# Patient Record
Sex: FEMALE | Race: WHITE | Employment: OTHER | URBAN - METROPOLITAN AREA
[De-identification: names, ages, dates, MRNs, and addresses within clinical notes are randomized per-mention and may not be internally consistent; named-entity substitution may affect disease eponyms.]

---

## 2017-02-01 ENCOUNTER — ANESTHESIA EVENT (OUTPATIENT)
Dept: ENDOSCOPY | Age: 77
End: 2017-02-01
Payer: MEDICARE

## 2017-02-02 ENCOUNTER — ANESTHESIA (OUTPATIENT)
Dept: ENDOSCOPY | Age: 77
End: 2017-02-02
Payer: MEDICARE

## 2017-02-02 PROCEDURE — 74011250636 HC RX REV CODE- 250/636

## 2017-02-02 RX ORDER — PROPOFOL 10 MG/ML
INJECTION, EMULSION INTRAVENOUS AS NEEDED
Status: DISCONTINUED | OUTPATIENT
Start: 2017-02-02 | End: 2017-02-02 | Stop reason: HOSPADM

## 2017-02-02 RX ORDER — PROPOFOL 10 MG/ML
INJECTION, EMULSION INTRAVENOUS
Status: DISCONTINUED | OUTPATIENT
Start: 2017-02-02 | End: 2017-02-02 | Stop reason: HOSPADM

## 2017-02-02 RX ADMIN — PROPOFOL 200 MCG/KG/MIN: 10 INJECTION, EMULSION INTRAVENOUS at 13:38

## 2017-02-02 RX ADMIN — PROPOFOL 60 MG: 10 INJECTION, EMULSION INTRAVENOUS at 13:38

## 2017-02-02 NOTE — ANESTHESIA PREPROCEDURE EVALUATION
Anesthetic History   No history of anesthetic complications            Review of Systems / Medical History  Patient summary reviewed and pertinent labs reviewed    Pulmonary  Within defined limits                 Neuro/Psych   Within defined limits           Cardiovascular    Hypertension: well controlled              Exercise tolerance: >4 METS     GI/Hepatic/Renal     GERD: well controlled           Endo/Other    Diabetes: type 2  Hypothyroidism: well controlled  Obesity     Other Findings   Comments: Current Smoker? NO       Elective Surgery? Yes       Abstained from smoking 24 hours prior to anesthesia? N/A    Risk Factors for Postoperative nausea/vomiting:       History of postoperative nausea/vomiting? NO       Female? YES       Motion sickness? NO       Intended opioid administration for postoperative analgesia?   NO           Physical Exam    Airway  Mallampati: I  TM Distance: 4 - 6 cm  Neck ROM: normal range of motion   Mouth opening: Normal     Cardiovascular  Regular rate and rhythm,  S1 and S2 normal,  no murmur, click, rub, or gallop  Rhythm: regular  Rate: normal         Dental    Dentition: Caps/crowns     Pulmonary  Breath sounds clear to auscultation               Abdominal  GI exam deferred       Other Findings            Anesthetic Plan    ASA: 2  Anesthesia type: MAC          Induction: Intravenous  Anesthetic plan and risks discussed with: Patient

## 2017-04-20 ENCOUNTER — OFFICE VISIT (OUTPATIENT)
Dept: FAMILY MEDICINE CLINIC | Age: 77
End: 2017-04-20

## 2017-04-20 ENCOUNTER — HOSPITAL ENCOUNTER (OUTPATIENT)
Dept: LAB | Age: 77
Discharge: HOME | End: 2017-04-20
Payer: MEDICARE

## 2017-04-20 VITALS
OXYGEN SATURATION: 97 % | BODY MASS INDEX: 34.36 KG/M2 | RESPIRATION RATE: 16 BRPM | WEIGHT: 175 LBS | HEIGHT: 60 IN | HEART RATE: 72 BPM | DIASTOLIC BLOOD PRESSURE: 68 MMHG | SYSTOLIC BLOOD PRESSURE: 142 MMHG | TEMPERATURE: 97.7 F

## 2017-04-20 DIAGNOSIS — E11.9 DM TYPE 2, GOAL HBA1C < 7% (HCC): Primary | ICD-10-CM

## 2017-04-20 DIAGNOSIS — L29.9 PRURITIC DISORDER: ICD-10-CM

## 2017-04-20 DIAGNOSIS — N28.1 RENAL CYST: ICD-10-CM

## 2017-04-20 DIAGNOSIS — E11.9 DM TYPE 2, GOAL HBA1C < 7% (HCC): ICD-10-CM

## 2017-04-20 DIAGNOSIS — R10.10 PAIN OF UPPER ABDOMEN: ICD-10-CM

## 2017-04-20 DIAGNOSIS — E03.4 HYPOTHYROIDISM DUE TO ACQUIRED ATROPHY OF THYROID: ICD-10-CM

## 2017-04-20 DIAGNOSIS — K57.30 DIVERTICULOSIS OF LARGE INTESTINE WITHOUT HEMORRHAGE: ICD-10-CM

## 2017-04-20 DIAGNOSIS — I10 ESSENTIAL HYPERTENSION: ICD-10-CM

## 2017-04-20 LAB
ALBUMIN SERPL BCP-MCNC: 3.9 G/DL (ref 3.4–5)
ALBUMIN/GLOB SERPL: 0.9 {RATIO} (ref 0.8–1.7)
ALP SERPL-CCNC: 86 U/L (ref 45–117)
ALT SERPL-CCNC: 18 U/L (ref 13–56)
ANION GAP BLD CALC-SCNC: 13 MMOL/L (ref 3–18)
APPEARANCE UR: CLEAR
AST SERPL W P-5'-P-CCNC: 14 U/L (ref 15–37)
BASOPHILS # BLD AUTO: 0 K/UL (ref 0–0.06)
BASOPHILS # BLD: 0 % (ref 0–2)
BILIRUB SERPL-MCNC: 0.4 MG/DL (ref 0.2–1)
BILIRUB UR QL: NEGATIVE
BUN SERPL-MCNC: 14 MG/DL (ref 7–18)
BUN/CREAT SERPL: 15 (ref 12–20)
CALCIUM SERPL-MCNC: 9.4 MG/DL (ref 8.5–10.1)
CHLORIDE SERPL-SCNC: 103 MMOL/L (ref 100–108)
CHOLEST SERPL-MCNC: 146 MG/DL
CO2 SERPL-SCNC: 25 MMOL/L (ref 21–32)
COLOR UR: YELLOW
CREAT SERPL-MCNC: 0.94 MG/DL (ref 0.6–1.3)
DIFFERENTIAL METHOD BLD: NORMAL
EOSINOPHIL # BLD: 0.3 K/UL (ref 0–0.4)
EOSINOPHIL NFR BLD: 3 % (ref 0–5)
ERYTHROCYTE [DISTWIDTH] IN BLOOD BY AUTOMATED COUNT: 13.2 % (ref 11.6–14.5)
EST. AVERAGE GLUCOSE BLD GHB EST-MCNC: 137 MG/DL
GLOBULIN SER CALC-MCNC: 4.2 G/DL (ref 2–4)
GLUCOSE SERPL-MCNC: 112 MG/DL (ref 74–99)
GLUCOSE UR STRIP.AUTO-MCNC: NEGATIVE MG/DL
HBA1C MFR BLD: 6.4 % (ref 4.2–5.6)
HCT VFR BLD AUTO: 41.8 % (ref 35–45)
HDLC SERPL-MCNC: 57 MG/DL (ref 40–60)
HDLC SERPL: 2.6 {RATIO} (ref 0–5)
HGB BLD-MCNC: 13.6 G/DL (ref 12–16)
HGB UR QL STRIP: NEGATIVE
KETONES UR QL STRIP.AUTO: NEGATIVE MG/DL
LDLC SERPL CALC-MCNC: 68 MG/DL (ref 0–100)
LEUKOCYTE ESTERASE UR QL STRIP.AUTO: NEGATIVE
LIPASE SERPL-CCNC: 247 U/L (ref 73–393)
LIPID PROFILE,FLP: NORMAL
LYMPHOCYTES # BLD AUTO: 28 % (ref 21–52)
LYMPHOCYTES # BLD: 2.5 K/UL (ref 0.9–3.6)
MCH RBC QN AUTO: 28.8 PG (ref 24–34)
MCHC RBC AUTO-ENTMCNC: 32.5 G/DL (ref 31–37)
MCV RBC AUTO: 88.4 FL (ref 74–97)
MONOCYTES # BLD: 0.4 K/UL (ref 0.05–1.2)
MONOCYTES NFR BLD AUTO: 5 % (ref 3–10)
NEUTS SEG # BLD: 5.8 K/UL (ref 1.8–8)
NEUTS SEG NFR BLD AUTO: 64 % (ref 40–73)
NITRITE UR QL STRIP.AUTO: NEGATIVE
PH UR STRIP: 5 [PH] (ref 5–8)
PLATELET # BLD AUTO: 236 K/UL (ref 135–420)
PMV BLD AUTO: 10.6 FL (ref 9.2–11.8)
POTASSIUM SERPL-SCNC: 4.6 MMOL/L (ref 3.5–5.5)
PROT SERPL-MCNC: 8.1 G/DL (ref 6.4–8.2)
PROT UR STRIP-MCNC: NEGATIVE MG/DL
RBC # BLD AUTO: 4.73 M/UL (ref 4.2–5.3)
SODIUM SERPL-SCNC: 141 MMOL/L (ref 136–145)
SP GR UR REFRACTOMETRY: 1.01 (ref 1–1.03)
TRIGL SERPL-MCNC: 105 MG/DL (ref ?–150)
TSH SERPL DL<=0.05 MIU/L-ACNC: 4.76 UIU/ML (ref 0.36–3.74)
UROBILINOGEN UR QL STRIP.AUTO: 0.2 EU/DL (ref 0.2–1)
VLDLC SERPL CALC-MCNC: 21 MG/DL
WBC # BLD AUTO: 9 K/UL (ref 4.6–13.2)

## 2017-04-20 PROCEDURE — 82043 UR ALBUMIN QUANTITATIVE: CPT | Performed by: INTERNAL MEDICINE

## 2017-04-20 PROCEDURE — 80061 LIPID PANEL: CPT | Performed by: INTERNAL MEDICINE

## 2017-04-20 PROCEDURE — 83690 ASSAY OF LIPASE: CPT | Performed by: INTERNAL MEDICINE

## 2017-04-20 PROCEDURE — 81003 URINALYSIS AUTO W/O SCOPE: CPT | Performed by: INTERNAL MEDICINE

## 2017-04-20 PROCEDURE — 83036 HEMOGLOBIN GLYCOSYLATED A1C: CPT | Performed by: INTERNAL MEDICINE

## 2017-04-20 PROCEDURE — 80053 COMPREHEN METABOLIC PANEL: CPT | Performed by: INTERNAL MEDICINE

## 2017-04-20 PROCEDURE — 85025 COMPLETE CBC W/AUTO DIFF WBC: CPT | Performed by: INTERNAL MEDICINE

## 2017-04-20 PROCEDURE — 84443 ASSAY THYROID STIM HORMONE: CPT | Performed by: INTERNAL MEDICINE

## 2017-04-20 PROCEDURE — 36415 COLL VENOUS BLD VENIPUNCTURE: CPT | Performed by: INTERNAL MEDICINE

## 2017-04-20 RX ORDER — LOSARTAN POTASSIUM AND HYDROCHLOROTHIAZIDE 12.5; 5 MG/1; MG/1
1 TABLET ORAL DAILY
Qty: 30 TAB | Refills: 3 | Status: SHIPPED | OUTPATIENT
Start: 2017-04-20 | End: 2017-05-17 | Stop reason: DRUGHIGH

## 2017-04-20 RX ORDER — DOCUSATE SODIUM 100 MG/1
100 CAPSULE, LIQUID FILLED ORAL 2 TIMES DAILY
Qty: 60 CAP | Refills: 2 | Status: SHIPPED | OUTPATIENT
Start: 2017-04-20 | End: 2017-07-19

## 2017-04-20 RX ORDER — HYDROXYZINE HYDROCHLORIDE 10 MG/1
10 TABLET, FILM COATED ORAL
Qty: 30 TAB | Refills: 3 | Status: SHIPPED | OUTPATIENT
Start: 2017-04-20 | End: 2017-04-30

## 2017-04-20 NOTE — PROGRESS NOTES
Jerad Hardy is a 67 y.o.  female and presents with     Chief Complaint   Patient presents with    Diabetes    Hypothyroidism    Cholesterol Problem    Abdominal Pain       Pt is here to establish care. Pt used to see Dr Redd Burr. Pt has h/o DM, HTN,Hyperchol, hypothyroidsim. Pt was admitted to hospital in November fofr abdominal pain. Pt was treated for diverticulitis . Pt als ohad dialted CBD and had pancreatitis. Pt was supposed to get gallbladder removed at a later date. Pt had cyst in ovary - pt was referred to Obgyn. Pt has thickened endomterium - pt is supposed to get hypsteroscoy and D and C. Pt feels okay today. No chests pain ,SOB. Blood pressure and DM are doing okay      Past Medical History:   Diagnosis Date    Arthritis     Bronchitis     Diabetes ([address]     Diverticulitis     GERD (gastroesophageal reflux disease)     Hypercholesterolemia     Hypertension     Hypothyroid     Pancreatitis      Past Surgical History:   Procedure Laterality Date    COLONOSCOPY N/A 2/2/2017    COLONOSCOPY  w/bx polyp performed by Katherine Acosta MD at [facility] ENDOSCOPY     Current Outpatient Prescriptions   Medication Sig    losartan-hydroCHLOROthiazide (HYZAAR) 50-12.5 mg per tablet Take 1 Tab by mouth daily.  hydrOXYzine HCl (ATARAX) 10 mg tablet Take 1 Tab by mouth three (3) times daily as needed for Itching for up to 10 days.  docusate sodium (COLACE) 100 mg capsule Take 1 Cap by mouth two (2) times a day for 90 days.  levothyroxine (SYNTHROID) 25 mcg tablet Take  by mouth Daily (before breakfast).  cholecalciferol, vitamin D3, (VITAMIN D3) 2,000 unit tab Take  by mouth.  metFORMIN (GLUCOPHAGE) 1,000 mg tablet Take 1,000 mg by mouth two (2) times daily (with meals).  glipiZIDE (GLUCOTROL) 5 mg tablet Take 5 mg by mouth two (2) times a day.  simvastatin (ZOCOR) 20 mg tablet Take 20 mg by mouth nightly.  famotidine (PEPCID) 20 mg tablet Take 20 mg by mouth two (2) times a day.  loperamide (IMODIUM) 2 mg capsule Take 2 mg by mouth four (4) times daily as needed. No current facility-administered medications for this visit. Health Maintenance   Topic Date Due    FOOT EXAM Q1  07/04/1950    MICROALBUMIN Q1  [birth year]04/1950    EYE EXAM RETINAL OR DILATED Q1  07[birth year]04/1949    DTaP/Tdap/Td series (1 - Tdap) 07/04/1960    ZOSTER VACCINE AGE 60>  07/04/1999    GLAUCOMA SCREENING Q2Y  07/04/2004    OSTEOPOROSIS SCREENING (DEXA)  07/04/2004    Pneumococcal 65+ Low/Medium Risk (1 of 2 - PCV13) 07/04/2004    MEDICARE YEARLY EXAM  07/04/2004    HEMOGLOBIN A1C Q6M  05/07/2017    LIPID PANEL Q1  11/07/2017    INFLUENZA AGE 9 TO ADULT  Completed     Immunization History   Administered Date(s) Administered    Influenza Vaccine (Quad) PF 11/09/2016     No LMP recorded. Patient is postmenopausal.        Allergies and Intolerances: Allergies   Allergen Reactions    Ampicillin Itching       Family History:   Family History   Problem Relation Age of Onset    Diabetes Mother     Heart Disease Mother     Cancer Father      melanoma    Stroke Sister     Hypertension Sister     Diabetes Sister     Hypertension Brother     Diabetes Brother     Breast Cancer Paternal Grandmother      older, but not sure age.  Breast Cancer Paternal Aunt      and gyn cancer ?age       Social History:   She  reports that she has never smoked. She has never used smokeless tobacco.  She  reports that she does not drink alcohol.             Review of Systems:   General: negative for - chills, fatigue, fever, weight change  Psych: negative for - anxiety, depression, irritability or mood swings  ENT: negative for - headaches, hearing change, nasal congestion, oral lesions, sneezing or sore throat  Heme/ Lymph: negative for - bleeding problems, bruising, pallor or swollen lymph nodes  Endo: negative for - hot flashes, polydipsia/polyuria or temperature intolerance  Resp: negative for - cough, shortness of breath or wheezing  CV: negative for - chest pain, edema or palpitations  GI: negative for - abdominal pain, change in bowel habits, constipation, diarrhea or nausea/vomiting  : negative for - dysuria, hematuria, incontinence, pelvic pain or vulvar/vaginal symptoms  MSK: negative for - joint pain, joint swelling or muscle pain  Neuro: negative for - confusion, headaches, seizures or weakness  Derm: negative for - dry skin, hair changes, rash or skin lesion changes          Physical:   Vitals:   Vitals:    04/20/17 1016 04/20/17 1028   BP: 146/60 142/68   Pulse: 72    Resp: 16    Temp: 97.7 °F (36.5 °C)    TempSrc: Oral    SpO2: 97%    Weight: 175 lb (79.4 kg)    Height: 5' (1.524 m)            Exam:   HEENT- atraumatic,normocephalic, awake, oriented, well nourished  Neck - supple,no enlarged lymph nodes, no JVD, no thyromegaly  Chest- CTA, no rhonchi, no crackles  Heart- rrr, no murmurs / gallop/rub  Abdomen- soft,BS+,NT, no hepatosplenomegaly,mild distension   Ext - no c/c/edema   Neuro- no focal deficits. Power 5/5 all extremities  Skin - warm,dry, no obvious rashes.           Review of Data:   LABS:   Lab Results   Component Value Date/Time    WBC 6.9 11/09/2016 03:50 AM    HGB 10.6 11/09/2016 03:50 AM    HCT 32.3 11/09/2016 03:50 AM    PLATELET 845 08/29/1116 03:50 AM     Lab Results   Component Value Date/Time    Sodium 142 11/09/2016 03:50 AM    Potassium 3.8 11/09/2016 03:50 AM    Chloride 108 11/09/2016 03:50 AM    CO2 25 11/09/2016 03:50 AM    Glucose 122 11/09/2016 03:50 AM    BUN 9 11/09/2016 03:50 AM    Creatinine 0.78 11/09/2016 03:50 AM     Lab Results   Component Value Date/Time    Cholesterol, total 142 11/07/2016 03:49 AM    HDL Cholesterol 51 11/07/2016 03:49 AM    LDL, calculated 71.4 11/07/2016 03:49 AM    Triglyceride 98 11/07/2016 03:49 AM     No results found for: GPT        Impression / Plan:        ICD-10-CM ICD-9-CM    1. DM type 2, goal HbA1c < 7% ([facility]) E11.9 250.00 HEMOGLOBIN A1C WITH EAG METABOLIC PANEL, COMPREHENSIVE      LIPID PANEL      MICROALBUMIN, UR, RAND W/ MICROALBUMIN/CREA RATIO      CBC WITH AUTOMATED DIFF   2. Essential hypertension I10 401.9 losartan-hydroCHLOROthiazide (HYZAAR) 50-12.5 mg per tablet   3. Hypothyroidism due to acquired atrophy of thyroid E03.4 244.8 TSH 3RD GENERATION     246.8    4. Pain of upper abdomen R10.10 789.09 US ABD COMP      LIPASE   5. Diverticulosis of large intestine without hemorrhage K57.30 562.10 docusate sodium (COLACE) 100 mg capsule   6. Pruritic disorder L29.9 698.9 hydrOXYzine HCl (ATARAX) 10 mg tablet         Explained to patient risk benefits of the medications. Advised patient to stop meds if having any side effects. Pt verbalized understanding of the instructions. I have discussed the diagnosis with the patient and the intended plan as seen in the above orders. The patient has received an after-visit summary and questions were answered concerning future plans. I have discussed medication side effects and warnings with the patient as well. I have reviewed the plan of care with the patient, accepted their input and they are in agreement with the treatment goals. Reviewed plan of care. Patient has provided input and agrees with goals.     Follow-up Disposition: Not on Chan Edwards MD

## 2017-04-20 NOTE — MR AVS SNAPSHOT
Visit Information Date & Time Provider Department Dept. Phone Encounter #  
 4/20/2017 10:30 AM Heather Gusman [address] [phone] [id number] Follow-up Instructions Return in about 2 weeks (around 5/4/2017). Upcoming Health Maintenance Date Due  
 FOOT EXAM Q1 7/4/1950 MICROALBUMIN Q1 7/4/1950 EYE EXAM RETINAL OR DILATED Q1 7[birth year]4/1950 DTaP/Tdap/Td series (1 - Tdap) 7/4/1960 ZOSTER VACCINE AGE 60> 7/4/1999 GLAUCOMA SCREENING Q2Y 7/4/2004 OSTEOPOROSIS SCREENING (DEXA) 7/4/2004 Pneumococcal 65+ Low/Medium Risk (1 of 2 - PCV13) 7/4/2004 MEDICARE YEARLY EXAM 7/4/2004 HEMOGLOBIN A1C Q6M 5/7/2017 LIPID PANEL Q1 11/7/2017 Allergies as of 4/20/2017  Review Complete On: 4/20/2017 By: Heather Gusman MD  
  
 Severity Noted Reaction Type Reactions Ampicillin  08/22/2013    Itching Current Immunizations  Never Reviewed Name Date Influenza Vaccine (Quad) PF 11/9/2016 11:56 AM  
  
 Not reviewed this visit You Were Diagnosed With   
  
 Codes Comments DM type 2, goal HbA1c < 7% (HCC)    -  Primary ICD-10-CM: E11.9 ICD-9-CM: 250.00 Essential hypertension     ICD-10-CM: I10 
ICD-9-CM: 401.9 Hypothyroidism due to acquired atrophy of thyroid     ICD-10-CM: E03.4 ICD-9-CM: 244.8, 246.8 Pain of upper abdomen     ICD-10-CM: R10.10 ICD-9-CM: 789.09 Diverticulosis of large intestine without hemorrhage     ICD-10-CM: K57.30 ICD-9-CM: 562.10 Pruritic disorder     ICD-10-CM: L29.9 ICD-9-CM: 698.9 Renal cyst     ICD-10-CM: N28.1 ICD-9-CM: 753.10 Vitals BP Pulse Temp Resp Height(growth percentile) Weight(growth percentile) 142/68 72 97.7 °F (36.5 °C) (Oral) 16 5' (1.524 m) 175 lb (79.4 kg) SpO2 BMI OB Status Smoking Status 97% 34.18 kg/m2 Postmenopausal Never Smoker Vitals History BMI and BSA Data  Body Mass Index Body Surface Area  
 34.18 kg/m 2 1.83 m 2  
  
  
 Preferred Pharmacy Pharmacy Name Phone [facility] - [place], [address] [phone] Your Updated Medication List  
  
   
This list is accurate as of: 4/20/17 11:14 AM.  Always use your most recent med list.  
  
  
  
  
 docusate sodium 100 mg capsule Commonly known as:  Simonton Aldair Take 1 Cap by mouth two (2) times a day for 90 days. glipiZIDE 5 mg tablet Commonly known as:  Daphene Jang Take 5 mg by mouth two (2) times a day.  
  
 hydrOXYzine HCl 10 mg tablet Commonly known as:  ATARAX Take 1 Tab by mouth three (3) times daily as needed for Itching for up to 10 days. levothyroxine 25 mcg tablet Commonly known as:  SYNTHROID Take  by mouth Daily (before breakfast). loperamide 2 mg capsule Commonly known as:  IMODIUM Take 2 mg by mouth four (4) times daily as needed. losartan-hydroCHLOROthiazide 50-12.5 mg per tablet Commonly known as:  HYZAAR Take 1 Tab by mouth daily. metFORMIN 1,000 mg tablet Commonly known as:  GLUCOPHAGE Take 1,000 mg by mouth two (2) times daily (with meals). PEPCID 20 mg tablet Generic drug:  famotidine Take 20 mg by mouth two (2) times a day. simvastatin 20 mg tablet Commonly known as:  ZOCOR Take 20 mg by mouth nightly. VITAMIN D3 2,000 unit Tab Generic drug:  cholecalciferol (vitamin D3) Take  by mouth. Prescriptions Sent to Pharmacy Refills  
 losartan-hydroCHLOROthiazide (HYZAAR) 50-12.5 mg per tablet 3 Sig: Take 1 Tab by mouth daily. Class: Normal  
 [facility] [id number] [facility] [address] Ph #: [phone] Route: Oral  
 hydrOXYzine HCl (ATARAX) 10 mg tablet 3 Sig: Take 1 Tab by mouth three (3) times daily as needed for Itching for up to 10 days. Class: Normal  
 [id number] [facility] [address] Ph #: [phone]  Route: Oral  
 docusate sodium (COLACE) 100 mg capsule 2  
 Sig: Take 1 Cap by mouth two (2) times a day for 90 days. Class: Normal  
 Pharmacy: [address]  #: [phone] Route: Oral  
  
Follow-up Instructions Return in about 2 weeks (around 5/4/2017). To-Do List   
 04/20/2017 Lab:  CBC WITH AUTOMATED DIFF   
  
 04/20/2017 Lab:  HEMOGLOBIN A1C WITH EAG   
  
 04/20/2017 Lab:  LIPASE   
  
 04/20/2017 Lab:  LIPID PANEL   
  
 04/20/2017 Lab:  METABOLIC PANEL, COMPREHENSIVE   
  
 04/20/2017 Lab:  MICROALBUMIN, UR, RAND W/ MICROALBUMIN/CREA RATIO   
  
 04/20/2017 Lab:  TSH 3RD GENERATION   
  
 04/20/2017 Lab:  URINALYSIS W/ RFLX MICROSCOPIC   
  
 04/20/2017 Imaging:  [facility]! Alissa Yousif introduces [employer] patient portal. Now you can access parts of your medical record, email your doctor's office, and request medication refills online. 1. In your internet browser, go to [email or web address] 2. Click on the First Time User? Click Here link in the Sign In box. You will see the New Member Sign Up page. 3. Enter your [employer] Access Code exactly as it appears below. You will not need to use this code after youve completed the sign-up process. If you do not sign up before the expiration date, you must request a new code. · [employer] Access Code: [email or web address] Expires: 5/8/2017  4:23 PM 
 
4. Enter the last four digits of your Social Security Number (xxxx) and Date of Birth (mm/dd/yyyy) as indicated and click Submit. You will be taken to the next sign-up page. 5. Create a [employer] ID. This will be your [employer] login ID and cannot be changed, so think of one that is secure and easy to remember. 6. Create a [employer] password. You can change your password at any time. 7. Enter your Password Reset Question and Answer. This can be used at a later time if you forget your password. 8. Enter your e-mail address. You will receive e-mail notification when new information is available in [address] 9. Click Sign Up. You can now view and download portions of your medical record. 10. Click the Download Summary menu link to download a portable copy of your medical information. If you have questions, please visit the Frequently Asked Questions section of the APR website. Remember, APR is NOT to be used for urgent needs. For medical emergencies, dial 911. Now available from your iPhone and Android! Please provide this summary of care documentation to your next provider. Your primary care clinician is listed as Shakila Anders. If you have any questions after today's visit, please call [phone].

## 2017-04-21 LAB
CREAT UR-MCNC: 114 MG/DL (ref 30–125)
MICROALBUMIN UR-MCNC: 9.7 MG/DL (ref 0–3)
MICROALBUMIN/CREAT UR-RTO: 85 MG/G (ref 0–30)

## 2017-05-01 ENCOUNTER — HOSPITAL ENCOUNTER (OUTPATIENT)
Dept: ULTRASOUND IMAGING | Age: 77
Discharge: HOME | End: 2017-05-01
Attending: INTERNAL MEDICINE
Payer: MEDICARE

## 2017-05-01 DIAGNOSIS — R10.10 PAIN OF UPPER ABDOMEN: ICD-10-CM

## 2017-05-01 PROCEDURE — 76700 US EXAM ABDOM COMPLETE: CPT

## 2017-05-03 ENCOUNTER — OFFICE VISIT (OUTPATIENT)
Dept: FAMILY MEDICINE CLINIC | Age: 77
End: 2017-05-03

## 2017-05-03 VITALS
BODY MASS INDEX: 34.36 KG/M2 | RESPIRATION RATE: 18 BRPM | DIASTOLIC BLOOD PRESSURE: 66 MMHG | OXYGEN SATURATION: 96 % | TEMPERATURE: 96.4 F | HEART RATE: 70 BPM | HEIGHT: 60 IN | SYSTOLIC BLOOD PRESSURE: 136 MMHG | WEIGHT: 175 LBS

## 2017-05-03 DIAGNOSIS — Z71.89 ADVANCE CARE PLANNING: ICD-10-CM

## 2017-05-03 DIAGNOSIS — K59.00 CONSTIPATION, UNSPECIFIED CONSTIPATION TYPE: ICD-10-CM

## 2017-05-03 DIAGNOSIS — Z13.39 SCREENING FOR ALCOHOLISM: ICD-10-CM

## 2017-05-03 DIAGNOSIS — Z00.00 MEDICARE ANNUAL WELLNESS VISIT, SUBSEQUENT: Primary | ICD-10-CM

## 2017-05-03 DIAGNOSIS — Z23 ENCOUNTER FOR IMMUNIZATION: ICD-10-CM

## 2017-05-03 DIAGNOSIS — Z00.00 ROUTINE GENERAL MEDICAL EXAMINATION AT A HEALTH CARE FACILITY: ICD-10-CM

## 2017-05-03 DIAGNOSIS — E03.4 HYPOTHYROIDISM DUE TO ACQUIRED ATROPHY OF THYROID: ICD-10-CM

## 2017-05-03 RX ORDER — LEVOTHYROXINE SODIUM 50 UG/1
50 TABLET ORAL
Qty: 30 TAB | Refills: 3 | Status: SHIPPED | OUTPATIENT
Start: 2017-05-03 | End: 2017-05-05 | Stop reason: DRUGHIGH

## 2017-05-03 NOTE — MR AVS SNAPSHOT
Visit Information Date & Time Provider Department Dept. Phone Encounter #  
 5/3/2017 11:00 AM [address], [address] [facility] [phone] [id number] Follow-up Instructions Return in 3 months (on 8/3/2017). Upcoming Health Maintenance Date Due  
 FOOT EXAM Q1 7/4/1950 EYE EXAM RETINAL OR DILATED Q1 7/4/1950 DTaP/Tdap/Td series (1 - Tdap) 7/4/1960 ZOSTER VACCINE AGE 60> 7/4/1999 GLAUCOMA SCREENING Q2Y 7/4/2004 OSTEOPOROSIS SCREENING (DEXA) 7/4/2004 Pneumococcal 65+ Low/Medium Risk (1 of 2 - PCV13) 7/4/2004 MEDICARE YEARLY EXAM 7/4/2004 INFLUENZA AGE 9 TO ADULT 8/1/2017 HEMOGLOBIN A1C Q6M 10/20/2017 MICROALBUMIN Q1 4/20/2018 LIPID PANEL Q1 4/20/2018 Allergies as of 5/3/2017  Review Complete On: 5/3/2017 By: Yumiko Pimentel LPN Severity Noted Reaction Type Reactions Ampicillin  08/22/2013    Itching Current Immunizations  Never Reviewed Name Date Influenza Vaccine (Quad) PF 11/9/2016 11:56 AM  
  
 Not reviewed this visit You Were Diagnosed With   
  
 Codes Comments Medicare annual wellness visit, subsequent    -  Primary ICD-10-CM: Z00.00 ICD-9-CM: V70.0 Hypothyroidism due to acquired atrophy of thyroid     ICD-10-CM: E03.4 ICD-9-CM: 244.8, 246.8 Advance care planning     ICD-10-CM: Z71.89 ICD-9-CM: V65.49 Encounter for immunization     ICD-10-CM: L35 ICD-9-CM: V03.89 Routine general medical examination at a health care facility     ICD-10-CM: Z00.00 ICD-9-CM: V70.0 Screening for alcoholism     ICD-10-CM: Z13.89 ICD-9-CM: V79.1 Vitals BP Pulse Temp Resp Height(growth percentile) Weight(growth percentile) 136/66 70 96.4 °F (35.8 °C) (Oral) 18 5' (1.524 m) 175 lb (79.4 kg) SpO2 BMI OB Status Smoking Status 96% 34.18 kg/m2 Postmenopausal Never Smoker Vitals History BMI and BSA Data  Body Mass Index Body Surface Area  
 34.18 kg/m 2 1.83 m 2  
  
  
 Preferred Pharmacy Pharmacy Name Phone [facility], [address] [place] [address] [phone] Your Updated Medication List  
  
   
This list is accurate as of: 5/3/17 11:52 AM.  Always use your most recent med list.  
  
  
  
  
 diph,Pertuss(AC),Tet Vac-PF 2.5-8-5 Lf-mcg suspension Commonly known as:  BOOSTRIX  
0.5 mL by IntraMUSCular route PRIOR TO DISCHARGE for 1 dose. docusate sodium 100 mg capsule Commonly known as:  Genice Flax Take 1 Cap by mouth two (2) times a day for 90 days. glipiZIDE 5 mg tablet Commonly known as:  Pilar Craw Take 5 mg by mouth two (2) times a day. levothyroxine 50 mcg tablet Commonly known as:  SYNTHROID Take 1 Tab by mouth Daily (before breakfast). loperamide 2 mg capsule Commonly known as:  IMODIUM Take 2 mg by mouth four (4) times daily as needed. losartan-hydroCHLOROthiazide 50-12.5 mg per tablet Commonly known as:  HYZAAR Take 1 Tab by mouth daily. metFORMIN 1,000 mg tablet Commonly known as:  GLUCOPHAGE Take 1,000 mg by mouth two (2) times daily (with meals). PEPCID 20 mg tablet Generic drug:  famotidine Take 20 mg by mouth two (2) times a day. simvastatin 20 mg tablet Commonly known as:  ZOCOR Take 20 mg by mouth nightly. varicella zoster vacine live 19,400 unit/0.65 mL Susr injection Commonly known as:  ZOSTAVAX  
1 Vial by SubCUTAneous route once for 1 dose. VITAMIN D3 2,000 unit Tab Generic drug:  cholecalciferol (vitamin D3) Take  by mouth. Prescriptions Printed Refills diph,Pertuss,AC,,Tet Vac-PF (BOOSTRIX) 2.5-8-5 Lf-mcg suspension 0 Si.5 mL by IntraMUSCular route PRIOR TO DISCHARGE for 1 dose. Class: Print Route: IntraMUSCular  
 varicella zoster vacine live (ZOSTAVAX) 19,400 unit/0.65 mL susr injection 0 Si Vial by SubCUTAneous route once for 1 dose. Class: Print Route: SubCUTAneous Prescriptions Sent to Pharmacy Refills  
 levothyroxine (SYNTHROID) 50 mcg tablet 3 Sig: Take 1 Tab by mouth Daily (before breakfast). Class: Normal  
 Pharmacy: [facility], [address] [place] [address] #: [phone] Route: Oral  
  
We Performed the Following ADVANCE CARE PLANNING FIRST 30 MINS [21159 CPT(R)] Follow-up Instructions Return in 3 months (on 8/3/2017). Patient Instructions Medicare Wellness Visit, Female The best way to live healthy is to have a healthy lifestyle by eating a well-balanced diet, exercising regularly, limiting alcohol and stopping smoking. Regular physical exams and screening tests are another way to keep healthy. Preventive exams provided by your health care provider can find health problems before they become diseases or illnesses. Preventive services including immunizations, screening tests, monitoring and exams can help you take care of your own health. All people over age 72 should have a pneumovax  and and a prevnar shot to prevent pneumonia. These are once in a lifetime unless you and your provider decide differently. All people over 65 should have a yearly flu shot and a tetanus vaccine every 10 years. A bone mass density to screen for osteoporosis or thinning of the bones should be done every 2 years after 65. Screening for diabetes mellitus with a blood sugar test should be done every year. Glaucoma is a disease of the eye due to increased ocular pressure that can lead to blindness and it should be done every year by an eye professional. 
 
Cardiovascular screening tests that check for elevated lipids (fatty part of blood) which can lead to heart disease and strokes should be done every 5 years.  
 
Colorectal screening that evaluates for blood or polyps in your colon should be done yearly as a stool test or every five years as a flexible sigmoidoscope or every 10 years as a colonoscopy up to age 76. Breast cancer screening with a mammogram is recommended biennially  for women age 54-69. Screening for cervical cancer with a pap smear and pelvic exam is recommended for women after age 72 years every 2 years up to age 79 or when the provider and patient decide to stop. If there is a history of cervical abnormalities or other increased risk for cancer then the test is recommended yearly. Hepatitis C screening is also recommended for anyone born between [birth year] through [address]. A shingles vaccine is also recommended once in a lifetime after age 61. Your Medicare Wellness Exam is recommended annually. Here is a list of your current Health Maintenance items with a due date: 
Health Maintenance Due Topic Date Due  
 Diabetic Foot Care  07[birth year]04/1950 [facility] Eye Exam  07/04/1950  
 DTaP/Tdap/Td  (1 - Tdap) 07/04/1960  Shingles Vaccine  07/04/1999  Glaucoma Screening   07/04/2004  Bone Density Screening  07/04/2004  Pneumococcal Vaccine (1 of 2 - PCV13) 07/04/2004 [facility] Annual Well Visit  07/04/2004 Introducing [facility]! Citlalli Couch introduces [employer] patient portal. Now you can access parts of your medical record, email your doctor's office, and request medication refills online. 1. In your internet browser, go to [email or web address] 2. Click on the First Time User? Click Here link in the Sign In box. You will see the New Member Sign Up page. 3. Enter your [employer] Access Code exactly as it appears below. You will not need to use this code after youve completed the sign-up process. If you do not sign up before the expiration date, you must request a new code. · [employer] Access Code: [email or web address] Expires: 5/8/2017  4:23 PM 
 
4.  Enter the last four digits of your Social Security Number (xxxx) and Date of Birth (mm/dd/yyyy) as indicated and click Submit. You will be taken to the next sign-up page. 5. Create a [employer] ID. This will be your [employer] login ID and cannot be changed, so think of one that is secure and easy to remember. 6. Create a [employer] password. You can change your password at any time. 7. Enter your Password Reset Question and Answer. This can be used at a later time if you forget your password. 8. Enter your e-mail address. You will receive e-mail notification when new information is available in [address] 9. Click Sign Up. You can now view and download portions of your medical record. 10. Click the Download Summary menu link to download a portable copy of your medical information. If you have questions, please visit the Frequently Asked Questions section of the [employer] website. Remember, [employer] is NOT to be used for urgent needs. For medical emergencies, dial 911. Now available from your iPhone and Android! Please provide this summary of care documentation to your next provider. Your primary care clinician is listed as Elena Fleischer. If you have any questions after today's visit, please call [phone].

## 2017-05-03 NOTE — PATIENT INSTRUCTIONS
Medicare Wellness Visit, Female    The best way to live healthy is to have a healthy lifestyle by eating a well-balanced diet, exercising regularly, limiting alcohol and stopping smoking. Regular physical exams and screening tests are another way to keep healthy. Preventive exams provided by your health care provider can find health problems before they become diseases or illnesses. Preventive services including immunizations, screening tests, monitoring and exams can help you take care of your own health. All people over age 72 should have a pneumovax  and and a prevnar shot to prevent pneumonia. These are once in a lifetime unless you and your provider decide differently. All people over 65 should have a yearly flu shot and a tetanus vaccine every 10 years. A bone mass density to screen for osteoporosis or thinning of the bones should be done every 2 years after 65. Screening for diabetes mellitus with a blood sugar test should be done every year. Glaucoma is a disease of the eye due to increased ocular pressure that can lead to blindness and it should be done every year by an eye professional.    Cardiovascular screening tests that check for elevated lipids (fatty part of blood) which can lead to heart disease and strokes should be done every 5 years. Colorectal screening that evaluates for blood or polyps in your colon should be done yearly as a stool test or every five years as a flexible sigmoidoscope or every 10 years as a colonoscopy up to age 76. Breast cancer screening with a mammogram is recommended biennially  for women age 54-69. Screening for cervical cancer with a pap smear and pelvic exam is recommended for women after age 72 years every 2 years up to age 79 or when the provider and patient decide to stop. If there is a history of cervical abnormalities or other increased risk for cancer then the test is recommended yearly.     Hepatitis C screening is also recommended for anyone born between [facility]. A shingles vaccine is also recommended once in a lifetime after age 61. Your Medicare Wellness Exam is recommended annually.     Here is a list of your current Health Maintenance items with a due date:  Health Maintenance Due   Topic Date Due    Diabetic Foot Care  07/04/1950   Marie Gunning Eye Exam  07/04/1950    DTaP/Tdap/Td  (1 - Tdap) 07/04/1960    Shingles Vaccine  07/04/1999    Glaucoma Screening   07/04/2004    Bone Density Screening  07/04/2004    Pneumococcal Vaccine (1 of 2 - PCV13) 07/04/2004    Annual Well Visit  07/04/2004

## 2017-05-03 NOTE — PROGRESS NOTES
This is a Subsequent Medicare Annual Wellness Visit providing Personalized Prevention Plan Services (PPPS) (Performed 12 months after initial AWV and PPPS )    I have reviewed the patient's medical history in detail and updated the computerized patient record. History     Past Medical History:   Diagnosis Date    Arthritis     Bronchitis     Diabetes ([address]     Diverticulitis     GERD (gastroesophageal reflux disease)     Hypercholesterolemia     Hypertension     Hypothyroid     Pancreatitis       Past Surgical History:   Procedure Laterality Date    COLONOSCOPY N/A 2/2/2017    COLONOSCOPY  w/bx polyp performed by Melisa De La Rosa MD at [facility] ENDOSCOPY     Current Outpatient Prescriptions   Medication Sig Dispense Refill    diph,Pertuss,AC,,Tet Vac-PF (BOOSTRIX) 2.5-8-5 Lf-mcg suspension 0.5 mL by IntraMUSCular route PRIOR TO DISCHARGE for 1 dose. 0.5 mL 0    docusate sodium (COLACE) 100 mg capsule Take 1 Cap by mouth two (2) times a day for 90 days. 60 Cap 2    cholecalciferol, vitamin D3, (VITAMIN D3) 2,000 unit tab Take  by mouth.  metFORMIN (GLUCOPHAGE) 1,000 mg tablet Take 1,000 mg by mouth two (2) times daily (with meals).  glipiZIDE (GLUCOTROL) 5 mg tablet Take 5 mg by mouth two (2) times a day.  simvastatin (ZOCOR) 20 mg tablet Take 20 mg by mouth nightly.  loperamide (IMODIUM) 2 mg capsule Take 2 mg by mouth four (4) times daily as needed.  pantoprazole (PROTONIX) 40 mg tablet Take 1 Tab by mouth daily. 30 Tab 3    losartan (COZAAR) 50 mg tablet Take 1 Tab by mouth daily. 30 Tab 3    hydroCHLOROthiazide (HYDRODIURIL) 25 mg tablet Take 1 Tab by mouth daily. 30 Tab 3    levothyroxine (SYNTHROID) 75 mcg tablet Take 1 Tab by mouth Daily (before breakfast).  30 Tab 3     Allergies   Allergen Reactions    Ampicillin Itching     Family History   Problem Relation Age of Onset    Diabetes Mother     Heart Disease Mother     Cancer Father      melanoma    Stroke Sister  Hypertension Sister     Diabetes Sister     Hypertension Brother     Diabetes Brother     Breast Cancer Paternal Grandmother      older, but not sure age. 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 No    Do you average more than 7 drinks per week? No      Functional Ability and Level of Safety:     Hearing Loss   mild, none    Activities of Daily Living   Self-care. Requires assistance with: no ADLs    Fall Risk     Fall Risk Assessment, last 12 mths 4/20/2017   Able to walk? Yes   Fall in past 12 months? No     Abuse Screen   Patient is not abused    Review of Systems   A comprehensive review of systems was negative. Physical Examination     Evaluation of Cognitive Function:  Mood/affect:  neutral  Appearance: age appropriate  Family member/caregiver input: none    Visit Vitals    /66  Comment: left arm recheck    Pulse 70    Temp 96.4 °F (35.8 °C) (Oral)    Resp 18    Ht 5' (1.524 m)    Wt 175 lb (79.4 kg)    SpO2 96%    BMI 34.18 kg/m2     General:  Alert, cooperative, no distress, appears stated age.    Head:  Normocephalic, without obvious abnormality, atraumatic. Eyes:  Conjunctivae/corneas clear. PERRL, EOMs intact. Fundi benign. Ears:  Normal TMs and external ear canals both ears. Nose: Nares normal. Septum midline. Mucosa normal. No drainage or sinus tenderness. Throat: Lips, mucosa, and tongue normal. Teeth and gums normal.   Neck: Supple, symmetrical, trachea midline, no adenopathy, thyroid: no enlargement/tenderness/nodules, no carotid bruit and no JVD. Back:   Symmetric, no curvature. ROM normal. No CVA tenderness. Lungs:   Clear to auscultation bilaterally. Chest wall:  No tenderness or deformity. Heart:  Regular rate and rhythm, S1, S2 normal, no murmur, click, rub or gallop. Abdomen:   Soft, non-tender. Bowel sounds normal. No masses,  No organomegaly. Extremities: Extremities normal, atraumatic, no cyanosis or edema. Pulses: 2+ and symmetric all extremities. Skin: Skin color, texture, turgor normal. No rashes or lesions. Lymph nodes: Cervical, supraclavicular, and axillary nodes normal.   Neurologic: CNII-XII intact. Normal strength, sensation and reflexes throughout. Patient Care Team:  Chad Hough MD as PCP - General (Internal Medicine)  Herman Kelley MD (General Surgery)    Advice/Referrals/Counseling   Education and counseling provided:  Are appropriate based on today's review and evaluation      Assessment/Plan   current treatment plan is effective, no change in therapy.        ----------------------------------------------      Chronic Condition    Mumtaz Craig is a 65 y.o.  female and presents with     Chief Complaint   Patient presents with    Constipation    Gas    Hypothyroidism       Pt does have constipation but is not taking stool softeners. Pt is taking thyroid meds but labs are off. Pt is supposed to get D and C. Pt is here for clearance for surgery.       Past Medical History:   Diagnosis Date    Arthritis     Bronchitis     Diabetes ([address]     Diverticulitis  GERD (gastroesophageal reflux disease)     Hypercholesterolemia     Hypertension     Hypothyroid     Pancreatitis      Past Surgical History:   Procedure Laterality Date    COLONOSCOPY N/A 2/2/2017    COLONOSCOPY  w/bx polyp performed by Sanjana Tello [facility] at [facility] ENDOSCOPY     Current Outpatient Prescriptions   Medication Sig    diph,Pertuss,AC,,Tet Vac-PF (BOOSTRIX) 2.5-8-5 Lf-mcg suspension 0.5 mL by IntraMUSCular route PRIOR TO DISCHARGE for 1 dose.  docusate sodium (COLACE) 100 mg capsule Take 1 Cap by mouth two (2) times a day for 90 days.  cholecalciferol, vitamin D3, (VITAMIN D3) 2,000 unit tab Take  by mouth.  metFORMIN (GLUCOPHAGE) 1,000 mg tablet Take 1,000 mg by mouth two (2) times daily (with meals).  glipiZIDE (GLUCOTROL) 5 mg tablet Take 5 mg by mouth two (2) times a day.  simvastatin (ZOCOR) 20 mg tablet Take 20 mg by mouth nightly.  loperamide (IMODIUM) 2 mg capsule Take 2 mg by mouth four (4) times daily as needed.  pantoprazole (PROTONIX) 40 mg tablet Take 1 Tab by mouth daily.  losartan (COZAAR) 50 mg tablet Take 1 Tab by mouth daily.  hydroCHLOROthiazide (HYDRODIURIL) 25 mg tablet Take 1 Tab by mouth daily.  levothyroxine (SYNTHROID) 75 mcg tablet Take 1 Tab by mouth Daily (before breakfast). No current facility-administered medications for this visit.       Health Maintenance   Topic Date Due    FOOT EXAM Q1  07/04/1950    EYE EXAM RETINAL OR DILATED Q1  [birth year]04/1950    GLAUCOMA SCREENING Q2Y  07/04/2004    OSTEOPOROSIS SCREENING (DEXA)  07/04/2004    INFLUENZA AGE 9 TO ADULT  08/01/2017    HEMOGLOBIN A1C Q6M  10/20/2017    MICROALBUMIN Q1  04/20/2018    LIPID PANEL Q1  04/20/2018    MEDICARE YEARLY EXAM  05/04/2018    Pneumococcal 65+ Low/Medium Risk (2 of 2 - PPSV23) 05/17/2018    DTaP/Tdap/Td series (2 - Td) 05/13/2027    ZOSTER VACCINE AGE 60>  Completed     Immunization History   Administered Date(s) Administered    Influenza Vaccine (Quad) PF 11/09/2016    Pneumococcal Conjugate (PCV-13) 05/17/2017     No LMP recorded. Patient is postmenopausal.        Allergies and Intolerances: Allergies   Allergen Reactions    Ampicillin Itching       Family History:   Family History   Problem Relation Age of Onset    Diabetes Mother     Heart Disease Mother     Cancer Father      melanoma    Stroke Sister     Hypertension Sister     Diabetes Sister     Hypertension Brother     Diabetes Brother     Breast Cancer Paternal Grandmother      older, but not sure age.  Breast Cancer Paternal Aunt      and gyn cancer ?age       Social History:   She  reports that she has never smoked. She has never used smokeless tobacco.  She  reports that she does not drink alcohol.             Review of Systems:   General: negative for - chills, fatigue, fever, weight change  Psych: negative for - anxiety, depression, irritability or mood swings  ENT: negative for - headaches, hearing change, nasal congestion, oral lesions, sneezing or sore throat  Heme/ Lymph: negative for - bleeding problems, bruising, pallor or swollen lymph nodes  Endo: negative for - hot flashes, polydipsia/polyuria or temperature intolerance  Resp: negative for - cough, shortness of breath or wheezing  CV: negative for - chest pain, edema or palpitations  GI: negative for - abdominal pain, change in bowel habits, pos for constipation, neg for diarrhea or nausea/vomiting  : negative for - dysuria, hematuria, incontinence, pelvic pain or vulvar/vaginal symptoms  MSK: negative for - joint pain, joint swelling or muscle pain  Neuro: negative for - confusion, headaches, seizures or weakness  Derm: negative for - dry skin, hair changes, rash or skin lesion changes          Physical:   Vitals:   Vitals:    05/03/17 1049 05/03/17 1056   BP: 140/64 136/66   Pulse: 70    Resp: 18    Temp: 96.4 °F (35.8 °C)    TempSrc: Oral    SpO2: 96%    Weight: 175 lb (79.4 kg)    Height: 5' (1.524 m) Exam:   HEENT- atraumatic,normocephalic, awake, oriented, well nourished  Neck - supple,no enlarged lymph nodes, no JVD, no thyromegaly  Chest- CTA, no rhonchi, no crackles  Heart- rrr, no murmurs / gallop/rub  Abdomen- soft,BS+,NT, no hepatosplenomegaly  Ext - no c/c/edema   Neuro- no focal deficits. Power 5/5 all extremities  Skin - warm,dry, no obvious rashes. Review of Data:   LABS:   Lab Results   Component Value Date/Time    WBC 10.7 06/13/2017 01:27 PM    HGB 13.6 06/13/2017 01:27 PM    HCT 41.0 06/13/2017 01:27 PM    PLATELET 098 84/24/4138 01:27 PM     Lab Results   Component Value Date/Time    Sodium 141 04/20/2017 11:23 AM    Potassium 4.6 04/20/2017 11:23 AM    Chloride 103 04/20/2017 11:23 AM    CO2 25 04/20/2017 11:23 AM    Glucose 112 04/20/2017 11:23 AM    BUN 14 04/20/2017 11:23 AM    Creatinine 0.94 04/20/2017 11:23 AM     Lab Results   Component Value Date/Time    Cholesterol, total 146 04/20/2017 11:23 AM    HDL Cholesterol 57 04/20/2017 11:23 AM    LDL, calculated 68 04/20/2017 11:23 AM    Triglyceride 105 04/20/2017 11:23 AM     No results found for: GPT        Impression / Plan:      DM- stable  Hypothyroisim - increase levoxyl  Constipation , diverticulitis  - needs to take stool softener. Endometrial thickening - for hysterectomy and  D and C - I do not find contraindiaction to D andC. Gallstone - asymptoamtic  Renal cyst - simple , stable in size    Explained to patient risk benefits of the medications. Advised patient to stop meds if having any side effects. Pt verbalized understanding of the instructions. I have discussed the diagnosis with the patient and the intended plan as seen in the above orders. The patient has received an after-visit summary and questions were answered concerning future plans. I have discussed medication side effects and warnings with the patient as well.  I have reviewed the plan of care with the patient, accepted their input and they are in agreement with the treatment goals. Reviewed plan of care. Patient has provided input and agrees with goals. Follow-up Disposition:  Return in 3 months (on 8/3/2017).     [id number] Lewis MD

## 2017-05-03 NOTE — PROGRESS NOTES
1. Have you been to the ER, urgent care clinic since your last visit? Hospitalized since your last visit? No    2. Have you seen or consulted any other health care providers outside of the [address] [place] [address] since your last visit? Include any pap smears or colon screening.  No      Patient presents for

## 2017-05-05 ENCOUNTER — TELEPHONE (OUTPATIENT)
Dept: FAMILY MEDICINE CLINIC | Age: 77
End: 2017-05-05

## 2017-05-05 DIAGNOSIS — E03.4 HYPOTHYROIDISM DUE TO ACQUIRED ATROPHY OF THYROID: Primary | ICD-10-CM

## 2017-05-05 RX ORDER — LEVOTHYROXINE SODIUM 75 UG/1
75 TABLET ORAL
Qty: 30 TAB | Refills: 3 | Status: SHIPPED | OUTPATIENT
Start: 2017-05-05 | End: 2017-08-13 | Stop reason: SDUPTHER

## 2017-05-05 NOTE — TELEPHONE ENCOUNTER
I called patient but she did not answer. I left a voicemail stating that her synthroid was increased and sent to [facility]. Closing encounter.

## 2017-05-05 NOTE — TELEPHONE ENCOUNTER
Ms Sosa Sanchez called stating that levothyroxine was sent over but dose needs to be increased. Please advise.

## 2017-05-16 RX ORDER — SODIUM CHLORIDE 0.9 % (FLUSH) 0.9 %
5-10 SYRINGE (ML) INJECTION AS NEEDED
Status: CANCELLED | OUTPATIENT
Start: 2017-05-16

## 2017-05-16 RX ORDER — SODIUM CHLORIDE 0.9 % (FLUSH) 0.9 %
5-10 SYRINGE (ML) INJECTION EVERY 8 HOURS
Status: CANCELLED | OUTPATIENT
Start: 2017-05-16

## 2017-05-16 RX ORDER — SODIUM CHLORIDE, SODIUM LACTATE, POTASSIUM CHLORIDE, CALCIUM CHLORIDE 600; 310; 30; 20 MG/100ML; MG/100ML; MG/100ML; MG/100ML
150 INJECTION, SOLUTION INTRAVENOUS CONTINUOUS
Status: CANCELLED | OUTPATIENT
Start: 2017-05-23 | End: 2017-05-24

## 2017-05-17 ENCOUNTER — HOSPITAL ENCOUNTER (OUTPATIENT)
Dept: PREADMISSION TESTING | Age: 77
Discharge: HOME | End: 2017-05-17
Payer: MEDICARE

## 2017-05-17 ENCOUNTER — CLINICAL SUPPORT (OUTPATIENT)
Dept: FAMILY MEDICINE CLINIC | Age: 77
End: 2017-05-17

## 2017-05-17 VITALS
RESPIRATION RATE: 15 BRPM | HEIGHT: 60 IN | TEMPERATURE: 97.8 F | BODY MASS INDEX: 35.06 KG/M2 | HEART RATE: 72 BPM | WEIGHT: 178.6 LBS | SYSTOLIC BLOOD PRESSURE: 150 MMHG | DIASTOLIC BLOOD PRESSURE: 80 MMHG | OXYGEN SATURATION: 95 %

## 2017-05-17 DIAGNOSIS — Z23 ENCOUNTER FOR IMMUNIZATION: ICD-10-CM

## 2017-05-17 DIAGNOSIS — H15.101 EPISCLERITIS OF RIGHT EYE: ICD-10-CM

## 2017-05-17 DIAGNOSIS — N85.00 ENDOMETRIAL HYPERPLASIA: ICD-10-CM

## 2017-05-17 DIAGNOSIS — Z01.818 PRE-OPERATIVE CLEARANCE: Primary | ICD-10-CM

## 2017-05-17 DIAGNOSIS — I10 ESSENTIAL HYPERTENSION: ICD-10-CM

## 2017-05-17 DIAGNOSIS — Z01.818 PRE-OP TESTING: ICD-10-CM

## 2017-05-17 LAB
ABO + RH BLD: NORMAL
BLOOD GROUP ANTIBODIES SERPL: NORMAL
ERYTHROCYTE [DISTWIDTH] IN BLOOD BY AUTOMATED COUNT: 12.7 % (ref 11.6–14.5)
HCT VFR BLD AUTO: 39.6 % (ref 35–45)
HGB BLD-MCNC: 13.1 G/DL (ref 12–16)
MCH RBC QN AUTO: 28.6 PG (ref 24–34)
MCHC RBC AUTO-ENTMCNC: 33.1 G/DL (ref 31–37)
MCV RBC AUTO: 86.5 FL (ref 74–97)
PLATELET # BLD AUTO: 216 K/UL (ref 135–420)
PMV BLD AUTO: 10.3 FL (ref 9.2–11.8)
RBC # BLD AUTO: 4.58 M/UL (ref 4.2–5.3)
SPECIMEN EXP DATE BLD: NORMAL
WBC # BLD AUTO: 9.9 K/UL (ref 4.6–13.2)

## 2017-05-17 PROCEDURE — 85027 COMPLETE CBC AUTOMATED: CPT | Performed by: OBSTETRICS & GYNECOLOGY

## 2017-05-17 PROCEDURE — 36415 COLL VENOUS BLD VENIPUNCTURE: CPT | Performed by: OBSTETRICS & GYNECOLOGY

## 2017-05-17 PROCEDURE — 86900 BLOOD TYPING SEROLOGIC ABO: CPT | Performed by: OBSTETRICS & GYNECOLOGY

## 2017-05-17 RX ORDER — LOSARTAN POTASSIUM 50 MG/1
50 TABLET ORAL DAILY
Qty: 30 TAB | Refills: 3 | Status: SHIPPED | OUTPATIENT
Start: 2017-05-17 | End: 2017-08-31 | Stop reason: SDUPTHER

## 2017-05-17 RX ORDER — HYDROCHLOROTHIAZIDE 25 MG/1
25 TABLET ORAL DAILY
Qty: 30 TAB | Refills: 3 | Status: SHIPPED | OUTPATIENT
Start: 2017-05-17 | End: 2017-08-31 | Stop reason: SDUPTHER

## 2017-05-17 NOTE — PROGRESS NOTES
Emanuel Neville is a 67 y.o.  female and presents with     Chief Complaint   Patient presents with    Pre-op Exam       Pt is here for pre op clearance for planned hysteroscopy and endometril biopsy. Pt does not have chest pain, orthopnoea, PND. No h/o bleeding from any site. NO fever , chills. No cough, dysuria. Pt informs that she already had TDAP and Shingles vaccine at [facility] on 5/13. Pt does have some redness to her  Rt eye and was dagnosed with epislcleritis. She is asymptomatic. Past Medical History:   Diagnosis Date    Arthritis     Bronchitis     Diabetes ([address]     Diverticulitis     GERD (gastroesophageal reflux disease)     Hypercholesterolemia     Hypertension     Hypothyroid     Pancreatitis      Past Surgical History:   Procedure Laterality Date    COLONOSCOPY N/A 2/2/2017    COLONOSCOPY  w/bx polyp performed by Hanny Cahves MD at [facility] ENDOSCOPY     Current Outpatient Prescriptions   Medication Sig    losartan (COZAAR) 50 mg tablet Take 1 Tab by mouth daily.  hydroCHLOROthiazide (HYDRODIURIL) 25 mg tablet Take 1 Tab by mouth daily.  levothyroxine (SYNTHROID) 75 mcg tablet Take 1 Tab by mouth Daily (before breakfast).  diph,Pertuss,AC,,Tet Vac-PF (BOOSTRIX) 2.5-8-5 Lf-mcg suspension 0.5 mL by IntraMUSCular route PRIOR TO DISCHARGE for 1 dose.  docusate sodium (COLACE) 100 mg capsule Take 1 Cap by mouth two (2) times a day for 90 days.  cholecalciferol, vitamin D3, (VITAMIN D3) 2,000 unit tab Take  by mouth.  metFORMIN (GLUCOPHAGE) 1,000 mg tablet Take 1,000 mg by mouth two (2) times daily (with meals).  glipiZIDE (GLUCOTROL) 5 mg tablet Take 5 mg by mouth two (2) times a day.  simvastatin (ZOCOR) 20 mg tablet Take 20 mg by mouth nightly.  famotidine (PEPCID) 20 mg tablet Take 20 mg by mouth two (2) times a day.  loperamide (IMODIUM) 2 mg capsule Take 2 mg by mouth four (4) times daily as needed.      No current facility-administered medications for this visit. Health Maintenance   Topic Date Due    FOOT EXAM Q1  07/04/1950    EYE EXAM RETINAL OR DILATED Q1  07/04/1950    GLAUCOMA SCREENING Q2Y  07/04/2004    OSTEOPOROSIS SCREENING (DEXA)  07/04/2004    Pneumococcal 65+ Low/Medium Risk (1 of 2 - PCV13) 07/04/2004    INFLUENZA AGE 9 TO ADULT  08/01/2017    HEMOGLOBIN A1C Q6M  10/20/2017    MICROALBUMIN Q1  04/20/2018    LIPID PANEL Q1  04/20/2018    MEDICARE YEARLY EXAM  05/04/2018    DTaP/Tdap/Td series (2 - Td) 05/13/2027    ZOSTER VACCINE AGE 60>  Completed     Immunization History   Administered Date(s) Administered    Influenza Vaccine (Quad) PF 11/09/2016     No LMP recorded. Patient is postmenopausal.        Allergies and Intolerances: Allergies   Allergen Reactions    Ampicillin Itching       Family History:   Family History   Problem Relation Age of Onset    Diabetes Mother     Heart Disease Mother     Cancer Father      melanoma    Stroke Sister     Hypertension Sister     Diabetes Sister     Hypertension Brother     Diabetes Brother     Breast Cancer Paternal Grandmother      older, but not sure age.  Breast Cancer Paternal Aunt      and gyn cancer ?age       Social History:   She  reports that she has never smoked. She has never used smokeless tobacco.  She  reports that she does not drink alcohol.             Review of Systems:   General: negative for - chills, fatigue, fever, weight change  Psych: negative for - anxiety, depression, irritability or mood swings  ENT: negative for - headaches, hearing change, nasal congestion, oral lesions, sneezing or sore throat  Heme/ Lymph: negative for - bleeding problems, bruising, pallor or swollen lymph nodes  Endo: negative for - hot flashes, polydipsia/polyuria or temperature intolerance  Resp: negative for - cough, shortness of breath or wheezing  CV: negative for - chest pain, edema or palpitations  GI: negative for - abdominal pain, change in bowel habits, constipation, diarrhea or nausea/vomiting  : negative for - dysuria, hematuria, incontinence, pelvic pain or vulvar/vaginal symptoms  MSK: negative for - joint pain, joint swelling or muscle pain  Neuro: negative for - confusion, headaches, seizures or weakness  Derm: negative for - dry skin, hair changes, rash or skin lesion changes          Physical:   Vitals:   Vitals:    05/17/17 0920 05/17/17 1001   BP: 172/73 150/80   Pulse: 72    Resp: 15    Temp: 97.8 °F (36.6 °C)    TempSrc: Oral    SpO2: 95%    Weight: 178 lb 9.6 oz (81 kg)    Height: 5' (1.524 m)            Exam:   HEENT- atraumatic,normocephalic, awake, oriented, well nourished, chemosis lateral aspect of rt eye, episcleritis  Neck - supple,no enlarged lymph nodes, no JVD, no thyromegaly  Chest- CTA, no rhonchi, no crackles  Heart- rrr, no murmurs / gallop/rub  Abdomen- soft,BS+,NT, no hepatosplenomegaly  Ext - no c/c/edema   Neuro- no focal deficits. Power 5/5 all extremities  Skin - warm,dry, no obvious rashes. Review of Data:   LABS:   Lab Results   Component Value Date/Time    WBC 9.0 04/20/2017 11:23 AM    HGB 13.6 04/20/2017 11:23 AM    HCT 41.8 04/20/2017 11:23 AM    PLATELET 182 15/03/9583 11:23 AM     Lab Results   Component Value Date/Time    Sodium 141 04/20/2017 11:23 AM    Potassium 4.6 04/20/2017 11:23 AM    Chloride 103 04/20/2017 11:23 AM    CO2 25 04/20/2017 11:23 AM    Glucose 112 04/20/2017 11:23 AM    BUN 14 04/20/2017 11:23 AM    Creatinine 0.94 04/20/2017 11:23 AM     Lab Results   Component Value Date/Time    Cholesterol, total 146 04/20/2017 11:23 AM    HDL Cholesterol 57 04/20/2017 11:23 AM    LDL, calculated 68 04/20/2017 11:23 AM    Triglyceride 105 04/20/2017 11:23 AM     No results found for: GPT    EKG - no acute ST - T changes, no pathogial Q waves    Impression / Plan:        ICD-10-CM ICD-9-CM    1. Pre-operative clearance Z01.818 V72.84 AMB POC EKG ROUTINE W/ 12 LEADS, INTER & REP   2.  Essential hypertension I10 401.9 losartan (COZAAR) 50 mg tablet      hydroCHLOROthiazide (HYDRODIURIL) 25 mg tablet   3. Episcleritis of right eye H15.101 379.00    4. Encounter for immunization Z23 V03.89 PNEUMOCOCCAL CONJ VACCINE 13 VALENT IM     DM - stable    Based on history , clincal findings, EKG , previous CXR reprots, lab review, I d not find  contraindication to the planned GYn procedure. Explained to patient risk benefits of the medications. Advised patient to stop meds if having any side effects. Pt verbalized understanding of the instructions. I have discussed the diagnosis with the patient and the intended plan as seen in the above orders. The patient has received an after-visit summary and questions were answered concerning future plans. I have discussed medication side effects and warnings with the patient as well. I have reviewed the plan of care with the patient, accepted their input and they are in agreement with the treatment goals. Reviewed plan of care. Patient has provided input and agrees with goals.     Follow-up Disposition: Not on Jennifer Raza MD

## 2017-05-17 NOTE — MR AVS SNAPSHOT
Visit Information Date & Time Provider Department Dept. Phone Encounter #  
 5/17/2017 10:45 AM Rose Boudreaux, [address] [facility] [phone] [id number] Follow-up Instructions Return for keep appt. Your Appointments 5/17/2017 10:45 AM  
[facility]t Note: E[facility]  
 [address] [place]  
  
   
 [address]  
  
    
 6/7/2017 11:30 AM  
[facility] Appt Note: Return in 1 month (6/7/17). [address] [place]  
  
   
 [address] Upcoming Health Maintenance Date Due  
 FOOT EXAM Q1 7/4/1950 EYE EXAM RETINAL OR DILATED Q1 7/4/1950 GLAUCOMA SCREENING Q2Y 7/4/2004 OSTEOPOROSIS SCREENING (DEXA) 7/4/2004 Pneumococcal 65+ Low/Medium Risk (1 of 2 - PCV13) 7/4/2004 INFLUENZA AGE 9 TO ADULT 8/1/2017 HEMOGLOBIN A1C Q6M 10/20/2017 MICROALBUMIN Q1 4/20/2018 LIPID PANEL Q1 4/20/2018 MEDICARE YEARLY EXAM 5/4/2018 DTaP/Tdap/Td series (2 - Td) 5/13/2027 Allergies as of 5/17/2017  Review Complete On: 5/17/2017 By: Rose Boudreaux MD  
  
 Severity Noted Reaction Type Reactions Ampicillin  08/22/2013    Itching Current Immunizations  Never Reviewed Name Date Influenza Vaccine (Quad) PF 11/9/2016 11:56 AM  
 Pneumococcal Conjugate (PCV-13)  Incomplete Not reviewed this visit You Were Diagnosed With   
  
 Codes Comments Pre-operative clearance    -  Primary ICD-10-CM: Q00.440 ICD-9-CM: V72.84 Essential hypertension     ICD-10-CM: I10 
ICD-9-CM: 401.9 Episcleritis of right eye     ICD-10-CM: H15.101 ICD-9-CM: 379.00 Encounter for immunization     ICD-10-CM: U38 ICD-9-CM: V03.89 Vitals BP Pulse Temp Resp Height(growth percentile) Weight(growth percentile) 150/80 72 97.8 °F (36.6 °C) (Oral) 15 5' (1.524 m) 178 lb 9.6 oz (81 kg) SpO2 BMI OB Status Smoking Status 95% 34.88 kg/m2 Postmenopausal Never Smoker Vitals History BMI and BSA Data Body Mass Index Body Surface Area 34.88 kg/m 2 1.85 m 2 Preferred Pharmacy Pharmacy Name Phone [facility] [address] [phone] Your Updated Medication List  
  
   
This list is accurate as of: 5/17/17 10:10 AM.  Always use your most recent med list.  
  
  
  
  
 diph,Pertuss(AC),Tet Vac-PF 2.5-8-5 Lf-mcg suspension Commonly known as:  BOOSTRIX  
0.5 mL by IntraMUSCular route PRIOR TO DISCHARGE for 1 dose. docusate sodium 100 mg capsule Commonly known as:  Óscar Hem Take 1 Cap by mouth two (2) times a day for 90 days. glipiZIDE 5 mg tablet Commonly known as:  Artist Doll Take 5 mg by mouth two (2) times a day. hydroCHLOROthiazide 25 mg tablet Commonly known as:  HYDRODIURIL Take 1 Tab by mouth daily. levothyroxine 75 mcg tablet Commonly known as:  SYNTHROID Take 1 Tab by mouth Daily (before breakfast). loperamide 2 mg capsule Commonly known as:  IMODIUM Take 2 mg by mouth four (4) times daily as needed. losartan 50 mg tablet Commonly known as:  COZAAR Take 1 Tab by mouth daily. metFORMIN 1,000 mg tablet Commonly known as:  GLUCOPHAGE Take 1,000 mg by mouth two (2) times daily (with meals). PEPCID 20 mg tablet Generic drug:  famotidine Take 20 mg by mouth two (2) times a day. simvastatin 20 mg tablet Commonly known as:  ZOCOR Take 20 mg by mouth nightly. VITAMIN D3 2,000 unit Tab Generic drug:  cholecalciferol (vitamin D3) Take  by mouth. Prescriptions Sent to Pharmacy  Refills  
 losartan (COZAAR) 50 mg tablet 3  
 Sig: Take 1 Tab by mouth daily. Class: Normal  
 Pharmacy: [facility], [address] [place] [address] Ph #: [phone] Route: Oral  
 hydroCHLOROthiazide (HYDRODIURIL) 25 mg tablet 3 Sig: Take 1 Tab by mouth daily. Class: Normal  
 Pharmacy: [facility], [address] Ph #: [phone] Route: Oral  
  
We Performed the Following AMB POC EKG ROUTINE W/ 12 LEADS, INTER & REP [15459 CPT(R)] PNEUMOCOCCAL CONJ VACCINE 13 VALENT IM [id number] CPT(R)] Follow-up Instructions Return for keep appt. Introducing [facility]! John Elliott introduces [employer] patient portal. Now you can access parts of your medical record, email your doctor's office, and request medication refills online. 1. In your internet browser, go to [email or web address] 2. Click on the First Time User? Click Here link in the Sign In box. You will see the New Member Sign Up page. 3. Enter your [employer] Access Code exactly as it appears below. You will not need to use this code after youve completed the sign-up process. If you do not sign up before the expiration date, you must request a new code. · [employer] Access Code: [email or web address] Expires: 8/15/2017 10:10 AM 
 
4. Enter the last four digits of your Social Security Number (xxxx) and Date of Birth (mm/dd/yyyy) as indicated and click Submit. You will be taken to the next sign-up page. 5. Create a FleetMaticst ID. This will be your [employer] login ID and cannot be changed, so think of one that is secure and easy to remember. 6. Create a [employer] password. You can change your password at any time. 7. Enter your Password Reset Question and Answer. This can be used at a later time if you forget your password. 8. Enter your e-mail address.  You will receive e-mail notification when new information is available in [employer]. 9. Click Sign Up. You can now view and download portions of your medical record. 10. Click the Download Summary menu link to download a portable copy of your medical information. If you have questions, please visit the Frequently Asked Questions section of the [employer] website. Remember, [employer] is NOT to be used for urgent needs. For medical emergencies, dial 911. Now available from your iPhone and Android! Please provide this summary of care documentation to your next provider. Your primary care clinician is listed as Darryl Arrieta. If you have any questions after today's visit, please call [phone].

## 2017-05-22 ENCOUNTER — ANESTHESIA EVENT (OUTPATIENT)
Dept: SURGERY | Age: 77
End: 2017-05-22
Payer: MEDICARE

## 2017-05-22 NOTE — H&P
Gynecology History and Physical    Name: Aiden Meraz MRN: [id number] SSN: [id number]    YOB: 1940  Age: 67 y.o. Sex: female       Subjective:      Mrs Milton Dc is a 67yo F with thickened endometrium scheduled for hysteroscopy with D&C. Originally saw me for a simple appearing left ovarian cyst (3cm) and thickened endometrium. Denied bleeding or pain at that time. US  with stable simple cyst but endometrium thickened with fluid and debris. Was unable to do biopsy last because of cervial stenosis.  negative at 12. Discussed hysteroscopy with D&C to r/o hyperplasia or malignancy of endometrium. OB History     No data available        Past Medical History:   Diagnosis Date    Arthritis     Bronchitis     Diabetes ([address]     Diverticulitis     GERD (gastroesophageal reflux disease)     Hypercholesterolemia     Hypertension     Hypothyroid     Pancreatitis      Past Surgical History:   Procedure Laterality Date    COLONOSCOPY N/A 2/2/2017    COLONOSCOPY  w/bx polyp performed by Cortney Borges MD at [place] [address] Not on file. Social History Main Topics    Smoking status: Never Smoker    Smokeless tobacco: Never Used    Alcohol use No    Drug use: No    Sexual activity: Not on file     Family History   Problem Relation Age of Onset    Diabetes Mother     Heart Disease Mother     Cancer Father      melanoma    Stroke Sister     Hypertension Sister     Diabetes Sister     Hypertension Brother     Diabetes Brother     Breast Cancer Paternal Grandmother      older, but not sure age.  Breast Cancer Paternal Aunt      and gyn cancer ?age        Allergies   Allergen Reactions    Ampicillin Itching     Prior to Admission medications    Medication Sig Start Date End Date Taking? Authorizing Provider   levothyroxine (SYNTHROID) 75 mcg tablet Take 1 Tab by mouth Daily (before breakfast).  5/5/17  Yes Maryann Reddy MD [place]   docusate sodium (COLACE) 100 mg capsule Take 1 Cap by mouth two (2) times a day for 90 days. 4/20/17 7/19/17 Yes Sana Stapleton MD   cholecalciferol, vitamin D3, (VITAMIN D3) 2,000 unit tab Take  by mouth. Yes Historical Provider   metFORMIN (GLUCOPHAGE) 1,000 mg tablet Take 1,000 mg by mouth two (2) times daily (with meals). Yes Lon Mckeon MD   glipiZIDE (GLUCOTROL) 5 mg tablet Take 5 mg by mouth two (2) times a day. Yes Lon Mckeon MD   simvastatin (ZOCOR) 20 mg tablet Take 20 mg by mouth nightly. Yes Lon Mckeon MD   losartan (COZAAR) 50 mg tablet Take 1 Tab by mouth daily. 5/17/17   Sana Stapleton MD   hydroCHLOROthiazide (HYDRODIURIL) 25 mg tablet Take 1 Tab by mouth daily. 5/17/17   Sana Stapleton MD   diph,Pertuss,AC,,Tet Vac-PF (BOOSTRIX) 2.5-8-5 Lf-mcg suspension 0.5 mL by IntraMUSCular route PRIOR TO DISCHARGE for 1 dose. 5/3/17   Sana Stapleton MD   famotidine (PEPCID) 20 mg tablet Take 20 mg by mouth two (2) times a day. Lon Mckeon MD   loperamide (IMODIUM) 2 mg capsule Take 2 mg by mouth four (4) times daily as needed. Lon Mckeon MD        Review of Systems:  A comprehensive review of systems was negative except for that written in the History of Present Illness. Objective:     Vitals:    05/16/17 1458   Weight: 79.4 kg (175 lb)   Height: 5' (1.524 m)       Physical Exam:  Patient without distress. Heart: Regular rate and rhythm  Lung: clear to auscultation throughout lung fields, no wheezes, no rales, no rhonchi and normal respiratory effort  Back: costovertebral angle tenderness absent  Abdomen: soft, nontender  External Genitalia: normal general appearance  Urinary system: urethral meatus normal  Vagina: normal mucosa without prolapse or lesions  Cervix: normal appearance  Adnexa: normal bimanual exam  Extr nontender    Assessment:      Thickened endometrium in a postmenopausal female    Plan:     Procedure(s) (LRB):  DILATATION AND CURETTAGE HYSTEROSCOPY (N/A)  Discussed the risks of surgery including the risks of bleeding, infection, deep vein thrombosis, and surgical injuries to internal organs including but not limited to the bowels, bladder, rectum, and female reproductive organs. The patient understands the risks; any and all questions were answered to the patient's satisfaction.     Signed By:  Ulises Colunga MD     May 22, 2017

## 2017-05-23 ENCOUNTER — ANESTHESIA (OUTPATIENT)
Dept: SURGERY | Age: 77
End: 2017-05-23
Payer: MEDICARE

## 2017-05-23 ENCOUNTER — HOSPITAL ENCOUNTER (OUTPATIENT)
Age: 77
Setting detail: OUTPATIENT SURGERY
Discharge: HOME | End: 2017-05-23
Attending: OBSTETRICS & GYNECOLOGY | Admitting: OBSTETRICS & GYNECOLOGY
Payer: MEDICARE

## 2017-05-23 VITALS
TEMPERATURE: 98.3 F | WEIGHT: 174.25 LBS | BODY MASS INDEX: 34.21 KG/M2 | OXYGEN SATURATION: 96 % | HEART RATE: 63 BPM | SYSTOLIC BLOOD PRESSURE: 151 MMHG | HEIGHT: 60 IN | RESPIRATION RATE: 16 BRPM | DIASTOLIC BLOOD PRESSURE: 73 MMHG

## 2017-05-23 LAB
GLUCOSE BLD STRIP.AUTO-MCNC: 94 MG/DL (ref 70–110)
GLUCOSE BLD STRIP.AUTO-MCNC: 98 MG/DL (ref 70–110)

## 2017-05-23 PROCEDURE — 82962 GLUCOSE BLOOD TEST: CPT

## 2017-05-23 PROCEDURE — 76010000149 HC OR TIME 1 TO 1.5 HR: Performed by: OBSTETRICS & GYNECOLOGY

## 2017-05-23 PROCEDURE — 77030031763 HC BLD INCIS TRUCLR PLS S&N -F: Performed by: OBSTETRICS & GYNECOLOGY

## 2017-05-23 PROCEDURE — 74011250636 HC RX REV CODE- 250/636

## 2017-05-23 PROCEDURE — 74011000272 HC RX REV CODE- 272: Performed by: OBSTETRICS & GYNECOLOGY

## 2017-05-23 PROCEDURE — 77030032490 HC SLV COMPR SCD KNE COVD -B: Performed by: OBSTETRICS & GYNECOLOGY

## 2017-05-23 PROCEDURE — 74011250637 HC RX REV CODE- 250/637: Performed by: NURSE ANESTHETIST, CERTIFIED REGISTERED

## 2017-05-23 PROCEDURE — 76210000022 HC REC RM PH II 1.5 TO 2 HR: Performed by: OBSTETRICS & GYNECOLOGY

## 2017-05-23 PROCEDURE — 88305 TISSUE EXAM BY PATHOLOGIST: CPT | Performed by: OBSTETRICS & GYNECOLOGY

## 2017-05-23 PROCEDURE — 77030012510 HC MSK AIRWY LMA TELE -B: Performed by: ANESTHESIOLOGY

## 2017-05-23 PROCEDURE — 77030032151 HC HYSTSCP FLD MGMT KT DISP S&N -D: Performed by: OBSTETRICS & GYNECOLOGY

## 2017-05-23 PROCEDURE — 76060000033 HC ANESTHESIA 1 TO 1.5 HR: Performed by: OBSTETRICS & GYNECOLOGY

## 2017-05-23 PROCEDURE — 74011250636 HC RX REV CODE- 250/636: Performed by: ANESTHESIOLOGY

## 2017-05-23 PROCEDURE — 76210000006 HC OR PH I REC 0.5 TO 1 HR: Performed by: OBSTETRICS & GYNECOLOGY

## 2017-05-23 PROCEDURE — 74011250636 HC RX REV CODE- 250/636: Performed by: NURSE ANESTHETIST, CERTIFIED REGISTERED

## 2017-05-23 PROCEDURE — 74011000250 HC RX REV CODE- 250

## 2017-05-23 RX ORDER — PROPOFOL 10 MG/ML
INJECTION, EMULSION INTRAVENOUS AS NEEDED
Status: DISCONTINUED | OUTPATIENT
Start: 2017-05-23 | End: 2017-05-23 | Stop reason: HOSPADM

## 2017-05-23 RX ORDER — DEXTROSE MONOHYDRATE 25 G/50ML
25-50 INJECTION, SOLUTION INTRAVENOUS AS NEEDED
Status: DISCONTINUED | OUTPATIENT
Start: 2017-05-23 | End: 2017-05-23 | Stop reason: HOSPADM

## 2017-05-23 RX ORDER — FENTANYL CITRATE 50 UG/ML
50 INJECTION, SOLUTION INTRAMUSCULAR; INTRAVENOUS
Status: DISCONTINUED | OUTPATIENT
Start: 2017-05-23 | End: 2017-05-23 | Stop reason: HOSPADM

## 2017-05-23 RX ORDER — KETOROLAC TROMETHAMINE 30 MG/ML
INJECTION, SOLUTION INTRAMUSCULAR; INTRAVENOUS AS NEEDED
Status: DISCONTINUED | OUTPATIENT
Start: 2017-05-23 | End: 2017-05-23 | Stop reason: HOSPADM

## 2017-05-23 RX ORDER — DEXAMETHASONE SODIUM PHOSPHATE 4 MG/ML
INJECTION, SOLUTION INTRA-ARTICULAR; INTRALESIONAL; INTRAMUSCULAR; INTRAVENOUS; SOFT TISSUE AS NEEDED
Status: DISCONTINUED | OUTPATIENT
Start: 2017-05-23 | End: 2017-05-23 | Stop reason: HOSPADM

## 2017-05-23 RX ORDER — FAMOTIDINE 20 MG/1
TABLET, FILM COATED ORAL
Status: DISCONTINUED
Start: 2017-05-23 | End: 2017-05-23 | Stop reason: HOSPADM

## 2017-05-23 RX ORDER — INSULIN LISPRO 100 [IU]/ML
INJECTION, SOLUTION INTRAVENOUS; SUBCUTANEOUS ONCE
Status: DISCONTINUED | OUTPATIENT
Start: 2017-05-24 | End: 2017-05-23 | Stop reason: HOSPADM

## 2017-05-23 RX ORDER — SODIUM CHLORIDE 0.9 % (FLUSH) 0.9 %
5-10 SYRINGE (ML) INJECTION AS NEEDED
Status: DISCONTINUED | OUTPATIENT
Start: 2017-05-23 | End: 2017-05-23 | Stop reason: HOSPADM

## 2017-05-23 RX ORDER — SODIUM CHLORIDE, SODIUM LACTATE, POTASSIUM CHLORIDE, CALCIUM CHLORIDE 600; 310; 30; 20 MG/100ML; MG/100ML; MG/100ML; MG/100ML
75 INJECTION, SOLUTION INTRAVENOUS CONTINUOUS
Status: DISCONTINUED | OUTPATIENT
Start: 2017-05-23 | End: 2017-05-23 | Stop reason: HOSPADM

## 2017-05-23 RX ORDER — HYDROMORPHONE HYDROCHLORIDE 2 MG/ML
0.2 INJECTION, SOLUTION INTRAMUSCULAR; INTRAVENOUS; SUBCUTANEOUS
Status: DISCONTINUED | OUTPATIENT
Start: 2017-05-23 | End: 2017-05-23 | Stop reason: HOSPADM

## 2017-05-23 RX ORDER — SODIUM CHLORIDE, SODIUM LACTATE, POTASSIUM CHLORIDE, CALCIUM CHLORIDE 600; 310; 30; 20 MG/100ML; MG/100ML; MG/100ML; MG/100ML
150 INJECTION, SOLUTION INTRAVENOUS CONTINUOUS
Status: DISCONTINUED | OUTPATIENT
Start: 2017-05-23 | End: 2017-05-23 | Stop reason: HOSPADM

## 2017-05-23 RX ORDER — MAGNESIUM SULFATE 100 %
4 CRYSTALS MISCELLANEOUS AS NEEDED
Status: DISCONTINUED | OUTPATIENT
Start: 2017-05-23 | End: 2017-05-23 | Stop reason: HOSPADM

## 2017-05-23 RX ORDER — INSULIN LISPRO 100 [IU]/ML
INJECTION, SOLUTION INTRAVENOUS; SUBCUTANEOUS ONCE
Status: DISCONTINUED | OUTPATIENT
Start: 2017-05-23 | End: 2017-05-23 | Stop reason: HOSPADM

## 2017-05-23 RX ORDER — FENTANYL CITRATE 50 UG/ML
INJECTION, SOLUTION INTRAMUSCULAR; INTRAVENOUS AS NEEDED
Status: DISCONTINUED | OUTPATIENT
Start: 2017-05-23 | End: 2017-05-23 | Stop reason: HOSPADM

## 2017-05-23 RX ORDER — LIDOCAINE HYDROCHLORIDE 20 MG/ML
INJECTION, SOLUTION EPIDURAL; INFILTRATION; INTRACAUDAL; PERINEURAL AS NEEDED
Status: DISCONTINUED | OUTPATIENT
Start: 2017-05-23 | End: 2017-05-23 | Stop reason: HOSPADM

## 2017-05-23 RX ORDER — SODIUM CHLORIDE 0.9 % (FLUSH) 0.9 %
5-10 SYRINGE (ML) INJECTION EVERY 8 HOURS
Status: DISCONTINUED | OUTPATIENT
Start: 2017-05-23 | End: 2017-05-23 | Stop reason: HOSPADM

## 2017-05-23 RX ORDER — FAMOTIDINE 20 MG/1
20 TABLET, FILM COATED ORAL ONCE
Status: COMPLETED | OUTPATIENT
Start: 2017-05-24 | End: 2017-05-23

## 2017-05-23 RX ORDER — ONDANSETRON 2 MG/ML
INJECTION INTRAMUSCULAR; INTRAVENOUS AS NEEDED
Status: DISCONTINUED | OUTPATIENT
Start: 2017-05-23 | End: 2017-05-23 | Stop reason: HOSPADM

## 2017-05-23 RX ORDER — SODIUM CHLORIDE, SODIUM LACTATE, POTASSIUM CHLORIDE, CALCIUM CHLORIDE 600; 310; 30; 20 MG/100ML; MG/100ML; MG/100ML; MG/100ML
50 INJECTION, SOLUTION INTRAVENOUS CONTINUOUS
Status: DISCONTINUED | OUTPATIENT
Start: 2017-05-24 | End: 2017-05-23 | Stop reason: HOSPADM

## 2017-05-23 RX ADMIN — PROPOFOL 120 MG: 10 INJECTION, EMULSION INTRAVENOUS at 07:39

## 2017-05-23 RX ADMIN — LIDOCAINE HYDROCHLORIDE 60 MG: 20 INJECTION, SOLUTION EPIDURAL; INFILTRATION; INTRACAUDAL; PERINEURAL at 07:39

## 2017-05-23 RX ADMIN — FENTANYL CITRATE 50 MCG: 50 INJECTION, SOLUTION INTRAMUSCULAR; INTRAVENOUS at 07:39

## 2017-05-23 RX ADMIN — KETOROLAC TROMETHAMINE 30 MG: 30 INJECTION, SOLUTION INTRAMUSCULAR; INTRAVENOUS at 08:26

## 2017-05-23 RX ADMIN — DEXAMETHASONE SODIUM PHOSPHATE 4 MG: 4 INJECTION, SOLUTION INTRA-ARTICULAR; INTRALESIONAL; INTRAMUSCULAR; INTRAVENOUS; SOFT TISSUE at 07:46

## 2017-05-23 RX ADMIN — SODIUM CHLORIDE, SODIUM LACTATE, POTASSIUM CHLORIDE, AND CALCIUM CHLORIDE 50 ML/HR: 600; 310; 30; 20 INJECTION, SOLUTION INTRAVENOUS at 06:55

## 2017-05-23 RX ADMIN — ONDANSETRON 4 MG: 2 INJECTION INTRAMUSCULAR; INTRAVENOUS at 08:26

## 2017-05-23 RX ADMIN — FENTANYL CITRATE 50 MCG: 50 INJECTION, SOLUTION INTRAMUSCULAR; INTRAVENOUS at 08:11

## 2017-05-23 RX ADMIN — FAMOTIDINE 20 MG: 20 TABLET ORAL at 06:58

## 2017-05-23 RX ADMIN — FENTANYL CITRATE 50 MCG: 50 INJECTION, SOLUTION INTRAMUSCULAR; INTRAVENOUS at 09:04

## 2017-05-23 NOTE — ANESTHESIA POSTPROCEDURE EVALUATION
Post-Anesthesia Evaluation and Assessment    Patient: Husam Dutton MRN: [id number]  SSN: [id number]    YOB: 1940  Age: 67 y.o. Sex: female      Data from PACU flowsheet    Cardiovascular Function/Vital Signs  Visit Vitals    /50    Pulse 64    Temp 36.8 °C (98.3 °F)    Resp 12    Ht 5' (1.524 m)    Wt 79 kg (174 lb 4 oz)    SpO2 95%    BMI 34.03 kg/m2       Patient is status post general anesthesia for Procedure(s):  DILATATION AND CURETTAGE HYSTEROSCOPY and REMOVAL OF POLYP. Nausea/Vomiting: controlled    Postoperative hydration reviewed and adequate. Pain:  Pain Scale 1: Numeric (0 - 10) (05/23/17 0908)  Pain Intensity 1: 2 (05/23/17 0908)   Managed      Mental Status and Level of Consciousness: Alert and oriented     Pulmonary Status:   O2 Device: Nasal cannula (05/23/17 0908)   Adequate oxygenation and airway patent    Complications related to anesthesia: None    Post-anesthesia assessment completed.  No concerns    Signed By: Eliza Frazier MD     May 23, 2017

## 2017-05-23 NOTE — ANESTHESIA PREPROCEDURE EVALUATION
Anesthetic History   No history of anesthetic complications            Review of Systems / Medical History  Patient summary reviewed and pertinent labs reviewed    Pulmonary  Within defined limits                 Neuro/Psych   Within defined limits           Cardiovascular    Hypertension                   GI/Hepatic/Renal     GERD           Endo/Other    Diabetes: type 2  Hypothyroidism  Morbid obesity and arthritis     Other Findings   Comments:   Risk Factors for Postoperative nausea/vomiting:       History of postoperative nausea/vomiting? NO       Female? YES       Motion sickness? NO       Intended opioid administration for postoperative analgesia? YES      Smoking Abstinence  Current Smoker? NO  Elective Surgery? YES  Seen preoperatively by anesthesiologist or proxy prior to day of surgery? YES  Pt abstained from smoking 24 hours prior to anesthesia?  N/A           Physical Exam    Airway  Mallampati: III  TM Distance: 4 - 6 cm  Neck ROM: decreased range of motion   Mouth opening: Diminished (comment)     Cardiovascular    Rhythm: irregular  Rate: normal         Dental    Dentition: Poor dentition     Pulmonary  Breath sounds clear to auscultation               Abdominal  GI exam deferred       Other Findings            Anesthetic Plan    ASA: 3  Anesthesia type: general          Induction: Intravenous  Anesthetic plan and risks discussed with: Patient

## 2017-05-23 NOTE — PERIOP NOTES
1632  Received pt. Connected pt to monitor. VSS. Assessment preformed. RN at bedside. Will continue to monitor. 3332   Called to waiting room, spoke to Irma Leal - pt son - pt id number verified. Update given on pt status.

## 2017-05-23 NOTE — DISCHARGE INSTRUCTIONS
Dilation and Curettage: What to Expect at Home  Your Recovery  Dilation and curettage (D&C) is a procedure to remove tissue from the inside of the uterus. The doctor used a curved tool, called a curette, to gently scrape tissue from your uterus. You are likely to have a backache, or cramps similar to menstrual cramps, and pass small clots of blood from your vagina for the first few days. You may continue to have light vaginal bleeding for several weeks after the procedure. You will probably be able to go back to most of your normal activities in 1 or 2 days. This care sheet gives you a general idea about how long it will take for you to recover. But each person recovers at a different pace. Follow the steps below to get better as quickly as possible. How can you care for yourself at home? Activity  · Rest when you feel tired. Getting enough sleep will help you recover. · Avoid strenuous activities, such as bicycle riding, jogging, weight lifting, or aerobic exercise, until your doctor says it is okay. · Most women are able to return to work the day after the procedure. · You may have some light vaginal bleeding. Wear sanitary pads if needed. Do not douche or use tampons for 2 weeks or until your doctor says it is okay. · Ask your doctor when it is okay for you to have sex. · If you could become pregnant, talk about birth control with your doctor. Do not try to become pregnant until your doctor says it is okay. Diet  · You can eat your normal diet. If your stomach is upset, try bland, low-fat foods like plain rice, broiled chicken, toast, and yogurt. · Drink plenty of fluids (unless your doctor tells you not to). Medicines  · Your doctor will tell you if and when you can restart your medicines. He or she will also give you instructions about taking any new medicines. · If you take blood thinners, such as warfarin (Coumadin), clopidogrel (Plavix), or aspirin, be sure to talk to your doctor.  He or she will tell you if and when to start taking those medicines again. Make sure that you understand exactly what your doctor wants you to do. · Be safe with medicines. Take pain medicines exactly as directed. ¨ If the doctor gave you a prescription medicine for pain, take it as prescribed. ¨ If you are not taking a prescription pain medicine, ask your doctor if you can take an over-the-counter medicine. · If you think your pain medicine is making you sick to your stomach:  ¨ Take your medicine after meals (unless your doctor has told you not to). ¨ Ask your doctor for a different pain medicine. · If your doctor prescribed antibiotics, take them as directed. Do not stop taking them just because you feel better. You need to take the full course of antibiotics. Follow-up care is a key part of your treatment and safety. Be sure to make and go to all appointments, and call your doctor if you are having problems. It's also a good idea to know your test results and keep a list of the medicines you take. When should you call for help? Call 911 anytime you think you may need emergency care. For example, call if:  · You passed out (lost consciousness). · You have severe trouble breathing. · You have chest pain and shortness of breath, or you cough up blood. · You have severe pain in your belly. Call your doctor now or seek immediate medical care if:  · You have bright red vaginal bleeding that soaks one or more pads each hour for 2 or more hours. · You pass blood clots that are larger than a golf ball. · You have vaginal discharge that smells bad. · You are sick to your stomach or cannot keep fluids down. · You have pain that does not get better after you take pain medicine. · You have pain that is getting worse 2 days after the procedure. · You have a fever over 100°F.  · Your belly feels tender, or full and hard.   Watch closely for changes in your health, and be sure to contact your doctor if:  · You do not get better as expected. Where can you learn more? Go to http://aravind-farzana.info/. Enter [phone] in the search box to learn more about \"Dilation and Curettage: What to Expect at Home. \"  Current as of: May 30, 2016  Content Version: 11.2  © 5664-7036 [employer]. Care instructions adapted under license by [employer] (which disclaims liability or warranty for this information). If you have questions about a medical condition or this instruction, always ask your healthcare professional. [place] [address] any warranty or liability for your use of this information. Call Dr Lb Russ office if fever higher than 101, bleeding through more than 4 pads an hour, nausea and vomiting and inability to eat or drink or any other concerns      DISCHARGE SUMMARY from Nurse    The following personal items are in your possession at time of discharge:    Dental Appliances: None  Visual Aid: Glasses           Clothing: Undergarments, Socks, Pants, Shirt, Footwear (belongings with patient's son.)                PATIENT INSTRUCTIONS:    After general anesthesia or intravenous sedation, for 24 hours or while taking prescription Narcotics:  · Limit your activities  · Do not drive and operate hazardous machinery  · Do not make important personal or business decisions  · Do  not drink alcoholic beverages  · If you have not urinated within 8 hours after discharge, please contact your surgeon on call.     Report the following to your surgeon:  · Excessive pain, swelling, redness or odor of or around the surgical area  · Temperature over 100.5  · Nausea and vomiting lasting longer than 4 hours or if unable to take medications  · Any signs of decreased circulation or nerve impairment to extremity: change in color, persistent  numbness, tingling, coldness or increase pain  · Any questions        What to do at Home:  These are general instructions for a healthy lifestyle:    No smoking/ No tobacco products/ Avoid exposure to second hand smoke    Surgeon General's Warning:  Quitting smoking now greatly reduces serious risk to your health. Obesity, smoking, and sedentary lifestyle greatly increases your risk for illness    A healthy diet, regular physical exercise & weight monitoring are important for maintaining a healthy lifestyle    You may be retaining fluid if you have a history of heart failure or if you experience any of the following symptoms:  Weight gain of 3 pounds or more overnight or 5 pounds in a week, increased swelling in our hands or feet or shortness of breath while lying flat in bed. Please call your doctor as soon as you notice any of these symptoms; do not wait until your next office visit. Recognize signs and symptoms of STROKE:    F-face looks uneven    A-arms unable to move or move unevenly    S-speech slurred or non-existent    T-time-call 911 as soon as signs and symptoms begin-DO NOT go       Back to bed or wait to see if you get better-TIME IS BRAIN. Warning Signs of HEART ATTACK     Call 911 if you have these symptoms:   Chest discomfort. Most heart attacks involve discomfort in the center of the chest that lasts more than a few minutes, or that goes away and comes back. It can feel like uncomfortable pressure, squeezing, fullness, or pain.  Discomfort in other areas of the upper body. Symptoms can include pain or discomfort in one or both arms, the back, neck, jaw, or stomach.  Shortness of breath with or without chest discomfort.  Other signs may include breaking out in a cold sweat, nausea, or lightheadedness. Don't wait more than five minutes to call 911 - MINUTES MATTER! Fast action can save your life. Calling 911 is almost always the fastest way to get lifesaving treatment. Emergency Medical Services staff can begin treatment when they arrive -- up to an hour sooner than if someone gets to the hospital by car.        The discharge information has been reviewed with the patient. The patient verbalized understanding. Discharge medications reviewed with the patient and appropriate educational materials and side effects teaching were provided. Patient armband removed and given to patient to take home.   Patient was informed of the privacy risks if armband lost or stolen

## 2017-05-23 NOTE — INTERVAL H&P NOTE
H&P Update: Xin Agrawal was seen and examined. History and physical has been reviewed. The patient has been examined.  There have been no significant clinical changes since the completion of the originally dated History and Physical.    Signed By: Selin Saldivar MD     May 23, 2017 7:27 AM

## 2017-05-23 NOTE — IP AVS SNAPSHOT
Yesenia Suggs 
 
 
 [address] 
[phone] Patient: Chayo Saldana MRN: [id number] You are allergic to the following Allergen Reactions Ampicillin Itching Recent Documentation Height Weight BMI OB Status Smoking Status 1.524 m 79 kg 34.03 kg/m2 Postmenopausal Never Smoker Emergency Contacts Name Discharge Info Relation Home Work [facility] DISCHARGE CAREGIVER [3] Son [22] [phone] About your hospitalization You were admitted on:  May 23, 2017 You last received care in the:  [facility] PHASE 2 RECOVERY You were discharged on:  May 23, 2017 Unit phone number:  [phone] Why you were hospitalized Your primary diagnosis was:  Not on File Providers Seen During Your Hospitalizations Provider Role Specialty Primary office phone Selin Saldivar MD Attending Provider Obstetrics & Gynecology [phone] Your Primary Care Physician (PCP) Primary Care Physician Office Phone Office Fax [facility] [phone] Follow-up Information Follow up With Details Comments Contact Info Josesito Lewis MD   [address] [facility] [address] [place] [address] 83 [phone] Your Appointments Tuesday June 06, 2017  1:45 PM EDT Follow Up with Josesito Lewis MD  
[facility] [address] [place] [address]) [address] [facility] [address] [place] [address] [id number] [phone] Current Discharge Medication List  
  
CONTINUE these medications which have NOT CHANGED Dose & Instructions Dispensing Information Comments Morning Noon Evening Bedtime diph,Pertuss(AC),Tet Vac-PF 2.5-8-5 Lf-mcg suspension Commonly known as:  BOOSTRIX Your last dose was:     
   
Your next dose is:    
   
   
 Dose:  0.5 mL  
 0.5 mL by IntraMUSCular route PRIOR TO DISCHARGE for 1 dose. Quantity:  0.5 mL Refills:  0  
     
   
   
   
  
 docusate sodium 100 mg capsule Commonly known as:  Edwin Lovelace Your last dose was: Your next dose is:    
   
   
 Dose:  100 mg Take 1 Cap by mouth two (2) times a day for 90 days. Quantity:  60 Cap Refills:  2  
     
   
   
   
  
 glipiZIDE 5 mg tablet Commonly known as:  Madhu Sauger Your last dose was: Your next dose is:    
   
   
 Dose:  5 mg Take 5 mg by mouth two (2) times a day. Refills:  0  
     
   
   
   
  
 hydroCHLOROthiazide 25 mg tablet Commonly known as:  HYDRODIURIL Your last dose was: Your next dose is:    
   
   
 Dose:  25 mg Take 1 Tab by mouth daily. Quantity:  30 Tab Refills:  3  
     
   
   
   
  
 levothyroxine 75 mcg tablet Commonly known as:  SYNTHROID Your last dose was: Your next dose is:    
   
   
 Dose:  75 mcg Take 1 Tab by mouth Daily (before breakfast). Quantity:  30 Tab Refills:  3  
     
   
   
   
  
 loperamide 2 mg capsule Commonly known as:  IMODIUM Your last dose was: Your next dose is:    
   
   
 Dose:  2 mg Take 2 mg by mouth four (4) times daily as needed. Refills:  0  
     
   
   
   
  
 losartan 50 mg tablet Commonly known as:  COZAAR Your last dose was: Your next dose is:    
   
   
 Dose:  50 mg Take 1 Tab by mouth daily. Quantity:  30 Tab Refills:  3  
     
   
   
   
  
 metFORMIN 1,000 mg tablet Commonly known as:  GLUCOPHAGE Your last dose was: Your next dose is:    
   
   
 Dose:  1000 mg Take 1,000 mg by mouth two (2) times daily (with meals). Refills:  0 PEPCID 20 mg tablet Generic drug:  famotidine Your last dose was: Your next dose is:    
   
   
 Dose:  20 mg Take 20 mg by mouth two (2) times a day. Refills:  0 simvastatin 20 mg tablet Commonly known as:  ZOCOR Your last dose was: Your next dose is:    
   
   
 Dose:  20 mg Take 20 mg by mouth nightly. Refills:  0  
     
   
   
   
  
 VITAMIN D3 2,000 unit Tab Generic drug:  cholecalciferol (vitamin D3) Your last dose was: Your next dose is: Take  by mouth. Refills:  0 Discharge Instructions Dilation and Curettage: What to Expect at [facility] Your Recovery Dilation and curettage (D&C) is a procedure to remove tissue from the inside of the uterus. The doctor used a curved tool, called a curette, to gently scrape tissue from your uterus. You are likely to have a backache, or cramps similar to menstrual cramps, and pass small clots of blood from your vagina for the first few days. You may continue to have light vaginal bleeding for several weeks after the procedure. You will probably be able to go back to most of your normal activities in 1 or 2 days. This care sheet gives you a general idea about how long it will take for you to recover. But each person recovers at a different pace. Follow the steps below to get better as quickly as possible. How can you care for yourself at home? Activity · Rest when you feel tired. Getting enough sleep will help you recover. · Avoid strenuous activities, such as bicycle riding, jogging, weight lifting, or aerobic exercise, until your doctor says it is okay. · Most women are able to return to work the day after the procedure. · You may have some light vaginal bleeding. Wear sanitary pads if needed. Do not douche or use tampons for 2 weeks or until your doctor says it is okay. · Ask your doctor when it is okay for you to have sex. · If you could become pregnant, talk about birth control with your doctor. Do not try to become pregnant until your doctor says it is okay. Diet · You can eat your normal diet. If your stomach is upset, try bland, low-fat foods like plain rice, broiled chicken, toast, and yogurt. · Drink plenty of fluids (unless your doctor tells you not to). Medicines · Your doctor will tell you if and when you can restart your medicines. He or she will also give you instructions about taking any new medicines. · If you take blood thinners, such as warfarin (Coumadin), clopidogrel (Plavix), or aspirin, be sure to talk to your doctor. He or she will tell you if and when to start taking those medicines again. Make sure that you understand exactly what your doctor wants you to do. · Be safe with medicines. Take pain medicines exactly as directed. ¨ If the doctor gave you a prescription medicine for pain, take it as prescribed. ¨ If you are not taking a prescription pain medicine, ask your doctor if you can take an over-the-counter medicine. · If you think your pain medicine is making you sick to your stomach: 
¨ Take your medicine after meals (unless your doctor has told you not to). ¨ Ask your doctor for a different pain medicine. · If your doctor prescribed antibiotics, take them as directed. Do not stop taking them just because you feel better. You need to take the full course of antibiotics. Follow-up care is a key part of your treatment and safety. Be sure to make and go to all appointments, and call your doctor if you are having problems. It's also a good idea to know your test results and keep a list of the medicines you take. When should you call for help? Call 911 anytime you think you may need emergency care. For example, call if: 
· You passed out (lost consciousness). · You have severe trouble breathing. · You have chest pain and shortness of breath, or you cough up blood. · You have severe pain in your belly.  
Call your doctor now or seek immediate medical care if: 
· You have bright red vaginal bleeding that soaks one or more pads each hour for 2 or more hours. · You pass blood clots that are larger than a golf ball. · You have vaginal discharge that smells bad. · You are sick to your stomach or cannot keep fluids down. · You have pain that does not get better after you take pain medicine. · You have pain that is getting worse 2 days after the procedure. · You have a fever over 100°F. 
· Your belly feels tender, or full and hard. Watch closely for changes in your health, and be sure to contact your doctor if: 
· You do not get better as expected. Where can you learn more? Go to http://aravind-farzana.info/. Enter [phone] in the search box to learn more about \"Dilation and Curettage: What to Expect at Home. \" Current as of: May 30, 2016 Content Version: 11.2 © 5124-3892 [employer]. Care instructions adapted under license by [employer] (which disclaims liability or warranty for this information). If you have questions about a medical condition or this instruction, always ask your healthcare professional. [address] any warranty or liability for your use of this information. Call Dr Gregory Rubin office if fever higher than 101, bleeding through more than 4 pads an hour, nausea and vomiting and inability to eat or drink or any other concerns DISCHARGE SUMMARY from Nurse The following personal items are in your possession at time of discharge: 
 
Dental Appliances: None Visual Aid: Glasses Clothing: Undergarments, Socks, Pants, Shirt, Footwear (belongings with patient's son.) PATIENT INSTRUCTIONS: 
 
After general anesthesia or intravenous sedation, for 24 hours or while taking prescription Narcotics: · Limit your activities · Do not drive and operate hazardous machinery · Do not make important personal or business decisions · Do  not drink alcoholic beverages · If you have not urinated within 8 hours after discharge, please contact your surgeon on call. Report the following to your surgeon: 
· Excessive pain, swelling, redness or odor of or around the surgical area · Temperature over 100.5 · Nausea and vomiting lasting longer than 4 hours or if unable to take medications · Any signs of decreased circulation or nerve impairment to extremity: change in color, persistent  numbness, tingling, coldness or increase pain · Any questions What to do at Home: These are general instructions for a healthy lifestyle: No smoking/ No tobacco products/ Avoid exposure to second hand smoke Surgeon General's Warning:  Quitting smoking now greatly reduces serious risk to your health. Obesity, smoking, and sedentary lifestyle greatly increases your risk for illness A healthy diet, regular physical exercise & weight monitoring are important for maintaining a healthy lifestyle You may be retaining fluid if you have a history of heart failure or if you experience any of the following symptoms:  Weight gain of 3 pounds or more overnight or 5 pounds in a week, increased swelling in our hands or feet or shortness of breath while lying flat in bed. Please call your doctor as soon as you notice any of these symptoms; do not wait until your next office visit. Recognize signs and symptoms of STROKE: 
 
F-face looks uneven A-arms unable to move or move unevenly S-speech slurred or non-existent T-time-call 911 as soon as signs and symptoms begin-DO NOT go Back to bed or wait to see if you get better-TIME IS BRAIN. Warning Signs of HEART ATTACK Call 911 if you have these symptoms: 
? Chest discomfort. Most heart attacks involve discomfort in the center of the chest that lasts more than a few minutes, or that goes away and comes back. It can feel like uncomfortable pressure, squeezing, fullness, or pain. ? Discomfort in other areas of the upper body.  Symptoms can include pain or discomfort in one or both arms, the back, neck, jaw, or stomach. ? Shortness of breath with or without chest discomfort. ? Other signs may include breaking out in a cold sweat, nausea, or lightheadedness. Don't wait more than five minutes to call [address]! Fast action can save your life. Calling 911 is almost always the fastest way to get lifesaving treatment. Emergency Medical Services staff can begin treatment when they arrive  up to an hour sooner than if someone gets to the hospital by car. The discharge information has been reviewed with the patient. The patient verbalized understanding. Discharge medications reviewed with the patient and appropriate educational materials and side effects teaching were provided. Patient armband removed and given to patient to take home. Patient was informed of the privacy risks if armband lost or stolen Discharge Orders None Introducing [facility] SERVICES! [facility] introduces [email or web address] patient portal. Now you can access parts of your medical record, email your doctor's office, and request medication refills online. 1. In your internet browser, go to [email or web address] 2. Click on the First Time User? Click Here link in the Sign In box. You will see the New Member Sign Up page. 3. Enter your [email or web address] Access Code exactly as it appears below. You will not need to use this code after youve completed the sign-up process. If you do not sign up before the expiration date, you must request a new code. · [email or web address] Access Code: [email or web address] Expires: 8/15/2017 10:10 AM 
 
4. Enter the last four digits of your Social Security Number (xxxx) and Date of Birth (mm/dd/yyyy) as indicated and click Submit. You will be taken to the next sign-up page. 5. Create a [email or web address] ID. This will be your [email or web address] login ID and cannot be changed, so think of one that is secure and easy to remember. 6. Create a [email or web address] password. You can change your password at any time. 7. Enter your Password Reset Question and Answer. This can be used at a later time if you forget your password. 8. Enter your e-mail address. You will receive e-mail notification when new information is available in [address] 9. Click Sign Up. You can now view and download portions of your medical record. 10. Click the Download Summary menu link to download a portable copy of your medical information. If you have questions, please visit the Frequently Asked Questions section of the [email or web address] website. Remember, [email or web address] is NOT to be used for urgent needs. For medical emergencies, dial 911. Now available from your iPhone and Android! General Information Please provide this summary of care documentation to your next provider. Patient Signature:  ____________________________________________________________ Date:  ____________________________________________________________  
  
Gisel Carrizales Provider Signature:  ____________________________________________________________ Date:  ____________________________________________________________

## 2017-05-23 NOTE — PROGRESS NOTES
conducted a pre-surgery visit with Sharyn Kovacs, who is a 67 y.o.,female. The  provided the following Interventions:  Initiated a relationship of care and support. Offered prayer and assurance of continued prayers on patient's behalf. Plan:  Chaplains will continue to follow and will provide pastoral care on an as needed/requested basis.  recommends bedside caregivers page  on duty if patient shows signs of acute spiritual or emotional distress    Chaplain Anthony Daly   Board Certified [address] [place] [address]   [phone]   .

## 2017-05-23 NOTE — OP NOTES
Operative Report:      Pre-op diagnosis: Thickened endometrium in postmenopausal female  Post-op diagnosis: Thickened endometrium; endometrial polyp  Procedure: Diagnostic Hysteroscopy with Polypectomy by Morcellation  Surgeon: Jamila Barboza MD  Assistant: none  EBL: minimal cc  IVFluids:500cc  Anesthesia: general anesthesia  Findings: Endometrial polyp on anterior uterine wall  Disposition:The patient was awoken from anesthesia and transferred to the recovery room in stable condition. Procedure:   Patient was taken to the OR after informed consent had been obtained. Surgery identification was completed with the patient and the OR team. She was then placed in the dorsal lithotomy position. She was prepped and draped  in a sterile fashion. A speculum was placed in the posterior vagina. The anterior lip of the cervix was grasped with a single toothed tenaculum. The cervix was serially dilated to allow passage of a 5 mm diagnostic hysteroscope. Hysteroscopy was performed with the above noted findings. The hysteroscopic morcellator was then introduced into the uterine cavity and blade aligned according to  instructions. The polyp was then removed easily and any other abnormalities by the morcellator until the cavity was clear. The specimen was sent for permanent pathology. Hysteroscope was removed and all other instruments were removed. There was excellent hemostasis. Fluid deficit was 125cc.  All sponge, lap and needle counts were correct x 2.     Jamila Barboza MD  May 23, 2017

## 2017-06-01 ENCOUNTER — TELEPHONE (OUTPATIENT)
Dept: FAMILY MEDICINE CLINIC | Age: 77
End: 2017-06-01

## 2017-06-01 DIAGNOSIS — K21.9 GASTROESOPHAGEAL REFLUX DISEASE WITHOUT ESOPHAGITIS: Primary | ICD-10-CM

## 2017-06-01 RX ORDER — PANTOPRAZOLE SODIUM 40 MG/1
40 TABLET, DELAYED RELEASE ORAL DAILY
Qty: 30 TAB | Refills: 3 | Status: SHIPPED | OUTPATIENT
Start: 2017-06-01 | End: 2017-09-11 | Stop reason: SDUPTHER

## 2017-06-01 NOTE — TELEPHONE ENCOUNTER
Patient called stating she's having a hard time with her acid reflux. She ate spicy food 2 days in a row.she's been taking pepcid, but wasn't working and starting rotating pepcid and pepto, which helped a little bit. She is requesting something else to be sent to the pharmacy before 4pm when her son gets off work- please advise.

## 2017-06-06 ENCOUNTER — OFFICE VISIT (OUTPATIENT)
Dept: FAMILY MEDICINE CLINIC | Age: 77
End: 2017-06-06

## 2017-06-06 VITALS
TEMPERATURE: 97.2 F | WEIGHT: 171 LBS | HEIGHT: 60 IN | RESPIRATION RATE: 18 BRPM | SYSTOLIC BLOOD PRESSURE: 124 MMHG | OXYGEN SATURATION: 97 % | BODY MASS INDEX: 33.57 KG/M2 | HEART RATE: 72 BPM | DIASTOLIC BLOOD PRESSURE: 70 MMHG

## 2017-06-06 DIAGNOSIS — E03.4 HYPOTHYROIDISM DUE TO ACQUIRED ATROPHY OF THYROID: ICD-10-CM

## 2017-06-06 DIAGNOSIS — E11.9 DM TYPE 2, GOAL HBA1C < 7% (HCC): Primary | ICD-10-CM

## 2017-06-06 DIAGNOSIS — I10 ESSENTIAL HYPERTENSION: ICD-10-CM

## 2017-06-06 DIAGNOSIS — K21.9 GASTROESOPHAGEAL REFLUX DISEASE WITHOUT ESOPHAGITIS: ICD-10-CM

## 2017-06-06 NOTE — MR AVS SNAPSHOT
Visit Information Date & Time Provider Department Dept. Phone Encounter #  
 6/6/2017  1:45 PM Josesito Lewis, [address] [facility] [id number] Follow-up Instructions Return in about 3 months (around 9/6/2017). Your Appointments 6/6/2017  1:45 PM  
Follow Up with Josesito Lewis MD  
[facility]) Appt Note: Return in 1 month (6/7/17).; PT R/S FROM 6/7/17  
 [address] [facility] [address] [facility]  
  
   
 [address] Upcoming Health Maintenance Date Due  
 FOOT EXAM Q1 7/4/1950 EYE EXAM RETINAL OR DILATED Q1 7/4/1950 GLAUCOMA SCREENING Q2Y 7/4/2004 OSTEOPOROSIS SCREENING (DEXA) 7/4/2004 INFLUENZA AGE 9 TO ADULT 8/1/2017 HEMOGLOBIN A1C Q6M 10/20/2017 MICROALBUMIN Q1 4/20/2018 LIPID PANEL Q1 4/20/2018 MEDICARE YEARLY EXAM 5/4/2018 Pneumococcal 65+ Low/Medium Risk (2 of 2 - PPSV23) 5/17/2018 DTaP/Tdap/Td series (2 - Td) 5/13/2027 Allergies as of 6/6/2017  Review Complete On: 6/6/2017 By: Josesito Lewis MD  
  
 Severity Noted Reaction Type Reactions Ampicillin  08/22/2013    Itching Current Immunizations  Reviewed on 5/17/2017 Name Date Influenza Vaccine (Quad) PF 11/9/2016 11:56 AM  
 Pneumococcal Conjugate (PCV-13) 5/17/2017 Not reviewed this visit You Were Diagnosed With   
  
 Codes Comments DM type 2, goal HbA1c < 7% (HCC)    -  Primary ICD-10-CM: E11.9 ICD-9-CM: 250.00 Essential hypertension     ICD-10-CM: I10 
ICD-9-CM: 401.9 Hypothyroidism due to acquired atrophy of thyroid     ICD-10-CM: E03.4 ICD-9-CM: 244.8, 246.8 Gastroesophageal reflux disease without esophagitis     ICD-10-CM: K21.9 ICD-9-CM: 530.81 Vitals BP Pulse Temp Resp Height(growth percentile) Weight(growth percentile) 124/70 72 97.2 °F (36.2 °C) (Oral) 18 5' (1.524 m) 171 lb (77.6 kg) SpO2 BMI OB Status Smoking Status 97% 33.4 kg/m2 Postmenopausal Never Smoker Vitals History BMI and BSA Data Body Mass Index Body Surface Area  
 33.4 kg/m 2 1.81 m 2 Preferred Pharmacy Pharmacy Name Phone [facility] [place], [address] [place] [address] [phone] Your Updated Medication List  
  
   
This list is accurate as of: 6/6/17 12:54 PM.  Always use your most recent med list.  
  
  
  
  
 diph,Pertuss(AC),Tet Vac-PF 2.5-8-5 Lf-mcg suspension Commonly known as:  BOOSTRIX  
0.5 mL by IntraMUSCular route PRIOR TO DISCHARGE for 1 dose. docusate sodium 100 mg capsule Commonly known as:  Zhen Harrington Take 1 Cap by mouth two (2) times a day for 90 days. glipiZIDE 5 mg tablet Commonly known as:  Bal Klippel Take 5 mg by mouth two (2) times a day. hydroCHLOROthiazide 25 mg tablet Commonly known as:  HYDRODIURIL Take 1 Tab by mouth daily. levothyroxine 75 mcg tablet Commonly known as:  SYNTHROID Take 1 Tab by mouth Daily (before breakfast). loperamide 2 mg capsule Commonly known as:  IMODIUM Take 2 mg by mouth four (4) times daily as needed. losartan 50 mg tablet Commonly known as:  COZAAR Take 1 Tab by mouth daily. metFORMIN 1,000 mg tablet Commonly known as:  GLUCOPHAGE Take 1,000 mg by mouth two (2) times daily (with meals). pantoprazole 40 mg tablet Commonly known as:  PROTONIX Take 1 Tab by mouth daily. simvastatin 20 mg tablet Commonly known as:  ZOCOR Take 20 mg by mouth nightly. VITAMIN D3 2,000 unit Tab Generic drug:  cholecalciferol (vitamin D3) Take  by mouth. Follow-up Instructions Return in about 3 months (around 9/6/2017). Introducing [facility]!    
 [employer] introduces MeSixty patient portal. Now you can access parts of your medical record, email your doctor's office, and request medication refills online. 1. In your internet browser, go to [email or web address] 2. Click on the First Time User? Click Here link in the Sign In box. You will see the New Member Sign Up page. 3. Enter your Prime Connections Access Code exactly as it appears below. You will not need to use this code after youve completed the sign-up process. If you do not sign up before the expiration date, you must request a new code. · Prime Connections Access Code: [email or web address] Expires: 8/15/2017 10:10 AM 
 
4. Enter the last four digits of your Social Security Number (xxxx) and Date of Birth (mm/dd/yyyy) as indicated and click Submit. You will be taken to the next sign-up page. 5. Create a Prime Connections ID. This will be your Prime Connections login ID and cannot be changed, so think of one that is secure and easy to remember. 6. Create a Prime Connections password. You can change your password at any time. 7. Enter your Password Reset Question and Answer. This can be used at a later time if you forget your password. 8. Enter your e-mail address. You will receive e-mail notification when new information is available in [address] 9. Click Sign Up. You can now view and download portions of your medical record. 10. Click the Download Summary menu link to download a portable copy of your medical information. If you have questions, please visit the Frequently Asked Questions section of the Prime Connections website. Remember, Prime Connections is NOT to be used for urgent needs. For medical emergencies, dial 911. Now available from your iPhone and Android! Please provide this summary of care documentation to your next provider. Your primary care clinician is listed as [address] If you have any questions after today's visit, please call [phone].

## 2017-06-06 NOTE — PROGRESS NOTES
1. Have you been to the ER, urgent care clinic since your last visit? Hospitalized since your last visit? No    2. Have you seen or consulted any other health care providers outside of the [address] [place] [address] since your last visit? Include any pap smears or colon screening.  obgyn

## 2017-06-06 NOTE — PROGRESS NOTES
Miller Herrera is a 67 y.o.  female and presents with     Chief Complaint   Patient presents with    Hypertension    Diabetes    Hypothyroidism       Pt  Had endometrial biopsy and that was benign. Pt feels fine. No chest pain, SOB. Pt has GERD. Pt is taking protonix and that is helping. Past Medical History:   Diagnosis Date    Arthritis     Bronchitis     Diabetes ([address]     Diverticulitis     GERD (gastroesophageal reflux disease)     Hypercholesterolemia     Hypertension     Hypothyroid     Pancreatitis      Past Surgical History:   Procedure Laterality Date    COLONOSCOPY N/A 2/2/2017    COLONOSCOPY  w/bx polyp performed by Saji Montez MD at [facility] ENDOSCOPY     Current Outpatient Prescriptions   Medication Sig    pantoprazole (PROTONIX) 40 mg tablet Take 1 Tab by mouth daily.  losartan (COZAAR) 50 mg tablet Take 1 Tab by mouth daily.  hydroCHLOROthiazide (HYDRODIURIL) 25 mg tablet Take 1 Tab by mouth daily.  levothyroxine (SYNTHROID) 75 mcg tablet Take 1 Tab by mouth Daily (before breakfast).  docusate sodium (COLACE) 100 mg capsule Take 1 Cap by mouth two (2) times a day for 90 days.  cholecalciferol, vitamin D3, (VITAMIN D3) 2,000 unit tab Take  by mouth.  metFORMIN (GLUCOPHAGE) 1,000 mg tablet Take 1,000 mg by mouth two (2) times daily (with meals).  glipiZIDE (GLUCOTROL) 5 mg tablet Take 5 mg by mouth two (2) times a day.  simvastatin (ZOCOR) 20 mg tablet Take 20 mg by mouth nightly.  loperamide (IMODIUM) 2 mg capsule Take 2 mg by mouth four (4) times daily as needed.  diph,Pertuss,AC,,Tet Vac-PF (BOOSTRIX) 2.5-8-5 Lf-mcg suspension 0.5 mL by IntraMUSCular route PRIOR TO DISCHARGE for 1 dose. No current facility-administered medications for this visit.       Health Maintenance   Topic Date Due    FOOT EXAM Q1  07/04/1950    EYE EXAM RETINAL OR DILATED Q1  07/04/1950    GLAUCOMA SCREENING Q2Y  07/04/2004    OSTEOPOROSIS SCREENING (DEXA) 07/04/2004    INFLUENZA AGE 9 TO ADULT  08/01/2017    HEMOGLOBIN A1C Q6M  10/20/2017    MICROALBUMIN Q1  04/20/2018    LIPID PANEL Q1  04/20/2018    MEDICARE YEARLY EXAM  05/04/2018    Pneumococcal 65+ Low/Medium Risk (2 of 2 - PPSV23) 05/17/2018    DTaP/Tdap/Td series (2 - Td) 05/13/2027    ZOSTER VACCINE AGE 60>  Completed     Immunization History   Administered Date(s) Administered    Influenza Vaccine (Quad) PF 11/09/2016    Pneumococcal Conjugate (PCV-13) 05/17/2017     No LMP recorded. Patient is postmenopausal.        Allergies and Intolerances: Allergies   Allergen Reactions    Ampicillin Itching       Family History:   Family History   Problem Relation Age of Onset    Diabetes Mother     Heart Disease Mother     Cancer Father      melanoma    Stroke Sister     Hypertension Sister     Diabetes Sister     Hypertension Brother     Diabetes Brother     Breast Cancer Paternal Grandmother      older, but not sure age.  Breast Cancer Paternal Aunt      and gyn cancer ?age       Social History:   She  reports that she has never smoked. She has never used smokeless tobacco.  She  reports that she does not drink alcohol.             Review of Systems:   General: negative for - chills, fatigue, fever, weight change  Psych: negative for - anxiety, depression, irritability or mood swings  ENT: negative for - headaches, hearing change, nasal congestion, oral lesions, sneezing or sore throat  Heme/ Lymph: negative for - bleeding problems, bruising, pallor or swollen lymph nodes  Endo: negative for - hot flashes, polydipsia/polyuria or temperature intolerance  Resp: negative for - cough, shortness of breath or wheezing  CV: negative for - chest pain, edema or palpitations  GI: negative for - abdominal pain, change in bowel habits, constipation, diarrhea or nausea/vomiting  : negative for - dysuria, hematuria, incontinence, pelvic pain or vulvar/vaginal symptoms  MSK: negative for - joint pain, joint swelling or muscle pain  Neuro: negative for - confusion, headaches, seizures or weakness  Derm: negative for - dry skin, hair changes, rash or skin lesion changes          Physical:   Vitals:   Vitals:    06/06/17 1237 06/06/17 1245   BP: 100/60 124/70   Pulse: 72    Resp: 18    Temp: 97.2 °F (36.2 °C)    TempSrc: Oral    SpO2: 97%    Weight: 171 lb (77.6 kg)    Height: 5' (1.524 m)            Exam:   HEENT- atraumatic,normocephalic, awake, oriented, well nourished  Neck - supple,no enlarged lymph nodes, no JVD, no thyromegaly  Chest- CTA, no rhonchi, no crackles  Heart- rrr, no murmurs / gallop/rub  Abdomen- soft,BS+,NT, no hepatosplenomegaly  Ext - no c/c/edema   Neuro- no focal deficits. Power 5/5 all extremities  Skin - warm,dry, no obvious rashes. Review of Data:   LABS:   Lab Results   Component Value Date/Time    WBC 9.9 05/17/2017 11:41 AM    HGB 13.1 05/17/2017 11:41 AM    HCT 39.6 05/17/2017 11:41 AM    PLATELET 375 30/11/7870 11:41 AM     Lab Results   Component Value Date/Time    Sodium 141 04/20/2017 11:23 AM    Potassium 4.6 04/20/2017 11:23 AM    Chloride 103 04/20/2017 11:23 AM    CO2 25 04/20/2017 11:23 AM    Glucose 112 04/20/2017 11:23 AM    BUN 14 04/20/2017 11:23 AM    Creatinine 0.94 04/20/2017 11:23 AM     Lab Results   Component Value Date/Time    Cholesterol, total 146 04/20/2017 11:23 AM    HDL Cholesterol 57 04/20/2017 11:23 AM    LDL, calculated 68 04/20/2017 11:23 AM    Triglyceride 105 04/20/2017 11:23 AM     No results found for: GPT        Impression / Plan:        ICD-10-CM ICD-9-CM    1. DM type 2, goal HbA1c < 7% (HCC) E11.9 250.00    2. Essential hypertension I10 401.9    3. Hypothyroidism due to acquired atrophy of thyroid E03.4 244.8      246.8    4. Gastroesophageal reflux disease without esophagitis K21.9 530.81      HTN - improved. Hypothyroidism - stable. Explained to patient risk benefits of the medications. Advised patient to stop meds if having any side effects. Pt verbalized understanding of the instructions. I have discussed the diagnosis with the patient and the intended plan as seen in the above orders. The patient has received an after-visit summary and questions were answered concerning future plans. I have discussed medication side effects and warnings with the patient as well. I have reviewed the plan of care with the patient, accepted their input and they are in agreement with the treatment goals. Reviewed plan of care. Patient has provided input and agrees with goals.     Follow-up Disposition: Not on Carrol Lopez MD

## 2017-06-13 ENCOUNTER — HOSPITAL ENCOUNTER (OUTPATIENT)
Dept: LAB | Age: 77
Discharge: HOME | End: 2017-06-13
Payer: MEDICARE

## 2017-06-13 ENCOUNTER — OFFICE VISIT (OUTPATIENT)
Dept: FAMILY MEDICINE CLINIC | Age: 77
End: 2017-06-13

## 2017-06-13 VITALS
BODY MASS INDEX: 33.69 KG/M2 | TEMPERATURE: 97 F | SYSTOLIC BLOOD PRESSURE: 118 MMHG | RESPIRATION RATE: 18 BRPM | WEIGHT: 171.6 LBS | HEIGHT: 60 IN | HEART RATE: 87 BPM | OXYGEN SATURATION: 97 % | DIASTOLIC BLOOD PRESSURE: 54 MMHG

## 2017-06-13 DIAGNOSIS — E03.4 HYPOTHYROIDISM DUE TO ACQUIRED ATROPHY OF THYROID: ICD-10-CM

## 2017-06-13 DIAGNOSIS — R42 VERTIGO: ICD-10-CM

## 2017-06-13 DIAGNOSIS — R51.9 NONINTRACTABLE HEADACHE, UNSPECIFIED CHRONICITY PATTERN, UNSPECIFIED HEADACHE TYPE: ICD-10-CM

## 2017-06-13 DIAGNOSIS — K21.9 GASTROESOPHAGEAL REFLUX DISEASE WITHOUT ESOPHAGITIS: Primary | ICD-10-CM

## 2017-06-13 LAB
BASOPHILS # BLD AUTO: 0 K/UL (ref 0–0.06)
BASOPHILS # BLD: 0 % (ref 0–2)
DIFFERENTIAL METHOD BLD: NORMAL
EOSINOPHIL # BLD: 0.2 K/UL (ref 0–0.4)
EOSINOPHIL NFR BLD: 2 % (ref 0–5)
ERYTHROCYTE [DISTWIDTH] IN BLOOD BY AUTOMATED COUNT: 12.8 % (ref 11.6–14.5)
HCT VFR BLD AUTO: 41 % (ref 35–45)
HGB BLD-MCNC: 13.6 G/DL (ref 12–16)
LYMPHOCYTES # BLD AUTO: 21 % (ref 21–52)
LYMPHOCYTES # BLD: 2.3 K/UL (ref 0.9–3.6)
MCH RBC QN AUTO: 28.8 PG (ref 24–34)
MCHC RBC AUTO-ENTMCNC: 33.2 G/DL (ref 31–37)
MCV RBC AUTO: 86.7 FL (ref 74–97)
MONOCYTES # BLD: 0.6 K/UL (ref 0.05–1.2)
MONOCYTES NFR BLD AUTO: 5 % (ref 3–10)
NEUTS SEG # BLD: 7.7 K/UL (ref 1.8–8)
NEUTS SEG NFR BLD AUTO: 72 % (ref 40–73)
PLATELET # BLD AUTO: 264 K/UL (ref 135–420)
PMV BLD AUTO: 10.7 FL (ref 9.2–11.8)
RBC # BLD AUTO: 4.73 M/UL (ref 4.2–5.3)
TSH SERPL DL<=0.05 MIU/L-ACNC: 0.6 UIU/ML (ref 0.36–3.74)
WBC # BLD AUTO: 10.7 K/UL (ref 4.6–13.2)

## 2017-06-13 PROCEDURE — 84443 ASSAY THYROID STIM HORMONE: CPT | Performed by: INTERNAL MEDICINE

## 2017-06-13 PROCEDURE — 85025 COMPLETE CBC W/AUTO DIFF WBC: CPT | Performed by: INTERNAL MEDICINE

## 2017-06-13 PROCEDURE — 36415 COLL VENOUS BLD VENIPUNCTURE: CPT | Performed by: INTERNAL MEDICINE

## 2017-06-13 RX ORDER — MECLIZINE HYDROCHLORIDE 25 MG/1
25 TABLET ORAL
Qty: 30 TAB | Refills: 1 | Status: SHIPPED | OUTPATIENT
Start: 2017-06-13 | End: 2017-06-23

## 2017-06-13 NOTE — PROGRESS NOTES
Deric Navarro is a 67 y.o.  female and presents with     Chief Complaint   Patient presents with    Headache    Dizziness    Epigastric Pain     Pt has h/o GERD. Pt stopped taking Protonix. Pt  Has some dizziness. Pt does get occasional rt isded headaches. Pt checked her blood sugars and they were normal and blood pressure was normal.  No ear problems. o fever or chills or bleeding. Past Medical History:   Diagnosis Date    Arthritis     Bronchitis     Diabetes ([address]     Diverticulitis     GERD (gastroesophageal reflux disease)     Hypercholesterolemia     Hypertension     Hypothyroid     Pancreatitis      Past Surgical History:   Procedure Laterality Date    COLONOSCOPY N/A 2/2/2017    COLONOSCOPY  w/bx polyp performed by Marquis Ace MD at [facility] ENDOSCOPY     Current Outpatient Prescriptions   Medication Sig    meclizine (ANTIVERT) 25 mg tablet Take 1 Tab by mouth three (3) times daily as needed for up to 10 days.  pantoprazole (PROTONIX) 40 mg tablet Take 1 Tab by mouth daily.  losartan (COZAAR) 50 mg tablet Take 1 Tab by mouth daily.  hydroCHLOROthiazide (HYDRODIURIL) 25 mg tablet Take 1 Tab by mouth daily.  levothyroxine (SYNTHROID) 75 mcg tablet Take 1 Tab by mouth Daily (before breakfast).  docusate sodium (COLACE) 100 mg capsule Take 1 Cap by mouth two (2) times a day for 90 days.  cholecalciferol, vitamin D3, (VITAMIN D3) 2,000 unit tab Take  by mouth.  metFORMIN (GLUCOPHAGE) 1,000 mg tablet Take 1,000 mg by mouth two (2) times daily (with meals).  glipiZIDE (GLUCOTROL) 5 mg tablet Take 5 mg by mouth two (2) times a day.  simvastatin (ZOCOR) 20 mg tablet Take 20 mg by mouth nightly.  loperamide (IMODIUM) 2 mg capsule Take 2 mg by mouth four (4) times daily as needed.  diph,Pertuss,AC,,Tet Vac-PF (BOOSTRIX) 2.5-8-5 Lf-mcg suspension 0.5 mL by IntraMUSCular route PRIOR TO DISCHARGE for 1 dose.      No current facility-administered medications for this visit. Health Maintenance   Topic Date Due    FOOT EXAM Q1  07/04/1950    EYE EXAM RETINAL OR DILATED Q1  [birth year]04/1950    GLAUCOMA SCREENING Q2Y  07/04/2004    OSTEOPOROSIS SCREENING (DEXA)  07/04/2004    INFLUENZA AGE 9 TO ADULT  08/01/2017    HEMOGLOBIN A1C Q6M  10/20/2017    MICROALBUMIN Q1  04/20/2018    LIPID PANEL Q1  04/20/2018    MEDICARE YEARLY EXAM  05/04/2018    Pneumococcal 65+ Low/Medium Risk (2 of 2 - PPSV23) 05/17/2018    DTaP/Tdap/Td series (2 - Td) 05/13/2027    ZOSTER VACCINE AGE 60>  Completed     Immunization History   Administered Date(s) Administered    Influenza Vaccine (Quad) PF 11/09/2016    Pneumococcal Conjugate (PCV-13) 05/17/2017     No LMP recorded. Patient is postmenopausal.        Allergies and Intolerances: Allergies   Allergen Reactions    Ampicillin Itching       Family History:   Family History   Problem Relation Age of Onset    Diabetes Mother     Heart Disease Mother     Cancer Father      melanoma    Stroke Sister     Hypertension Sister     Diabetes Sister     Hypertension Brother     Diabetes Brother     Breast Cancer Paternal Grandmother      older, but not sure age.  Breast Cancer Paternal Aunt      and gyn cancer ?age       Social History:   She  reports that she has never smoked. She has never used smokeless tobacco.  She  reports that she does not drink alcohol.             Review of Systems:   General: negative for - chills, fatigue, fever, weight change  Psych: negative for - anxiety, depression, irritability or mood swings  ENT: negative for - headaches, hearing change, nasal congestion, oral lesions, sneezing or sore throat  Heme/ Lymph: negative for - bleeding problems, bruising, pallor or swollen lymph nodes  Endo: negative for - hot flashes, polydipsia/polyuria or temperature intolerance  Resp: negative for - cough, shortness of breath or wheezing  CV: negative for - chest pain, edema or palpitations  GI: negative for - abdominal pain, change in bowel habits, constipation, diarrhea or nausea/vomiting  : negative for - dysuria, hematuria, incontinence, pelvic pain or vulvar/vaginal symptoms  MSK: negative for - joint pain, joint swelling or muscle pain  Neuro: negative for - confusion, headaches, seizures or weakness,pos for dizziness  Derm: negative for - dry skin, hair changes, rash or skin lesion changes          Physical:   Vitals:   Vitals:    06/13/17 1249   BP: 118/54   Pulse: 87   Resp: 18   Temp: 97 °F (36.1 °C)   TempSrc: Oral   SpO2: 97%   Weight: 171 lb 9.6 oz (77.8 kg)   Height: 5' (1.524 m)           Exam:   HEENT- atraumatic,normocephalic, awake, oriented, well nourished  Neck - supple,no enlarged lymph nodes, no JVD, no thyromegaly  Chest- CTA, no rhonchi, no crackles  Heart- rrr, no murmurs / gallop/rub  Abdomen- soft,BS+,NT, no hepatosplenomegaly  Ext - no c/c/edema   Neuro- no focal deficits. Power 5/5 all extremities,slow gait due to knee pain  Skin - warm,dry, no obvious rashes. Review of Data:   LABS:   Lab Results   Component Value Date/Time    WBC 9.9 05/17/2017 11:41 AM    HGB 13.1 05/17/2017 11:41 AM    HCT 39.6 05/17/2017 11:41 AM    PLATELET 628 07/54/2512 11:41 AM     Lab Results   Component Value Date/Time    Sodium 141 04/20/2017 11:23 AM    Potassium 4.6 04/20/2017 11:23 AM    Chloride 103 04/20/2017 11:23 AM    CO2 25 04/20/2017 11:23 AM    Glucose 112 04/20/2017 11:23 AM    BUN 14 04/20/2017 11:23 AM    Creatinine 0.94 04/20/2017 11:23 AM     Lab Results   Component Value Date/Time    Cholesterol, total 146 04/20/2017 11:23 AM    HDL Cholesterol 57 04/20/2017 11:23 AM    LDL, calculated 68 04/20/2017 11:23 AM    Triglyceride 105 04/20/2017 11:23 AM     No results found for: GPT        Impression / Plan:        ICD-10-CM ICD-9-CM    1. Gastroesophageal reflux disease without esophagitis K21.9 530.81    2. Hypothyroidism due to acquired atrophy of thyroid E03.4 244.8 TSH 3RD GENERATION     246.8    3. Nonintractable headache, unspecified chronicity pattern, unspecified headache type R51 784.0 CBC WITH AUTOMATED DIFF      CT HEAD W WO CONT   4. Vertigo R42 780.4 meclizine (ANTIVERT) 25 mg tablet      CT HEAD W WO CONT     Advised pt to restart protnix for GERD    Explained to patient risk benefits of the medications. Advised patient to stop meds if having any side effects. Pt verbalized understanding of the instructions. I have discussed the diagnosis with the patient and the intended plan as seen in the above orders. The patient has received an after-visit summary and questions were answered concerning future plans. I have discussed medication side effects and warnings with the patient as well. I have reviewed the plan of care with the patient, accepted their input and they are in agreement with the treatment goals. Reviewed plan of care. Patient has provided input and agrees with goals.     Follow-up Disposition: Not on Octavia Gracia MD

## 2017-06-13 NOTE — MR AVS SNAPSHOT
Visit Information Date & Time Provider Department Dept. Phone Encounter #  
 6/13/2017  1:15 PM Josesito Lewis, [address] [facility] [phone] [id number] Follow-up Instructions Return for keep appt. Your Appointments 9/5/2017 12:30 PM  
Follow Up with Josesito Lewis MD  
[facility] [place] Appt Note: Return in about 3 months (around 9/6/2017). [address] [facility] [address] [facility]  
  
   
 [address] [place] [address] [address] Upcoming Health Maintenance Date Due  
 FOOT EXAM Q1 7/4/1950 EYE EXAM RETINAL OR DILATED Q1 7/4/1950 GLAUCOMA SCREENING Q2Y 7/4/2004 OSTEOPOROSIS SCREENING (DEXA) 7/4/2004 INFLUENZA AGE 9 TO ADULT 8/1/2017 HEMOGLOBIN A1C Q6M 10/20/2017 MICROALBUMIN Q1 4/20/2018 LIPID PANEL Q1 4/20/2018 MEDICARE YEARLY EXAM 5/4/2018 Pneumococcal 65+ Low/Medium Risk (2 of 2 - PPSV23) 5/17/2018 DTaP/Tdap/Td series (2 - Td) 5/13/2027 Allergies as of 6/13/2017  Review Complete On: 6/13/2017 By: Josesito Lewis MD  
  
 Severity Noted Reaction Type Reactions Ampicillin  08/22/2013    Itching Current Immunizations  Reviewed on 5/17/2017 Name Date Influenza Vaccine (Quad) PF 11/9/2016 11:56 AM  
 Pneumococcal Conjugate (PCV-13) 5/17/2017 Not reviewed this visit You Were Diagnosed With   
  
 Codes Comments Gastroesophageal reflux disease without esophagitis    -  Primary ICD-10-CM: K21.9 ICD-9-CM: 530.81 Hypothyroidism due to acquired atrophy of thyroid     ICD-10-CM: E03.4 ICD-9-CM: 244.8, 246.8 Nonintractable headache, unspecified chronicity pattern, unspecified headache type     ICD-10-CM: R51 ICD-9-CM: 355. 0 Vertigo     ICD-10-CM: D89 ICD-9-CM: 780.4 Vitals BP Pulse Temp Resp Height(growth percentile) Weight(growth percentile) 118/54 (BP 1 Location: Left arm, BP Patient Position: Sitting) 87 97 °F (36.1 °C) (Oral) 18 5' (1.524 m) 171 lb 9.6 oz (77.8 kg) SpO2 BMI OB Status Smoking Status 97% 33.51 kg/m2 Postmenopausal Never Smoker Vitals History BMI and BSA Data Body Mass Index Body Surface Area  
 33.51 kg/m 2 1.81 m 2 Preferred Pharmacy Pharmacy Name Phone [facility] [address] [phone] Your Updated Medication List  
  
   
This list is accurate as of: 6/13/17  1:22 PM.  Always use your most recent med list.  
  
  
  
  
 diph,Pertuss(AC),Tet Vac-PF 2.5-8-5 Lf-mcg suspension Commonly known as:  BOOSTRIX  
0.5 mL by IntraMUSCular route PRIOR TO DISCHARGE for 1 dose. docusate sodium 100 mg capsule Commonly known as:  [place] Take 1 Cap by mouth two (2) times a day for 90 days. glipiZIDE 5 mg tablet Commonly known as:  Ludie Null Take 5 mg by mouth two (2) times a day. hydroCHLOROthiazide 25 mg tablet Commonly known as:  HYDRODIURIL Take 1 Tab by mouth daily. levothyroxine 75 mcg tablet Commonly known as:  SYNTHROID Take 1 Tab by mouth Daily (before breakfast). loperamide 2 mg capsule Commonly known as:  IMODIUM Take 2 mg by mouth four (4) times daily as needed. losartan 50 mg tablet Commonly known as:  COZAAR Take 1 Tab by mouth daily. meclizine 25 mg tablet Commonly known as:  ANTIVERT Take 1 Tab by mouth three (3) times daily as needed for up to 10 days. metFORMIN 1,000 mg tablet Commonly known as:  GLUCOPHAGE Take 1,000 mg by mouth two (2) times daily (with meals). pantoprazole 40 mg tablet Commonly known as:  PROTONIX Take 1 Tab by mouth daily. simvastatin 20 mg tablet Commonly known as:  ZOCOR Take 20 mg by mouth nightly. VITAMIN D3 2,000 unit Tab Generic drug:  cholecalciferol (vitamin D3) Take  by mouth. Prescriptions Sent to Pharmacy Refills  
 meclizine (ANTIVERT) 25 mg tablet 1 Sig: Take 1 Tab by mouth three (3) times daily as needed for up to 10 days. Class: Normal  
 Pharmacy: Copyright Agent [facility], [address] #: [phone] Route: Oral  
  
Follow-up Instructions Return for keep appt. To-Do List   
 06/13/2017 Lab:  CBC WITH AUTOMATED DIFF   
  
 06/13/2017 Imaging:  CT HEAD W WO CONT   
  
 06/13/2017 Lab:  TSH 3RD GENERATION Introducing [facility]! [facility] introduces [employer] patient portal. Now you can access parts of your medical record, email your doctor's office, and request medication refills online. 1. In your internet browser, go to [email or web address] 2. Click on the First Time User? Click Here link in the Sign In box. You will see the New Member Sign Up page. 3. Enter your [employer] Access Code exactly as it appears below. You will not need to use this code after youve completed the sign-up process. If you do not sign up before the expiration date, you must request a new code. · [employer] Access Code: [email or web address] Expires: 8/15/2017 10:10 AM 
 
4. Enter the last four digits of your Social Security Number (xxxx) and Date of Birth (mm/dd/yyyy) as indicated and click Submit. You will be taken to the next sign-up page. 5. Create a [employer] Renewablest ID. This will be your [employer] login ID and cannot be changed, so think of one that is secure and easy to remember. 6. Create a [employer] password. You can change your password at any time. 7. Enter your Password Reset Question and Answer. This can be used at a later time if you forget your password. 8. Enter your e-mail address. You will receive e-mail notification when new information is available in [address] 9. Click Sign Up.  You can now view and download portions of your medical record. 10. Click the Download Summary menu link to download a portable copy of your medical information. If you have questions, please visit the Frequently Asked Questions section of the [employer] website. Remember, [employer] is NOT to be used for urgent needs. For medical emergencies, dial 911. Now available from your iPhone and Android! Please provide this summary of care documentation to your next provider. Your primary care clinician is listed as [id number] [address] Lewis. If you have any questions after today's visit, please call [phone].

## 2017-07-11 ENCOUNTER — HOSPITAL ENCOUNTER (OUTPATIENT)
Dept: CT IMAGING | Age: 77
Discharge: HOME | End: 2017-07-11
Attending: INTERNAL MEDICINE
Payer: MEDICARE

## 2017-07-11 ENCOUNTER — TELEPHONE (OUTPATIENT)
Dept: FAMILY MEDICINE CLINIC | Age: 77
End: 2017-07-11

## 2017-07-11 DIAGNOSIS — R42 VERTIGO: ICD-10-CM

## 2017-07-11 DIAGNOSIS — R51.9 NONINTRACTABLE HEADACHE, UNSPECIFIED CHRONICITY PATTERN, UNSPECIFIED HEADACHE TYPE: ICD-10-CM

## 2017-07-11 PROCEDURE — 70450 CT HEAD/BRAIN W/O DYE: CPT

## 2017-07-25 ENCOUNTER — OFFICE VISIT (OUTPATIENT)
Dept: FAMILY MEDICINE CLINIC | Age: 77
End: 2017-07-25

## 2017-07-25 VITALS
OXYGEN SATURATION: 96 % | RESPIRATION RATE: 18 BRPM | DIASTOLIC BLOOD PRESSURE: 61 MMHG | TEMPERATURE: 97.2 F | HEART RATE: 80 BPM | BODY MASS INDEX: 34.16 KG/M2 | HEIGHT: 60 IN | SYSTOLIC BLOOD PRESSURE: 121 MMHG | WEIGHT: 174 LBS

## 2017-07-25 DIAGNOSIS — D32.9 MENINGIOMA (HCC): Primary | ICD-10-CM

## 2017-07-25 DIAGNOSIS — I10 ESSENTIAL HYPERTENSION: ICD-10-CM

## 2017-07-25 DIAGNOSIS — E03.4 HYPOTHYROIDISM DUE TO ACQUIRED ATROPHY OF THYROID: ICD-10-CM

## 2017-07-25 NOTE — MR AVS SNAPSHOT
Visit Information Date & Time Provider Department Dept. Phone Encounter #  
 7/25/2017  2:15 PM José Hassan, [address] [facility] [phone] Follow-up Instructions Return in about 3 months (around 10/25/2017) for keep appt. Your Appointments 9/5/2017 12:30 PM  
Follow Up with José Hassan MD  
[facility]) Appt Note: Return in about 3 months (around 9/6/2017). [address] [facility] 83 [address]  
  
   
 [address] [place] [address] [address] Upcoming Health Maintenance Date Due  
 FOOT EXAM Q1 7/4/1950 EYE EXAM RETINAL OR DILATED Q1 7/4/1950 GLAUCOMA SCREENING Q2Y 7/4/2004 OSTEOPOROSIS SCREENING (DEXA) 7/4/2004 INFLUENZA AGE 9 TO ADULT 8/1/2017 HEMOGLOBIN A1C Q6M 10/20/2017 MICROALBUMIN Q1 4/20/2018 LIPID PANEL Q1 4/20/2018 MEDICARE YEARLY EXAM 5/4/2018 Pneumococcal 65+ Low/Medium Risk (2 of 2 - PPSV23) 5/17/2018 DTaP/Tdap/Td series (2 - Td) 5/13/2027 Allergies as of 7/25/2017  Review Complete On: 7/25/2017 By: José Hassan MD  
  
 Severity Noted Reaction Type Reactions Ampicillin  08/22/2013    Itching Current Immunizations  Reviewed on 5/17/2017 Name Date Influenza Vaccine (Quad) PF 11/9/2016 11:56 AM  
 Pneumococcal Conjugate (PCV-13) 5/17/2017 Not reviewed this visit You Were Diagnosed With   
  
 Codes Comments [facility]    -  Primary ICD-10-CM: D32.9 ICD-9-CM: 225.2 Hypothyroidism due to acquired atrophy of thyroid     ICD-10-CM: E03.4 ICD-9-CM: 244.8, 246.8 Essential hypertension     ICD-10-CM: I10 
ICD-9-CM: 401.9 Vitals BP Pulse Temp Resp Height(growth percentile) Weight(growth percentile) 121/61 (BP 1 Location: Left arm, BP Patient Position: At rest) 80 97.2 °F (36.2 °C) (Oral) 18 5' (1.524 m) 174 lb (78.9 kg) SpO2 BMI OB Status Smoking Status 96% 33.98 kg/m2 Postmenopausal Never Smoker Vitals History BMI and BSA Data Body Mass Index Body Surface Area 33.98 kg/m 2 1.83 m 2 Preferred Pharmacy Pharmacy Name Phone [facility] [place], [address] [phone] Your Updated Medication List  
  
   
This list is accurate as of: 7/25/17  3:18 PM.  Always use your most recent med list.  
  
  
  
  
 diph,Pertuss(AC),Tet Vac-PF 2.5-8-5 Lf-mcg suspension Commonly known as:  BOOSTRIX  
0.5 mL by IntraMUSCular route PRIOR TO DISCHARGE for 1 dose. glipiZIDE 5 mg tablet Commonly known as:  Aly Half Take 5 mg by mouth two (2) times a day. hydroCHLOROthiazide 25 mg tablet Commonly known as:  HYDRODIURIL Take 1 Tab by mouth daily. levothyroxine 75 mcg tablet Commonly known as:  SYNTHROID Take 1 Tab by mouth Daily (before breakfast). loperamide 2 mg capsule Commonly known as:  IMODIUM Take 2 mg by mouth four (4) times daily as needed. losartan 50 mg tablet Commonly known as:  COZAAR Take 1 Tab by mouth daily. metFORMIN 1,000 mg tablet Commonly known as:  GLUCOPHAGE Take 1,000 mg by mouth two (2) times daily (with meals). pantoprazole 40 mg tablet Commonly known as:  PROTONIX Take 1 Tab by mouth daily. simvastatin 20 mg tablet Commonly known as:  ZOCOR Take 20 mg by mouth nightly. VITAMIN D3 2,000 unit Tab Generic drug:  cholecalciferol (vitamin D3) Take  by mouth. Follow-up Instructions Return in about 3 months (around 10/25/2017) for keep appt. Introducing [facility]! [employer] introduces [employer] patient portal. Now you can access parts of your medical record, email your doctor's office, and request medication refills online. 1. In your internet browser, go to [email or web address] ([email or web address]) 2. Click on the First Time User? Click Here link in the Sign In box. You will see the New Member Sign Up page. 3. Enter your [employer] Access Code exactly as it appears below. You will not need to use this code after youve completed the sign-up process. If you do not sign up before the expiration date, you must request a new code. · [employer] Access Code: [email or web address] Expires: 8/15/2017 10:10 AM 
 
4. Enter the last four digits of your Social Security Number (xxxx) and Date of Birth (mm/dd/yyyy) as indicated and click Submit. You will be taken to the next sign-up page. 5. Create a FlameStower ID. This will be your [employer] login ID and cannot be changed, so think of one that is secure and easy to remember. 6. Create a [employer] password. You can change your password at any time. 7. Enter your Password Reset Question and Answer. This can be used at a later time if you forget your password. 8. Enter your e-mail address. You will receive e-mail notification when new information is available in [address] 9. Click Sign Up. You can now view and download portions of your medical record. 10. Click the Download Summary menu link to download a portable copy of your medical information. If you have questions, please visit the Frequently Asked Questions section of the [employer] website. Remember, [employer] is NOT to be used for urgent needs. For medical emergencies, dial 911. Now available from your iPhone and Android! Please provide this summary of care documentation to your next provider. Your primary care clinician is listed as Juan C Strauss. If you have any questions after today's visit, please call [phone].

## 2017-07-25 NOTE — PROGRESS NOTES
Neela Frazier is a 65 y.o.  female and presents with     Chief Complaint   Patient presents with    Labs     CT scan     Diabetes    Hypertension    Cholesterol Problem       Pt says she had headache last week, very mild but not anymore. No seizures, no loss of vision. No focal deficits. Pt is here for CT head results and labs . Past Medical History:   Diagnosis Date    Arthritis     Bronchitis     Diabetes ([address]     Diverticulitis     GERD (gastroesophageal reflux disease)     Hypercholesterolemia     Hypertension     Hypothyroid     Pancreatitis      Past Surgical History:   Procedure Laterality Date    COLONOSCOPY N/A 2/2/2017    COLONOSCOPY  w/bx polyp performed by Meg Dee MD at [facility] ENDOSCOPY     Current Outpatient Prescriptions   Medication Sig    pantoprazole (PROTONIX) 40 mg tablet Take 1 Tab by mouth daily.  losartan (COZAAR) 50 mg tablet Take 1 Tab by mouth daily.  hydroCHLOROthiazide (HYDRODIURIL) 25 mg tablet Take 1 Tab by mouth daily.  levothyroxine (SYNTHROID) 75 mcg tablet Take 1 Tab by mouth Daily (before breakfast).  cholecalciferol, vitamin D3, (VITAMIN D3) 2,000 unit tab Take  by mouth.  metFORMIN (GLUCOPHAGE) 1,000 mg tablet Take 1,000 mg by mouth two (2) times daily (with meals).  glipiZIDE (GLUCOTROL) 5 mg tablet Take 5 mg by mouth two (2) times a day.  simvastatin (ZOCOR) 20 mg tablet Take 20 mg by mouth nightly.  loperamide (IMODIUM) 2 mg capsule Take 2 mg by mouth four (4) times daily as needed.  diph,Pertuss,AC,,Tet Vac-PF (BOOSTRIX) 2.5-8-5 Lf-mcg suspension 0.5 mL by IntraMUSCular route PRIOR TO DISCHARGE for 1 dose. No current facility-administered medications for this visit.       Health Maintenance   Topic Date Due    FOOT EXAM Q1  07/04/1950    EYE EXAM RETINAL OR DILATED Q1  07/04/1950    GLAUCOMA SCREENING Q2Y  07/04/2004    OSTEOPOROSIS SCREENING (DEXA)  07/04/2004    INFLUENZA AGE 9 TO ADULT  08/01/2017    HEMOGLOBIN A1C Q6M  10/20/2017    MICROALBUMIN Q1  04/20/2018    LIPID PANEL Q1  04/20/2018    MEDICARE YEARLY EXAM  05/04/2018    Pneumococcal 65+ Low/Medium Risk (2 of 2 - PPSV23) 05/17/2018    DTaP/Tdap/Td series (2 - Td) 05/13/2027    ZOSTER VACCINE AGE 60>  Completed     Immunization History   Administered Date(s) Administered    Influenza Vaccine (Quad) PF 11/09/2016    Pneumococcal Conjugate (PCV-13) 05/17/2017     No LMP recorded. Patient is postmenopausal.        Allergies and Intolerances: Allergies   Allergen Reactions    Ampicillin Itching       Family History:   Family History   Problem Relation Age of Onset    Diabetes Mother     Heart Disease Mother     Cancer Father      melanoma    Stroke Sister     Hypertension Sister     Diabetes Sister     Hypertension Brother     Diabetes Brother     Breast Cancer Paternal Grandmother      older, but not sure age.  Breast Cancer Paternal Aunt      and gyn cancer ?age       Social History:   She  reports that she has never smoked. She has never used smokeless tobacco.  She  reports that she does not drink alcohol.             Review of Systems:   General: negative for - chills, fatigue, fever, weight change  Psych: negative for - anxiety, depression, irritability or mood swings  ENT: negative for - headaches, hearing change, nasal congestion, oral lesions, sneezing or sore throat  Heme/ Lymph: negative for - bleeding problems, bruising, pallor or swollen lymph nodes  Endo: negative for - hot flashes, polydipsia/polyuria or temperature intolerance  Resp: negative for - cough, shortness of breath or wheezing  CV: negative for - chest pain, edema or palpitations  GI: negative for - abdominal pain, change in bowel habits, constipation, diarrhea or nausea/vomiting  : negative for - dysuria, hematuria, incontinence, pelvic pain or vulvar/vaginal symptoms  MSK: negative for - joint pain, joint swelling or muscle pain  Neuro: negative for - confusion, headaches, seizures or weakness  Derm: negative for - dry skin, hair changes, rash or skin lesion changes          Physical:   Vitals:   Vitals:    07/25/17 1440   BP: 121/61   Pulse: 80   Resp: 18   Temp: 97.2 °F (36.2 °C)   TempSrc: Oral   SpO2: 96%   Weight: 174 lb (78.9 kg)   Height: 5' (1.524 m)           Exam:   HEENT- atraumatic,normocephalic, awake, oriented, well nourished  Neck - supple,no enlarged lymph nodes, no JVD, no thyromegaly  Chest- CTA, no rhonchi, no crackles  Heart- rrr, no murmurs / gallop/rub  Abdomen- soft,BS+,NT, no hepatosplenomegaly  Ext - no c/c/edema   Neuro- no focal deficits. Power 5/5 all extremities  Skin - warm,dry, no obvious rashes. Review of Data:   LABS:   Lab Results   Component Value Date/Time    WBC 10.7 06/13/2017 01:27 PM    HGB 13.6 06/13/2017 01:27 PM    HCT 41.0 06/13/2017 01:27 PM    PLATELET 137 07/79/8390 01:27 PM     Lab Results   Component Value Date/Time    Sodium 141 04/20/2017 11:23 AM    Potassium 4.6 04/20/2017 11:23 AM    Chloride 103 04/20/2017 11:23 AM    CO2 25 04/20/2017 11:23 AM    Glucose 112 04/20/2017 11:23 AM    BUN 14 04/20/2017 11:23 AM    Creatinine 0.94 04/20/2017 11:23 AM     Lab Results   Component Value Date/Time    Cholesterol, total 146 04/20/2017 11:23 AM    HDL Cholesterol 57 04/20/2017 11:23 AM    LDL, calculated 68 04/20/2017 11:23 AM    Triglyceride 105 04/20/2017 11:23 AM     No results found for: GPT        Impression / Plan:        ICD-10-CM ICD-9-CM    1. Meningioma (HCC) D32.9 225.2    2. Hypothyroidism due to acquired atrophy of thyroid E03.4 244.8      246.8    3. Essential hypertension I10 401.9      DM/HTN/Hypothyroidsim - stable    Meningioma- pt wants to hold off on Neurology referral, pt has lot of bills to pay. Asked pt to RTC if pt has dizziness, focal weakness, headaches. Explained to patient risk benefits of the medications. Advised patient to stop meds if having any side effects. Pt verbalized understanding of the instructions. I have discussed the diagnosis with the patient and the intended plan as seen in the above orders. The patient has received an after-visit summary and questions were answered concerning future plans. I have discussed medication side effects and warnings with the patient as well. I have reviewed the plan of care with the patient, accepted their input and they are in agreement with the treatment goals. Reviewed plan of care. Patient has provided input and agrees with goals.     Follow-up Disposition: Not on Leyla Leon MD

## 2017-08-13 DIAGNOSIS — E03.4 HYPOTHYROIDISM DUE TO ACQUIRED ATROPHY OF THYROID: ICD-10-CM

## 2017-08-14 DIAGNOSIS — E03.4 HYPOTHYROIDISM DUE TO ACQUIRED ATROPHY OF THYROID: ICD-10-CM

## 2017-08-14 RX ORDER — LEVOTHYROXINE SODIUM 75 UG/1
TABLET ORAL
Qty: 30 TAB | Refills: 0 | Status: SHIPPED | OUTPATIENT
Start: 2017-08-14 | End: 2017-08-14 | Stop reason: SDUPTHER

## 2017-08-14 NOTE — TELEPHONE ENCOUNTER
Requested Prescriptions     Pending Prescriptions Disp Refills    levothyroxine (SYNTHROID) 75 mcg tablet 30 Tab 0    loperamide (IMODIUM) 2 mg capsule       Sig: Take 1 Cap by mouth four (4) times daily as needed.  metFORMIN (GLUCOPHAGE) 1,000 mg tablet       Sig: Take 1 Tab by mouth two (2) times daily (with meals).      Last office visit: 7/25/17  Next office visit: 9/5/17

## 2017-08-15 RX ORDER — LEVOTHYROXINE SODIUM 75 UG/1
75 TABLET ORAL
Qty: 30 TAB | Refills: 0 | Status: SHIPPED | OUTPATIENT
Start: 2017-08-15 | End: 2017-11-21

## 2017-08-15 RX ORDER — METFORMIN HYDROCHLORIDE 1000 MG/1
1000 TABLET ORAL 2 TIMES DAILY WITH MEALS
Qty: 60 TAB | Refills: 3 | Status: SHIPPED | OUTPATIENT
Start: 2017-08-15 | End: 2017-11-21

## 2017-08-15 RX ORDER — LOPERAMIDE HYDROCHLORIDE 2 MG/1
2 CAPSULE ORAL
Qty: 60 CAP | Refills: 1 | Status: SHIPPED | OUTPATIENT
Start: 2017-08-15 | End: 2017-10-12

## 2017-08-31 DIAGNOSIS — I10 ESSENTIAL HYPERTENSION: ICD-10-CM

## 2017-09-01 RX ORDER — LOSARTAN POTASSIUM 50 MG/1
50 TABLET ORAL DAILY
Qty: 30 TAB | Refills: 3 | Status: SHIPPED | OUTPATIENT
Start: 2017-09-01 | End: 2017-11-21

## 2017-09-01 RX ORDER — HYDROCHLOROTHIAZIDE 25 MG/1
25 TABLET ORAL DAILY
Qty: 30 TAB | Refills: 3 | Status: SHIPPED | OUTPATIENT
Start: 2017-09-01 | End: 2018-01-08 | Stop reason: SDUPTHER

## 2017-09-02 DIAGNOSIS — I10 ESSENTIAL HYPERTENSION: ICD-10-CM

## 2017-09-05 ENCOUNTER — OFFICE VISIT (OUTPATIENT)
Dept: FAMILY MEDICINE CLINIC | Age: 77
End: 2017-09-05

## 2017-09-05 VITALS
OXYGEN SATURATION: 98 % | HEART RATE: 76 BPM | RESPIRATION RATE: 15 BRPM | DIASTOLIC BLOOD PRESSURE: 73 MMHG | BODY MASS INDEX: 33.77 KG/M2 | TEMPERATURE: 97.5 F | SYSTOLIC BLOOD PRESSURE: 143 MMHG | WEIGHT: 172 LBS | HEIGHT: 60 IN

## 2017-09-05 DIAGNOSIS — L30.9 ECZEMA, UNSPECIFIED TYPE: ICD-10-CM

## 2017-09-05 DIAGNOSIS — I10 ESSENTIAL HYPERTENSION: Primary | ICD-10-CM

## 2017-09-05 DIAGNOSIS — R05.9 COUGH: ICD-10-CM

## 2017-09-05 DIAGNOSIS — E03.4 HYPOTHYROIDISM DUE TO ACQUIRED ATROPHY OF THYROID: ICD-10-CM

## 2017-09-05 DIAGNOSIS — Z78.0 POST-MENOPAUSAL: ICD-10-CM

## 2017-09-05 DIAGNOSIS — Z23 ENCOUNTER FOR IMMUNIZATION: ICD-10-CM

## 2017-09-05 RX ORDER — GLIPIZIDE 5 MG/1
5 TABLET ORAL 2 TIMES DAILY
Qty: 60 TAB | Refills: 3 | Status: SHIPPED | OUTPATIENT
Start: 2017-09-05 | End: 2017-12-13 | Stop reason: SDUPTHER

## 2017-09-05 RX ORDER — LOSARTAN POTASSIUM 50 MG/1
TABLET ORAL
Qty: 30 TAB | Refills: 0 | Status: SHIPPED | OUTPATIENT
Start: 2017-09-05 | End: 2017-09-05 | Stop reason: SDUPTHER

## 2017-09-05 RX ORDER — CODEINE PHOSPHATE AND GUAIFENESIN 10; 100 MG/5ML; MG/5ML
5 SOLUTION ORAL
Qty: 118 ML | Refills: 0 | Status: SHIPPED | OUTPATIENT
Start: 2017-09-05 | End: 2017-10-12

## 2017-09-05 RX ORDER — TRIAMCINOLONE ACETONIDE 1 MG/G
CREAM TOPICAL 2 TIMES DAILY
Qty: 15 G | Refills: 0 | Status: SHIPPED | OUTPATIENT
Start: 2017-09-05 | End: 2018-01-18 | Stop reason: SDUPTHER

## 2017-09-05 NOTE — MR AVS SNAPSHOT
Visit Information Date & Time Provider Department Dept. Phone Encounter #  
 9/5/2017 12:30 PM Chaim Gutierrez, [facility] [phone] Follow-up Instructions Return in about 3 months (around 12/5/2017). Upcoming Health Maintenance Date Due  
 FOOT EXAM Q1 7/4/1950 EYE EXAM RETINAL OR DILATED Q1 7/4/1950 GLAUCOMA SCREENING Q2Y 7/4/2004 OSTEOPOROSIS SCREENING (DEXA) 7/4/2004 HEMOGLOBIN A1C Q6M 10/20/2017 MICROALBUMIN Q1 4/20/2018 LIPID PANEL Q1 4/20/2018 MEDICARE YEARLY EXAM 5/4/2018 Pneumococcal 65+ Low/Medium Risk (2 of 2 - PPSV23) 5/17/2018 DTaP/Tdap/Td series (2 - Td) 5/13/2027 Allergies as of 9/5/2017  Review Complete On: 9/5/2017 By: Gabi Gallegos LPN Severity Noted Reaction Type Reactions Ampicillin  08/22/2013    Itching Current Immunizations  Reviewed on 5/17/2017 Name Date Influenza Vaccine (Quad) PF 11/9/2016 11:56 AM  
 Pneumococcal Conjugate (PCV-13) 5/17/2017 Not reviewed this visit You Were Diagnosed With   
  
 Codes Comments Essential hypertension    -  Primary ICD-10-CM: I10 
ICD-9-CM: 401.9 Post-menopausal     ICD-10-CM: Z78.0 ICD-9-CM: V49.81 Hypothyroidism due to acquired atrophy of thyroid     ICD-10-CM: E03.4 ICD-9-CM: 244.8, 246.8 Eczema, unspecified type     ICD-10-CM: L30.9 ICD-9-CM: 692.9 Cough     ICD-10-CM: R05 ICD-9-CM: 208. 2 Vitals BP Pulse Temp Resp Height(growth percentile) Weight(growth percentile) 143/73 76 97.5 °F (36.4 °C) (Oral) 15 5' (1.524 m) 172 lb (78 kg) SpO2 BMI OB Status Smoking Status 98% 33.59 kg/m2 Postmenopausal Never Smoker Vitals History BMI and BSA Data Body Mass Index Body Surface Area  
 33.59 kg/m 2 1.82 m 2 Preferred Pharmacy Pharmacy Name Phone [facility] [address] [phone] Your Updated Medication List  
  
   
This list is accurate as of: 9/5/17  1:12 PM.  Always use your most recent med list.  
  
  
  
  
 diph,Pertuss(AC),Tet Vac-PF 2.5-8-5 Lf-mcg suspension Commonly known as:  BOOSTRIX  
0.5 mL by IntraMUSCular route PRIOR TO DISCHARGE for 1 dose. glipiZIDE 5 mg tablet Commonly known as:  Rebecca Kitchen Take 1 Tab by mouth two (2) times a day. guaiFENesin-codeine 100-10 mg/5 mL solution Commonly known as:  Bessie Campi Take 5 mL by mouth nightly as needed for Cough. Max Daily Amount: 5 mL.  
  
 hydroCHLOROthiazide 25 mg tablet Commonly known as:  HYDRODIURIL Take 1 Tab by mouth daily. levothyroxine 75 mcg tablet Commonly known as:  SYNTHROID Take 1 Tab by mouth Daily (before breakfast). loperamide 2 mg capsule Commonly known as:  IMODIUM Take 1 Cap by mouth four (4) times daily as needed. losartan 50 mg tablet Commonly known as:  COZAAR Take 1 Tab by mouth daily. metFORMIN 1,000 mg tablet Commonly known as:  GLUCOPHAGE Take 1 Tab by mouth two (2) times daily (with meals). pantoprazole 40 mg tablet Commonly known as:  PROTONIX Take 1 Tab by mouth daily. simvastatin 20 mg tablet Commonly known as:  ZOCOR Take 20 mg by mouth nightly. triamcinolone acetonide 0.1 % topical cream  
Commonly known as:  KENALOG Apply  to affected area two (2) times a day. use thin layer VITAMIN D3 2,000 unit Tab Generic drug:  cholecalciferol (vitamin D3) Take  by mouth. Prescriptions Printed Refills  
 guaiFENesin-codeine (VIRTUSSIN AC) 100-10 mg/5 mL solution 0 Sig: Take 5 mL by mouth nightly as needed for Cough. Max Daily Amount: 5 mL. Class: Print Route: Oral  
  
Prescriptions Sent to Pharmacy Refills  
 triamcinolone acetonide (KENALOG) 0.1 % topical cream 0 Sig: Apply  to affected area two (2) times a day. use thin layer  Class: Normal  
 Pharmacy: [facility], [address] [place] [address] AT [address] [place] [address] #: [phone] Route: Topical  
  
Follow-up Instructions Return in about 3 months (around 12/5/2017). To-Do List   
 09/05/2017 Imaging:  DEXA BONE DENSITY STUDY AXIAL Introducing [facility] SERVICES! [employer] introduces [employer] patient portal. Now you can access parts of your medical record, email your doctor's office, and request medication refills online. 1. In your internet browser, go to [email or web address] 2. Click on the First Time User? Click Here link in the Sign In box. You will see the New Member Sign Up page. 3. Enter your [employer] Access Code exactly as it appears below. You will not need to use this code after youve completed the sign-up process. If you do not sign up before the expiration date, you must request a new code. · [employer] Access Code: [email or web address] Expires: 12/4/2017 11:50 AM 
 
4. Enter the last four digits of your Social Security Number (xxxx) and Date of Birth (mm/dd/yyyy) as indicated and click Submit. You will be taken to the next sign-up page. 5. Create a [employer] ID. This will be your [employer] login ID and cannot be changed, so think of one that is secure and easy to remember. 6. Create a [employer] password. You can change your password at any time. 7. Enter your Password Reset Question and Answer. This can be used at a later time if you forget your password. 8. Enter your e-mail address. You will receive e-mail notification when new information is available in [address] 9. Click Sign Up. You can now view and download portions of your medical record. 10. Click the Download Summary menu link to download a portable copy of your medical information. If you have questions, please visit the Frequently Asked Questions section of the [employer] website.  Remember, [employer] is NOT to be used for urgent needs. For medical emergencies, dial 911. Now available from your iPhone and Android! Please provide this summary of care documentation to your next provider. Your primary care clinician is listed as Josesito Lewis. If you have any questions after today's visit, please call [phone].

## 2017-09-05 NOTE — PROGRESS NOTES
1. Have you been to the ER, urgent care clinic since your last visit? Hospitalized since your last visit? No    2. Have you seen or consulted any other health care providers outside of the [address] [place] [address] since your last visit? Include any pap smears or colon screening.  No

## 2017-09-05 NOTE — PROGRESS NOTES
Arvind Kirby is a 65 y.o.  female and presents with     Chief Complaint   Patient presents with    Hypertension    Hypothyroidism    Diabetes    Cholesterol Problem    Cough       Pt is here for follow up. Pt says her headaches are very mild and she does not want to see Neurology for meningioma. She has eczema on her hands and wants refills on kenalog. She has mild cough  And wants to try cough syrup. No SOB, no leg swelling. She is taking meds for DM, chol and thyroid problem as recommended. Past Medical History:   Diagnosis Date    Arthritis     Bronchitis     Diabetes ([address]     Diverticulitis     GERD (gastroesophageal reflux disease)     Hypercholesterolemia     Hypertension     Hypothyroid     Pancreatitis      Past Surgical History:   Procedure Laterality Date    COLONOSCOPY N/A 2/2/2017    COLONOSCOPY  w/bx polyp performed by Melisa De La Rosa MD at [facility] ENDOSCOPY     Current Outpatient Prescriptions   Medication Sig    glipiZIDE (GLUCOTROL) 5 mg tablet Take 1 Tab by mouth two (2) times a day.  triamcinolone acetonide (KENALOG) 0.1 % topical cream Apply  to affected area two (2) times a day. use thin layer    guaiFENesin-codeine (VIRTUSSIN AC) 100-10 mg/5 mL solution Take 5 mL by mouth nightly as needed for Cough. Max Daily Amount: 5 mL.  losartan (COZAAR) 50 mg tablet Take 1 Tab by mouth daily.  hydroCHLOROthiazide (HYDRODIURIL) 25 mg tablet Take 1 Tab by mouth daily.  levothyroxine (SYNTHROID) 75 mcg tablet Take 1 Tab by mouth Daily (before breakfast).  loperamide (IMODIUM) 2 mg capsule Take 1 Cap by mouth four (4) times daily as needed.  metFORMIN (GLUCOPHAGE) 1,000 mg tablet Take 1 Tab by mouth two (2) times daily (with meals).  pantoprazole (PROTONIX) 40 mg tablet Take 1 Tab by mouth daily.  cholecalciferol, vitamin D3, (VITAMIN D3) 2,000 unit tab Take  by mouth.  simvastatin (ZOCOR) 20 mg tablet Take 20 mg by mouth nightly.     diph,Pertuss,AC,,Tet Vac-PF (BOOSTRIX) 2.5-8-5 Lf-mcg suspension 0.5 mL by IntraMUSCular route PRIOR TO DISCHARGE for 1 dose. No current facility-administered medications for this visit. Health Maintenance   Topic Date Due    FOOT EXAM Q1  07/04/1950    EYE EXAM RETINAL OR DILATED Q1  07/04/1950    GLAUCOMA SCREENING Q2Y  07/04/2004    OSTEOPOROSIS SCREENING (DEXA)  07/04/2004    HEMOGLOBIN A1C Q6M  10/20/2017    MICROALBUMIN Q1  04/20/2018    LIPID PANEL Q1  04/20/2018    MEDICARE YEARLY EXAM  05/04/2018    Pneumococcal 65+ Low/Medium Risk (2 of 2 - PPSV23) 05/17/2018    DTaP/Tdap/Td series (2 - Td) 05/13/2027    ZOSTER VACCINE AGE 60>  Completed    INFLUENZA AGE 9 TO ADULT  Completed     Immunization History   Administered Date(s) Administered    Influenza Vaccine (Quad) PF 11/09/2016    Pneumococcal Conjugate (PCV-13) 05/17/2017     No LMP recorded. Patient is postmenopausal.        Allergies and Intolerances: Allergies   Allergen Reactions    Ampicillin Itching       Family History:   Family History   Problem Relation Age of Onset    Diabetes Mother     Heart Disease Mother     Cancer Father      melanoma    Stroke Sister     Hypertension Sister     Diabetes Sister     Hypertension Brother     Diabetes Brother     Breast Cancer Paternal Grandmother      older, but not sure age.  Breast Cancer Paternal Aunt      and gyn cancer ?age       Social History:   She  reports that she has never smoked. She has never used smokeless tobacco.  She  reports that she does not drink alcohol.             Review of Systems:   General: negative for - chills, fatigue, fever, weight change  Psych: negative for - anxiety, depression, irritability or mood swings  ENT: negative for - headaches, hearing change, nasal congestion, oral lesions, sneezing or sore throat  Heme/ Lymph: negative for - bleeding problems, bruising, pallor or swollen lymph nodes  Endo: negative for - hot flashes, polydipsia/polyuria or temperature intolerance  Resp: negative for -  shortness of breath or wheezing, pos for cough  CV: negative for - chest pain, edema or palpitations  GI: negative for - abdominal pain, change in bowel habits, constipation, diarrhea or nausea/vomiting  : negative for - dysuria, hematuria, incontinence, pelvic pain or vulvar/vaginal symptoms  MSK: negative for - joint pain, joint swelling or muscle pain  Neuro: negative for - confusion, headaches, seizures or weakness  Derm: negative for - dry skin, hair changes, rash or skin lesion changes          Physical:   Vitals:   Vitals:    09/05/17 1216 09/05/17 1219   BP: 140/65 143/73   Pulse: 76    Resp: 15    Temp: 97.5 °F (36.4 °C)    TempSrc: Oral    SpO2: 98%    Weight: 172 lb (78 kg)    Height: 5' (1.524 m)            Exam:   HEENT- atraumatic,normocephalic, awake, oriented, well nourished, mild congestion of throat  Neck - supple,no enlarged lymph nodes, no JVD, no thyromegaly  Chest- CTA, no rhonchi, no crackles  Heart- rrr, no murmurs / gallop/rub  Abdomen- soft,BS+,NT, no hepatosplenomegaly  Ext - no c/c/edema   Neuro- no focal deficits. Power 5/5 all extremities  Skin - warm,dry, no obvious rashes.           Review of Data:   LABS:   Lab Results   Component Value Date/Time    WBC 10.7 06/13/2017 01:27 PM    HGB 13.6 06/13/2017 01:27 PM    HCT 41.0 06/13/2017 01:27 PM    PLATELET 139 56/32/4646 01:27 PM     Lab Results   Component Value Date/Time    Sodium 141 04/20/2017 11:23 AM    Potassium 4.6 04/20/2017 11:23 AM    Chloride 103 04/20/2017 11:23 AM    CO2 25 04/20/2017 11:23 AM    Glucose 112 04/20/2017 11:23 AM    BUN 14 04/20/2017 11:23 AM    Creatinine 0.94 04/20/2017 11:23 AM     Lab Results   Component Value Date/Time    Cholesterol, total 146 04/20/2017 11:23 AM    HDL Cholesterol 57 04/20/2017 11:23 AM    LDL, calculated 68 04/20/2017 11:23 AM    Triglyceride 105 04/20/2017 11:23 AM     No results found for: GPT        Impression / Plan:        ICD-10-CM ICD-9-CM    1. Essential hypertension I10 401.9    2. Post-menopausal Z78.0 V49.81 DEXA BONE DENSITY STUDY AXIAL   3. Hypothyroidism due to acquired atrophy of thyroid E03.4 244.8      246.8    4. Eczema, unspecified type L30.9 692.9 triamcinolone acetonide (KENALOG) 0.1 % topical cream   5. Cough R05 786.2 guaiFENesin-codeine (VIRTUSSIN AC) 100-10 mg/5 mL solution     DM /HTN/hypothyroidism - stable        Explained to patient risk benefits of the medications. Advised patient to stop meds if having any side effects. Pt verbalized understanding of the instructions. I have discussed the diagnosis with the patient and the intended plan as seen in the above orders. The patient has received an after-visit summary and questions were answered concerning future plans. I have discussed medication side effects and warnings with the patient as well. I have reviewed the plan of care with the patient, accepted their input and they are in agreement with the treatment goals. Reviewed plan of care. Patient has provided input and agrees with goals.     Follow-up Disposition: Not on Mia Barger MD

## 2017-09-11 DIAGNOSIS — K21.9 GASTROESOPHAGEAL REFLUX DISEASE WITHOUT ESOPHAGITIS: ICD-10-CM

## 2017-09-11 RX ORDER — PANTOPRAZOLE SODIUM 40 MG/1
40 TABLET, DELAYED RELEASE ORAL DAILY
Qty: 30 TAB | Refills: 3 | Status: SHIPPED | OUTPATIENT
Start: 2017-09-11 | End: 2017-11-21

## 2017-09-13 DIAGNOSIS — E03.4 HYPOTHYROIDISM DUE TO ACQUIRED ATROPHY OF THYROID: ICD-10-CM

## 2017-09-13 RX ORDER — LEVOTHYROXINE SODIUM 75 UG/1
TABLET ORAL
Qty: 30 TAB | Refills: 0 | Status: SHIPPED | OUTPATIENT
Start: 2017-09-13 | End: 2017-10-09 | Stop reason: SDUPTHER

## 2017-09-18 ENCOUNTER — HOSPITAL ENCOUNTER (OUTPATIENT)
Dept: GENERAL RADIOLOGY | Age: 77
Discharge: HOME | End: 2017-09-18
Attending: INTERNAL MEDICINE
Payer: MEDICARE

## 2017-09-18 DIAGNOSIS — Z78.0 POST-MENOPAUSAL: ICD-10-CM

## 2017-09-18 PROCEDURE — 77080 DXA BONE DENSITY AXIAL: CPT

## 2017-10-09 ENCOUNTER — HOSPITAL ENCOUNTER (INPATIENT)
Age: 77
LOS: 3 days | Discharge: NURSING, REHAB OR LONG-TERM CARE | DRG: 482 | End: 2017-10-12
Attending: EMERGENCY MEDICINE | Admitting: INTERNAL MEDICINE
Payer: MEDICARE

## 2017-10-09 ENCOUNTER — APPOINTMENT (OUTPATIENT)
Dept: GENERAL RADIOLOGY | Age: 77
DRG: 482 | End: 2017-10-09
Attending: EMERGENCY MEDICINE
Payer: MEDICARE

## 2017-10-09 DIAGNOSIS — S62.115A CLOSED NONDISPLACED FRACTURE OF TRIQUETRUM OF LEFT WRIST, INITIAL ENCOUNTER: ICD-10-CM

## 2017-10-09 DIAGNOSIS — S72.002A CLOSED FRACTURE OF NECK OF LEFT FEMUR, INITIAL ENCOUNTER (HCC): Primary | ICD-10-CM

## 2017-10-09 PROBLEM — S72.009A HIP FRACTURE (HCC): Status: ACTIVE | Noted: 2017-10-09

## 2017-10-09 LAB
ANION GAP SERPL CALC-SCNC: 11 MMOL/L (ref 3–18)
BASOPHILS # BLD: 0 K/UL (ref 0–0.06)
BASOPHILS NFR BLD: 0 % (ref 0–2)
BUN SERPL-MCNC: 20 MG/DL (ref 7–18)
BUN/CREAT SERPL: 17 (ref 12–20)
CALCIUM SERPL-MCNC: 8.6 MG/DL (ref 8.5–10.1)
CHLORIDE SERPL-SCNC: 101 MMOL/L (ref 100–108)
CO2 SERPL-SCNC: 27 MMOL/L (ref 21–32)
CREAT SERPL-MCNC: 1.16 MG/DL (ref 0.6–1.3)
DIFFERENTIAL METHOD BLD: ABNORMAL
EOSINOPHIL # BLD: 0.2 K/UL (ref 0–0.4)
EOSINOPHIL NFR BLD: 1 % (ref 0–5)
ERYTHROCYTE [DISTWIDTH] IN BLOOD BY AUTOMATED COUNT: 12.3 % (ref 11.6–14.5)
EST. AVERAGE GLUCOSE BLD GHB EST-MCNC: 131 MG/DL
GLUCOSE BLD STRIP.AUTO-MCNC: 143 MG/DL (ref 70–110)
GLUCOSE SERPL-MCNC: 161 MG/DL (ref 74–99)
HBA1C MFR BLD: 6.2 % (ref 4.2–5.6)
HCT VFR BLD AUTO: 38.8 % (ref 35–45)
HGB BLD-MCNC: 12.9 G/DL (ref 12–16)
INR PPP: 0.9 (ref 0.8–1.2)
LYMPHOCYTES # BLD: 1.6 K/UL (ref 0.9–3.6)
LYMPHOCYTES NFR BLD: 11 % (ref 21–52)
MCH RBC QN AUTO: 28.2 PG (ref 24–34)
MCHC RBC AUTO-ENTMCNC: 33.2 G/DL (ref 31–37)
MCV RBC AUTO: 84.7 FL (ref 74–97)
MONOCYTES # BLD: 0.7 K/UL (ref 0.05–1.2)
MONOCYTES NFR BLD: 5 % (ref 3–10)
NEUTS SEG # BLD: 12.3 K/UL (ref 1.8–8)
NEUTS SEG NFR BLD: 83 % (ref 40–73)
PLATELET # BLD AUTO: 288 K/UL (ref 135–420)
PMV BLD AUTO: 9.8 FL (ref 9.2–11.8)
POTASSIUM SERPL-SCNC: 4.4 MMOL/L (ref 3.5–5.5)
PROTHROMBIN TIME: 11.8 SEC (ref 11.5–15.2)
RBC # BLD AUTO: 4.58 M/UL (ref 4.2–5.3)
SODIUM SERPL-SCNC: 139 MMOL/L (ref 136–145)
WBC # BLD AUTO: 14.7 K/UL (ref 4.6–13.2)

## 2017-10-09 PROCEDURE — 73110 X-RAY EXAM OF WRIST: CPT

## 2017-10-09 PROCEDURE — 73502 X-RAY EXAM HIP UNI 2-3 VIEWS: CPT

## 2017-10-09 PROCEDURE — 77030020263 HC SOL INJ SOD CL0.9% LFCR 1000ML

## 2017-10-09 PROCEDURE — 82962 GLUCOSE BLOOD TEST: CPT

## 2017-10-09 PROCEDURE — 83036 HEMOGLOBIN GLYCOSYLATED A1C: CPT | Performed by: INTERNAL MEDICINE

## 2017-10-09 PROCEDURE — 80048 BASIC METABOLIC PNL TOTAL CA: CPT | Performed by: EMERGENCY MEDICINE

## 2017-10-09 PROCEDURE — 71010 XR CHEST SNGL V: CPT

## 2017-10-09 PROCEDURE — 65270000029 HC RM PRIVATE

## 2017-10-09 PROCEDURE — 85610 PROTHROMBIN TIME: CPT | Performed by: EMERGENCY MEDICINE

## 2017-10-09 PROCEDURE — 77030034850

## 2017-10-09 PROCEDURE — 74011250636 HC RX REV CODE- 250/636: Performed by: INTERNAL MEDICINE

## 2017-10-09 PROCEDURE — 93005 ELECTROCARDIOGRAM TRACING: CPT

## 2017-10-09 PROCEDURE — 74011250637 HC RX REV CODE- 250/637: Performed by: INTERNAL MEDICINE

## 2017-10-09 PROCEDURE — 74011250636 HC RX REV CODE- 250/636: Performed by: EMERGENCY MEDICINE

## 2017-10-09 PROCEDURE — 96372 THER/PROPH/DIAG INJ SC/IM: CPT

## 2017-10-09 PROCEDURE — 74011250637 HC RX REV CODE- 250/637: Performed by: EMERGENCY MEDICINE

## 2017-10-09 PROCEDURE — 99284 EMERGENCY DEPT VISIT MOD MDM: CPT

## 2017-10-09 PROCEDURE — 85025 COMPLETE CBC W/AUTO DIFF WBC: CPT | Performed by: EMERGENCY MEDICINE

## 2017-10-09 PROCEDURE — 75810000053 HC SPLINT APPLICATION

## 2017-10-09 RX ORDER — SODIUM CHLORIDE 9 MG/ML
100 INJECTION, SOLUTION INTRAVENOUS CONTINUOUS
Status: DISCONTINUED | OUTPATIENT
Start: 2017-10-09 | End: 2017-10-11

## 2017-10-09 RX ORDER — MORPHINE SULFATE 4 MG/ML
2 INJECTION, SOLUTION INTRAMUSCULAR; INTRAVENOUS
Status: DISCONTINUED | OUTPATIENT
Start: 2017-10-09 | End: 2017-10-09

## 2017-10-09 RX ORDER — ONDANSETRON 4 MG/1
4 TABLET, ORALLY DISINTEGRATING ORAL
Status: COMPLETED | OUTPATIENT
Start: 2017-10-09 | End: 2017-10-09

## 2017-10-09 RX ORDER — MORPHINE SULFATE 10 MG/ML
2 INJECTION, SOLUTION INTRAMUSCULAR; INTRAVENOUS
Status: DISCONTINUED | OUTPATIENT
Start: 2017-10-09 | End: 2017-10-12 | Stop reason: HOSPADM

## 2017-10-09 RX ORDER — INSULIN LISPRO 100 [IU]/ML
INJECTION, SOLUTION INTRAVENOUS; SUBCUTANEOUS
Status: DISCONTINUED | OUTPATIENT
Start: 2017-10-09 | End: 2017-10-12 | Stop reason: HOSPADM

## 2017-10-09 RX ORDER — PANTOPRAZOLE SODIUM 40 MG/1
40 TABLET, DELAYED RELEASE ORAL DAILY
Status: DISCONTINUED | OUTPATIENT
Start: 2017-10-10 | End: 2017-10-12 | Stop reason: HOSPADM

## 2017-10-09 RX ORDER — DEXTROSE 50 % IN WATER (D50W) INTRAVENOUS SYRINGE
25-50 AS NEEDED
Status: DISCONTINUED | OUTPATIENT
Start: 2017-10-09 | End: 2017-10-12 | Stop reason: HOSPADM

## 2017-10-09 RX ORDER — MORPHINE SULFATE 4 MG/ML
2 INJECTION, SOLUTION INTRAMUSCULAR; INTRAVENOUS
Status: COMPLETED | OUTPATIENT
Start: 2017-10-09 | End: 2017-10-09

## 2017-10-09 RX ORDER — HYDROCODONE BITARTRATE AND ACETAMINOPHEN 5; 325 MG/1; MG/1
1 TABLET ORAL
Status: DISCONTINUED | OUTPATIENT
Start: 2017-10-09 | End: 2017-10-10 | Stop reason: SDUPTHER

## 2017-10-09 RX ORDER — ZOLPIDEM TARTRATE 5 MG/1
5 TABLET ORAL
Status: DISCONTINUED | OUTPATIENT
Start: 2017-10-09 | End: 2017-10-12 | Stop reason: HOSPADM

## 2017-10-09 RX ORDER — MAGNESIUM SULFATE 100 %
4 CRYSTALS MISCELLANEOUS AS NEEDED
Status: DISCONTINUED | OUTPATIENT
Start: 2017-10-09 | End: 2017-10-10 | Stop reason: SDUPTHER

## 2017-10-09 RX ORDER — MORPHINE SULFATE 2 MG/ML
2 INJECTION, SOLUTION INTRAMUSCULAR; INTRAVENOUS
Status: DISPENSED | OUTPATIENT
Start: 2017-10-09 | End: 2017-10-10

## 2017-10-09 RX ORDER — ONDANSETRON 4 MG/1
4 TABLET, ORALLY DISINTEGRATING ORAL
Status: DISCONTINUED | OUTPATIENT
Start: 2017-10-09 | End: 2017-10-10 | Stop reason: SDUPTHER

## 2017-10-09 RX ORDER — SIMVASTATIN 20 MG/1
20 TABLET, FILM COATED ORAL
Status: DISCONTINUED | OUTPATIENT
Start: 2017-10-09 | End: 2017-10-12 | Stop reason: HOSPADM

## 2017-10-09 RX ORDER — HYDROCHLOROTHIAZIDE 25 MG/1
25 TABLET ORAL DAILY
Status: DISCONTINUED | OUTPATIENT
Start: 2017-10-10 | End: 2017-10-12 | Stop reason: HOSPADM

## 2017-10-09 RX ORDER — ENOXAPARIN SODIUM 100 MG/ML
40 INJECTION SUBCUTANEOUS EVERY 24 HOURS
Status: DISCONTINUED | OUTPATIENT
Start: 2017-10-09 | End: 2017-10-12 | Stop reason: HOSPADM

## 2017-10-09 RX ORDER — LEVOTHYROXINE SODIUM 75 UG/1
75 TABLET ORAL
Status: DISCONTINUED | OUTPATIENT
Start: 2017-10-10 | End: 2017-10-12 | Stop reason: HOSPADM

## 2017-10-09 RX ORDER — LOSARTAN POTASSIUM 50 MG/1
50 TABLET ORAL DAILY
Status: DISCONTINUED | OUTPATIENT
Start: 2017-10-10 | End: 2017-10-12 | Stop reason: HOSPADM

## 2017-10-09 RX ADMIN — SODIUM CHLORIDE 100 ML/HR: 900 INJECTION, SOLUTION INTRAVENOUS at 20:55

## 2017-10-09 RX ADMIN — ONDANSETRON 4 MG: 4 TABLET, ORALLY DISINTEGRATING ORAL at 18:21

## 2017-10-09 RX ADMIN — HYDROCODONE BITARTRATE AND ACETAMINOPHEN 1 TABLET: 5; 325 TABLET ORAL at 21:08

## 2017-10-09 RX ADMIN — ENOXAPARIN SODIUM 40 MG: 40 INJECTION SUBCUTANEOUS at 20:55

## 2017-10-09 RX ADMIN — MORPHINE SULFATE 2 MG: 4 INJECTION, SOLUTION INTRAMUSCULAR; INTRAVENOUS at 18:00

## 2017-10-09 RX ADMIN — SIMVASTATIN 20 MG: 20 TABLET, FILM COATED ORAL at 21:08

## 2017-10-09 NOTE — H&P
History and Physical    Patient: Qamar Chau               Sex: female          DOA: 10/9/2017       YOB: 1940      Age:  65 y.o. Assessment/Plan       Perioperative risk assessment per ACC/AHA 2014 guidelines    Perioperative risks:  RCRI:  IdentityList.se  ACS/NSQIP: http://riskcalculator. facs.org/RiskCalculator/index. jsp      Patient can achieve >4METS and requires no further workup or testing. ECG NSR. Lori Martinez AD. 2014 ACC/AHA Guideline on Perioperative Cardiovascular Evaluation and Management of Patients Undergoing Noncardiac Surgery: A Report of the American College of Cardiology/American Heart Association Task Force on Practice Guidelines. J Am Lawson Cardiol 2014. L femoral neck fracture:  DVT prophy, pain control, ortho consult. NPO  DM - SSI AC/HS hold metformin and glipizide  HTN- resume losartan 50, hctz 50   Thyroid disease - synthroid 75mcg  HLP - zocor    Code status: Full  PPX:  DVT: LMWH   GI:PPI    HPI:     Chief Complaint   Patient presents with    Leg Pain    Wrist Pain       Qamar Chau is a 65 y.o. female with PMHX of DM,HTN,HLP,Obesity who presents after she tripped on her flip flop while walking her dog. She had intense hip pain as well as left wrist pain and so presented to ER. She was found to have a left femoral hip fracture. Her left wrist was splinted to fracture found. Ortho was consulted. She is very active and walks up a flight of stairs daily to get to her house. She can walk atleast 10 blocks. Past Medical History:   Diagnosis Date    Arthritis     Bronchitis     Diabetes ([address]     Diverticulitis     GERD (gastroesophageal reflux disease)     Hypercholesterolemia     Hypertension     Hypothyroid     Pancreatitis        Prior to Admission Medications   Prescriptions Last Dose Informant Patient Reported? Taking?    cholecalciferol, vitamin D3, (VITAMIN D3) 2,000 unit tab 10/9/2017  Yes Yes   Sig: Take  by mouth. diph,Pertuss,AC,,Tet Vac-PF (BOOSTRIX) 2.5-8-5 Lf-mcg suspension 10/9/2017  No Yes   Si.5 mL by IntraMUSCular route PRIOR TO DISCHARGE for 1 dose. glipiZIDE (GLUCOTROL) 5 mg tablet 10/9/2017  No Yes   Sig: Take 1 Tab by mouth two (2) times a day. guaiFENesin-codeine (VIRTUSSIN AC) 100-10 mg/5 mL solution 10/9/2017  No Yes   Sig: Take 5 mL by mouth nightly as needed for Cough. Max Daily Amount: 5 mL.   hydroCHLOROthiazide (HYDRODIURIL) 25 mg tablet 10/9/2017  No Yes   Sig: Take 1 Tab by mouth daily. levothyroxine (SYNTHROID) 75 mcg tablet 10/9/2017  No Yes   Sig: Take 1 Tab by mouth Daily (before breakfast). loperamide (IMODIUM) 2 mg capsule 10/9/2017  No Yes   Sig: Take 1 Cap by mouth four (4) times daily as needed. losartan (COZAAR) 50 mg tablet 10/9/2017  No Yes   Sig: Take 1 Tab by mouth daily. metFORMIN (GLUCOPHAGE) 1,000 mg tablet 10/9/2017  No Yes   Sig: Take 1 Tab by mouth two (2) times daily (with meals). pantoprazole (PROTONIX) 40 mg tablet 10/9/2017  No Yes   Sig: Take 1 Tab by mouth daily. simvastatin (ZOCOR) 20 mg tablet 10/9/2017  Yes Yes   Sig: Take 20 mg by mouth nightly. triamcinolone acetonide (KENALOG) 0.1 % topical cream 10/9/2017  No Yes   Sig: Apply  to affected area two (2) times a day. use thin layer      Facility-Administered Medications: None       Social History:  Social History     Social History    Marital status: SINGLE     Spouse name: N/A    Number of children: N/A    Years of education: N/A     Occupational History    Not on file.      Social History Main Topics    Smoking status: Never Smoker    Smokeless tobacco: Never Used    Alcohol use No    Drug use: No    Sexual activity: Not on file     Other Topics Concern    Not on file     Social History Narrative       Family History:  Family History   Problem Relation Age of Onset    Diabetes Mother     Heart Disease Mother     Cancer Father      melanoma    Stroke Sister     Hypertension Sister     Diabetes Sister     Hypertension Brother     Diabetes Brother     Breast Cancer Paternal Grandmother      older, but not sure age.  Breast Cancer Paternal Aunt      and gyn cancer ?age       Surgical History:  Past Surgical History:   Procedure Laterality Date    COLONOSCOPY N/A 2/2/2017    COLONOSCOPY  w/bx polyp performed by Joanna Sarmiento MD at [facility] ENDOSCOPY       Review of Systems  Constitutional:  No fever or weight loss  HEENT:  No headache or visual changes  Cardiovascular:  No chest pain or diaphoresis  Respiratory:  No coughing, wheezing, or shortness of breath. GI:  No nausea or vomitting. No diarrhea  :  No hematuria or dysuria  Skin:  No rashes or moles  Neuro:  No seizures or syncope  Hematological:  No bruising or bleeding  Endocrine:  + diabetes + thyroid disease    Physical Exam:      Visit Vitals    /85 (BP 1 Location: Right arm, BP Patient Position: At rest;Sitting)    Pulse 90    Temp 97.5 °F (36.4 °C)    Resp 16    Ht 5' (1.524 m)    Wt 77.1 kg (170 lb)    SpO2 96%    BMI 33.2 kg/m2       Physical Exam:  Gen:  No distress, alert  HEENT:  Normal cephalic atraumatic, extra-occular movements are intact. Neck:  Supple, No JVD  Lungs:  Clear bilaterally, no wheeze, no rales, normal effort  Heart:  Regular Rate and Rhythm, normal S1 and S2, no edema  Abdomen:  Soft, non tender, normal bowel sounds, no guarding. Extremities:  Well perfused, no cyanosis or edema. Severe pain L lef with any pain. No leg rotation. Neurological:  Awake and alert, CN's are intact, normal strength throughout extremities  Skin:  No rashes or moles  Psych:  Normal thought process, does not appear anxious    Laboratory Studies: All lab results for the last 24 hours reviewed.   Recent Results (from the past 12 hour(s))   CBC WITH AUTOMATED DIFF    Collection Time: 10/09/17  5:30 PM   Result Value Ref Range    WBC 14.7 (H) 4.6 - 13.2 K/uL    RBC 4.58 4.20 - 5.30 M/uL    HGB 12.9 12.0 - 16.0 g/dL    HCT 38.8 35.0 - 45.0 %    MCV 84.7 74.0 - 97.0 FL    MCH 28.2 24.0 - 34.0 PG    MCHC 33.2 31.0 - 37.0 g/dL    RDW 12.3 11.6 - 14.5 %    PLATELET 461 105 - 440 K/uL    MPV 9.8 9.2 - 11.8 FL    NEUTROPHILS 83 (H) 40 - 73 %    LYMPHOCYTES 11 (L) 21 - 52 %    MONOCYTES 5 3 - 10 %    EOSINOPHILS 1 0 - 5 %    BASOPHILS 0 0 - 2 %    ABS. NEUTROPHILS 12.3 (H) 1.8 - 8.0 K/UL    ABS. LYMPHOCYTES 1.6 0.9 - 3.6 K/UL    ABS. MONOCYTES 0.7 0.05 - 1.2 K/UL    ABS. EOSINOPHILS 0.2 0.0 - 0.4 K/UL    ABS.  BASOPHILS 0.0 0.0 - 0.06 K/UL    DF AUTOMATED     PROTHROMBIN TIME + INR    Collection Time: 10/09/17  5:30 PM   Result Value Ref Range    Prothrombin time 11.8 11.5 - 15.2 sec    INR 0.9 0.8 - 1.2     METABOLIC PANEL, BASIC    Collection Time: 10/09/17  5:30 PM   Result Value Ref Range    Sodium 139 136 - 145 mmol/L    Potassium 4.4 3.5 - 5.5 mmol/L    Chloride 101 100 - 108 mmol/L    CO2 27 21 - 32 mmol/L    Anion gap 11 3.0 - 18 mmol/L    Glucose 161 (H) 74 - 99 mg/dL    BUN 20 (H) 7.0 - 18 MG/DL    Creatinine 1.16 0.6 - 1.3 MG/DL    BUN/Creatinine ratio 17 12 - 20      GFR est AA 55 (L) >60 ml/min/1.73m2    GFR est non-AA 45 (L) >60 ml/min/1.73m2    Calcium 8.6 8.5 - 10.1 MG/DL   EKG, 12 LEAD, INITIAL    Collection Time: 10/09/17  6:29 PM   Result Value Ref Range    Ventricular Rate 73 BPM    Atrial Rate 73 BPM    P-R Interval 128 ms    QRS Duration 64 ms    Q-T Interval 408 ms    QTC Calculation (Bezet) 449 ms    Calculated P Axis 31 degrees    Calculated R Axis 58 degrees    Calculated T Axis 59 degrees    Diagnosis       Normal sinus rhythm  Normal ECG  When compared with ECG of 07-NOV-2016 03:53,  QRS axis shifted right  Non-specific change in ST segment in Inferior leads  Nonspecific T wave abnormality no longer evident in Inferior leads         Rad:  CXR 10/9  XR Hip 10/9  XR L wrist 10/9

## 2017-10-09 NOTE — ED TRIAGE NOTES
Patient fell on her L side today while trying to catch her dog, had flip flops on and lost balance, no LOC

## 2017-10-09 NOTE — ED PROVIDER NOTES
HPI Comments: 3:57 PM Mellisa Leone is a 65 y.o. female with h/o HTN and DM who presents to ED complaining of let hip pain onset today. Patient was walking her dog and she tried to get keep the dog from getting away. Her foot got twisted in her flipflop and fell straight on the hip onto the sidewalk. Admits to left wrist pain. Denies abd pain LOC, and CP. Patient was not ambulatory upon ED arrival. No other concerns or symptoms at this time. PCP: [id number] S Vernon Lewis MD      The history is provided by the patient. Past Medical History:   Diagnosis Date    Arthritis     Bronchitis     Diabetes ([address]     Diverticulitis     GERD (gastroesophageal reflux disease)     Hypercholesterolemia     Hypertension     Hypothyroid     Pancreatitis        Past Surgical History:   Procedure Laterality Date    COLONOSCOPY N/A 2/2/2017    COLONOSCOPY  w/bx polyp performed by Lawyer Yary MD at [facility] ENDOSCOPY         Family History:   Problem Relation Age of Onset    Diabetes Mother     Heart Disease Mother     Cancer Father      melanoma    Stroke Sister     Hypertension Sister     Diabetes Sister     Hypertension Brother     Diabetes Brother     Breast Cancer Paternal Grandmother      older, but not sure age.  Breast Cancer Paternal Aunt      and gyn cancer ?age       Social History     Social History    Marital status: SINGLE     Spouse name: N/A    Number of children: N/A    Years of education: N/A     Occupational History    Not on file. Social History Main Topics    Smoking status: Never Smoker    Smokeless tobacco: Never Used    Alcohol use No    Drug use: No    Sexual activity: Not on file     Other Topics Concern    Not on file     Social History Narrative         ALLERGIES: Ampicillin    Review of Systems   Cardiovascular: Negative for chest pain. Gastrointestinal: Negative for abdominal pain.    Musculoskeletal:        Left hip pain  Left wrist pain   All other systems reviewed and are negative. Vitals:    10/09/17 1551   BP: 141/85   Pulse: 90   Resp: 16   Temp: 97.5 °F (36.4 °C)   SpO2: 96%   Weight: 77.1 kg (170 lb)   Height: 5' (1.524 m)            Physical Exam   Constitutional: She is oriented to person, place, and time. She appears well-developed and well-nourished. HENT:   Head: Normocephalic and atraumatic. Neck: Neck supple. No JVD present. Musculoskeletal: She exhibits no edema. L hip joint tenderness, decreased flexion due to pain  No L knee tenderness, flexion and extension intact at knee  No internal or external rotation  DP 2+  Gross sensation intact    L wrist: full ROM, mild joint tenderness, mild swelling  Is able to fully make fist and extend digits    No L elbow joint tenderness  Radial pulse 2+  Cap refill 1 sec  Gross sensation intact   Neurological: She is alert and oriented to person, place, and time. Skin: Skin is warm and dry. No erythema.         MDM  Number of Diagnoses or Management Options  Closed fracture of neck of left femur, initial encounter [facility]):   Closed nondisplaced fracture of triquetrum of left wrist, initial encounter:   Diagnosis management comments: 64 y/o female presents with L hip and wrist pain    Normal neuro exam  xrs to eval for fx  Mechanical fall, will only check labs if fx       Amount and/or Complexity of Data Reviewed  Clinical lab tests: ordered and reviewed  Tests in the radiology section of CPT®: ordered and reviewed  Tests in the medicine section of CPT®: reviewed and ordered      ED Course       Procedures  Vitals:  Patient Vitals for the past 12 hrs:   Temp Pulse Resp BP SpO2   10/09/17 1551 97.5 °F (36.4 °C) 90 16 141/85 96 %       Medications ordered:   Medications   morphine injection 2 mg (2 mg IntraVENous Given 10/9/17 1730)         Lab findings:  Recent Results (from the past 12 hour(s))   CBC WITH AUTOMATED DIFF    Collection Time: 10/09/17  5:30 PM   Result Value Ref Range    WBC 14.7 (H) 4.6 - 13.2 K/uL    RBC 4.58 4.20 - 5.30 M/uL    HGB 12.9 12.0 - 16.0 g/dL    HCT 38.8 35.0 - 45.0 %    MCV 84.7 74.0 - 97.0 FL    MCH 28.2 24.0 - 34.0 PG    MCHC 33.2 31.0 - 37.0 g/dL    RDW 12.3 11.6 - 14.5 %    PLATELET 811 762 - 744 K/uL    MPV 9.8 9.2 - 11.8 FL    NEUTROPHILS 83 (H) 40 - 73 %    LYMPHOCYTES 11 (L) 21 - 52 %    MONOCYTES 5 3 - 10 %    EOSINOPHILS 1 0 - 5 %    BASOPHILS 0 0 - 2 %    ABS. NEUTROPHILS 12.3 (H) 1.8 - 8.0 K/UL    ABS. LYMPHOCYTES 1.6 0.9 - 3.6 K/UL    ABS. MONOCYTES 0.7 0.05 - 1.2 K/UL    ABS. EOSINOPHILS 0.2 0.0 - 0.4 K/UL    ABS. BASOPHILS 0.0 0.0 - 0.06 K/UL    DF AUTOMATED           X-Ray, CT or other radiology findings or impressions:  XR HIP LT W OR WO PELV 2-3 VWS    IMPRESSION:     Left femoral neck fracture. XR WRIST LT AP/LAT/OBL MIN 3V       IMPRESSION  IMPRESSION:  1. Minimally displaced triquetral avulsion fracture. 2. Possible nondisplaced fracture of the distal left radius, spanning the  radioscaphoid fossa. 3. Moderate dorsal left wrist soft tissue swelling. 4. 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 Progress notes, Consult notes, and Reevaluation of patient:   5 PM. Discussed results with pt, plan for admission. Ortho paged. 6:02 PM : Pt care transferred to Dr. Kecia Briggs  ,ED provider. History of patient complaint(s), available diagnostic reports and current treatment plan has been discussed thoroughly. Bedside rounding on patient occured : yes . Intended disposition of patient : ADMIT  Pending diagnostics reports and/or labs (please list): awaiting ortho consult, plan for admission to medicine. Disposition:  Diagnosis:   1.  Closed [facility] neck of left femur, [facility]    2. Closed nondisplaced fracture of triquetrum of left wrist, initial encounter            [facility] acting as a scribe for and in the presence of Mammie Felty, DO      October 09, 2017 at 3:57 PM       Provider Attestation:      I personally performed the services described in the documentation, reviewed the documentation, as recorded by the scribe in my presence, and it accurately and completely records my words and actions.  October 09, 2017 at 3:57 PM - Irving Ledezma DO

## 2017-10-09 NOTE — ED NOTES
6:00 PM :Pt care assumed from Dr. Jennifer Da Silva , ED provider. Pt complaint(s), current treatment plan, progression and available diagnostic results have been discussed thoroughly. Rounding occurred: yes  Intended Disposition: ADMIT   Pending diagnostic reports and/or labs (please list): Waiting on call from Dr. Marlee De La Rosa (Ortho)    6:13 PM Consult: I discussed care with Dr. Marlee De La Rosa (Ortho). It was a standard discussion including patient history, chief complaint, available diagnostic results, and predicted treatment course. Recommend patient be NPO after midnight. Will evaluate patient in the morning. 6:24 PM Consult: I discussed care with Dr. Rossy Crowder (Hospitalist). It was a standard discussion including patient history, chief complaint, available diagnostic results, and predicted treatment course. Discussed admission. US guided [facility] Line  Date/Time: 10/9/2017 6:30 PM  Performed by: Jana Mann  Authorized by: Jana Mann     Consent:     Consent obtained:  Verbal    Consent given by:  Patient    Risks discussed:  Pain    Alternatives discussed:  Delayed treatment  Pre-procedure details:     Preparation: Patient was prepped and draped in the usual sterile fashion    Post-procedure details:     Patient tolerance of procedure: Tolerated well, no immediate complications  Comments:      18 gauge on right side.

## 2017-10-09 NOTE — IP AVS SNAPSHOT
[address] [address] [facility] Patient: Alison Venegas MRN: [id number] You are allergic to the following Allergen Reactions Ampicillin Itching Recent Documentation Height Weight BMI OB Status Smoking Status 1.524 m 77.1 kg 33.2 kg/m2 Postmenopausal Never Smoker Unresulted Labs Order Current Status CULTURE, BLOOD Preliminary result Emergency Contacts Name Discharge Info Relation Home Work Mobile [facility] DISCHARGE CAREGIVER [3] Son [22] [phone] About your hospitalization You were admitted on:  October 9, 2017 You last received care in the:  [facility] 2E GEN SURG You were discharged on:  October 12, 2017 Unit phone number:  [phone] Why you were hospitalized Your primary diagnosis was:  Hip Fracture (Hcc) Your diagnoses also included:  Htn (Hypertension), Hypothyroid, Obesity, Dm (Diabetes Mellitus) (Hcc) Providers Seen During Your Hospitalizations Provider Role Specialty Primary office phone Fransisca Garcias DO Attending Provider Emergency Medicine [phone] Ronel Moore DO Attending Provider Internal Medicine [phone] Sandra Abbott MD Attending Provider [facility] [phone] Your Primary Care Physician (PCP) Primary Care Physician Office Phone Office Fax [facility] [phone] Follow-up Information Follow up With Details Comments Contact Info MD [address] [place] [id number] 
[phone] Janet Traore MD In 1 month for follow up,            staples out in 10 - 14 days [address] [address] 
[phone] Current Discharge Medication List  
  
START taking these medications Dose & Instructions Dispensing Information Comments Morning Noon Evening Bedtime  
 enoxaparin 40 mg/0.4 mL Commonly known as:  LOVENOX Your last dose was: Your next dose is:    
   
   
 Dose:  40 mg  
0.4 mL by SubCUTAneous route daily for 14 days. Indications: DEEP VEIN THROMBOSIS PREVENTION Quantity:  5.6 mL Refills:  0  
     
   
   
   
  
 oxyCODONE-acetaminophen 5-325 mg per tablet Commonly known as:  PERCOCET Your last dose was: Your next dose is:    
   
   
 Dose:  1-2 Tab Take 1-2 Tabs by mouth every four (4) hours as needed. Max Daily Amount: 12 Tabs. Quantity:  60 Tab Refills:  0 CONTINUE these medications which have CHANGED Dose & Instructions Dispensing Information Comments Morning Noon Evening Bedtime  
 levothyroxine 75 mcg tablet Commonly known as:  SYNTHROID What changed:  Another medication with the same name was removed. Continue taking this medication, and follow the directions you see here. Your last dose was: Your next dose is:    
   
   
 Dose:  75 mcg Take 1 Tab by mouth Daily (before breakfast). Quantity:  30 Tab Refills:  0 CONTINUE these medications which have NOT CHANGED Dose & Instructions Dispensing Information Comments Morning Noon Evening Bedtime  
 glipiZIDE 5 mg tablet Commonly known as:  Katherine  Your last dose was: Your next dose is:    
   
   
 Dose:  5 mg Take 1 Tab by mouth two (2) times a day. Quantity:  60 Tab Refills:  3  
     
   
   
   
  
 hydroCHLOROthiazide 25 mg tablet Commonly known as:  HYDRODIURIL Your last dose was: Your next dose is:    
   
   
 Dose:  25 mg Take 1 Tab by mouth daily. Quantity:  30 Tab Refills:  3  
     
   
   
   
  
 losartan 50 mg tablet Commonly known as:  COZAAR Your last dose was:     
   
Your next dose is:    
   
   
 Dose:  50 mg  
 Take 1 Tab by mouth daily. Quantity:  30 Tab Refills:  3  
     
   
   
   
  
 metFORMIN 1,000 mg tablet Commonly known as:  GLUCOPHAGE Your last dose was: Your next dose is:    
   
   
 Dose:  1000 mg Take 1 Tab by mouth two (2) times daily (with meals). Quantity:  60 Tab Refills:  3  
     
   
   
   
  
 pantoprazole 40 mg tablet Commonly known as:  PROTONIX Your last dose was: Your next dose is:    
   
   
 Dose:  40 mg Take 1 Tab by mouth daily. Quantity:  30 Tab Refills:  3  
     
   
   
   
  
 simvastatin 20 mg tablet Commonly known as:  ZOCOR Your last dose was: Your next dose is:    
   
   
 Dose:  20 mg Take 20 mg by mouth nightly. Refills:  0  
     
   
   
   
  
 triamcinolone acetonide 0.1 % topical cream  
Commonly known as:  KENALOG Your last dose was: Your next dose is:    
   
   
 Apply  to affected area two (2) times a day. use thin layer Quantity:  15 g Refills:  0  
     
   
   
   
  
 VITAMIN D3 2,000 unit Tab Generic drug:  cholecalciferol (vitamin D3) Your last dose was: Your next dose is: Take  by mouth. Refills:  0 STOP taking these medications diph,Pertuss(AC),Tet Vac-PF 2.5-8-5 Lf-mcg suspension Commonly known as:  BOOSTRIX  
   
  
 guaiFENesin-codeine 100-10 mg/5 mL solution Commonly known as:  VIRTUSSIN AC  
   
  
 loperamide 2 mg capsule Commonly known as:  IMODIUM Where to Get Your Medications Information on where to get these meds will be given to you by the nurse or doctor. ! Ask your nurse or doctor about these medications  
  enoxaparin 40 mg/0.4 mL  
 oxyCODONE-acetaminophen 5-325 mg per tablet Discharge Instructions DISCHARGE SUMMARY from Nurse The following personal items are in your possession at time of discharge: 
 
Dental Appliances: None Visual Aid: Glasses, With patient Home Medications: None Jewelry: None Clothing: None (All clothes and valuables left in pt's rrom ) Other Valuables: Raudel Vaca PATIENT INSTRUCTIONS: 
 
 
F-face looks uneven A-arms unable to move or move unevenly S-speech slurred or non-existent T-time-call 911 as soon as signs and symptoms begin-DO NOT go Back to bed or wait to see if you get better-TIME IS BRAIN. Warning Signs of HEART ATTACK Call 911 if you have these symptoms: 
? Chest discomfort. Most heart attacks involve discomfort in the center of the chest that lasts more than a few minutes, or that goes away and comes back. It can feel like uncomfortable pressure, squeezing, fullness, or pain. ? Discomfort in other areas of the upper body. Symptoms can include pain or discomfort in one or both arms, the back, neck, jaw, or stomach. ? Shortness of breath with or without chest discomfort. ? Other signs may include breaking out in a cold sweat, nausea, or lightheadedness. Don't wait more than five minutes to call [address]! Fast action can save your life. Calling 911 is almost always the fastest way to get lifesaving treatment. Emergency Medical Services staff can begin treatment when they arrive  up to an hour sooner than if someone gets to the hospital by car. The discharge information has been reviewed with the patient. The patient verbalized understanding. Discharge medications reviewed with the patient and appropriate educational materials and side effects teaching were provided. Patient armband removed and shredded. Discharge Instructions Attachments/References HIP FRACTURE: SURGERY: POST-OP (ENGLISH) ENOXAPARIN (LOVENOX) (ENGLISH) OXYCODONE/ACETAMINOPHEN (BY MOUTH) (ENGLISH) Discharge Orders None [facility] Announcement We are excited to announce that we are making your provider's discharge notes available to you in [employer]. You will see these notes when they are completed and signed by the physician that discharged you from your recent hospital stay. If you have any questions or concerns about any information you see in [employer], please call the Health Information Department where you were seen or reach out to your Primary Care Provider for more information about your plan of care. Introducing [facility]! [employer] introduces [employer] patient portal. Now you can access parts of your medical record, email your doctor's office, and request medication refills online. 1. In your internet browser, go to [email or web address] 2. Click on the First Time User? Click Here link in the Sign In box. You will see the New Member Sign Up page. 3. Enter your [employer] Access Code exactly as it appears below. You will not need to use this code after youve completed the sign-up process. If you do not sign up before the expiration date, you must request a new code. · [employer] Access Code: [email or web address] Expires: 12/4/2017 11:50 AM 
 
4. Enter the last four digits of your Social Security Number (xxxx) and Date of Birth (mm/dd/yyyy) as indicated and click Submit. You will be taken to the next sign-up page. 5. Create a Liquid Xt ID. This will be your [employer] login ID and cannot be changed, so think of one that is secure and easy to remember. 6. Create a [employer] password. You can change your password at any time. 7. Enter your Password Reset Question and Answer. This can be used at a later time if you forget your password. 8. Enter your e-mail address. You will receive e-mail notification when new information is available in [address] 9. Click Sign Up. You can now view and download portions of your medical record. 10. Click the Download Summary menu link to download a portable copy of your medical information. If you have questions, please visit the Frequently Asked Questions section of the [employer] website. Remember, [employer] is NOT to be used for urgent needs. For medical emergencies, dial 911. Now available from your iPhone and Android! General Information Please provide this summary of care documentation to your next provider. Patient Signature:  ____________________________________________________________ Date:  ____________________________________________________________  
  
[facility] Provider Signature:  ____________________________________________________________ Date:  ____________________________________________________________ More Information Surgery to Repair a Hip Fracture: What to Expect at Home Your Recovery Surgery for a hip fracture repairs a broken hip bone. When you leave the hospital after surgery, you will probably be walking with crutches or a walker. You may be able to climb a few stairs and get in and out of bed and chairs. But you will need someone to help you at home for the next few weeks or until you have more energy and can move around better. If there is no one to help you at home, you may go to a rehabilitation center or long-term care center. You will go home with a bandage and stitches or staples. You can remove the bandage when your doctor tells you to. Your doctor will remove your stitches or staples 10 days to 3 weeks after your surgery. You may still have some mild pain, and the area may be swollen for 3 to 4 months after surgery. Your doctor will give you medicine for the pain. You will continue the rehabilitation program (rehab) you started in the hospital. The better you do with your rehab exercises, the quicker you will get your strength and movement back. Most people are able to return to work 4 weeks to 4 months after surgery. But it may take 6 months to 1 year for you to fully recover. Some people, especially older people, are never able to move quite as well as they used to. This care sheet gives you a general idea about how long it will take for you to recover. But each person recovers at a different pace. Follow the steps below to get better as quickly as possible. You heal best when you take good care of yourself. Eat a variety of healthy foods, and don't smoke. How can you care for yourself at home? Activity · Rest when you feel tired. You may take a nap, but do not stay in bed all day. · Work with your physical therapist to learn the best way to exercise. You may be able to take frequent, short walks using crutches or a walker. You will probably have to use crutches or a walker for at least 4 to 6 weeks. After that, you may need to use a cane to help you walk. · Do not sit for longer than 30 to 45 minutes at a time. When you sit, use chairs with arms, and do not sit in low chairs. · Sleep on your back with your legs slightly apart or on your side with a pillow between your knees for about 6 weeks or as your doctor tells you. Do not sleep on your stomach or affected hip. · You may need to take sponge baths until your stitches or staples have been removed. You will probably be able to shower 24 hours after they are removed. Ask your doctor when it is okay to bathe or shower. · Ask your doctor when you can drive again. · Most people are able to return to work 4 weeks to 4 months after surgery. · Ask your doctor when it is okay for you to have sex. · Do not lift anything that would make you strain.  This may include heavy · Be safe with medicines. Take pain medicines exactly as directed. ¨ If the doctor gave you a prescription medicine for pain, take it as prescribed. ¨ If you are not taking a prescription pain medicine, ask your doctor if you can take an over-the-counter medicine. · If you think your pain medicine is making you sick to your stomach: 
¨ Take your medicine after meals (unless your doctor has told you not to). ¨ Ask your doctor for a different pain medicine. · If your doctor prescribed antibiotics, take them as directed. Do not stop taking them just because you feel better. You need to take the full course of antibiotics. · Your doctor may also prescribe medicines or calcium supplements to make your bones stronger. Incision care · You will have a bandage over the cut (incision) and staples or stitches. If there is no drainage, most doctors will let you take the bandage off in a few days. · Your doctor will remove the staples or stitches 10 days to 3 weeks after the surgery and replace them with strips of tape. Leave the tape on for a week or until it falls off. Exercise · Your rehab program will include a number of exercises to do. Always do them as your therapist tells you. · Do not do any vigorous exercise for 12 weeks or until your doctor tells you it is okay. Ice and elevation · For pain, put ice or a cold pack on the area for 10 to 20 minutes at a time. Put a thin cloth between the ice and your skin. · Your ankle may swell for about 3 months. Prop up your ankle when you ice it or anytime you sit or lie down. Try to keep it above the level of your heart. This will help reduce swelling. Other instructions · Continue to wear your support stockings as your doctor says. These help to prevent blood clots. The length of time that you will have to wear them depends on your activity level and the amount of swelling you have. Most people wear these stockings for 4 to 6 weeks after surgery. Preventing falls is also very important. To prevent falls: · Arrange furniture so that you will not trip on it. · Get rid of throw rugs, and move electrical cords out of the way. · Walk only in areas with plenty of light. · Put grab bars in showers and bathtubs. · Avoid icy or snowy sidewalks. · Wear shoes with sturdy, flat soles. Follow-up care is a key part of your treatment and safety. Be sure to make and go to all appointments, and call your doctor if you are having problems. It's also a good idea to know your test results and keep a list of the medicines you take. When should you call for help? Call 911 anytime you think you may need emergency care. For example, call if: 
· You passed out (lost consciousness). · You have severe trouble breathing. · You have sudden chest pain and shortness of breath, or you cough up blood. Call your doctor now or seek immediate medical care if: 
· Your leg or foot is cool or pale or changes color. · You cannot feel or move your leg. · You have signs of a blood clot, such as: 
¨ Pain in your calf, back of the knee, thigh, or groin. ¨ Redness and swelling in your leg or groin. · Your incision comes open and begins to bleed, or the bleeding increases. · You feel like your heart is racing or beating irregularly. · You have signs of infection, such as: 
¨ Increased pain, swelling, warmth, or redness. ¨ Red streaks leading from the incision. ¨ Pus draining from the incision. ¨ A fever. Watch closely for any changes in your health, and be sure to contact your doctor if: 
· You do not have a bowel movement after taking a laxative. · You do not get better as expected. Where can you learn more? Go to http://aravind-farznaa.info/. Enter J340 in the search box to learn more about \"Surgery to Repair a Hip Fracture: What to Expect at Home. \" Current as of: March 21, 2017 Content Version: 11.3 © 8949-9004 [employer]. Care instructions adapted under license by [employer] (which disclaims liability or warranty for this information). If you have questions about a medical condition or this instruction, always ask your healthcare professional. [address] any warranty or liability for your use of this information. Enoxaparin (Lovenox): Care Instructions Your Care Instructions Enoxaparin (Lovenox) is an anticoagulant medicine. It is one of a class of anticoagulants called low molecular weight heparin. Many people call these medicines blood thinners. They don't actually thin the blood, but they increase the time it takes a blood clot to form. This reduces the chance of a blood clot in the leg veins (deep vein thrombosis) or in the lungs (pulmonary embolism). Enoxaparin is a shot (injection). You or someone caring for you will inject it once or twice a day. Most people need shots for 5 to 10 days, but in some cases it can be longer. Your doctor will tell you how long you need to have the shots. Enoxaparin is used to: · Treat deep vein thrombosis (DVT), which is a blood clot in the legs, pelvis, or arms. · Reduce the chance of getting blood clots after certain surgeries. For example, you may take enoxaparin after knee or hip replacement surgery. · Reduce the chance of getting blood clots in people who are likely to get them and who are not active for a long period of time. For example, you may need enoxaparin if you need to stay in bed for a long time because of a health problem. · Reduce the chance of blood clots when another blood thinner is stopped for a short time. For example, if you take warfarin and need surgery, your doctor may ask you to stop taking warfarin for a short time before the surgery. You will take enoxaparin to help prevent blood clots before the surgery.  After the surgery, your doctor will tell you when it is safe to start taking warfarin again. This is called bridge therapy. Follow-up care is a key part of your treatment and safety. Be sure to make and go to all appointments, and call your doctor if you are having problems. It's also a good idea to know your test results and keep a list of the medicines you take. How can you care for yourself at home? How to inject enoxaparin You will get a prescription for prefilled syringes. Inject the medicine at the same time every day unless your doctor gives you other instructions. 1. Wash and dry your hands. 2. Sit or lie in a position that lets you see your belly. 3. Clean the injection site with an alcohol pad or swab, and let it dry. Choose a site on the right or left side of your belly, at least 2 inches from your belly button. Change the site each time you inject the medicine. 4. Remove the needle cap by pulling it straight off. Don't twist it. 5. Hold the syringe like a pencil in one hand. With the other hand, pinch an area of the injection site skin. You should have a \"fold\" in the skin. 6. Insert the entire needle straight down into the fold of skin. Don't insert the needle at an angle. 7. Press the plunger with your thumb until the syringe is empty. 8. Pull the needle straight out and let go of the skin. 9. Point the needle away from you and press down on the plunger. The needle will be covered. Take precautions · Don't rub the injection site. This could cause bruising. · Don't push air bubbles out of the syringe unless your doctor tells you to. Each syringe comes with air bubbles. · Don't stop taking enoxaparin without talking to your doctor. · If you are taking a blood thinner, be sure you get instructions about how to take your medicine safely. Blood thinners can cause serious bleeding problems. · Talk to your doctor before you take any prescription medicines, over-the-counter medicines, antibiotics, vitamins, or herbal products. · Don't take the following medicines unless your doctor says it's okay: ¨ Aspirin, products like aspirin (salicylates), or products that contain aspirin ¨ Nonsteroidal anti-inflammatory drugs (NSAIDs), such as ibuprofen (Advil, Motrin) and naproxen (Aleve) · Store enoxaparin at room temperature. Don't put it in the refrigerator or freezer. When should you call for help? Call 911 anytime you think you may need emergency care. For example, call if: 
· You cough up blood. · You vomit blood or what looks like coffee grounds. · You pass maroon or very bloody stools. Call your doctor now or seek immediate medical care if: 
· You have new bruises that are away from the injection site or blood spots under your skin. · You have a nosebleed. · You have blood in your urine. · Your stools are black and tarlike or have streaks of blood. · You have vaginal bleeding when you are not having your period, or heavy period bleeding. Watch closely for changes in your health, and be sure to contact your doctor if: 
· You have questions about enoxaparin or your treatment. Where can you learn more? Go to http://aravind-farzana.info/. Enter P266 in the search box to learn more about \"Enoxaparin (Lovenox): Care Instructions. \" Current as of: October 13, 2016 Content Version: 11.3 © 5646-2966 [employer]. Care instructions adapted under license by [employer] (which disclaims liability or warranty for this information). If you have questions about a medical condition or this instruction, always ask your healthcare professional. [place] [address] any warranty or liability for your use of this information. Oxycodone/Acetaminophen (By mouth) Acetaminophen (d-dnck-l-MIN-oh-fen), Oxycodone Hydrochloride (ti-o-ETN-done gus-droe-KLOR-yolanda) Treats moderate to moderately severe pain. This medicine is a narcotic pain reliever. Brand Name(s): Endocet, Percocet, Primlev, Roxicet, Xartemis XR There may be other brand names for this medicine. When This Medicine Should Not Be Used: This medicine is not right for everyone. Do not use it if you had an allergic reaction to acetaminophen or oxycodone, or if you have serious breathing problems or paralytic ileus. How to Use This Medicine:  
Capsule, Liquid, Tablet, Long Acting Tablet · Your doctor will tell you how much medicine to use. Do not use more than directed. · An overdose can be dangerous. Follow directions carefully so you do not get too much medicine at one time. · Oral liquid: Measure the oral liquid medicine with a marked measuring spoon, oral syringe, or medicine cup. · Swallow the extended-release tablet whole. Do not crush, break, or chew it. Do not lick or wet the tablet before placing it in your mouth. Do not give this medicine through a feeding tube. · This medicine should come with a Medication Guide. Ask your pharmacist for a copy if you do not have one. · Missed dose: If you miss a dose of this medicine, skip the missed dose and go back to your regular dosing schedule. Do not double doses. · Store the medicine in a closed container at room temperature, away from heat, moisture, and direct light. Ask your pharmacist about the best way to dispose of medicine you do not use. Drugs and Foods to Avoid: Ask your doctor or pharmacist before using any other medicine, including over-the-counter medicines, vitamins, and herbal products. · Do not use Xartemis XR if you are using or have used an MAO inhibitor in the past 14 days. · Some medicines can affect how this medicine works. Tell your doctor if you are using any of the following: ¨ Carbamazepine, erythromycin, ketoconazole, lamotrigine, mirtazapine, naltrexone, phenytoin, propranolol, rifampin, ritonavir, tramadol, trazodone, or zidovudine ¨ Birth control pills ¨ Diuretic (water pill) ¨ Medicine to treat depression ¨ Phenothiazine medicine ¨ Triptan medicine to treat migraine headaches · Do not drink alcohol while you are using this medicine. Acetaminophen can damage your liver, and alcohol can increase this risk. Do not take acetaminophen without asking your doctor if you have 3 or more drinks of alcohol every day. · Tell your doctor if you use anything else that makes you sleepy. Some examples are allergy medicine, narcotic pain medicine, and alcohol. Tell your doctor if you are using buprenorphine, butorphanol, nalbuphine, pentazocine, a benzodiazepine, or a muscle relaxer. Warnings While Using This Medicine: · Tell your doctor if you are pregnant or breastfeeding, or if you have kidney disease, liver disease, heart disease, low blood pressure, breathing problems or lung disease (such as asthma, COPD), thyroid problems, [place] disease, pancreas or gallbladder problems, prostate problems, trouble urinating, or a stomach problems, or a history of head injury or brain damage, seizures, or alcohol or drug abuse. Tell your doctor if you are allergic to codeine. · This medicine may cause the following problems: 
¨ High risk of overdose, which can lead to death ¨ Respiratory depression (serious breathing problem that can be life-threatening) ¨ Liver problems ¨ Serious skin reactions ¨ Serotonin syndrome (when used with certain medicines) · This medicine may make you dizzy or drowsy. Do not drive or do anything that could be dangerous until you know how this medicine affects you. Sit or lie down if you feel dizzy. Stand up carefully. · This medicine contains acetaminophen. Read the labels of all other medicines you are using to see if they also contain acetaminophen, or ask your doctor or pharmacist. Gilda Garnica not use more than 4 grams (4,000 milligrams) total of acetaminophen in one day. · This medicine can be habit-forming.  Do not use more than your prescribed dose. Call your doctor if you think your medicine is not working. · Do not stop using this medicine suddenly. Your doctor will need to slowly decrease your dose before you stop it completely. · This medicine could cause infertility. Talk with your doctor before using this medicine if you plan to have children. · This medicine may cause constipation, especially with long-term use. Ask your doctor if you should use a laxative to prevent and treat constipation. · Keep all medicine out of the reach of children. Never share your medicine with anyone. Possible Side Effects While Using This Medicine:  
Call your doctor right away if you notice any of these side effects: · Allergic reaction: Itching or hives, swelling in your face or hands, swelling or tingling in your mouth or throat, chest tightness, trouble breathing · Anxiety, restlessness, fast heartbeat, fever, muscle spasms, twitching, diarrhea, seeing or hearing things that are not there · Blistering, peeling, red skin rash · Blue lips, fingernails, or skin · Dark urine or pale stools, loss of appetite, stomach pain, yellow skin or eyes · Extreme weakness, shallow breathing, uneven heartbeat, seizures, sweating, or cold or clammy skin · Severe confusion, lightheadedness, dizziness, or fainting · Severe constipation, nausea, or vomiting · Trouble breathing or slow breathing If you notice these less serious side effects, talk with your doctor:  
· Headache · Mild constipation, nausea, or vomiting · Mild sleepiness or drowsiness If you notice other side effects that you think are caused by this medicine, tell your doctor. Call your doctor for medical advice about side effects. You may report side effects to FDA at 4-661-FDA-9408 © 2017 [facility] Information is for End User's use only and may not be sold, redistributed or otherwise used for commercial purposes. The above information is an  only. It is not intended as medical advice for individual conditions or treatments. Talk to your doctor, nurse or pharmacist before following any medical regimen to see if it is safe and effective for you.

## 2017-10-09 NOTE — ED NOTES
Unable to medicate patient via IV due to IV infiltration prior to giving medication. MAR updated and MD informed. Orders received.

## 2017-10-10 ENCOUNTER — APPOINTMENT (OUTPATIENT)
Dept: GENERAL RADIOLOGY | Age: 77
DRG: 482 | End: 2017-10-10
Attending: ORTHOPAEDIC SURGERY
Payer: MEDICARE

## 2017-10-10 ENCOUNTER — ANESTHESIA EVENT (OUTPATIENT)
Dept: SURGERY | Age: 77
DRG: 482 | End: 2017-10-10
Payer: MEDICARE

## 2017-10-10 ENCOUNTER — ANESTHESIA (OUTPATIENT)
Dept: SURGERY | Age: 77
DRG: 482 | End: 2017-10-10
Payer: MEDICARE

## 2017-10-10 LAB
ANION GAP SERPL CALC-SCNC: 9 MMOL/L (ref 3–18)
ATRIAL RATE: 73 BPM
BUN SERPL-MCNC: 17 MG/DL (ref 7–18)
BUN/CREAT SERPL: 17 (ref 12–20)
CALCIUM SERPL-MCNC: 7.7 MG/DL (ref 8.5–10.1)
CALCULATED P AXIS, ECG09: 31 DEGREES
CALCULATED R AXIS, ECG10: 58 DEGREES
CALCULATED T AXIS, ECG11: 59 DEGREES
CHLORIDE SERPL-SCNC: 102 MMOL/L (ref 100–108)
CO2 SERPL-SCNC: 26 MMOL/L (ref 21–32)
CREAT SERPL-MCNC: 1.03 MG/DL (ref 0.6–1.3)
DIAGNOSIS, 93000: NORMAL
ERYTHROCYTE [DISTWIDTH] IN BLOOD BY AUTOMATED COUNT: 12.5 % (ref 11.6–14.5)
GLUCOSE BLD STRIP.AUTO-MCNC: 112 MG/DL (ref 70–110)
GLUCOSE BLD STRIP.AUTO-MCNC: 115 MG/DL (ref 70–110)
GLUCOSE BLD STRIP.AUTO-MCNC: 139 MG/DL (ref 70–110)
GLUCOSE BLD STRIP.AUTO-MCNC: 183 MG/DL (ref 70–110)
GLUCOSE BLD STRIP.AUTO-MCNC: 195 MG/DL (ref 70–110)
GLUCOSE SERPL-MCNC: 180 MG/DL (ref 74–99)
HCT VFR BLD AUTO: 33.7 % (ref 35–45)
HGB BLD-MCNC: 11 G/DL (ref 12–16)
MCH RBC QN AUTO: 28.1 PG (ref 24–34)
MCHC RBC AUTO-ENTMCNC: 32.6 G/DL (ref 31–37)
MCV RBC AUTO: 86 FL (ref 74–97)
P-R INTERVAL, ECG05: 128 MS
PLATELET # BLD AUTO: 219 K/UL (ref 135–420)
PMV BLD AUTO: 9.7 FL (ref 9.2–11.8)
POTASSIUM SERPL-SCNC: 4.2 MMOL/L (ref 3.5–5.5)
Q-T INTERVAL, ECG07: 408 MS
QRS DURATION, ECG06: 64 MS
QTC CALCULATION (BEZET), ECG08: 449 MS
RBC # BLD AUTO: 3.92 M/UL (ref 4.2–5.3)
SODIUM SERPL-SCNC: 137 MMOL/L (ref 136–145)
VENTRICULAR RATE, ECG03: 73 BPM
WBC # BLD AUTO: 10.2 K/UL (ref 4.6–13.2)

## 2017-10-10 PROCEDURE — 77030008462 HC STPLR SKN PROX J&J -A: Performed by: ORTHOPAEDIC SURGERY

## 2017-10-10 PROCEDURE — 74011250636 HC RX REV CODE- 250/636: Performed by: NURSE ANESTHETIST, CERTIFIED REGISTERED

## 2017-10-10 PROCEDURE — 36415 COLL VENOUS BLD VENIPUNCTURE: CPT | Performed by: INTERNAL MEDICINE

## 2017-10-10 PROCEDURE — 74011636637 HC RX REV CODE- 636/637: Performed by: INTERNAL MEDICINE

## 2017-10-10 PROCEDURE — 77030008683 HC TU ET CUF COVD -A: Performed by: ANESTHESIOLOGY

## 2017-10-10 PROCEDURE — 77030018836 HC SOL IRR NACL ICUM -A: Performed by: ORTHOPAEDIC SURGERY

## 2017-10-10 PROCEDURE — 77030031139 HC SUT VCRL2 J&J -A: Performed by: ORTHOPAEDIC SURGERY

## 2017-10-10 PROCEDURE — 74011250636 HC RX REV CODE- 250/636

## 2017-10-10 PROCEDURE — 74011000258 HC RX REV CODE- 258: Performed by: ORTHOPAEDIC SURGERY

## 2017-10-10 PROCEDURE — 77030035168: Performed by: ORTHOPAEDIC SURGERY

## 2017-10-10 PROCEDURE — 74011000250 HC RX REV CODE- 250

## 2017-10-10 PROCEDURE — 77010033678 HC OXYGEN DAILY

## 2017-10-10 PROCEDURE — 73502 X-RAY EXAM HIP UNI 2-3 VIEWS: CPT

## 2017-10-10 PROCEDURE — 76010000162 HC OR TIME 1.5 TO 2 HR INTENSV-TIER 1: Performed by: ORTHOPAEDIC SURGERY

## 2017-10-10 PROCEDURE — C1713 ANCHOR/SCREW BN/BN,TIS/BN: HCPCS | Performed by: ORTHOPAEDIC SURGERY

## 2017-10-10 PROCEDURE — 77030008771 HC TU NG SALEM SUMP -A: Performed by: ANESTHESIOLOGY

## 2017-10-10 PROCEDURE — 85027 COMPLETE CBC AUTOMATED: CPT | Performed by: INTERNAL MEDICINE

## 2017-10-10 PROCEDURE — C1769 GUIDE WIRE: HCPCS | Performed by: ORTHOPAEDIC SURGERY

## 2017-10-10 PROCEDURE — 0QH706Z INSERTION OF INTRAMEDULLARY INTERNAL FIXATION DEVICE INTO LEFT UPPER FEMUR, OPEN APPROACH: ICD-10-PCS | Performed by: ORTHOPAEDIC SURGERY

## 2017-10-10 PROCEDURE — 65270000029 HC RM PRIVATE

## 2017-10-10 PROCEDURE — 74011250636 HC RX REV CODE- 250/636: Performed by: INTERNAL MEDICINE

## 2017-10-10 PROCEDURE — 77030018673: Performed by: ORTHOPAEDIC SURGERY

## 2017-10-10 PROCEDURE — 74011250636 HC RX REV CODE- 250/636: Performed by: ORTHOPAEDIC SURGERY

## 2017-10-10 PROCEDURE — 74011000258 HC RX REV CODE- 258

## 2017-10-10 PROCEDURE — 77030016474 HC BIT DRL QC3 SYNT -C: Performed by: ORTHOPAEDIC SURGERY

## 2017-10-10 PROCEDURE — 76210000016 HC OR PH I REC 1 TO 1.5 HR: Performed by: ORTHOPAEDIC SURGERY

## 2017-10-10 PROCEDURE — 77030019908 HC STETH ESOPH SIMS -A: Performed by: ANESTHESIOLOGY

## 2017-10-10 PROCEDURE — 76060000034 HC ANESTHESIA 1.5 TO 2 HR: Performed by: ORTHOPAEDIC SURGERY

## 2017-10-10 PROCEDURE — 74011250637 HC RX REV CODE- 250/637: Performed by: INTERNAL MEDICINE

## 2017-10-10 PROCEDURE — 80048 BASIC METABOLIC PNL TOTAL CA: CPT | Performed by: INTERNAL MEDICINE

## 2017-10-10 PROCEDURE — 77030020253 HC SOL INJ D545NS .05 DEX .45 SAL

## 2017-10-10 PROCEDURE — 0QS7XZZ REPOSITION LEFT UPPER FEMUR, EXTERNAL APPROACH: ICD-10-PCS | Performed by: ORTHOPAEDIC SURGERY

## 2017-10-10 PROCEDURE — 77030032490 HC SLV COMPR SCD KNE COVD -B: Performed by: ORTHOPAEDIC SURGERY

## 2017-10-10 PROCEDURE — 82962 GLUCOSE BLOOD TEST: CPT

## 2017-10-10 DEVICE — SCREW BNE L36MM DIA5MM NONSTERILE TIB LT GRN TI ST CANN LOK: Type: IMPLANTABLE DEVICE | Site: FEMUR | Status: FUNCTIONAL

## 2017-10-10 RX ORDER — SODIUM CHLORIDE, SODIUM LACTATE, POTASSIUM CHLORIDE, CALCIUM CHLORIDE 600; 310; 30; 20 MG/100ML; MG/100ML; MG/100ML; MG/100ML
100 INJECTION, SOLUTION INTRAVENOUS CONTINUOUS
Status: DISCONTINUED | OUTPATIENT
Start: 2017-10-10 | End: 2017-10-10 | Stop reason: HOSPADM

## 2017-10-10 RX ORDER — SODIUM CHLORIDE 0.9 % (FLUSH) 0.9 %
5-10 SYRINGE (ML) INJECTION AS NEEDED
Status: DISCONTINUED | OUTPATIENT
Start: 2017-10-10 | End: 2017-10-12 | Stop reason: HOSPADM

## 2017-10-10 RX ORDER — SUCCINYLCHOLINE CHLORIDE 20 MG/ML
INJECTION INTRAMUSCULAR; INTRAVENOUS AS NEEDED
Status: DISCONTINUED | OUTPATIENT
Start: 2017-10-10 | End: 2017-10-10 | Stop reason: HOSPADM

## 2017-10-10 RX ORDER — SODIUM CHLORIDE 0.9 % (FLUSH) 0.9 %
5-10 SYRINGE (ML) INJECTION EVERY 8 HOURS
Status: DISCONTINUED | OUTPATIENT
Start: 2017-10-10 | End: 2017-10-12 | Stop reason: HOSPADM

## 2017-10-10 RX ORDER — FENTANYL CITRATE 50 UG/ML
25 INJECTION, SOLUTION INTRAMUSCULAR; INTRAVENOUS AS NEEDED
Status: DISCONTINUED | OUTPATIENT
Start: 2017-10-10 | End: 2017-10-10 | Stop reason: HOSPADM

## 2017-10-10 RX ORDER — PROPOFOL 10 MG/ML
INJECTION, EMULSION INTRAVENOUS AS NEEDED
Status: DISCONTINUED | OUTPATIENT
Start: 2017-10-10 | End: 2017-10-10 | Stop reason: HOSPADM

## 2017-10-10 RX ORDER — SODIUM CHLORIDE 9 MG/ML
INJECTION, SOLUTION INTRAVENOUS
Status: DISCONTINUED | OUTPATIENT
Start: 2017-10-10 | End: 2017-10-10 | Stop reason: HOSPADM

## 2017-10-10 RX ORDER — HYDROMORPHONE HYDROCHLORIDE 1 MG/ML
INJECTION, SOLUTION INTRAMUSCULAR; INTRAVENOUS; SUBCUTANEOUS AS NEEDED
Status: DISCONTINUED | OUTPATIENT
Start: 2017-10-10 | End: 2017-10-10 | Stop reason: HOSPADM

## 2017-10-10 RX ORDER — MAGNESIUM SULFATE 100 %
4 CRYSTALS MISCELLANEOUS AS NEEDED
Status: DISCONTINUED | OUTPATIENT
Start: 2017-10-10 | End: 2017-10-10 | Stop reason: HOSPADM

## 2017-10-10 RX ORDER — VANCOMYCIN HYDROCHLORIDE 1 G/20ML
INJECTION, POWDER, LYOPHILIZED, FOR SOLUTION INTRAVENOUS AS NEEDED
Status: DISCONTINUED | OUTPATIENT
Start: 2017-10-10 | End: 2017-10-10 | Stop reason: HOSPADM

## 2017-10-10 RX ORDER — FENTANYL CITRATE 50 UG/ML
INJECTION, SOLUTION INTRAMUSCULAR; INTRAVENOUS AS NEEDED
Status: DISCONTINUED | OUTPATIENT
Start: 2017-10-10 | End: 2017-10-10 | Stop reason: HOSPADM

## 2017-10-10 RX ORDER — DEXTROSE 50 % IN WATER (D50W) INTRAVENOUS SYRINGE
25-50 AS NEEDED
Status: DISCONTINUED | OUTPATIENT
Start: 2017-10-10 | End: 2017-10-10 | Stop reason: HOSPADM

## 2017-10-10 RX ORDER — ONDANSETRON 2 MG/ML
4 INJECTION INTRAMUSCULAR; INTRAVENOUS ONCE
Status: COMPLETED | OUTPATIENT
Start: 2017-10-10 | End: 2017-10-10

## 2017-10-10 RX ORDER — HYDROCODONE BITARTRATE AND ACETAMINOPHEN 5; 325 MG/1; MG/1
1 TABLET ORAL AS NEEDED
Status: DISCONTINUED | OUTPATIENT
Start: 2017-10-10 | End: 2017-10-10 | Stop reason: HOSPADM

## 2017-10-10 RX ORDER — ONDANSETRON 2 MG/ML
INJECTION INTRAMUSCULAR; INTRAVENOUS AS NEEDED
Status: DISCONTINUED | OUTPATIENT
Start: 2017-10-10 | End: 2017-10-10 | Stop reason: HOSPADM

## 2017-10-10 RX ORDER — INSULIN LISPRO 100 [IU]/ML
INJECTION, SOLUTION INTRAVENOUS; SUBCUTANEOUS ONCE
Status: DISCONTINUED | OUTPATIENT
Start: 2017-10-10 | End: 2017-10-10 | Stop reason: HOSPADM

## 2017-10-10 RX ORDER — LIDOCAINE HYDROCHLORIDE 20 MG/ML
INJECTION, SOLUTION EPIDURAL; INFILTRATION; INTRACAUDAL; PERINEURAL AS NEEDED
Status: DISCONTINUED | OUTPATIENT
Start: 2017-10-10 | End: 2017-10-10 | Stop reason: HOSPADM

## 2017-10-10 RX ORDER — SODIUM CHLORIDE 0.9 % (FLUSH) 0.9 %
5-10 SYRINGE (ML) INJECTION AS NEEDED
Status: DISCONTINUED | OUTPATIENT
Start: 2017-10-10 | End: 2017-10-10 | Stop reason: HOSPADM

## 2017-10-10 RX ORDER — DEXTROSE MONOHYDRATE AND SODIUM CHLORIDE 5; .45 G/100ML; G/100ML
75 INJECTION, SOLUTION INTRAVENOUS CONTINUOUS
Status: DISCONTINUED | OUTPATIENT
Start: 2017-10-10 | End: 2017-10-11

## 2017-10-10 RX ADMIN — LEVOTHYROXINE SODIUM 75 MCG: 75 TABLET ORAL at 05:40

## 2017-10-10 RX ADMIN — HYDROCODONE BITARTRATE AND ACETAMINOPHEN 1 TABLET: 5; 325 TABLET ORAL at 05:40

## 2017-10-10 RX ADMIN — MORPHINE SULFATE 2 MG: 10 INJECTION INTRAMUSCULAR; INTRAVENOUS; SUBCUTANEOUS at 22:07

## 2017-10-10 RX ADMIN — Medication 10 ML: at 23:30

## 2017-10-10 RX ADMIN — CEFAZOLIN SODIUM 1 G: 1 INJECTION, POWDER, FOR SOLUTION INTRAMUSCULAR; INTRAVENOUS at 17:23

## 2017-10-10 RX ADMIN — PANTOPRAZOLE SODIUM 40 MG: 40 TABLET, DELAYED RELEASE ORAL at 09:22

## 2017-10-10 RX ADMIN — ENOXAPARIN SODIUM 40 MG: 40 INJECTION SUBCUTANEOUS at 21:55

## 2017-10-10 RX ADMIN — HYDROMORPHONE HYDROCHLORIDE 0.5 MG: 1 INJECTION, SOLUTION INTRAMUSCULAR; INTRAVENOUS; SUBCUTANEOUS at 12:05

## 2017-10-10 RX ADMIN — HYDROMORPHONE HYDROCHLORIDE 0.5 MG: 1 INJECTION, SOLUTION INTRAMUSCULAR; INTRAVENOUS; SUBCUTANEOUS at 12:10

## 2017-10-10 RX ADMIN — ONDANSETRON 4 MG: 2 INJECTION INTRAMUSCULAR; INTRAVENOUS at 12:48

## 2017-10-10 RX ADMIN — SUCCINYLCHOLINE CHLORIDE 100 MG: 20 INJECTION INTRAMUSCULAR; INTRAVENOUS at 10:26

## 2017-10-10 RX ADMIN — INSULIN LISPRO 2 UNITS: 100 INJECTION, SOLUTION INTRAVENOUS; SUBCUTANEOUS at 18:23

## 2017-10-10 RX ADMIN — SIMVASTATIN 20 MG: 20 TABLET, FILM COATED ORAL at 22:08

## 2017-10-10 RX ADMIN — HYDROCHLOROTHIAZIDE 25 MG: 25 TABLET ORAL at 09:22

## 2017-10-10 RX ADMIN — MORPHINE SULFATE 2 MG: 10 INJECTION INTRAMUSCULAR; INTRAVENOUS; SUBCUTANEOUS at 18:26

## 2017-10-10 RX ADMIN — INSULIN LISPRO 2 UNITS: 100 INJECTION, SOLUTION INTRAVENOUS; SUBCUTANEOUS at 22:01

## 2017-10-10 RX ADMIN — SODIUM CHLORIDE: 9 INJECTION, SOLUTION INTRAVENOUS at 10:20

## 2017-10-10 RX ADMIN — ONDANSETRON 4 MG: 2 INJECTION INTRAMUSCULAR; INTRAVENOUS at 11:30

## 2017-10-10 RX ADMIN — LIDOCAINE HYDROCHLORIDE 80 MG: 20 INJECTION, SOLUTION EPIDURAL; INFILTRATION; INTRACAUDAL; PERINEURAL at 10:26

## 2017-10-10 RX ADMIN — LOSARTAN POTASSIUM 50 MG: 50 TABLET ORAL at 09:22

## 2017-10-10 RX ADMIN — FENTANYL CITRATE 100 MCG: 50 INJECTION, SOLUTION INTRAMUSCULAR; INTRAVENOUS at 10:40

## 2017-10-10 RX ADMIN — PROPOFOL 150 MG: 10 INJECTION, EMULSION INTRAVENOUS at 10:26

## 2017-10-10 NOTE — PROGRESS NOTES
1328  Received from PACU, alert and oriented ,potter with in reach,, left  Dressing at the thigh intact, wiggle toes, warm to touch., no pain at this time, o2 applied, triflo raised up to  500 ml only. 1600  looks comfortable but pulse  Ox was 83%, rechecked, encourage CDB and triflo raised up to 94%. 1730  Wiggle left toes warm to touch, wiggle  Left  Fingers warm to touch. 1900  assisted by 2 staff , sit at the edge of the bed only for few minutes, not trying to help. She is not even moving, tried her to get up  With  Milly Retana but  She is not cooperating  She said wants it  Tomorrow, was medicated.

## 2017-10-10 NOTE — OP NOTES
Brijesh Williamson    Name:  Sheree Hancock  MR#:  [id number]  :  1940  Account #:  [de-identified]  Date of Adm:  10/09/2017  Date of Surgery:  10/10/2017      PREOPERATIVE DIAGNOSIS: Closed left comminuted displaced  intertrochanteric femur fracture. POSTOPERATIVE DIAGNOSIS: Closed left comminuted displaced  intertrochanteric femur fracture. PROCEDURES PERFORMED:  1. Closed reduction of left intertrochanteric femur fracture on fracture  table. 2. Placement of short trochanteric femoral nail for left intertrochanteric  femur fracture. A. 170 mm nail. B. 10 mm diameter. C. 75 mm helical flange blade. D. 36 mm distal locking screw. SURGEON: ANA Rose MD    ASSISTANT: Afia Chaudhari BLOOD LOSS: 0    SPECIMENS REMOVED: 0    ANESTHESIA: General.    FINDINGS: The patient had a closed displaced left intertrochanteric  femur fracture which had some comminution and was displaced. DESCRIPTION OF PROCEDURE: Under general anesthesia, the  patient was placed on the fracture table in supine position. Peripheral  nerves padded. Fracture underwent closed reduction under AP and  lateral C-arm control. Left lateral thigh prepped and draped in a sterile fashion. Oblique  incision made at the greater trochanter. Guide pin was placed under  AP and lateral C-arm control, followed by over reaming, nail  placement. Helical flange and distal locking devices were placed using  targeting device. Final radiographs obtained, instrumentation and  fracture position is satisfactory. Wound copiously irrigated and closed in layers with powdered  vancomycin in deep subcutaneous tissue. Wound dressed. The patient  awakened, taken to recovery room in satisfactory condition, without  complication. ESTIMATED BLOOD LOSS: 50 mL. FLUIDS: The patient received 300 mL crystalloid fluid, 40 mL urine  output. SPECIMENS: None. DRAINS: None.     COUNTS: Needle and sponge count correct without complication. At the time of dictation, the patient not awakened sufficiently to test  motor sensation.         MD Jonathan Landeros / MIKI  D:  10/10/2017   11:31  T:  10/10/2017   11:55  Job #:  [id number]

## 2017-10-10 NOTE — PERIOP NOTES
TRANSFER - IN REPORT:    Verbal report received from Minerva Herndon RN on Qamar Chau  being received from Rm [id number] for ordered procedure      Report consisted of patients Situation, Background, Assessment and   Recommendations(SBAR). Information from the following report(s) SBAR and Kardex was reviewed with the receiving nurse. Pt alert and oriented x 4. Pt is anxious about PICC line placement. Radiology in pt's room to start PIV with ultrasound assist. Tucker cath patent, draining clear yellow urine. Left arm wrapped in Ace Wrap. RN to call OR when IV is placed. RN to check vitals and blood sugar before pt is transported.

## 2017-10-10 NOTE — PERIOP NOTES
TRANSFER - OUT REPORT:    Verbal report given to Linda MOSQUERA (name) on Kathi Farley  being transferred to 2400 (unit) for routine post - op       Report consisted of patients Situation, Background, Assessment and   Recommendations(SBAR). Information from the following report(s) SBAR, OR Summary, Procedure Summary, Intake/Output and MAR was reviewed with the receiving nurse. Opportunity for questions and clarification was provided.       Patient transported with:   O2 @ 2 liters  Registered Nurse

## 2017-10-10 NOTE — PROGRESS NOTES
2045 Pt arrived on floor from ED, not provided shift report. 2100 Pt complains of pain, administered pain med.    2200 IV infiltrated, discontinued IV and put warm compress on pt's arm where swelling is visible and elevated arm. Called ICU to start IV, said they'd come over when available. 2330 No IV started yet, Alley Huang checked with ICU said it will be awhile. Called Nurse Supervisor who informed me to call MD and ask for an order from Anesthesia to place IV.    [phone] Paged MD (hospitalist) 4x with no returned call. 5359 Received order for Anesthesia to start IV. Before starting IV ICU RN came over to try, was unable to start IV.     0030 Put in order under misc per Nurse Supervisor and paged Anesthesiologist who said they'd be to place an IV.     0100 Anesthesia unable to start IV. Called Nurse Supervisor for other suggestions before calling MD, she said to call MD. MD (hospitalist) paged and was instructed to call surgeon. Alexx Paged surgeon requested order for PICC in AM. Will let AM RN know so they can call PICC team to inform them. Order entered at this time. 0130 Informed pt who is very concerned, saying she passed out last time she had a PICC placed. Explained the procedure and helped reassure the pt, but pt very upset. Let pt know that she will be able to talk to the PICC team in the morning and can inform them of her concerns. 0545 Pt complains of pain, administered pain med. Bedside and Verbal shift change report given to Shantelle Cordero RN (oncoming nurse) by Fallon Wade RN (offgoing nurse). Report included the following information SBAR, Kardex, Intake/Output, MAR and Recent Results.

## 2017-10-10 NOTE — ANESTHESIA POSTPROCEDURE EVALUATION
Post-Anesthesia Evaluation and Assessment    Patient: Wade Boateng MRN: [id number]  SSN: [id number]    YOB: 1940  Age: 65 y.o. Sex: female       Cardiovascular Function/Vital Signs  Visit Vitals    /64    Pulse 94    Temp 36.9 °C (98.5 °F)    Resp 14    Ht 5' (1.524 m)    Wt 77.1 kg (170 lb)    SpO2 100%    BMI 33.2 kg/m2       Patient is status post general anesthesia for Procedure(s): FEMUR INSERTION INTRA MEDULLARY NAIL  SHORT. Nausea/Vomiting: None    Postoperative hydration reviewed and adequate. Pain:  Pain Scale 1: (P) FACES (10/10/17 5116)  Pain Intensity 1: 0 (10/10/17 4117)   Managed    Neurological Status:   Neuro (WDL): Exceptions to WDL (10/10/17 1209)  Neuro  LLE Motor Response: Other(comment) (surgical site) (10/10/17 1209)   At baseline    Mental Status and Level of Consciousness: Arousable    Pulmonary Status:   O2 Device: Oxygen mask (10/10/17 1212)   Adequate oxygenation and airway patent    Complications related to anesthesia: None    Post-anesthesia assessment completed.  No concerns    Signed By: Belle Rosenberg MD     October 10, 2017

## 2017-10-10 NOTE — PROGRESS NOTES
[facility]  Hospitalist Division        Inpatient Daily Progress Note    Daily progress Note    Patient: Eulalia Ivy MRN: [id number]  CSN: [id number]    YOB: 1940  Age: 65 y.o. Sex: female    DOA: 10/9/2017 LOS:  LOS: 1 day                    Chief Complaint: Left femoral neck fracture        Subjective: Hx of DM, HTN, Hyperlipidemia came to the ED yesterday after tripping on her flip flop while walking her dog. She had intense left hip and left wrist pain- xray showed femoral hip fracture. Wrist xray as documented below and splinted. Pt examined this morning prior to surgery- resting in bed comfortably- reports left wrist pain but controlled with meds. Otherwise no complaints. Objective:      Visit Vitals    /64    Pulse 94    Temp 98.5 °F (36.9 °C)    Resp 14    Ht 5' (1.524 m)    Wt 77.1 kg (170 lb)    SpO2 100%    BMI 33.2 kg/m2           Physical Exam:  General appearance: alert, cooperative, no distress, appears stated age  Head: Normocephalic, without obvious abnormality, atraumatic  Lungs: clear to auscultation bilaterally  Heart: regular rate and rhythm, S1, S2 normal, no murmur, click, rub or gallop  Abdomen: soft, non tender, non distended . Normoactive bowel sounds.  No masses, no organomegaly  Extremities: extremities normal, atraumatic, no cyanosis or edema  Skin: Skin color, texture, turgor normal. No rashes or lesions  Neurologic: Grossly normal  PSY: Mood and affect normal, appropriately behaved        Intake and Output:  Current Shift:  10/10 0701 - 10/10 1900  In: 1200 [I.V.:1200]  Out: 300 [Urine:250]  Last three shifts:  10/08 1901 - 10/10 0700  In: 108.3 [I.V.:108.3]  Out: 175 [Urine:175]      Recent Results (from the past 24 hour(s))   CBC WITH AUTOMATED DIFF    Collection Time: 10/09/17  5:30 PM   Result Value Ref Range    WBC 14.7 (H) 4.6 - 13.2 K/uL    RBC 4.58 4.20 - 5.30 M/uL    HGB 12.9 12.0 - 16.0 g/dL HCT 38.8 35.0 - 45.0 %    MCV 84.7 74.0 - 97.0 FL    MCH 28.2 24.0 - 34.0 PG    MCHC 33.2 31.0 - 37.0 g/dL    RDW 12.3 11.6 - 14.5 %    PLATELET 465 892 - 500 K/uL    MPV 9.8 9.2 - 11.8 FL    NEUTROPHILS 83 (H) 40 - 73 %    LYMPHOCYTES 11 (L) 21 - 52 %    MONOCYTES 5 3 - 10 %    EOSINOPHILS 1 0 - 5 %    BASOPHILS 0 0 - 2 %    ABS. NEUTROPHILS 12.3 (H) 1.8 - 8.0 K/UL    ABS. LYMPHOCYTES 1.6 0.9 - 3.6 K/UL    ABS. MONOCYTES 0.7 0.05 - 1.2 K/UL    ABS. EOSINOPHILS 0.2 0.0 - 0.4 K/UL    ABS.  BASOPHILS 0.0 0.0 - 0.06 K/UL    DF AUTOMATED     PROTHROMBIN TIME + INR    Collection Time: 10/09/17  5:30 PM   Result Value Ref Range    Prothrombin time 11.8 11.5 - 15.2 sec    INR 0.9 0.8 - 1.2     METABOLIC PANEL, BASIC    Collection Time: 10/09/17  5:30 PM   Result Value Ref Range    Sodium 139 136 - 145 mmol/L    Potassium 4.4 3.5 - 5.5 mmol/L    Chloride 101 100 - 108 mmol/L    CO2 27 21 - 32 mmol/L    Anion gap 11 3.0 - 18 mmol/L    Glucose 161 (H) 74 - 99 mg/dL    BUN 20 (H) 7.0 - 18 MG/DL    Creatinine 1.16 0.6 - 1.3 MG/DL    BUN/Creatinine ratio 17 12 - 20      GFR est AA 55 (L) >60 ml/min/1.73m2    GFR est non-AA 45 (L) >60 ml/min/1.73m2    Calcium 8.6 8.5 - 10.1 MG/DL   HEMOGLOBIN A1C WITH EAG    Collection Time: 10/09/17  5:30 PM   Result Value Ref Range    Hemoglobin A1c 6.2 (H) 4.2 - 5.6 %    Est. average glucose 131 mg/dL   EKG, 12 LEAD, INITIAL    Collection Time: 10/09/17  6:29 PM   Result Value Ref Range    Ventricular Rate 73 BPM    Atrial Rate 73 BPM    P-R Interval 128 ms    QRS Duration 64 ms    Q-T Interval 408 ms    QTC Calculation (Bezet) 449 ms    Calculated P Axis 31 degrees    Calculated R Axis 58 degrees    Calculated T Axis 59 degrees    Diagnosis       Normal sinus rhythm  Normal ECG  When compared with ECG of 07-NOV-2016 03:53,  QRS axis shifted right  Non-specific change in ST segment in Inferior leads  Nonspecific T wave abnormality no longer evident in Inferior leads  Confirmed by Jayla Lee ([id number]) on 10/10/2017 11:40:03 AM     GLUCOSE, POC    Collection Time: 10/09/17  9:15 PM   Result Value Ref Range    Glucose (POC) 143 (H) 70 - 110 mg/dL   GLUCOSE, POC    Collection Time: 10/10/17  5:44 AM   Result Value Ref Range    Glucose (POC) 115 (H) 70 - 110 mg/dL   GLUCOSE, POC    Collection Time: 10/10/17  9:46 AM   Result Value Ref Range    Glucose (POC) 112 (H) 70 - 110 mg/dL           Lab Results   Component Value Date/Time    Glucose 161 10/09/2017 05:30 PM    Glucose 112 04/20/2017 11:23 AM    Glucose 122 11/09/2016 03:50 AM    Glucose 113 11/08/2016 03:55 AM    Glucose 115 11/07/2016 03:49 AM      Xray left hip 10/9/2017  IMPRESSION:     Left femoral neck fracture.         Xray left wrist 10/9/2017  IMPRESSION:  1. Minimally displaced triquetral avulsion fracture. 2. Possible nondisplaced fracture of the distal left radius, spanning the  radioscaphoid fossa. 3. Moderate dorsal left wrist soft tissue swelling. 4. 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 Matt Chaudhari  Pain control  DM-accuchecks Q6H while NPO, AC/HS when taking po   HTN- on Losartan and HCTZ  DVT Prophylaxis- Lovenox 40mg daily  GI Prophylaxis- Protonix 40mg PO daily    CHANO Marie  [address]  Hospitalist Division  Pager:  [phone]  Office:  [phone]

## 2017-10-10 NOTE — ANESTHESIA PREPROCEDURE EVALUATION
Anesthetic History   No history of anesthetic complications            Review of Systems / Medical History  Patient summary reviewed and pertinent labs reviewed    Pulmonary  Within defined limits                 Neuro/Psych   Within defined limits           Cardiovascular    Hypertension: well controlled                   GI/Hepatic/Renal     GERD: well controlled           Endo/Other    Diabetes: well controlled, type 2, using insulin  Hypothyroidism: well controlled  Morbid obesity and arthritis     Other Findings   Comments:   Risk Factors for Postoperative nausea/vomiting:       History of postoperative nausea/vomiting? NO       Female? YES       Motion sickness? NO       Intended opioid administration for postoperative analgesia? YES      Smoking Abstinence  Current Smoker? NO  Elective Surgery? NO  Seen preoperatively by anesthesiologist or proxy prior to day of surgery? YES  Pt abstained from smoking 24 hours prior to anesthesia? N/A         Physical Exam    Airway  Mallampati: II  TM Distance: 4 - 6 cm  Neck ROM: normal range of motion   Mouth opening: Normal     Cardiovascular               Dental  No notable dental hx    Comments: Prominent upper incisors.      Pulmonary                 Abdominal  GI exam deferred       Other Findings            Anesthetic Plan    ASA: 3, emergent  Anesthesia type: general          Induction: Intravenous  Anesthetic plan and risks discussed with: Patient

## 2017-10-10 NOTE — PROGRESS NOTES
Patient has designated her son to participate in his/her discharge plan and to receive any needed information.      Name:   Sid Grigsby  son   [id number] Jimy Patton, [address] [place] [address]   Oct 10, 2017        Address:  Phone number:

## 2017-10-10 NOTE — CONSULTS
Progress Note          Assessment:    1. Left intertrochanteric hip fracture  2. Left triquetral avulsion fracture  3. Left intertrochanteric hip fracture      PLAN:    1. NPO and cleared for the OR today for a left short TFN. Non-operative treatment for her left hip fracture. He wrist splint is ill-fitting and I will replace this. Anticipate WBAT left hip post-operative with platform walker to allow weightbearing through the left elbow for mobilization. Understanding risks, benefits, and alternatives to the procedure she wishes to proceed. Consent signed         HPI: Kendra Anders is a 65 y.o. female patient presents after a fall yesterday. She was walking with flip flops on and tripped. On presentation to the ED images were taken showing both a left wrist and hip fracture. She has been admitted for further management. Past Medical History:   Diagnosis Date    Arthritis     Bronchitis     Diabetes ([address]     Diverticulitis     GERD (gastroesophageal reflux disease)     Hypercholesterolemia     Hypertension     Hypothyroid     Pancreatitis      Past Surgical History:   Procedure Laterality Date    COLONOSCOPY N/A 2/2/2017    COLONOSCOPY  w/bx polyp performed by Dereje Prado MD at [facility] ENDOSCOPY     Prior to Admission medications    Medication Sig Start Date End Date Taking? Authorizing Provider   pantoprazole (PROTONIX) 40 mg tablet Take 1 Tab by mouth daily. 9/11/17  Yes [address] Vernon Lewis MD   glipiZIDE (GLUCOTROL) 5 mg tablet Take 1 Tab by mouth two (2) times a day. 9/5/17  Yes [address] Vernon Lewis MD   triamcinolone acetonide (KENALOG) 0.1 % topical cream Apply  to affected area two (2) times a day. use thin layer 9/5/17  Yes [address] Vernon Lewis MD   guaiFENesin-codeine (VIRTUSSIN AC) 100-10 mg/5 mL solution Take 5 mL by mouth nightly as needed for Cough. Max Daily Amount: 5 mL. 9/5/17  Yes [address] Vernon Lewis MD   losartan (COZAAR) 50 mg tablet Take 1 Tab by mouth daily.  9/1/17  Yes Presley Judi Bhakta MD   hydroCHLOROthiazide (HYDRODIURIL) 25 mg tablet Take 1 Tab by mouth daily. 9/1/17  Yes Sania Monsalve MD   levothyroxine (SYNTHROID) 75 mcg tablet Take 1 Tab by mouth Daily (before breakfast). 8/15/17  Yes Sania Monsalve MD   loperamide (IMODIUM) 2 mg capsule Take 1 Cap by mouth four (4) times daily as needed. 8/15/17  Yes Sania Monsalve MD   metFORMIN (GLUCOPHAGE) 1,000 mg tablet Take 1 Tab by mouth two (2) times daily (with meals). 8/15/17  Yes Sania Monsalve MD   diph,Pertuss,AC,,Tet Vac-PF (BOOSTRIX) 2.5-8-5 Lf-mcg suspension 0.5 mL by IntraMUSCular route PRIOR TO DISCHARGE for 1 dose. 5/3/17  Yes Sania Monsalve MD   cholecalciferol, vitamin D3, (VITAMIN D3) 2,000 unit tab Take  by mouth. Yes Historical Provider   simvastatin (ZOCOR) 20 mg tablet Take 20 mg by mouth nightly. Yes Lon Mckeon MD     Allergies   Allergen Reactions    Ampicillin Itching     Social History     Social History    Marital status: SINGLE     Spouse name: N/A    Number of children: N/A    Years of education: N/A     Occupational History    Not on file. Social History Main Topics    Smoking status: Never Smoker    Smokeless tobacco: Never Used    Alcohol use No    Drug use: No    Sexual activity: Not on file     Other Topics Concern    Not on file     Social History Narrative       Blood pressure 123/59, pulse 70, temperature 98 °F (36.7 °C), resp. rate 18, height 5' (1.524 m), weight 77.1 kg (170 lb), SpO2 96 %. CBC w/Diff   Lab Results   Component Value Date/Time    WBC 14.7 (H) 10/09/2017 05:30 PM    RBC 4.58 10/09/2017 05:30 PM    HCT 38.8 10/09/2017 05:30 PM          Physical Assessment:  General: in no apparent distress and oriented times 3   Extremities:  Pain with left hip motion. Skin intact. Volar left wrist splint in place. She can wiggle the fingers. NVI bilat upper and lower extremities. Dressing:  none   DVT Exam:   No exam evidence to suggest DVT.  Compartments soft and NT. Radiology:   Left hip: 2 views left hip obtained. There is acute obliquely oriented fracture  that extends from the superior lateral left femoral neck, involving the base of  the femoral neck and extending into the proximal intertrochanteric region. This  is nondisplaced. There is no dislocation. There is associated joint effusion. Mild degenerative changes left hip. No abnormal lytic or sclerotic lesion. Left wrist: Frontal, oblique, lateral, and ulnar deviation projections are  obtained. Osseous alignment is within normal limits. Small ossific fragment  noted dorsal to the triquetrum on the lateral projection. Linear lucency noted  to span the radial scaphoid fossa. Moderate adjacent soft tissue swelling noted. Mild radiocarpal, moderate STT and thumb carpometacarpal joint osteoarthritis is  present. No retained radiopaque foreign object.          Mady Jalloh PA-C  10/10/2017  Office [phone]  Cell [phone]

## 2017-10-10 NOTE — PROGRESS NOTES
0745  Received pt on bed, left arm with elastic dressing  Intact, wiggle fingers and warm to touch. 0830  PICC line RN been notified, spoke to her about pt refusing PICC line due a bad experience, remain NPO.    0930  For surgery today, consent obtained by pre op nurse, 2 peripheral IV  Started by  PICC line RN    7950  VS and BS taken prior to OR.

## 2017-10-10 NOTE — PROGRESS NOTES
Rolando   Discharge Planning/ Assessment    Reasons for Intervention: Interviewed patient, she agrees to share her discharge information with her son, see below. She Lives with her son was independent prior to admission and see Dr Alex Wolf for her primary care needs. Her discharge plan is to go to rehab and is requesting TCC. Placed in e discharge and matched to TCC her only selection.  .     High Risk Criteria  [x] Yes  []No   Physician Referral  [] Yes  [x]No        Date    Nursing Referral  [] Yes  [x]No        Date    Patient/Family Request  [] Yes  [x]No        Date       Resources:    Medicare  [x] Yes  []No A and B   Medicaid  [x] Yes  []No   No Resources  [] Yes  [x]No   Private Insurance  [] Yes  [x]No    Name/Phone Number    Other  [] Yes  []No         (i.e. Workman's Comp)         Prior Services:    Prior Services  [] Yes  [x]No   Home Health  [] Yes  [x]No   [address] [place]  [] Yes  [x]No        Number of [address]  [] Yes  [x]No       Meals on Wheels  [] Yes  [x]No   Office on Aging  [] Yes  [x]No   Transportation Services  [] Yes  [x]No   Nursing Home  [] Yes  [x]No        Nursing Home Name    [address]  [] Yes  [x]No        [address] Name    Other       Information Source:      Information obtained from  [x] Patient  [] Parent   [] [address]  [] Child  [] Spouse   [] Significant Other/Partner   [] Friend      [] EMS    [] Nursing Home Chart          [x] Other: chart   Chart Review  [x] Yes  []No     Family/Support System:    Patient lives with  [] Alone    [] Spouse   [] Significant Other  [x] Children  [] Caretaker   [] Parent  [] Sibling     [] Other       Other Support System:    Is the patient responsible for care of others  [] Yes  [x]No   Information of person caring for patient on  discharge self   Managers financial affairs independently  [x] Yes  []No   If no, explain:      Status Prior to Admission:    Mental Status  [x] Awake  [x] Alert  [x] Oriented  [] Quiet/Calm [] Lethargic/Sedated   [] Disoriented  [] Restless/Anxious  [] Combative   Personal Care  [] Dependent  [x] Independent Personal Care  [] Requires Assistance   Meal Preparation Ability  [x] Independent   [] Standby Assistance   [] Minimal Assistance   [] Moderate Assistance  [] Maximum Assistance     [] Total Assistance   Chores  [x] Independent with Chores   [] N/A Nursing Home Resident   [] Requires Assistance   Bowel/Bladder  [x] Continent  [] Catheter  [] Incontinent  [] Ostomy Self-Care    [] Urine Diversion Self-Care  [] Maximum Assistance     [] Total Assistance   Number of Persons needed for assistance    DME at home  [] Eveleen Frames, Dianah Borges  [] Eveleen Frames, Straight   [] Commode    [] Bathroom/Grab Bars  [] Hospital Bed  [] Nebulizer  [] Oxygen           [] Raised Toilet Seat  [] Shower Chair  [] Side Rails for Bed   [] Tub Transfer Bench   [] Evaline Jenna  [] Frutoso Jordan, Standard      [] Other:   Vendor      Treatment Presently Receiving:    Current Treatments  [] Chemotherapy  [] Dialysis  [] Insulin  [] IVAB [x] IVF   [] O2  [] PCA   [x] PT   [] RT   [] Tube Feedings   [] Wound Care     Psychosocial Evaluation:    Verbalized Knowledge of Disease Process  [x] Patient  [x]Family   Coping with Disease Process  [x] Patient  [x]Family   Requires Further Counseling Coping with Disease Process  [] Patient  []Family     Identified Projected Needs:    Home Health Aid  [] Yes  [x]No   Transportation  [x] Yes  []No   Education  [] Yes  [x]No        Specific Education     Financial Counseling  [] Yes  [x]No   Inability to Care for Self/Will Require 24 hour care  [] Yes  [x]No   Pain Management  [] Yes  [x]No   Home Infusion Therapy  [] Yes  [x]No   Oxygen Therapy  [] Yes  [x]No   DME  [] Yes  [x]No   Long Term Care Placement  [] Yes  [x]No   Rehab  [x] Yes  []No   Physical Therapy  [x] Yes  []No   Needs Anticipated At This Time  [x] Yes  []No Intra-Hospital Referral:    Home Health Liasion  [] Yes  [x]No     [] Yes  [x]No   Patient Representative  [] Yes  [x]No   Staff for Teaching Needs  [] Yes  [x]No   Specialty Teaching Needs     Diabetic Educator  [] Yes  [x]No   Referral for Diabetic Educator Needed  [] Yes  [x]No  If Yes, place order for Nutritionist or Diabetic Consult     Tentative Discharge Plan:    Home with No Services  [x] Yes  []No   Home with [address]  [] Yes  [x]No        If Yes, specify type    [facility]  [] Yes  [x]No        If Yes, specify type    Meals on Wheels  [] Yes  [x]No   Office of Aging  [] Yes  [x]No   NHP  [] Yes  [x]No   Return to the Nursing Home  [] Yes  [x]No   Rehab Therapy  [x] Yes  []No   Acute Rehab  [] Yes  [x]No   Subacute Rehab  [x] Yes  []No   Private Care  [] Yes  [x]No   Substance Abuse Referral  [] Yes  [x]No   Transportation  [] Yes  [x]No   Chore Service  [] Yes  [x]No   Inpatient Hospice  [] Yes  [x]No   OP RT  [] Yes  [x] No   OP Hemo  [] Yes  [x] No   OP PT  [] Yes  [x]No   Support Group  [] Yes  [x]No   Reach to Recovery  [] Yes  [x]No   OP Oncology Clinic  [] Yes  [x]No   Clinic Appointment  [] Yes  [x]No   DME  [] Yes  [x]No   Comments    Name of D/C Planner or  Given to Patient or Family Jc Woodward RN   Phone Number [phone]   Date Oct 10, 2017   Time 650 am   If you are discharged home, whom do you designate to participate in your discharge plan and receive any information needed?      Enter name of Neptali Nguyen  son        Phone # of designee [phone]        Address of [address] [place] [address] Caroline Jaspreet Montes., Jimy, [address] [place] [address]        Updated Oct 10, 2017        Patient refused to designate any           individual

## 2017-10-10 NOTE — OP NOTES
BRIEF OPERATIVE NOTE    Date of Procedure: 10/10/2017     Preoperative Diagnosis: left hip fracture    Postoperative Diagnosis: left hip fracture      Procedure: Procedure(s):    FEMUR INSERTION INTRA MEDULLARY NAIL  SHORT    Surgeon(s) and Role:     * Deni Loredo MD - Primary    Anesthesia: General     Findings: comminuted displace l closed intertroch femur fx     Estimated Blood Loss: 50  Replaced0      [id number]        Urine40    Specimens: * No specimens in log *     Tubes/Drains: None    Needle/sponge count:  Correct    Complications: 0    Plan    Up at [facility]  PT asmbulate tdwb l  Home /rehab 2-3 days   rto 1 month

## 2017-10-10 NOTE — PERIOP NOTES
Two PIVs placed by radiology. Right AC and Right FA. Flushed well. Dressings C/D/I . Pt ready for OR.

## 2017-10-10 NOTE — PROGRESS NOTES
conducted a pre-surgery visit with Brittany Villavicencio, who is a 65 y.o.,female. The  provided the following Interventions:  Initiated a relationship of care and support. Offered prayer and assurance of continued prayers on patient's behalf. Plan:  Chaplains will continue to follow and will provide pastoral care on an as needed/requested basis.  recommends bedside caregivers page  on duty if patient shows signs of acute spiritual or emotional distress.     Jeb Condon   Spiritual Care   [phone]

## 2017-10-10 NOTE — PROGRESS NOTES
Nutrition initial assessment/Plan of care      RECOMMENDATIONS:   1. NPO. Advance to consistent carb diet when medically indicated  2. Monitor weight and PO intake  3. RD to follow     GOALS:   1. PO intake meets >75% of protein/calorie needs by 10/15  2. Weight Maintenance/Gradual weight loss (1-2 lb) by 10/17        ASSESSMENT:   Per BMI of 33.2, weight is in the obesity classification. 9.6% weight loss in 1 year which is desirable for patient. PO intake is poor vs NPO order. Labs noted. Nutrition recommendations listed. RD to follow. Nutrition Diagnoses:   Obesity related to previous excessive energy intake as evidenced by BMI of 33.2. Nutrition Risk:  [] High  [x] Moderate []  Low    SUBJECTIVE/OBJECTIVE:   Patient admitted for hip fracture. She had surgery on 10/10. She has h/o diabetes. Patient is drowsy from medication at this time and she is feeling sick. No known food allergy. Per chart review, weight was 188 lb last year. Information Obtained from:    [x] Chart Review   [x] Patient   [] Family/Caregiver   [x] Nurse/Physician   [] Interdisciplinary Meeting/Rounds    Diet: NPO  Medications: [x] Reviewed ([place])   Allergies: [x] Reviewed   Encounter Diagnoses     ICD-10-CM ICD-9-CM   1. Closed fracture of neck of left femur, initial encounter ([facility] S72.002A 820.8   2.  Closed nondisplaced fracture of triquetrum of left wrist, initial encounter S62.115A 814.03     Past Medical History:   Diagnosis Date    Arthritis     Bronchitis     Diabetes ([facility]     Diverticulitis     GERD (gastroesophageal reflux disease)     Hypercholesterolemia     Hypertension     Hypothyroid     Pancreatitis       Labs:    Lab Results   Component Value Date/Time    Sodium 139 10/09/2017 05:30 PM    Potassium 4.4 10/09/2017 05:30 PM    Chloride 101 10/09/2017 05:30 PM    CO2 27 10/09/2017 05:30 PM    Anion gap 11 10/09/2017 05:30 PM    Glucose 161 10/09/2017 05:30 PM    BUN 20 10/09/2017 05:30 PM    Creatinine 1.16 10/09/2017 05:30 PM    Calcium 8.6 10/09/2017 05:30 PM    Magnesium 1.4 12/04/2009 06:45 AM    Phosphorus 3.5 12/04/2009 06:45 AM    Albumin 3.9 04/20/2017 11:23 AM     Anthropometrics: BMI (calculated): 33.2  Last 3 Recorded Weights in this Encounter    10/09/17 1551   Weight: 77.1 kg (170 lb)      Ht Readings from Last 1 Encounters:   10/09/17 5' (1.524 m)       IBW: 100 lb %IBW: 170% UBW: 188 lb %UBW: 90%   [x] Weight Loss [] Weight Gain [] Weight Stable    Estimated Nutrition Needs: [x] MSJ  [] Other:  Calories: 2819-1161 kcal Based on:   [x] Actual BW    Protein:   55-65 g Based on:   [x] IBW    Fluid:       3033-6400 ml Based on:   [x] Actual BW      [x] No Cultural, Episcopal or ethnic dietary need identified.     [] Cultural, Episcopal and ethnic food preferences identified and addressed     Wt Status:  [] Normal (18.6 - 24.9) [] Underweight (<18.5) [] Overweight (25 - 29.9) [x] Mild Obesity (30 - 34.9)  [] Moderate Obesity (35 - 39.9) [] Morbid Obesity (40+)   [] Moderate Malnutrition [] Severe Malnutrition in the context of :     Nutrition Problems Identified:   [x] Suboptimal PO intake   [] Food Allergies  [] Difficulty chewing/swallowing/poor dentition  [] Constipation/Diarrhea   [] Nausea/Vomiting   [] None  [] Other:     Plan:   [x] Therapeutic Diet  []  Obtained/adjusted food preferences/tolerances and/or snacks options   []  Supplements added   [] Occupational therapy following for feeding techniques  []  HS snack added   []  Modify diet texture   []  Modify diet for food allergies   []  Educate patient   []  Assist with menu selection   [x]  Monitor PO intake on meal rounds   [x]  Continue inpatient monitoring and intervention   []  Participated in discharge planning/Interdisciplinary rounds/Team meetings   []  Other:     Education Needs:   [] Not appropriate for teaching at this time due to:   [x] Identified and addressed    Nutrition Monitoring and Evaluation:  [x] Continue ongoing monitoring and intervention  [] Other    Dereje Moore, [address] [place] [address]  Pager: [phone]

## 2017-10-11 ENCOUNTER — APPOINTMENT (OUTPATIENT)
Dept: GENERAL RADIOLOGY | Age: 77
DRG: 482 | End: 2017-10-11
Attending: FAMILY MEDICINE
Payer: MEDICARE

## 2017-10-11 LAB
ANION GAP SERPL CALC-SCNC: 18 MMOL/L (ref 3–18)
APPEARANCE UR: CLEAR
BACTERIA URNS QL MICRO: NEGATIVE /HPF
BASOPHILS # BLD: 0 K/UL (ref 0–0.06)
BASOPHILS NFR BLD: 0 % (ref 0–2)
BILIRUB UR QL: NEGATIVE
BUN SERPL-MCNC: 16 MG/DL (ref 7–18)
BUN/CREAT SERPL: 16 (ref 12–20)
CALCIUM SERPL-MCNC: 7.3 MG/DL (ref 8.5–10.1)
CHLORIDE SERPL-SCNC: 99 MMOL/L (ref 100–108)
CO2 SERPL-SCNC: 27 MMOL/L (ref 21–32)
COLOR UR: YELLOW
CREAT SERPL-MCNC: 0.98 MG/DL (ref 0.6–1.3)
DIFFERENTIAL METHOD BLD: ABNORMAL
EOSINOPHIL # BLD: 0.2 K/UL (ref 0–0.4)
EOSINOPHIL NFR BLD: 2 % (ref 0–5)
EPITH CASTS URNS QL MICRO: NEGATIVE /LPF (ref 0–5)
ERYTHROCYTE [DISTWIDTH] IN BLOOD BY AUTOMATED COUNT: 12.4 % (ref 11.6–14.5)
ERYTHROCYTE [DISTWIDTH] IN BLOOD BY AUTOMATED COUNT: 12.4 % (ref 11.6–14.5)
ERYTHROCYTE [DISTWIDTH] IN BLOOD BY AUTOMATED COUNT: 12.5 % (ref 11.6–14.5)
GLUCOSE BLD STRIP.AUTO-MCNC: 115 MG/DL (ref 70–110)
GLUCOSE BLD STRIP.AUTO-MCNC: 158 MG/DL (ref 70–110)
GLUCOSE BLD STRIP.AUTO-MCNC: 165 MG/DL (ref 70–110)
GLUCOSE BLD STRIP.AUTO-MCNC: 205 MG/DL (ref 70–110)
GLUCOSE SERPL-MCNC: 167 MG/DL (ref 74–99)
GLUCOSE UR STRIP.AUTO-MCNC: NEGATIVE MG/DL
HCT VFR BLD AUTO: 30.8 % (ref 35–45)
HCT VFR BLD AUTO: 31 % (ref 35–45)
HCT VFR BLD AUTO: 32.9 % (ref 35–45)
HGB BLD-MCNC: 10.7 G/DL (ref 12–16)
HGB BLD-MCNC: 9.9 G/DL (ref 12–16)
HGB BLD-MCNC: 9.9 G/DL (ref 12–16)
HGB UR QL STRIP: ABNORMAL
INR PPP: 1.1 (ref 0.8–1.2)
KETONES UR QL STRIP.AUTO: NEGATIVE MG/DL
LEUKOCYTE ESTERASE UR QL STRIP.AUTO: ABNORMAL
LYMPHOCYTES # BLD: 1.4 K/UL (ref 0.9–3.6)
LYMPHOCYTES NFR BLD: 14 % (ref 21–52)
MCH RBC QN AUTO: 27.9 PG (ref 24–34)
MCH RBC QN AUTO: 27.9 PG (ref 24–34)
MCH RBC QN AUTO: 28 PG (ref 24–34)
MCHC RBC AUTO-ENTMCNC: 31.9 G/DL (ref 31–37)
MCHC RBC AUTO-ENTMCNC: 32.1 G/DL (ref 31–37)
MCHC RBC AUTO-ENTMCNC: 32.5 G/DL (ref 31–37)
MCV RBC AUTO: 85.7 FL (ref 74–97)
MCV RBC AUTO: 87.3 FL (ref 74–97)
MCV RBC AUTO: 87.3 FL (ref 74–97)
MONOCYTES # BLD: 0.5 K/UL (ref 0.05–1.2)
MONOCYTES NFR BLD: 5 % (ref 3–10)
NEUTS SEG # BLD: 7.6 K/UL (ref 1.8–8)
NEUTS SEG NFR BLD: 79 % (ref 40–73)
NITRITE UR QL STRIP.AUTO: NEGATIVE
PH UR STRIP: 5 [PH] (ref 5–8)
PLATELET # BLD AUTO: 219 K/UL (ref 135–420)
PLATELET # BLD AUTO: 224 K/UL (ref 135–420)
PLATELET # BLD AUTO: 230 K/UL (ref 135–420)
PMV BLD AUTO: 10 FL (ref 9.2–11.8)
PMV BLD AUTO: 10.3 FL (ref 9.2–11.8)
PMV BLD AUTO: 9.6 FL (ref 9.2–11.8)
POTASSIUM SERPL-SCNC: 4.4 MMOL/L (ref 3.5–5.5)
PROT UR STRIP-MCNC: ABNORMAL MG/DL
PROTHROMBIN TIME: 13.7 SEC (ref 11.5–15.2)
RBC # BLD AUTO: 3.53 M/UL (ref 4.2–5.3)
RBC # BLD AUTO: 3.55 M/UL (ref 4.2–5.3)
RBC # BLD AUTO: 3.84 M/UL (ref 4.2–5.3)
RBC #/AREA URNS HPF: NORMAL /HPF (ref 0–5)
SODIUM SERPL-SCNC: 144 MMOL/L (ref 136–145)
SP GR UR REFRACTOMETRY: 1.02 (ref 1–1.03)
UROBILINOGEN UR QL STRIP.AUTO: 0.2 EU/DL (ref 0.2–1)
WBC # BLD AUTO: 9.4 K/UL (ref 4.6–13.2)
WBC # BLD AUTO: 9.5 K/UL (ref 4.6–13.2)
WBC # BLD AUTO: 9.6 K/UL (ref 4.6–13.2)
WBC URNS QL MICRO: NORMAL /HPF (ref 0–4)

## 2017-10-11 PROCEDURE — 85610 PROTHROMBIN TIME: CPT | Performed by: ORTHOPAEDIC SURGERY

## 2017-10-11 PROCEDURE — 74011250636 HC RX REV CODE- 250/636: Performed by: INTERNAL MEDICINE

## 2017-10-11 PROCEDURE — 77010033678 HC OXYGEN DAILY

## 2017-10-11 PROCEDURE — 97162 PT EVAL MOD COMPLEX 30 MIN: CPT

## 2017-10-11 PROCEDURE — 74011000258 HC RX REV CODE- 258: Performed by: ORTHOPAEDIC SURGERY

## 2017-10-11 PROCEDURE — 80048 BASIC METABOLIC PNL TOTAL CA: CPT | Performed by: ORTHOPAEDIC SURGERY

## 2017-10-11 PROCEDURE — 87040 BLOOD CULTURE FOR BACTERIA: CPT | Performed by: FAMILY MEDICINE

## 2017-10-11 PROCEDURE — 74011250636 HC RX REV CODE- 250/636: Performed by: ORTHOPAEDIC SURGERY

## 2017-10-11 PROCEDURE — 36415 COLL VENOUS BLD VENIPUNCTURE: CPT | Performed by: ORTHOPAEDIC SURGERY

## 2017-10-11 PROCEDURE — 74011250637 HC RX REV CODE- 250/637: Performed by: PHYSICIAN ASSISTANT

## 2017-10-11 PROCEDURE — 71010 XR CHEST PORT: CPT

## 2017-10-11 PROCEDURE — 65270000029 HC RM PRIVATE

## 2017-10-11 PROCEDURE — 74011250637 HC RX REV CODE- 250/637: Performed by: INTERNAL MEDICINE

## 2017-10-11 PROCEDURE — 74011636637 HC RX REV CODE- 636/637: Performed by: INTERNAL MEDICINE

## 2017-10-11 PROCEDURE — 85025 COMPLETE CBC W/AUTO DIFF WBC: CPT | Performed by: FAMILY MEDICINE

## 2017-10-11 PROCEDURE — 82962 GLUCOSE BLOOD TEST: CPT

## 2017-10-11 PROCEDURE — 74011636637 HC RX REV CODE- 636/637: Performed by: HOSPITALIST

## 2017-10-11 PROCEDURE — 81001 URINALYSIS AUTO W/SCOPE: CPT | Performed by: FAMILY MEDICINE

## 2017-10-11 PROCEDURE — 77030020253 HC SOL INJ D545NS .05 DEX .45 SAL

## 2017-10-11 PROCEDURE — 97530 THERAPEUTIC ACTIVITIES: CPT

## 2017-10-11 RX ORDER — OXYCODONE AND ACETAMINOPHEN 5; 325 MG/1; MG/1
1-2 TABLET ORAL
Status: DISCONTINUED | OUTPATIENT
Start: 2017-10-11 | End: 2017-10-12 | Stop reason: HOSPADM

## 2017-10-11 RX ADMIN — INSULIN LISPRO 3 UNITS: 100 INJECTION, SOLUTION INTRAVENOUS; SUBCUTANEOUS at 22:24

## 2017-10-11 RX ADMIN — LEVOTHYROXINE SODIUM 75 MCG: 75 TABLET ORAL at 05:36

## 2017-10-11 RX ADMIN — DEXTROSE MONOHYDRATE AND SODIUM CHLORIDE 75 ML/HR: 5; .45 INJECTION, SOLUTION INTRAVENOUS at 00:23

## 2017-10-11 RX ADMIN — OXYCODONE HYDROCHLORIDE AND ACETAMINOPHEN 2 TABLET: 5; 325 TABLET ORAL at 15:56

## 2017-10-11 RX ADMIN — ENOXAPARIN SODIUM 40 MG: 40 INJECTION SUBCUTANEOUS at 22:25

## 2017-10-11 RX ADMIN — INSULIN LISPRO 4 UNITS: 100 INJECTION, SOLUTION INTRAVENOUS; SUBCUTANEOUS at 08:41

## 2017-10-11 RX ADMIN — OXYCODONE HYDROCHLORIDE AND ACETAMINOPHEN 2 TABLET: 5; 325 TABLET ORAL at 11:43

## 2017-10-11 RX ADMIN — PANTOPRAZOLE SODIUM 40 MG: 40 TABLET, DELAYED RELEASE ORAL at 08:42

## 2017-10-11 RX ADMIN — MORPHINE SULFATE 2 MG: 10 INJECTION INTRAMUSCULAR; INTRAVENOUS; SUBCUTANEOUS at 02:22

## 2017-10-11 RX ADMIN — SIMVASTATIN 20 MG: 20 TABLET, FILM COATED ORAL at 22:24

## 2017-10-11 RX ADMIN — INSULIN LISPRO 3 UNITS: 100 INJECTION, SOLUTION INTRAVENOUS; SUBCUTANEOUS at 18:08

## 2017-10-11 RX ADMIN — CEFAZOLIN SODIUM 1 G: 1 INJECTION, POWDER, FOR SOLUTION INTRAMUSCULAR; INTRAVENOUS at 02:17

## 2017-10-11 RX ADMIN — CEFAZOLIN SODIUM 1 G: 1 INJECTION, POWDER, FOR SOLUTION INTRAMUSCULAR; INTRAVENOUS at 09:08

## 2017-10-11 RX ADMIN — Medication 10 ML: at 22:24

## 2017-10-11 RX ADMIN — Medication 10 ML: at 05:37

## 2017-10-11 NOTE — PROGRESS NOTES
[facility]  Hospitalist Division        Inpatient Daily Progress Note    Daily progress Note    Patient: Brittany Villavicencio MRN: [id number]  CSN: [id number]    YOB: 1940  Age: 65 y.o. Sex: female    DOA: 10/9/2017 LOS:  LOS: 2 days                    Chief Complaint: Left femoral neck fracture        Subjective: s/p femur insertion intramedullary nail by Dr. Gutierrez [place] POD #1. Tmax 101.6 overnight- blood cultures obtained and pending. UA obtained. Afebrile this morning. PT with patient, OT ordered. Nurse states SBP this morning was 103 mm Hg so BP meds held. Repeat  mm Hg. Pt asymptomatic. Objective:      Visit Vitals    /71 (BP 1 Location: Left arm, BP Patient Position: At rest)    Pulse 85    Temp 97.9 °F (36.6 °C)    Resp 12    Ht 5' (1.524 m)    Wt 77.1 kg (170 lb)    SpO2 96%    BMI 33.2 kg/m2           Physical Exam:  General appearance: alert, cooperative, no distress, appears stated age  Head: Normocephalic, without obvious abnormality, atraumatic  Lungs: clear to auscultation bilaterally  Heart: regular rate and rhythm, S1, S2 normal, no murmur, click, rub or gallop  Abdomen: soft, non tender, non distended . Normoactive bowel sounds. No masses, no organomegaly  Extremities: extremities normal, atraumatic, no cyanosis or edema- dressing left hip CDI  Skin: Skin color, texture, turgor normal. No rashes or lesions  Neurologic: Grossly normal  PSY: Mood and affect normal, appropriately behaved        Intake and Output:  Current Shift:  10/11 0701 - 10/11 1900  In: 720 [P.O.:720]  Out: -   Last three shifts:  10/09 1901 - 10/11 0700  In: 3158.3 [P.O.:250;  I.V.:2908.3]  Out: 1575 [Urine:1525]    Recent Results (from the past 24 hour(s))   GLUCOSE, POC    Collection Time: 10/10/17  5:35 PM   Result Value Ref Range    Glucose (POC) 195 (H) 70 - 421 mg/dL   METABOLIC PANEL, BASIC    Collection Time: 10/10/17  6:36 PM   Result Value Ref Range    Sodium 137 136 - 145 mmol/L    Potassium 4.2 3.5 - 5.5 mmol/L    Chloride 102 100 - 108 mmol/L    CO2 26 21 - 32 mmol/L    Anion gap 9 3.0 - 18 mmol/L    Glucose 180 (H) 74 - 99 mg/dL    BUN 17 7.0 - 18 MG/DL    Creatinine 1.03 0.6 - 1.3 MG/DL    BUN/Creatinine ratio 17 12 - 20      GFR est AA >60 >60 ml/min/1.73m2    GFR est non-AA 52 (L) >60 ml/min/1.73m2    Calcium 7.7 (L) 8.5 - 10.1 MG/DL   CBC W/O DIFF    Collection Time: 10/10/17  6:36 PM   Result Value Ref Range    WBC 10.2 4.6 - 13.2 K/uL    RBC 3.92 (L) 4.20 - 5.30 M/uL    HGB 11.0 (L) 12.0 - 16.0 g/dL    HCT 33.7 (L) 35.0 - 45.0 %    MCV 86.0 74.0 - 97.0 FL    MCH 28.1 24.0 - 34.0 PG    MCHC 32.6 31.0 - 37.0 g/dL    RDW 12.5 11.6 - 14.5 %    PLATELET 519 664 - 610 K/uL    MPV 9.7 9.2 - 11.8 FL   GLUCOSE, POC    Collection Time: 10/10/17  9:58 PM   Result Value Ref Range    Glucose (POC) 183 (H) 70 - 110 mg/dL   CBC W/O DIFF    Collection Time: 10/10/17 11:55 PM   Result Value Ref Range    WBC 9.4 4.6 - 13.2 K/uL    RBC 3.84 (L) 4.20 - 5.30 M/uL    HGB 10.7 (L) 12.0 - 16.0 g/dL    HCT 32.9 (L) 35.0 - 45.0 %    MCV 85.7 74.0 - 97.0 FL    MCH 27.9 24.0 - 34.0 PG    MCHC 32.5 31.0 - 37.0 g/dL    RDW 12.4 11.6 - 14.5 %    PLATELET 938 940 - 807 K/uL    MPV 9.6 9.2 - 36.1 FL   METABOLIC PANEL, BASIC    Collection Time: 10/11/17  3:50 AM   Result Value Ref Range    Sodium 144 136 - 145 mmol/L    Potassium 4.4 3.5 - 5.5 mmol/L    Chloride 99 (L) 100 - 108 mmol/L    CO2 27 21 - 32 mmol/L    Anion gap 18 3.0 - 18 mmol/L    Glucose 167 (H) 74 - 99 mg/dL    BUN 16 7.0 - 18 MG/DL    Creatinine 0.98 0.6 - 1.3 MG/DL    BUN/Creatinine ratio 16 12 - 20      GFR est AA >60 >60 ml/min/1.73m2    GFR est non-AA 55 (L) >60 ml/min/1.73m2    Calcium 7.3 (L) 8.5 - 10.1 MG/DL   CBC W/O DIFF    Collection Time: 10/11/17  3:50 AM   Result Value Ref Range    WBC 9.5 4.6 - 13.2 K/uL    RBC 3.53 (L) 4.20 - 5.30 M/uL    HGB 9.9 (L) 12.0 - 16.0 g/dL    HCT 30.8 (L) 35.0 - 45.0 % MCV 87.3 74.0 - 97.0 FL    MCH 28.0 24.0 - 34.0 PG    MCHC 32.1 31.0 - 37.0 g/dL    RDW 12.4 11.6 - 14.5 %    PLATELET 108 799 - 279 K/uL    MPV 10.0 9.2 - 11.8 FL   PROTHROMBIN TIME + INR    Collection Time: 10/11/17  3:50 AM   Result Value Ref Range    Prothrombin time 13.7 11.5 - 15.2 sec    INR 1.1 0.8 - 1.2     CBC WITH AUTOMATED DIFF    Collection Time: 10/11/17  3:50 AM   Result Value Ref Range    WBC 9.6 4.6 - 13.2 K/uL    RBC 3.55 (L) 4.20 - 5.30 M/uL    HGB 9.9 (L) 12.0 - 16.0 g/dL    HCT 31.0 (L) 35.0 - 45.0 %    MCV 87.3 74.0 - 97.0 FL    MCH 27.9 24.0 - 34.0 PG    MCHC 31.9 31.0 - 37.0 g/dL    RDW 12.5 11.6 - 14.5 %    PLATELET 704 215 - 410 K/uL    MPV 10.3 9.2 - 11.8 FL    NEUTROPHILS 79 (H) 40 - 73 %    LYMPHOCYTES 14 (L) 21 - 52 %    MONOCYTES 5 3 - 10 %    EOSINOPHILS 2 0 - 5 %    BASOPHILS 0 0 - 2 %    ABS. NEUTROPHILS 7.6 1.8 - 8.0 K/UL    ABS. LYMPHOCYTES 1.4 0.9 - 3.6 K/UL    ABS. MONOCYTES 0.5 0.05 - 1.2 K/UL    ABS. EOSINOPHILS 0.2 0.0 - 0.4 K/UL    ABS.  BASOPHILS 0.0 0.0 - 0.06 K/UL    DF AUTOMATED     URINALYSIS W/ RFLX MICROSCOPIC    Collection Time: 10/11/17  5:15 AM   Result Value Ref Range    Color YELLOW      Appearance CLEAR      Specific gravity 1.021 1.005 - 1.030      pH (UA) 5.0 5.0 - 8.0      Protein TRACE (A) NEG mg/dL    Glucose NEGATIVE  NEG mg/dL    Ketone NEGATIVE  NEG mg/dL    Bilirubin NEGATIVE  NEG      Blood SMALL (A) NEG      Urobilinogen 0.2 0.2 - 1.0 EU/dL    Nitrites NEGATIVE  NEG      Leukocyte Esterase SMALL (A) NEG     URINE MICROSCOPIC ONLY    Collection Time: 10/11/17  5:15 AM   Result Value Ref Range    WBC 4 to 10 0 - 4 /hpf    RBC 0 to 3 0 - 5 /hpf    Epithelial cells NEGATIVE  0 - 5 /lpf    Bacteria NEGATIVE  NEG /hpf   GLUCOSE, POC    Collection Time: 10/11/17  5:46 AM   Result Value Ref Range    Glucose (POC) 205 (H) 70 - 110 mg/dL   CULTURE, BLOOD    Collection Time: 10/11/17  6:20 AM   Result Value Ref Range    Special Requests: PERIPHERAL Culture result: PENDING    GLUCOSE, POC    Collection Time: 10/11/17 11:48 AM   Result Value Ref Range    Glucose (POC) 115 (H) 70 - 110 mg/dL           Lab Results   Component Value Date/Time    Glucose 167 10/11/2017 03:50 AM    Glucose 180 10/10/2017 06:36 PM    Glucose 161 10/09/2017 05:30 PM    Glucose 112 04/20/2017 11:23 AM    Glucose 122 11/09/2016 03:50 AM      CXR 10/11/2017    IMPRESSION:     No acute pulmonary process identified.      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

## 2017-10-11 NOTE — PROGRESS NOTES
5789 Received patient from Jose Wilks RN. Patient is alert and oriented x 4.  2300 Patient is sleeping.  0300 at four am vitals temperature was 101.6 called MD and got NO for Blood cultures, Urinalysis, CBC. Put Urinalysis into lab.  0985 All Labs drawn waiting results. 0715 Bedside and Verbal shift change report given to Jac Dave RN (oncoming nurse) by Guido Biggs RN (offgoing nurse). Report included the following information SBAR, Kardex, Intake/Output, MAR and Recent Results.

## 2017-10-11 NOTE — PROGRESS NOTES
Discussed don interdisciplinary rounds, OT ordered per request. Discharge plan is for rehab. Patient has not had a BM, recommendation LOC today.   Kim Lucas RN

## 2017-10-11 NOTE — PROGRESS NOTES
Problem: Falls - Risk of  Goal: *Absence of Falls  Document Khris Fall Risk and appropriate interventions in the flowsheet.    Outcome: Progressing Towards Goal  Fall Risk Interventions:  Mobility Interventions: Patient to call before getting OOB, PT Consult for mobility concerns, PT Consult for assist device competence, Utilize walker, cane, or other assitive device     Mentation Interventions: Adequate sleep, hydration, pain control     Medication Interventions: Patient to call before getting OOB, Teach patient to arise slowly, Assess postural VS orthostatic hypotension     Elimination Interventions: Call light in reach, Patient to call for help with toileting needs     History of Falls Interventions: Door open when patient unattended

## 2017-10-11 NOTE — PROGRESS NOTES
Problem: Mobility Impaired (Adult and Pediatric)  Goal: *Acute Goals and Plan of Care (Insert Text)  Physical Therapy Goals  Initiated 10/11/2017 and to be accomplished within 7 day(s) following toe touch left LE and nwb on left wrist   1. Patient will move from supine to sit and sit to supine , scoot up and down and roll side to side in bed with supervision/set-up. 2. Patient will transfer from bed to chair and chair to bed with minimal assistance/contact guard assist using the least restrictive device. 3. Patient will perform sit to stand with minimal assistance/contact guard assist.  4. Patient will ambulate with moderate assistance for 25 feet with the least restrictive device. 5. Patient will ascend/descend 5 stairs with one handrail(s) with moderate assistance . Outcome: Progressing Towards Goal  PHYSICAL THERAPY TREATMENT     Patient: Darrius Cross (37 y.o. female)  Date: 10/11/2017  Diagnosis: Hip fracture (HCC)  left hip fracture Hip fracture (HCC)  Procedure(s) (LRB):   FEMUR INSERTION INTRA MEDULLARY NAIL  SHORT (Left) 1 Day Post-Op  Precautions: Fall, NWB, TTWB (nwb on left wrist and ttwb on left leg )  Chart, physical therapy assessment, plan of care and goals were reviewed. ASSESSMENT:  Patient received medication priro to treatment . Moved to sitting edge of bed with Leatha RUTH assisting. Patient sat for 4 minutes at edge of bed and then scooted over to chair with gait belt and  assist of two. Left in chair with call light,  Nursing to get patient back to bed toward the  right side, Gait belt left in room with patient. Pain reported 4/10 pre and post.  education on times for tomorrow and taking medication prior to therapy needs reinforcement.   Progression toward goals:  [ ]      Improving appropriately and progressing toward goals  [X]      Improving slowly and progressing toward goals  [ ]      Not making progress toward goals and plan of care will be adjusted       PLAN:  Patient continues to benefit from skilled intervention to address the above impairments. Continue treatment per established plan of care. Discharge Recommendations:  Rehab and [facility]  Further Equipment Recommendations for Discharge:  rolling walker with platform        SUBJECTIVE:   Patient stated .      OBJECTIVE DATA SUMMARY:   Critical Behavior:  Neurologic State: Alert  Orientation Level: Oriented X4  Cognition: Follows commands  Safety/Judgement: Fall prevention, Awareness of environment  Functional Mobility Training:  Bed Mobility:  Rolling: Moderate assistance;Maximum assistance;Assist x2; Additional time  Supine to Sit: Moderate assistance;Maximum assistance;Assist x2; Additional time  Sit to Supine:  (left in cahir )  Scooting: Moderate assistance; Additional time;Assist x2;Assist x1  Transfers:  Sit to Stand: Maximum assistance;Assist x2; Additional time (unable to maintain toe touch on left leg )  Bed to Chair: Moderate assistance;Maximum assistance; Additional time;Assist x2 (right side of chair lowered using gait belt )  Balance:  Sitting: Impaired  Sitting - Static: Fair (occasional)  Sitting - Dynamic: Fair (occasional)  Standing:  (not tested )  Standing - Static: Poor  Standing - Dynamic : None  Ambulation/Gait Training:  Gait Description (WDL):  (unable to test )  Neuro Re-Education:  Upright posture in sitting   Pain: pre and post 4/10   Pain Scale 1: Numeric (0 - 10)  Activity Tolerance:   Fair   Please refer to the flowsheet for vital signs taken during this treatment.   After treatment:   [X] Patient left in no apparent distress sitting up in chair  [ ] Patient left in no apparent distress in bed  [X] Call bell left within reach  [X] Nursing notified  [X] Caregiver present  [ ] Bed alarm activated      Husam Rawls PT   Time Calculation: 23 mins

## 2017-10-11 NOTE — PROGRESS NOTES
Progress Note      Post Operative Day: 1    Assessment:    1. Status post  FEMUR INSERTION INTRA MEDULLARY NAIL for Hip fracture ([address]  left hip fracture 10/9/2017,   Progressing. 2. Left triquetral avulsion fracture and distal radius fracture      PLAN:    1. Mobilize. Continue P.T.  2.  TTWB LLE, NWB left wrist. Platform walker with PT if able to tolerate, otherwise transfers from bed to chair  3. Lovenox for DVT prophylaxis  4. Will change splint today  5. Discharge Planning to rehab 1-2 days    Will apply l wrist splint for fracture           HPI: Herman Nevarez is a 65 y.o. female patient without new complaints status post TFN for Hip fracture ([address]  left hip fracture 10/9/2017. No new orthopaedic changes. Blood pressure 103/66, pulse 76, temperature 98.5 °F (36.9 °C), resp. rate 18, height 5' (1.524 m), weight 77.1 kg (170 lb), SpO2 99 %. CBC w/Diff   Lab Results   Component Value Date/Time    WBC 9.5 10/11/2017 03:50 AM    WBC 9.6 10/11/2017 03:50 AM    RBC 3.53 (L) 10/11/2017 03:50 AM    RBC 3.55 (L) 10/11/2017 03:50 AM    HCT 30.8 (L) 10/11/2017 03:50 AM    HCT 31.0 (L) 10/11/2017 03:50 AM          Physical Assessment:  General: in no apparent distress   Extremities:  Neurovascular intact    Dressing:  dry   DVT Exam:   No exam evidence to suggest DVT. Compartments soft and NT. Radiology: Three views of the left hip on an image intensifier were performed. There is an intramedullary mae with sliding femoral neck nail present involving  the proximal left femur.  The osseous structures appear in normal alignment  expected adjacent soft tissue postoperative changes are present.           Thomas Pierson PA-C  10/11/2017  Office [phone] Cqvq [phone]

## 2017-10-11 NOTE — PROGRESS NOTES
Patient has elected to discharge to rehab. She has selected [facility]. Denny Malik notified awaiting therapy evaluation.   Gregory Tony RN

## 2017-10-11 NOTE — PROGRESS NOTES
Problem: Mobility Impaired (Adult and Pediatric)  Goal: *Acute Goals and Plan of Care (Insert Text)  Physical Therapy Goals  Initiated 10/11/2017 and to be accomplished within 7 day(s) following toe touch left LE and nwb on left wrist   1. Patient will move from supine to sit and sit to supine , scoot up and down and roll side to side in bed with supervision/set-up. 2. Patient will transfer from bed to chair and chair to bed with minimal assistance/contact guard assist using the least restrictive device. 3. Patient will perform sit to stand with minimal assistance/contact guard assist.  4. Patient will ambulate with moderate assistance for 25 feet with the least restrictive device. 5. Patient will ascend/descend 5 stairs with one handrail(s) with moderate assistance . Outcome: Progressing Towards Goal  PHYSICAL THERAPY EVALUATION     Patient: Tariq Gay (23 y.o. female)  Date: 10/11/2017  Primary Diagnosis: Hip fracture (HCC)  left hip fracture  Procedure(s) (LRB):   FEMUR INSERTION INTRA MEDULLARY NAIL  SHORT (Left) 1 Day Post-Op   Precautions:   Fall, NWB, TTWB (nwb on left wrist and ttwb on left leg )      PROBLEM LIST:  Patient presents with the following problems:   Bed Mobility, Transfers, Gait, Strength, Range of Motion, Balance, Stairs and Precautions  ASSESSMENT :   Patient requires between maximal assistance and moderate assistance  for bed mobility, transfers . Patient unable to maintain toe touch on left leg and using left shoulder depression for standing and max A of two. Returned to bed pain 10/10 pre and post medication given at end of session. Patient educated on plan of care weight bearing status, and  Safety with mobility needs reinforcement.    Recommendations for nursing:  Written on communication board: toe touch weight bearing on left le and non weight on left UE  Verbally communicated to: [place], rn  Patient will benefit from skilled intervention to address the above impairments. Patients rehabilitation potential is considered to be Fair  Factors which may influence rehabilitation potential include:   [ ]         None noted  [ ]         Mental ability/status  [X]         Medical condition  [ ]         Home/family situation and support systems  [ ]         Safety awareness  [ ]         Pain tolerance/management  [ ]         Other:        PLAN :  Recommendations and Planned Interventions:  [X]           Bed Mobility Training             [X]    Neuromuscular Re-Education  [X]           Transfer Training                   [ ]    Orthotic/Prosthetic Training  [X]           Gait Training                          [ ]    Modalities  [X]           Therapeutic Exercises          [ ]    Edema Management/Control  [X]           Therapeutic Activities            [X]    Patient and Family Training/Education  [ ]           Other (comment):     Frequency/Duration: Patient will be followed by physical therapy 1-2 times per day/4-7 days per week to address goals. Discharge Recommendations: [facility]  Further Equipment Recommendations for Discharge: rolling walker with platform ? SUBJECTIVE:   Patient stated .      OBJECTIVE DATA SUMMARY:       Past Medical History:   Diagnosis Date    Arthritis      Bronchitis      [facility]      Diverticulitis      GERD (gastroesophageal reflux disease)      Hypercholesterolemia      Hypertension      Hypothyroid      Pancreatitis       Past Surgical History:   Procedure Laterality Date    COLONOSCOPY N/A 2/2/2017     COLONOSCOPY  w/bx polyp performed by Hudson Yu MD at [facility] ENDOSCOPY     Barriers to Learning/Limitations: yes;  physical  Compensate with: visual, verbal, tactile, kinesthetic cues/model     G CODES:Mobility  Current  CM= 80-99%   Goal  CJ= 20-39%.   The severity rating is based on the Other [employer] Balance Scale1/5   [facility] Standing Balance Scale1/5  0: Pt performs 25% or less of standing activity (Max assist) CN, 100% impaired. 1: Pt supports self with upper extremities but requires therapist assistance. Pt performs 25-50% of effort (Mod assist) CM, 80% to <100% impaired. 1+: Pt supports self with upper extremities but requires therapist assistance. Pt performs >50% effort. (Min assist). CL, 60% to <80% impaired. 2: Pt supports self independently with both upper extremities (walker, crutches, parallel bars). CL, 60% to <80% impaired. 2+: Pt support self independently with 1 upper extremity (cane, crutch, 1 parallel bar). CK, 40% to <60% impaired. 3: Pt stands without upper extremity support for up to 30 seconds. CK, 40% to <60% impaired. 3+: Pt stands without upper extremity support for 30 seconds or greater. CJ, 20% to <40% impaired. 4: Pt independently moves and returns center of gravity 1-2 inches in one plane. CJ, 20% to <40% impaired. 4+: Pt independently moves and returns center of gravity 1-2 inches in multiple planes. CI, 1% to <20% impaired. 5: Pt independently moves and returns center of gravity in all planes greater than 2 inches. CH, 0% impaired.         Eval Complexity: History: HIGH Complexity :3+ comorbidities / personal factors will impact the outcome/ POC Exam:MEDIUM Complexity : 3 Standardized tests and measures addressing body structure, function, activity limitation and / or participation in recreation  Presentation: MEDIUM Complexity : Evolving with changing characteristics  Clinical Decision Making:Medium Complexity [facility] Standing Balance Scale1/5 Overall Complexity:MEDIUM     Prior Level of Function/Home Situation: I with  adl's and ambulation without assistive device   Home Situation  Home Environment: Private residence  # Steps to Enter: 14  One/Two Story Residence: Two story  # of Interior Steps: 14  Living Alone: No  Support Systems: Family member(s)  Patient Expects to be Discharged to[de-identified] Rehabilitation facility  Current DME Used/Available at Home: [employer] [place] Behavior:  Neurologic State: Alert  Orientation Level: Oriented X4  Cognition: Follows commands  Safety/Judgement: Fall prevention; Awareness of environment  Psychosocial  Patient Behaviors: Calm; Cooperative  Purposeful Interaction: Yes  Pt Identified Daily Priority: Clinical issues (comment)  Caritas Process: Nurture loving kindness;Establish trust;Attend basic human needs; Supportive expression  Caring Interventions: Reassure; Therapeutic modalities  Reassure: Therapeutic listening; Informing; Instilling georgette and hope;Caring rounds  Therapeutic Modalities: Intentional therapeutic touch;Humor  Skin Condition/Temp: Warm  Skin Integrity:  (incision on left hip and splint on  left wrist hand )  Skin Integumentary  Skin Color: Appropriate for ethnicity  Skin Condition/Temp: Warm  Skin Integrity:  (incision on left hip and splint on  left wrist hand )  Turgor: Non-tenting  Hair Growth: Present  Varicosities: Absent  Strength:    Strength:  (right extremities only 3/5)  Tone & Sensation:   Tone: Normal  Sensation: Intact  Range Of Motion:  AROM: Generally decreased, functional  PROM: Generally decreased, functional  Functional Mobility:  Bed Mobility:  Rolling: Moderate assistance;Maximum assistance;Assist x2; Additional time  Supine to Sit: Moderate assistance;Maximum assistance;Assist x2; Additional time  Sit to Supine: Moderate assistance;Maximum assistance;Assist x2; Additional time  Scooting: Moderate assistance; Additional time;Assist x2;Assist x1  Transfers:  Sit to Stand: Maximum assistance;Assist x2; Additional time (unable to maintain toe touch on left leg )  Stand to Sit: Maximum assistance; Total assistance;Assist x2; Additional time  Balance:   Sitting: Impaired  Sitting - Static: Fair (occasional)  Sitting - Dynamic: Fair (occasional)  Standing: Impaired  Standing - Static: Poor  Standing - Dynamic : None  Ambulation/Gait Training:  Gait Description (WDL):  (unable to test )     Pain:  Pre treatment pain level:10  Post treatment pain level:10  Pain Scale 1: Numeric (0 - 10)  Pain Intervention(s) 1: Medication (see MAR)  Activity Tolerance:   Fair   Please refer to the flowsheet for vital signs taken during this treatment. After treatment:   [ ]         Patient left in no apparent distress sitting up in chair  [X]         Patient left in no apparent distress in bed  [X]         Call bell left within reach  [X]         Nursing notified  [ ]         Caregiver present  [ ]         Bed alarm activated      COMMUNICATION/EDUCATION:   [X]         Fall prevention education was provided and the patient/caregiver indicated understanding. [X]         Patient/family have participated as able in goal setting and plan of care. [X]         Patient/family agree to work toward stated goals and plan of care. [ ]         Patient understands intent and goals of therapy, but is neutral about his/her participation. [ ]         Patient is unable to participate in goal setting and plan of care. Patient educated on the role of physical therapy during the acute stay  and the importance of mobility. VU.needs reinforcement.        Thank you for this referral.  Nira Moritz, PT   Time Calculation: 35 mins

## 2017-10-11 NOTE — PROGRESS NOTES
Received bedside verbal  report from Felipe Donaldson RN. Patient is sleeping, NAD noted, bed at the lowest position with wheels locked, call bell within reach. 1115 PT is in room at this time, will continue to monitor pt.    1143 Pt is complaining of pain, administered pain med, educated her on using ICS, pt verbalized understanding. 1630 Patient is OOB with the help of PT, pt is sitting on recliner, all needs within reach, will continue to monitor her. 45 [facility]. Spoke to her about if she could come to apply wrist splint for the pt,  she said she will be back tomorrow morning. 1730 Pt is resting in recliner, call bell within reach, will continue to monitor her. 1800 Pt is getting back to bed with the help of CNAs, denies any pain at this time, will continue to monitor her.

## 2017-10-11 NOTE — PROGRESS NOTES
0073 patient in bed, call bell within reach, no acute distress noted, patient in bed, assessment complete    1020 Bedside and Verbal shift change report given to [address] [facility] (oncoming nurse) by Michael Ruiz RN   (offgoing nurse). Report included the following information SBAR, Kardex and MAR.

## 2017-10-12 ENCOUNTER — HOSPITAL ENCOUNTER (INPATIENT)
Age: 77
LOS: 40 days | Discharge: HOME HEALTH | End: 2017-11-21
Attending: INTERNAL MEDICINE | Admitting: INTERNAL MEDICINE

## 2017-10-12 VITALS
DIASTOLIC BLOOD PRESSURE: 57 MMHG | TEMPERATURE: 99.3 F | OXYGEN SATURATION: 92 % | SYSTOLIC BLOOD PRESSURE: 121 MMHG | BODY MASS INDEX: 33.38 KG/M2 | HEART RATE: 81 BPM | RESPIRATION RATE: 16 BRPM | WEIGHT: 170 LBS | HEIGHT: 60 IN

## 2017-10-12 LAB
ANION GAP SERPL CALC-SCNC: 13 MMOL/L (ref 3–18)
BUN SERPL-MCNC: 14 MG/DL (ref 7–18)
BUN/CREAT SERPL: 16 (ref 12–20)
CALCIUM SERPL-MCNC: 7.1 MG/DL (ref 8.5–10.1)
CHLORIDE SERPL-SCNC: 102 MMOL/L (ref 100–108)
CO2 SERPL-SCNC: 23 MMOL/L (ref 21–32)
CREAT SERPL-MCNC: 0.89 MG/DL (ref 0.6–1.3)
ERYTHROCYTE [DISTWIDTH] IN BLOOD BY AUTOMATED COUNT: 12.3 % (ref 11.6–14.5)
EST. AVERAGE GLUCOSE BLD GHB EST-MCNC: 128 MG/DL
GLUCOSE BLD STRIP.AUTO-MCNC: 126 MG/DL (ref 70–110)
GLUCOSE BLD STRIP.AUTO-MCNC: 138 MG/DL (ref 70–110)
GLUCOSE BLD STRIP.AUTO-MCNC: 156 MG/DL (ref 70–110)
GLUCOSE BLD STRIP.AUTO-MCNC: 179 MG/DL (ref 70–110)
GLUCOSE SERPL-MCNC: 116 MG/DL (ref 74–99)
HBA1C MFR BLD: 6.1 % (ref 4.2–5.6)
HCT VFR BLD AUTO: 31.2 % (ref 35–45)
HGB BLD-MCNC: 10.1 G/DL (ref 12–16)
INR PPP: 1.1 (ref 0.8–1.2)
MCH RBC QN AUTO: 27.8 PG (ref 24–34)
MCHC RBC AUTO-ENTMCNC: 32.4 G/DL (ref 31–37)
MCV RBC AUTO: 86 FL (ref 74–97)
PLATELET # BLD AUTO: 210 K/UL (ref 135–420)
PMV BLD AUTO: 10 FL (ref 9.2–11.8)
POTASSIUM SERPL-SCNC: 3.7 MMOL/L (ref 3.5–5.5)
PROTHROMBIN TIME: 14 SEC (ref 11.5–15.2)
RBC # BLD AUTO: 3.63 M/UL (ref 4.2–5.3)
SODIUM SERPL-SCNC: 138 MMOL/L (ref 136–145)
WBC # BLD AUTO: 9.7 K/UL (ref 4.6–13.2)

## 2017-10-12 PROCEDURE — 74011250637 HC RX REV CODE- 250/637: Performed by: INTERNAL MEDICINE

## 2017-10-12 PROCEDURE — 97166 OT EVAL MOD COMPLEX 45 MIN: CPT

## 2017-10-12 PROCEDURE — 74011636637 HC RX REV CODE- 636/637: Performed by: INTERNAL MEDICINE

## 2017-10-12 PROCEDURE — 97530 THERAPEUTIC ACTIVITIES: CPT

## 2017-10-12 PROCEDURE — 51798 US URINE CAPACITY MEASURE: CPT

## 2017-10-12 PROCEDURE — 97535 SELF CARE MNGMENT TRAINING: CPT

## 2017-10-12 PROCEDURE — 80048 BASIC METABOLIC PNL TOTAL CA: CPT | Performed by: ORTHOPAEDIC SURGERY

## 2017-10-12 PROCEDURE — 83036 HEMOGLOBIN GLYCOSYLATED A1C: CPT | Performed by: INTERNAL MEDICINE

## 2017-10-12 PROCEDURE — 74011250637 HC RX REV CODE- 250/637: Performed by: NURSE PRACTITIONER

## 2017-10-12 PROCEDURE — 74011636637 HC RX REV CODE- 636/637: Performed by: HOSPITALIST

## 2017-10-12 PROCEDURE — 74011250637 HC RX REV CODE- 250/637: Performed by: PHYSICIAN ASSISTANT

## 2017-10-12 PROCEDURE — 82962 GLUCOSE BLOOD TEST: CPT

## 2017-10-12 PROCEDURE — 85610 PROTHROMBIN TIME: CPT | Performed by: ORTHOPAEDIC SURGERY

## 2017-10-12 PROCEDURE — 36415 COLL VENOUS BLD VENIPUNCTURE: CPT | Performed by: ORTHOPAEDIC SURGERY

## 2017-10-12 PROCEDURE — 85027 COMPLETE CBC AUTOMATED: CPT | Performed by: ORTHOPAEDIC SURGERY

## 2017-10-12 RX ORDER — TRIAMCINOLONE ACETONIDE 1 MG/G
CREAM TOPICAL 2 TIMES DAILY
Status: DISCONTINUED | OUTPATIENT
Start: 2017-10-12 | End: 2017-11-21 | Stop reason: HOSPADM

## 2017-10-12 RX ORDER — OXYCODONE AND ACETAMINOPHEN 5; 325 MG/1; MG/1
1-2 TABLET ORAL
Qty: 60 TAB | Refills: 0 | Status: SHIPPED | OUTPATIENT
Start: 2017-10-12 | End: 2017-11-21

## 2017-10-12 RX ORDER — OXYCODONE AND ACETAMINOPHEN 5; 325 MG/1; MG/1
1-2 TABLET ORAL
Qty: 60 TAB | Refills: 0 | Status: SHIPPED | OUTPATIENT
Start: 2017-10-12 | End: 2017-10-12

## 2017-10-12 RX ORDER — LOSARTAN POTASSIUM 50 MG/1
50 TABLET ORAL DAILY
Status: DISCONTINUED | OUTPATIENT
Start: 2017-10-13 | End: 2017-10-12

## 2017-10-12 RX ORDER — ENOXAPARIN SODIUM 100 MG/ML
40 INJECTION SUBCUTANEOUS DAILY
Qty: 5.6 ML | Refills: 0 | Status: SHIPPED
Start: 2017-10-12 | End: 2017-10-26

## 2017-10-12 RX ORDER — ENOXAPARIN SODIUM 100 MG/ML
40 INJECTION SUBCUTANEOUS DAILY
Status: DISCONTINUED | OUTPATIENT
Start: 2017-10-13 | End: 2017-11-21 | Stop reason: HOSPADM

## 2017-10-12 RX ORDER — METFORMIN HYDROCHLORIDE 500 MG/1
1000 TABLET ORAL 2 TIMES DAILY WITH MEALS
Status: DISCONTINUED | OUTPATIENT
Start: 2017-10-12 | End: 2017-11-21 | Stop reason: HOSPADM

## 2017-10-12 RX ORDER — DOCUSATE SODIUM 100 MG/1
100 CAPSULE, LIQUID FILLED ORAL 2 TIMES DAILY
Qty: 60 CAP | Refills: 2 | Status: SHIPPED | OUTPATIENT
Start: 2017-10-12 | End: 2019-01-17 | Stop reason: ALTCHOICE

## 2017-10-12 RX ORDER — LOSARTAN POTASSIUM 50 MG/1
50 TABLET ORAL DAILY
Status: DISCONTINUED | OUTPATIENT
Start: 2017-10-13 | End: 2017-10-18

## 2017-10-12 RX ORDER — FACIAL-BODY WIPES
10 EACH TOPICAL
Status: COMPLETED | OUTPATIENT
Start: 2017-10-12 | End: 2017-10-12

## 2017-10-12 RX ORDER — GLIPIZIDE 5 MG/1
5 TABLET ORAL 2 TIMES DAILY
Status: DISCONTINUED | OUTPATIENT
Start: 2017-10-12 | End: 2017-10-18

## 2017-10-12 RX ORDER — MAGNESIUM SULFATE 100 %
4 CRYSTALS MISCELLANEOUS AS NEEDED
Status: DISCONTINUED | OUTPATIENT
Start: 2017-10-12 | End: 2017-11-21 | Stop reason: HOSPADM

## 2017-10-12 RX ORDER — DEXTROSE 50 % IN WATER (D50W) INTRAVENOUS SYRINGE
25-50 AS NEEDED
Status: DISCONTINUED | OUTPATIENT
Start: 2017-10-12 | End: 2017-11-21 | Stop reason: HOSPADM

## 2017-10-12 RX ORDER — DOCUSATE SODIUM 100 MG/1
100 CAPSULE, LIQUID FILLED ORAL 2 TIMES DAILY
Status: DISCONTINUED | OUTPATIENT
Start: 2017-10-12 | End: 2017-11-21 | Stop reason: HOSPADM

## 2017-10-12 RX ORDER — HYDROCHLOROTHIAZIDE 25 MG/1
25 TABLET ORAL DAILY
Status: DISCONTINUED | OUTPATIENT
Start: 2017-10-13 | End: 2017-10-18

## 2017-10-12 RX ORDER — OXYCODONE AND ACETAMINOPHEN 5; 325 MG/1; MG/1
1-2 TABLET ORAL
Status: DISCONTINUED | OUTPATIENT
Start: 2017-10-12 | End: 2017-11-21 | Stop reason: HOSPADM

## 2017-10-12 RX ORDER — MELATONIN
2000 DAILY
Status: DISCONTINUED | OUTPATIENT
Start: 2017-10-13 | End: 2017-10-18

## 2017-10-12 RX ORDER — FACIAL-BODY WIPES
EACH TOPICAL
Status: DISCONTINUED
Start: 2017-10-12 | End: 2017-10-12 | Stop reason: HOSPADM

## 2017-10-12 RX ORDER — LEVOTHYROXINE SODIUM 75 UG/1
75 TABLET ORAL
Status: DISCONTINUED | OUTPATIENT
Start: 2017-10-13 | End: 2017-11-21 | Stop reason: HOSPADM

## 2017-10-12 RX ORDER — INSULIN LISPRO 100 [IU]/ML
INJECTION, SOLUTION INTRAVENOUS; SUBCUTANEOUS
Status: DISCONTINUED | OUTPATIENT
Start: 2017-10-12 | End: 2017-10-18

## 2017-10-12 RX ORDER — PANTOPRAZOLE SODIUM 40 MG/1
40 TABLET, DELAYED RELEASE ORAL DAILY
Status: DISCONTINUED | OUTPATIENT
Start: 2017-10-13 | End: 2017-11-21 | Stop reason: HOSPADM

## 2017-10-12 RX ORDER — SIMVASTATIN 20 MG/1
20 TABLET, FILM COATED ORAL
Status: DISCONTINUED | OUTPATIENT
Start: 2017-10-12 | End: 2017-11-21 | Stop reason: HOSPADM

## 2017-10-12 RX ADMIN — Medication 10 ML: at 07:41

## 2017-10-12 RX ADMIN — TRIAMCINOLONE ACETONIDE: 1 CREAM TOPICAL at 21:24

## 2017-10-12 RX ADMIN — INSULIN LISPRO 2 UNITS: 100 INJECTION, SOLUTION INTRAVENOUS; SUBCUTANEOUS at 17:41

## 2017-10-12 RX ADMIN — PANTOPRAZOLE SODIUM 40 MG: 40 TABLET, DELAYED RELEASE ORAL at 08:24

## 2017-10-12 RX ADMIN — BISACODYL 10 MG: 10 SUPPOSITORY RECTAL at 11:33

## 2017-10-12 RX ADMIN — METFORMIN HYDROCHLORIDE 1000 MG: 500 TABLET ORAL at 17:41

## 2017-10-12 RX ADMIN — GLIPIZIDE 5 MG: 5 TABLET ORAL at 17:44

## 2017-10-12 RX ADMIN — SIMVASTATIN 20 MG: 20 TABLET, FILM COATED ORAL at 22:00

## 2017-10-12 RX ADMIN — DOCUSATE SODIUM 100 MG: 100 CAPSULE, LIQUID FILLED ORAL at 17:44

## 2017-10-12 RX ADMIN — LOSARTAN POTASSIUM 50 MG: 50 TABLET ORAL at 08:24

## 2017-10-12 RX ADMIN — HYDROCHLOROTHIAZIDE 25 MG: 25 TABLET ORAL at 08:24

## 2017-10-12 RX ADMIN — INSULIN LISPRO 3 UNITS: 100 INJECTION, SOLUTION INTRAVENOUS; SUBCUTANEOUS at 13:01

## 2017-10-12 RX ADMIN — LEVOTHYROXINE SODIUM 75 MCG: 75 TABLET ORAL at 06:49

## 2017-10-12 RX ADMIN — OXYCODONE HYDROCHLORIDE AND ACETAMINOPHEN 2 TABLET: 5; 325 TABLET ORAL at 10:09

## 2017-10-12 NOTE — PROGRESS NOTES
Problem: Mobility Impaired (Adult and Pediatric)  Goal: *Acute Goals and Plan of Care (Insert Text)  Physical Therapy Goals  Initiated 10/11/2017 and to be accomplished within 7 day(s) following toe touch left LE and nwb on left wrist   1. Patient will move from supine to sit and sit to supine , scoot up and down and roll side to side in bed with supervision/set-up. 2. Patient will transfer from bed to chair and chair to bed with minimal assistance/contact guard assist using the least restrictive device. 3. Patient will perform sit to stand with minimal assistance/contact guard assist.  4. Patient will ambulate with moderate assistance for 25 feet with the least restrictive device. 5. Patient will ascend/descend 5 stairs with one handrail(s) with moderate assistance . Outcome: Progressing Towards Goal  PHYSICAL THERAPY TREATMENT     Patient: Sirisha Ortiz (43 y.o. female)  Date: 10/12/2017  Diagnosis: Hip fracture (HCC)  left hip fracture Hip fracture (HCC)  Procedure(s) (LRB):   FEMUR INSERTION INTRA MEDULLARY NAIL  SHORT (Left) 2 Days Post-Op  Precautions: Fall, NWB, TTWB (nwb on left wrist and ttwb on left leg )   Chart, physical therapy assessment, plan of care and goals were reviewed. ASSESSMENT:  Pt sitting EOB with OT for evaluation. Reviewed and demonstrated to pt TTWB on LLE and reiterated NWB on L hand. Sit to stand performed with maxAx2 and manual assistance for LUE placement on platform walker. Attempted swiveling on RLE to move lateral up towards head of bed. Increased difficulty using RW with platform correctly. Pt sweating profusely, room is humid. MaxAx2 to return to supine, provided with ice pace, fan directed towards pt, LLE/LUE elevated on pillow.       Education: [employer] bearing, platform walker use  Progression toward goals:  [ ]      Improving appropriately and progressing toward goals  [X]      Improving slowly and progressing toward goals  [ ]      Not making progress toward goals and plan of care will be adjusted       PLAN:  Patient continues to benefit from skilled intervention to address the above impairments. Continue treatment per established plan of care. Discharge Recommendations:  Skilled Nursing Facility  Further Equipment Recommendations for Discharge:  wheelchair        SUBJECTIVE:   Patient stated Marylen Findtanya do you want to do?       OBJECTIVE DATA SUMMARY:   Critical Behavior:  Neurologic State: Alert  Orientation Level: Oriented X4  Cognition: Appropriate decision making  Safety/Judgement: Fall prevention, Awareness of environment  Functional Mobility Training:  Bed Mobility:  Sit to Supine: Maximum assistance;Assist x2  Transfers:  Sit to Stand: Maximum assistance;Assist x2  Stand to Sit: Maximum assistance;Assist x2  Balance:  Sitting: Impaired  Sitting - Static: Good (unsupported)  Sitting - Dynamic: Fair (occasional)  Standing: Impaired  Standing - Static: Poor  Standing - Dynamic : Poor  Ambulation/Gait Training:  Distance (ft): 0 Feet (ft)  Assistive Device: Brace/Splint;Gait belt;Walker, rolling; Other (comment)  Ambulation - Level of Assistance: Maximum assistance;Assist x2  Gait Abnormalities: Antalgic;Decreased step clearance  Base of Support: Shift to right  Pain:  Pre:6  Post:6  Pain Scale 1: Numeric (0 - 10)  Pain Intensity 1: 6  Pain Location 1: Hand;Hip  Pain Orientation 1: Left  Pain Description 1: Aching  Pain Intervention(s) 1: Medication (see MAR)  Activity Tolerance:   Fair(-)  Please refer to the flowsheet for vital signs taken during this treatment.   After treatment:   [ ] Patient left in no apparent distress sitting up in chair  [X] Patient left in no apparent distress in bed  [X] Call bell left within reach  [ ] Nursing notified  [ ] Caregiver present  [ ] Bed alarm activated      Jelena Toure PTA   Time Calculation: 23 mins

## 2017-10-12 NOTE — ROUTINE PROCESS
Received a 65year old female, patient in bed with son @ BS, alert/verbal with dx of Left hip fx,s/p left hip nailing HX of DM. Has L wrist splint and 02 @ 2l/min nc with son @ bedside. Admission care and v/s done. Oriented to unit and call system. Call bell and phone in reach for safety. Verbalized safety instructions. Bed low  And locked.

## 2017-10-12 NOTE — PROGRESS NOTES
Care Management Interventions  PCP Verified by CM: Yes  Mode of Transport at Discharge: Other (see comment)  Transition of Care Consult (CM Consult): Discharge Planning, SNF  MyChart Signup: No  Discharge Durable Medical Equipment: No  Physical Therapy Consult: No  Occupational Therapy Consult: No  Speech Therapy Consult: No  Current Support Network:  Other, Relative's Home  Confirm Follow Up Transport: Other (see comment)  Plan discussed with Pt/Family/Caregiver: Yes  Discharge Location  Discharge Placement: Skilled nursing facility

## 2017-10-12 NOTE — PROGRESS NOTES
2009: Received patient in bed awake,alert and oriented x4 . No signs of  Distress. Denies any pain at this time. Call bell  within reach. Will continue monitoring. 0150: In bed sleeping,no other concerns at this time. Call bell within reach. Will continue monitoring. 6289: In bed resting quietly,offered  pain medication throughout the shift but patient refused. No other concerns at this time. Call bell within reach. Will monitor.

## 2017-10-12 NOTE — DISCHARGE INSTRUCTIONS
DISCHARGE SUMMARY from Nurse    The following personal items are in your possession at time of discharge:    Dental Appliances: None  Visual Aid: Glasses, With patient     Home Medications: None  Jewelry: None  Clothing: None (All clothes and valuables left in pt's rrom )  Other Valuables: Eyeglasses             PATIENT INSTRUCTIONS:    After general anesthesia or intravenous sedation, for 24 hours or while taking prescription Narcotics:  · Limit your activities  · Do not drive and operate hazardous machinery  · Do not make important personal or business decisions  · Do  not drink alcoholic beverages  · If you have not urinated within 8 hours after discharge, please contact your surgeon on call. Report the following to your surgeon:  · Excessive pain, swelling, redness or odor of or around the surgical area  · Temperature over 100.5  · Nausea and vomiting lasting longer than 4 hours or if unable to take medications  · Any signs of decreased circulation or nerve impairment to extremity: change in color, persistent  numbness, tingling, coldness or increase pain  · Any questions        What to do at Home:  Recommended activity: Activity as tolerated. If you experience any of the following symptoms severe pain, nausea and vomiting, fever above 100.5, bleeding or drainage from incision, shortness of breath, please follow up with Dr. Jorden Verde. *  Please give a list of your current medications to your Primary Care Provider. *  Please update this list whenever your medications are discontinued, doses are      changed, or new medications (including over-the-counter products) are added. *  Please carry medication information at all times in case of emergency situations.           These are general instructions for a healthy lifestyle:    No smoking/ No tobacco products/ Avoid exposure to second hand smoke    Surgeon General's Warning:  Quitting smoking now greatly reduces serious risk to your health. Obesity, smoking, and sedentary lifestyle greatly increases your risk for illness    A healthy diet, regular physical exercise & weight monitoring are important for maintaining a healthy lifestyle    You may be retaining fluid if you have a history of heart failure or if you experience any of the following symptoms:  Weight gain of 3 pounds or more overnight or 5 pounds in a week, increased swelling in our hands or feet or shortness of breath while lying flat in bed. Please call your doctor as soon as you notice any of these symptoms; do not wait until your next office visit. Recognize signs and symptoms of STROKE:    F-face looks uneven    A-arms unable to move or move unevenly    S-speech slurred or non-existent    T-time-call 911 as soon as signs and symptoms begin-DO NOT go       Back to bed or wait to see if you get better-TIME IS BRAIN. Warning Signs of HEART ATTACK     Call 911 if you have these symptoms:   Chest discomfort. Most heart attacks involve discomfort in the center of the chest that lasts more than a few minutes, or that goes away and comes back. It can feel like uncomfortable pressure, squeezing, fullness, or pain.  Discomfort in other areas of the upper body. Symptoms can include pain or discomfort in one or both arms, the back, neck, jaw, or stomach.  Shortness of breath with or without chest discomfort.  Other signs may include breaking out in a cold sweat, nausea, or lightheadedness. Don't wait more than five minutes to call 911 - MINUTES MATTER! Fast action can save your life. Calling 911 is almost always the fastest way to get lifesaving treatment. Emergency Medical Services staff can begin treatment when they arrive -- up to an hour sooner than if someone gets to the hospital by car. The discharge information has been reviewed with the patient. The patient verbalized understanding.     Discharge medications reviewed with the patient and appropriate educational materials and side effects teaching were provided. Patient armband removed and shredded.

## 2017-10-12 NOTE — PROGRESS NOTES
Problem: Self Care Deficits Care Plan (Adult)  Goal: *Acute Goals and Plan of Care (Insert Text)  Occupational Therapy Goals  Initiated 10/12/2017 within 7 day(s). 1. Patient will perform lower body dressing with minimal assistance utilizing AE. 2. Patient will perform grooming task at EOB with supervision for balance and min vcs for weightbearing precautions. 3. Patient will perform functional task in standing for 8 minutes with platform walker and supervision for balance to increase activity tolerance for ADLs. 4. Patient will perform toilet transfers with minimal assistance/contact guard assist.  5. Patient will perform all aspects of toileting with minimal assistance/contact guard assist.  6. Patient will participate in upper extremity therapeutic exercise/activities with supervision for 8 minutes to maintain BUE strength for ADLs. 7. Patient will utilize energy conservation techniques during functional activities with minimal verbal cues. Outcome: Progressing Towards Goal  OCCUPATIONAL THERAPY EVALUATION     Patient: Nick Gonzalez (14 y.o. female)  Date: 10/12/2017  Primary Diagnosis: Hip fracture (HCC)  left hip fracture  Procedure(s) (LRB):   FEMUR INSERTION INTRA MEDULLARY NAIL  SHORT (Left) 2 Days Post-Op   Precautions: Fall, NWB, TTWB (NWB L wrist; TTWB LLE)      ASSESSMENT :  Based on the objective data described below, the patient presents with impairments with regard to bed mobility, activity tolerance and independence in ADLs due to L hip & L wrist fracture. C/o 9/10 pain; Armani Craig RN provided pain meds. Max A x1 for bed mobility with max A & max vc's for BUE positioning for bedpan placement in prep for LB dressing. Max A to maneuver to EOB. Poor balance initially due to fearfulness of falling. Supervision/set-up for ADLs at EOB. Max vc's to maintain midline in sitting & to refrain from weightbearing on L wrist; needs reinforcement.  PT arrived to ensure safety during sit to stand transfer; max A x2 with platform walker; not safe to transfer to [facility] or chair at this time. Pt sweating profusely at end of session, SCDs applied, icepack applied, L wrist elevated, needs within reach. Recommend SNF. Valeriy Sprague RN aware. 5/10 pain reported at end of session. Patient will benefit from skilled intervention to address the above impairments. Patients rehabilitation potential is considered to be Good  Factors which may influence rehabilitation potential include:   [ ]             None noted  [ ]             Mental ability/status  [X]             Medical condition  [ ]             Home/family situation and support systems  [ ]             Safety awareness  [ ]             Pain tolerance/management  [ ]             Other:      Recommendations for nursing: up with assist x2 & platform walker; TTWB LLE; NWB L wrist  Verbally communicated to: Valeriy Sprague RN          PLAN :  Recommendations and Planned Interventions:  [X]               Self Care Training                  [X]        Therapeutic Activities  [X]               Functional Mobility Training    [ ]        Cognitive Retraining  [X]               Therapeutic Exercises           [X]        Endurance Activities  [X]               Balance Training                   [ ]        Neuromuscular Re-Education  [ ]               Visual/Perceptual Training     [X]   Home Safety Training  [X]               Patient Education                 [X]        Family Training/Education  [ ]               Other (comment):     Frequency/Duration: Patient will be followed by occupational therapy 4-7 times a week to address goals. Discharge Recommendations: Skilled Nursing Facility  Further Equipment Recommendations for Discharge: bedside commode, shower chair        SUBJECTIVE:   Patient stated I don't want to fall.       OBJECTIVE DATA SUMMARY:       Past Medical History:   Diagnosis Date    Arthritis      Bronchitis      Diabetes ([facility]      Diverticulitis      GERD (gastroesophageal reflux disease)      Hypercholesterolemia      Hypertension      Hypothyroid      Pancreatitis       Past Surgical History:   Procedure Laterality Date    COLONOSCOPY N/A 2/2/2017     COLONOSCOPY  w/bx polyp performed by Lonny Patel MD at [facility] ENDOSCOPY     Barriers to Learning/Limitations: None  Compensate with: visual, verbal, tactile, kinesthetic cues/model     GCODES:  Self Care  Current  CL= 60-79%   Goal  CJ= 20-39%. The severity rating is based on the Other Functional Assessment, MMT, ROM     Eval Complexity: History: MEDIUM Complexity : Expanded review of history including physical, cognitive and psychosocial  history ; Examination: MEDIUM Complexity : 3-5 performance deficits relating to physical, cognitive , or psychosocial skils that result in activity limitations and / or participation restrictions; Decision Making:MEDIUM Complexity : Patient may present with comorbidities that affect occupational performnce. Miniml to moderate modification of tasks or assistance (eg, physical or verbal ) with assesment(s) is necessary to enable patient to complete evaluation      Prior Level of Function/Home Situation: Pt was independent with basic self care tasks and functional mobility PTA. Home Situation  Home Environment: Private residence  # Steps to Enter: 14  Rails to Enter: Yes  Hand Rails : Right  One/Two Story Residence: Two story  # of Interior Steps: 14  Living Alone: No  Support Systems: Child(napoleon)  Patient Expects to be Discharged to[de-identified] Rehabilitation facility  Current DME Used/Available at Home: Walker, rollator  Tub or Shower Type: Tub/Shower combination (no grab bars or shower chair)  [X]  Right hand dominant          [ ]  Left hand dominant     Cognitive/Behavioral Status:  Neurologic State: Alert  Orientation Level: Oriented X4  Cognition: Appropriate decision making; Follows commands  Safety/Judgement: Awareness of environment; Fall prevention      Skin: Intact (BUEs)  Edema: None noted (BUEs)     Vision/Perceptual:    Acuity: Within Defined Limits    Corrective Lenses: Glasses      Coordination:  Coordination: Within functional limits (BUEs)  Fine Motor Skills-Upper: Right Intact; Left Intact    Gross Motor Skills-Upper: Right Intact; Left Intact      Balance:  Sitting: Impaired  Sitting - Static: Good (unsupported)  Sitting - Dynamic: Fair (occasional) (Fair-/Poor)  Standing: Impaired  Standing - Static: Poor  Standing - Dynamic : Poor      Strength:  Strength: Generally decreased, functional (BUEs: 4+/5)     Tone & Sensation:  Tone: Normal (BUEs)  Sensation: Intact (BUEs)     Range of Motion:  AROM: Generally decreased, functional (BUEs: full shoulder flex; L wrist n/t 2/2 fx & splint)  PROM: Within functional limits (BUEs)     Functional Mobility and Transfers for ADLs:  Bed Mobility:  Supine to Sit: Maximum assistance; Additional time;Assist x1  Sit to Supine: Maximum assistance;Assist x2  Scooting: Maximum assistance; Additional time;Assist x1;Assist x2      Transfers:  Sit to Stand: Maximum assistance;Assist x2              Toilet Transfer :  (not assessed due to poor static standing balance)     ADL Assessment:  Feeding: Setup;Supervision  Oral Facial Hygiene/Grooming: Setup;Supervision  Bathing: Maximum assistance  Upper Body Dressing: Moderate assistance  Lower Body Dressing: Maximum assistance  Toileting: Maximum assistance     ADL Intervention:  Grooming  Grooming Assistance: Supervision/set up  Washing Face: Supervision/set-up  Brushing Teeth: Supervision/set-up  Brushing/Combing Hair: Supervision/set-up  Cues: Verbal cues provided     At EOB with good/fair static sitting balance & fair-/poor dynamic balance, pt engaged in oral care, facial hygiene, and hair combing with supervision/set-up. Max vc's to maintain midline in sitting & to refrain from weightbearing on L wrist; pt needs reinforcement. Cognitive Retraining  Safety/Judgement: Awareness of environment; Fall prevention Therapeutic Activity:  While supine, pt engaged in bed mobility (rolling x2) with max A and max vc's for BUE positioning for bedpan placement in prep for LB dressing. Bed mobility (with max A x1 & additional time) in prep for ADLs at EOB. Functional standing from EOB x1 trial with max A x2 & platform walker in prep for BSC transfer; pt able to \"heel toe\" with RLE with max vc's for TTWB on LLE. Pain:  Pre treatment pain level:  9/10  Post treatment pain level: 5/10  Pain Scale 1: Numeric (0 - 10)  Pain Intensity 1: 1  Pain Location 1: Hand;Hip  Pain Orientation 1: Left  Pain Description 1: Aching  Pain Intervention(s) 1: Medication (see MAR)      Activity Tolerance:  Fair-  Please refer to the flowsheet for vital signs taken during this treatment. After treatment:   [ ] Patient left in no apparent distress sitting up in chair  [X] Patient left in no apparent distress in bed  [X] Call bell left within reach  [X] Nursing notified  [ ] Caregiver present  [ ] Bed alarm activated      COMMUNICATION/EDUCATION: Pt educated on role of OT and POC. She verbalized understanding.   [X] Home safety education was provided and the patient/caregiver indicated understanding. [X] Patient/family have participated as able in goal setting and plan of care. [X] Patient/family agree to work toward stated goals and plan of care. [ ] Patient understands intent and goals of therapy, but is neutral about his/her participation. [ ] Patient is unable to participate in goal setting and plan of care.      Thank you for this referral.     Olaf Almanzar MS OTR/L  Time Calculation: 42 mins

## 2017-10-12 NOTE — PROGRESS NOTES
0800  Received pt on bed left  Hip dressing intact, wiggle toes, warm to touch, left  Arm with splint, covered with elastic dressing, exposed fingers warm to touch, wiggle fimngers. 1200  Given Dulcolax supp at 1100. No relief yet, report given to  Nurse manager, about transfer to [facility], per nurse to wait until pt will move her bowels from laxative. 1230  Voided in the bathroom assisted by  PAPURVA, seen in the hut is 150 cc, but there were more but miss the hut, bladder scan 45 cc, had 3 small formed stool,  [facility] notified, after lunch pt will go upstairs. 1314  To [facility] via wheelchair.

## 2017-10-12 NOTE — PROGRESS NOTES
OT order received and chart reviewed. 6086: 1st attempt for OT evaluation. Pt supine in bed, reporting 9/10 pain in LLE and LUE. Declining to mobilize with therapy until pain meds are administered. Vielka Espinoza RN aware. Will follow up shortly.  Thank you for the referral.    Ifeoma Nichols MS OTR/L  Office Ext: [phone]  Pager: [phone]

## 2017-10-12 NOTE — DISCHARGE SUMMARY
[facility] [place]    Discharge Summary    Patient: Alison Venegas MRN: [id number]  [facility]: [id number]    YOB: 1940  Age: 65 y.o. Sex: female    DOA: 10/9/2017 LOS:  LOS: 3 days   Discharge Date:      Admission Diagnoses: Hip fracture ([address]  left hip fracture    Discharge Diagnoses:    Problem List as of 10/12/2017  Date Reviewed: 10/10/2017          Codes Class Noted - Resolved    * (Principal)Hip fracture ([address] ICD-10-CM: S72.009A  ICD-9-CM: 820.8  10/9/2017 - Present        Meningioma ([address] ICD-10-CM: D32.9  ICD-9-CM: 225.2  7/25/2017 - Present        Hypothyroidism due to acquired atrophy of thyroid ICD-10-CM: E03.4  ICD-9-CM: 244.8, 246.8  6/13/2017 - Present        Episcleritis of right eye ICD-10-CM: H15.101  ICD-9-CM: 379.00  5/17/2017 - Present        Pancreatitis ICD-10-CM: K85.90  ICD-9-CM: 577.0  11/7/2016 - Present        Acute pancreatitis ICD-10-CM: K85.90  ICD-9-CM: 353.9  11/7/2016 - Present        Diverticulitis ICD-10-CM: K57.92  ICD-9-CM: 562.11  11/7/2016 - Present        Hypothyroid ICD-10-CM: E03.9  ICD-9-CM: 244.9  2/24/2014 - Present        HTN (hypertension) ICD-10-CM: I10  ICD-9-CM: 401.9  2/24/2014 - Present        Obesity ICD-10-CM: E66.9  ICD-9-CM: 278.00  2/24/2014 - Present        DM (diabetes mellitus) ([address] ICD-10-CM: E11.9  ICD-9-CM: 250.00  2/24/2014 - Present        Family hx-breast malignancy ICD-10-CM: Z80.3  ICD-9-CM: V16.3  2/24/2014 - Present              Discharge Condition: Stable    Discharge To: SNF    Consults: Hospitalist and Orthopedics    Hospital Course: 64 y/o female with hx of HTN, DM and hyperlipidemia presented to the ED on 10/9/2017 with intense left hip and left wrist pain after tripping over her flip flop walking her dog. Imaging studies confirmed left wrist and left hip fracture. S/p femur insertion intramedullary nail by Dr. Parveen Ding on 10/10/2017. She has a splint to E. Tmax 101.6 overnight, blood cultures obtained NGTD. CXR negative.   No evidence of leukocytosis. Vital signs remained stable, pt afebrile. PT/OT evaluation obtained and worked with patient during hospital stay. She is stable for discharge to rehab. Physical Exam:  General appearance: alert, cooperative, no distress, appears stated age  Head: Normocephalic, without obvious abnormality, atraumatic  Lungs: clear to auscultation bilaterally  Heart: regular rate and rhythm, S1, S2 normal, no murmur, click, rub or gallop  Abdomen: soft, non-tender.  Bowel sounds normal. No masses,  no organomegaly  Extremities: extremities normal, atraumatic, no cyanosis or edema  Skin: Dressing left hip CDI  Neurologic: Grossly normal  PSY: Mood and affect normal, appropriately behaved    Significant Diagnostic Studies: labs:   Recent Results (from the past 24 hour(s))   GLUCOSE, POC    Collection Time: 10/11/17 11:48 AM   Result Value Ref Range    Glucose (POC) 115 (H) 70 - 110 mg/dL   GLUCOSE, POC    Collection Time: 10/11/17  4:59 PM   Result Value Ref Range    Glucose (POC) 158 (H) 70 - 110 mg/dL   GLUCOSE, POC    Collection Time: 10/11/17 10:12 PM   Result Value Ref Range    Glucose (POC) 165 (H) 70 - 077 mg/dL   METABOLIC PANEL, BASIC    Collection Time: 10/12/17  4:00 AM   Result Value Ref Range    Sodium 138 136 - 145 mmol/L    Potassium 3.7 3.5 - 5.5 mmol/L    Chloride 102 100 - 108 mmol/L    CO2 23 21 - 32 mmol/L    Anion gap 13 3.0 - 18 mmol/L    Glucose 116 (H) 74 - 99 mg/dL    BUN 14 7.0 - 18 MG/DL    Creatinine 0.89 0.6 - 1.3 MG/DL    BUN/Creatinine ratio 16 12 - 20      GFR est AA >60 >60 ml/min/1.73m2    GFR est non-AA >60 >60 ml/min/1.73m2    Calcium 7.1 (L) 8.5 - 10.1 MG/DL   CBC W/O DIFF    Collection Time: 10/12/17  4:00 AM   Result Value Ref Range    WBC 9.7 4.6 - 13.2 K/uL    RBC 3.63 (L) 4.20 - 5.30 M/uL    HGB 10.1 (L) 12.0 - 16.0 g/dL    HCT 31.2 (L) 35.0 - 45.0 %    MCV 86.0 74.0 - 97.0 FL    MCH 27.8 24.0 - 34.0 PG    MCHC 32.4 31.0 - 37.0 g/dL    RDW 12.3 11.6 - 14.5 %    PLATELET 465 135 - 420 K/uL    MPV 10.0 9.2 - 11.8 FL   PROTHROMBIN TIME + INR    Collection Time: 10/12/17  4:00 AM   Result Value Ref Range    Prothrombin time 14.0 11.5 - 15.2 sec    INR 1.1 0.8 - 1.2     GLUCOSE, POC    Collection Time: 10/12/17  5:44 AM   Result Value Ref Range    Glucose (POC) 138 (H) 70 - 110 mg/dL     Xray left wrist 10/9/2017  IMPRESSION:  1. Minimally displaced triquetral avulsion fracture. 2. Possible nondisplaced fracture of the distal left radius, spanning the  radioscaphoid fossa. 3. Moderate dorsal left wrist soft tissue swelling. 4. 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 Xray left hip 10/9/2017  IMPRESSION:     Left femoral neck fracture.         Xray left hip 10/10/2017  IMPRESSION:      Status post insertion of an intramedullary mae with femoral neck nail within the  proximal left femur. Discharge Medications:     Current Discharge Medication List      START taking these medications    Details   oxyCODONE-acetaminophen (PERCOCET) 5-325 mg per tablet Take 1-2 Tabs by mouth every four (4) hours as needed. Max Daily Amount: 12 Tabs. Qty: 60 Tab, Refills: 0      enoxaparin (LOVENOX) 40 mg/0.4 mL 0.4 mL by SubCUTAneous route daily for 14 days. Indications: DEEP VEIN THROMBOSIS PREVENTION  Qty: 5.6 mL, Refills: 0         CONTINUE these medications which have NOT CHANGED    Details   pantoprazole (PROTONIX) 40 mg tablet Take 1 Tab by mouth daily. Qty: 30 Tab, Refills: 3    Associated Diagnoses: Gastroesophageal reflux disease without esophagitis      glipiZIDE (GLUCOTROL) 5 mg tablet Take 1 Tab by mouth two (2) times a day. Qty: 60 Tab, Refills: 3      triamcinolone acetonide (KENALOG) 0.1 % topical cream Apply  to affected area two (2) times a day. use thin layer  Qty: 15 g, Refills: 0    Associated Diagnoses: Eczema, unspecified type      losartan (COZAAR) 50 mg tablet Take 1 Tab by mouth daily.   Qty: 30 Tab, Refills: 3    Associated Diagnoses: Essential hypertension hydroCHLOROthiazide (HYDRODIURIL) 25 mg tablet Take 1 Tab by mouth daily. Qty: 30 Tab, Refills: 3    Associated Diagnoses: Essential hypertension      levothyroxine (SYNTHROID) 75 mcg tablet Take 1 Tab by mouth Daily (before breakfast). Qty: 30 Tab, Refills: 0    Associated Diagnoses: Hypothyroidism due to acquired atrophy of thyroid      metFORMIN (GLUCOPHAGE) 1,000 mg tablet Take 1 Tab by mouth two (2) times daily (with meals). Qty: 60 Tab, Refills: 3      cholecalciferol, vitamin D3, (VITAMIN D3) 2,000 unit tab Take  by mouth. simvastatin (ZOCOR) 20 mg tablet Take 20 mg by mouth nightly. STOP taking these medications       guaiFENesin-codeine (VIRTUSSIN AC) 100-10 mg/5 mL solution Comments:   Reason for Stopping:         loperamide (IMODIUM) 2 mg capsule Comments:   Reason for Stopping:         diph,Pertuss,AC,,Tet Vac-PF (BOOSTRIX) 2.5-8-5 Lf-mcg suspension Comments:   Reason for Stopping:               Activity: TTWB LLE, NWB left wrist. Platform walker with PT if able to tolerate, otherwise transfers from bed to chair    Diet: Diabetic Diet    Wound Care: Reinforce dressing PRN    Follow-up: with Staff MD on arrival to [facility]. Follow up with Dr. Татьяна Love orthopedic in office in one month. Staples out in 10-14 days.     Discharge time: >35 mins  Carlos Strauss NP  10/12/2017, 10:29 AM

## 2017-10-12 NOTE — IP AVS SNAPSHOT
Summary of Care Report The Summary of Care report has been created to help improve care coordination. Users with access to [employer] or [address] (Web-based application) may access additional patient information including the Discharge Summary. If you are not currently a [address] user and need more information, please call the number listed below in the [place] [address] section and ask to be connected with Medical Records. Facility Information Name Address Phone [facility] [address] [place] [phone] Patient Information Patient Name Sex SKY Beltran ([id number]) Female 1940 Discharge Information Admitting Provider Service Area Unit Cinthia Shetty MD / [address] [facility] 3 Transitional  / [phone] Discharge Provider Discharge Date/Time Discharge Disposition Destination (none) 2017 Midday (Pending) [facility] (none) Patient Language Language ENGLISH [13] Hospital Problems as of 2017  Reviewed: 10/10/2017 10:48 AM by Nick Duarte MD  
  
  
  
 Class Noted - Resolved Last Modified POA Active Problems Hip fracture ([address]  10/9/2017 - Present 10/12/2017 by Cinthia Shetty MD Unknown Entered by Amy Olivia DO Non-Hospital Problems as of 2017  Reviewed: 10/10/2017 10:48 AM by Nick Duarte MD  
  
  
  
 Class Noted - Resolved Last Modified Active Problems Hypothyroid  2014 - Present 10/10/2017 by Nimco Vivar MD  
  Entered by Pam He HTN (hypertension)  2014 - Present 10/10/2017 by Nimco Vivar MD  
  Entered by Pam He Obesity  2014 - Present 10/10/2017 by Nimco Vivar MD  
  Entered by Pam He DM (diabetes mellitus) ([facility]  2/24/2014 - Present 10/10/2017 by Vevelyn Kocher, MD  
  Entered by So Mendoza Family hx-breast malignancy  2/24/2014 - Present 2/24/2014 by So Mendoza Entered by So Mendoza Pancreatitis  11/7/2016 - Present 11/7/2016 by Demarco Lamb MD  
  Entered by Angie Perez MD  
  Acute pancreatitis  11/7/2016 - Present 11/7/2016 by Demarco Lamb MD  
  Entered by Demarco Lamb MD  
  Diverticulitis  11/7/2016 - Present 11/7/2016 by Demarco Lamb MD  
  Entered by Demarco Lamb MD  
  Episcleritis of right eye  5/17/2017 - Present 5/17/2017 by Josesito Lewis MD  
  Entered by [id number] [address] Vernon Lewis MD  
  Hypothyroidism due to acquired atrophy of thyroid  6/13/2017 - Present 6/13/2017 by Josesito Lewis MD  
  Entered by [id number] ZAIDA Lewis MD  
  [facility])  7/25/2017 - Present 7/25/2017 by [id number] ZAIDA Leiws MD  
  Entered by [id number] ZAIDA Lewis MD  
  
You are allergic to the following Allergen Reactions Ampicillin Itching Current Discharge Medication List  
  
START taking these medications Dose & Instructions Dispensing Information Comments  
 celecoxib 200 mg capsule Commonly known as:  CELEBREX Dose:  200 mg Take 1 Cap by mouth daily as needed for Pain for up to 90 days. Quantity:  30 Cap Refills:  0 CONTINUE these medications which have CHANGED Dose & Instructions Dispensing Information Comments  
 cholecalciferol 1,000 unit tablet Commonly known as:  VITAMIN D3 What changed:   
- medication strength 
- how much to take - when to take this Dose:  2000 Units Take 2 Tabs by mouth daily. Quantity:  60 Tab Refills:  0  
   
 losartan 25 mg tablet Commonly known as:  COZAAR What changed:   
- medication strength 
- how much to take Dose:  25 mg Take 1 Tab by mouth daily. Quantity:  60 Tab Refills:  0 CONTINUE these medications which have NOT CHANGED Dose & Instructions Dispensing Information Comments  
 levothyroxine 75 mcg tablet Commonly known as:  SYNTHROID Dose:  75 mcg Take 1 Tab by mouth Daily (before breakfast). Quantity:  60 Tab Refills:  0  
   
 metFORMIN 1,000 mg tablet Commonly known as:  GLUCOPHAGE Dose:  1000 mg Take 1 Tab by mouth two (2) times daily (with meals). Quantity:  60 Tab Refills:  0  
   
 oxyCODONE-acetaminophen 5-325 mg per tablet Commonly known as:  PERCOCET Dose:  1-2 Tab Take 1-2 Tabs by mouth every four (4) hours as needed. Max Daily Amount: 12 Tabs. Quantity:  60 Tab Refills:  0  
   
 pantoprazole 40 mg tablet Commonly known as:  PROTONIX Dose:  40 mg Take 1 Tab by mouth daily. Quantity:  60 Tab Refills:  0  
   
 simvastatin 20 mg tablet Commonly known as:  ZOCOR Dose:  20 mg Take 1 Tab by mouth nightly. Quantity:  60 Tab Refills:  0 ASK your doctor about these medications Dose & Instructions Dispensing Information Comments  
 docusate sodium 100 mg capsule Commonly known as:  Ltanya Earle Dose:  100 mg Take 1 Cap by mouth two (2) times a day. Indications: take while on narcotics for pain Quantity:  60 Cap Refills:  2  
   
 enoxaparin 40 mg/0.4 mL Commonly known as:  LOVENOX Ask about: Should I take this medication? Dose:  40 mg  
0.4 mL by SubCUTAneous route daily for 14 days. Indications: DEEP VEIN THROMBOSIS PREVENTION Quantity:  5.6 mL Refills:  0  
   
 glipiZIDE 5 mg tablet Commonly known as:  Helen Limb Dose:  5 mg Take 1 Tab by mouth two (2) times a day. Quantity:  60 Tab Refills:  3  
   
 hydroCHLOROthiazide 25 mg tablet Commonly known as:  HYDRODIURIL Dose:  25 mg Take 1 Tab by mouth daily. Quantity:  30 Tab Refills:  3  
   
 triamcinolone acetonide 0.1 % topical cream  
Commonly known as:  KENALOG Apply  to affected area two (2) times a day. use thin layer Quantity:  15 g Refills:  0 Current Immunizations Name Date Influenza High Dose Vaccine PF 9/5/2017 Influenza Vaccine 9/1/2017 Influenza Vaccine (Quad) PF 11/9/2016 Pneumococcal Conjugate (PCV-13) 5/17/2017 Follow-up Information Follow up With Details Comments Contact Ryan morales On 10/24/2017 staff will need to accompany pt to [facility]t [facility] [address], [place]  [id number] [phone] @10:30am       
 Gena Martínez Go on 12/5/2017 Appointment at [address] Vernon Lewis MD   [address] [place] [address] 
[phone] Discharge Instructions None Chart Review Routing History Recipient Method Report Sent By Sheree Yuan MD  
Fax: [phone] Phone: [phone] Fax IP Auto Routed [employer] In[employer] Yeboah MD [[id number]] 10/17/2017  7:38 AM 10/17/2017

## 2017-10-12 NOTE — IP AVS SNAPSHOT
[address] [facility] 
 
 
 [address] [facility] Patient: Sonya Wade MRN: [id number] JAVON:[id number] About your hospitalization You were admitted on:  October 12, 2017 You last received care in the:  [facility] [address] [place] [address] You were discharged on:  November 21, 2017 Why you were hospitalized Your primary diagnosis was:  Not on File Your diagnoses also included:  Hip Fracture (Hcc) Things You Need To Do (next 8 weeks) Follow up with Veronica Cormier MD  
  
Phone:  [phone] Where:  [address] [facility], [address], [facility] [id number] Tuesday Dec 05, 2017 Follow Up with Veronica Cormier MD at 12:45 PM  
Where:  [address] [facility] 16 [facility] Go to [facility] Appointment at 10:50 Where:  [facility] [facility] Discharge Orders None A check valerie indicates which time of day the medication should be taken. My Medications TAKE these medications as instructed Instructions Each Dose to Equal  
 Morning Noon Evening Bedtime  
 celecoxib 200 mg capsule Commonly known as:  CELEBREX Your last dose was: Your next dose is: Take 1 Cap by mouth daily as needed for Pain for up to 90 days. 200 mg  
    
   
   
   
  
 cholecalciferol 1,000 unit tablet Commonly known as:  VITAMIN D3 Your last dose was: Your next dose is: Take 2 Tabs by mouth daily. 2000 Units  
    
   
   
   
  
 levothyroxine 75 mcg tablet Commonly known as:  SYNTHROID Your last dose was: Your next dose is: Take 1 Tab by mouth Daily (before breakfast). 75 mcg  
    
   
   
   
  
 losartan 25 mg tablet Commonly known as:  COZAAR Your last dose was: Your next dose is: Take 1 Tab by mouth daily.   
 25 mg  
    
   
   
   
  
 metFORMIN 1,000 mg tablet Commonly known as:  GLUCOPHAGE Your last dose was: Your next dose is: Take 1 Tab by mouth two (2) times daily (with meals). 1000 mg  
    
   
   
   
  
 oxyCODONE-acetaminophen 5-325 mg per tablet Commonly known as:  PERCOCET Your last dose was: Your next dose is: Take 1-2 Tabs by mouth every four (4) hours as needed. Max Daily Amount: 12 Tabs. 1-2 Tab  
    
   
   
   
  
 pantoprazole 40 mg tablet Commonly known as:  PROTONIX Your last dose was: Your next dose is: Take 1 Tab by mouth daily. 40 mg  
    
   
   
   
  
 simvastatin 20 mg tablet Commonly known as:  ZOCOR Your last dose was: Your next dose is: Take 1 Tab by mouth nightly. 20 mg ASK your physician about these medications Instructions Each Dose to Equal  
 Morning Noon Evening Bedtime  
 docusate sodium 100 mg capsule Commonly known as:  Ramiro Booze Your last dose was: Your next dose is: Take 1 Cap by mouth two (2) times a day. Indications: take while on narcotics for pain 100 mg  
    
   
   
   
  
 enoxaparin 40 mg/0.4 mL Commonly known as:  LOVENOX Ask about: Should I take this medication? Your last dose was: Your next dose is: 0.4 mL by SubCUTAneous route daily for 14 days. Indications: DEEP VEIN THROMBOSIS PREVENTION  
 40 mg  
    
   
   
   
  
 glipiZIDE 5 mg tablet Commonly known as:  [place] Your last dose was: Your next dose is: Take 1 Tab by mouth two (2) times a day. 5 mg  
    
   
   
   
  
 hydroCHLOROthiazide 25 mg tablet Commonly known as:  HYDRODIURIL Your last dose was: Your next dose is: Take 1 Tab by mouth daily. 25 mg  
    
   
   
   
  
 triamcinolone acetonide 0.1 % topical cream  
Commonly known as:  KENALOG Your last dose was: Your next dose is:    
   
   
 Apply  to affected area two (2) times a day. use thin layer Where to Get Your Medications Information on where to get these meds will be given to you by the nurse or doctor. ! Ask your nurse or doctor about these medications  
  celecoxib 200 mg capsule  
 cholecalciferol 1,000 unit tablet  
 levothyroxine 75 mcg tablet  
 losartan 25 mg tablet  
 metFORMIN 1,000 mg tablet  
 oxyCODONE-acetaminophen 5-325 mg per tablet  
 pantoprazole 40 mg tablet  
 simvastatin 20 mg tablet Discharge Instructions None [employer] Announcement We are excited to announce that we are making your provider's discharge notes available to you in [employer]. You will see these notes when they are completed and signed by the physician that discharged you from your recent hospital stay. If you have any questions or concerns about any information you see in [employer], please call the Health Information Department where you were seen or reach out to your Primary Care Provider for more information about your plan of care. Introducing [facility]! [employer] introduces [employer] patient portal. Now you can access parts of your medical record, email your doctor's office, and request medication refills online. 1. In your internet browser, go to [email or web address] 2. Click on the First Time User? Click Here link in the Sign In box. You will see the New Member Sign Up page. 3. Enter your [employer] Access Code exactly as it appears below. You will not need to use this code after youve completed the sign-up process. If you do not sign up before the expiration date, you must request a new code. · [employer] Access Code: [email or web address] Expires: 12/4/2017 10:50 AM 
 
4.  Enter the last four digits of your Social Security Number (xxxx) and Date of Birth (mm/dd/yyyy) as indicated and click Submit. You will be taken to the next sign-up page. 5. Create a [employer] ID. This will be your [employer] login ID and cannot be changed, so think of one that is secure and easy to remember. 6. Create a [employer] password. You can change your password at any time. 7. Enter your Password Reset Question and Answer. This can be used at a later time if you forget your password. 8. Enter your e-mail address. You will receive e-mail notification when new information is available in [address] 9. Click Sign Up. You can now view and download portions of your medical record. 10. Click the Download Summary menu link to download a portable copy of your medical information. If you have questions, please visit the Frequently Asked Questions section of the [employer] website. Remember, [employer] is NOT to be used for urgent needs. For medical emergencies, dial 911. Now available from your iPhone and Android! Providers Seen During Your Hospitalization Provider Specialty Primary office phone Fabian Beltran [address] [place] [address] [phone] Your Primary Care Physician (PCP) Primary Care Physician Office Phone Office Fax [facility] [phone] You are allergic to the following Allergen Reactions Ampicillin Itching Recent Documentation Height Weight Breastfeeding? BMI OB Status Smoking Status 1.524 m 83.3 kg No 35.88 kg/m2 Postmenopausal Never Smoker Emergency Contacts Name Discharge Info Relation Home Work [facility] DISCHARGE CAREGIVER [3] Son [22] [phone] Patient Belongings The following personal items are in your possession at time of discharge: 
     Visual Aid: Glasses             Clothing: Other (comment) (glasses) Please provide this summary of care documentation to your next provider. Signatures-by signing, you are acknowledging that this After Visit Summary has been reviewed with you and you have received a copy. Patient Signature:  ____________________________________________________________ Date:  ____________________________________________________________  
  
[facility] Provider Signature:  ____________________________________________________________ Date:  ____________________________________________________________

## 2017-10-12 NOTE — PROGRESS NOTES
met with patient completed the initial Spiritual Assessment of the patient, and offered Pastoral Care, see flow sheets for interventions.  extended the assurance of prayer and spiritual care materials. Patient does not have any Catholic/cultural needs that will affect patients preferences in health care. The patient was informed that chaplains will continue to follow and will provide pastoral care on an as needed/requested basis.       Shanae Titus, MPH, [address] [address] [phone]

## 2017-10-12 NOTE — IP AVS SNAPSHOT
[facility] 
 
 
 [address] [place] [address] Patient: Herman Nevarez MRN: [id number] RRL:[birth year] My Medications TAKE these medications as instructed Instructions Each Dose to Equal  
 Morning Noon Evening Bedtime  
 celecoxib 200 mg capsule Commonly known as:  CELEBREX Your last dose was: Your next dose is: Take 1 Cap by mouth daily as needed for Pain for up to 90 days. 200 mg  
    
   
   
   
  
 cholecalciferol 1,000 unit tablet Commonly known as:  VITAMIN D3 Your last dose was: Your next dose is: Take 2 Tabs by mouth daily. 2000 Units  
    
   
   
   
  
 levothyroxine 75 mcg tablet Commonly known as:  SYNTHROID Your last dose was: Your next dose is: Take 1 Tab by mouth Daily (before breakfast). 75 mcg  
    
   
   
   
  
 losartan 25 mg tablet Commonly known as:  COZAAR Your last dose was: Your next dose is: Take 1 Tab by mouth daily. 25 mg  
    
   
   
   
  
 metFORMIN 1,000 mg tablet Commonly known as:  GLUCOPHAGE Your last dose was: Your next dose is: Take 1 Tab by mouth two (2) times daily (with meals). 1000 mg  
    
   
   
   
  
 oxyCODONE-acetaminophen 5-325 mg per tablet Commonly known as:  PERCOCET Your last dose was: Your next dose is: Take 1-2 Tabs by mouth every four (4) hours as needed. Max Daily Amount: 12 Tabs. 1-2 Tab  
    
   
   
   
  
 pantoprazole 40 mg tablet Commonly known as:  PROTONIX Your last dose was: Your next dose is: Take 1 Tab by mouth daily. 40 mg  
    
   
   
   
  
 simvastatin 20 mg tablet Commonly known as:  ZOCOR Your last dose was: Your next dose is: Take 1 Tab by mouth nightly.   
 20 mg  
    
   
   
   
  
  
 ASK your physician about these medications Instructions Each Dose to Equal  
 Morning Noon Evening Bedtime  
 docusate sodium 100 mg capsule Commonly known as:  Rich Watt Your last dose was: Your next dose is: Take 1 Cap by mouth two (2) times a day. Indications: take while on narcotics for pain 100 mg  
    
   
   
   
  
 enoxaparin 40 mg/0.4 mL Commonly known as:  LOVENOX Ask about: Should I take this medication? Your last dose was: Your next dose is: 0.4 mL by SubCUTAneous route daily for 14 days. Indications: DEEP VEIN THROMBOSIS PREVENTION  
 40 mg  
    
   
   
   
  
 glipiZIDE 5 mg tablet Commonly known as:  [place] Your last dose was: Your next dose is: Take 1 Tab by mouth two (2) times a day. 5 mg  
    
   
   
   
  
 hydroCHLOROthiazide 25 mg tablet Commonly known as:  HYDRODIURIL Your last dose was: Your next dose is: Take 1 Tab by mouth daily. 25 mg  
    
   
   
   
  
 triamcinolone acetonide 0.1 % topical cream  
Commonly known as:  KENALOG Your last dose was: Your next dose is:    
   
   
 Apply  to affected area two (2) times a day. use thin layer Where to Get Your Medications Information on where to get these meds will be given to you by the nurse or doctor. ! Ask your nurse or doctor about these medications  
  celecoxib 200 mg capsule  
 cholecalciferol 1,000 unit tablet  
 levothyroxine 75 mcg tablet  
 losartan 25 mg tablet  
 metFORMIN 1,000 mg tablet  
 oxyCODONE-acetaminophen 5-325 mg per tablet  
 pantoprazole 40 mg tablet  
 simvastatin 20 mg tablet

## 2017-10-12 NOTE — PROGRESS NOTES
Problem: Falls - Risk of  Goal: *Absence of Falls  Document Khris Fall Risk and appropriate interventions in the flowsheet.    Outcome: Progressing Towards Goal  Fall Risk Interventions:  Mobility Interventions: Patient to call before getting OOB, Utilize walker, cane, or other assitive device, Utilize gait belt for transfers/ambulation     Mentation Interventions: Adequate sleep, hydration, pain control, Bed/chair exit alarm, Door open when patient unattended, Increase mobility     Medication Interventions: Patient to call before getting OOB, Teach patient to arise slowly     Elimination Interventions: Call light in reach, Patient to call for help with toileting needs, Toilet paper/wipes in reach     History of Falls Interventions: Bed/chair exit alarm, Door open when patient unattended

## 2017-10-12 NOTE — H&P
[address]  ROUTINE H AND PS    Name:  Antonino Espana  MR#:  [id number]  :  1940  Account #:  [de-identified]  Date of Adm:  10/12/2017      REASON FOR ADMISSION: Status post fall with left hip fracture. HISTORY OF PRESENT ILLNESS: The patient is a pleasant 67-year-  old female with past medical history significant for hypertension,  diabetes, hyperlipidemia, hypothyroidism, history of meningioma, who  was admitted to the hospital after the patient fell at home while walking  her dog. The patient was seen in the ER. X-ray confirmed left wrist and  hip fracture. The patient underwent femur insertion, intramedullary nail  by Dr. Alexia Andrade on 10/10/2017. The patient had a T-max of 101 postop, but  the cultures were negative. At the time of my evaluation, the patient is  alert, awake, oriented, denies any chest pain, shortness of breath or  palpitations. She is afebrile with a temperature of 97.4. She  does complain of hip pain and wrist pain, which is controlled with  current pain medication. She does have occasional headache, but she  relates that it is chronic from her meningioma. PAST MEDICAL HISTORY: Hypertension, hyperlipidemia, diabetes,  hypothyroidism, history of pancreatitis, history of meningioma. PAST SURGICAL HISTORY: This hip surgery is the patient's first  surgery. SOCIAL HISTORY: She is single, has 1 child. She is a nonsmoker,  nondrinker. FAMILY HISTORY: Positive for diabetes. ALLERGIES: AMPICILLIN. MEDICATIONS:  1. Oxycodone p.r.n.  2. Lovenox 40 mg subcutaneous daily. 3. Protonix 40 mg daily. 4. Glipizide 5 mg p.o. b.i.d.  5. Cozaar 50 mg daily. 6. Hydrochlorothiazide 25 mg daily. 7. Synthroid 75 mcg p.o. daily. 8. Metformin 1000 mg p.o. b.i.d.  9. Vitamin D 2000 units p.o. daily. 10. Zocor 20 mg p.o. at bedtime. REVIEW OF SYSTEMS: No fever or chills. Positive for headache. No  weight loss. No neck pain or sore throat.  No chest pain or palpitations. No shortness of breath or cough. No nausea, vomiting, diarrhea. No  dysuria or polyuria. Positive for hip and wrist pain. No heat or cold  intolerance. No anxiety or depression. PHYSICAL EXAMINATION:  GENERAL: This is a well-nourished, well-developed female in no  apparent distress. VITAL SIGNS: Temperature 97.4, pulse is 69. Initial blood pressure  was 79/53, but this is being repeated right now. Respiratory is 18. HEENT: Normocephalic, atraumatic. Sclerae are anicteric. Oropharynx  clear. Mucous membranes moist.  NECK: No JVD or thyromegaly. HEART: S1, S2. Regular rate and rhythm. LUNGS: Clear to auscultation bilaterally, no wheezing. ABDOMEN: Nontender, nondistended, normoactive bowel sounds. EXTREMITIES: Trace edema on the left. No clubbing. Motor strength  is 5/5 in all 4 extremities. NEUROLOGIC: Cranial nerves are grossly intact. Decreased range of  motion on the left lower extremity secondary to pain. ASSESSMENT AND PLAN:  1. Left hip fracture, status post surgical repair. Continue with deep  venous thrombosis prophylaxis and pain management. 2. Hypertension. Will hold Cozaar for systolic of less than 009 and  repeat blood pressure, as the patient remains asymptomatic. 3. Diabetes on glipizide and metformin. Follow up Accu-Chek  before meals and at bedtime with sliding scale. 4. Hypothyroidism. 5. Hyperlipidemia, on Zocor. 6. Deconditioning. Continue physical therapy, occupational therapy. Overall prognosis fair. We will follow for any repeat fever. Blood  cultures remain negative x1 day.         MD Erica Ferraro / [place] Needs  D:  10/12/2017   15:28  T:  10/12/2017   15:56  Job #:  [id number]

## 2017-10-12 NOTE — PROGRESS NOTES
Progress Note      Post Operative Day: 3    Assessment:    1. Status post  FEMUR INSERTION INTRA MEDULLARY NAIL for Hip fracture ([facility] [address]  left hip fracture 10/9/2017,   Progressing. 2. 2. Left triquetral avulsion fracture and distal radius fracture    PLAN:    1. Mobilize. Continue P.T.  2.  TTWB LLE, NWB left wrist. Platform walker with PT if able to tolerate, otherwise transfers from bed to chair  3. Lovenox for DVT prophylaxis  4. Splint in place LUE  5. Discharge Plannin Serena Alexander for d/c from ortho standpoint. F/u in the office with Dr. Татьяна Love 1 month. Staples out 10-14 days         HPI: Qamar Chau is a 65 y.o. female patient without new complaints status post TFN for Hip fracture ([facility] [address]  left hip fracture 10/9/2017. No new orthopaedic changes. Blood pressure 121/57, pulse 81, temperature 99.3 °F (37.4 °C), resp. rate 16, height 5' (1.524 m), weight 77.1 kg (170 lb), SpO2 92 %. CBC w/Diff   Lab Results   Component Value Date/Time    WBC 9.7 10/12/2017 04:00 AM    RBC 3.63 (L) 10/12/2017 04:00 AM    HCT 31.2 (L) 10/12/2017 04:00 AM          Physical Assessment:  General: in no apparent distress   Wound: clean, dry   Extremities:  Neurovascular intact bilat upper and lower extremities   Dressing:  dry   DVT Exam:   No exam evidence to suggest DVT. Compartments soft and NT.           Radiology: none         Steve Carnes PA-C  10/12/2017  Office [phone]  Cell [phone]

## 2017-10-13 LAB
GLUCOSE BLD STRIP.AUTO-MCNC: 114 MG/DL (ref 70–110)
GLUCOSE BLD STRIP.AUTO-MCNC: 126 MG/DL (ref 70–110)
GLUCOSE BLD STRIP.AUTO-MCNC: 129 MG/DL (ref 70–110)
GLUCOSE BLD STRIP.AUTO-MCNC: 95 MG/DL (ref 70–110)

## 2017-10-13 PROCEDURE — 82962 GLUCOSE BLOOD TEST: CPT

## 2017-10-13 PROCEDURE — 74011250637 HC RX REV CODE- 250/637: Performed by: INTERNAL MEDICINE

## 2017-10-13 PROCEDURE — 74011250636 HC RX REV CODE- 250/636: Performed by: INTERNAL MEDICINE

## 2017-10-13 RX ADMIN — TRIAMCINOLONE ACETONIDE: 1 CREAM TOPICAL at 07:00

## 2017-10-13 RX ADMIN — DOCUSATE SODIUM 100 MG: 100 CAPSULE, LIQUID FILLED ORAL at 17:27

## 2017-10-13 RX ADMIN — TRIAMCINOLONE ACETONIDE: 1 CREAM TOPICAL at 18:00

## 2017-10-13 RX ADMIN — ENOXAPARIN SODIUM 40 MG: 40 INJECTION SUBCUTANEOUS at 09:00

## 2017-10-13 RX ADMIN — METFORMIN HYDROCHLORIDE 1000 MG: 500 TABLET ORAL at 17:27

## 2017-10-13 RX ADMIN — GLIPIZIDE 5 MG: 5 TABLET ORAL at 09:00

## 2017-10-13 RX ADMIN — OXYCODONE HYDROCHLORIDE AND ACETAMINOPHEN 2 TABLET: 5; 325 TABLET ORAL at 06:51

## 2017-10-13 RX ADMIN — LEVOTHYROXINE SODIUM 75 MCG: 75 TABLET ORAL at 05:58

## 2017-10-13 RX ADMIN — SIMVASTATIN 20 MG: 20 TABLET, FILM COATED ORAL at 21:36

## 2017-10-13 RX ADMIN — PANTOPRAZOLE SODIUM 40 MG: 40 TABLET, DELAYED RELEASE ORAL at 08:59

## 2017-10-13 RX ADMIN — CHOLECALCIFEROL TAB 25 MCG (1000 UNIT) 2000 UNITS: 25 TAB at 08:58

## 2017-10-13 RX ADMIN — GLIPIZIDE 5 MG: 5 TABLET ORAL at 17:27

## 2017-10-13 RX ADMIN — OXYCODONE HYDROCHLORIDE AND ACETAMINOPHEN 2 TABLET: 5; 325 TABLET ORAL at 14:06

## 2017-10-13 RX ADMIN — DOCUSATE SODIUM 100 MG: 100 CAPSULE, LIQUID FILLED ORAL at 08:59

## 2017-10-13 RX ADMIN — METFORMIN HYDROCHLORIDE 1000 MG: 500 TABLET ORAL at 08:59

## 2017-10-13 RX ADMIN — LOSARTAN POTASSIUM 50 MG: 50 TABLET ORAL at 08:59

## 2017-10-13 NOTE — PROGRESS NOTES
RT met with pt to complete initial activity interview. Pt was lying in bed upon approach. Pt alert and oriented. Pt was pleasant and cooperative during activity interview. With minimal prompting pt was able to ID gardening, arts and crafts, reading, keeping up with the news, and pets as leisure interests. Pt agreeable to pet therapy. Educated pt on leisure cabinet and unit activities. Pt reported \" When I was here in 2009 they tried to get me out in that room, but old people were always in there and it scared me so I never did. \" RT reassured pt that there are pt's of all ages whom participate in activities in the dining room. RT will encourage pt participation in activities. RT will transport pt to activities of interest in dining room.

## 2017-10-13 NOTE — PROGRESS NOTES
Nutrition initial assessment-[facility]/  Plan of care      RECOMMENDATIONS:     1. No concentrated sweets diet  2. Ensure daily  3. Monitor weight, labs and PO intake  4. RD to follow     GOALS:     1. PO intake meets >75% of protein/calorie needs by 10/20  2. Weight Maintenance (+/- 1-2 lb by 10/20)    ASSESSMENT:     Weight status is classified as obese per BMI of 33.2. PO intake is fair. Labs noted. BG range from  over past 24 hours. Nutrition recommendations listed. RD to follow. Nutrition Diagnoses:   Inadequate oral intake related to poor appetite as evidenced by pt reported 25-50% intake of meals. Nutrition Risk:  [] High  [x] Moderate []  Low    SUBJECTIVE/OBJECTIVE:      Transferred from  to [facility] on 10/20/17. S/P femur insertion intra medullary nail short for left hip fracture on 1/10/17. Patient reports poor appetite and with gradual weight loss over past year. States weight of 182 lb last year and recent weight around 170-172 lb. 50% intake of lunch meal but reported minimal intake of breakfast. Agreeable to Ensure supplements with breakfast meal. Denies food allergies and problems with chewing/swallowing. Reported not following a special diet PTA. Will liberalize diet to help with intake. Encouraged adequate intake. Will monitor.     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    Meningioma ([address] D32.9    Hip fracture ([facility] [address] S72.009A     Past Medical History: Diagnosis Date    Arthritis     Bronchitis     Diabetes ([facility] [address]     Diverticulitis     GERD (gastroesophageal reflux disease)     Hypercholesterolemia     Hypertension     Hypothyroid     Pancreatitis       Labs:    Lab Results   Component Value Date/Time    Sodium 138 10/12/2017 04:00 AM    Potassium 3.7 10/12/2017 04:00 AM    Chloride 102 10/12/2017 04:00 AM    CO2 23 10/12/2017 04:00 AM    Anion gap 13 10/12/2017 04:00 AM    Glucose 116 10/12/2017 04:00 AM    BUN 14 10/12/2017 04:00 AM    Creatinine 0.89 10/12/2017 04:00 AM    Calcium 7.1 10/12/2017 04:00 AM    Magnesium 1.4 12/04/2009 06:45 AM    Phosphorus 3.5 12/04/2009 06:45 AM    Albumin 3.9 04/20/2017 11:23 AM     Anthropometrics:  BMI: 33.2  There were no vitals filed for this visit. Ht Readings from Last 1 Encounters:   10/09/17 5' (1.524 m)   10/9/17 77.111 kg (pt stated)  No data found. IBW: 100 lb %IBW: 170%  [x] Weight Loss [] Weight Gain [] Weight Stable    Estimated Nutrition Needs: [x] MSJ   Calories: 0916-3659 Kcal Based on:   [x] Actual BW    Protein:    g Based on:   [x] Actual BW    Fluid:       8782-0896 ml Based on:   [x] Actual BW      [x] No Cultural, Judaism or ethnic dietary need identified.     [] Cultural, Judaism and ethnic food preferences identified and addressed     Wt Status:  [] Normal (18.6 - 24.9) [] Underweight (<18.5) [] Overweight (25 - 29.9)   [x] Mild Obesity (30 - 34.9)  [] Moderate Obesity (35 - 39.9) [] Morbid Obesity (40+)     Nutrition Problems Identified:   [x] Fair PO intake   [] Food Allergies  [] Difficulty chewing/swallowing/poor dentition  [] Constipation/Diarrhea   [] Nausea/Vomiting   [] None  [] Other:     Plan:   [x] Therapeutic Diet  [x]  Obtained/adjusted food preferences/tolerances and/or snacks options   [x]  Supplements added   [] Occupational therapy following for feeding techniques  []  HS snack added   []  Modify diet texture   []  Modify diet for food allergies   []  Assist with menu selection   [x]  Monitor PO intake on meal rounds   [x]  Continue inpatient monitoring and intervention   [x]  Participate in discharge planning/Interdisciplinary rounds/Team meetings   []  Other:     Education Needs:   [] Not appropriate for teaching at this time due to:   [x] Identified and addressed    Nutrition Monitoring and Evaluation:  [x] Continue ongoing monitoring and intervention  [] Other    Gabby Perry

## 2017-10-13 NOTE — PROGRESS NOTES
Problem: Mobility Impaired (Adult and Pediatric)  Goal: *Acute Goals and Plan of Care (Insert Text)  PHYSICAL THERAPY STG GOALS :  Initiated 10/13/2017 and to be accomplished within 2 Weeks    1. Patient will move from supine to sit and sit to supine and roll side to side in bed with minimal assistance/contact guard assist with WB compliance. 2. Patient will transfer from bed to chair and chair to bed with minimal assistance/contact guard assist using platform walker with WB compliance. 3. Patient will perform sit to stand with minimal assistance/contact guard assist with Fair balance and safety awareness with WB compliance. 4. Patient will ambulate with minimal assistance/contact guard assist for 75 feet with platform walker on level surfaces with 2 turns with WB compliance. 5. Patient will ascend/descend 14 stairs with right handrail(s) withminimal assistance to allow for safe home access/exit with WB compliance. 6. Patient will improve standardized test score for [place] Standing Balance Scale 1+ with WB compliance. PHYSICAL THERAPY LTG GOALS :  Initiated 10/13/2017 and to be accomplished within 4 Weeks    1. Patient will move from supine to sit and sit to supine and roll side to side in bed with modified independence with WB compliance. 2. Patient will transfer from bed to chair and chair to bed with modified independence using LRAD with WB compliance. 3. Patient will perform sit to stand with modified independence with Good balance and safety awareness with WB compliance. 4. Patient will ambulate with modified independence for 150 feet with LRAD on level surfaces and be able to maneuver through narrow spaces and obstacles without loss of balance with WB compliance. 5. Patient will ascend/descend 14 stairs with right handrail(s) with supervision/set-up to allow for safe home access/exit with WB compliance.   6. Patient will improve standardized test score for [place] Standing Balance Scale 3+ to reduce fall risk with WB compliance. Physical Therapist: Hortencia Blue PT on 10/13/2017   [facility]   PHYSICAL THERAPY EVALUATION     Patient: Frederick Pedro (62 y.o. female)                Start of Care Date: 10/13/2017   Referred by : Trey Jackson MD                                    Precautions: Fall, TTWB, NWB (TTWB LLE, NWB L wrist)    History:  Patient was admitted to [facility] on 10/09/17 with a Dx of left hip fracture, left wrist fracture. L hip fracture was surgically addressed with femur insertion intramedullary nailing, L wrist fracture non-surgically addressed. Upon acute d/c, she was unsafe to return home and was transferred to [facility]. History of present problem reveals HIGH Complexity :3+ comorbidities / personal factors will impact the outcome/ POC . Past Medical history includes:   Past Medical History:   Diagnosis Date    Arthritis      Bronchitis      Diabetes ([address]      Diverticulitis      GERD (gastroesophageal reflux disease)      Hypercholesterolemia      Hypertension      Hypothyroid      Pancreatitis       Past Surgical History:   Procedure Laterality Date    COLONOSCOPY N/A 2/2/2017     COLONOSCOPY  w/bx polyp performed by Sil Gil MD at [facility] ENDOSCOPY        Cognitive Status:  Mental Status  Neurologic State: Alert; Appropriate for age  Orientation Level: Oriented X4  Cognition: Appropriate for age attention/concentration  Perception: Appears intact  Perseveration: No perseveration noted  Safety/Judgement: Fall prevention  Barriers to Learning/Limitations: None  Compensate with: visual, verbal, tactile, kinesthetic cues/model  Prior Level of Function/Home Situation and Assitance recieved:  Home Situation  Home Environment: Private residence ([facility])  # Steps to Enter: 1  Rails to [employer]: No  One/Two Story Residence: Two story  # of Interior Steps: 14  Interior Rails: Right  Lift Chair Available: No  Living Alone: No  Support Systems: Child([place])  Patient Expects to be Discharged to[de-identified] Private residence  Current DME Used/Available at Home: levi Archibald  Level of Assistance received at Home:   Prior to this current hospitalization, the patient was reportedly independent with ADLs and IADLs; however, she reports that she purchased a walker b/c \"I just felt like I was going to need it. \"  Justification for Evaluation complexity:  Patient is referred to Skilled Physical Therapy services due a functional decline in strength, endurance, mobility, gait, and balance and required an overall HIGH  complexity evaluation examination. Exam:HIGH Complexity : 4+ Standardized tests and measures addressing body structure, function, activity limitation and / or participation in recreation    Clinical Presentation: MEDIUM Complexity : Evolving with changing characteristics   Eval Complexity: History: HIGH Complexity :3+ comorbidities / personal factors will impact the outcome/ POC Exam:HIGH Complexity : 4+ Standardized tests and measures addressing body structure, function, activity limitation and / or participation in recreation  Presentation: MEDIUM Complexity : Evolving with changing characteristics    OBJECTIVE DATA SUMMARY:      Vital Signs:  Resting BP:  BP: 107/50  HR: Pulse (Heart Rate): 75    Pain:  Pain Screen  Pain Scale 1: Numeric (0 - 10)  Pain Intensity 1: 8  Pain Location 1: Hip;Hand  Pain Orientation 1: Left  Pain Description 1: Aching  Pain Intervention(s) 1: Elevation;Ice;Medication (see MAR)  Patient Stated Pain Goal: 0  Gross Assessment:  Gross Assessment  AROM: Generally decreased, functional  PROM: Generally decreased, functional  Strength: Generally decreased, functional (RLE 3+/5 grossly, LLE demonstrates 3/5)  Coordination: Generally decreased, functional  Tone: Normal              Bed Mobility:  Bed Mobility  Supine to Sit: Maximum assistance  Sit to Supine: Maximum assistance  Balance:  Balance  Sitting: Impaired; With support  Sitting - Static: Fair (occasional) (+)  Sitting - Dynamic: Fair (occasional)  Standing: Impaired  Standing - Static: Poor  Transfers:  Sit to Stand: Maximum assistance  Gait:     Gait Description (WDL):  (unable to assess)          Wheelchair Management:      Assessment:   Clinical Decision Making:High Complexity , using standardized patient assessment instrument and /or measurable assessement of functional outcome of [facility] Standing Balance Scale, score of 0.   0: Pt performs 25% or less of standing activity (Max assist) CN, 100% impaired. 1: Pt supports self with upper extremities but requires therapist assistance. Pt performs 25-50% of effort (Mod assist) CM, 80% to <100% impaired. 1+: Pt supports self with upper extremities but requires therapist assistance. Pt performs >50% effort. (Min assist). CL, 60% to <80% impaired. 2: Pt supports self independently with both upper extremities (walker, crutches, parallel bars). CL, 60% to <80% impaired. 2+: Pt support self independently with 1 upper extremity (cane, crutch, 1 parallel bar). CK, 40% to <60% impaired. 3: Pt stands without upper extremity support for up to 30 seconds. CK, 40% to <60% impaired. 3+: Pt stands without upper extremity support for 30 seconds or greater. CJ, 20% to <40% impaired. 4: Pt independently moves and returns center of gravity 1-2 inches in one plane. CJ, 20% to <40% impaired. 4+: Pt independently moves and returns center of gravity 1-2 inches in multiple planes. CI, 1% to <20% impaired. 5: Pt independently moves and returns center of gravity in all planes greater than 2 inches. CH, 0% impaired. Positioning needs/Additional Comments :  Pt presents in bed, c/o being hot. Therapist assisted pt in adjusting fan at bedside. Pt alert and oriented x 4. Bed mobility with max A for LE management and due to impaired strength in achieving upright sitting at EOB.  Sit <> stand transfers with verbal cueing for NWB through wrist and TTWB through LLE, visual cueing also required for compliance. Scooting to [facility] with mod A and verbal and visual cueing. Gait training not assessed due to 10/10 pain in LLE; platform walker also unavailable. Pt received pain meds about 1 hr prior to evaluation, pt's pain persists. Platform walker to be used for ambulation per ortho surgeon. Therapeutic exercises rendered to address strength, endurance, mobility and included: heel raises, toe raises, LAQ while sitting EOB 15 reps x 1 set, ankle pumps 15 reps x 1 set, verbal and visual cueing for proper technique. HEP administered to include ankle pumps to address circulation, instructed in completion on commercial breaks as pt appears to enjoy television. Safety awareness techniques rendered to reduce fall risk. Recommend skilled physical therapy services to address deficits, restore function, and for safe return home. TREATMENT PLAN: Rehab Potential : Good  Skilled Physical Therapy Services may include:   [X]           Bed Mobility Training             [X]    Neuromuscular Re-Education  [X]           Transfer Training                   [X]    Orthotic/Prosthetic Training  [X]           Gait Training                          [X]    Modalities  [X]           Therapeutic Procedures        [X]    Manual Therapy  [X]           Therapeutic Activities            [X]    Patient and Family Training/Education  [ ]           Other (comment):      Frequency/Duration: Patient will be followed by physical therapy for 1-2 times a day for a minimum of 3 days per week for 4 weeks to address goals. Discharge Recommendations: Home Health  Patient's expected Discharge Location:  [X]Private Residence  [ ] DREW/ILF  [ Ander Dub  [ ]Other:       COMMUNICATION/EDUCATION:  Patient/Family Agreement with Treatment Plan :      [X]         The PT evaluation/POC has been reviewed with PT assistant.    [X]         Fall prevention education was provided and the patient/caregiver indicated understanding. [X]         Patient/family have participated as able in goal setting and plan of care and Patient/family agree to work toward stated goals and plan of care. [ ]         Patient is unable to participate in goal setting and plan of care. Therapist/SW will contact family/POA. Treatment session:  Overall Complexity: MEDIUM Evaluation:  34 mins./ Treatment:  56 mins.   Physical Therapist:   Susannah Bhatt, PT       10/13/2017   Thank you for this referral.

## 2017-10-13 NOTE — ROUTINE PROCESS
Bedside and Verbal shift change report given to Wilber Mota (oncoming nurse) by Angel Luis Andres RN (offgoing nurse). Report included the following information SBAR, Kardex and MAR. QH ROUNDS AND 24H CHART CHECKS DONE.

## 2017-10-13 NOTE — PROGRESS NOTES
Late entry: SW met with pt to complete the social evaluation. Pt lives in her own home with her son Romana Beery # [phone] and is expected to return home at discharge. Pt sustained a left femur fracture and left wrist fx while walking her dog with flip flops on. Pt reported that she was independent with all activities prior to admission. Pt lives in a tri level home with 28 steps to ambulate to her 3rd floor bedroom. The first level of the home is an enclosed patio and the 2nd level has no bathroom. She indicated that she could probably use a bedside commode if needed on the 2nd level. Her goal is \" to go home and do what I always do\". Loly informed pt that the ortho MD wants to see her back in 1 month and SW will obtain appt for her. Pt is pleasant and cooperative. SW will follow.

## 2017-10-14 LAB
GLUCOSE BLD STRIP.AUTO-MCNC: 115 MG/DL (ref 70–110)
GLUCOSE BLD STRIP.AUTO-MCNC: 120 MG/DL (ref 70–110)
GLUCOSE BLD STRIP.AUTO-MCNC: 97 MG/DL (ref 70–110)
GLUCOSE BLD STRIP.AUTO-MCNC: 99 MG/DL (ref 70–110)

## 2017-10-14 PROCEDURE — 74011250637 HC RX REV CODE- 250/637: Performed by: INTERNAL MEDICINE

## 2017-10-14 PROCEDURE — 74011250636 HC RX REV CODE- 250/636: Performed by: INTERNAL MEDICINE

## 2017-10-14 PROCEDURE — 82962 GLUCOSE BLOOD TEST: CPT

## 2017-10-14 RX ORDER — ONDANSETRON 4 MG/1
4 TABLET, ORALLY DISINTEGRATING ORAL
Status: DISCONTINUED | OUTPATIENT
Start: 2017-10-14 | End: 2017-11-21 | Stop reason: HOSPADM

## 2017-10-14 RX ORDER — MAG HYDROX/ALUMINUM HYD/SIMETH 200-200-20
30 SUSPENSION, ORAL (FINAL DOSE FORM) ORAL
Status: DISCONTINUED | OUTPATIENT
Start: 2017-10-14 | End: 2017-11-21 | Stop reason: HOSPADM

## 2017-10-14 RX ADMIN — METFORMIN HYDROCHLORIDE 1000 MG: 500 TABLET ORAL at 16:57

## 2017-10-14 RX ADMIN — CHOLECALCIFEROL TAB 25 MCG (1000 UNIT) 2000 UNITS: 25 TAB at 09:02

## 2017-10-14 RX ADMIN — GLIPIZIDE 5 MG: 5 TABLET ORAL at 09:03

## 2017-10-14 RX ADMIN — TRIAMCINOLONE ACETONIDE: 1 CREAM TOPICAL at 09:00

## 2017-10-14 RX ADMIN — PANTOPRAZOLE SODIUM 40 MG: 40 TABLET, DELAYED RELEASE ORAL at 09:03

## 2017-10-14 RX ADMIN — ENOXAPARIN SODIUM 40 MG: 40 INJECTION SUBCUTANEOUS at 09:02

## 2017-10-14 RX ADMIN — DOCUSATE SODIUM 100 MG: 100 CAPSULE, LIQUID FILLED ORAL at 09:00

## 2017-10-14 RX ADMIN — GLIPIZIDE 5 MG: 5 TABLET ORAL at 17:47

## 2017-10-14 RX ADMIN — ONDANSETRON 4 MG: 4 TABLET, ORALLY DISINTEGRATING ORAL at 15:50

## 2017-10-14 RX ADMIN — METFORMIN HYDROCHLORIDE 1000 MG: 500 TABLET ORAL at 09:03

## 2017-10-14 RX ADMIN — DOCUSATE SODIUM 100 MG: 100 CAPSULE, LIQUID FILLED ORAL at 17:46

## 2017-10-14 RX ADMIN — TRIAMCINOLONE ACETONIDE: 1 CREAM TOPICAL at 18:00

## 2017-10-14 RX ADMIN — SIMVASTATIN 20 MG: 20 TABLET, FILM COATED ORAL at 21:42

## 2017-10-14 RX ADMIN — LEVOTHYROXINE SODIUM 75 MCG: 75 TABLET ORAL at 06:03

## 2017-10-14 NOTE — ROUTINE PROCESS
Bedside and Verbal shift change report given to [address] [place] [address] (oncoming nurse) by Kike Matias RN (offgoing nurse). Report included the following information SBAR, Kardex and MAR. Q VISUAL CHECKS DONE.

## 2017-10-14 NOTE — ROUTINE PROCESS
Bedside and Verbal shift change report given to Ericka Alejandra RN (oncoming nurse) by Meagan Martin RN (offgoing nurse). Report included the following information SBAR, Kardex and MAR.

## 2017-10-15 LAB
GLUCOSE BLD STRIP.AUTO-MCNC: 100 MG/DL (ref 70–110)
GLUCOSE BLD STRIP.AUTO-MCNC: 117 MG/DL (ref 70–110)
GLUCOSE BLD STRIP.AUTO-MCNC: 122 MG/DL (ref 70–110)
GLUCOSE BLD STRIP.AUTO-MCNC: 153 MG/DL (ref 70–110)
GLUCOSE BLD STRIP.AUTO-MCNC: 65 MG/DL (ref 70–110)
GLUCOSE BLD STRIP.AUTO-MCNC: 74 MG/DL (ref 70–110)

## 2017-10-15 PROCEDURE — 74011636637 HC RX REV CODE- 636/637: Performed by: INTERNAL MEDICINE

## 2017-10-15 PROCEDURE — 74011250636 HC RX REV CODE- 250/636: Performed by: INTERNAL MEDICINE

## 2017-10-15 PROCEDURE — 74011250637 HC RX REV CODE- 250/637: Performed by: INTERNAL MEDICINE

## 2017-10-15 PROCEDURE — 82962 GLUCOSE BLOOD TEST: CPT

## 2017-10-15 RX ADMIN — ENOXAPARIN SODIUM 40 MG: 40 INJECTION SUBCUTANEOUS at 09:09

## 2017-10-15 RX ADMIN — SIMVASTATIN 20 MG: 20 TABLET, FILM COATED ORAL at 21:02

## 2017-10-15 RX ADMIN — OXYCODONE HYDROCHLORIDE AND ACETAMINOPHEN 2 TABLET: 5; 325 TABLET ORAL at 09:54

## 2017-10-15 RX ADMIN — TRIAMCINOLONE ACETONIDE: 1 CREAM TOPICAL at 09:00

## 2017-10-15 RX ADMIN — METFORMIN HYDROCHLORIDE 1000 MG: 500 TABLET ORAL at 16:20

## 2017-10-15 RX ADMIN — GLIPIZIDE 5 MG: 5 TABLET ORAL at 17:05

## 2017-10-15 RX ADMIN — TRIAMCINOLONE ACETONIDE: 1 CREAM TOPICAL at 18:00

## 2017-10-15 RX ADMIN — DOCUSATE SODIUM 100 MG: 100 CAPSULE, LIQUID FILLED ORAL at 09:09

## 2017-10-15 RX ADMIN — GLIPIZIDE 5 MG: 5 TABLET ORAL at 09:10

## 2017-10-15 RX ADMIN — CHOLECALCIFEROL TAB 25 MCG (1000 UNIT) 2000 UNITS: 25 TAB at 09:09

## 2017-10-15 RX ADMIN — INSULIN LISPRO 2 UNITS: 100 INJECTION, SOLUTION INTRAVENOUS; SUBCUTANEOUS at 11:30

## 2017-10-15 RX ADMIN — PANTOPRAZOLE SODIUM 40 MG: 40 TABLET, DELAYED RELEASE ORAL at 09:10

## 2017-10-15 RX ADMIN — OXYCODONE HYDROCHLORIDE AND ACETAMINOPHEN 2 TABLET: 5; 325 TABLET ORAL at 01:40

## 2017-10-15 RX ADMIN — LEVOTHYROXINE SODIUM 75 MCG: 75 TABLET ORAL at 06:43

## 2017-10-15 RX ADMIN — LOSARTAN POTASSIUM 50 MG: 50 TABLET ORAL at 09:10

## 2017-10-15 RX ADMIN — HYDROCHLOROTHIAZIDE 25 MG: 25 TABLET ORAL at 09:00

## 2017-10-15 RX ADMIN — METFORMIN HYDROCHLORIDE 1000 MG: 500 TABLET ORAL at 09:09

## 2017-10-15 NOTE — ROUTINE PROCESS
Bedside shift change report given to Yanira Jacobo (oncoming nurse) by Dominga Rivas RN (offgoing nurse). Report included the following information SBAR, Kardex and MAR.  Q VISUAL CHECKS DONE

## 2017-10-15 NOTE — PROGRESS NOTES
Problem: Mobility Impaired (Adult and Pediatric)  Goal: *Acute Goals and Plan of Care (Insert Text)  PHYSICAL THERAPY STG GOALS :  Initiated 10/13/2017 and to be accomplished within 2 Weeks    1. Patient will move from supine to sit and sit to supine and roll side to side in bed with minimal assistance/contact guard assist with WB compliance. 2. Patient will transfer from bed to chair and chair to bed with minimal assistance/contact guard assist using platform walker with WB compliance. 3. Patient will perform sit to stand with minimal assistance/contact guard assist with Fair balance and safety awareness with WB compliance. 4. Patient will ambulate with minimal assistance/contact guard assist for 75 feet with platform walker on level surfaces with 2 turns with WB compliance. 5. Patient will ascend/descend 14 stairs with right handrail(s) withminimal assistance to allow for safe home access/exit with WB compliance. 6. Patient will improve standardized test score for [place] Standing Balance Scale 1+ with WB compliance. PHYSICAL THERAPY LTG GOALS :  Initiated 10/13/2017 and to be accomplished within 4 Weeks    1. Patient will move from supine to sit and sit to supine and roll side to side in bed with modified independence with WB compliance. 2. Patient will transfer from bed to chair and chair to bed with modified independence using LRAD with WB compliance. 3. Patient will perform sit to stand with modified independence with Good balance and safety awareness with WB compliance. 4. Patient will ambulate with modified independence for 150 feet with LRAD on level surfaces and be able to maneuver through narrow spaces and obstacles without loss of balance with WB compliance. 5. Patient will ascend/descend 14 stairs with right handrail(s) with supervision/set-up to allow for safe home access/exit with WB compliance.   6. Patient will improve standardized test score for [place] Standing Balance Scale 3+ to reduce fall risk with WB compliance. Physical Therapist: Anh Saez, PT on 10/13/2017    [facility]ARE [facility]ENTER   PHYSICAL THERAPY DAILY TREATMENT NOTE        Patient: Saba Maria (25 y.o. female)               Date: 10/15/2017    Physician: Sadiq Parsons MD  Primary Diagnosis: left hip fracture  Hip fracture [facility])          Treatment Diagnosis  Treatment Diagnosis: muscle weakness  Treatment Diagnosis 2: increased need for assist w/ self care  Precautions: Fall, TTWB (TTWB LLE and NWB Left wrist)  Vital Signs  Vital Signs  Pulse (Heart Rate): 71  BP: 111/59     Cognitive Status:  Mental Status  Neurologic State: Alert; Appropriate for age  Orientation Level: Oriented X4  Cognition: Appropriate for age attention/concentration  Pain  Pain Screen  Pain Scale 1: Numeric (0 - 10)  Pain Intensity 1: 0  Pain Onset 1: now  Pain Location 1: Hip  Pain Orientation 1: Left  Pain Description 1: Aching  Pain Intervention(s) 1: Position;Medication (see MAR)  Patient Stated Pain Goal: 0  Pain Reassessment 1: Yes  Bed Mobility Training  Bed Mobility Training  Supine to Sit: Moderate assistance  Sit to Supine: Moderate assistance  Balance  Sitting: Impaired; With support  Sitting - Static: Fair (occasional)  Sitting - Dynamic: Fair (occasional)  Standing: Impaired  Standing - Static: Poor  Transfer Training  Transfer Training  Sit to Stand: Moderate assistance (x2)  Stand to Sit: Moderate assistance (x2)  Stand Pivot Transfers: Moderate assistance  Bed to Chair: Maximum assistance (x2)  Sit to Stand: Moderate assistance (x2)  Gait Training                     With 0 turns. Therapeutic Exercise:   [place] to maximize functional gains. Pt supine in bed upon arrival to room, reports 7/10 pain. Bed mobility as mentioned above, modA for LE advancement, verbal cuing for sequencing. SPT mod-maxA x2, verbal and tactile cuing for sequencing. Sit<>stand transfers modA x2 using platform walker.  Pt education on TTWB for LLE with max verbal/tactile feedback using therapist foot under pt's, pt able to maintain WB status 80% of time. Pt returned to bed with SPT mod A x2. Patient/Caregiver Education:   Pt /Caregiver Education on safety and fall prevention,  Transfer training was provided to reduce risk for falls. ASSESSMENT:  Patient continues to benefit from Skilled PT services to improve overall strength and mobility, decrease pain, improve functional mobility. Progression toward goals:  [X]      Improving appropriately and progressing toward goals  [ ]      Improving slowly and progressing toward goals  [ ]      Not making progress toward goals and plan of care will be adjusted      Treatment session: 59 minutes.   Therapist:   Emili Pandya PTA,          10/15/2017

## 2017-10-15 NOTE — ROUTINE PROCESS
Bedside and Verbal shift change report given to Manish Arenas RN (oncoming nurse) by Devi Husain RN (offgoing nurse). Report included the following information SBAR, Kardex and MAR. 24 hour chart check. Hourly rounds made.

## 2017-10-15 NOTE — PROGRESS NOTES
Problem: Self Care Deficits Care Plan (Adult)  Goal: *Acute Goals and Plan of Care (Insert Text)  OCCUPATIONAL THERAPY SHORT TERM GOALS   Initiated 10/13/2017 and to be accomplished within 2 Week(s)    1. Patient will perform Upper body ADLs with adaptive equipment & compensatory techniques as needed with minimal assistance/contact guard assist.  2. Patient will perform Lower body ADLs with adaptive equipment & compensatory techniques as needed moderate assistance . 3. Patient will perform toileting task with moderate assistance x 2 with Good safety to reduce falls risk. 4. Patient will perform toilet transfers with Platform Walker and moderate assistance x 2 .  5. Patient will perform standing static/dynamic balance activities for improved ADL/IADL function with moderate assistance x,2 and Good balance and safety awareness. 6. Patient will improve Barthel index scores to atleast 35/100 to improve functional mobility. 7. Patient will tolerate standing x 5 minutes during functional activity while adhering to LLE TTWB And Left wrist NWB utilizing platform walker to increase participation in ADL routine. OCCUPATIONAL THERAPY LONG TERM GOALS   Initiated 10/13/2017 and to be accomplished within 4 Week(s)    1. Patient will perform Upper body ADLs with adaptive equipment & compensatory techniques as needed with supervision/set-up. 2. Patient will perform Lower body ADLs with adaptive equipment & compensatory techniques as needed with supervision/set-up . 3. Patient will perform toileting task with supervision/set-up with Good safety to reduce falls risk. 4. Patient will perform functional transfers with Platform Walker and supervision/set-up and Good balance and safety awareness. 5. Patient will perform standing static/dynamic activity for improved ADL/IADL function with supervision/set-up an and Good balance and safety awareness.   6. Patient will improve Barthel index score to 50/100 to improve independence with mobility. Therapist: Blair Neves 10/13/2017   TR[facility]ITIONAL [facility]ARE CENTER   OCCUPATIONAL THERAPY DAILY TREATMENT NOTE        Patient: Artemio Harry (29 y.o. female)                           Date: 10/15/2017  Attending Physician: Zarina Hermosillo MD  Primary Diagnosis: left hip fracture  Hip fracture [facility])    Treatment Diagnosis  Treatment Diagnosis: muscle weakness  Treatment Diagnosis 2: increased need for assist w/ self care   Precautions : Precautions at Admission: Fall, TTWB (TTWB LLE and NWB Left wrist)  Vital Signs:  Vital Signs  Pulse (Heart Rate): 71  BP: 111/59     Cognitive Status:  Mental Status  Neurologic State: Alert; Appropriate for age  Orientation Level: Oriented X4  Cognition: Appropriate for age attention/concentration  Pain:  Pain Screen  Pain Scale 1: Numeric (0 - 10)  Pain Intensity 1: 0  Pain Onset 1: now  Pain Location 1: Hip  Pain Orientation 1: Left  Pain Description 1: Aching  Pain Intervention(s) 1: Position;Medication (see MAR)  Patient Stated Pain Goal: 0  Pain Reassessment 1: Yes  Pain Scale 1: Numeric (0 - 10)  Gross Assessment:     Coordination:     Bed Mobility:  Bed Mobility  Supine to Sit: Moderate assistance  Sit to Supine: Moderate assistance  Transfers:  Functional Transfers  Sit to Stand: Moderate assistance (x2)  Stand to Sit: Moderate assistance (x2)  Bed to Chair: Maximum assistance (x2)     Balance:  Balance  Sitting: Impaired; With support  Sitting - Static: Fair (occasional)  Sitting - Dynamic: Fair (occasional)  Standing: Impaired  Standing - Static: Poor  ADL Self Care:     ADL Intervention:     Upper Body [address]: Minimum  assistance        Grooming  Brushing Teeth: Supervision/set-up  Brushing/Combing Hair: Supervision/set-up; Modified independent  Lower Body Dressing Assistance  Socks: Total assistance (dependent)                 Therapeutic Activities: Co-treat with P.T.  To maximize functional potential with functional mobility and transfers to improve ADL performance skills and ADL functional transfers e.g. Toilet transfers. Patient required max verbal and tactile cues for adherence w/ LLE TTWB 80% of the time and min verbal cues to adhere to NWB L wrist, grooming tasks, bed mobility and functional transfers as mentioned above. Patient/Caregiver Education:    Pt.  Education on correct hand placement and remain TTWB LLE and NWB L wrist during functional transfer was provided to increase safety awareness, adhere to WB restrictions and prevent falls. ASSESSMENT:  Patient continues to demonstrate the need for skilled Occupational Therapy services to improve   grooming, bathing, upper body dressing, lower body dressing, toileting and toilet transfers.   Progression toward goals:  [ ]      Improving appropriately and progressing toward goals  [X]      Improving slowly and progressing toward goals  [ ]      Not making progress toward goals and plan of care will be adjusted      Treatment session:   59 minutes     Therapist:    Angelina Cook,  10/15/2017

## 2017-10-16 LAB
GLUCOSE BLD STRIP.AUTO-MCNC: 117 MG/DL (ref 70–110)
GLUCOSE BLD STRIP.AUTO-MCNC: 134 MG/DL (ref 70–110)
GLUCOSE BLD STRIP.AUTO-MCNC: 171 MG/DL (ref 70–110)
GLUCOSE BLD STRIP.AUTO-MCNC: 94 MG/DL (ref 70–110)

## 2017-10-16 PROCEDURE — 82962 GLUCOSE BLOOD TEST: CPT

## 2017-10-16 PROCEDURE — 74011250637 HC RX REV CODE- 250/637: Performed by: INTERNAL MEDICINE

## 2017-10-16 PROCEDURE — 74011636637 HC RX REV CODE- 636/637: Performed by: INTERNAL MEDICINE

## 2017-10-16 PROCEDURE — 74011250636 HC RX REV CODE- 250/636: Performed by: INTERNAL MEDICINE

## 2017-10-16 RX ADMIN — PANTOPRAZOLE SODIUM 40 MG: 40 TABLET, DELAYED RELEASE ORAL at 10:34

## 2017-10-16 RX ADMIN — INSULIN LISPRO 2 UNITS: 100 INJECTION, SOLUTION INTRAVENOUS; SUBCUTANEOUS at 12:29

## 2017-10-16 RX ADMIN — CHOLECALCIFEROL TAB 25 MCG (1000 UNIT) 2000 UNITS: 25 TAB at 10:34

## 2017-10-16 RX ADMIN — METFORMIN HYDROCHLORIDE 1000 MG: 500 TABLET ORAL at 17:52

## 2017-10-16 RX ADMIN — ONDANSETRON 4 MG: 4 TABLET, ORALLY DISINTEGRATING ORAL at 12:23

## 2017-10-16 RX ADMIN — SIMVASTATIN 20 MG: 20 TABLET, FILM COATED ORAL at 21:26

## 2017-10-16 RX ADMIN — LEVOTHYROXINE SODIUM 75 MCG: 75 TABLET ORAL at 05:50

## 2017-10-16 RX ADMIN — LOSARTAN POTASSIUM 50 MG: 50 TABLET ORAL at 10:34

## 2017-10-16 RX ADMIN — TRIAMCINOLONE ACETONIDE: 1 CREAM TOPICAL at 18:00

## 2017-10-16 RX ADMIN — TRIAMCINOLONE ACETONIDE: 1 CREAM TOPICAL at 10:39

## 2017-10-16 RX ADMIN — DOCUSATE SODIUM 100 MG: 100 CAPSULE, LIQUID FILLED ORAL at 10:34

## 2017-10-16 RX ADMIN — ENOXAPARIN SODIUM 40 MG: 40 INJECTION SUBCUTANEOUS at 10:35

## 2017-10-16 RX ADMIN — METFORMIN HYDROCHLORIDE 1000 MG: 500 TABLET ORAL at 10:33

## 2017-10-16 RX ADMIN — HYDROCHLOROTHIAZIDE 25 MG: 25 TABLET ORAL at 10:34

## 2017-10-16 RX ADMIN — GLIPIZIDE 5 MG: 5 TABLET ORAL at 17:52

## 2017-10-16 RX ADMIN — OXYCODONE HYDROCHLORIDE AND ACETAMINOPHEN 2 TABLET: 5; 325 TABLET ORAL at 03:41

## 2017-10-16 RX ADMIN — OXYCODONE HYDROCHLORIDE AND ACETAMINOPHEN 2 TABLET: 5; 325 TABLET ORAL at 17:52

## 2017-10-16 RX ADMIN — GLIPIZIDE 5 MG: 5 TABLET ORAL at 10:34

## 2017-10-16 NOTE — PROGRESS NOTES
I have reviewed this patient's current medication list and recent laboratory results. At this time, I do not suggest any drug therapy adjustments or additional laboratory monitoring. Thank you,  Angelina KINSEY  Ph. M. S.  10/16/2017

## 2017-10-16 NOTE — PROGRESS NOTES
Problem: Self Care Deficits Care Plan (Adult)  Goal: *Acute Goals and Plan of Care (Insert Text)  OCCUPATIONAL THERAPY SHORT TERM GOALS   Initiated 10/13/2017 and to be accomplished within 2 Week(s)    1. Patient will perform Upper body ADLs with adaptive equipment & compensatory techniques as needed with minimal assistance/contact guard assist.  2. Patient will perform Lower body ADLs with adaptive equipment & compensatory techniques as needed moderate assistance . 3. Patient will perform toileting task with moderate assistance x 2 with Good safety to reduce falls risk. 4. Patient will perform toilet transfers with Platform Walker and moderate assistance x 2 .  5. Patient will perform standing static/dynamic balance activities for improved ADL/IADL function with moderate assistance x,2 and Good balance and safety awareness. 6. Patient will improve Barthel index scores to atleast 35/100 to improve functional mobility. 7. Patient will tolerate standing x 5 minutes during functional activity while adhering to LLE TTWB And Left wrist NWB utilizing platform walker to increase participation in ADL routine. OCCUPATIONAL THERAPY LONG TERM GOALS   Initiated 10/13/2017 and to be accomplished within 4 Week(s)    1. Patient will perform Upper body ADLs with adaptive equipment & compensatory techniques as needed with supervision/set-up. 2. Patient will perform Lower body ADLs with adaptive equipment & compensatory techniques as needed with supervision/set-up . 3. Patient will perform toileting task with supervision/set-up with Good safety to reduce falls risk. 4. Patient will perform functional transfers with Platform Walker and supervision/set-up and Good balance and safety awareness. 5. Patient will perform standing static/dynamic activity for improved ADL/IADL function with supervision/set-up an and Good balance and safety awareness.   6. Patient will improve Barthel index score to 50/100 to improve independence with mobility. Therapist: Vandana Andersen 10/13/2017   [facility]   OCCUPATIONAL THERAPY DAILY TREATMENT NOTE        Patient: Tariq Gay (82 y.o. female)                           Date: 10/16/2017  Attending Physician: Rebecca Abreu MD  Primary Diagnosis: left hip fracture  Hip fracture [facility])    Treatment Diagnosis  Treatment Diagnosis: muscle weakness  Treatment Diagnosis 2: increased need for assist w/ self care   Precautions : Precautions at Admission: Fall, TTWB (TTWB LLE and NWB Left wrist)  Vital Signs:  Vital Signs  Pulse (Heart Rate): 67  Level of Consciousness: Alert  BP: 97/60  MAP (Calculated): 72     Cognitive Status:  Mental Status  Neurologic State: Alert  Orientation Level: Oriented X4  Cognition: Appropriate for age attention/concentration  Pain:  Pain Screen  Pain Scale 1: Numeric (0 - 10)  Pain Intensity 1: 0 (Reports recently having pain meds)  Patient Stated Pain Goal: 0  Pain Reassessment 1: Patient sleeping  Pain Scale 1: Numeric (0 - 10)  Gross Assessment:     Coordination:     Bed Mobility:  Bed Mobility  Supine to Sit: Moderate assistance  Sit to Supine: Moderate assistance  Scooting: Maximum assistance  Transfers:  Functional Transfers  Sit to Stand: Moderate assistance;Assist x2  Stand to Sit: Moderate assistance     Balance:  Balance  Sitting: With support  Sitting - Static: Fair (occasional)  Sitting - Dynamic: Fair (occasional)  Standing: With support  Standing - Static: Fair (-)        Therapeutic Activities:  Assisted patient with bed mobility  in order to assess safety and independence during task. See above for levels of A needed. Cueing needed for patient to not utilize L UE during bed mobility for optimal safety and healing. Practiced Static standing activity with platform RW in order to increase standing balance and tolerance needed for ADLS.  Patient was able to tolerate two trials of standing, 15-30 each with Max cueing for TTWB on L Nico Gonzalez discussed with patient importance of adhering to TTWB for optimal healing. Patient verbalized understanding however stated pain in L knee limiting TTWB position. Therapeutic Exercises:  UB strengthening with 2#, 2 sets x 20 reps in order to increase functional activity tolerance and UB muscle strength needed for ADLs  Patient/Caregiver Education:    Pt. Lionel Galan Education on see above.         ASSESSMENT:  Patient continues to demonstrate the need for skilled Occupational Therapy services to improve static standing balance needed for bathing  Progression toward goals:  [ ]      Improving appropriately and progressing toward goals  [X]      Improving slowly and progressing toward goals  [ ]      Not making progress toward goals and plan of care will be adjusted      Treatment session:   45 minutes     Therapist:    JUDY Palma,  10/16/2017

## 2017-10-16 NOTE — PROGRESS NOTES
Problem: Mobility Impaired (Adult and Pediatric)  Goal: *Acute Goals and Plan of Care (Insert Text)  PHYSICAL THERAPY STG GOALS :  Initiated 10/13/2017 and to be accomplished within 2 Weeks    1. Patient will move from supine to sit and sit to supine and roll side to side in bed with minimal assistance/contact guard assist with WB compliance. 2. Patient will transfer from bed to chair and chair to bed with minimal assistance/contact guard assist using platform walker with WB compliance. 3. Patient will perform sit to stand with minimal assistance/contact guard assist with Fair balance and safety awareness with WB compliance. 4. Patient will ambulate with minimal assistance/contact guard assist for 75 feet with platform walker on level surfaces with 2 turns with WB compliance. 5. Patient will ascend/descend 14 stairs with right handrail(s) withminimal assistance to allow for safe home access/exit with WB compliance. 6. Patient will improve standardized test score for [place] Standing Balance Scale 1+ with WB compliance. PHYSICAL THERAPY LTG GOALS :  Initiated 10/13/2017 and to be accomplished within 4 Weeks    1. Patient will move from supine to sit and sit to supine and roll side to side in bed with modified independence with WB compliance. 2. Patient will transfer from bed to chair and chair to bed with modified independence using LRAD with WB compliance. 3. Patient will perform sit to stand with modified independence with Good balance and safety awareness with WB compliance. 4. Patient will ambulate with modified independence for 150 feet with LRAD on level surfaces and be able to maneuver through narrow spaces and obstacles without loss of balance with WB compliance. 5. Patient will ascend/descend 14 stairs with right handrail(s) with supervision/set-up to allow for safe home access/exit with WB compliance.   6. Patient will improve standardized test score for [place] Standing Balance Scale 3+ to reduce fall risk with WB compliance. Physical Therapist: Louetta Claude, PT on 10/13/2017    [facility]   PHYSICAL THERAPY DAILY TREATMENT NOTE        Patient: Eulalia Ivy (71 y.o. female)               Date: 10/16/2017    Physician: Hans Gibson MD  Primary Diagnosis: left hip fracture  Hip fracture [facility])          Treatment Diagnosis  Treatment Diagnosis: muscle weakness  Treatment Diagnosis 2: increased need for assist w/ self care  Precautions: Fall, TTWB (TTWB LLE and NWB Left wrist)  Vital Signs  Vital Signs  Pulse (Heart Rate): 67  Level of Consciousness: Alert  BP: 97/60  MAP (Calculated): 72     Cognitive Status:  Mental Status  Neurologic State: Alert  Orientation Level: Oriented X4  Cognition: Appropriate for age attention/concentration  Pain  Pain Screen  Pain Scale 1: Numeric (0 - 10)  Pain Intensity 1: 0 (Reports recently having pain meds)  Pain Onset 1: movement  Pain Location 1: Hip  Pain Orientation 1: Left  Pain Description 1: Aching  Pain Intervention(s) 1: Medication (see MAR); Repositioned; Rest  Patient Stated Pain Goal: 0  Pain Reassessment 1: Patient sleeping  Bed Mobility Training     Balance     Transfer Training  Transfer Training  Sit to Stand: Moderate assistance (3 reps with 30 stance)  Stand to Sit: Moderate assistance  Sit to Stand: Moderate assistance (3 reps with 30 stance)  Gait Training  Gait  Ambulation - Level of Assistance: Moderate assistance; Additional time  Distance (ft): 5 Feet (ft)  Assistive Device: Other (comment) (Rolling walker with platform on left)                    Therapeutic Exercise:    Pt performed exercises in supine (ankle pumps, hip abd, hip add, SAQ, quad sets) x 20 reps to increase LE strength and activity endurance. Required rest breaks to minimize fatigue during treatment.  Able to perform some bed mobility but limited due to weight bearing for left wrist. Only able to perform gait training for a short period due to becoming dizzy during treatment requiring supplemental oxygen (2L), nursing staff reported pt having a low BP earlier today. Pt has a good understanding of weight bearing precautions but remains by   Patient/Caregiver Education:   Pt education on safety and fall prevention was provided to ensure continue safety. ASSESSMENT:  Patient continues to benefit from Skilled PT services to improve LE strength and mobility  Progression toward goals:  [X]      Improving appropriately and progressing toward goals  [ ]      Improving slowly and progressing toward goals  [ ]      Not making progress toward goals and plan of care will be adjusted      Treatment session: 60 minutes.   Therapist:   Jasmyne Gerard PTA,          10/16/2017

## 2017-10-16 NOTE — ROUTINE PROCESS
Bedside and Verbal shift change report given to [address] [place] [address] (oncoming nurse) by Agueda Bautista RN (offgoing nurse). Report included the following information SBAR, Kardex and MAR.

## 2017-10-17 LAB
BACTERIA SPEC CULT: NORMAL
GLUCOSE BLD STRIP.AUTO-MCNC: 101 MG/DL (ref 70–110)
GLUCOSE BLD STRIP.AUTO-MCNC: 102 MG/DL (ref 70–110)
GLUCOSE BLD STRIP.AUTO-MCNC: 123 MG/DL (ref 70–110)
GLUCOSE BLD STRIP.AUTO-MCNC: 174 MG/DL (ref 70–110)
SERVICE CMNT-IMP: NORMAL

## 2017-10-17 PROCEDURE — 74011636637 HC RX REV CODE- 636/637: Performed by: INTERNAL MEDICINE

## 2017-10-17 PROCEDURE — 74011250636 HC RX REV CODE- 250/636: Performed by: INTERNAL MEDICINE

## 2017-10-17 PROCEDURE — 77030012890

## 2017-10-17 PROCEDURE — 74011250637 HC RX REV CODE- 250/637: Performed by: INTERNAL MEDICINE

## 2017-10-17 PROCEDURE — 82962 GLUCOSE BLOOD TEST: CPT

## 2017-10-17 RX ADMIN — DOCUSATE SODIUM 100 MG: 100 CAPSULE, LIQUID FILLED ORAL at 17:33

## 2017-10-17 RX ADMIN — OXYCODONE HYDROCHLORIDE AND ACETAMINOPHEN 2 TABLET: 5; 325 TABLET ORAL at 05:06

## 2017-10-17 RX ADMIN — METFORMIN HYDROCHLORIDE 1000 MG: 500 TABLET ORAL at 09:19

## 2017-10-17 RX ADMIN — INSULIN LISPRO 2 UNITS: 100 INJECTION, SOLUTION INTRAVENOUS; SUBCUTANEOUS at 14:05

## 2017-10-17 RX ADMIN — LEVOTHYROXINE SODIUM 75 MCG: 75 TABLET ORAL at 06:30

## 2017-10-17 RX ADMIN — METFORMIN HYDROCHLORIDE 1000 MG: 500 TABLET ORAL at 17:33

## 2017-10-17 RX ADMIN — CHOLECALCIFEROL TAB 25 MCG (1000 UNIT) 2000 UNITS: 25 TAB at 09:19

## 2017-10-17 RX ADMIN — GLIPIZIDE 5 MG: 5 TABLET ORAL at 09:20

## 2017-10-17 RX ADMIN — TRIAMCINOLONE ACETONIDE: 1 CREAM TOPICAL at 18:00

## 2017-10-17 RX ADMIN — OXYCODONE HYDROCHLORIDE AND ACETAMINOPHEN 2 TABLET: 5; 325 TABLET ORAL at 09:19

## 2017-10-17 RX ADMIN — SIMVASTATIN 20 MG: 20 TABLET, FILM COATED ORAL at 21:15

## 2017-10-17 RX ADMIN — TRIAMCINOLONE ACETONIDE: 1 CREAM TOPICAL at 09:22

## 2017-10-17 RX ADMIN — ENOXAPARIN SODIUM 40 MG: 40 INJECTION SUBCUTANEOUS at 09:24

## 2017-10-17 RX ADMIN — OXYCODONE HYDROCHLORIDE AND ACETAMINOPHEN 2 TABLET: 5; 325 TABLET ORAL at 22:14

## 2017-10-17 RX ADMIN — PANTOPRAZOLE SODIUM 40 MG: 40 TABLET, DELAYED RELEASE ORAL at 09:20

## 2017-10-17 RX ADMIN — GLIPIZIDE 5 MG: 5 TABLET ORAL at 17:33

## 2017-10-17 NOTE — PROGRESS NOTES
Problem: Self Care Deficits Care Plan (Adult)  Goal: *Acute Goals and Plan of Care (Insert Text)  OCCUPATIONAL THERAPY SHORT TERM GOALS   Initiated 10/13/2017 and to be accomplished within 2 Week(s)    1. Patient will perform Upper body ADLs with adaptive equipment & compensatory techniques as needed with minimal assistance/contact guard assist.  2. Patient will perform Lower body ADLs with adaptive equipment & compensatory techniques as needed moderate assistance . 3. Patient will perform toileting task with moderate assistance x 2 with Good safety to reduce falls risk. 4. Patient will perform toilet transfers with Platform Walker and moderate assistance x 2 .  5. Patient will perform standing static/dynamic balance activities for improved ADL/IADL function with moderate assistance x,2 and Good balance and safety awareness. 6. Patient will improve Barthel index scores to atleast 35/100 to improve functional mobility. 7. Patient will tolerate standing x 5 minutes during functional activity while adhering to LLE TTWB And Left wrist NWB utilizing platform walker to increase participation in ADL routine. OCCUPATIONAL THERAPY LONG TERM GOALS   Initiated 10/13/2017 and to be accomplished within 4 Week(s)    1. Patient will perform Upper body ADLs with adaptive equipment & compensatory techniques as needed with supervision/set-up. 2. Patient will perform Lower body ADLs with adaptive equipment & compensatory techniques as needed with supervision/set-up . 3. Patient will perform toileting task with supervision/set-up with Good safety to reduce falls risk. 4. Patient will perform functional transfers with Platform Walker and supervision/set-up and Good balance and safety awareness. 5. Patient will perform standing static/dynamic activity for improved ADL/IADL function with supervision/set-up an and Good balance and safety awareness.   6. Patient will improve Barthel index score to 50/100 to improve independence with mobility. Therapist: Francis Galvan 10/13/2017   [facility]   OCCUPATIONAL THERAPY DAILY TREATMENT NOTE        Patient: Herman Nevarez (81 y.o. female)                           Date: 10/17/2017  Attending Physician: Queenie Reardon MD  Primary Diagnosis: left hip fracture  Hip fracture [facility])    Treatment Diagnosis  Treatment Diagnosis: muscle weakness  Treatment Diagnosis 2: increased need for assist w/ self care   Precautions : Precautions at Admission: Fall, TTWB (TTWB LLE and NWB Left wrist)  Vital Signs:  Vital Signs  Pulse (Heart Rate): 72  Level of Consciousness: Alert  BP: 109/47  MAP (Calculated): 68     Cognitive Status:  Mental Status  Neurologic State: Alert; Appropriate for age  Orientation Level: Oriented X4  Pain:  Pain Screen  Pain Scale 1: Numeric (0 - 10)  Pain Intensity 1: 0  Pain Onset 1: now  Pain Location 1: Hip  Pain Orientation 1: Left  Pain Description 1: Aching  Pain Intervention(s) 1: Position;Medication (see MAR)  Patient Stated Pain Goal: 0  Pain Reassessment 1: Yes  Pain Scale 1: Numeric (0 - 10)  Gross Assessment:     Coordination:     Bed Mobility:  Bed Mobility  Supine to Sit: Minimum assistance  Scooting: Minimum assistance  Transfers:  Functional Transfers  Sit to Stand: Moderate assistance;Maximum assistance  Stand to Sit: Moderate assistance     Balance:  Balance  Sitting: With support  Sitting - Static: Fair (occasional)  Sitting - Dynamic: Fair (occasional)  Standing: With support  Standing - Static: Fair  Therapeutic Activities:  Co-treated with PT for optimal functional gains with functional transfers needed for ADL tasks. Assisted patient with bed mobility and bed <>w/c transfers in order to assess safety and independence during task. See above for levels of A needed. Patient continues to demonstrate increased difficulty with adhering to TTWB on L LE during transfers today.  Practiced Static standing activity in order to increase standing balance and tolerance while adhering to TTWB needed for ADLS. Patient required max A x 2 with max cueing, 30-1 min to adhere to TTWB on L LE during task for optimal healing. Patient/Caregiver Education:    Pt. Mya Bhakta Education on see above.         ASSESSMENT:  Patient continues to demonstrate the need for skilled Occupational Therapy services to improve static standing balance needed for bathing  Progression toward goals:  [ ]      Improving appropriately and progressing toward goals  [X]      Improving slowly and progressing toward goals  [ ]      Not making progress toward goals and plan of care will be adjusted      Treatment session:   38 minutes     Therapist:    JUDY Mathis,  10/17/2017

## 2017-10-17 NOTE — PROGRESS NOTES
conducted a Follow up consultation and Spiritual Assessment for Mellisa Leone, who is a 65 y.o.,female. The  provided the following Interventions:  Continued the relationship of care and support. Listened empathically. Offered prayer and assurance of continued prayer on patients behalf. Chart reviewed. The following outcomes were achieved:  Patient expressed gratitude for 's visit. Assessment:  There are no spiritual or Anabaptist issues which require intervention at this time. Plan:  Chaplains will continue to follow and will provide pastoral care on an as needed/requested basis.  recommends bedside caregivers page  on duty if patient shows signs of acute spiritual or emotional distress. Mary Anne Rain M.Div.   [facility] [address]  [phone]

## 2017-10-17 NOTE — ROUTINE PROCESS
Patient: Princess Rogers (42 y.o. female)                         Date: 10/17/2017    Primary Diagnosis: left hip fracture  Hip fracture [facility])      Attending Physician: John Mai MD   Treatment Diagnosis  Treatment Diagnosis: muscle weakness  Treatment Diagnosis 2: increased need for assist w/ self care                                                                  Based on admission information 10/12-14 to [facility] documentation. Mrs. Agrawal lives with her   son and dc plan is to return home with services. She is limited by pain, decreased ROM LEft LE/UE. Pta she was independent with ADLs/mobility. On eval she is limited with ambulation, requires max   assist for bed mobility, sit-to-stand, she is dependent for transfers. DC goal is moderate assist for   transfers.   MDS Section GG  Data Summary Tool Key:        01   Dependent      02   Substantial/Maximal              Assistance      03    Partial/Moderate            Assistance      04    Supervision or            Touching Assistance      05    Set-up or [address] CERDA    Resident Refused      09    not applicable      88    Not Attempted d/t            Medical or Safety           Admission  Usual Performance Score Discharge Goal Discharge Performance Score    Eating 5      Oral Hygiene 4      Toilet Hygiene 2      Sit to Lying 2      Lying to sitting on SOB 2      Sit to stand 2      Chair/Chair -to- bed Transfer 1 3     Toilet Transfer 1      Walk 50 ft. , 2 turns 8      Walk  150 ft. 8      Wheel 50 ft. , 2 turns 8      Wheel 150 ft. 9

## 2017-10-17 NOTE — ROUTINE PROCESS
Bedside and Verbal shift change report given to [address] (oncoming nurse) by Dre Pompa RN (offgoing nurse). Report included the following information SBAR, Kardex and MAR. QH ROUNDS AND 24H CHART CHECKS DONE.

## 2017-10-17 NOTE — PROGRESS NOTES
Problem: Mobility Impaired (Adult and Pediatric)  Goal: *Acute Goals and Plan of Care (Insert Text)  PHYSICAL THERAPY STG GOALS :  Initiated 10/13/2017 and to be accomplished within 2 Weeks    1. Patient will move from supine to sit and sit to supine and roll side to side in bed with minimal assistance/contact guard assist with WB compliance. 2. Patient will transfer from bed to chair and chair to bed with minimal assistance/contact guard assist using platform walker with WB compliance. 3. Patient will perform sit to stand with minimal assistance/contact guard assist with Fair balance and safety awareness with WB compliance. 4. Patient will ambulate with minimal assistance/contact guard assist for 75 feet with platform walker on level surfaces with 2 turns with WB compliance. 5. Patient will ascend/descend 14 stairs with right handrail(s) withminimal assistance to allow for safe home access/exit with WB compliance. 6. Patient will improve standardized test score for [place] Standing Balance Scale 1+ with WB compliance. PHYSICAL THERAPY LTG GOALS :  Initiated 10/13/2017 and to be accomplished within 4 Weeks    1. Patient will move from supine to sit and sit to supine and roll side to side in bed with modified independence with WB compliance. 2. Patient will transfer from bed to chair and chair to bed with modified independence using LRAD with WB compliance. 3. Patient will perform sit to stand with modified independence with Good balance and safety awareness with WB compliance. 4. Patient will ambulate with modified independence for 150 feet with LRAD on level surfaces and be able to maneuver through narrow spaces and obstacles without loss of balance with WB compliance. 5. Patient will ascend/descend 14 stairs with right handrail(s) with supervision/set-up to allow for safe home access/exit with WB compliance.   6. Patient will improve standardized test score for [place] Standing Balance Scale 3+ to reduce fall risk with WB compliance. Physical Therapist: Sangeeta Wheeler, PT on 10/13/2017    [facility] CENTER   PHYSICAL THERAPY DAILY TREATMENT NOTE        Patient: Alfonzo Turner (38 y.o. female)               Date: 10/17/2017    Physician: Kristel Romero MD  Primary Diagnosis: left hip fracture  Hip fracture [facility])          Treatment Diagnosis  Treatment Diagnosis: muscle weakness  Treatment Diagnosis 2: increased need for assist w/ self care  Precautions: Fall, TTWB (TTWB LLE and NWB Left wrist)  Vital Signs  Vital Signs  Pulse (Heart Rate): 72  Level of Consciousness: Alert  BP: 109/47  MAP (Calculated): 68     Cognitive Status:     Pain  Pain Screen  Pain Scale 1: Numeric (0 - 10)  Pain Intensity 1: 0  Pain Onset 1: now  Pain Location 1: Hip  Pain Orientation 1: Left  Pain Description 1: Aching  Pain Intervention(s) 1: Position;Medication (see MAR)  Patient Stated Pain Goal: 0  Pain Reassessment 1: Yes  Bed Mobility Training  Bed Mobility Training  Supine to Sit: Minimum assistance  Scooting: Minimum assistance  Balance  Sitting: With support  Sitting - Static: Fair (occasional)  Sitting - Dynamic: Fair (occasional)  Standing: With support  Standing - Static: Fair  Transfer Training  Transfer Training  Sit to Stand: Moderate assistance;Maximum assistance  Stand to Sit: Moderate assistance  Stand Pivot Transfers: Minimal assistance  Sit to Stand: Moderate assistance;Maximum assistance  Gait Training                     With 0 turns. Therapeutic Exercise:   Pt supine in bed, reports 7/10 pain, received pain meds 1 hour prior to session. Bed mobility as noted above, verbal cuing to keep NWB status with L wrist. SPT Javier x2 with with verbal cuing for sequencing and biofeedback with therapist foot under pt's to maintain TTWB on LLE. Sit<>stand transfers mod-maxA x2. Pt stand 2x1'. Seated TE for LE strengthening and mobility: ankle pumps, LAQs, marches 2x20 reps of each with 2# ankle weight on RLE. Transfer training and education with nursing. SPT transferring to strong side (R). Education on WB status for both UE/LE. Pt transfers min-modA x1. Patient/Caregiver Education:   Pt /Caregiver Education on safety and fall prevention,  Pt education on WB status was provided to decerase risk for injury. ASSESSMENT:  Patient continues to benefit from Skilled PT services to improve overall strength and mobility, improve balance, improve functional mobility  Progression toward goals:  [X]      Improving appropriately and progressing toward goals  [ ]      Improving slowly and progressing toward goals  [ ]      Not making progress toward goals and plan of care will be adjusted      Treatment session: 42 minutes.   Therapist:   Ezio Tobar PTA,          10/17/2017

## 2017-10-18 LAB
GLUCOSE BLD STRIP.AUTO-MCNC: 134 MG/DL (ref 70–110)
GLUCOSE BLD STRIP.AUTO-MCNC: 77 MG/DL (ref 70–110)
GLUCOSE BLD STRIP.AUTO-MCNC: 96 MG/DL (ref 70–110)

## 2017-10-18 PROCEDURE — 82962 GLUCOSE BLOOD TEST: CPT

## 2017-10-18 PROCEDURE — 74011250637 HC RX REV CODE- 250/637: Performed by: INTERNAL MEDICINE

## 2017-10-18 PROCEDURE — 74011250636 HC RX REV CODE- 250/636: Performed by: INTERNAL MEDICINE

## 2017-10-18 RX ORDER — INSULIN LISPRO 100 [IU]/ML
INJECTION, SOLUTION INTRAVENOUS; SUBCUTANEOUS
Status: DISCONTINUED | OUTPATIENT
Start: 2017-10-18 | End: 2017-10-23

## 2017-10-18 RX ORDER — LOSARTAN POTASSIUM 50 MG/1
25 TABLET ORAL DAILY
Status: DISCONTINUED | OUTPATIENT
Start: 2017-10-19 | End: 2017-11-21 | Stop reason: HOSPADM

## 2017-10-18 RX ORDER — MELATONIN
2000 DAILY
Status: DISCONTINUED | OUTPATIENT
Start: 2017-10-19 | End: 2017-11-21 | Stop reason: HOSPADM

## 2017-10-18 RX ADMIN — TRIAMCINOLONE ACETONIDE: 1 CREAM TOPICAL at 18:00

## 2017-10-18 RX ADMIN — LEVOTHYROXINE SODIUM 75 MCG: 75 TABLET ORAL at 05:48

## 2017-10-18 RX ADMIN — METFORMIN HYDROCHLORIDE 1000 MG: 500 TABLET ORAL at 09:24

## 2017-10-18 RX ADMIN — HYDROCHLOROTHIAZIDE 25 MG: 25 TABLET ORAL at 09:24

## 2017-10-18 RX ADMIN — SIMVASTATIN 20 MG: 20 TABLET, FILM COATED ORAL at 21:37

## 2017-10-18 RX ADMIN — LOSARTAN POTASSIUM 50 MG: 50 TABLET ORAL at 09:24

## 2017-10-18 RX ADMIN — DOCUSATE SODIUM 100 MG: 100 CAPSULE, LIQUID FILLED ORAL at 09:23

## 2017-10-18 RX ADMIN — METFORMIN HYDROCHLORIDE 1000 MG: 500 TABLET ORAL at 17:09

## 2017-10-18 RX ADMIN — ONDANSETRON 4 MG: 4 TABLET, ORALLY DISINTEGRATING ORAL at 09:23

## 2017-10-18 RX ADMIN — PANTOPRAZOLE SODIUM 40 MG: 40 TABLET, DELAYED RELEASE ORAL at 09:24

## 2017-10-18 RX ADMIN — ENOXAPARIN SODIUM 40 MG: 40 INJECTION SUBCUTANEOUS at 09:25

## 2017-10-18 RX ADMIN — OXYCODONE HYDROCHLORIDE AND ACETAMINOPHEN 2 TABLET: 5; 325 TABLET ORAL at 09:22

## 2017-10-18 RX ADMIN — TRIAMCINOLONE ACETONIDE: 1 CREAM TOPICAL at 14:12

## 2017-10-18 RX ADMIN — GLIPIZIDE 5 MG: 5 TABLET ORAL at 09:24

## 2017-10-18 RX ADMIN — CHOLECALCIFEROL TAB 25 MCG (1000 UNIT) 2000 UNITS: 25 TAB at 09:24

## 2017-10-18 NOTE — PROGRESS NOTES
GIM     Patient: Tawanda Funk MRN: [id number]  CSN: [id number]    YOB: 1940  Age: 65 y.o. Sex: female    DOA: 10/12/2017 LOS:  LOS: 6 days                    Subjective:     Pt with fall and fx of L hip and L wrist. Pain controlled and bowel program established. On lovenox for prophylaxsis. Hypoglycemia and will d/c glucotrol and taper bp meds while in bed.     Objective:      Visit Vitals    BP 96/61    Pulse 94    Temp 97.4 °F (36.3 °C)    Resp 18    Ht 5' (1.524 m)    Wt 77.4 kg (170 lb 11.2 oz)    SpO2 98%    Breastfeeding No    BMI 33.34 kg/m2       Physical Exam:  Chest clear   rr  abd soft  No edema  Wound clean and dry    Intake and Output:  Current Shift:     Last three shifts:       Recent Results (from the past 24 hour(s))   GLUCOSE, POC    Collection Time: 10/17/17  1:22 PM   Result Value Ref Range    Glucose (POC) 174 (H) 70 - 110 mg/dL   GLUCOSE, POC    Collection Time: 10/17/17  4:40 PM   Result Value Ref Range    Glucose (POC) 101 70 - 110 mg/dL   GLUCOSE, POC    Collection Time: 10/17/17  9:14 PM   Result Value Ref Range    Glucose (POC) 123 (H) 70 - 110 mg/dL   GLUCOSE, POC    Collection Time: 10/18/17  5:47 AM   Result Value Ref Range    Glucose (POC) 77 70 - 110 mg/dL       Current Facility-Administered Medications   Medication Dose Route Frequency    [START ON 10/19/2017] losartan (COZAAR) tablet 25 mg  25 mg Oral DAILY    alum-mag hydroxide-simeth (MYLANTA) oral suspension 30 mL  30 mL Oral QID PRN    ondansetron (ZOFRAN ODT) tablet 4 mg  4 mg Oral Q6H PRN    docusate sodium (COLACE) capsule 100 mg  100 mg Oral BID    enoxaparin (LOVENOX) injection 40 mg  40 mg SubCUTAneous DAILY    levothyroxine (SYNTHROID) tablet 75 mcg  75 mcg Oral ACB    metFORMIN (GLUCOPHAGE) tablet 1,000 mg  1,000 mg Oral BID WITH MEALS    oxyCODONE-acetaminophen (PERCOCET) 5-325 mg per tablet 1-2 Tab  1-2 Tab Oral Q4H PRN    pantoprazole (PROTONIX) tablet 40 mg  40 mg Oral DAILY  simvastatin (ZOCOR) tablet 20 mg  20 mg Oral QHS    triamcinolone acetonide (KENALOG) 0.1 % cream   Topical BID    cholecalciferol (VITAMIN D3) tablet 2,000 Units  2,000 Units Oral DAILY    insulin lispro (HUMALOG) injection   SubCUTAneous AC&HS    glucose chewable tablet 16 g  4 Tab Oral PRN    glucagon (GLUCAGEN) injection 1 mg  1 mg IntraMUSCular PRN    dextrose (D50W) injection syrg 12.5-25 g  25-50 mL IntraVENous PRN       Lab Results   Component Value Date/Time    Glucose 116 10/12/2017 04:00 AM    Glucose 167 10/11/2017 03:50 AM    Glucose 180 10/10/2017 06:36 PM    Glucose 161 10/09/2017 05:30 PM    Glucose 112 04/20/2017 11:23 AM        Assessment/Plan     Active Problems:    Hip fracture ([facility] [address] (10/9/2017)        Niru Casanova MD  10/18/2017, 11:46 AM

## 2017-10-18 NOTE — PROGRESS NOTES
Nutrition follow up-[facility]/  Plan of care      RECOMMENDATIONS:     1. No concentrated sweets diet  2. Ensure daily  3. Monitor weight, labs and PO intake  4. RD to follow     GOALS:     1. Ongoing: PO intake meets >75% of protein/calorie needs by 10/25  2. Met/Ongoing: Weight Maintenance (+/- 1-2 lb by 10/25)    ASSESSMENT:     Weight status is classified as obese per BMI of 33.3. PO intake appears adequate. Weight stable over past week. Labs noted. BG range from  over past 24 hours. Nutrition recommendations listed. RD to follow. Nutrition Risk:  [] High  [x] Moderate []  Low    SUBJECTIVE/OBJECTIVE:      Transferred from  to [facility] on 10/20/17. S/P femur insertion intra medullary nail short for left hip fracture on 1/10/17. Patient with gradual weight loss over past year. Patient with 100% intake of lunch meal today. Patient continues with poor intake with breakfast meal-will continue Ensure daily. Encouraged adequate intake. Will monitor.     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    Meningioma (HCC) D32.9    Hip fracture ([facility] [address] S72.009A     Past Medical History:   Diagnosis Date    Arthritis     Bronchitis     Diabetes ([address]     Diverticulitis     GERD (gastroesophageal reflux disease)     Hypercholesterolemia     Hypertension     Hypothyroid     Pancreatitis       Labs:    Lab Results   Component Value Date/Time    Sodium 138 10/12/2017 04:00 AM    Potassium 3.7 10/12/2017 04:00 AM    Chloride 102 10/12/2017 04:00 AM    CO2 23 10/12/2017 04:00 AM    Anion gap 13 10/12/2017 04:00 AM    Glucose 116 10/12/2017 04:00 AM    BUN 14 10/12/2017 04:00 AM    Creatinine 0.89 10/12/2017 04:00 AM    Calcium 7.1 10/12/2017 04:00 AM    Magnesium 1.4 12/04/2009 06:45 AM    Phosphorus 3.5 12/04/2009 06:45 AM    Albumin 3.9 04/20/2017 11:23 AM     Anthropometrics: BMI (calculated): 33.3  Last 3 Recorded Weights in this Encounter    10/17/17 1550   Weight: 77.4 kg (170 lb 11.2 oz)      Ht Readings from Last 1 Encounters:   10/17/17 5' (1.524 m)     No data found.    [] Weight Loss [] Weight Gain [x] Weight Stable    Nutrition Needs:   Calories: 6213-4831 Kcal   Protein:    g      [x] No Cultural, Yazidism or ethnic dietary need identified.     [] Cultural, Yazidism and ethnic food preferences identified and addressed     Wt Status:  [] Normal (18.6 - 24.9) [] Underweight (<18.5) [] Overweight (25 - 29.9)   [x] Mild Obesity (30 - 34.9)  [] Moderate Obesity (35 - 39.9) [] Morbid Obesity (40+)     Nutrition Problems Identified:   [] Fair PO intake   [] Food Allergies  [] Difficulty chewing/swallowing/poor dentition  [] Constipation/Diarrhea   [] Nausea/Vomiting   [x] None  [] Other:     Plan:   [x] Therapeutic Diet  [x]  Obtained/adjusted food preferences/tolerances and/or snacks options   [x]  Dietary supplements (Ensure daily)  [] Occupational therapy following for feeding techniques  []  HS snack added   []  Modify diet texture   []  Modify diet for food allergies   []  Assist with menu selection   [x]  Monitor PO intake on meal rounds   [x]  Continue inpatient monitoring and intervention   [x]  Participated in discharge planning/Interdisciplinary rounds/Team meetings   []  Other:     Education Needs:   [] Not appropriate for teaching at this time due to:   [x] Identified and addressed    Nutrition Monitoring and Evaluation:  [x] Continue ongoing monitoring and intervention  [] Other    Patrisha Ore

## 2017-10-18 NOTE — ROUTINE PROCESS
Bedside and Verbal shift change report given to [address] [place] [address] Liam (oncoming nurse) by Beckie Conteh RN (offgoing nurse). Report included the following information SBAR, Kardex and MAR.

## 2017-10-18 NOTE — PROGRESS NOTES
Problem: Self Care Deficits Care Plan (Adult)  Goal: *Acute Goals and Plan of Care (Insert Text)  OCCUPATIONAL THERAPY SHORT TERM GOALS    Initiated 10/13/2017 and to be accomplished within 2 Week(s)    1. Patient will perform Upper body ADL's with adaptive equipment & compensatory techniques as needed with minimal assistance/contact guard assist.  2.  Patient will perform Lower body ADL's with adaptive equipment & compensatory techniques as needed moderate assistance  . 3. Patient will perform toileting task with moderate assistance x 2 with Good safety to reduce falls risk. 4.  Patient will perform toilet transfers with Platform Walker and moderate assistance x 2 .  5. Patient will perform standing static/dynamic balance activities for improved ADL/IADL function with moderate assistance x,2 and Good balance and safety awareness. 6.  Patient will improve Barthel index scores to atleast 35/100 to improve functional mobility. 7.  Patient will tolerate standing x 5 minutes during functional activity while adhering to LLE TTWB And Left wrist NWB utilizing platform walker to increase participation in ADL routine. OCCUPATIONAL THERAPY LONG TERM GOALS   Initiated 10/13/2017 and to be accomplished within 4 Week(s)    1. Patient will perform Upper body ADL's with adaptive equipment & compensatory techniques as needed with supervision/set-up. 2.  Patient will perform Lower body ADL's with adaptive equipment & compensatory techniques as needed with         supervision/set-up . 3. Patient will perform toileting task with supervision/set-up with Good safety to reduce falls risk. 4.  Patient will perform functional transfers with Platform Walker and  supervision/set-up and Good balance and safety awareness. 5.  Patient will perform standing static/dynamic activity for improved ADL/IADL function    with supervision/set-up an and Good balance and safety awareness.   6.  Patient will improve Barthel index score to 50/100 to improve independence with mobility. Therapist: Manpreet Ibrahim    10/13/2017          [facility]   Occupational Therapy Daily Treatment note      Patient: Wade Boateng (87 y.o. female)       Date: 10/18/2017  Attending Physician: Lev Valenzuela MD  Primary Diagnosis: left hip fracture  Hip fracture [facility])    Treatment Diagnosis  Treatment Diagnosis: muscle weakness  Treatment Diagnosis 2: increased need for assist w/ self care   Precautions : Precautions at Admission: Fall, TTWB (TTWB LLE and NWB Left wrist)  Vital Signs:  Vital Signs  Pulse (Heart Rate): 74  BP: 138/72  MAP (Calculated): 94     Cognitive Status:  Mental Status  Neurologic State: Alert  Orientation Level: Oriented X4  Pain:        Gross Assessment:     Coordination:     Bed Mobility:  Bed Mobility  Supine to Sit: Stand-by asssistance  Scooting: Stand-by asssistance  Transfers:  Functional Transfers  Sit to Stand: Moderate assistance  Stand to Sit: Minimum assistance; Moderate assistance     Balance:  Balance  Sitting: With support  Sitting - Static: Fair (occasional)  Sitting - Dynamic: Fair (occasional)  Standing: With support  Standing - Static: Fair  Therapeutic Activities:  Practiced w/c<>standard chair transfers in order to increase safety, independence with transfer while adhering to TTWB on L LE needed to safely complete toileting transfers. See above for levels of A needed. Patient demonstrated increased difficulty with adhering to TTWB due to pain in L knee today. Attempted SPT however patient reports increased comfort with utilizing platform RW during transfers. KIM reviewed importance of patient to comply NWB on L UE during transfer for optimal safety. Assisted patient with toileting routine in order to assess safety and independence during routine. See above for levels of A needed. Patient/Caregiver Education:    Pt. Scottie Austin Education on see above.       ASSESSMENT:  Patient continues to demonstrate the need for skilled Occupational Therapy services to improve static standing balance needed for bathing and toileting  Progression toward goals:  []      Improving appropriately and progressing toward goals  [x]      Improving slowly and progressing toward goals  []      Not making progress toward goals and plan of care will be adjusted     Treatment session:   40 minutes    Therapist:    JUDY Warner,  10/18/2017

## 2017-10-18 NOTE — PROGRESS NOTES
Problem: Mobility Impaired (Adult and Pediatric)  Goal: *Acute Goals and Plan of Care (Insert Text)  PHYSICAL THERAPY STG GOALS :  Initiated 10/13/2017 and to be accomplished within 2 Weeks    1. Patient will move from supine to sit and sit to supine  and roll side to side in bed with minimal assistance/contact guard assist with WB compliance. 2.  Patient will transfer from bed to chair and chair to bed with minimal assistance/contact guard assist using platform walker with WB compliance. 3.  Patient will perform sit to stand with minimal assistance/contact guard assist with Fair balance and safety awareness with WB compliance. 4.  Patient will ambulate with minimal assistance/contact guard assist for 75 feet with platform walker on level surfaces with 2 turns with WB compliance. 5.  Patient will ascend/descend 14 stairs with right handrail(s) withminimal assistance to allow for safe home access/exit with WB compliance. 6.  Patient will improve standardized test score for [place] Standing Balance Scale 1+ with WB compliance. PHYSICAL THERAPY LTG GOALS :  Initiated 10/13/2017 and to be accomplished within 4 Weeks    1. Patient will move from supine to sit and sit to supine  and roll side to side in bed with modified independence with WB compliance. 2.  Patient will transfer from bed to chair and chair to bed with modified independence using LRAD with WB compliance. 3.  Patient will perform sit to stand with modified independence with Good balance and safety awareness with WB compliance. 4.  Patient will ambulate with modified independence for 150 feet with LRAD on level surfaces and be able to maneuver through narrow spaces and obstacles without loss of balance with WB compliance. 5.  Patient will ascend/descend 14 stairs with right handrail(s) with supervision/set-up to allow for safe home access/exit with WB compliance.   6.  Patient will improve standardized test score for [facility] Standing Balance Scale 3+ to reduce fall risk with WB compliance. Physical Therapist:   Quoc Adan PT  on 10/13/2017            [address] [place] [address]   physical Therapy Daily Treatment note      Patient: Bunnie Cabot (10 y.o. female)               Date: 10/18/2017    Physician: Claudean Peaches, MD  Primary Diagnosis: left hip fracture  Hip fracture [facility])          Treatment Diagnosis  Treatment Diagnosis: muscle weakness  Treatment Diagnosis 2: increased need for assist w/ self care  Precautions: Fall, TTWB (TTWB LLE and NWB Left wrist)  Vital Signs        Cognitive Status:     Pain     Bed Mobility Training  Bed Mobility Training  Supine to Sit: Stand-by asssistance  Scooting: Stand-by asssistance  Balance  Sitting: With support  Sitting - Static: Fair (occasional)  Sitting - Dynamic: Fair (occasional)  Standing: With support  Standing - Static: Fair  Transfer Training  Transfer Training  Sit to Stand: Moderate assistance  Stand to Sit: Minimum assistance; Moderate assistance  Stand Pivot Transfers: Minimal assistance  Sit to Stand: Moderate assistance  Gait Training                     With 0 turns. Therapeutic Exercise:   Pt supine in bed upon arrival to room, reports 5/10 pain. Bed mobility as mention above. SPT from bed to w/c Javier with constant verbal cuing to maintain WB status for UE/LE. Seated TE for LE strengthening: heel/toe raises, LAQs, marches 2x20 reps of each. Sit<>stand transfer modA x1 using platform walker. Pt requires constant verbal cuing to maintain LE TTWB, biofeedback with therapist foot under pt LLE. Pt maintains TTWB ~85% with transfers. Pt stand x1', returned to sitting due to pt report of dizziness. Patient/Caregiver Education:   Pt /Caregiver Education on safety and fall prevention,  Pt education on WB stautus was provided to prevent further injury.      ASSESSMENT:  Patient continues to benefit from Skilled PT services to improve overall strength and mobility, decrease pain, improve functional mobility. Progression toward goals:  [x]      Improving appropriately and progressing toward goals  []      Improving slowly and progressing toward goals  []      Not making progress toward goals and plan of care will be adjusted     Treatment session: 43 minutes.   Therapist:   Aure Chavez PTA,          10/18/2017

## 2017-10-19 ENCOUNTER — PATIENT OUTREACH (OUTPATIENT)
Dept: CASE MANAGEMENT | Age: 77
End: 2017-10-19

## 2017-10-19 ENCOUNTER — PATIENT OUTREACH (OUTPATIENT)
Dept: FAMILY MEDICINE CLINIC | Age: 77
End: 2017-10-19

## 2017-10-19 LAB
GLUCOSE BLD STRIP.AUTO-MCNC: 109 MG/DL (ref 70–110)
GLUCOSE BLD STRIP.AUTO-MCNC: 133 MG/DL (ref 70–110)

## 2017-10-19 PROCEDURE — 74011250637 HC RX REV CODE- 250/637: Performed by: INTERNAL MEDICINE

## 2017-10-19 PROCEDURE — 82962 GLUCOSE BLOOD TEST: CPT

## 2017-10-19 PROCEDURE — 74011250636 HC RX REV CODE- 250/636: Performed by: INTERNAL MEDICINE

## 2017-10-19 RX ADMIN — TRIAMCINOLONE ACETONIDE: 1 CREAM TOPICAL at 09:00

## 2017-10-19 RX ADMIN — LEVOTHYROXINE SODIUM 75 MCG: 75 TABLET ORAL at 06:30

## 2017-10-19 RX ADMIN — SIMVASTATIN 20 MG: 20 TABLET, FILM COATED ORAL at 22:25

## 2017-10-19 RX ADMIN — OXYCODONE HYDROCHLORIDE AND ACETAMINOPHEN 2 TABLET: 5; 325 TABLET ORAL at 21:30

## 2017-10-19 RX ADMIN — OXYCODONE HYDROCHLORIDE AND ACETAMINOPHEN 2 TABLET: 5; 325 TABLET ORAL at 10:36

## 2017-10-19 RX ADMIN — DOCUSATE SODIUM 100 MG: 100 CAPSULE, LIQUID FILLED ORAL at 17:50

## 2017-10-19 RX ADMIN — ENOXAPARIN SODIUM 40 MG: 40 INJECTION SUBCUTANEOUS at 10:38

## 2017-10-19 RX ADMIN — PANTOPRAZOLE SODIUM 40 MG: 40 TABLET, DELAYED RELEASE ORAL at 10:37

## 2017-10-19 RX ADMIN — LOSARTAN POTASSIUM 25 MG: 50 TABLET ORAL at 10:41

## 2017-10-19 RX ADMIN — CHOLECALCIFEROL (VITAMIN D3) 25 MCG (1,000 UNIT) TABLET 2000 UNITS: at 10:41

## 2017-10-19 RX ADMIN — TRIAMCINOLONE ACETONIDE: 1 CREAM TOPICAL at 18:00

## 2017-10-19 RX ADMIN — DOCUSATE SODIUM 100 MG: 100 CAPSULE, LIQUID FILLED ORAL at 10:38

## 2017-10-19 RX ADMIN — METFORMIN HYDROCHLORIDE 1000 MG: 500 TABLET ORAL at 10:37

## 2017-10-19 RX ADMIN — METFORMIN HYDROCHLORIDE 1000 MG: 500 TABLET ORAL at 17:51

## 2017-10-19 NOTE — PROGRESS NOTES
Nurse Lora 10 Follow Up Note  -10/09/17 Admitted at [facility] for s/p fall, hip fracture  -10/12/17 Discharged to SN[facility]           Noted patient currently in [facility].. Will continue to follow      Will follow up once patient is discharged from the hospital              This note will not be viewable in [address]

## 2017-10-19 NOTE — PROGRESS NOTES
Problem: Mobility Impaired (Adult and Pediatric)  Goal: *Acute Goals and Plan of Care (Insert Text)  PHYSICAL THERAPY STG GOALS :  Initiated 10/13/2017 and to be accomplished within 2 Weeks (updated 10/19/17)    1. Patient will move from supine to sit and sit to supine  and roll side to side in bed with minimal assistance/contact guard assist with WB compliance. (Achieved)  2. Patient will transfer from bed to chair and chair to bed with minimal assistance/contact guard assist using platform walker with WB compliance. (Progressing modA)  3. Patient will perform sit to stand with minimal assistance/contact guard assist with Fair balance and safety awareness with WB compliance. (Progressing modA)  4. Patient will ambulate with minimal assistance/contact guard assist for 75 feet with platform walker on level surfaces with 2 turns with WB compliance. (not tested due to TTWB on LLE)  5. Patient will ascend/descend 14 stairs with right handrail(s) withminimal assistance to allow for safe home access/exit with WB compliance. (Not tested due to WB status)  6. Patient will improve standardized test score for [place] Standing Balance Scale 1+ with WB compliance. PHYSICAL THERAPY LTG GOALS :  Initiated 10/13/2017 and to be accomplished within 4 Weeks    1. Patient will move from supine to sit and sit to supine  and roll side to side in bed with modified independence with WB compliance. 2.  Patient will transfer from bed to chair and chair to bed with modified independence using LRAD with WB compliance. 3.  Patient will perform sit to stand with modified independence with Good balance and safety awareness with WB compliance. 4.  Patient will ambulate with modified independence for 150 feet with LRAD on level surfaces and be able to maneuver through narrow spaces and obstacles without loss of balance with WB compliance.    5.  Patient will ascend/descend 14 stairs with right handrail(s) with supervision/set-up to allow for safe home access/exit with WB compliance. 6.  Patient will improve standardized test score for [place] Standing Balance Scale 3+ to reduce fall risk with WB compliance. Physical Therapist:   Pavithra Allen, PT  on 10/13/2017            [facility]   PHYSICAL THERAPY WEEKLY PROGRESS REPORT  Reporting Period:  Date:   10/13/17  to 10/19/17      Patient: Micheal Victoria (1 y.o. female)                         Date: 10/19/2017    Primary Diagnosis: left hip fracture  Hip fracture [facility])      Attending Physician: Colin Nguyen MD   Treatment Diagnosis  Treatment Diagnosis: muscle weakness  Treatment Diagnosis 2: increased need for assist w/ self care  Precautions:  Fall, TTWB (TTWB LLE and NWB Left wrist)  Rehab Potential : Good    Skill interventions and education provided with clinical rationale (include individualized treatment techniques and standardized tests):   Skilled Physical Therapy services were provided with TE for LE strengthening and mobility, transfer training with stand pivot transfers bed<>chair, sit<>stand transfer training with platform walker, pt education on WB status (TTWB for LLE, NWB for LUE)        Using a comparative statement, summarize significant progress toward goals as a result of skilled intervention provided:  Patient has made Fair progress towards their Physical Therapy goals in the areas of LE strength and mobility, bed mobility and transfers  Identify remaining functional areas, impairments limiting progress and/or barriers to improvement:  Patient would benefit from continues PT services to address the following functional deficits in maintaining WB status, pt requires constant verbal cuing. LE strength and mobility in preparation for standing activities and gait training.      OBJECTIVE DATA SUMMARY:     INITIAL ASSESSMENT WEEKLY ASSESSMENT   COGNITIVE STATUS COGNITIVE STATUS   Neurologic State: Alert, Appropriate for age  Orientation Level: Oriented X4  Cognition: Appropriate for age attention/concentration  Perception: Appears intact  Perseveration: No perseveration noted  Safety/Judgement: Fall prevention Neurologic State: Alert, Appropriate for age  Orientation Level: Oriented X4  Cognition: Appropriate for age attention/concentration  Perception: Appears intact  Perseveration: No perseveration noted  Safety/Judgement: Fall prevention   PAIN PAIN   Pain Scale 1: Numeric (0 - 10)  Pain Intensity 1: 0  Pain Onset 1: now  Pain Location 1: Hip, Hand  Pain Orientation 1: Left  Pain Description 1: Aching  Pain Intervention(s) 1: Elevation, Ice, Medication (see MAR)  Patient Stated Pain Goal: 0  Pain Reassessment 1: Yes Pain Scale 1: Visual  Pain Intensity 1: 0  Pain Onset 1: movement  Pain Location 1: Hip  Pain Orientation 1: Left  Pain Description 1: Aching  Pain Intervention(s) 1: Medication (see MAR), Repositioned, Rest  Patient Stated Pain Goal: 0  Pain Reassessment 1: Patient sleeping   GROSS ASSESSMENT GROSS ASSESSMENT   AROM: Generally decreased, functional  PROM: Generally decreased, functional  Strength: Generally decreased, functional (RLE 3+/5 grossly, LLE demonstrates 3/5)  Coordination: Generally decreased, functional  Tone: Normal  Sensation: Intact AROM: Generally decreased, functional  PROM: Generally decreased, functional  Strength: Generally decreased, functional  Coordination: Generally decreased, functional  Tone: Normal  Sensation: Intact   BED MOBILITY BED MOBILITY   Rolling: Stand-by asssistance  Supine to Sit: Maximum assistance  Sit to Supine: Maximum assistance  Scooting: Maximum assistance Rolling: Stand-by asssistance  Supine to Sit: Stand-by asssistance  Sit to Supine: Contact guard assistance  Scooting: Minimum assistance   GAIT GAIT   Ambulation - Level of Assistance:  Moderate assistance, Additional time  Distance (ft): 5 Feet (ft)  Assistive Device: Other (comment) (Rolling walker with platform on left) Ambulation - Level of Assistance: Moderate assistance, Additional time  Distance (ft): 5 Feet (ft)  Assistive Device: Other (comment) (Rolling walker with platform on left)    (unable to assess)  (unable to assess)               TRANSFERS TRANSFERS   Sit to Stand: Maximum assistance  Stand to Sit: Moderate assistance  Bed to Chair: Maximum assistance (x2) Sit to Stand: Moderate assistance  Stand to Sit: Minimum assistance, Moderate assistance  Bed to Chair: Maximum assistance (x2)   BALANCE BALANCE   Sitting: Impaired, With support  Sitting - Static: Fair (occasional) (+)  Sitting - Dynamic: Fair (occasional)  Standing: Impaired  Standing - Static: Poor Sitting: With support  Sitting - Static: Fair (occasional)  Sitting - Dynamic: Fair (occasional)  Standing: With support  Standing - Static: Fair   WHEELCHAIR MOBILITY/MGMT WHEELCHAIR MOBILITY/MGMT         Activity Tolerance:  Fair Activity Tolerance: Fair   Visual/Perceptual   Corrective Lenses: Glasses      Visual/Perceptual   Vision  Corrective Lenses: Glasses        Auditory:   Auditory Impairment: None    Auditory:   Auditory  Auditory Impairment: None       Steps: not tested   Clinical Decision making:  [place] Standing Balance Clinical Decision making:  [place] Standing Balance     Treatment:   [place] with OT to maximize functional gains. Bed mobility training scooting at EOB. Verbal and tactile cuing for sequencing and maintaining WB status. Transfer training with SPT Javier x2. Verbal cuning to maintain WB status. Pt able to maintain WB status 85% of time. Patient's response to treatment rendered:  Pt responding well to treatment, compliant with therapy, motivated to participate. Patient expected Discharge Location:  []Private Residence  [] [facility]/ILF  []LTC  []Other:    Plan: Continue Skilled PT services as established by the Plan of Care for 5-6 days/wk.     PT and Assistant have had a weekly case conference regarding the above treatment:  [x] Yes [] No       Treatment session:  39 minutes. Therapist: Aure Chavez PTA       Date:10/19/2017  Forward to PT for co-signature when completed.

## 2017-10-19 NOTE — ROUTINE PROCESS
Bedside and Verbal shift change report given to Lorelei RUELAS LPN (oncoming nurse) by Dharmesh Beth RN (offgoing nurse). Report included the following information SBAR, Kardex and MAR. QH ROUNDS And 24h chart checks done.

## 2017-10-19 NOTE — PROGRESS NOTES
Problem: Self Care Deficits Care Plan (Adult)  Goal: *Acute Goals and Plan of Care (Insert Text)  OCCUPATIONAL THERAPY SHORT TERM GOALS    Initiated 10/13/2017 and to be accomplished within 2 Week(s)    1. Patient will perform Upper body ADL's with adaptive equipment & compensatory techniques as needed with minimal assistance/contact guard assist. (progressing)  2. Patient will perform Lower body ADL's with adaptive equipment & compensatory techniques as needed moderate assistance. (progressing)  3. Patient will perform toileting task with moderate assistance x 2 with Good safety to reduce falls risk. 4.  Patient will perform toilet transfers with Platform Walker and moderate assistance x 2 .  5. Patient will perform standing static/dynamic balance activities for improved ADL/IADL function with moderate assistance x,2 and Good balance and safety awareness. 6.  Patient will improve Barthel index scores to atleast 35/100 to improve functional mobility. 7.  Patient will tolerate standing x 5 minutes during functional activity while adhering to LLE TTWB And Left wrist NWB utilizing platform walker to increase participation in ADL routine. OCCUPATIONAL THERAPY LONG TERM GOALS   Initiated 10/13/2017 and to be accomplished within 4 Week(s)    1. Patient will perform Upper body ADL's with adaptive equipment & compensatory techniques as needed with supervision/set-up. 2.  Patient will perform Lower body ADL's with adaptive equipment & compensatory techniques as needed with         supervision/set-up . 3. Patient will perform toileting task with supervision/set-up with Good safety to reduce falls risk. 4.  Patient will perform functional transfers with Platform Walker and  supervision/set-up and Good balance and safety awareness. 5.  Patient will perform standing static/dynamic activity for improved ADL/IADL function    with supervision/set-up an and Good balance and safety awareness.   6.  Patient will improve Barthel index score to 50/100 to improve independence with mobility. Therapist: Bessie De Jesus    10/13/2017           [facility]  OCCUPATIONAL THERAPY WEEKLY SUMMARY   Reporting period:  from 10/13/17 through 10/19/17       Patient: Kathi Farley (60 y.o. female)   Date: 10/19/2017    Primary Diagnosis: left hip fracture  Hip fracture [facility])       Attending Physician: Cassidy Gill MD Treatment Diagnosis  Treatment Diagnosis: muscle weakness  Treatment Diagnosis 2: increased need for assist w/ self care  Precautions: Fall, TTWB (TTWB LLE and NWB Left wrist)  Rehab Potential : Fair    Skill interventions and education provided with clinical rationale (include individualized treatment techniques and standardized tests):  Skilled Occupational services were provided utilizing Therapeutic exercises, therapeutic activities, functional mobility, functional transfers, and EC/WS. Using a comparative statement, summarize significant progress toward goals as a result of skilled intervention provided:  Patient has made Poor progress towards their Occupational Therapy goals in the following areas: increased independence with upper body dressing. Patient has met 1/6 STGS and making slow but steady progression towards LTGS. . Identify remaining functional areas, impairments limiting progress and/or barriers to improvement:  Patient would benefit from continued skilled Occupational Therapy Services to address the following functional deficits in decreased static and dynamic standing balance and tolerance needed for safely complete ADL/IADLS.         OBJECTIVE DATA SUMMARY:       INITIAL ASSESSMENT WEEKLY PROGRESS   COGNITIVE STATUS: COGNITIVE STATUS:   Neurologic State: Alert, Appropriate for age  Orientation Level: Oriented X4  Cognition: Appropriate for age attention/concentration  Perception: Appears intact  Perseveration: No perseveration noted  Safety/Judgement: Fall prevention Neurologic State: Alert, Appropriate for age  Orientation Level: Oriented X4  Cognition: Appropriate for age attention/concentration  Perception: Appears intact  Perseveration: No perseveration noted  Safety/Judgement: Fall prevention   PAIN: PAIN:   Pain Scale 1: Numeric (0 - 10)  Pain Intensity 1: 0  Pain Onset 1: now  Pain Location 1: Hip, Hand  Pain Orientation 1: Left  Pain Description 1: Aching  Pain Intervention(s) 1: Elevation, Ice, Medication (see MAR)  Patient Stated Pain Goal: 0  Pain Reassessment 1: Yes     Pain Scale 1: Visual  Pain Intensity 1: 0  Pain Onset 1: movement  Pain Location 1: Hip  Pain Orientation 1: Left  Pain Description 1: Aching  Pain Intervention(s) 1: Medication (see MAR), Repositioned, Rest  Patient Stated Pain Goal: 0  Pain Reassessment 1: Patient sleeping   BED MOBILITY BED MOBILITY   Rolling: Stand-by asssistance  Supine to Sit: Maximum assistance  Sit to Supine: Maximum assistance  Scooting: Maximum assistance Rolling: Stand-by asssistance  Supine to Sit: Stand-by asssistance  Sit to Supine: Contact guard assistance  Scooting: Minimum assistance   ADL SELF CARE ADL SELF CARE     Bathing Assistance: Moderate assistance    Dressing Assistance: Moderate assistance  Hospital Gown: Minimum  assistance    Bathing Assistance: Maximum assistance         Grooming Assistance: Supervision/set up  Brushing Teeth: Supervision/set-up  Brushing/Combing Hair: Supervision/set-up, Modified independent    Dressing Assistance: Maximum assistance  Socks: Total assistance (dependent)    Feeding Assistance: Modified independent   TRANSFERS TRANSFERS   Sit to Stand: Maximum assistance  Stand to Sit: Moderate assistance  Bed to Chair: Maximum assistance (x2)  Toilet Transfer : Other (comment) (unable 2/2/  inability to adhere to LLE TTWB) Sit to Stand:  Moderate assistance  Stand to Sit: Minimum assistance, Moderate assistance  Bed to Chair: Maximum assistance (x2)  Toilet Transfer : Other (comment) (unable 2/2/ inability to adhere to LLE TTWB)   Toilet Transfer : Other (comment) (unable 2/2/  inability to adhere to LLE TTWB) Toilet Transfer : Other (comment) (unable 2/2/  inability to adhere to LLE TTWB)   BALANCE BALANCE   Sitting: Impaired, With support  Sitting - Static: Fair (occasional) (+)  Sitting - Dynamic: Fair (occasional)  Standing: Impaired  Standing - Static: Poor Sitting: With support  Sitting - Static: Fair (occasional)  Sitting - Dynamic: Fair (occasional)  Standing: With support  Standing - Static: Fair       GROSS ASSESSMENT  GROSS ASSESSMENT   AROM: Generally decreased, functional  PROM: Generally decreased, functional  Strength: Generally decreased, functional (RLE 3+/5 grossly, LLE demonstrates 3/5)  Coordination: Generally decreased, functional  Tone: Normal  Sensation: Intact AROM: Generally decreased, functional  PROM: Generally decreased, functional  Strength: Generally decreased, functional  Coordination: Generally decreased, functional  Tone: Normal  Sensation: Intact   COORDINATION COORDINATION   Fine Motor Skills-Upper: Left Impaired, Right Intact (L hand splint intact)  Gross Motor Skills-Upper: Left Intact, Right Intact Fine Motor Skills-Upper: Left Impaired, Right Intact (L hand splint intact)  Gross Motor Skills-Upper: Left Intact, Right Intact   VISUAL/PERCEPTUAL VISUAL/PERCEPTUAL   Corrective Lenses: Glasses   Corrective Lenses: Glasses     AUDITORY: AUDITORY:   Auditory Impairment: None Auditory Impairment: None       INSTRUMENTAL  ADL'S:    INSTRUMENTAL ADL'S:          THE BARTHEL INDEX  ACTIVITY   SCORE   FEEDING  0=unable  5=needs help cutting,spreading butter,etc., or modified diet  10= independent   10   BATHING  0=dependent  5=independent (or in shower   0   GROOMING  0=needs help  5=independent face/hair/teeth/shaving (implements provided)   5   DRESSING  0=dependent  5=needs help but can do about half unaided  10=independent(including buttons, zips,laces etc.)   5 BOWELS  0=incontinent  5=occasional accident  10=continent   10   BLADDER  0=incontinent, or catheterized and unable to manage alone  5=occasional accident  10=continent   10   TOILET USE  0=dependent  5=needs some help, but can do something alone  10=independent (on and off, dressing, wiping)   0   TRANSFER (BED TO CHAIR AND BACK)  0=unable, no sitting balance  5=major help(one or two people,physical), can sit  10=minor help(verbal or physical)  15=independent   5   MOBILITY (ON LEVEL SURFACES)  0=immobile or <50 yards  5=wheelchair independent,including corners,>50 yards  10=walkes with help of one person (verbal or physical) >50 yards  15=independent(but may use any aid; for example, stick) >50 yards   0   STAIRS  0=unable  5=needs help (verbal, physical, carrying aid)  10=independent   0            TOTAL:                  45/100     Treatment:  Co-treated with PT for optimal functional gains with  needed for ADL tasks. Practiced SPT from bed<> w/c in order to increase safety and independence with task while adhering to NWB on L UE and TTWB on L LE. Patient continues to required Mod A x 2 with and max tactile, verbal and physical cueing for adhering on L LE during transfers. Practiced scooting on bed in order to increase good carryover with technique in preparation of toileting transfer. Patient's response to Treatment rendered:  Patient is pleasant and receptive to therapist cueing and recommendations. Patient expected Discharge Location:  [x]Private Residence  [] [facility]/ILF  []LTC  []Other:    Plan: Continue OT services as established on the Plan of Care for 5 times a week.     Treatment Minutes:  29  OT and Assistant have had a weekly case conference regarding the above treatment:  [x] Yes     [] No    Therapist:   Minerva Grant,         Date:10/19/2017     Forward to OT for co-signature when completed

## 2017-10-20 LAB
GLUCOSE BLD STRIP.AUTO-MCNC: 102 MG/DL (ref 70–110)
GLUCOSE BLD STRIP.AUTO-MCNC: 99 MG/DL (ref 70–110)

## 2017-10-20 PROCEDURE — 74011250637 HC RX REV CODE- 250/637: Performed by: INTERNAL MEDICINE

## 2017-10-20 PROCEDURE — 74011250636 HC RX REV CODE- 250/636: Performed by: INTERNAL MEDICINE

## 2017-10-20 PROCEDURE — 82962 GLUCOSE BLOOD TEST: CPT

## 2017-10-20 RX ADMIN — SIMVASTATIN 20 MG: 20 TABLET, FILM COATED ORAL at 21:32

## 2017-10-20 RX ADMIN — OXYCODONE HYDROCHLORIDE AND ACETAMINOPHEN 2 TABLET: 5; 325 TABLET ORAL at 10:19

## 2017-10-20 RX ADMIN — TRIAMCINOLONE ACETONIDE: 1 CREAM TOPICAL at 18:00

## 2017-10-20 RX ADMIN — CHOLECALCIFEROL (VITAMIN D3) 25 MCG (1,000 UNIT) TABLET 2000 UNITS: at 10:16

## 2017-10-20 RX ADMIN — LEVOTHYROXINE SODIUM 75 MCG: 75 TABLET ORAL at 06:12

## 2017-10-20 RX ADMIN — PANTOPRAZOLE SODIUM 40 MG: 40 TABLET, DELAYED RELEASE ORAL at 10:15

## 2017-10-20 RX ADMIN — METFORMIN HYDROCHLORIDE 1000 MG: 500 TABLET ORAL at 17:36

## 2017-10-20 RX ADMIN — ENOXAPARIN SODIUM 40 MG: 40 INJECTION SUBCUTANEOUS at 10:16

## 2017-10-20 RX ADMIN — ONDANSETRON 4 MG: 4 TABLET, ORALLY DISINTEGRATING ORAL at 17:35

## 2017-10-20 RX ADMIN — DOCUSATE SODIUM 100 MG: 100 CAPSULE, LIQUID FILLED ORAL at 10:15

## 2017-10-20 RX ADMIN — METFORMIN HYDROCHLORIDE 1000 MG: 500 TABLET ORAL at 10:15

## 2017-10-20 RX ADMIN — OXYCODONE HYDROCHLORIDE AND ACETAMINOPHEN 2 TABLET: 5; 325 TABLET ORAL at 14:33

## 2017-10-20 RX ADMIN — LOSARTAN POTASSIUM 25 MG: 50 TABLET ORAL at 10:15

## 2017-10-20 NOTE — PROGRESS NOTES
Problem: Self Care Deficits Care Plan (Adult)  Goal: *Acute Goals and Plan of Care (Insert Text)  OCCUPATIONAL THERAPY SHORT TERM GOALS    Initiated 10/13/2017 and to be accomplished within 2 Week(s)    1. Patient will perform Upper body ADL's with adaptive equipment & compensatory techniques as needed with minimal assistance/contact guard assist. (progressing)  2. Patient will perform Lower body ADL's with adaptive equipment & compensatory techniques as needed moderate assistance. (progressing)  3. Patient will perform toileting task with moderate assistance x 2 with Good safety to reduce falls risk. 4.  Patient will perform toilet transfers with Platform Walker and moderate assistance x 2 .  5. Patient will perform standing static/dynamic balance activities for improved ADL/IADL function with moderate assistance x,2 and Good balance and safety awareness. 6.  Patient will improve Barthel index scores to atleast 35/100 to improve functional mobility. 7.  Patient will tolerate standing x 5 minutes during functional activity while adhering to LLE TTWB And Left wrist NWB utilizing platform walker to increase participation in ADL routine. OCCUPATIONAL THERAPY LONG TERM GOALS   Initiated 10/13/2017 and to be accomplished within 4 Week(s)    1. Patient will perform Upper body ADL's with adaptive equipment & compensatory techniques as needed with supervision/set-up. 2.  Patient will perform Lower body ADL's with adaptive equipment & compensatory techniques as needed with         supervision/set-up . 3. Patient will perform toileting task with supervision/set-up with Good safety to reduce falls risk. 4.  Patient will perform functional transfers with Platform Walker and  supervision/set-up and Good balance and safety awareness. 5.  Patient will perform standing static/dynamic activity for improved ADL/IADL function    with supervision/set-up an and Good balance and safety awareness.   6.  Patient will improve Barthel index score to 50/100 to improve independence with mobility. Therapist: Alexsander Ruiz    10/13/2017           [facility]   Occupational Therapy Daily Treatment note      Patient: Bunnie Cabot (32 y.o. female)       Date: 10/20/2017  Attending Physician: Claudean Peaches, MD  Primary Diagnosis: left hip fracture  Hip fracture [facility])    Treatment Diagnosis  Treatment Diagnosis: muscle weakness  Treatment Diagnosis 2: increased need for assist w/ self care   Precautions : Precautions at Admission: Fall, TTWB (TTWB LLE and NWB Left wrist)  Vital Signs:  Vital Signs  Heart Rate Source: Monitor  Level of Consciousness: Alert  BP 1 Method: Automatic  BP 1 Location: Right arm  BP Patient Position: At rest     Cognitive Status:  Mental Status  Neurologic State: Alert; Appropriate for age  Orientation Level: Oriented X4  Cognition: Appropriate for age attention/concentration  Pain:  Pain Screen  Pain Scale 1: Numeric (0 - 10)  Pain Intensity 1: 7  Pain Location 1: Hip  Pain Orientation 1: Left  Pain Description 1: Aching  Pain Intervention(s) 1: Medication (see MAR)  Patient Stated Pain Goal: 0  Pain Reassessment 1: Patient sleeping  Pain Scale 1: Numeric (0 - 10)  Gross Assessment:     Coordination:     Bed Mobility:  Bed Mobility  Rolling: Minimum assistance (towards R side)  Supine to Sit: Minimum assistance  Sit to Supine: Minimum assistance  Scooting: Contact guard assistance  Transfers:        Balance:  Balance  Sitting: With support  Sitting - Static: Fair (occasional)  Sitting - Dynamic: Fair (occasional)  ADL Self Care:     ADL Intervention:           Toileting  Toileting Assistance: Maximum assistance (bed level)  Bladder Hygiene: Maximum assistance  Clothing Management: Maximum assistance                 Therapeutic Activities:   Assisted patient with toileting routine, bed level, in order to assess safety and independence during routine. See above for levels of A needed. Assisted patient with bed mobility in order to assess safety and independence during task. See above for levels of A needed. [place] batting, seated EOB, in order to challenge sitting balance as well as increase weight shifting on L LE needed to safely completing LB dressing. Patient/Caregiver Education:    Pt. Payam Choi Education on adhering to weight bearing precautions during bed mobility for toileting routinet was provided for optimal safety. .      ASSESSMENT:  Patient continues to demonstrate the need for skilled Occupational Therapy services to improve dynamic sitting balance needed for bathing  Progression toward goals:  [x]      Improving appropriately and progressing toward goals  []      Improving slowly and progressing toward goals  []      Not making progress toward goals and plan of care will be adjusted     Treatment session:   32 minutes    Therapist:    JUDY Ochoa,  10/20/2017

## 2017-10-20 NOTE — ROUTINE PROCESS
Bedside and Verbal shift change report given to Archana Pike RN (oncoming nurse) by Radha Hutchison RN (offgoing nurse). Report included the following information SBAR, Kardex and MAR. Hourly rounds made.

## 2017-10-20 NOTE — ROUTINE PROCESS
Bedside and Verbal shift change report given to Cisco Payne (oncoming nurse) by Fernando Smith RN (offgoing nurse). Report included the following information SBAR, Kardex and MAR. QH VISUAL CHECKS  AND 24H CHART CHECKS DONE.

## 2017-10-20 NOTE — PROGRESS NOTES
conducted a Follow up consultation and Spiritual Assessment for Darrius Cross, who is a 65 y.o.,female. The  provided the following Interventions:  Continued the relationship of care and support. Listened empathically. Offered prayer and assurance of continued prayer on patients behalf. Chart reviewed. The following outcomes were achieved:  Patient expressed gratitude for pastoral care visit. Assessment:  There are no further spiritual or Uats[place] issues which require Spiritual Care Services interventions at this time. Plan:  Chaplains will continue to follow and will provide pastoral care on an as needed/requested basis.  recommends bedside caregivers page  on duty if patient shows signs of acute spiritual or emotional distress.      [facility]   Staff [facility]   [phone]

## 2017-10-20 NOTE — PROGRESS NOTES
Problem: Mobility Impaired (Adult and Pediatric)  Goal: *Acute Goals and Plan of Care (Insert Text)  PHYSICAL THERAPY STG GOALS :  Initiated 10/13/2017 and to be accomplished within 2 Weeks    1. Patient will move from supine to sit and sit to supine  and roll side to side in bed with minimal assistance/contact guard assist with WB compliance. 2.  Patient will transfer from bed to chair and chair to bed with minimal assistance/contact guard assist using platform walker with WB compliance. 3.  Patient will perform sit to stand with minimal assistance/contact guard assist with Fair balance and safety awareness with WB compliance. 4.  Patient will ambulate with minimal assistance/contact guard assist for 75 feet with platform walker on level surfaces with 2 turns with WB compliance. 5.  Patient will ascend/descend 14 stairs with right handrail(s) withminimal assistance to allow for safe home access/exit with WB compliance. 6.  Patient will improve standardized test score for [place] Standing Balance Scale 1+ with WB compliance. PHYSICAL THERAPY LTG GOALS :  Initiated 10/13/2017 and to be accomplished within 4 Weeks    1. Patient will move from supine to sit and sit to supine  and roll side to side in bed with modified independence with WB compliance. 2.  Patient will transfer from bed to chair and chair to bed with modified independence using LRAD with WB compliance. 3.  Patient will perform sit to stand with modified independence with Good balance and safety awareness with WB compliance. 4.  Patient will ambulate with modified independence for 150 feet with LRAD on level surfaces and be able to maneuver through narrow spaces and obstacles without loss of balance with WB compliance. 5.  Patient will ascend/descend 14 stairs with right handrail(s) with supervision/set-up to allow for safe home access/exit with WB compliance.   6.  Patient will improve standardized test score for [facility] Standing Balance Scale 3+ to reduce fall risk with WB compliance. Physical Therapist:   Quoc Adan PT  on 10/13/2017            [address] [place] [address]   physical Therapy Daily Treatment note      Patient: Bunnie Cabot (25 y.o. female)               Date: 10/20/2017    Physician: Claudean Peaches, MD  Primary Diagnosis: left hip fracture  Hip fracture [facility])          Treatment Diagnosis  Treatment Diagnosis: muscle weakness  Treatment Diagnosis 2: increased need for assist w/ self care  Precautions: Fall, TTWB (TTWB LLE and NWB Left wrist)  Vital Signs  Vital Signs  Pulse (Heart Rate): 71  Heart Rate Source: Monitor  Level of Consciousness: Alert  BP: 120/59  MAP (Calculated): 79  BP 1 Method: Automatic  BP 1 Location: Right arm  BP Patient Position: At rest     Cognitive Status:  Mental Status  Neurologic State: Alert  Orientation Level: Oriented X4  Cognition: Appropriate decision making  Pain  Pain Screen  Pain Scale 1: Numeric (0 - 10)  Pain Intensity 1: 7  Pain Location 1: Hip  Pain Orientation 1: Left  Pain Description 1: Aching  Pain Intervention(s) 1: Medication (see MAR)  Patient Stated Pain Goal: 0  Pain Reassessment 1: Patient sleeping  Bed Mobility Training  Bed Mobility Training  Rolling: Stand-by asssistance  Supine to Sit: Stand-by asssistance  Sit to Supine: Stand-by asssistance  Scooting: Stand-by asssistance  Balance  Sitting: With support  Sitting - Static: Fair (occasional)  Sitting - Dynamic: Fair (occasional)  Transfer Training  Transfer Training  Sit to Stand: Minimum assistance  Stand to Sit: Minimum assistance  Stand Pivot Transfers: Minimal assistance  Sit to Stand: Minimum assistance  Gait Training                     With 0 turns. Therapeutic Exercise:   Pt supine in bed upon arrival, reports having some pain \"I just got my pain pill 20 minutes ago\". TE for LE strengthening and mobility: supine quad sets, heel slide, SLR hip abd 2x10 reps of each with Javier for LLE.  Seated LAQs and marches 2x15 reps of each. SPT from bed to w/c Javier with verbal cuing for sequencing and reminders of WB status. Sit<>stand transfer training x3 with platform walker and Javier. Pt requires biofeedback with therapist foot under pt's to maintain TTWB on LLE. Noted improvement in maintaining WB status with transfer, pt kept TTWB for LLE 90% of time. Tends to meight bear during initiation of stand. Patient/Caregiver Education:   Pt /Caregiver Education on safety and fall prevention, transfer training was provided to maintain WB status and reduce risk for falls with stands. ASSESSMENT:  Patient continues to benefit from Skilled PT services to improve overall strength and mobility, improve functional mobility. Progression toward goals:  [x]      Improving appropriately and progressing toward goals  []      Improving slowly and progressing toward goals  []      Not making progress toward goals and plan of care will be adjusted     Treatment session: 48 minutes.   Therapist:   Trung Carmichael PTA,          10/20/2017

## 2017-10-20 NOTE — ROUTINE PROCESS
Bedside shift change report given to [address] [facility] rn (oncoming nurse) by austin rn (offgoing nurse). Report included the following information Kardex, MAR and Recent Results.

## 2017-10-21 LAB
GLUCOSE BLD STRIP.AUTO-MCNC: 88 MG/DL (ref 70–110)
GLUCOSE BLD STRIP.AUTO-MCNC: 91 MG/DL (ref 70–110)

## 2017-10-21 PROCEDURE — 74011250637 HC RX REV CODE- 250/637: Performed by: INTERNAL MEDICINE

## 2017-10-21 PROCEDURE — 74011250636 HC RX REV CODE- 250/636: Performed by: INTERNAL MEDICINE

## 2017-10-21 PROCEDURE — 82962 GLUCOSE BLOOD TEST: CPT

## 2017-10-21 PROCEDURE — 87086 URINE CULTURE/COLONY COUNT: CPT | Performed by: INTERNAL MEDICINE

## 2017-10-21 RX ORDER — BISMUTH SUBSALICYLATE 262 MG/15ML
30 LIQUID ORAL
Status: DISCONTINUED | OUTPATIENT
Start: 2017-10-21 | End: 2017-11-21 | Stop reason: HOSPADM

## 2017-10-21 RX ADMIN — CHOLECALCIFEROL (VITAMIN D3) 25 MCG (1,000 UNIT) TABLET 2000 UNITS: at 09:47

## 2017-10-21 RX ADMIN — OXYCODONE HYDROCHLORIDE AND ACETAMINOPHEN 2 TABLET: 5; 325 TABLET ORAL at 22:03

## 2017-10-21 RX ADMIN — PANTOPRAZOLE SODIUM 40 MG: 40 TABLET, DELAYED RELEASE ORAL at 09:47

## 2017-10-21 RX ADMIN — SIMVASTATIN 20 MG: 20 TABLET, FILM COATED ORAL at 22:03

## 2017-10-21 RX ADMIN — TRIAMCINOLONE ACETONIDE: 1 CREAM TOPICAL at 09:00

## 2017-10-21 RX ADMIN — OXYCODONE HYDROCHLORIDE AND ACETAMINOPHEN 2 TABLET: 5; 325 TABLET ORAL at 09:46

## 2017-10-21 RX ADMIN — DOCUSATE SODIUM 100 MG: 100 CAPSULE, LIQUID FILLED ORAL at 18:15

## 2017-10-21 RX ADMIN — LOSARTAN POTASSIUM 25 MG: 50 TABLET ORAL at 09:46

## 2017-10-21 RX ADMIN — LEVOTHYROXINE SODIUM 75 MCG: 75 TABLET ORAL at 06:01

## 2017-10-21 RX ADMIN — BISMUTH SUBSALICYLATE 30 ML: 262 LIQUID ORAL at 21:35

## 2017-10-21 RX ADMIN — METFORMIN HYDROCHLORIDE 1000 MG: 500 TABLET ORAL at 09:45

## 2017-10-21 RX ADMIN — METFORMIN HYDROCHLORIDE 1000 MG: 500 TABLET ORAL at 18:16

## 2017-10-21 RX ADMIN — TRIAMCINOLONE ACETONIDE: 1 CREAM TOPICAL at 18:00

## 2017-10-21 RX ADMIN — ONDANSETRON 4 MG: 4 TABLET, ORALLY DISINTEGRATING ORAL at 06:58

## 2017-10-21 RX ADMIN — ENOXAPARIN SODIUM 40 MG: 40 INJECTION SUBCUTANEOUS at 09:47

## 2017-10-21 NOTE — ROUTINE PROCESS
Bedside shift change report given to [address] [place] [address] (oncoming nurse) by austin rivera (offgoing nurse). Report included the following information Kardex, MAR and Recent Results.

## 2017-10-21 NOTE — ROUTINE PROCESS
Bedside and Verbal shift change report given to [address] [place] [address] (oncoming nurse) by Xiomara Watson RN (offgoing nurse). Report included the following information SBAR, Kardex and MAR.

## 2017-10-22 LAB
GLUCOSE BLD STRIP.AUTO-MCNC: 89 MG/DL (ref 70–110)
GLUCOSE BLD STRIP.AUTO-MCNC: 97 MG/DL (ref 70–110)

## 2017-10-22 PROCEDURE — 82962 GLUCOSE BLOOD TEST: CPT

## 2017-10-22 PROCEDURE — 74011250636 HC RX REV CODE- 250/636: Performed by: INTERNAL MEDICINE

## 2017-10-22 PROCEDURE — 74011250637 HC RX REV CODE- 250/637: Performed by: INTERNAL MEDICINE

## 2017-10-22 RX ORDER — BISMUTH SUBSALICYLATE 262 MG/15ML
30 LIQUID ORAL
Status: DISCONTINUED | OUTPATIENT
Start: 2017-10-22 | End: 2017-10-22 | Stop reason: SDUPTHER

## 2017-10-22 RX ADMIN — ENOXAPARIN SODIUM 40 MG: 40 INJECTION SUBCUTANEOUS at 09:35

## 2017-10-22 RX ADMIN — METFORMIN HYDROCHLORIDE 1000 MG: 500 TABLET ORAL at 09:38

## 2017-10-22 RX ADMIN — PANTOPRAZOLE SODIUM 40 MG: 40 TABLET, DELAYED RELEASE ORAL at 09:38

## 2017-10-22 RX ADMIN — ONDANSETRON 4 MG: 4 TABLET, ORALLY DISINTEGRATING ORAL at 12:15

## 2017-10-22 RX ADMIN — OXYCODONE HYDROCHLORIDE AND ACETAMINOPHEN 2 TABLET: 5; 325 TABLET ORAL at 20:41

## 2017-10-22 RX ADMIN — OXYCODONE HYDROCHLORIDE AND ACETAMINOPHEN 1 TABLET: 5; 325 TABLET ORAL at 09:36

## 2017-10-22 RX ADMIN — CHOLECALCIFEROL (VITAMIN D3) 25 MCG (1,000 UNIT) TABLET 2000 UNITS: at 09:36

## 2017-10-22 RX ADMIN — LEVOTHYROXINE SODIUM 75 MCG: 75 TABLET ORAL at 06:30

## 2017-10-22 RX ADMIN — SIMVASTATIN 20 MG: 20 TABLET, FILM COATED ORAL at 20:43

## 2017-10-22 RX ADMIN — METFORMIN HYDROCHLORIDE 1000 MG: 500 TABLET ORAL at 16:48

## 2017-10-22 RX ADMIN — BISMUTH SUBSALICYLATE 30 ML: 262 LIQUID ORAL at 18:43

## 2017-10-22 RX ADMIN — LOSARTAN POTASSIUM 25 MG: 50 TABLET ORAL at 09:36

## 2017-10-22 NOTE — ROUTINE PROCESS
Bedside and Verbal shift change report given to ALANA Gunn (oncoming nurse) by KERI Decker (offgoing nurse). Report included the following information SBAR, Kardex and MAR.

## 2017-10-22 NOTE — PROGRESS NOTES
Problem: Self Care Deficits Care Plan (Adult)  Goal: *Acute Goals and Plan of Care (Insert Text)  OCCUPATIONAL THERAPY SHORT TERM GOALS    Initiated 10/13/2017 and to be accomplished within 2 Week(s)    1. Patient will perform Upper body ADL's with adaptive equipment & compensatory techniques as needed with minimal assistance/contact guard assist. (progressing)  2. Patient will perform Lower body ADL's with adaptive equipment & compensatory techniques as needed moderate assistance. (progressing)  3. Patient will perform toileting task with moderate assistance x 2 with Good safety to reduce falls risk. 4.  Patient will perform toilet transfers with Platform Walker and moderate assistance x 2 .  5. Patient will perform standing static/dynamic balance activities for improved ADL/IADL function with moderate assistance x,2 and Good balance and safety awareness. 6.  Patient will improve Barthel index scores to atleast 35/100 to improve functional mobility. 7.  Patient will tolerate standing x 5 minutes during functional activity while adhering to LLE TTWB And Left wrist NWB utilizing platform walker to increase participation in ADL routine. OCCUPATIONAL THERAPY LONG TERM GOALS   Initiated 10/13/2017 and to be accomplished within 4 Week(s)    1. Patient will perform Upper body ADL's with adaptive equipment & compensatory techniques as needed with supervision/set-up. 2.  Patient will perform Lower body ADL's with adaptive equipment & compensatory techniques as needed with         supervision/set-up . 3. Patient will perform toileting task with supervision/set-up with Good safety to reduce falls risk. 4.  Patient will perform functional transfers with Platform Walker and  supervision/set-up and Good balance and safety awareness. 5.  Patient will perform standing static/dynamic activity for improved ADL/IADL function    with supervision/set-up an and Good balance and safety awareness.   6.  Patient will improve Barthel index score to 50/100 to improve independence with mobility. Therapist: Donato Santiago    10/13/2017           [facility]   Occupational Therapy Daily Treatment note      Patient: Dakota Palmer (89 y.o. female)       Date: 10/22/2017  Attending Physician: Tyler Rich MD  Primary Diagnosis: left hip fracture  Hip fracture [facility])    Treatment Diagnosis  Treatment Diagnosis: muscle weakness  Treatment Diagnosis 2: increased need for assist w/ self care   Precautions : Precautions at Admission: Fall, TTWB (TTWB LLE and NWB Left wrist)  Vital Signs:  Vital Signs  Pulse (Heart Rate): 80  BP: 114/61     Cognitive Status:  Mental Status  Neurologic State: Alert  Orientation Level: Oriented X4  Cognition: Appropriate decision making  Pain:  Pain Screen  Pain Scale 1: Numeric (0 - 10)  Pain Intensity 1: 0  Pain Scale 1: Numeric (0 - 10)  Gross Assessment:     Coordination:     Bed Mobility:  Bed Mobility  Supine to Sit: Stand-by asssistance  Sit to Supine: Moderate assistance  Transfers:  Functional Transfers  Sit to Stand: Minimum assistance  Bed to Chair: Minimum assistance;Contact guard assistance     Balance:  Balance  Sitting - Static: Fair (occasional)  Standing: With support  Standing - Static: Fair ((-))  ADL Self Care:     ADL Intervention:     Upper [address]: Minimum  assistance     Toileting  Toileting Assistance: Total assistance(dependent)  Clothing Management: Total assistance (dependent)  Grooming  Brushing/Combing Hair: Supervision/set-up  Lower Body Dressing Assistance  Underpants: Total assistance (dependent)  Socks:  Total assistance (dependent)  Position Performed: Supine           Therapeutic Activities:  First tx session: ADL performed as mentioned above, Bed mob: SBA supine to sit edge of bed, Functional transfer: Min A sit to stand & SPT from edge of bed to w/c utilizing platform walker w/ good adherence to NWB L wrist and TTWB LLE w/ tactile awareness feedback cues w/ therapist's foot placed under patient's L foot. Patient tolerated RUE reaching w/ 1# wrist weight while crossing midline and overhead for placement of pegs into vertical peg board while seated w/c level in order to improve functional reach for improved ADL performance skills and functional transfers. Patient agreeable to remain sitting up in w/c and eat lunch, nursing SANJUANAA/Day notified of training to be provided with CNA niralit for functional transfer utilzing platform following patient eating lunch  From w/c to bed and WB restrictions reviewed. Second tx session: caregiver instruction w/ FLORY/Day for safety awareness and technique of nursing x 2 for functional transfers utilizing platform walker and adhering to NWB L wrist and TTWB LLE w/ tactile awareness/feedback with caregiver foot under patient's left foot to adhere to TTWB was provided to increase out of bed activity, prevent falls and maintain WB restrictions. Patient adhered well to WB restrictions and performed sit to stand & SPT w/ platform walker and  w/ tactile awareness feedback cues w/ therapist's foot placed under patient's L foot w/ Min A -CGA. Bed mobility: Mod A sit to supine for elevating LEs into bed and verbal cues not to utilize L foot during bridge technique, Mod A scooting up towards head of bed. Patient/Caregiver Education:    Pt. Mya Bhakta Education on safety awareness and technique of nursing x 2 for functional transfers utilizing platform walker and adhering to NWB L wrist and TTWB LLE w/ tactile awareness/feedback with caregiver foot under patient's left foot to adhere to TTWB was provided to increase out of bed activity, prevent falls and maintain WB restrictions.       ASSESSMENT:  Patient continues to demonstrate the need for skilled Occupational Therapy services to improve grooming, bathing, upper body dressing, lower body dressing, toileting, toilet transfer, tub transfer and simple home management  Progression toward goals:  [x]      Improving appropriately and progressing toward goals  []      Improving slowly and progressing toward goals  []      Not making progress toward goals and plan of care will be adjusted     Treatment session:   48 minutes    Therapist:    Nuria Vanessa,  10/22/2017

## 2017-10-22 NOTE — ROUTINE PROCESS
Bedside and Verbal shift change report given to mustapha benson rn(oncoming nurse) by jovani Gunn lpn (offgoing nurse). Report included the following information SBAR, Kardex and MAR.  Hourly rounds

## 2017-10-23 LAB
BACTERIA SPEC CULT: NORMAL
GLUCOSE BLD STRIP.AUTO-MCNC: 165 MG/DL (ref 70–110)
GLUCOSE BLD STRIP.AUTO-MCNC: 85 MG/DL (ref 70–110)
SERVICE CMNT-IMP: NORMAL

## 2017-10-23 PROCEDURE — 74011250636 HC RX REV CODE- 250/636: Performed by: INTERNAL MEDICINE

## 2017-10-23 PROCEDURE — 74011250637 HC RX REV CODE- 250/637: Performed by: INTERNAL MEDICINE

## 2017-10-23 PROCEDURE — 82962 GLUCOSE BLOOD TEST: CPT

## 2017-10-23 RX ORDER — CELECOXIB 100 MG/1
200 CAPSULE ORAL 2 TIMES DAILY
Status: DISCONTINUED | OUTPATIENT
Start: 2017-10-23 | End: 2017-11-20

## 2017-10-23 RX ADMIN — TRIAMCINOLONE ACETONIDE: 1 CREAM TOPICAL at 18:00

## 2017-10-23 RX ADMIN — CHOLECALCIFEROL (VITAMIN D3) 25 MCG (1,000 UNIT) TABLET 2000 UNITS: at 12:57

## 2017-10-23 RX ADMIN — SIMVASTATIN 20 MG: 20 TABLET, FILM COATED ORAL at 21:23

## 2017-10-23 RX ADMIN — ENOXAPARIN SODIUM 40 MG: 40 INJECTION SUBCUTANEOUS at 12:56

## 2017-10-23 RX ADMIN — LOSARTAN POTASSIUM 25 MG: 50 TABLET ORAL at 12:57

## 2017-10-23 RX ADMIN — LEVOTHYROXINE SODIUM 75 MCG: 75 TABLET ORAL at 05:39

## 2017-10-23 RX ADMIN — PANTOPRAZOLE SODIUM 40 MG: 40 TABLET, DELAYED RELEASE ORAL at 12:57

## 2017-10-23 RX ADMIN — OXYCODONE HYDROCHLORIDE AND ACETAMINOPHEN 2 TABLET: 5; 325 TABLET ORAL at 14:19

## 2017-10-23 RX ADMIN — METFORMIN HYDROCHLORIDE 1000 MG: 500 TABLET ORAL at 17:30

## 2017-10-23 RX ADMIN — CELECOXIB 200 MG: 100 CAPSULE ORAL at 18:23

## 2017-10-23 RX ADMIN — METFORMIN HYDROCHLORIDE 1000 MG: 500 TABLET ORAL at 12:57

## 2017-10-23 RX ADMIN — OXYCODONE HYDROCHLORIDE AND ACETAMINOPHEN 2 TABLET: 5; 325 TABLET ORAL at 07:04

## 2017-10-23 RX ADMIN — TRIAMCINOLONE ACETONIDE: 1 CREAM TOPICAL at 12:57

## 2017-10-23 NOTE — PROGRESS NOTES
Problem: Mobility Impaired (Adult and Pediatric)  Goal: *Acute Goals and Plan of Care (Insert Text)  PHYSICAL THERAPY STG GOALS :  Initiated 10/13/2017 and to be accomplished within 2 Weeks    1. Patient will move from supine to sit and sit to supine  and roll side to side in bed with minimal assistance/contact guard assist with WB compliance. 2.  Patient will transfer from bed to chair and chair to bed with minimal assistance/contact guard assist using platform walker with WB compliance. 3.  Patient will perform sit to stand with minimal assistance/contact guard assist with Fair balance and safety awareness with WB compliance. 4.  Patient will ambulate with minimal assistance/contact guard assist for 75 feet with platform walker on level surfaces with 2 turns with WB compliance. 5.  Patient will ascend/descend 14 stairs with right handrail(s) withminimal assistance to allow for safe home access/exit with WB compliance. 6.  Patient will improve standardized test score for [place] Standing Balance Scale 1+ with WB compliance. PHYSICAL THERAPY LTG GOALS :  Initiated 10/13/2017 and to be accomplished within 4 Weeks    1. Patient will move from supine to sit and sit to supine  and roll side to side in bed with modified independence with WB compliance. 2.  Patient will transfer from bed to chair and chair to bed with modified independence using LRAD with WB compliance. 3.  Patient will perform sit to stand with modified independence with Good balance and safety awareness with WB compliance. 4.  Patient will ambulate with modified independence for 150 feet with LRAD on level surfaces and be able to maneuver through narrow spaces and obstacles without loss of balance with WB compliance. 5.  Patient will ascend/descend 14 stairs with right handrail(s) with supervision/set-up to allow for safe home access/exit with WB compliance.   6.  Patient will improve standardized test score for [facility] Standing Balance Scale 3+ to reduce fall risk with WB compliance. Physical Therapist:   Darci Reich, PT  on 10/13/2017            [address] [place] [address]   physical Therapy Daily Treatment note      Patient: Nick Gonzalez (41 y.o. female)               Date: 10/23/2017    Physician: Miguel Ángel Yanez MD  Primary Diagnosis: left hip fracture  Hip fracture [facility])          Treatment Diagnosis  Treatment Diagnosis: muscle weakness  Treatment Diagnosis 2: increased need for assist w/ self care  Precautions: Fall, TTWB (TTWB LLE and NWB Left wrist)  Vital Signs  Vital Signs  Temp: 98.4 °F (36.9 °C)  Temp Source: Oral  Pulse (Heart Rate): 82  O2 Sat (%): 98 %  BP: 143/62  MAP (Calculated): 89  BP 1 Method: Automatic     Cognitive Status:  Mental Status  Neurologic State: Alert  Orientation Level: Oriented X4  Cognition: Appropriate for age attention/concentration  Pain  Pain Screen  Pain Scale 1: Numeric (0 - 10)  Pain Intensity 1: 5  Pain Onset 1: now  Pain Location 1: Hip  Pain Orientation 1: Left  Pain Description 1: Aching  Pain Intervention(s) 1: Medication (see MAR)  Patient Stated Pain Goal: 0  Pain Reassessment 1: Yes  Bed Mobility Training  Bed Mobility Training  Supine to Sit: Stand-by asssistance  Balance  Sitting: With support  Sitting - Static: Fair (occasional)  Sitting - Dynamic: Fair (occasional)  Standing: With support  Standing - Static: Fair  Standing - Dynamic : Fair  Transfer Training  Transfer Training  Sit to Stand: Minimum assistance  Stand to Sit: Contact guard assistance  Bed to Chair: Minimum assistance (Mod A with w/c managment)  Sit to Stand: Minimum assistance  Gait Training                     With 0 turns. Therapeutic Exercise:   Pt reports LLE pain 6/10, \"it feels very heavy today\". Seated TE for LE strengthening and mobility: ankle pumps, LAQs, marches 2x15 reps of each. Pt has continued to c/o R knee pain and arthritis, noted bowing of RLE.  Therapist applied ace wrap for R knee to assist in joint support with standing and transfers. Pt reports feeling some relief in pain while wearing ace wrap. Per therapist request, pt could benefit from knee brace, Dr. Tavares Just notified. Pt sees ortho 10/24/17, noted to ask for knee brace. [place] with OT for standing balance activity. Pt stand 1x1'56\"  1x1'30\" CGA  Patient/Caregiver Education:   Pt /Caregiver Education on safety and fall prevention,  Pt education with transfers was provided to maintain WB status and reduce risk for falls. ASSESSMENT:  Patient continues to benefit from Skilled PT services to improve overall strength and mobility, improve functional mobility  Progression toward goals:  [x]      Improving appropriately and progressing toward goals  []      Improving slowly and progressing toward goals  []      Not making progress toward goals and plan of care will be adjusted     Treatment session: 48 minutes.   Therapist:   Baljeet Williamson PTA,          10/23/2017

## 2017-10-23 NOTE — PROGRESS NOTES
I have reviewed this patient's current medication list and recent laboratory results. At this time, I do not suggest any drug therapy adjustments or additional laboratory monitoring. Thank you,  Angelina KINSEY  Ph. M. S.  10/23/2017

## 2017-10-23 NOTE — PROGRESS NOTES
Problem: Self Care Deficits Care Plan (Adult)  Goal: *Acute Goals and Plan of Care (Insert Text)  OCCUPATIONAL THERAPY SHORT TERM GOALS    Initiated 10/13/2017 and to be accomplished within 2 Week(s)    1. Patient will perform Upper body ADL's with adaptive equipment & compensatory techniques as needed with minimal assistance/contact guard assist. (progressing)  2. Patient will perform Lower body ADL's with adaptive equipment & compensatory techniques as needed moderate assistance. (progressing)  3. Patient will perform toileting task with moderate assistance x 2 with Good safety to reduce falls risk. 4.  Patient will perform toilet transfers with Platform Walker and moderate assistance x 2 .  5. Patient will perform standing static/dynamic balance activities for improved ADL/IADL function with moderate assistance x,2 and Good balance and safety awareness. 6.  Patient will improve Barthel index scores to atleast 35/100 to improve functional mobility. 7.  Patient will tolerate standing x 5 minutes during functional activity while adhering to LLE TTWB And Left wrist NWB utilizing platform walker to increase participation in ADL routine. OCCUPATIONAL THERAPY LONG TERM GOALS   Initiated 10/13/2017 and to be accomplished within 4 Week(s)    1. Patient will perform Upper body ADL's with adaptive equipment & compensatory techniques as needed with supervision/set-up. 2.  Patient will perform Lower body ADL's with adaptive equipment & compensatory techniques as needed with         supervision/set-up . 3. Patient will perform toileting task with supervision/set-up with Good safety to reduce falls risk. 4.  Patient will perform functional transfers with Platform Walker and  supervision/set-up and Good balance and safety awareness. 5.  Patient will perform standing static/dynamic activity for improved ADL/IADL function    with supervision/set-up an and Good balance and safety awareness.   6.  Patient will improve Barthel index score to 50/100 to improve independence with mobility. Therapist: Boubacar Arciniega    10/13/2017           [facility]   Occupational Therapy Daily Treatment note      Patient: Frederick Pedro (41 y.o. female)       Date: 10/23/2017  Attending Physician: Karolina Alexis MD  Primary Diagnosis: left hip fracture  Hip fracture [facility])    Treatment Diagnosis  Treatment Diagnosis: muscle weakness  Treatment Diagnosis 2: increased need for assist w/ self care   Precautions : Precautions at Admission: Fall, TTWB (TTWB LLE and NWB Left wrist)  Vital Signs:        Cognitive Status:  Mental Status  Neurologic State: Alert  Orientation Level: Oriented X4  Cognition: Appropriate for age attention/concentration  Pain:  Pain Screen  Pain Scale 1: Numeric (0 - 10)  Pain Intensity 1: 5  Pain Onset 1: now  Pain Location 1: Hip  Pain Orientation 1: Left  Pain Description 1: Aching  Pain Intervention(s) 1: Medication (see MAR)  Patient Stated Pain Goal: 0  Pain Reassessment 1: Yes  Pain Scale 1: Numeric (0 - 10)  Gross Assessment:     Coordination:     Bed Mobility:  Bed Mobility  Supine to Sit: Stand-by asssistance  Transfers:  Functional Transfers  Sit to Stand: Minimum assistance  Bed to Chair: Minimum assistance (Mod A with w/c managment)     Balance:  Balance  Sitting: With support  Sitting - Static: Fair (occasional)  Sitting - Dynamic: Fair (occasional)  Standing: With support  Standing - Static: Fair  Standing - Dynamic : Fair  ADL Self Care:     ADL Intervention:                 Lower Body Dressing Assistance  Dressing Assistance: Moderate assistance  Underpants: Moderate assistance  Position Performed: Standing  Cues: Verbal cues provided             Therapeutic Activities:  Assisted patient with bed mobility and bed <>w/c transfers in order to assess safety and independence during task. See above for levels of A needed.  Patient demonstrated increased independence and performance with sit to stands today utilizing platform w/c for transfers. Patient asked Jamie Hein regarding a possible knee brace on R LE to increase stability and support during transfers while adhering to TTWB on L LE. JUDY informed patient she will inform PT staff to assess. Practiced static standing activity in order to increase standing balance and tolerance needed for standing aspects of ADLS. Patient was able to tolerate standing with CGA and min cueing for TTWB on L LE for 1:30 prior to requesting to sit. Assisted patient with donning underpants at standing in order to assess safety and independence during task. See above for levels of A needed. 2nd treatment session: Partially co-treated with PT for optimal functional gains with static standing with R knee support to assess safety and independence. Patient reports increased stability on R LE with Ace Bandage on R LE during standing task. Therapeutic Exercises:  UB strengthening with 3#, 2 sets x 20 reps in order to increase functional activity tolerance and UB muscle strength needed for ADLs  Patient/Caregiver Education:    Pt. Mary Ellen Freeman Education on see above.       ASSESSMENT:  Patient continues to demonstrate the need for skilled Occupational Therapy services to improve static standing balance needed for bathing  Progression toward goals:  []      Improving appropriately and progressing toward goals  [x]      Improving slowly and progressing toward goals  []      Not making progress toward goals and plan of care will be adjusted     Treatment session:   47  minutes    Therapist:    JUDY Beyer,  10/23/2017

## 2017-10-23 NOTE — PROGRESS NOTES
GIM     Patient: Sonya Wade MRN: [id number]  CSN: [id number]    YOB: 1940  Age: 65 y.o. Sex: female    DOA: 10/12/2017 LOS:  LOS: 11 days                    Subjective:     Pt doing well and non wt bearing on leg till seen by ortho. Blood sugar controlled.  L wrist fx stable    Objective:      Visit Vitals    /62    Pulse 82    Temp 98.4 °F (36.9 °C)    Resp 18    Ht 5' (1.524 m)    Wt 77.4 kg (170 lb 11.2 oz)    SpO2 98%    Breastfeeding No    BMI 33.34 kg/m2       Physical Exam:  Chest clear  Cor rr  abd soft  No edema    Intake and Output:  Current Shift:     Last three shifts:       Recent Results (from the past 24 hour(s))   GLUCOSE, POC    Collection Time: 10/22/17  4:33 PM   Result Value Ref Range    Glucose (POC) 97 70 - 110 mg/dL   GLUCOSE, POC    Collection Time: 10/23/17  5:45 AM   Result Value Ref Range    Glucose (POC) 85 70 - 110 mg/dL       Current Facility-Administered Medications   Medication Dose Route Frequency    celecoxib (CELEBREX) capsule 200 mg  200 mg Oral BID    bismuth subsalicylate (PEPTO-BISMOL) oral suspension 30 mL  30 mL Oral Q6H PRN    losartan (COZAAR) tablet 25 mg  25 mg Oral DAILY    cholecalciferol (VITAMIN D3) tablet 2,000 Units  2,000 Units Oral DAILY    [facility] ANESTHESIA   Other PRN    [facility] EMERGENCY/STAT BOX   Other PRN    alum-mag hydroxide-simeth (MYLANTA) oral suspension 30 mL  30 mL Oral QID PRN    ondansetron (ZOFRAN ODT) tablet 4 mg  4 mg Oral Q6H PRN    docusate sodium (COLACE) capsule 100 mg  100 mg Oral BID    enoxaparin (LOVENOX) injection 40 mg  40 mg SubCUTAneous DAILY    levothyroxine (SYNTHROID) tablet 75 mcg  75 mcg Oral ACB    metFORMIN (GLUCOPHAGE) tablet 1,000 mg  1,000 mg Oral BID WITH MEALS    oxyCODONE-acetaminophen (PERCOCET) 5-325 mg per tablet 1-2 Tab  1-2 Tab Oral Q4H PRN    pantoprazole (PROTONIX) tablet 40 mg  40 mg Oral DAILY    simvastatin (ZOCOR) tablet 20 mg  20 mg Oral QHS    triamcinolone acetonide (KENALOG) 0.1 % cream   Topical BID    glucose chewable tablet 16 g  4 Tab Oral PRN    glucagon (GLUCAGEN) injection 1 mg  1 mg IntraMUSCular PRN    dextrose (D50W) injection syrg 12.5-25 g  25-50 mL IntraVENous PRN       Lab Results   Component Value Date/Time    Glucose 116 10/12/2017 04:00 AM    Glucose 167 10/11/2017 03:50 AM    Glucose 180 10/10/2017 06:36 PM    Glucose 161 10/09/2017 05:30 PM    Glucose 112 04/20/2017 11:23 AM        Assessment/Plan     Active Problems:    Hip fracture ([facility] [address] (10/9/2017)        Lev Valenzuela MD  10/23/2017, 1:48 PM

## 2017-10-23 NOTE — ROUTINE PROCESS
Bedside shift change report given to [facility] rn (oncoming nurse) by austin rivera (offgoing nurse). Report included the following information Kardex, MAR and Recent Results.

## 2017-10-23 NOTE — ROUTINE PROCESS
Verbal shift change report given to AMELIE Burdick (oncoming nurse) by Shiva Hodges   (offgoing nurse). Report included the following information SBAR, Kardex and MAR.

## 2017-10-23 NOTE — ROUTINE PROCESS
Bedside shift change report given to [facility] (oncoming nurse) by Anita Zuniga RN (offgoing nurse). Report included the following information SBAR, Kardex, MAR and Recent Results VISUALLY CHECKED PT Q 1 HR BY NURSING STAFF. 24 hour order chart check done. Landon Douglas

## 2017-10-24 LAB — GLUCOSE BLD STRIP.AUTO-MCNC: 95 MG/DL (ref 70–110)

## 2017-10-24 PROCEDURE — 74011250637 HC RX REV CODE- 250/637: Performed by: INTERNAL MEDICINE

## 2017-10-24 PROCEDURE — 74011250636 HC RX REV CODE- 250/636: Performed by: INTERNAL MEDICINE

## 2017-10-24 PROCEDURE — 82962 GLUCOSE BLOOD TEST: CPT

## 2017-10-24 RX ADMIN — METFORMIN HYDROCHLORIDE 1000 MG: 500 TABLET ORAL at 16:52

## 2017-10-24 RX ADMIN — PANTOPRAZOLE SODIUM 40 MG: 40 TABLET, DELAYED RELEASE ORAL at 08:34

## 2017-10-24 RX ADMIN — ONDANSETRON 4 MG: 4 TABLET, ORALLY DISINTEGRATING ORAL at 08:39

## 2017-10-24 RX ADMIN — BISMUTH SUBSALICYLATE 30 ML: 262 LIQUID ORAL at 20:20

## 2017-10-24 RX ADMIN — OXYCODONE HYDROCHLORIDE AND ACETAMINOPHEN 2 TABLET: 5; 325 TABLET ORAL at 08:31

## 2017-10-24 RX ADMIN — LEVOTHYROXINE SODIUM 75 MCG: 75 TABLET ORAL at 05:30

## 2017-10-24 RX ADMIN — OXYCODONE HYDROCHLORIDE AND ACETAMINOPHEN 2 TABLET: 5; 325 TABLET ORAL at 20:19

## 2017-10-24 RX ADMIN — LOSARTAN POTASSIUM 25 MG: 50 TABLET ORAL at 08:31

## 2017-10-24 RX ADMIN — CELECOXIB 200 MG: 100 CAPSULE ORAL at 08:34

## 2017-10-24 RX ADMIN — ENOXAPARIN SODIUM 40 MG: 40 INJECTION SUBCUTANEOUS at 08:35

## 2017-10-24 RX ADMIN — METFORMIN HYDROCHLORIDE 1000 MG: 500 TABLET ORAL at 08:34

## 2017-10-24 RX ADMIN — SIMVASTATIN 20 MG: 20 TABLET, FILM COATED ORAL at 21:10

## 2017-10-24 RX ADMIN — TRIAMCINOLONE ACETONIDE: 1 CREAM TOPICAL at 08:41

## 2017-10-24 RX ADMIN — TRIAMCINOLONE ACETONIDE: 1 CREAM TOPICAL at 18:00

## 2017-10-24 RX ADMIN — CHOLECALCIFEROL (VITAMIN D3) 25 MCG (1,000 UNIT) TABLET 2000 UNITS: at 08:31

## 2017-10-24 RX ADMIN — CELECOXIB 200 MG: 100 CAPSULE ORAL at 17:26

## 2017-10-24 NOTE — PROGRESS NOTES
conducted a Follow up consultation and Spiritual Assessment for Alfredo Pearson, who is a 65 y.o.,female. The  provided the following Interventions:  Continued the relationship of care and support. Listened empathically. Offered prayer and assurance of continued prayer on patients behalf. Chart reviewed. The following outcomes were achieved:  Patient expressed gratitude for pastoral care visit. Assessment:  There are no further spiritual or Quaker issues which require Spiritual Care Services interventions at this time. Plan:  Chaplains will continue to follow and will provide pastoral care on an as needed/requested basis.  recommends bedside caregivers page  on duty if patient shows signs of acute spiritual or emotional distress.      [address] [facility]   Staff [facility]   [phone]

## 2017-10-24 NOTE — ROUTINE PROCESS
Bedside and Verbal shift change report given to Maciej Del Rio RN (oncoming nurse) by Quentin Downs RN (offgoing nurse). Report included the following information SBAR, Kardex and MAR. Hourly rounds made.

## 2017-10-24 NOTE — ROUTINE PROCESS
Verbal bedside shift change report [facility]  (on[facility] nurse) by Ryan Bhakta   (offgoing nurse).  Report included the following information Yair and MAR.

## 2017-10-24 NOTE — PROGRESS NOTES
Problem: Self Care Deficits Care Plan (Adult)  Goal: *Acute Goals and Plan of Care (Insert Text)  OCCUPATIONAL THERAPY SHORT TERM GOALS    Initiated 10/13/2017 and to be accomplished within 2 Week(s)    1. Patient will perform Upper body ADL's with adaptive equipment & compensatory techniques as needed with minimal assistance/contact guard assist. (progressing)  2. Patient will perform Lower body ADL's with adaptive equipment & compensatory techniques as needed moderate assistance. (progressing)  3. Patient will perform toileting task with moderate assistance x 2 with Good safety to reduce falls risk. 4.  Patient will perform toilet transfers with Platform Walker and moderate assistance x 2 .  5. Patient will perform standing static/dynamic balance activities for improved ADL/IADL function with moderate assistance x,2 and Good balance and safety awareness. 6.  Patient will improve Barthel index scores to atleast 35/100 to improve functional mobility. 7.  Patient will tolerate standing x 5 minutes during functional activity while adhering to LLE TTWB And Left wrist NWB utilizing platform walker to increase participation in ADL routine. OCCUPATIONAL THERAPY LONG TERM GOALS   Initiated 10/13/2017 and to be accomplished within 4 Week(s)    1. Patient will perform Upper body ADL's with adaptive equipment & compensatory techniques as needed with supervision/set-up. 2.  Patient will perform Lower body ADL's with adaptive equipment & compensatory techniques as needed with         supervision/set-up . 3. Patient will perform toileting task with supervision/set-up with Good safety to reduce falls risk. 4.  Patient will perform functional transfers with Platform Walker and  supervision/set-up and Good balance and safety awareness. 5.  Patient will perform standing static/dynamic activity for improved ADL/IADL function    with supervision/set-up an and Good balance and safety awareness.   6.  Patient will improve Barthel index score to 50/100 to improve independence with mobility. Therapist: Neela Davis    10/13/2017           [facility]  OCCUPATIONAL THERAPY WEEKLY SUMMARY   Reporting period:  from 10/13/17 through 10/24/17       Patient: Garry Young (54 y.o. female)   Date: 10/24/2017    Primary Diagnosis: left hip fracture  Hip fracture [facility])       Attending Physician: Viviana Olivera MD Treatment Diagnosis  Treatment Diagnosis: muscle weakness  Treatment Diagnosis 2: increased need for assist w/ self care  Precautions: Fall, TTWB (TTWB LLE and NWB Left wrist)  Rehab Potential : Fair    Skill interventions and education provided with clinical rationale (include individualized treatment techniques and standardized tests):  Skilled Occupational services were provided utilizing Therapeutic exercises, therapeutic activities, functional mobility, functional transfers, and EC/WS. Using a comparative statement, summarize significant progress toward goals as a result of skilled intervention provided:  Patient has made Fair progress towards their Occupational Therapy goals in the following areas: increased independence and performance with grooming, bathing, upper body dressing, lower body dressing, toileting and toilet transfer. Patient has met 3/6 STGS and making slow but steady progression towards LTGS. Patient recently f/u with MD and has new orders on L LE and wrist to increase WB needed for ADL/IADLS. Identify remaining functional areas, impairments limiting progress and/or barriers to improvement:  Patient would benefit from continued skilled Occupational Therapy Services to address the following functional deficits in decreased  static and dynamic standing balance and tolerance needed for safely complete ADL/IADLS.         OBJECTIVE DATA SUMMARY:       INITIAL ASSESSMENT WEEKLY PROGRESS   COGNITIVE STATUS: COGNITIVE STATUS:   Neurologic State: Alert, Appropriate for age  Orientation Level: Oriented X4  Cognition: Appropriate for age attention/concentration  Perception: Appears intact  Perseveration: No perseveration noted  Safety/Judgement: Fall prevention Neurologic State: Alert, Appropriate for age  Orientation Level: Oriented X4  Cognition: Appropriate for age attention/concentration  Perception: Appears intact  Perseveration: No perseveration noted  Safety/Judgement: Fall prevention   PAIN: PAIN:   Pain Scale 1: Numeric (0 - 10)  Pain Intensity 1: 0  Pain Onset 1: now  Pain Location 1: Hip, Hand  Pain Orientation 1: Left  Pain Description 1: Aching  Pain Intervention(s) 1: Elevation, Ice, Medication (see MAR)  Patient Stated Pain Goal: 0  Pain Reassessment 1: Yes     Pain Scale 1: Numeric (0 - 10)  Pain Intensity 1: 5  Pain Onset 1: now  Pain Location 1: Hip  Pain Orientation 1: Left  Pain Description 1: Aching  Pain Intervention(s) 1: Medication (see MAR)  Patient Stated Pain Goal: 0  Pain Reassessment 1: Yes   BED MOBILITY BED MOBILITY   Rolling: Stand-by asssistance  Supine to Sit: Maximum assistance  Sit to Supine: Maximum assistance  Scooting: Maximum assistance Rolling: Stand-by asssistance  Supine to Sit: Stand-by asssistance  Sit to Supine: Minimum assistance  Scooting: Stand-by asssistance   ADL SELF CARE ADL SELF CARE     Bathing Assistance: Moderate assistance    Dressing Assistance: Moderate assistance  Hospital Gown: Minimum  assistance    Bathing Assistance: Maximum assistance    Toileting Assistance: Moderate assistance  Bladder Hygiene: Maximum assistance  Clothing Management: Minimum assistance    Grooming Assistance: Supervision/set up  Brushing Teeth: Supervision/set-up  Brushing/Combing Hair: Supervision/set-up    Dressing Assistance: Minimum assistance  Underpants: Minimum assistance  Socks:  Total assistance (dependent)  Leg Crossed Method Used: No  Position Performed: Seated edge of bed  Cues: Verbal cues provided    Feeding Assistance: Modified independent   TRANSFERS TRANSFERS   Sit to Stand: Maximum assistance  Stand to Sit: Moderate assistance  Bed to Chair: Maximum assistance (x2)  Toilet Transfer : Other (comment) (unable 2/2/  inability to adhere to LLE TTWB) Sit to Stand: Minimum assistance, Contact guard assistance  Stand to Sit: Contact guard assistance  Bed to Chair: Minimum assistance (Mod A with w/c managment)  Toilet Transfer : Other (comment) (unable 2/2/  inability to adhere to LLE TTWB)   Toilet Transfer : Other (comment) (unable 2/2/  inability to adhere to LLE TTWB) Toilet Transfer : Other (comment) (unable 2/2/  inability to adhere to LLE TTWB)   BALANCE BALANCE   Sitting: Impaired, With support  Sitting - Static: Fair (occasional) (+)  Sitting - Dynamic: Fair (occasional)  Standing: Impaired  Standing - Static: Poor  Standing - Dynamic : Fair Sitting: With support  Sitting - Static: Fair (occasional)  Sitting - Dynamic: Fair (occasional)  Standing: With support  Standing - Static: Fair  Standing - Dynamic : Fair (-)       GROSS ASSESSMENT  GROSS ASSESSMENT   AROM: Generally decreased, functional  PROM: Generally decreased, functional  Strength: Generally decreased, functional (RLE 3+/5 grossly, LLE demonstrates 3/5)  Coordination: Generally decreased, functional  Tone: Normal  Sensation: Intact AROM: Generally decreased, functional  PROM: Generally decreased, functional  Strength: Generally decreased, functional  Coordination: Generally decreased, functional  Tone: Normal  Sensation: Intact   COORDINATION COORDINATION   Fine Motor Skills-Upper: Left Impaired, Right Intact (L hand splint intact)  Gross Motor Skills-Upper: Left Intact, Right Intact Fine Motor Skills-Upper: Left Impaired, Right Intact (L hand splint intact)  Gross Motor Skills-Upper: Left Intact, Right Intact   VISUAL/PERCEPTUAL VISUAL/PERCEPTUAL   Corrective Lenses: Glasses   Corrective Lenses: Glasses     AUDITORY: AUDITORY:   Auditory Impairment: None Auditory Impairment: None       INSTRUMENTAL  ADL'S:    INSTRUMENTAL ADL'S:          THE BARTHEL INDEX  ACTIVITY   SCORE   FEEDING  0=unable  5=needs help cutting,spreading butter,etc., or modified diet  10= independent   10   BATHING  0=dependent  5=independent (or in shower   0   GROOMING  0=needs help  5=independent face/hair/teeth/shaving (implements provided)   5   DRESSING  0=dependent  5=needs help but can do about half unaided  10=independent(including buttons, zips,laces etc.)   5   BOWELS  0=incontinent  5=occasional accident  10=continent   10   BLADDER  0=incontinent, or catheterized and unable to manage alone  5=occasional accident  10=continent   10   TOILET USE  0=dependent  5=needs some help, but can do something alone  10=independent (on and off, dressing, wiping)   5   TRANSFER (BED TO CHAIR AND BACK)  0=unable, no sitting balance  5=major help(one or two people,physical), can sit  10=minor help(verbal or physical)  15=independent   5   MOBILITY (ON LEVEL SURFACES)  0=immobile or <50 yards  5=wheelchair independent,including corners,>50 yards  10=walkes with help of one person (verbal or physical) >50 yards  15=independent(but may use any aid; for example, stick) >50 yards   0   STAIRS  0=unable  5=needs help (verbal, physical, carrying aid)  10=independent   0            TOTAL:                50/100     Treatment:  Co-treated with PT for optimal functional gains with functional mobility utilizing platform RW while adhering to new WB orders on L LE. Practiced functional mobility in order to increase independence and performance during task. Patient was able to complete 6ft of mobility with Mod cueing for increasing WB on B UE due to increased pain in L UE. Assisted patient with changing UB/LB dressing in order to assess safety and independence during routine. See above for levels of A needed. Patient's response to Treatment rendered:  Patient is pleasant and receptive to therapist cueing.     Patient expected Discharge Location:  [x]Private Residence  [] DREW/ILF  []LTC  []Other:    Plan: Continue OT services as established on the Plan of Care for 5 times a week.     Treatment Minutes:  50  OT and Assistant have had a weekly case conference regarding the above treatment:  [x] Yes     [] No    Therapist:   Estrellita Millan,         Date:10/24/2017     Forward to OT for co-signature when completed

## 2017-10-24 NOTE — ROUTINE PROCESS
Bedside shift change report given to Trung Perdomo RN (oncoming nurse) by Mitzi Rivera RN (offgoing nurse). Report included the following information SBAR, Kardex, MAR and Recent Results. VISUALLY CHECKED PT Q 1 HR BY NURSING STAFF. 24 hour order chart check done

## 2017-10-24 NOTE — PROGRESS NOTES
Problem: Mobility Impaired (Adult and Pediatric)  Goal: *Acute Goals and Plan of Care (Insert Text)  PHYSICAL THERAPY STG GOALS :  Initiated 10/13/2017 and to be accomplished within 2 Weeks (updated 10/19/17)    1. Patient will move from supine to sit and sit to supine  and roll side to side in bed with minimal assistance/contact guard assist with WB compliance. (Achieved)  2. Patient will transfer from bed to chair and chair to bed with minimal assistance/contact guard assist using platform walker with WB compliance. (Progressing modA)  3. Patient will perform sit to stand with minimal assistance/contact guard assist with Fair balance and safety awareness with WB compliance. (Progressing modA)  4. Patient will ambulate with minimal assistance/contact guard assist for 75 feet with platform walker on level surfaces with 2 turns with WB compliance. (not tested due to TTWB on LLE)  5. Patient will ascend/descend 14 stairs with right handrail(s) withminimal assistance to allow for safe home access/exit with WB compliance. (Not tested due to WB status)  6. Patient will improve standardized test score for [place] Standing Balance Scale 1+ with WB compliance. PHYSICAL THERAPY LTG GOALS :  Initiated 10/13/2017 and to be accomplished within 4 Weeks    1. Patient will move from supine to sit and sit to supine  and roll side to side in bed with modified independence with WB compliance. 2.  Patient will transfer from bed to chair and chair to bed with modified independence using LRAD with WB compliance. 3.  Patient will perform sit to stand with modified independence with Good balance and safety awareness with WB compliance. 4.  Patient will ambulate with modified independence for 150 feet with LRAD on level surfaces and be able to maneuver through narrow spaces and obstacles without loss of balance with WB compliance.    5.  Patient will ascend/descend 14 stairs with right handrail(s) with supervision/set-up to allow for safe home access/exit with WB compliance. 6.  Patient will improve standardized test score for [place] Standing Balance Scale 3+ to reduce fall risk with WB compliance. Physical Therapist:   Brandi Rincon, PT  on 10/13/2017            [address] [place] [address]   physical Therapy Daily Treatment note      Patient: Qamar Chau (25 y.o. female)               Date: 10/24/2017    Physician: Stephon Mir MD  Primary Diagnosis: left hip fracture  Hip fracture [facility])          Treatment Diagnosis  Treatment Diagnosis: muscle weakness  Treatment Diagnosis 2: increased need for assist w/ self care  Precautions: Fall, TTWB (TTWB LLE and NWB Left wrist)  Vital Signs  Vital Signs  Pulse (Heart Rate): 68  Level of Consciousness: Alert  BP: 144/71  MAP (Calculated): 95  BP 1 Method: Automatic     Cognitive Status:     Pain     Bed Mobility Training  Bed Mobility Training  Sit to Supine: Minimum assistance  Balance  Sitting: With support  Sitting - Static: Fair (occasional)  Sitting - Dynamic: Fair (occasional)  Standing: With support  Standing - Static: Fair  Standing - Dynamic : Fair (-)  Transfer Training  Transfer Training  Sit to Stand: Minimum assistance;Contact guard assistance  Sit to Stand: Minimum assistance;Contact guard assistance  Gait Training                     With 1 turns. Therapeutic Exercise:   Pt received new orders from ortho doctor, now WBAT for LLE. Pt education on new WB status. Gait trainin steps with platform walker and modA, with verbal cuing for sequencing and weight shift. Transfer training using platform walker from chair to bed with modA. Cotreat with OT to maximize functinoal gains. Patient/Caregiver Education:   Pt /Caregiver Education on safety and fall prevention, pt education with WBAT for LLE was provided to reduce risk for falls with standing, gait and transfers.      ASSESSMENT:  Patient continues to benefit from Skilled PT services to improve overall strengthening and mobility, improve gait and balance, improve functional mobility  Progression toward goals:  [x]      Improving appropriately and progressing toward goals  []      Improving slowly and progressing toward goals  []      Not making progress toward goals and plan of care will be adjusted     Treatment session: 47 minutes.   Therapist:   Juan C Salcedo PTA,          10/24/2017

## 2017-10-24 NOTE — PROGRESS NOTES
Patient returned from outside Dr. Arta Kussmaul. New orders noted. Patient stated her staples where removed from her left hip 2 incision areas. Areas clean and dry. Patient stated she tolerated well.

## 2017-10-25 PROCEDURE — 74011250636 HC RX REV CODE- 250/636: Performed by: INTERNAL MEDICINE

## 2017-10-25 PROCEDURE — 74011250637 HC RX REV CODE- 250/637: Performed by: INTERNAL MEDICINE

## 2017-10-25 RX ADMIN — ONDANSETRON 4 MG: 4 TABLET, ORALLY DISINTEGRATING ORAL at 09:50

## 2017-10-25 RX ADMIN — LOSARTAN POTASSIUM 25 MG: 50 TABLET ORAL at 08:26

## 2017-10-25 RX ADMIN — OXYCODONE HYDROCHLORIDE AND ACETAMINOPHEN 2 TABLET: 5; 325 TABLET ORAL at 17:39

## 2017-10-25 RX ADMIN — OXYCODONE HYDROCHLORIDE AND ACETAMINOPHEN 2 TABLET: 5; 325 TABLET ORAL at 08:04

## 2017-10-25 RX ADMIN — METFORMIN HYDROCHLORIDE 1000 MG: 500 TABLET ORAL at 17:35

## 2017-10-25 RX ADMIN — CHOLECALCIFEROL (VITAMIN D3) 25 MCG (1,000 UNIT) TABLET 2000 UNITS: at 08:24

## 2017-10-25 RX ADMIN — METFORMIN HYDROCHLORIDE 1000 MG: 500 TABLET ORAL at 08:28

## 2017-10-25 RX ADMIN — PANTOPRAZOLE SODIUM 40 MG: 40 TABLET, DELAYED RELEASE ORAL at 08:25

## 2017-10-25 RX ADMIN — CELECOXIB 100 MG: 100 CAPSULE ORAL at 08:27

## 2017-10-25 RX ADMIN — CELECOXIB 200 MG: 100 CAPSULE ORAL at 17:35

## 2017-10-25 RX ADMIN — TRIAMCINOLONE ACETONIDE: 1 CREAM TOPICAL at 17:36

## 2017-10-25 RX ADMIN — LEVOTHYROXINE SODIUM 75 MCG: 75 TABLET ORAL at 06:09

## 2017-10-25 RX ADMIN — ENOXAPARIN SODIUM 40 MG: 40 INJECTION SUBCUTANEOUS at 08:32

## 2017-10-25 RX ADMIN — DOCUSATE SODIUM 100 MG: 100 CAPSULE, LIQUID FILLED ORAL at 17:35

## 2017-10-25 RX ADMIN — SIMVASTATIN 20 MG: 20 TABLET, FILM COATED ORAL at 21:36

## 2017-10-25 NOTE — PROGRESS NOTES
Nutrition follow up-[facility]/  Plan of care      RECOMMENDATIONS:     1. No concentrated sweets diet  2. Ensure daily  3. Monitor weight, labs and PO intake  4. RD to follow     GOALS:     1. Ongoing: PO intake meets >75% of protein/calorie needs by 11/1  2. Met/Ongoing: Weight Maintenance (+/- 1-2 lb by 11/1)    ASSESSMENT:     Weight status is classified as obese per BMI of 33.8. PO intake is variable. 1.5% weight gain over past week. Labs noted. BG range from  over past 48 hours. Nutrition recommendations listed. RD to follow. Nutrition Risk:  [] High  [x] Moderate []  Low    SUBJECTIVE/OBJECTIVE:      Transferred from  to [facility] on 10/20/17. S/P femur insertion intra medullary nail short for left hip fracture on 1/10/17. Patient with gradual weight loss over past year. Patient reports fair appetite but is trying to eat her meals. 75% intake of breakfast meal this morning. Patient states drinking Ensure supplement when she receives them. Encouraged adequate intake. Will monitor.     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    Meningioma (HCC) D32.9    Hip fracture ([facility] [address] S72.009A     Past Medical History:   Diagnosis Date    Arthritis     Bronchitis     Diabetes ([address]     Diverticulitis     GERD (gastroesophageal reflux disease)     Hypercholesterolemia     Hypertension     Hypothyroid     Pancreatitis       Labs:    Lab Results   Component Value Date/Time    Sodium 138 10/12/2017 04:00 AM    Potassium 3.7 10/12/2017 04:00 AM    Chloride 102 10/12/2017 04:00 AM    CO2 23 10/12/2017 04:00 AM    Anion gap 13 10/12/2017 04:00 AM    Glucose 116 10/12/2017 04:00 AM    BUN 14 10/12/2017 04:00 AM    Creatinine 0.89 10/12/2017 04:00 AM    Calcium 7.1 10/12/2017 04:00 AM    Magnesium 1.4 12/04/2009 06:45 AM    Phosphorus 3.5 12/04/2009 06:45 AM    Albumin 3.9 04/20/2017 11:23 AM     Anthropometrics: BMI (calculated): 33.8  Last 3 Recorded Weights in this Encounter    10/17/17 1550 10/24/17 1333   Weight: 77.4 kg (170 lb 11.2 oz) 78.6 kg (173 lb 3.2 oz)      Ht Readings from Last 1 Encounters:   10/24/17 5' (1.524 m)     No data found.    [] Weight Loss [x] Weight Gain (1.5%) [] Weight Stable    Nutrition Needs:   Calories: 5030-0627 Kcal   Protein:    g      [x] No Cultural, Baptist or ethnic dietary need identified.     [] Cultural, Baptist and ethnic food preferences identified and addressed     Wt Status:  [] Normal (18.6 - 24.9) [] Underweight (<18.5) [] Overweight (25 - 29.9)   [x] Mild Obesity (30 - 34.9)  [] Moderate Obesity (35 - 39.9) [] Morbid Obesity (40+)     Nutrition Problems Identified:   [x] Fair PO intake   [] Food Allergies  [] Difficulty chewing/swallowing/poor dentition  [] Constipation/Diarrhea   [] Nausea/Vomiting   [] None  [] Other:     Plan:   [x] Therapeutic Diet  [x]  Obtained/adjusted food preferences/tolerances and/or snacks options   [x]  Dietary supplements (Ensure daily)  [] Occupational therapy following for feeding techniques  []  HS snack added   []  Modify diet texture   []  Modify diet for food allergies   []  Assist with menu selection   [x]  Monitor PO intake on meal rounds   [x]  Continue inpatient monitoring and intervention   [x]  Participated in discharge planning/Interdisciplinary rounds/Team meetings   []  Other:     Education Needs:   [] Not appropriate for teaching at this time due to:   [x] Identified and addressed    Nutrition Monitoring and Evaluation:  [x] Continue ongoing monitoring and intervention  [] Other    Kannan Weaver

## 2017-10-25 NOTE — PROGRESS NOTES
Problem: Self Care Deficits Care Plan (Adult)  Goal: *Acute Goals and Plan of Care (Insert Text)  OCCUPATIONAL THERAPY SHORT TERM GOALS    Initiated 10/13/2017 and to be accomplished within 2 Week(s)    1. Patient will perform Upper body ADL's with adaptive equipment & compensatory techniques as needed with minimal assistance/contact guard assist. (progressing)  2. Patient will perform Lower body ADL's with adaptive equipment & compensatory techniques as needed moderate assistance. (progressing)  3. Patient will perform toileting task with moderate assistance x 2 with Good safety to reduce falls risk. 4.  Patient will perform toilet transfers with Platform Walker and moderate assistance x 2 .  5. Patient will perform standing static/dynamic balance activities for improved ADL/IADL function with moderate assistance x,2 and Good balance and safety awareness. 6.  Patient will improve Barthel index scores to atleast 35/100 to improve functional mobility. 7.  Patient will tolerate standing x 5 minutes during functional activity while adhering to LLE TTWB And Left wrist NWB utilizing platform walker to increase participation in ADL routine. OCCUPATIONAL THERAPY LONG TERM GOALS   Initiated 10/13/2017 and to be accomplished within 4 Week(s)    1. Patient will perform Upper body ADL's with adaptive equipment & compensatory techniques as needed with supervision/set-up. 2.  Patient will perform Lower body ADL's with adaptive equipment & compensatory techniques as needed with         supervision/set-up . 3. Patient will perform toileting task with supervision/set-up with Good safety to reduce falls risk. 4.  Patient will perform functional transfers with Platform Walker and  supervision/set-up and Good balance and safety awareness. 5.  Patient will perform standing static/dynamic activity for improved ADL/IADL function    with supervision/set-up an and Good balance and safety awareness.   6.  Patient will improve Barthel index score to 50/100 to improve independence with mobility. Therapist: Kae Conde    10/13/2017           [facility]   Occupational Therapy Daily Treatment note      Patient: Nick Gonzalez (49 y.o. female)       Date: 10/25/2017  Attending Physician: Miguel Ángel Yanez MD  Primary Diagnosis: left hip fracture  Hip fracture [facility])    Treatment Diagnosis  Treatment Diagnosis: muscle weakness  Treatment Diagnosis 2: increased need for assist w/ self care   Precautions : Precautions at Admission: Fall, WBAT (Splint at L wrist;ROM activities @L wrist)  Vital Signs:  Vital Signs  Pulse (Heart Rate): 69  BP: 165/69  MAP (Calculated): 101  BP 1 Method: Automatic  Cognitive Status:  Mental Status  Neurologic State: Alert  Orientation Level: Oriented X4  Cognition: Appropriate for age attention/concentration; Follows commands  Perception: Appears intact  Perseveration: No perseveration noted  Safety/Judgement: Fall prevention  Pain:  Pain Screen  Pain Scale 1: Numeric (0 - 10)  Pain Intensity 1: 0  Pain Location 1: Hip;Wrist  Pain Orientation 1: Left  Pain Description 1: Aching  Pain Intervention(s) 1: Medication (see MAR)  Patient Stated Pain Goal: 0  Pain Scale 1: Numeric (0 - 10)  Bed Mobility:  Bed Mobility  Scooting: Contact guard assistance  Transfers:  Functional Transfers  Sit to Stand: Minimum assistance; Additional time  Toilet Transfer : Minimum assistance; Additional time  Functional Transfers  Toilet Transfer : Minimum assistance; Additional time  Cues: Verbal cues provided  Adaptive Equipment:  (RW )  Balance:  Balance  Sitting: With support  Sitting - Static: Good (unsupported)  Sitting - Dynamic: Good (unsupported)  Standing: With support  Standing - Static: Fair (+)  Standing - Dynamic : Fair  ADL Intervention:  Toileting  Toileting Assistance:  Moderate assistance  Bladder Hygiene: Minimum assistance  Clothing Management: Moderate assistance  Adaptive Equipment: Zack bars;Walker  Therapeutic Activities:  Pt participated with toilet transfer, toileting and bladder hygiene. See above for levels of assistance required. Pt ambulate 16ft with RW for toilet transfers. Therapeutic Exercises:   RUE strengthening exercises with 2# dumbbell, 2x20 in all planes. AROM exercises at LUE, 2x20 in all planes. Educated pt on maintaining hand hygiene and splint care when not in therapy. Educated on AROM exercises that pt can perform in room throughout the day to maintain and improve strength, ROM and endurance at LUE. Pt demo understanding for the same. Patient/Caregiver Education:    [facility]. [facility] Education on safety with ADLs and transfers. Pt verbalized understanding. ASSESSMENT:  Patient continues to demonstrate the need for skilled Occupational Therapy services to improve strength, endurance and balance.    Progression toward goals:  [x]      Improving appropriately and progressing toward goals  []      Improving slowly and progressing toward goals  []      Not making progress toward goals and plan of care will be adjusted     Treatment session:   55 minutes    Therapist:    RAYSHAWN Martino    10/25/2017

## 2017-10-25 NOTE — PROGRESS NOTES
GIM     Patient: Elsie Dey MRN: [id number]  CSN: [id number]    YOB: 1940  Age: 65 y.o. Sex: female    DOA: 10/12/2017 LOS:  LOS: 13 days                    Subjective:     Pt now released for wt bearing and rom with wrist. Ambulating. Vs stable    Objective:      Visit Vitals    /69    Pulse 69    Temp 97.6 °F (36.4 °C)    Resp 18    Ht 5' (1.524 m)    Wt 78.6 kg (173 lb 3.2 oz)    SpO2 98%    Breastfeeding No    BMI 33.83 kg/m2       Physical Exam:  Chest clear  Cor rr  abd soft  No edema    Intake and Output:  Current Shift:     Last three shifts:       No results found for this or any previous visit (from the past 24 hour(s)).     Current Facility-Administered Medications   Medication Dose Route Frequency    celecoxib (CELEBREX) capsule 200 mg  200 mg Oral BID    bismuth subsalicylate (PEPTO-BISMOL) oral suspension 30 mL  30 mL Oral Q6H PRN    losartan (COZAAR) tablet 25 mg  25 mg Oral DAILY    cholecalciferol (VITAMIN D3) tablet 2,000 Units  2,000 Units Oral DAILY    [facility] ANESTHESIA   Other PRN    [facility] EMERGENCY/STAT BOX   Other PRN    alum-mag hydroxide-simeth (MYLANTA) oral suspension 30 mL  30 mL Oral QID PRN    ondansetron (ZOFRAN ODT) tablet 4 mg  4 mg Oral Q6H PRN    docusate sodium (COLACE) capsule 100 mg  100 mg Oral BID    enoxaparin (LOVENOX) injection 40 mg  40 mg SubCUTAneous DAILY    levothyroxine (SYNTHROID) tablet 75 mcg  75 mcg Oral ACB    metFORMIN (GLUCOPHAGE) tablet 1,000 mg  1,000 mg Oral BID WITH MEALS    oxyCODONE-acetaminophen (PERCOCET) 5-325 mg per tablet 1-2 Tab  1-2 Tab Oral Q4H PRN    pantoprazole (PROTONIX) tablet 40 mg  40 mg Oral DAILY    simvastatin (ZOCOR) tablet 20 mg  20 mg Oral QHS    triamcinolone acetonide (KENALOG) 0.1 % cream   Topical BID    glucose chewable tablet 16 g  4 Tab Oral PRN    glucagon (GLUCAGEN) injection 1 mg  1 mg IntraMUSCular PRN    dextrose (D50W) injection syrg 12.5-25 g  25-50 mL IntraVENous PRN       Lab Results   Component Value Date/Time    Glucose 116 10/12/2017 04:00 AM    Glucose 167 10/11/2017 03:50 AM    Glucose 180 10/10/2017 06:36 PM    Glucose 161 10/09/2017 05:30 PM    Glucose 112 04/20/2017 11:23 AM        Assessment/Plan     Active Problems:    Hip fracture ([address] (10/9/2017)        Sadiq Parsons MD  10/25/2017, 10:17 AM

## 2017-10-25 NOTE — ROUTINE PROCESS
Bedside and Verbal shift change report given to [address] [place] [address] (oncoming nurse) by Elaine Lopez RN (offgoing nurse). Report included the following information SBAR, Kardex and MAR.

## 2017-10-25 NOTE — PROGRESS NOTES
Problem: Mobility Impaired (Adult and Pediatric)  Goal: *Acute Goals and Plan of Care (Insert Text)  PHYSICAL THERAPY STG GOALS :  Initiated 10/13/2017 and to be accomplished within 2 Weeks (updated 10/19/17)    1. Patient will move from supine to sit and sit to supine  and roll side to side in bed with minimal assistance/contact guard assist with WB compliance. (Achieved)  2. Patient will transfer from bed to chair and chair to bed with minimal assistance/contact guard assist using platform walker with WB compliance. (Progressing modA)  3. Patient will perform sit to stand with minimal assistance/contact guard assist with Fair balance and safety awareness with WB compliance. (Progressing modA)  4. Patient will ambulate with minimal assistance/contact guard assist for 75 feet with platform walker on level surfaces with 2 turns with WB compliance. (not tested due to TTWB on LLE)  5. Patient will ascend/descend 14 stairs with right handrail(s) withminimal assistance to allow for safe home access/exit with WB compliance. (Not tested due to WB status)  6. Patient will improve standardized test score for [place] Standing Balance Scale 1+ with WB compliance. PHYSICAL THERAPY LTG GOALS :  Initiated 10/13/2017 and to be accomplished within 4 Weeks    1. Patient will move from supine to sit and sit to supine  and roll side to side in bed with modified independence with WB compliance. 2.  Patient will transfer from bed to chair and chair to bed with modified independence using LRAD with WB compliance. 3.  Patient will perform sit to stand with modified independence with Good balance and safety awareness with WB compliance. 4.  Patient will ambulate with modified independence for 150 feet with LRAD on level surfaces and be able to maneuver through narrow spaces and obstacles without loss of balance with WB compliance.    5.  Patient will ascend/descend 14 stairs with right handrail(s) with supervision/set-up to allow for safe home access/exit with WB compliance. 6.  Patient will improve standardized test score for [place] Standing Balance Scale 3+ to reduce fall risk with WB compliance. Physical Therapist:   Hortencia Blue PT  on 10/13/2017             [address] [place] [address]   physical Therapy Daily Treatment note      Patient: Frederick Pedro (45 y.o. female)               Date: 10/25/2017    Physician: Karolina Alexis MD  Primary Diagnosis: left hip fracture  Hip fracture [facility])          Treatment Diagnosis  Treatment Diagnosis: muscle weakness  Treatment Diagnosis 2: increased need for assist w/ self care  Precautions: Fall, WBAT (Splint at L wrist;ROM activities @L wrist)  Vital Signs  Vital Signs  Pulse (Heart Rate): 69  BP: 165/69  MAP (Calculated): 101  BP 1 Method: Automatic     Cognitive Status:  Mental Status  Neurologic State: Alert  Orientation Level: Oriented X4  Cognition: Appropriate for age attention/concentration; Follows commands  Perception: Appears intact  Perseveration: No perseveration noted  Safety/Judgement: Fall prevention  Pain  Pain Screen  Pain Scale 1: Numeric (0 - 10)  Pain Intensity 1: 0  Pain Location 1: Hip;Wrist  Pain Orientation 1: Left  Pain Description 1: Aching  Pain Intervention(s) 1: Medication (see MAR)  Patient Stated Pain Goal: 0  Bed Mobility Training  Bed Mobility Training  Rolling: Stand-by asssistance  Supine to Sit: Stand-by asssistance  Scooting: Contact guard assistance  Balance  Sitting: With support  Sitting - Static: Fair (occasional) (+)  Sitting - Dynamic: Fair (occasional) (+)  Standing: With support  Standing - Static: Fair (+)  Standing - Dynamic : Fair  Transfer Training  Transfer Training  Sit to Stand: Stand-by asssistance  Stand to Sit: Stand-by asssistance  Sit to Stand: Stand-by asssistance  Gait Training  Gait  Base of Support: Center of gravity altered  Speed/Polly: Slow  Step Length: Left shortened;Right shortened  Gait Abnormalities: Decreased step clearance  Ambulation - Level of Assistance: Contact guard assistance  Distance (ft): 16 Feet (ft)  Assistive Device: Gait belt;Walker, rolling     Gait Abnormalities: Decreased step clearance            With 1 turns. Therapeutic Exercise:     Gait training as mentioned above with platform walker, step to gait pattern, improved gait efficiency. Static standing balance x 13 minutes using single UE support with little to no swaying, limited by R hip pain (uninvolved side). Upon return to room, pt began vomiting, nurse notified. Patient/Caregiver Education:   Pt /Caregiver Education on safety and fall prevention to reduce fall risk. ASSESSMENT:  Patient continues to benefit from Skilled PT services to improve strength, endurance, mobility, gait, balance. Progression toward goals:  []      Improving appropriately and progressing toward goals  [x]      Improving slowly and progressing toward goals  []      Not making progress toward goals and plan of care will be adjusted     Treatment session: 58 minutes.   Therapist:   Matra Epperson, PT,          10/25/2017

## 2017-10-26 ENCOUNTER — PATIENT OUTREACH (OUTPATIENT)
Dept: CASE MANAGEMENT | Age: 77
End: 2017-10-26

## 2017-10-26 PROCEDURE — 74011250637 HC RX REV CODE- 250/637: Performed by: INTERNAL MEDICINE

## 2017-10-26 PROCEDURE — 74011250636 HC RX REV CODE- 250/636: Performed by: INTERNAL MEDICINE

## 2017-10-26 RX ADMIN — PANTOPRAZOLE SODIUM 40 MG: 40 TABLET, DELAYED RELEASE ORAL at 08:51

## 2017-10-26 RX ADMIN — OXYCODONE HYDROCHLORIDE AND ACETAMINOPHEN 2 TABLET: 5; 325 TABLET ORAL at 21:21

## 2017-10-26 RX ADMIN — SIMVASTATIN 20 MG: 20 TABLET, FILM COATED ORAL at 21:20

## 2017-10-26 RX ADMIN — ENOXAPARIN SODIUM 40 MG: 40 INJECTION SUBCUTANEOUS at 08:57

## 2017-10-26 RX ADMIN — METFORMIN HYDROCHLORIDE 1000 MG: 500 TABLET ORAL at 10:45

## 2017-10-26 RX ADMIN — TRIAMCINOLONE ACETONIDE: 1 CREAM TOPICAL at 18:00

## 2017-10-26 RX ADMIN — CELECOXIB 200 MG: 100 CAPSULE ORAL at 17:46

## 2017-10-26 RX ADMIN — LOSARTAN POTASSIUM 25 MG: 50 TABLET ORAL at 08:51

## 2017-10-26 RX ADMIN — OXYCODONE HYDROCHLORIDE AND ACETAMINOPHEN 2 TABLET: 5; 325 TABLET ORAL at 11:00

## 2017-10-26 RX ADMIN — ONDANSETRON 4 MG: 4 TABLET, ORALLY DISINTEGRATING ORAL at 17:47

## 2017-10-26 RX ADMIN — OXYCODONE HYDROCHLORIDE AND ACETAMINOPHEN 2 TABLET: 5; 325 TABLET ORAL at 14:55

## 2017-10-26 RX ADMIN — LEVOTHYROXINE SODIUM 75 MCG: 75 TABLET ORAL at 05:36

## 2017-10-26 RX ADMIN — CHOLECALCIFEROL (VITAMIN D3) 25 MCG (1,000 UNIT) TABLET 1000 UNITS: at 08:48

## 2017-10-26 RX ADMIN — CELECOXIB 200 MG: 100 CAPSULE ORAL at 08:50

## 2017-10-26 RX ADMIN — DOCUSATE SODIUM 100 MG: 100 CAPSULE, LIQUID FILLED ORAL at 08:51

## 2017-10-26 RX ADMIN — METFORMIN HYDROCHLORIDE 1000 MG: 500 TABLET ORAL at 17:46

## 2017-10-26 RX ADMIN — OXYCODONE HYDROCHLORIDE AND ACETAMINOPHEN 2 TABLET: 5; 325 TABLET ORAL at 06:21

## 2017-10-26 NOTE — PROGRESS NOTES
Pt participated in the luncheon with the harpist for the duration of 60 minutes. Pt was observed socializing with peer and requesting songs of interest to be played during the activity. Pt interacted well with peers.

## 2017-10-26 NOTE — ROUTINE PROCESS
Bedside and Verbal shift change report given to AMELIE Mena RN (oncoming nurse) by David Monzon   (offgoing nurse). Report included the following information Kardex and MAR.

## 2017-10-26 NOTE — PROGRESS NOTES
Pt participated in [employer] activity for the duration of 70 minutes. Pt interacted well with peers.

## 2017-10-26 NOTE — PROGRESS NOTES
Problem: Self Care Deficits Care Plan (Adult)  Goal: *Acute Goals and Plan of Care (Insert Text)  OCCUPATIONAL THERAPY SHORT TERM GOALS    Initiated 10/13/2017 and to be accomplished within 2 Week(s)    1. Patient will perform Upper body ADL's with adaptive equipment & compensatory techniques as needed with minimal assistance/contact guard assist. (progressing)  2. Patient will perform Lower body ADL's with adaptive equipment & compensatory techniques as needed moderate assistance. (progressing)  3. Patient will perform toileting task with moderate assistance x 2 with Good safety to reduce falls risk. 4.  Patient will perform toilet transfers with Platform Walker and moderate assistance x 2 .  5. Patient will perform standing static/dynamic balance activities for improved ADL/IADL function with moderate assistance x,2 and Good balance and safety awareness. 6.  Patient will improve Barthel index scores to atleast 35/100 to improve functional mobility. 7.  Patient will tolerate standing x 5 minutes during functional activity while adhering to LLE TTWB And Left wrist NWB utilizing platform walker to increase participation in ADL routine. OCCUPATIONAL THERAPY LONG TERM GOALS   Initiated 10/13/2017 and to be accomplished within 4 Week(s)    1. Patient will perform Upper body ADL's with adaptive equipment & compensatory techniques as needed with supervision/set-up. 2.  Patient will perform Lower body ADL's with adaptive equipment & compensatory techniques as needed with         supervision/set-up . 3. Patient will perform toileting task with supervision/set-up with Good safety to reduce falls risk. 4.  Patient will perform functional transfers with Platform Walker and  supervision/set-up and Good balance and safety awareness. 5.  Patient will perform standing static/dynamic activity for improved ADL/IADL function    with supervision/set-up an and Good balance and safety awareness.   6.  Patient will improve Barthel index score to 50/100 to improve independence with mobility.       Therapist: Esther Parsons    10/13/2017           [facility]   Occupational Therapy Daily Treatment note      Patient: Anika Carrasco (83 y.o. female)       Date: 10/26/2017  Attending Physician: Debbra Litten, MD  Primary Diagnosis: left hip fracture  Hip fracture [facility])    Treatment Diagnosis  Treatment Diagnosis: muscle weakness  Treatment Diagnosis 2: increased need for assist w/ self care   Precautions : Precautions at Admission: Fall, WBAT (Splint at L wrist;ROM activities @L wrist)  Vital Signs:  Vital Signs  Pulse (Heart Rate): 67  Level of Consciousness: Alert  BP: 119/59  MAP (Calculated): 79  BP 1 Method: Automatic     Cognitive Status:  Mental Status  Neurologic State: Alert  Orientation Level: Oriented X4  Cognition: Appropriate for age attention/concentration  Pain:  Pain Screen  Pain Scale 1: Numeric (0 - 10)  Pain Intensity 1: 7  Pain Onset 1: now  Pain Location 1: Hip;Wrist  Pain Orientation 1: Left  Pain Description 1: Aching  Pain Intervention(s) 1: Medication (see MAR)  Patient Stated Pain Goal: 2  Pain Reassessment 1: Yes  Pain Scale 1: Numeric (0 - 10)  Gross Assessment:     Coordination:     Bed Mobility:  Bed Mobility  Rolling: Stand-by asssistance  Supine to Sit: Stand-by asssistance  Transfers:  Functional Transfers  Sit to Stand: Minimum assistance  Stand to Sit: Contact guard assistance  Toilet Transfer : Minimum assistance  Functional Transfers  Toilet Transfer : Minimum assistance  Adaptive Equipment: Other (comment);Grab bars (platform walker)  Balance:  Balance  Sitting: Without support  Sitting - Static: Good (unsupported)  Sitting - Dynamic: Good (unsupported)  Standing: With support  Standing - Static: Fair  Standing - Dynamic : Fair (-)  ADL Self Care:     ADL Intervention:     Upper Body Dressing Assistance  Pullover Shirt: Modified independent     Toileting  Bladder Hygiene: Contact guard assistance  Bowel Hygiene: Contact guard assistance  Clothing Management: Minimum assistance  Adaptive Equipment: Other (comment);Grab bars (platform walker)  Grooming  Brushing Teeth: Modified independent  Brushing/Combing Hair: Modified independent  Lower Body Dressing Assistance  Pants With Elastic Waist: Contact guard assistance             Therapeutic Activities:   ADL routine level of assist as mentioned above. RUE w/ 1# wrist weight reaching crossing midline and overhead as tolerated for removal of pegs from vertical peg board to improve functional reach for improved ADL performance skills and functional transfers.      Patient/Caregiver Education:     Pt.  Education on correct hand placement e.g. Push up from & reach back for w/c arm rests w/ RUE and remain NWB L wrist until clarification received by Orthopedic Doctor for sit to stand was provided to improved functional transfers and prevention of falls    ASSESSMENT:  Patient continues to demonstrate the need for skilled Occupational Therapy services to improve grooming, bathing, upper body dressing, lower body dressing, toileting and toilet transfer  Progression toward goals:  [x]      Improving appropriately and progressing toward goals  []      Improving slowly and progressing toward goals  []      Not making progress toward goals and plan of care will be adjusted     Treatment session:   49 minutes    Therapist:    Laurel Lua,  10/26/2017

## 2017-10-26 NOTE — ROUTINE PROCESS
Bedside shift change report given to [address]  (oncoming nurse) by Pinky Martinez RN (offgoing nurse). Report included the following information SBAR, Kardex, MAR and Recent Results. VISUALLY CHECKED PT Q 1 HR BY NURSING STAFF. 24 hour order check done

## 2017-10-26 NOTE — ROUTINE PROCESS
Bedside and Verbal shift change report given to MELISSA Keen RN (oncoming nurse) by AMELIE Mena RN (offgoing nurse). Report included the following information SBAR, Kardex and MAR.

## 2017-10-26 NOTE — PROGRESS NOTES
Problem: Mobility Impaired (Adult and Pediatric)  Goal: *Acute Goals and Plan of Care (Insert Text)  PHYSICAL THERAPY STG GOALS :  Initiated 10/13/2017 and to be accomplished within 2 Weeks (updated 10/26/17)    1. Patient will move from supine to sit and sit to supine  and roll side to side in bed with minimal assistance/contact guard assist with WB compliance. (Achieved)  2. Patient will transfer from bed to chair and chair to bed with minimal assistance/contact guard assist using platform walker with WB compliance. (Achieved)  3. Patient will perform sit to stand with minimal assistance/contact guard assist with Fair balance and safety awareness with WB compliance. (Achieved)  4. Patient will ambulate with minimal assistance/contact guard assist for 75 feet with platform walker on level surfaces with 2 turns with WB compliance. (Progressing; 16 ft using platform RW with CGA)  5. Patient will ascend/descend 14 stairs with right handrail(s) withminimal assistance to allow for safe home access/exit with WB compliance. (Attempted, pt currently unable)  6. Patient will improve standardized test score for [place] Standing Balance Scale 1+ with WB compliance. (Achieved)    PHYSICAL THERAPY STG GOALS :  Initiated 10/13/2017 and to be accomplished within 2 Weeks (updated 10/19/17)    1. Patient will move from supine to sit and sit to supine  and roll side to side in bed with minimal assistance/contact guard assist with WB compliance. (Achieved)  2. Patient will transfer from bed to chair and chair to bed with minimal assistance/contact guard assist using platform walker with WB compliance. (Progressing modA)  3. Patient will perform sit to stand with minimal assistance/contact guard assist with Fair balance and safety awareness with WB compliance. (Progressing modA)  4.   Patient will ambulate with minimal assistance/contact guard assist for 75 feet with platform walker on level surfaces with 2 turns with WB compliance. (not tested due to TTWB on LLE)  5. Patient will ascend/descend 14 stairs with right handrail(s) withminimal assistance to allow for safe home access/exit with WB compliance. (Not tested due to WB status)  6. Patient will improve standardized test score for [place] Standing Balance Scale 1+ with WB compliance. PHYSICAL THERAPY LTG GOALS :  Initiated 10/13/2017 and to be accomplished within 4 Weeks    1. Patient will move from supine to sit and sit to supine  and roll side to side in bed with modified independence with WB compliance. 2.  Patient will transfer from bed to chair and chair to bed with modified independence using LRAD with WB compliance. 3.  Patient will perform sit to stand with modified independence with Good balance and safety awareness with WB compliance. 4.  Patient will ambulate with modified independence for 150 feet with LRAD on level surfaces and be able to maneuver through narrow spaces and obstacles without loss of balance with WB compliance. 5.  Patient will ascend/descend 14 stairs with right handrail(s) with supervision/set-up to allow for safe home access/exit with WB compliance. 6.  Patient will improve standardized test score for [place] Standing Balance Scale 3+ to reduce fall risk with WB compliance.       Physical Therapist:   Fide Felix PT  on 10/13/2017             [facility]   PHYSICAL THERAPY WEEKLY PROGRESS REPORT  Reporting Period:  Date:   10/19/17  to 10/26/17      Patient: Kathi Farley (35 y.o. female)                         Date: 10/26/2017    Primary Diagnosis: left hip fracture  Hip fracture [facility])      Attending Physician: Cassidy Gill MD   Treatment Diagnosis  Treatment Diagnosis: muscle weakness  Treatment Diagnosis 2: difficulty in walking  Precautions:  Fall, WBAT (Splint at L wrist;ROM activities @L wrist)  Rehab Potential : Good:    Skill interventions and education provided with clinical rationale (include individualized treatment techniques and standardized tests):   Skilled Physical Therapy services were provided with: TE rendered to address strength, endurance, mobility. TA renedered to address bed mobility, transfers. Gait training using platform walker to address gait deficits. Stair training attempted, pt unable to weight shift onto LLE to advance RLE onto step. Will continue to address stair training for safe entrance into home. Neuromuscular re-education rendered to address balance impairments. Using a comparative statement, summarize significant progress toward goals as a result of skilled intervention provided:  Patient has made Fair progress towards their Physical Therapy goals in the areas of bed mobility, transfers, ambulation, gait. Pt has achieved 4/6 STGs and is progressing well. Identify remaining functional areas, impairments limiting progress and/or barriers to improvement:  Patient would benefit from continues PT services to address the following functional deficits in standing balance, ambulation using platform walker, stair training.  Barriers to improvement include: previous NWB through LLE that has been transitioned to WBAT as of 10/24/17, NWB through L wrist.     OBJECTIVE DATA SUMMARY:     INITIAL ASSESSMENT WEEKLY ASSESSMENT   COGNITIVE STATUS COGNITIVE STATUS   Neurologic State: Alert, Appropriate for age  Orientation Level: Oriented X4  Cognition: Appropriate for age attention/concentration  Perception: Appears intact  Perseveration: No perseveration noted  Safety/Judgement: Fall prevention Neurologic State: Alert  Orientation Level: Oriented X4  Cognition: Appropriate for age attention/concentration  Perception: Appears intact  Perseveration: No perseveration noted  Safety/Judgement: Fall prevention   PAIN PAIN   Pain Scale 1: Numeric (0 - 10)  Pain Intensity 1: 0  Pain Onset 1: now  Pain Location 1: Hip, Hand  Pain Orientation 1: Left  Pain Description 1: Aching  Pain Intervention(s) 1: Elevation, Ice, Medication (see MAR)  Patient Stated Pain Goal: 0  Pain Reassessment 1: Yes Pain Scale 1: Numeric (0 - 10)  Pain Intensity 1: 5  Pain Onset 1: now  Pain Location 1: Hip  Pain Orientation 1: Left  Pain Description 1: Aching  Pain Intervention(s) 1: Medication (see MAR)  Patient Stated Pain Goal: 0  Pain Reassessment 1: Yes   GROSS ASSESSMENT GROSS ASSESSMENT   AROM: Generally decreased, functional  PROM: Generally decreased, functional  Strength: Generally decreased, functional (RLE 3+/5 grossly, LLE demonstrates 3/5)  Coordination: Generally decreased, functional  Tone: Normal  Sensation: Intact AROM: Generally decreased, functional  PROM: Generally decreased, functional  Strength: Generally decreased, functional  Coordination: Generally decreased, functional  Tone: Normal  Sensation: Intact   BED MOBILITY BED MOBILITY   Rolling: Stand-by asssistance  Supine to Sit: Maximum assistance  Sit to Supine: Maximum assistance  Scooting: Maximum assistance Rolling: Stand-by asssistance  Supine to Sit: Stand-by asssistance  Sit to Supine: Minimum assistance  Scooting: Contact guard assistance   GAIT GAIT   Base of Support: Center of gravity altered  Speed/Polly: Slow  Step Length: Left shortened, Right shortened  Gait Abnormalities: Decreased step clearance  Ambulation - Level of Assistance:  Moderate assistance, Additional time  Distance (ft): 5 Feet (ft)  Assistive Device: Other (comment) (Rolling walker with platform on left) Base of Support: Center of gravity altered  Speed/Polly: Slow  Step Length: Left shortened, Right shortened  Gait Abnormalities: Decreased step clearance  Ambulation - Level of Assistance: Contact guard assistance  Distance (ft): 14 Feet (ft) (10)  Assistive Device: Gait belt, Walker, rolling (platform walker)    (unable to assess)  (unable to assess)               TRANSFERS TRANSFERS   Sit to Stand: Maximum assistance  Stand to Sit: Moderate assistance  Bed to Chair: Maximum assistance (x2) Sit to Stand: Minimum assistance  Stand to Sit: Contact guard assistance  Bed to Chair: Minimum assistance (Mod A with w/c managment)   BALANCE BALANCE   Sitting: Impaired, With support  Sitting - Static: Fair (occasional) (+)  Sitting - Dynamic: Fair (occasional)  Standing: Impaired  Standing - Static: Poor  Standing - Dynamic : Fair Sitting: Without support  Sitting - Static: Good (unsupported)  Sitting - Dynamic: Good (unsupported)  Standing: With support  Standing - Static: Fair  Standing - Dynamic : Fair (-)   WHEELCHAIR MOBILITY/MGMT WHEELCHAIR MOBILITY/MGMT         Activity Tolerance:  Fair Activity Tolerance: Fair   Visual/Perceptual   Corrective Lenses: Glasses      Visual/Perceptual   Vision  Corrective Lenses: Glasses        Auditory:   Auditory Impairment: None    Auditory:   Auditory  Auditory Impairment: None       Steps: none   Clinical Decision makin Clinical Decision makin+ [place] Standing Balance Scale     Treatment:   Pt reportedly received pain meds about 45 minutes prior to session, no c/o pain in LLE. Gait training x 14 ft, 10 ft using platform RW, CGA required, step to and antalgic gait pattern noted. Stair training attempted at stair case, pt unable and nervous. Stair training then attempted in // bars with 3-inch step, pt unable to shift weight onto LLE to advance RLE onto step. TE rendered to address strength, endurance, mobility and included: LAQ, heel raises, toe raises 20 reps x 2 sets. Continue POC, pt progressing well. Patient's response to treatment rendered:  Fair+    Patient expected Discharge Location:  [x]Private Residence  [] [facility]/ILF  []LTC  []Other:    Plan: Continue Skilled PT services as established by the Plan of Care for 5-6 times a week. PT and Assistant have had a weekly case conference regarding the above treatment:  [] Yes     [] No       Treatment session:  58 minutes.       Therapist: Brunilda Anderson, PT Date:10/26/2017  Forward to PT for co-signature when completed.

## 2017-10-27 LAB — GLUCOSE BLD STRIP.AUTO-MCNC: 103 MG/DL (ref 70–110)

## 2017-10-27 PROCEDURE — 74011250637 HC RX REV CODE- 250/637: Performed by: INTERNAL MEDICINE

## 2017-10-27 PROCEDURE — 82962 GLUCOSE BLOOD TEST: CPT

## 2017-10-27 PROCEDURE — 74011250636 HC RX REV CODE- 250/636: Performed by: INTERNAL MEDICINE

## 2017-10-27 RX ADMIN — LEVOTHYROXINE SODIUM 75 MCG: 75 TABLET ORAL at 05:39

## 2017-10-27 RX ADMIN — CHOLECALCIFEROL (VITAMIN D3) 25 MCG (1,000 UNIT) TABLET 1000 UNITS: at 08:36

## 2017-10-27 RX ADMIN — SIMVASTATIN 20 MG: 20 TABLET, FILM COATED ORAL at 21:36

## 2017-10-27 RX ADMIN — LOSARTAN POTASSIUM 50 MG: 50 TABLET ORAL at 08:37

## 2017-10-27 RX ADMIN — TRIAMCINOLONE ACETONIDE: 1 CREAM TOPICAL at 18:00

## 2017-10-27 RX ADMIN — ENOXAPARIN SODIUM 40 MG: 40 INJECTION SUBCUTANEOUS at 08:37

## 2017-10-27 RX ADMIN — BISMUTH SUBSALICYLATE 30 ML: 262 LIQUID ORAL at 06:13

## 2017-10-27 RX ADMIN — CELECOXIB 100 MG: 100 CAPSULE ORAL at 08:36

## 2017-10-27 RX ADMIN — BISMUTH SUBSALICYLATE 30 ML: 262 LIQUID ORAL at 21:36

## 2017-10-27 RX ADMIN — TRIAMCINOLONE ACETONIDE: 1 CREAM TOPICAL at 09:00

## 2017-10-27 RX ADMIN — PANTOPRAZOLE SODIUM 40 MG: 40 TABLET, DELAYED RELEASE ORAL at 09:00

## 2017-10-27 RX ADMIN — METFORMIN HYDROCHLORIDE 1000 MG: 500 TABLET ORAL at 08:37

## 2017-10-27 RX ADMIN — CELECOXIB 200 MG: 100 CAPSULE ORAL at 17:25

## 2017-10-27 RX ADMIN — METFORMIN HYDROCHLORIDE 1000 MG: 500 TABLET ORAL at 17:25

## 2017-10-27 NOTE — PROGRESS NOTES
Problem: Self Care Deficits Care Plan (Adult)  Goal: *Acute Goals and Plan of Care (Insert Text)  OCCUPATIONAL THERAPY SHORT TERM GOALS    Initiated 10/13/2017 and to be accomplished within 2 Week(s)    1. Patient will perform Upper body ADL's with adaptive equipment & compensatory techniques as needed with minimal assistance/contact guard assist. (progressing)  2. Patient will perform Lower body ADL's with adaptive equipment & compensatory techniques as needed moderate assistance. (progressing)  3. Patient will perform toileting task with moderate assistance x 2 with Good safety to reduce falls risk. 4.  Patient will perform toilet transfers with Platform Walker and moderate assistance x 2 .  5. Patient will perform standing static/dynamic balance activities for improved ADL/IADL function with moderate assistance x,2 and Good balance and safety awareness. 6.  Patient will improve Barthel index scores to atleast 35/100 to improve functional mobility. 7.  Patient will tolerate standing x 5 minutes during functional activity while adhering to LLE TTWB And Left wrist NWB utilizing platform walker to increase participation in ADL routine. OCCUPATIONAL THERAPY LONG TERM GOALS   Initiated 10/13/2017 and to be accomplished within 4 Week(s)    1. Patient will perform Upper body ADL's with adaptive equipment & compensatory techniques as needed with supervision/set-up. 2.  Patient will perform Lower body ADL's with adaptive equipment & compensatory techniques as needed with         supervision/set-up . 3. Patient will perform toileting task with supervision/set-up with Good safety to reduce falls risk. 4.  Patient will perform functional transfers with Platform Walker and  supervision/set-up and Good balance and safety awareness. 5.  Patient will perform standing static/dynamic activity for improved ADL/IADL function    with supervision/set-up an and Good balance and safety awareness.   6.  Patient will improve Barthel index score to 50/100 to improve independence with mobility. Therapist: Neela Davis    10/13/2017           [facility]   Occupational Therapy Daily Treatment note      Patient: Garry Young (56 y.o. female)       Date: 10/27/2017  Attending Physician: Viviana Olivera MD  Primary Diagnosis: left hip fracture  Hip fracture [facility])    Treatment Diagnosis  Treatment Diagnosis: muscle weakness  Treatment Diagnosis 2: increased need for assist w/ self care   Precautions : Precautions at Admission: Fall, WBAT (Splint at L wrist;ROM activities @L wrist)  Vital Signs:        Cognitive Status:     Pain:        Gross Assessment:     Coordination:     Bed Mobility:  Bed Mobility  Rolling: Stand-by asssistance  Supine to Sit: Stand-by asssistance  Sit to Supine: Moderate assistance  Scooting: Stand-by asssistance  Transfers:  Functional Transfers  Sit to Stand: Contact guard assistance  Stand to Sit: Contact guard assistance  Bed to Chair: Contact guard assistance  Toilet Transfer : Contact guard assistance  Functional Transfers  Toilet Transfer : Contact guard assistance  Adaptive Equipment: Other (comment) (platform walker)  Balance:  Balance  Sitting: Without support  Sitting - Static: Good (unsupported)  Sitting - Dynamic: Fair (occasional) (+)  Standing: Impaired; With support  Standing - Static: Fair  Standing - Dynamic : Fair  ADL Self Care:     ADL Intervention:           Toileting  Bladder Hygiene: Supervision/set-up  Clothing Management: Stand-by assistance  Adaptive Equipment: Grab bars; Other (comment) (platform walker)                   Therapeutic Activities:  ADL level of assist as mentioned above. Patient participated in [place] reaching w/ 1# wrist weight e.g. Crossing midline and overhead for placement followed by removal of graded resistive clamps onto dowel tree,  patient demonstrates good participation with increased alertness and sequencing skills.  Dr Javier Ching office contacted and therapist spoke w/ Dr. Rianna Dodge to obtain further clarification s/p most recent appointment regarding L wrist WB status and ROM status secondary to recent report stating WBAT for LLE and wear L wrist splint for 3 more weeks, Binta requested therapist to call back on Monday once Doctor Mauricio's dictation is completed, patient notified of nurse Binta's statement. Patient/Caregiver Education:    Pt.  Education on [place] walker for toilet transfers from w/c level was provided to prevent falls and maintain L wrist NWB until doctor clarification otherwise.       ASSESSMENT:  Patient continues to demonstrate the need for skilled Occupational Therapy services to improve grooming, bathing, upper body dressing, lower body dressing, toileting and toilet transfer  Progression toward goals:  [x]      Improving appropriately and progressing toward goals  []      Improving slowly and progressing toward goals  []      Not making progress toward goals and plan of care will be adjusted     Treatment session:   47 minutes    Therapist:    Justina Hilton,  10/27/2017

## 2017-10-27 NOTE — ROUTINE PROCESS
Bedside and Verbal shift change report given to SAVANNAH Mena RN (oncoming nurse) by Marianela Boateng RN (offgoing nurse). Report included the following information SBAR, Kardex and MAR.

## 2017-10-27 NOTE — ROUTINE PROCESS
Pt c/o feeling nauseated, would not take any nausea medication, drink sips of ginger ale and took meds, without difficulty, resting quietly at this time. Also, had BM times 2, refused colace.

## 2017-10-27 NOTE — ROUTINE PROCESS
Bedside and Verbal shift change report given to Irma Quispe RN (oncoming nurse) by Anne Hutchison RN (offgoing nurse). Report included the following information SBAR, Kardex and MAR.

## 2017-10-27 NOTE — PROGRESS NOTES
Pt participated in Arts and Crafts activity for the duration of 30 minutes. With minimal prompting pt was able to use art supplies to complete craft activity. Pt interacted well with peers.

## 2017-10-27 NOTE — PROGRESS NOTES
Pt participated in Arts and Crafts activity for the duration of 65 minutes. With minimal prompting pt was able to complete the foam mosaic tile project. Pt interacted well with peers.

## 2017-10-27 NOTE — ROUTINE PROCESS
Bedside and Verbal shift change report given to KERI Alonso LPN (oncoming nurse) by AMELIE MenaRN (offgoing nurse). Report included the following information SBAR, Kardex and MAR.

## 2017-10-27 NOTE — ROUTINE PROCESS
Bedside and Verbal shift change report given to AMELIE Chavarria LPN (oncoming nurse) by KERI Alonso LPN (offgoing nurse). Report included the following information SBAR, Kardex and MAR.

## 2017-10-28 LAB — GLUCOSE BLD STRIP.AUTO-MCNC: 91 MG/DL (ref 70–110)

## 2017-10-28 PROCEDURE — 74011250636 HC RX REV CODE- 250/636: Performed by: INTERNAL MEDICINE

## 2017-10-28 PROCEDURE — 74011250637 HC RX REV CODE- 250/637: Performed by: INTERNAL MEDICINE

## 2017-10-28 PROCEDURE — 82962 GLUCOSE BLOOD TEST: CPT

## 2017-10-28 RX ADMIN — LOSARTAN POTASSIUM 25 MG: 50 TABLET ORAL at 08:32

## 2017-10-28 RX ADMIN — PANTOPRAZOLE SODIUM 40 MG: 40 TABLET, DELAYED RELEASE ORAL at 08:31

## 2017-10-28 RX ADMIN — OXYCODONE HYDROCHLORIDE AND ACETAMINOPHEN 2 TABLET: 5; 325 TABLET ORAL at 09:50

## 2017-10-28 RX ADMIN — LEVOTHYROXINE SODIUM 75 MCG: 75 TABLET ORAL at 05:38

## 2017-10-28 RX ADMIN — TRIAMCINOLONE ACETONIDE: 1 CREAM TOPICAL at 09:00

## 2017-10-28 RX ADMIN — CHOLECALCIFEROL (VITAMIN D3) 25 MCG (1,000 UNIT) TABLET 2000 UNITS: at 08:31

## 2017-10-28 RX ADMIN — TRIAMCINOLONE ACETONIDE: 1 CREAM TOPICAL at 18:19

## 2017-10-28 RX ADMIN — METFORMIN HYDROCHLORIDE 1000 MG: 500 TABLET ORAL at 17:29

## 2017-10-28 RX ADMIN — ENOXAPARIN SODIUM 40 MG: 40 INJECTION SUBCUTANEOUS at 08:30

## 2017-10-28 RX ADMIN — CELECOXIB 200 MG: 100 CAPSULE ORAL at 17:29

## 2017-10-28 RX ADMIN — SIMVASTATIN 20 MG: 20 TABLET, FILM COATED ORAL at 21:07

## 2017-10-28 RX ADMIN — CELECOXIB 200 MG: 100 CAPSULE ORAL at 08:30

## 2017-10-28 RX ADMIN — METFORMIN HYDROCHLORIDE 1000 MG: 500 TABLET ORAL at 08:32

## 2017-10-28 RX ADMIN — OXYCODONE HYDROCHLORIDE AND ACETAMINOPHEN 1 TABLET: 5; 325 TABLET ORAL at 21:10

## 2017-10-28 NOTE — PROGRESS NOTES
Problem: Mobility Impaired (Adult and Pediatric)  Goal: *Acute Goals and Plan of Care (Insert Text)  PHYSICAL THERAPY STG GOALS :  Initiated 10/13/2017 and to be accomplished within 2 Weeks (updated 10/26/17)    1. Patient will move from supine to sit and sit to supine  and roll side to side in bed with minimal assistance/contact guard assist with WB compliance. (Achieved)  2. Patient will transfer from bed to chair and chair to bed with minimal assistance/contact guard assist using platform walker with WB compliance. (Achieved)  3. Patient will perform sit to stand with minimal assistance/contact guard assist with Fair balance and safety awareness with WB compliance. (Achieved)  4. Patient will ambulate with minimal assistance/contact guard assist for 75 feet with platform walker on level surfaces with 2 turns with WB compliance. (Progressing; 16 ft using platform RW with CGA)  5. Patient will ascend/descend 14 stairs with right handrail(s) withminimal assistance to allow for safe home access/exit with WB compliance. (Attempted, pt currently unable)  6. Patient will improve standardized test score for [place] Standing Balance Scale 1+ with WB compliance. (Achieved)    PHYSICAL THERAPY STG GOALS :  Initiated 10/13/2017 and to be accomplished within 2 Weeks (updated 10/19/17)    1. Patient will move from supine to sit and sit to supine  and roll side to side in bed with minimal assistance/contact guard assist with WB compliance. (Achieved)  2. Patient will transfer from bed to chair and chair to bed with minimal assistance/contact guard assist using platform walker with WB compliance. (Progressing modA)  3. Patient will perform sit to stand with minimal assistance/contact guard assist with Fair balance and safety awareness with WB compliance. (Progressing modA)  4.   Patient will ambulate with minimal assistance/contact guard assist for 75 feet with platform walker on level surfaces with 2 turns with WB compliance. (not tested due to TTWB on LLE)  5. Patient will ascend/descend 14 stairs with right handrail(s) withminimal assistance to allow for safe home access/exit with WB compliance. (Not tested due to WB status)  6. Patient will improve standardized test score for [place] Standing Balance Scale 1+ with WB compliance. PHYSICAL THERAPY LTG GOALS :  Initiated 10/13/2017 and to be accomplished within 4 Weeks    1. Patient will move from supine to sit and sit to supine  and roll side to side in bed with modified independence with WB compliance. 2.  Patient will transfer from bed to chair and chair to bed with modified independence using LRAD with WB compliance. 3.  Patient will perform sit to stand with modified independence with Good balance and safety awareness with WB compliance. 4.  Patient will ambulate with modified independence for 150 feet with LRAD on level surfaces and be able to maneuver through narrow spaces and obstacles without loss of balance with WB compliance. 5.  Patient will ascend/descend 14 stairs with right handrail(s) with supervision/set-up to allow for safe home access/exit with WB compliance. 6.  Patient will improve standardized test score for [place] Standing Balance Scale 3+ to reduce fall risk with WB compliance.       Physical Therapist:   Quoc Adan PT  on 10/13/2017              [address] [place] [address]   physical Therapy Daily Treatment note      Patient: Bunnie Cabot (76 y.o. female)               Date: 10/28/2017    Physician: Claudean Peaches, MD  Primary Diagnosis: left hip fracture  Hip fracture [facility])          Treatment Diagnosis  Treatment Diagnosis: muscle weakness  Treatment Diagnosis 2: increased need for assist w/ self care  Precautions: Fall, WBAT (Splint at L wrist;ROM activities @L wrist)  Vital Signs        Cognitive Status:     Pain     Bed Mobility Training     Balance  Sitting - Static: Good (unsupported)  Standing - Static: Fair  Standing - Dynamic : Fair  Transfer Training  Transfer Training  Sit to Stand: Contact guard assistance  Stand to Sit: Contact guard assistance  Sit to Stand: Contact guard assistance  Gait Training  Gait  Base of Support: Center of gravity altered  Speed/Polly: Slow  Step Length: Left shortened;Right shortened  Stance: Left decreased  Gait Abnormalities: Decreased step clearance; Antalgic; Step to gait  Ambulation - Level of Assistance: Contact guard assistance  Distance (ft): 13 Feet (ft) ( + 10 )  Assistive Device: Walker, rolling (platform walker)     Gait Abnormalities: Decreased step clearance; Antalgic; Step to gait          Therapeutic Exercise:   Patient completed seated ankle pumps, hip adductor isometrics, LAQ's, R hip flexion x 20 reps. Sit<>stand from wheelchair x 3 reps, CGA with initial vc's for technique/hand placement. Patient was able to maintain static stance x 2 minutes x 2 reps, CGA-SBA. Gait training on level surfaces using platform walker, CGA + initial vc's for sequence, vc's for increased step length. Patient/Caregiver Education:   Pt /Caregiver Education on safety and fall prevention was provided to  pt. ASSESSMENT:  Patient continues to benefit from Skilled PT services to improve strength, balance, (I) witht ransfers/ambulation  Progression toward goals:  []      Improving appropriately and progressing toward goals  []      Improving slowly and progressing toward goals  []      Not making progress toward goals and plan of care will be adjusted     Treatment session: 43 minutes.   Therapist:   Syed Howe, PT,          10/28/2017

## 2017-10-28 NOTE — ROUTINE PROCESS
Bedside and Verbal shift change report given to Nahomy Raygoza. Fermin Nam RN (oncoming nurse) by KERI Snyder LPN (offgoing nurse). Report included the following information SBAR, Kardex and MAR.

## 2017-10-28 NOTE — ROUTINE PROCESS
Bedside and Verbal shift change report given to KERI Musa (oncoming nurse) by AMELIE Mena RN (offgoing nurse). Report included the following information SBAR, Kardex and MAR.

## 2017-10-29 PROCEDURE — 74011250637 HC RX REV CODE- 250/637: Performed by: INTERNAL MEDICINE

## 2017-10-29 PROCEDURE — 74011250636 HC RX REV CODE- 250/636: Performed by: INTERNAL MEDICINE

## 2017-10-29 RX ADMIN — METFORMIN HYDROCHLORIDE 1000 MG: 500 TABLET ORAL at 18:05

## 2017-10-29 RX ADMIN — CELECOXIB 200 MG: 100 CAPSULE ORAL at 08:38

## 2017-10-29 RX ADMIN — DOCUSATE SODIUM 100 MG: 100 CAPSULE, LIQUID FILLED ORAL at 18:04

## 2017-10-29 RX ADMIN — DOCUSATE SODIUM 100 MG: 100 CAPSULE, LIQUID FILLED ORAL at 08:39

## 2017-10-29 RX ADMIN — TRIAMCINOLONE ACETONIDE: 1 CREAM TOPICAL at 09:00

## 2017-10-29 RX ADMIN — LOSARTAN POTASSIUM 25 MG: 50 TABLET ORAL at 08:39

## 2017-10-29 RX ADMIN — LEVOTHYROXINE SODIUM 75 MCG: 75 TABLET ORAL at 05:57

## 2017-10-29 RX ADMIN — METFORMIN HYDROCHLORIDE 1000 MG: 500 TABLET ORAL at 08:38

## 2017-10-29 RX ADMIN — OXYCODONE HYDROCHLORIDE AND ACETAMINOPHEN 1 TABLET: 5; 325 TABLET ORAL at 18:04

## 2017-10-29 RX ADMIN — SIMVASTATIN 20 MG: 20 TABLET, FILM COATED ORAL at 21:14

## 2017-10-29 RX ADMIN — CELECOXIB 200 MG: 100 CAPSULE ORAL at 18:05

## 2017-10-29 RX ADMIN — PANTOPRAZOLE SODIUM 40 MG: 40 TABLET, DELAYED RELEASE ORAL at 08:38

## 2017-10-29 RX ADMIN — OXYCODONE HYDROCHLORIDE AND ACETAMINOPHEN 1 TABLET: 5; 325 TABLET ORAL at 08:46

## 2017-10-29 RX ADMIN — ONDANSETRON 4 MG: 4 TABLET, ORALLY DISINTEGRATING ORAL at 18:09

## 2017-10-29 RX ADMIN — ENOXAPARIN SODIUM 40 MG: 40 INJECTION SUBCUTANEOUS at 08:38

## 2017-10-29 NOTE — ROUTINE PROCESS
Bedside and Verbal shift change report given to Norma Canales LPN (oncoming nurse) by Svetlana Gracia RN (offgoing nurse). Report included the following information SBAR, Kardex and MAR. 24 hour chart check & hourly rounds made.

## 2017-10-29 NOTE — PROGRESS NOTES
Problem: Self Care Deficits Care Plan (Adult)  Goal: *Acute Goals and Plan of Care (Insert Text)  OCCUPATIONAL THERAPY SHORT TERM GOALS    Initiated 10/13/2017 and to be accomplished within 2 Week(s)    1. Patient will perform Upper body ADL's with adaptive equipment & compensatory techniques as needed with minimal assistance/contact guard assist. (progressing)  2. Patient will perform Lower body ADL's with adaptive equipment & compensatory techniques as needed moderate assistance. (progressing)  3. Patient will perform toileting task with moderate assistance x 2 with Good safety to reduce falls risk. 4.  Patient will perform toilet transfers with Platform Walker and moderate assistance x 2 .  5. Patient will perform standing static/dynamic balance activities for improved ADL/IADL function with moderate assistance x,2 and Good balance and safety awareness. 6.  Patient will improve Barthel index scores to atleast 35/100 to improve functional mobility. 7.  Patient will tolerate standing x 5 minutes during functional activity while adhering to LLE TTWB And Left wrist NWB utilizing platform walker to increase participation in ADL routine. OCCUPATIONAL THERAPY LONG TERM GOALS   Initiated 10/13/2017 and to be accomplished within 4 Week(s)    1. Patient will perform Upper body ADL's with adaptive equipment & compensatory techniques as needed with supervision/set-up. 2.  Patient will perform Lower body ADL's with adaptive equipment & compensatory techniques as needed with         supervision/set-up . 3. Patient will perform toileting task with supervision/set-up with Good safety to reduce falls risk. 4.  Patient will perform functional transfers with Platform Walker and  supervision/set-up and Good balance and safety awareness. 5.  Patient will perform standing static/dynamic activity for improved ADL/IADL function    with supervision/set-up an and Good balance and safety awareness.   6.  Patient will improve Barthel index score to 50/100 to improve independence with mobility. Therapist: Marito Sandoval    10/13/2017           Outcome: Progressing Towards Goal  [facility]   Occupational Therapy Daily Treatment note      Patient: Sonya Wade (62 y.o. female)       Date: 10/29/2017  Attending Physician: Fela Shah MD  Primary Diagnosis: left hip fracture  Hip fracture [facility])    Treatment Diagnosis  Treatment Diagnosis: muscle weakness  Treatment Diagnosis 2: increased need for assist w/ self care   Precautions : Precautions at Admission: Fall, WBAT (Splint at L wrist;ROM activities @L wrist)  Vital Signs:  Vital Signs  Pulse (Heart Rate): 77  BP: 144/72  Cognitive Status:  Mental Status  Neurologic State: Alert  Orientation Level: Oriented X4  Cognition: Appropriate decision making; Appropriate safety awareness; Follows commands  Bed Mobility:  Bed Mobility  Supine to Sit: Supervision  Transfers:  Functional Transfers  Sit to Stand: Contact guard assistance  Toilet Transfer : Stand-by asssistance  Functional Transfers  Toilet Transfer : Stand-by asssistance  Adaptive Equipment: Grab bars; Walker (comment)  Balance:  Balance  Sitting: Intact  Standing: With support  Standing - Static: Fair  Standing - Dynamic : Fair  ADL Intervention:  Grooming  Washing Hands: Modified independent  Lower Body Dressing Assistance  Socks: Total assistance (dependent)  Therapeutic Activities:  Provided pt demonstration and education on WB precautions for L wrist during ADL and IADL tasks. Provided compensatory strategies to manage opening medication bottles, shampoo bottles. ..etc with using knees/leg to stabilize/support/secure bottle while R hand presses/twists/opens. Pt demonstrated understanding through return demonstration and verbalization requiring Min Verbal directions.    Therapeutic Exercises:  Pt participated in pincer and intrinsic strengthening ex for L hand through small item retrieval (beads) from orange thera-putty (7/8 retrieved) and power web (flexion/extension of all digits using Red side 2/10 each). Provided Pt education and demonstration for AROM ex for L wrist for increased mobility during ADL tasks. Pt demonstrated understanding through return demonstration and verbalization.    Patient/Caregiver Education:    See therapeutic activity note    ASSESSMENT:  Patient continues to demonstrate the need for skilled Occupational Therapy services to improve withlower body dressing  Progression toward goals:  [x]      Improving appropriately and progressing toward goals  []      Improving slowly and progressing toward goals  []      Not making progress toward goals and plan of care will be adjusted     Treatment session:   52 minutes    Therapist:    Morse Skiff, Matthias Cruz,  10/29/2017

## 2017-10-30 PROCEDURE — 74011250636 HC RX REV CODE- 250/636: Performed by: INTERNAL MEDICINE

## 2017-10-30 PROCEDURE — 74011250637 HC RX REV CODE- 250/637: Performed by: INTERNAL MEDICINE

## 2017-10-30 RX ADMIN — OXYCODONE HYDROCHLORIDE AND ACETAMINOPHEN 1 TABLET: 5; 325 TABLET ORAL at 21:26

## 2017-10-30 RX ADMIN — METFORMIN HYDROCHLORIDE 1000 MG: 500 TABLET ORAL at 08:42

## 2017-10-30 RX ADMIN — LOSARTAN POTASSIUM 25 MG: 50 TABLET ORAL at 08:39

## 2017-10-30 RX ADMIN — SIMVASTATIN 20 MG: 20 TABLET, FILM COATED ORAL at 21:26

## 2017-10-30 RX ADMIN — ENOXAPARIN SODIUM 40 MG: 40 INJECTION SUBCUTANEOUS at 08:41

## 2017-10-30 RX ADMIN — CELECOXIB 200 MG: 100 CAPSULE ORAL at 17:39

## 2017-10-30 RX ADMIN — METFORMIN HYDROCHLORIDE 1000 MG: 500 TABLET ORAL at 17:39

## 2017-10-30 RX ADMIN — LEVOTHYROXINE SODIUM 75 MCG: 75 TABLET ORAL at 05:30

## 2017-10-30 RX ADMIN — PANTOPRAZOLE SODIUM 40 MG: 40 TABLET, DELAYED RELEASE ORAL at 08:39

## 2017-10-30 RX ADMIN — CHOLECALCIFEROL (VITAMIN D3) 25 MCG (1,000 UNIT) TABLET 2000 UNITS: at 08:37

## 2017-10-30 RX ADMIN — OXYCODONE HYDROCHLORIDE AND ACETAMINOPHEN 2 TABLET: 5; 325 TABLET ORAL at 14:14

## 2017-10-30 RX ADMIN — TRIAMCINOLONE ACETONIDE: 1 CREAM TOPICAL at 09:00

## 2017-10-30 RX ADMIN — OXYCODONE HYDROCHLORIDE AND ACETAMINOPHEN 1 TABLET: 5; 325 TABLET ORAL at 05:27

## 2017-10-30 RX ADMIN — TRIAMCINOLONE ACETONIDE: 1 CREAM TOPICAL at 17:47

## 2017-10-30 RX ADMIN — CELECOXIB 200 MG: 100 CAPSULE ORAL at 08:41

## 2017-10-30 NOTE — PROGRESS NOTES
Late entry: Pt requested to speak with SEVERO re: her length of stay. SW informed pt that SEVERO will meet with therapy staff tomorrow to discuss rehab pt's and will get back with her.

## 2017-10-30 NOTE — PROGRESS NOTES
conducted a Follow up consultation and Spiritual Assessment for Alison Venegas, who is a 65 y.o.,female. The  provided the following Interventions:  Continued the relationship of care and support. Listened empathically. Offered prayer and assurance of continued prayer on patients behalf. Chart reviewed. The following outcomes were achieved:  Patient expressed gratitude for 's visit. Assessment:  There are no spiritual or Sabianist issues which require intervention at this time. Plan:  Chaplains will continue to follow and will provide pastoral care on an as needed/requested basis.  recommends bedside caregivers page  on duty if patient shows signs of acute spiritual or emotional distress. Cindy White M.Div.   [facility] [address]  [phone]

## 2017-10-30 NOTE — PROGRESS NOTES
Pt participated in music with the guitarist for the duration of 60 minutes. Pt was able to suggest and identify song choices during the activity. Pt interacted well with peers.

## 2017-10-30 NOTE — ROUTINE PROCESS
edside shift change report given to Arnel Bustos LPN (oncoming nurse) by Diane Luis RN (offgoing nurse). Report included the following information SBAR, Kardex, MAR and Recent Results. VISUALLY CHECKED PT Q 1 HR BY NURSING STAFF. 24 hour order check done

## 2017-10-30 NOTE — PROGRESS NOTES
GIM     Patient: Anabel Lopes MRN: [id number]  CSN: [id number]    YOB: 1940  Age: 65 y.o. Sex: female    DOA: 10/12/2017 LOS:  LOS: 18 days                    Subjective:     Pt with fx of L hip and L wrist. Now doing better and progressing with therapy. Lab and VS stable. Objective:      Visit Vitals    /66    Pulse 68    Temp 97.8 °F (36.6 °C)    Resp 16    Ht 5' (1.524 m)    Wt 78.6 kg (173 lb 3.2 oz)    SpO2 96%    Breastfeeding No    BMI 33.83 kg/m2       Physical Exam:  cheest clear  Cor rr  abd soft  No edema    Intake and Output:  Current Shift:     Last three shifts:       No results found for this or any previous visit (from the past 24 hour(s)).     Current Facility-Administered Medications   Medication Dose Route Frequency    celecoxib (CELEBREX) capsule 200 mg  200 mg Oral BID    bismuth subsalicylate (PEPTO-BISMOL) oral suspension 30 mL  30 mL Oral Q6H PRN    losartan (COZAAR) tablet 25 mg  25 mg Oral DAILY    cholecalciferol (VITAMIN D3) tablet 2,000 Units  2,000 Units Oral DAILY    [facility] ANESTHESIA   Other PRN    [facility] EMERGENCY/STAT BOX   Other PRN    alum-mag hydroxide-simeth (MYLANTA) oral suspension 30 mL  30 mL Oral QID PRN    ondansetron (ZOFRAN ODT) tablet 4 mg  4 mg Oral Q6H PRN    docusate sodium (COLACE) capsule 100 mg  100 mg Oral BID    enoxaparin (LOVENOX) injection 40 mg  40 mg SubCUTAneous DAILY    levothyroxine (SYNTHROID) tablet 75 mcg  75 mcg Oral ACB    metFORMIN (GLUCOPHAGE) tablet 1,000 mg  1,000 mg Oral BID WITH MEALS    oxyCODONE-acetaminophen (PERCOCET) 5-325 mg per tablet 1-2 Tab  1-2 Tab Oral Q4H PRN    pantoprazole (PROTONIX) tablet 40 mg  40 mg Oral DAILY    simvastatin (ZOCOR) tablet 20 mg  20 mg Oral QHS    triamcinolone acetonide (KENALOG) 0.1 % cream   Topical BID    glucose chewable tablet 16 g  4 Tab Oral PRN    glucagon (GLUCAGEN) injection 1 mg  1 mg IntraMUSCular PRN    dextrose (D50W) injection syrg 12.5-25 g  25-50 mL IntraVENous PRN       Lab Results   Component Value Date/Time    Glucose 116 10/12/2017 04:00 AM    Glucose 167 10/11/2017 03:50 AM    Glucose 180 10/10/2017 06:36 PM    Glucose 161 10/09/2017 05:30 PM    Glucose 112 04/20/2017 11:23 AM        Assessment/Plan     Active Problems:    Hip fracture ([address] (10/9/2017)        Zarina Hermosillo MD  10/30/2017, 10:58 AM

## 2017-10-30 NOTE — ROUTINE PROCESS
Bedside and Verbal shift change report given to Jody Caraballo RN (oncoming nurse) by Jhony Urias LPN   (offgoing nurse). Report included the following information SBAR, Kardex and MAR.

## 2017-10-30 NOTE — PROGRESS NOTES
Problem: Mobility Impaired (Adult and Pediatric)  Goal: *Acute Goals and Plan of Care (Insert Text)  PHYSICAL THERAPY STG GOALS :  Initiated 10/13/2017 and to be accomplished within 2 Weeks (updated 10/26/17)    1. Patient will move from supine to sit and sit to supine  and roll side to side in bed with minimal assistance/contact guard assist with WB compliance. (Achieved)  2. Patient will transfer from bed to chair and chair to bed with minimal assistance/contact guard assist using platform walker with WB compliance. (Achieved)  3. Patient will perform sit to stand with minimal assistance/contact guard assist with Fair balance and safety awareness with WB compliance. (Achieved)  4. Patient will ambulate with minimal assistance/contact guard assist for 75 feet with platform walker on level surfaces with 2 turns with WB compliance. (Progressing; 16 ft using platform RW with CGA)  5. Patient will ascend/descend 14 stairs with right handrail(s) withminimal assistance to allow for safe home access/exit with WB compliance. (Attempted, pt currently unable)  6. Patient will improve standardized test score for [place] Standing Balance Scale 1+ with WB compliance. (Achieved)    PHYSICAL THERAPY STG GOALS :  Initiated 10/13/2017 and to be accomplished within 2 Weeks (updated 10/19/17)    1. Patient will move from supine to sit and sit to supine  and roll side to side in bed with minimal assistance/contact guard assist with WB compliance. (Achieved)  2. Patient will transfer from bed to chair and chair to bed with minimal assistance/contact guard assist using platform walker with WB compliance. (Progressing modA)  3. Patient will perform sit to stand with minimal assistance/contact guard assist with Fair balance and safety awareness with WB compliance. (Progressing modA)  4.   Patient will ambulate with minimal assistance/contact guard assist for 75 feet with platform walker on level surfaces with 2 turns with WB compliance. (not tested due to TTWB on LLE)  5. Patient will ascend/descend 14 stairs with right handrail(s) withminimal assistance to allow for safe home access/exit with WB compliance. (Not tested due to WB status)  6. Patient will improve standardized test score for [place] Standing Balance Scale 1+ with WB compliance. PHYSICAL THERAPY LTG GOALS :  Initiated 10/13/2017 and to be accomplished within 4 Weeks    1. Patient will move from supine to sit and sit to supine  and roll side to side in bed with modified independence with WB compliance. 2.  Patient will transfer from bed to chair and chair to bed with modified independence using LRAD with WB compliance. 3.  Patient will perform sit to stand with modified independence with Good balance and safety awareness with WB compliance. 4.  Patient will ambulate with modified independence for 150 feet with LRAD on level surfaces and be able to maneuver through narrow spaces and obstacles without loss of balance with WB compliance. 5.  Patient will ascend/descend 14 stairs with right handrail(s) with supervision/set-up to allow for safe home access/exit with WB compliance. 6.  Patient will improve standardized test score for [place] Standing Balance Scale 3+ to reduce fall risk with WB compliance.       Physical Therapist:   Fide Felix, PT  on 10/13/2017              [address] [place] [address]   physical Therapy Daily Treatment note      Patient: Kathi Farley (50 y.o. female)               Date: 10/30/2017    Physician: Cassidy Gill MD  Primary Diagnosis: left hip fracture  Hip fracture [facility])          Treatment Diagnosis  Treatment Diagnosis: muscle weakness  Treatment Diagnosis 2: increased need for assist w/ self care  Precautions: Fall, WBAT (Splint at L wrist;ROM activities @L wrist)  Vital Signs  Vital Signs  Pulse (Heart Rate): 68  BP: 120/66  MAP (Calculated): 84     Cognitive Status:  Mental Status  Neurologic State: Alert  Orientation Level: Oriented X4  Pain  Pain Screen  Pain Scale 1: Numeric (0 - 10)  Pain Intensity 1: 0  Pain Onset 1: movement  Pain Location 1: Hip  Pain Orientation 1: Left  Pain Description 1: Aching  Pain Intervention(s) 1: Medication (see MAR)  Patient Stated Pain Goal: 0  Pain Reassessment 1: Yes  Bed Mobility Training  Bed Mobility Training  Supine to Sit: Stand-by asssistance  Balance  Sitting: Intact  Standing: With support  Standing - Static: Fair (+)  Standing - Dynamic : Fair (+)  Transfer Training  Transfer Training  Sit to Stand: Contact guard assistance  Stand to Sit: Stand-by asssistance  Sit to Stand: Contact guard assistance  Gait Training  Gait  Base of Support: Center of gravity altered  Speed/Polly: Slow  Step Length: Left shortened;Right shortened  Gait Abnormalities: Decreased step clearance  Ambulation - Level of Assistance: Contact guard assistance  Distance (ft): 12 Feet (ft)  Assistive Device: Gait belt;Walker, rolling (platform walker)     Gait Abnormalities: Decreased step clearance            With 0 turns. Therapeutic Exercise:    Upon presentation, pt c/o LLE swelling and pain and reported, \"I thought I was dying\". She reports recently having pain meds. Gait training as mentioned above. Pt c/o LLE pain that limited further ambulation. TE rendered to address strength, endurance, mobility and included: heel raises, toe raises, LAQ, resisted hip add 15 reps x 2 sets. Standing activities, including stair negotiation, not rendered due to pain. Patient/Caregiver Education:   Pt /Caregiver Education on safety and fall prevention to reduce fall risk. ASSESSMENT:  Patient continues to benefit from Skilled PT services to improve strength, mobility, gait.    Progression toward goals:  []      Improving appropriately and progressing toward goals  [x]      Improving slowly and progressing toward goals  []      Not making progress toward goals and plan of care will be adjusted Treatment session: 48 minutes.   Therapist:   Anna Black PT,          10/30/2017

## 2017-10-30 NOTE — PROGRESS NOTES
I have reviewed this patient's current medication list and recent laboratory results. At this time, I do not suggest any drug therapy adjustments or additional laboratory monitoring. Thank you,  Antonio KINSEY  Ph. M. S.  10/30/2017

## 2017-10-31 PROCEDURE — 74011250636 HC RX REV CODE- 250/636: Performed by: INTERNAL MEDICINE

## 2017-10-31 PROCEDURE — 74011250637 HC RX REV CODE- 250/637: Performed by: INTERNAL MEDICINE

## 2017-10-31 RX ADMIN — CELECOXIB 200 MG: 100 CAPSULE ORAL at 09:11

## 2017-10-31 RX ADMIN — SIMVASTATIN 20 MG: 20 TABLET, FILM COATED ORAL at 22:01

## 2017-10-31 RX ADMIN — METFORMIN HYDROCHLORIDE 1000 MG: 500 TABLET ORAL at 09:11

## 2017-10-31 RX ADMIN — LOSARTAN POTASSIUM 25 MG: 50 TABLET ORAL at 09:12

## 2017-10-31 RX ADMIN — ENOXAPARIN SODIUM 40 MG: 40 INJECTION SUBCUTANEOUS at 09:12

## 2017-10-31 RX ADMIN — OXYCODONE HYDROCHLORIDE AND ACETAMINOPHEN 2 TABLET: 5; 325 TABLET ORAL at 14:49

## 2017-10-31 RX ADMIN — TRIAMCINOLONE ACETONIDE: 1 CREAM TOPICAL at 09:13

## 2017-10-31 RX ADMIN — OXYCODONE HYDROCHLORIDE AND ACETAMINOPHEN 1 TABLET: 5; 325 TABLET ORAL at 06:19

## 2017-10-31 RX ADMIN — CHOLECALCIFEROL (VITAMIN D3) 25 MCG (1,000 UNIT) TABLET 2000 UNITS: at 09:11

## 2017-10-31 RX ADMIN — CELECOXIB 200 MG: 100 CAPSULE ORAL at 17:15

## 2017-10-31 RX ADMIN — PANTOPRAZOLE SODIUM 40 MG: 40 TABLET, DELAYED RELEASE ORAL at 09:11

## 2017-10-31 RX ADMIN — LEVOTHYROXINE SODIUM 75 MCG: 75 TABLET ORAL at 06:19

## 2017-10-31 RX ADMIN — METFORMIN HYDROCHLORIDE 1000 MG: 500 TABLET ORAL at 17:15

## 2017-10-31 RX ADMIN — TRIAMCINOLONE ACETONIDE: 1 CREAM TOPICAL at 18:00

## 2017-10-31 NOTE — PROGRESS NOTES
Problem: Mobility Impaired (Adult and Pediatric)  Goal: *Acute Goals and Plan of Care (Insert Text)  PHYSICAL THERAPY STG GOALS :  Initiated 10/13/2017 and to be accomplished within 2 Weeks (updated 10/26/17)    1. Patient will move from supine to sit and sit to supine  and roll side to side in bed with minimal assistance/contact guard assist with WB compliance. (Achieved)  2. Patient will transfer from bed to chair and chair to bed with minimal assistance/contact guard assist using platform walker with WB compliance. (Achieved)  3. Patient will perform sit to stand with minimal assistance/contact guard assist with Fair balance and safety awareness with WB compliance. (Achieved)  4. Patient will ambulate with minimal assistance/contact guard assist for 75 feet with platform walker on level surfaces with 2 turns with WB compliance. (Progressing; 16 ft using platform RW with CGA)  5. Patient will ascend/descend 14 stairs with right handrail(s) withminimal assistance to allow for safe home access/exit with WB compliance. (Attempted, pt currently unable)  6. Patient will improve standardized test score for [place] Standing Balance Scale 1+ with WB compliance. (Achieved)    PHYSICAL THERAPY STG GOALS :  Initiated 10/13/2017 and to be accomplished within 2 Weeks (updated 10/19/17)    1. Patient will move from supine to sit and sit to supine  and roll side to side in bed with minimal assistance/contact guard assist with WB compliance. (Achieved)  2. Patient will transfer from bed to chair and chair to bed with minimal assistance/contact guard assist using platform walker with WB compliance. (Progressing modA)  3. Patient will perform sit to stand with minimal assistance/contact guard assist with Fair balance and safety awareness with WB compliance. (Progressing modA)  4.   Patient will ambulate with minimal assistance/contact guard assist for 75 feet with platform walker on level surfaces with 2 turns with WB compliance. (not tested due to TTWB on LLE)  5. Patient will ascend/descend 14 stairs with right handrail(s) withminimal assistance to allow for safe home access/exit with WB compliance. (Not tested due to WB status)  6. Patient will improve standardized test score for [place] Standing Balance Scale 1+ with WB compliance. PHYSICAL THERAPY LTG GOALS :  Initiated 10/13/2017 and to be accomplished within 4 Weeks    1. Patient will move from supine to sit and sit to supine  and roll side to side in bed with modified independence with WB compliance. 2.  Patient will transfer from bed to chair and chair to bed with modified independence using LRAD with WB compliance. 3.  Patient will perform sit to stand with modified independence with Good balance and safety awareness with WB compliance. 4.  Patient will ambulate with modified independence for 150 feet with LRAD on level surfaces and be able to maneuver through narrow spaces and obstacles without loss of balance with WB compliance. 5.  Patient will ascend/descend 14 stairs with right handrail(s) with supervision/set-up to allow for safe home access/exit with WB compliance. 6.  Patient will improve standardized test score for [place] Standing Balance Scale 3+ to reduce fall risk with WB compliance.       Physical Therapist:   Niecy Best, PT  on 10/13/2017              [address] [place] [address]   physical Therapy Daily Treatment note      Patient: Mary Fermin (89 y.o. female)               Date: 10/31/2017    Physician: Jayda Westfall MD  Primary Diagnosis: left hip fracture  [facility]ip fracture [facility])          Treatment Diagnosis  Treatment Diagnosis: muscle weakness  Treatment Diagnosis 2: increased need for assist w/ self care  Precautions: Fall, WBAT (Splint at L wrist;ROM activities @L wrist)  Vital Signs  Vital Signs  Pulse (Heart Rate): 69  BP: 165/71  MAP (Calculated): 102  Cognitive Status:  Mental Status  Neurologic State: Alert; Appropriate for age  Orientation Level: Oriented X4  Cognition: Appropriate for age attention/concentration  Pain  Pain Screen  Pain Scale 1: Numeric (0 - 10)  Pain Intensity 1: 0  Pain Onset 1: movement  Pain Location 1: Hip  Pain Orientation 1: Left  Pain Description 1: Aching  Pain Intervention(s) 1: Medication (see MAR)  Patient Stated Pain Goal: 0  Pain Reassessment 1: Yes  Bed Mobility Training  Balance  Sitting: Intact  Sitting - Static: Good (unsupported)  Sitting - Dynamic: Good (unsupported)  Standing: With support  Standing - Static: Fair (+)  Standing - Dynamic : Fair  Transfer Training  Transfer Training  Sit to Stand: Contact guard assistance  Stand to Sit: Stand-by asssistance  Interventions: Safety awareness training;Verbal cues  Sit to Stand: Contact guard assistance  Gait Training  Gait  Base of Support: Center of gravity altered  Speed/Sri: Slow  Step Length: Right shortened;Left shortened  Stance: Left decreased  Gait Abnormalities: Decreased step clearance  Ambulation - Level of Assistance: Contact guard assistance  Distance (ft): 18 Feet (ft)  Assistive Device: Gait belt;Walker, rolling; Other (comment) (Platform walker )  Gait Abnormalities: Decreased step clearance    With 1 turns. Therapeutic Exercise: Gait training rendered as noted above with close w/c follow for safety. Pt with much difficulty WB on L LE with decreased sri. Pt reported 5/10 pain at L hip. Pt received pain medication about 2 hours prior to therapy session. Tap ups on 4\" step with L LE x10 with CGA and R UE support. Pt unable to WB on L LE to allow for R tap-ups to be completed. Weight-shifting at // with promote greater WB through L LE in preparation for stair training. Seated B LE TE rendered to promote strength and endurance for improved functional mobility: HR/TR, LAQ, hip flexion (minimal range < 90%), ball squeezes, resisted hip abd (orange band) 2x20.       Patient/Caregiver Education:   Pt Nas Harley Education on safety and fall prevention, weight shifting to improved gait and stair negotiation was provided to reduce risk of falls and maximize functional gains. ASSESSMENT:  Patient continues to benefit from Skilled PT services to improve bed mobility, strength, balance, gait and stair negotiation. Progression toward goals:  []      Improving appropriately and progressing toward goals  [x]      Improving slowly and progressing toward goals  []      Not making progress toward goals and plan of care will be adjusted     Treatment session: 42 minutes.   Therapist:   Alonso Hernandez PTA,          10/31/2017

## 2017-10-31 NOTE — ROUTINE PROCESS
Bedside shift change report given to [address] Michaud (oncoming nurse) by Damon Hodgson RN (offgoing nurse). Report included the following information SBAR, Kardex, MAR and Recent Results. VISUALLY CHECKED PT Q 1 HR BY NURSING STAFF. 24 hour order chart check.

## 2017-10-31 NOTE — ROUTINE PROCESS
Bedside and Verbal shift change report given to Jennifer Hager RN (oncoming nurse) by Raudel Rankin RN (offgoing nurse). Report included the following information SBAR, Kardex and MAR. Hourly rounds made.

## 2017-10-31 NOTE — PROGRESS NOTES
Problem: Self Care Deficits Care Plan (Adult)  Goal: *Acute Goals and Plan of Care (Insert Text)  OCCUPATIONAL THERAPY SHORT TERM GOALS    Initiated 10/13/2017 and to be accomplished within 2 Week(s)    1. Patient will perform Upper body ADL's with adaptive equipment & compensatory techniques as needed with minimal assistance/contact guard assist. (progressing)  2. Patient will perform Lower body ADL's with adaptive equipment & compensatory techniques as needed moderate assistance. (progressing)  3. Patient will perform toileting task with moderate assistance x 2 with Good safety to reduce falls risk. 4.  Patient will perform toilet transfers with Platform Walker and moderate assistance x 2 .  5. Patient will perform standing static/dynamic balance activities for improved ADL/IADL function with moderate assistance x,2 and Good balance and safety awareness. 6.  Patient will improve Barthel index scores to atleast 35/100 to improve functional mobility. 7.  Patient will tolerate standing x 5 minutes during functional activity while adhering to LLE TTWB And Left wrist NWB utilizing platform walker to increase participation in ADL routine. OCCUPATIONAL THERAPY LONG TERM GOALS   Initiated 10/13/2017 and to be accomplished within 4 Week(s)    1. Patient will perform Upper body ADL's with adaptive equipment & compensatory techniques as needed with supervision/set-up. 2.  Patient will perform Lower body ADL's with adaptive equipment & compensatory techniques as needed with         supervision/set-up . 3. Patient will perform toileting task with supervision/set-up with Good safety to reduce falls risk. 4.  Patient will perform functional transfers with Platform Walker and  supervision/set-up and Good balance and safety awareness. 5.  Patient will perform standing static/dynamic activity for improved ADL/IADL function    with supervision/set-up an and Good balance and safety awareness.   6.  Patient will improve Barthel index score to 50/100 to improve independence with mobility. Therapist: Kae Conde    10/13/2017           [facility]   Occupational Therapy Daily Treatment note      Patient: Nick Gonzalez (59 y.o. female)       Date: 10/31/2017  Attending Physician: Miguel Ángel Yanez MD  Primary Diagnosis: left hip fracture  Hip fracture [facility])    Treatment Diagnosis  Treatment Diagnosis: muscle weakness  Treatment Diagnosis 2: increased need for assist w/ self care   Precautions : Precautions at Admission: Fall, WBAT (Splint at L wrist;ROM activities @L wrist)  Vital Signs:  Vital Signs  Pulse (Heart Rate): 69  BP: 165/71  MAP (Calculated): 102     Cognitive Status:  Mental Status  Neurologic State: Alert; Appropriate for age  Orientation Level: Oriented X4  Cognition: Appropriate for age attention/concentration  Pain:  Pain Screen  Pain Scale 1: Numeric (0 - 10)  Pain Intensity 1: 0  Pain Onset 1: movement  Pain Location 1: Hip  Pain Orientation 1: Left  Pain Description 1: Aching  Pain Intervention(s) 1: Medication (see MAR)  Patient Stated Pain Goal: 0  Pain Reassessment 1: Yes  Pain Scale 1: Numeric (0 - 10)  Gross Assessment:     Coordination:     Bed Mobility:     Transfers:  Functional Transfers  Sit to Stand: Contact guard assistance     Balance:  Balance  Sitting: Intact  Sitting - Static: Good (unsupported)  Sitting - Dynamic: Good (unsupported)  Standing: With support  Standing - Static: Fair (+)  Standing - Dynamic : Fair  Therapeutic Activities:  Patient informed patient regarding possible discharge home. KIM informed patient, she would recommend 24 hour care due to current limitations with L UE as well as assistance needed for ADL tasks and transfers. Patient also informed KIM she has a three level home with 14 stairs to each level.  Practiced step ups with 2 inch booster in order to increase weight shifting and challenge balance on L LE needed to safely complete tub transfers and stair training. Patient demonstrated increase difficulty with weight bearing on L LE. JUDY informed patient she will discuss with PT staff regarding patient's desire to increase B LE strengthening and stair training for optimal independence and safety. Functional mobility utilizing RW from patient's room to therapy room in order to increase functional activity tolerance and independence during task. Patient was able to complete 16ft prior to requesting to sit. Patient/Caregiver Education:    Pt. Kerwin Brown Education on see above.       ASSESSMENT:  Patient continues to demonstrate the need for skilled Occupational Therapy services to improve dynamic standing balance needed for bathing  Progression toward goals:  []      Improving appropriately and progressing toward goals  [x]      Improving slowly and progressing toward goals  []      Not making progress toward goals and plan of care will be adjusted     Treatment session:  42 minutes    Therapist:    JUDY Fuentes,  10/31/2017

## 2017-10-31 NOTE — ROUTINE PROCESS
Bedside and Verbal shift change report given to Layla Howard LPN  (oncoming nurse) by Johnnie Isaac LPN (offgoing nurse). Report included the following information SBAR, Kardex and MAR.

## 2017-10-31 NOTE — PROGRESS NOTES
Nutrition follow up-[facility]/  Plan of care      RECOMMENDATIONS:     1. No concentrated sweets diet  2. Ensure daily  3. Monitor weight, labs and PO intake  4. RD to follow     GOALS:     1. Met/Ongoing: PO intake meets >75% of protein/calorie needs by 11/7  2. Met/Ongoing: Weight Maintenance (+/- 1-2 lb by 11/7)    ASSESSMENT:     Weight status is classified as obese per BMI of 35. PO intake is variable. Nutrition recommendations listed. RD to follow. Nutrition Risk:  [] High  [x] Moderate []  Low    SUBJECTIVE/OBJECTIVE:      Transferred from  to [facility] on 10/20/17. S/P femur insertion intra medullary nail short for left hip fracture on 1/10/17. Patient with gradual weight loss over past year. 90% intake of breakfast meal this morning. Patient states drinking Ensure supplement when she receives them. Encouraged adequate intake. Will monitor.     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    Meningioma ([address] D32.9    Hip fracture ([address] S72.009A     Past Medical History:   Diagnosis Date    Arthritis     Bronchitis     Diabetes ([address]     Diverticulitis     GERD (gastroesophageal reflux disease)     Hypercholesterolemia     Hypertension     Hypothyroid     Pancreatitis       Labs:    Lab Results   Component Value Date/Time    Sodium 138 10/12/2017 04:00 AM    Potassium 3.7 10/12/2017 04:00 AM    Chloride 102 10/12/2017 04:00 AM    CO2 23 10/12/2017 04:00 AM Anion gap 13 10/12/2017 04:00 AM    Glucose 116 10/12/2017 04:00 AM    BUN 14 10/12/2017 04:00 AM    Creatinine 0.89 10/12/2017 04:00 AM    Calcium 7.1 10/12/2017 04:00 AM    Magnesium 1.4 12/04/2009 06:45 AM    Phosphorus 3.5 12/04/2009 06:45 AM    Albumin 3.9 04/20/2017 11:23 AM     Anthropometrics: BMI (calculated): 35  Last 3 Recorded Weights in this Encounter    10/17/17 1550 10/24/17 1333 10/31/17 1248   Weight: 77.4 kg (170 lb 11.2 oz) 78.6 kg (173 lb 3.2 oz) 81.2 kg (179 lb)      Ht Readings from Last 1 Encounters:   10/31/17 5' (1.524 m)     No data found.    [] Weight Loss [x] Weight Gain (3% x 1 week) [] Weight Stable    Nutrition Needs:   Calories: 4585-0483 Kcal   Protein:    g      [x] No Cultural, Taoism or ethnic dietary need identified.     [] Cultural, Taoism and ethnic food preferences identified and addressed     Wt Status:  [] Normal (18.6 - 24.9) [] Underweight (<18.5) [] Overweight (25 - 29.9)   [] Mild Obesity (30 - 34.9)  [x] Moderate Obesity (35 - 39.9) [] Morbid Obesity (40+)     Nutrition Problems Identified:   [x] Fair PO intake   [] Food Allergies  [] Difficulty chewing/swallowing/poor dentition  [] Constipation/Diarrhea   [] Nausea/Vomiting   [] None  [] Other:     Plan:   [x] Therapeutic Diet  [x]  Obtained/adjusted food preferences/tolerances and/or snacks options   [x]  Dietary supplements (Ensure daily)  [] Occupational therapy following for feeding techniques  []  HS snack added   []  Modify diet texture   []  Modify diet for food allergies   []  Assist with menu selection   [x]  Monitor PO intake on meal rounds   [x]  Continue inpatient monitoring and intervention   [x]  Participated in discharge planning/Interdisciplinary rounds/Team meetings   []  Other:     Education Needs:   [] Not appropriate for teaching at this time due to:   [x] Identified and addressed    Nutrition Monitoring and Evaluation:  [x] Continue ongoing monitoring and intervention  [] Mike MIKE Meg

## 2017-11-01 PROCEDURE — 74011250636 HC RX REV CODE- 250/636: Performed by: INTERNAL MEDICINE

## 2017-11-01 PROCEDURE — 74011250637 HC RX REV CODE- 250/637: Performed by: INTERNAL MEDICINE

## 2017-11-01 RX ADMIN — ENOXAPARIN SODIUM 40 MG: 40 INJECTION SUBCUTANEOUS at 09:53

## 2017-11-01 RX ADMIN — CELECOXIB 200 MG: 100 CAPSULE ORAL at 09:52

## 2017-11-01 RX ADMIN — TRIAMCINOLONE ACETONIDE: 1 CREAM TOPICAL at 18:00

## 2017-11-01 RX ADMIN — TRIAMCINOLONE ACETONIDE: 1 CREAM TOPICAL at 09:00

## 2017-11-01 RX ADMIN — DOCUSATE SODIUM 100 MG: 100 CAPSULE, LIQUID FILLED ORAL at 18:44

## 2017-11-01 RX ADMIN — OXYCODONE HYDROCHLORIDE AND ACETAMINOPHEN 2 TABLET: 5; 325 TABLET ORAL at 05:16

## 2017-11-01 RX ADMIN — METFORMIN HYDROCHLORIDE 1000 MG: 500 TABLET ORAL at 16:33

## 2017-11-01 RX ADMIN — CHOLECALCIFEROL (VITAMIN D3) 25 MCG (1,000 UNIT) TABLET 2000 UNITS: at 09:53

## 2017-11-01 RX ADMIN — METFORMIN HYDROCHLORIDE 1000 MG: 500 TABLET ORAL at 09:54

## 2017-11-01 RX ADMIN — OXYCODONE HYDROCHLORIDE AND ACETAMINOPHEN 1 TABLET: 5; 325 TABLET ORAL at 18:43

## 2017-11-01 RX ADMIN — SIMVASTATIN 20 MG: 20 TABLET, FILM COATED ORAL at 21:43

## 2017-11-01 RX ADMIN — LOSARTAN POTASSIUM 25 MG: 50 TABLET ORAL at 09:52

## 2017-11-01 RX ADMIN — LEVOTHYROXINE SODIUM 75 MCG: 75 TABLET ORAL at 05:35

## 2017-11-01 RX ADMIN — CELECOXIB 200 MG: 100 CAPSULE ORAL at 18:20

## 2017-11-01 RX ADMIN — ONDANSETRON 4 MG: 4 TABLET, ORALLY DISINTEGRATING ORAL at 18:43

## 2017-11-01 RX ADMIN — OXYCODONE HYDROCHLORIDE AND ACETAMINOPHEN 2 TABLET: 5; 325 TABLET ORAL at 09:51

## 2017-11-01 RX ADMIN — DOCUSATE SODIUM 100 MG: 100 CAPSULE, LIQUID FILLED ORAL at 09:54

## 2017-11-01 RX ADMIN — PANTOPRAZOLE SODIUM 40 MG: 40 TABLET, DELAYED RELEASE ORAL at 09:54

## 2017-11-01 NOTE — ROUTINE PROCESS
Bedside and verbal shift report given to Irma Quispe RN  (on coming nurse nurse) by Atif Rogers LPN (off going nurse) Report included SBAR, Kardex, Mars and recent results.

## 2017-11-01 NOTE — ROUTINE PROCESS
Bedside and Verbal shift change report given to Moncho Ambrosio LPN (oncoming nurse) by José Antonio Rangel RN (offgoing nurse). Report included the following information SBAR, Kardex and MAR.

## 2017-11-01 NOTE — PROGRESS NOTES
Late enrty: SEVERO arranged for family conference on Wed 11/8/17 with and and her son. Pt continues to ask SW on a daily basis re: her d/c date. SW informed pt and her son that therapy staff will recommend a d/c date and that therapy can discuss any d/c concerns with them at that time.

## 2017-11-01 NOTE — PROGRESS NOTES
GIM     Patient: Hafsa Babin MRN: [id number]  CSN: [id number]    YOB: 1940  Age: 65 y.o. Sex: female    DOA: 10/12/2017 LOS:  LOS: 20 days                    Subjective:     Pt improving with fx L hip and wrist. Feels well and improving. Objective:      Visit Vitals    /67    Pulse 72    Temp 97.9 °F (36.6 °C)    Resp 16    Ht 5' (1.524 m)    Wt 81.2 kg (179 lb)    SpO2 96%    Breastfeeding No    BMI 34.96 kg/m2       Physical Exam:  Chest clear  Cor rr  abd soft  No edema    Intake and Output:  Current Shift:     Last three shifts:       No results found for this or any previous visit (from the past 24 hour(s)).     Current Facility-Administered Medications   Medication Dose Route Frequency    celecoxib (CELEBREX) capsule 200 mg  200 mg Oral BID    bismuth subsalicylate (PEPTO-BISMOL) oral suspension 30 mL  30 mL Oral Q6H PRN    losartan (COZAAR) tablet 25 mg  25 mg Oral DAILY    cholecalciferol (VITAMIN D3) tablet 2,000 Units  2,000 Units Oral DAILY    [facility] ANESTHESIA   Other PRN    [facility] EMERGENCY/STAT BOX   Other PRN    alum-mag hydroxide-simeth (MYLANTA) oral suspension 30 mL  30 mL Oral QID PRN    ondansetron (ZOFRAN ODT) tablet 4 mg  4 mg Oral Q6H PRN    docusate sodium (COLACE) capsule 100 mg  100 mg Oral BID    enoxaparin (LOVENOX) injection 40 mg  40 mg SubCUTAneous DAILY    levothyroxine (SYNTHROID) tablet 75 mcg  75 mcg Oral ACB    metFORMIN (GLUCOPHAGE) tablet 1,000 mg  1,000 mg Oral BID WITH MEALS    oxyCODONE-acetaminophen (PERCOCET) 5-325 mg per tablet 1-2 Tab  1-2 Tab Oral Q4H PRN    pantoprazole (PROTONIX) tablet 40 mg  40 mg Oral DAILY    simvastatin (ZOCOR) tablet 20 mg  20 mg Oral QHS    triamcinolone acetonide (KENALOG) 0.1 % cream   Topical BID    glucose chewable tablet 16 g  4 Tab Oral PRN    glucagon (GLUCAGEN) injection 1 mg  1 mg IntraMUSCular PRN    dextrose (D50W) injection syrg 12.5-25 g  25-50 mL IntraVENous PRN       Lab Results   Component Value Date/Time    Glucose 116 10/12/2017 04:00 AM    Glucose 167 10/11/2017 03:50 AM    Glucose 180 10/10/2017 06:36 PM    Glucose 161 10/09/2017 05:30 PM    Glucose 112 04/20/2017 11:23 AM        Assessment/Plan     Active Problems:    Hip fracture ([facility] (10/9/2017)        Viviana Olivera MD  11/1/2017, 10:20 AM

## 2017-11-01 NOTE — PROGRESS NOTES
Problem: Self Care Deficits Care Plan (Adult)  Goal: *Acute Goals and Plan of Care (Insert Text)  OCCUPATIONAL THERAPY SHORT TERM GOALS    Updated 10/26/17    1. Patient will perform Upper body ADL's with adaptive equipment & compensatory techniques as needed with minimal assistance/contact guard assist.   2.  Patient will perform Lower body ADL's with adaptive equipment & compensatory techniques as needed moderate assistance. 3.  Patient will perform toileting task with moderate assistance  with Good safety to reduce falls risk. 4.  Patient will perform toilet transfers with Platform Walker and moderate assistance x 1.  5.  Patient will perform standing static/dynamic balance activities for improved ADL/IADL function with moderate assistance x 1 and Good balance and safety awareness. 6.  Patient will improve Barthel index scores to atleast 50/100 to improve functional mobility. 7.  Patient will tolerate standing x 5 minutes during functional activity while adhering to LLE TTWB And Left wrist NWB utilizing platform walker to increase participation in ADL routine. Initiated 10/13/2017 and to be accomplished within 2 Week(s)    1. Patient will perform Upper body ADL's with adaptive equipment & compensatory techniques as needed with minimal assistance/contact guard assist. (progressing)  2. Patient will perform Lower body ADL's with adaptive equipment & compensatory techniques as needed moderate assistance. (progressing)  3. Patient will perform toileting task with moderate assistance x 2 with Good safety to reduce falls risk. (goal met-UPG Mod A)  4. Patient will perform toilet transfers with Platform Walker and moderate assistance x 2. (goal met-UPG Mod A x 1)  5. Patient will perform standing static/dynamic balance activities for improved ADL/IADL function with moderate assistance x,2 and Good balance and safety awareness. (goal met-UPG Mod A)  6.   Patient will improve Barthel index scores to atleast 35/100 to improve functional mobility. (goal met-UPG 60/100)  7. Patient will tolerate standing x 5 minutes during functional activity while adhering to LLE TTWB And Left wrist NWB utilizing platform walker to increase participation in ADL routine. OCCUPATIONAL THERAPY LONG TERM GOALS   Initiated 10/13/2017 and to be accomplished within 4 Week(s)    1. Patient will perform Upper body ADL's with adaptive equipment & compensatory techniques as needed with supervision/set-up. 2.  Patient will perform Lower body ADL's with adaptive equipment & compensatory techniques as needed with         supervision/set-up . 3. Patient will perform toileting task with supervision/set-up with Good safety to reduce falls risk. 4.  Patient will perform functional transfers with Platform Walker and  supervision/set-up and Good balance and safety awareness. 5.  Patient will perform standing static/dynamic activity for improved ADL/IADL function    with supervision/set-up an and Good balance and safety awareness. 6.  Patient will improve Barthel index score to 50/100 to improve independence with mobility.       Therapist: Lara Quiñones    10/13/2017           [facility]   Occupational Therapy Daily Treatment note      Patient: Mellisa Leone (10 y.o. female)       Date: 11/1/2017  Attending Physician: Darrius Olivares MD  Primary Diagnosis: left hip fracture  Hip fracture [facility])    Treatment Diagnosis  Treatment Diagnosis: muscle weakness  Treatment Diagnosis 2: increased need for assist w/ self care   Precautions : Precautions at Admission: Fall, WBAT (Splint at L wrist;ROM activities @L wrist)  Vital Signs:  Vital Signs  Pulse (Heart Rate): 72  Level of Consciousness: Alert  BP: 172/67  MAP (Calculated): 102     Cognitive Status:  Mental Status  Neurologic State: Sleeping  Pain:  Pain Screen  Pain Scale 1: Numeric (0 - 10)  Pain Intensity 1: 9  Pain Onset 1: now  Pain Location 1: Teeth  Pain Orientation 1: Right  Pain Description 1: Aching  Pain Intervention(s) 1: Medication (see MAR); Distraction  Patient Stated Pain Goal: 0  Pain Scale 1: Numeric (0 - 10)  Gross Assessment:     Coordination:     Bed Mobility:     Transfers:  Functional Transfers  Sit to Stand: Contact guard assistance  Toilet Transfer : Contact guard assistance  Functional Transfers  Bathroom Mobility: Contact guard assistance  Toilet Transfer : Contact guard assistance  Balance:  Balance  Sitting: Intact  Sitting - Static: Good (unsupported)  Sitting - Dynamic: Good (unsupported)  Standing: With support  Standing - Static: Fair (+)  Standing - Dynamic : Fair  ADL Self Care:     ADL Intervention:  Upper Body Bathing  Bathing Assistance: Stand-by assistance  Position Performed: Seated in chair  Upper Body Dressing Assistance  Dressing Assistance: Supervision/set-up  Lower Body Bathing  Bathing Assistance: Contact guard assistance  Perineal  : Contact guard assistance  Position Performed: Standing  Lower Body : Stand-by assistance  Position Performed: Seated edge of bed        Lower Body Dressing Assistance  Dressing Assistance: Minimum assistance  Underpants: Minimum assistance  Socks: Maximum assistance  Leg Crossed Method Used: No  Position Performed: Seated edge of bed;Bending forward method             Therapeutic Activities:  Assisted patient with morning ADLs in order to assess safety, performance, and independence during routine. See above for levels of A needed. Completed bathroom mobility utilizing platform RW to/from bathroom room and w/c in order to increase functional activity tolerance and independence during task. Patient able to complete mobility with CGA and cueing to pick L LE up during mobility for optimal safety. Patient/Caregiver Education:    Pt. Mya Bhakta Education on see above.       ASSESSMENT:  Patient continues to demonstrate the need for skilled Occupational Therapy services to improve functional mobility needed for bathing  Progression toward goals:  [x]      Improving appropriately and progressing toward goals  []      Improving slowly and progressing toward goals  []      Not making progress toward goals and plan of care will be adjusted     Treatment session:   43 minutes    Therapist:    JUDY Mathis,  11/1/2017

## 2017-11-01 NOTE — PROGRESS NOTES
Problem: Mobility Impaired (Adult and Pediatric)  Goal: *Acute Goals and Plan of Care (Insert Text)  PHYSICAL THERAPY STG GOALS :  Initiated 10/13/2017 and to be accomplished within 2 Weeks (updated 10/26/17)    1. Patient will move from supine to sit and sit to supine  and roll side to side in bed with minimal assistance/contact guard assist with WB compliance. (Achieved)  2. Patient will transfer from bed to chair and chair to bed with minimal assistance/contact guard assist using platform walker with WB compliance. (Achieved)  3. Patient will perform sit to stand with minimal assistance/contact guard assist with Fair balance and safety awareness with WB compliance. (Achieved)  4. Patient will ambulate with minimal assistance/contact guard assist for 75 feet with platform walker on level surfaces with 2 turns with WB compliance. (Progressing; 16 ft using platform RW with CGA)  5. Patient will ascend/descend 14 stairs with right handrail(s) withminimal assistance to allow for safe home access/exit with WB compliance. (Attempted, pt currently unable)  6. Patient will improve standardized test score for [place] Standing Balance Scale 1+ with WB compliance. (Achieved)    PHYSICAL THERAPY STG GOALS :  Initiated 10/13/2017 and to be accomplished within 2 Weeks (updated 10/19/17)    1. Patient will move from supine to sit and sit to supine  and roll side to side in bed with minimal assistance/contact guard assist with WB compliance. (Achieved)  2. Patient will transfer from bed to chair and chair to bed with minimal assistance/contact guard assist using platform walker with WB compliance. (Progressing modA)  3. Patient will perform sit to stand with minimal assistance/contact guard assist with Fair balance and safety awareness with WB compliance. (Progressing modA)  4.   Patient will ambulate with minimal assistance/contact guard assist for 75 feet with platform walker on level surfaces with 2 turns with WB compliance. (not tested due to TTWB on LLE)  5. Patient will ascend/descend 14 stairs with right handrail(s) withminimal assistance to allow for safe home access/exit with WB compliance. (Not tested due to WB status)  6. Patient will improve standardized test score for [place] Standing Balance Scale 1+ with WB compliance. PHYSICAL THERAPY LTG GOALS :  Initiated 10/13/2017 and to be accomplished within 4 Weeks    1. Patient will move from supine to sit and sit to supine  and roll side to side in bed with modified independence with WB compliance. 2.  Patient will transfer from bed to chair and chair to bed with modified independence using LRAD with WB compliance. 3.  Patient will perform sit to stand with modified independence with Good balance and safety awareness with WB compliance. 4.  Patient will ambulate with modified independence for 150 feet with LRAD on level surfaces and be able to maneuver through narrow spaces and obstacles without loss of balance with WB compliance. 5.  Patient will ascend/descend 14 stairs with right handrail(s) with supervision/set-up to allow for safe home access/exit with WB compliance. 6.  Patient will improve standardized test score for [place] Standing Balance Scale 3+ to reduce fall risk with WB compliance.       Physical Therapist:   Pavithra Allen PT  on 10/13/2017              [facility]   physical Therapy Daily Treatment note      Patient: Micheal Victoria (32 y.o. female)               Date: 11/1/2017    Physician: Colin Nguyen MD  Primary Diagnosis: left hip fracture  Hip fracture [facility])          Treatment Diagnosis  Treatment Diagnosis: muscle weakness  Treatment Diagnosis 2: increased need for assist w/ self care  Precautions: Fall, WBAT (Splint at L wrist;ROM activities @L wrist)  Vital Signs  Vital Signs  Pulse (Heart Rate): 72  Level of Consciousness: Alert  BP: 172/67  MAP (Calculated): 102     Cognitive Status:  Mental Status  Neurologic State: Alert  Pain  Pain Screen  Pain Scale 1: Numeric (0 - 10)  Pain Intensity 1: 9  Pain Onset 1: now  Pain Location 1: Teeth  Pain Orientation 1: Right  Pain Description 1: Aching  Pain Intervention(s) 1: Medication (see MAR); Distraction  Patient Stated Pain Goal: 0  Bed Mobility Training  Balance  Sitting: Intact  Sitting - Static: Good (unsupported)  Sitting - Dynamic: Good (unsupported)  Standing: With support  Standing - Static: Fair (+)  Standing - Dynamic : Fair  Transfer Training  Transfer Training  Sit to Stand: Stand-by asssistance;Contact guard assistance  Stand to Sit: Stand-by asssistance  Interventions: Safety awareness training  Sit to Stand: Stand-by asssistance;Contact guard assistance  Gait Training  Gait  Base of Support: Center of gravity altered  Speed/Polly: Pace decreased (<100 feet/min)  Step Length: Right shortened;Left shortened  Stance: Left decreased  Gait Abnormalities: Decreased step clearance  Ambulation - Level of Assistance: Contact guard assistance  Distance (ft): 46 Feet (ft)  Assistive Device: Gait belt;Walker, rolling; Other (comment) (platform RW)  Gait Abnormalities: Decreased step clearance   With 1 turns. Therapeutic Exercise: Gait training with abovementioned details and w/c follow for safety. Pt encouraged to improve WB on L LE with ambulation. Tap -ups performed at // with 4\" on L LE and 1\" on R LE. Pt required assistance from therapist for WB through L elbow to improve ability to weightshift onto L LE. Seated B LE TE's rendered to promote strength and endurance for improved quaility of gait and ability to participate in stair training: LAQ, hip flexion(minimal range) HR/TR, ball squeezes 2x20. Pt reported not sitting up longer due to L hip pain. Therapist adjusted elevating leg-rest for better support and positioned pillow under L LE/foot for comfort. Pt reported feeling better and agreeable to sit up OOB post therapy session.        Patient/Caregiver Education:   Pt /Caregiver Education on safety and fall prevention, and postioning of L LE to allow for more prolonged sitting was provided to reduce risk of falls and maximize functional gains. ASSESSMENT:  Patient continues to benefit from Skilled PT services to improve bed mobility, transfers, strength, balance, gait and stair negotiation. Progression toward goals:  []      Improving appropriately and progressing toward goals  [x]      Improving slowly and progressing toward goals  []      Not making progress toward goals and plan of care will be adjusted     Treatment session: 42 minutes.   Therapist:   Laure Jones PTA,          11/1/2017

## 2017-11-02 ENCOUNTER — PATIENT OUTREACH (OUTPATIENT)
Dept: CASE MANAGEMENT | Age: 77
End: 2017-11-02

## 2017-11-02 PROCEDURE — 74011250636 HC RX REV CODE- 250/636: Performed by: INTERNAL MEDICINE

## 2017-11-02 PROCEDURE — 74011250637 HC RX REV CODE- 250/637: Performed by: INTERNAL MEDICINE

## 2017-11-02 RX ADMIN — TRIAMCINOLONE ACETONIDE: 1 CREAM TOPICAL at 19:11

## 2017-11-02 RX ADMIN — SIMVASTATIN 20 MG: 20 TABLET, FILM COATED ORAL at 22:21

## 2017-11-02 RX ADMIN — OXYCODONE HYDROCHLORIDE AND ACETAMINOPHEN 2 TABLET: 5; 325 TABLET ORAL at 22:21

## 2017-11-02 RX ADMIN — LOSARTAN POTASSIUM 25 MG: 50 TABLET ORAL at 13:19

## 2017-11-02 RX ADMIN — OXYCODONE HYDROCHLORIDE AND ACETAMINOPHEN 2 TABLET: 5; 325 TABLET ORAL at 01:32

## 2017-11-02 RX ADMIN — OXYCODONE HYDROCHLORIDE AND ACETAMINOPHEN 2 TABLET: 5; 325 TABLET ORAL at 13:19

## 2017-11-02 RX ADMIN — METFORMIN HYDROCHLORIDE 1000 MG: 500 TABLET ORAL at 19:10

## 2017-11-02 RX ADMIN — CHOLECALCIFEROL (VITAMIN D3) 25 MCG (1,000 UNIT) TABLET 2000 UNITS: at 13:19

## 2017-11-02 RX ADMIN — PANTOPRAZOLE SODIUM 40 MG: 40 TABLET, DELAYED RELEASE ORAL at 13:20

## 2017-11-02 RX ADMIN — DOCUSATE SODIUM 100 MG: 100 CAPSULE, LIQUID FILLED ORAL at 19:11

## 2017-11-02 RX ADMIN — CELECOXIB 200 MG: 100 CAPSULE ORAL at 13:18

## 2017-11-02 RX ADMIN — OXYCODONE HYDROCHLORIDE AND ACETAMINOPHEN 2 TABLET: 5; 325 TABLET ORAL at 07:50

## 2017-11-02 RX ADMIN — OXYCODONE HYDROCHLORIDE AND ACETAMINOPHEN 2 TABLET: 5; 325 TABLET ORAL at 19:11

## 2017-11-02 RX ADMIN — CELECOXIB 200 MG: 100 CAPSULE ORAL at 19:10

## 2017-11-02 RX ADMIN — LEVOTHYROXINE SODIUM 75 MCG: 75 TABLET ORAL at 05:35

## 2017-11-02 RX ADMIN — ENOXAPARIN SODIUM 40 MG: 40 INJECTION SUBCUTANEOUS at 13:18

## 2017-11-02 RX ADMIN — METFORMIN HYDROCHLORIDE 1000 MG: 500 TABLET ORAL at 07:51

## 2017-11-02 NOTE — ROUTINE PROCESS
Bedside and verbal shift report given to TRACEY Ignacio Rn(oncoming nurse) by Zita Khan Lpn(off going nurse) Report included SBAR, Kardex, Mars, and recent results.

## 2017-11-02 NOTE — PROGRESS NOTES
Problem: Self Care Deficits Care Plan (Adult)  Goal: *Acute Goals and Plan of Care (Insert Text)  OCCUPATIONAL THERAPY SHORT TERM GOALS    Updated 11/02/17    1. Patient will perform Upper body ADL's with adaptive equipment & compensatory techniques as needed with contact guard assist.   2.  Patient will perform Lower body ADL's with adaptive equipment & compensatory techniques as needed Min assistance. 3.  Patient will perform toileting task with SBA with Good safety to reduce falls risk. 4.  Patient will perform toilet transfers with Platform Walker and SBA. 5.  Patient will perform standing static/dynamic balance activities for improved ADL/IADL function with SBA x 1 and Good balance and safety awareness. 6.  Patient will improve Barthel index scores to atleast 70/100 to improve functional mobility. 7.  Patient will tolerate standing x 15 minutes during functional activity while adhering to LLE TTWB And Left wrist NWB utilizing platform walker to increase participation in ADL routine. Updated 10/26/17    1. Patient will perform Upper body ADL's with adaptive equipment & compensatory techniques as needed with minimal assistance/contact guard assist. (goal met-UPG SBA)  2.  Patient will perform Lower body ADL's with adaptive equipment & compensatory techniques as needed moderate assistance. (goal met-UPG Min A)  3. Patient will perform toileting task with moderate assistance  with Good safety to reduce falls risk. (goal met-UPG SBA)  4. Patient will perform toilet transfers with Platform Walker and moderate assistance x 1.(goal met-UPG SBA)  5. Patient will perform standing static/dynamic balance activities for improved ADL/IADL function with moderate assistance x 1 and Good balance and safety awareness. (goal met-UPG SBA)  6. Patient will improve Barthel index scores to atleast 50/100 to improve functional mobility. (goal met-UPG 70/100)  7.   Patient will tolerate standing x 5 minutes during functional activity while adhering to LLE TTWB And Left wrist NWB utilizing platform walker to increase participation in ADL routine. (goal met-UPG 15)       Initiated 10/13/2017 and to be accomplished within 2 Week(s)    1. Patient will perform Upper body ADL's with adaptive equipment & compensatory techniques as needed with minimal assistance/contact guard assist. (progressing)  2. Patient will perform Lower body ADL's with adaptive equipment & compensatory techniques as needed moderate assistance. (progressing)  3. Patient will perform toileting task with moderate assistance x 2 with Good safety to reduce falls risk. (goal met-UPG Mod A)  4. Patient will perform toilet transfers with Platform Walker and moderate assistance x 2. (goal met-UPG Mod A x 1)  5. Patient will perform standing static/dynamic balance activities for improved ADL/IADL function with moderate assistance x,2 and Good balance and safety awareness. (goal met-UPG Mod A)  6. Patient will improve Barthel index scores to atleast 35/100 to improve functional mobility. (goal met-UPG 60/100)  7. Patient will tolerate standing x 5 minutes during functional activity while adhering to LLE TTWB And Left wrist NWB utilizing platform walker to increase participation in ADL routine. OCCUPATIONAL THERAPY LONG TERM GOALS   Initiated 10/13/2017 and to be accomplished within 4 Week(s)    1. Patient will perform Upper body ADL's with adaptive equipment & compensatory techniques as needed with supervision/set-up. 2.  Patient will perform Lower body ADL's with adaptive equipment & compensatory techniques as needed with         supervision/set-up . 3. Patient will perform toileting task with supervision/set-up with Good safety to reduce falls risk. 4.  Patient will perform functional transfers with Platform Walker and  supervision/set-up and Good balance and safety awareness.   5.  Patient will perform standing static/dynamic activity for improved ADL/IADL function    with supervision/set-up an and Good balance and safety awareness. 6.  Patient will improve Barthel index score to 50/100 to improve independence with mobility. Therapist: Nuria Vanessa    10/13/2017            [facility] CARE CENTER  OCCUPATIONAL THERAPY WEEKLY SUMMARY   Reporting period:  from 10/26/17 through 11/02/17       Patient: Yamel Ruelas (21 y.o. female)   Date: 11/2/2017    Primary Diagnosis: left hip fracture  Hip fracture [facility])       Attending Physician: Joel Veliz MD Treatment Diagnosis  Treatment Diagnosis: muscle weakness  Treatment Diagnosis 2: increased need for assist w/ self care  Precautions: Fall, WBAT (Splint at L wrist;ROM activities @L wrist)  Rehab Potential : Good    Skill interventions and education provided with clinical rationale (include individualized treatment techniques and standardized tests):  Skilled Occupational services were provided utilizing Therapeutic exercises, therapeutic activities, functional mobility, functional transfers, and EC/WS. Using a comparative statement, summarize significant progress toward goals as a result of skilled intervention provided:  Patient has made Good progress towards their Occupational Therapy goals in the following areas: increased independence and performance with grooming, bathing, upper body dressing, lower body dressing, toileting, toilet transfer and shower transfer using Reacher and Antonioton. Patient has met 7/7 STGS and making good progression towards LTGS. Identify remaining functional areas, impairments limiting progress and/or barriers to improvement:  Patient would benefit from continued skilled Occupational Therapy Services to address the following functional deficits in decreased dynamic standing balance needed for ADL/IADLS tasks.         OBJECTIVE DATA SUMMARY:       INITIAL ASSESSMENT WEEKLY PROGRESS   COGNITIVE STATUS: COGNITIVE STATUS:   Neurologic State: Alert, Appropriate for age  Orientation Level: Oriented X4  Cognition: Appropriate for age attention/concentration  Perception: Appears intact  Perseveration: No perseveration noted  Safety/Judgement: Fall prevention Neurologic State: Alert  Orientation Level: Oriented X4  Cognition: Appropriate for age attention/concentration  Perception: Appears intact  Perseveration: No perseveration noted  Safety/Judgement: Fall prevention   PAIN: PAIN:   Pain Scale 1: Numeric (0 - 10)  Pain Intensity 1: 0  Pain Onset 1: now  Pain Location 1: Hip, Hand  Pain Orientation 1: Left  Pain Description 1: Aching  Pain Intervention(s) 1: Elevation, Ice, Medication (see MAR)  Patient Stated Pain Goal: 0  Pain Reassessment 1: Yes     Pain Scale 1: Numeric (0 - 10)  Pain Intensity 1: 0  Pain Onset 1: now  Pain Location 1: Hip  Pain Orientation 1: Left  Pain Description 1: Aching  Pain Intervention(s) 1: Medication (see MAR)  Patient Stated Pain Goal: 0  Pain Reassessment 1: Yes   BED MOBILITY BED MOBILITY   Rolling: Stand-by asssistance  Supine to Sit: Maximum assistance  Sit to Supine: Maximum assistance  Scooting: Maximum assistance Rolling: Stand-by asssistance  Supine to Sit: Stand-by asssistance  Sit to Supine: Moderate assistance  Scooting: Stand-by asssistance   ADL SELF CARE ADL SELF CARE     Bathing Assistance: Stand-by assistance  Position Performed: Seated in chair    Dressing Assistance: Supervision/set-up  Orthotics(Brace): Stand-by assistance  Hospital Gown: Minimum  assistance  Pullover Shirt: Modified independent    Bathing Assistance: Contact guard assistance  Perineal  : Contact guard assistance  Position Performed: Standing  Lower Body : Stand-by assistance  Position Performed: Seated edge of bed    Toileting Assistance:  Moderate assistance  Bladder Hygiene: Supervision/set-up  Bowel Hygiene: Contact guard assistance  Clothing Management: Stand-by assistance  Adaptive Equipment: Grab bars, Other (comment) (platform walker)    Grooming Assistance: Supervision/set up  Washing Hands: Modified independent  Brushing Teeth: Modified independent  Brushing/Combing Hair: Modified independent    Dressing Assistance: Stand-by assistance  Underpants: Minimum assistance  Pants With Elastic Waist: Contact guard assistance  Socks: Stand-by assistance  Leg Crossed Method Used: No  Position Performed: Bending forward method, Seated in chair  Cues: Verbal cues provided  Adaptive Equipment Used: Sock aid, Reacher    Feeding Assistance: Modified independent   TRANSFERS TRANSFERS   Sit to Stand: Maximum assistance  Stand to Sit: Moderate assistance  Bed to Chair: Maximum assistance (x2)  Toilet Transfer : Other (comment) (unable 2/2/  inability to adhere to LLE TTWB) Sit to Stand: Stand-by asssistance  Stand to Sit: Stand-by asssistance  Bed to Chair: Contact guard assistance  Toilet Transfer : Contact guard assistance   Bathroom Mobility: Contact guard assistance  Toilet Transfer : Other (comment) (unable 2/2/  inability to adhere to LLE TTWB)  Cues: Verbal cues provided  Adaptive Equipment:  (RW ) Bathroom Mobility: Contact guard assistance  Toilet Transfer : Contact guard assistance  Cues: Verbal cues provided  Adaptive Equipment: Grab bars, Walker (comment)   BALANCE BALANCE   Sitting: Impaired, With support  Sitting - Static: Fair (occasional) (+)  Sitting - Dynamic: Fair (occasional)  Standing: Impaired  Standing - Static: Poor  Standing - Dynamic : Fair Sitting: Intact  Sitting - Static: Good (unsupported)  Sitting - Dynamic: Good (unsupported)  Standing: With support  Standing - Static: Fair (+)  Standing - Dynamic : Fair       GROSS ASSESSMENT  GROSS ASSESSMENT   AROM: Generally decreased, functional  PROM: Generally decreased, functional  Strength: Generally decreased, functional (RLE 3+/5 grossly, LLE demonstrates 3/5)  Coordination: Generally decreased, functional  Tone: Normal  Sensation: Intact AROM: Generally decreased, functional  PROM: Generally decreased, functional  Strength: Generally decreased, functional  Coordination: Generally decreased, functional  Tone: Normal  Sensation: Intact   COORDINATION COORDINATION   Fine Motor Skills-Upper: Left Impaired, Right Intact (L hand splint intact)  Gross Motor Skills-Upper: Left Intact, Right Intact Fine Motor Skills-Upper: Left Impaired, Right Intact (L hand splint intact)  Gross Motor Skills-Upper: Left Intact, Right Intact   VISUAL/PERCEPTUAL VISUAL/PERCEPTUAL   Corrective Lenses: Glasses   Corrective Lenses: Glasses     AUDITORY: AUDITORY:   Auditory Impairment: None Auditory Impairment: None       INSTRUMENTAL  ADL'S:    INSTRUMENTAL ADL'S:          THE BARTHEL INDEX  ACTIVITY   SCORE   FEEDING  0=unable  5=needs help cutting,spreading butter,etc., or modified diet  10= independent   10   BATHING  0=dependent  5=independent (or in shower   0   GROOMING  0=needs help  5=independent face/hair/teeth/shaving (implements provided)   5   DRESSING  0=dependent  5=needs help but can do about half unaided  10=independent(including buttons, zips,laces etc.)   5   BOWELS  0=incontinent  5=occasional accident  10=continent   10   BLADDER  0=incontinent, or catheterized and unable to manage alone  5=occasional accident  10=continent   10   TOILET USE  0=dependent  5=needs some help, but can do something alone  10=independent (on and off, dressing, wiping)   5   TRANSFER (BED TO CHAIR AND BACK)  0=unable, no sitting balance  5=major help(one or two people,physical), can sit  10=minor help(verbal or physical)  15=independent   10   MOBILITY (ON LEVEL SURFACES)  0=immobile or <50 yards  5=wheelchair independent,including corners,>50 yards  10=walkes with help of one person (verbal or physical) >50 yards  15=independent(but may use any aid; for example, stick) >50 yards   5   STAIRS  0=unable  5=needs help (verbal, physical, carrying aid)  10=independent   0            TOTAL:                  60/100     Treatment:  See daily treatment on 11/02/17    Patient's response to Treatment rendered:  Patient is pleasant and receptive to therapist cueing    Patient expected Discharge Location:  [x]Private Residence  [] DREW/ILF  []LTC  []Other:    Plan: Continue OT services as established on the Plan of Care for 3-6 times a week.     Treatment Minutes:    OT and Assistant have had a weekly case conference regarding the above treatment:  [x] Yes     [] No    Therapist:   Fatmata Avina,         Date:11/2/2017     Forward to OT for co-signature when completed

## 2017-11-02 NOTE — PROGRESS NOTES
Problem: Self Care Deficits Care Plan (Adult)  Goal: *Acute Goals and Plan of Care (Insert Text)  OCCUPATIONAL THERAPY SHORT TERM GOALS    Updated 10/26/17    1. Patient will perform Upper body ADL's with adaptive equipment & compensatory techniques as needed with minimal assistance/contact guard assist.   2.  Patient will perform Lower body ADL's with adaptive equipment & compensatory techniques as needed moderate assistance. 3.  Patient will perform toileting task with moderate assistance  with Good safety to reduce falls risk. 4.  Patient will perform toilet transfers with Platform Walker and moderate assistance x 1.  5.  Patient will perform standing static/dynamic balance activities for improved ADL/IADL function with moderate assistance x 1 and Good balance and safety awareness. 6.  Patient will improve Barthel index scores to atleast 50/100 to improve functional mobility. 7.  Patient will tolerate standing x 5 minutes during functional activity while adhering to LLE TTWB And Left wrist NWB utilizing platform walker to increase participation in ADL routine. Initiated 10/13/2017 and to be accomplished within 2 Week(s)    1. Patient will perform Upper body ADL's with adaptive equipment & compensatory techniques as needed with minimal assistance/contact guard assist. (progressing)  2. Patient will perform Lower body ADL's with adaptive equipment & compensatory techniques as needed moderate assistance. (progressing)  3. Patient will perform toileting task with moderate assistance x 2 with Good safety to reduce falls risk. (goal met-UPG Mod A)  4. Patient will perform toilet transfers with Platform Walker and moderate assistance x 2. (goal met-UPG Mod A x 1)  5. Patient will perform standing static/dynamic balance activities for improved ADL/IADL function with moderate assistance x,2 and Good balance and safety awareness. (goal met-UPG Mod A)  6.   Patient will improve Barthel index scores to atleast 35/100 to improve functional mobility. (goal met-UPG 60/100)  7. Patient will tolerate standing x 5 minutes during functional activity while adhering to LLE TTWB And Left wrist NWB utilizing platform walker to increase participation in ADL routine. OCCUPATIONAL THERAPY LONG TERM GOALS   Initiated 10/13/2017 and to be accomplished within 4 Week(s)    1. Patient will perform Upper body ADL's with adaptive equipment & compensatory techniques as needed with supervision/set-up. 2.  Patient will perform Lower body ADL's with adaptive equipment & compensatory techniques as needed with         supervision/set-up . 3. Patient will perform toileting task with supervision/set-up with Good safety to reduce falls risk. 4.  Patient will perform functional transfers with Platform Walker and  supervision/set-up and Good balance and safety awareness. 5.  Patient will perform standing static/dynamic activity for improved ADL/IADL function    with supervision/set-up an and Good balance and safety awareness. 6.  Patient will improve Barthel index score to 50/100 to improve independence with mobility.       Therapist: Darrion Neves    10/13/2017            [facility]   Occupational Therapy Daily Treatment note      Patient: Kendra Anders (51 y.o. female)       Date: 11/2/2017  Attending Physician: Renata Corona MD  Primary Diagnosis: left hip fracture  Hip fracture [facility])    Treatment Diagnosis  Treatment Diagnosis: muscle weakness  Treatment Diagnosis 2: increased need for assist w/ self care   Precautions : Precautions at Admission: Fall, WBAT (Splint at L wrist;ROM activities @L wrist)  Vital Signs:  Vital Signs  Pulse (Heart Rate): 69  Level of Consciousness: Alert  BP: 150/69  MAP (Calculated): 96     Cognitive Status:  Mental Status  Neurologic State: Alert  Orientation Level: Oriented X4  Cognition: Appropriate for age attention/concentration  Pain:  Pain Screen  Pain Scale 1: Numeric (0 - 10)  Pain Intensity 1: 0  Pain Onset 1: now  Pain Location 1: Hip  Pain Orientation 1: Left  Pain Description 1: Aching  Pain Intervention(s) 1: Medication (see MAR)  Patient Stated Pain Goal: 0  Pain Reassessment 1: Yes  Pain Scale 1: Numeric (0 - 10)  Gross Assessment:     Coordination:     Bed Mobility:     Transfers:  Functional Transfers  Sit to Stand: Stand-by asssistance  Stand to Sit: Stand-by asssistance     Balance:  Balance  Sitting: Intact  Sitting - Static: Good (unsupported)  Sitting - Dynamic: Good (unsupported)  Standing: With support  Standing - Static: Fair (+)  Standing - Dynamic : Fair  ADL Self Care:     ADL Intervention:                 Lower Body Dressing Assistance  Dressing Assistance: Stand-by assistance  Socks: Stand-by assistance  Leg Crossed Method Used: No  Position Performed: Bending forward method;Seated in chair  Adaptive Equipment Used: Sock aid;Reacher       Therapeutic Activities:  Weight shifting activity, at standing, in order to increase standing balance and tolerance as well challenge balance and stability needed to safely complete tub transfers. Patient was able to tolerate 7:30 at standing prior to requesting to sit. Patient demonstrated increased weight shifting on L LE at standing today. Practiced LB dressing utilizing AD in order to increase independence and performance during task. Patient/Caregiver Education:    Pt. Taj Shannon Education on see above.       ASSESSMENT:  Patient continues to demonstrate the need for skilled Occupational Therapy services to improve dynamic standing balance needed for bathing  Progression toward goals:  [x]      Improving appropriately and progressing toward goals  []      Improving slowly and progressing toward goals  []      Not making progress toward goals and plan of care will be adjusted     Treatment session:   44 minutes    Therapist:    JUDY Bhatia,  11/2/2017

## 2017-11-02 NOTE — ROUTINE PROCESS
Bedside shift change report given to [address] (oncoming nurse) by Mitzi Rivera RN (offgoing nurse). Report included the following information SBAR, Kardex, MAR and Recent Results. VISUALLY CHECKED PT Q 1 HR BY NURSING STAFF. 24 hour order chart check done.

## 2017-11-02 NOTE — PROGRESS NOTES
Problem: Mobility Impaired (Adult and Pediatric)  Goal: *Acute Goals and Plan of Care (Insert Text)  PHYSICAL THERAPY STG GOALS :  Initiated 10/13/2017 and to be accomplished within 2 Weeks (updated 10/26/17)    1. Patient will move from supine to sit and sit to supine  and roll side to side in bed with minimal assistance/contact guard assist with WB compliance. (Achieved)  2. Patient will transfer from bed to chair and chair to bed with minimal assistance/contact guard assist using platform walker with WB compliance. (Achieved)  3. Patient will perform sit to stand with minimal assistance/contact guard assist with Fair balance and safety awareness with WB compliance. (Achieved)  4. Patient will ambulate with minimal assistance/contact guard assist for 75 feet with platform walker on level surfaces with 2 turns with WB compliance. (Progressing; 16 ft using platform RW with CGA)  5. Patient will ascend/descend 14 stairs with right handrail(s) withminimal assistance to allow for safe home access/exit with WB compliance. (Attempted, pt currently unable)  6. Patient will improve standardized test score for [place] Standing Balance Scale 1+ with WB compliance. (Achieved)    PHYSICAL THERAPY STG GOALS :  Initiated 10/13/2017 and to be accomplished within 2 Weeks (updated 10/19/17)    1. Patient will move from supine to sit and sit to supine  and roll side to side in bed with minimal assistance/contact guard assist with WB compliance. (Achieved)  2. Patient will transfer from bed to chair and chair to bed with minimal assistance/contact guard assist using platform walker with WB compliance. (Progressing modA)  3. Patient will perform sit to stand with minimal assistance/contact guard assist with Fair balance and safety awareness with WB compliance. (Progressing modA)  4.   Patient will ambulate with minimal assistance/contact guard assist for 75 feet with platform walker on level surfaces with 2 turns with WB compliance. (not tested due to TTWB on LLE)  5. Patient will ascend/descend 14 stairs with right handrail(s) withminimal assistance to allow for safe home access/exit with WB compliance. (Not tested due to WB status)  6. Patient will improve standardized test score for [place] Standing Balance Scale 1+ with WB compliance. PHYSICAL THERAPY LTG GOALS :  Initiated 10/13/2017 and to be accomplished within 4 Weeks    1. Patient will move from supine to sit and sit to supine  and roll side to side in bed with modified independence with WB compliance. 2.  Patient will transfer from bed to chair and chair to bed with modified independence using LRAD with WB compliance. 3.  Patient will perform sit to stand with modified independence with Good balance and safety awareness with WB compliance. 4.  Patient will ambulate with modified independence for 150 feet with LRAD on level surfaces and be able to maneuver through narrow spaces and obstacles without loss of balance with WB compliance. 5.  Patient will ascend/descend 14 stairs with right handrail(s) with supervision/set-up to allow for safe home access/exit with WB compliance. 6.  Patient will improve standardized test score for [place] Standing Balance Scale 3+ to reduce fall risk with WB compliance.       Physical Therapist:   Nat Pitts, PT  on 10/13/2017              [address] [place] [address]   physical Therapy Daily Treatment note      Patient: Herman Nevarez (18 y.o. female)               Date: 11/2/2017    Physician: Queenie Reardon MD  Primary Diagnosis: left hip fracture  Hip fracture [facility])          Treatment Diagnosis  Treatment Diagnosis: muscle weakness  Treatment Diagnosis 2: increased need for assist w/ self care  Precautions: Fall, WBAT (Splint at L wrist;ROM activities @L wrist)  Vital Signs  Vital Signs  Temp: 97.7 °F (36.5 °C)  Temp Source: Oral  Pulse (Heart Rate): 69  Heart Rate Source: Monitor  Cardiac Rhythm: Normal sinus rhythm  Resp Rate: 20  O2 Sat (%): 100 %  Level of Consciousness: Alert  BP: 150/69  MAP (Calculated): 96  MEWS Score: 1     Cognitive Status:  Mental Status  Neurologic State: Alert  Orientation Level: Oriented X4  Cognition: Appropriate for age attention/concentration  Pain  Pain Screen  Pain Scale 1: Numeric (0 - 10)  Pain Intensity 1: 0  Pain Onset 1: now  Pain Location 1: Hip  Pain Orientation 1: Left  Pain Description 1: Aching  Pain Intervention(s) 1: Medication (see MAR)  Patient Stated Pain Goal: 0  Pain Reassessment 1: Yes  Bed Mobility Training  Balance  Sitting: Intact  Sitting - Static: Good (unsupported)  Sitting - Dynamic: Good (unsupported)  Standing: With support  Standing - Static: Fair (+)  Standing - Dynamic : Fair  Transfer Training  Transfer Training  Sit to Stand: Stand-by asssistance  Stand to Sit: Stand-by asssistance  Interventions: Safety awareness training;Verbal cues  Sit to Stand: Stand-by asssistance  Gait Training  Gait  Base of Support: Center of gravity altered  Speed/Polly: Pace decreased (<100 feet/min)  Step Length: Right shortened;Left shortened  Stance: Left decreased  Gait Abnormalities: Decreased step clearance; Antalgic  Ambulation - Level of Assistance: Contact guard assistance  Distance (ft): 30 Feet (ft)  Assistive Device: Gait belt;Walker, rolling  Gait Abnormalities: Decreased step clearance; Antalgic   With 1 turns. Therapeutic Exercise: Pt is now WBAT on L wrist. Therapist initiated gait training with RW and educated pt on safety and WBAT at wrist. Pt performed tap ups on 1\" step with B UE support at // with Min A. Static standing x6'30\" with 1 UE support and SBA with no noted LOB with encouragement to increased WBAT on L LE. Seated B LE TE rendered to promote strength and endurance for improved functional mobility: HR/TR, LAQ, hip flexion, ball squeezes, resisted hip abd (orange band) 2x20.        Patient/Caregiver Education:   Pt /Caregiver Education on safety and fall prevention, and not walking/standing without assistance was provided to reduce risk of falls. ASSESSMENT:  Patient continues to benefit from Skilled PT services to improve bed mobility, transfers, strength, balance, gait and stair negotiation. Progression toward goals:  []      Improving appropriately and progressing toward goals  [x]      Improving slowly and progressing toward goals  []      Not making progress toward goals and plan of care will be adjusted     Treatment session: 53 minutes.   Therapist:   Suzette Sweet PTA,          11/2/2017

## 2017-11-02 NOTE — PROGRESS NOTES
conducted a Follow up consultation and Spiritual Assessment for Princess Rogers, who is a 65 y.o.,female. The  provided the following Interventions:  Continued the relationship of care and support. Listened empathically. Offered prayer and assurance of continued prayer on patients behalf. Chart reviewed. The following outcomes were achieved:  Patient expressed gratitude for pastoral care visit. Assessment:  There are no further spiritual or Mosque issues which require Spiritual Care Services interventions at this time. Plan:  Chaplains will continue to follow and will provide pastoral care on an as needed/requested basis.  recommends bedside caregivers page  on duty if patient shows signs of acute spiritual or emotional distress.      [address] [facility]   Staff [address] [facility]   [phone]

## 2017-11-03 PROCEDURE — 74011250637 HC RX REV CODE- 250/637: Performed by: INTERNAL MEDICINE

## 2017-11-03 PROCEDURE — 74011250636 HC RX REV CODE- 250/636: Performed by: INTERNAL MEDICINE

## 2017-11-03 RX ADMIN — ENOXAPARIN SODIUM 40 MG: 40 INJECTION SUBCUTANEOUS at 08:45

## 2017-11-03 RX ADMIN — LOSARTAN POTASSIUM 25 MG: 50 TABLET ORAL at 08:44

## 2017-11-03 RX ADMIN — OXYCODONE HYDROCHLORIDE AND ACETAMINOPHEN 2 TABLET: 5; 325 TABLET ORAL at 15:56

## 2017-11-03 RX ADMIN — CELECOXIB 200 MG: 100 CAPSULE ORAL at 17:01

## 2017-11-03 RX ADMIN — LEVOTHYROXINE SODIUM 75 MCG: 75 TABLET ORAL at 05:38

## 2017-11-03 RX ADMIN — OXYCODONE HYDROCHLORIDE AND ACETAMINOPHEN 2 TABLET: 5; 325 TABLET ORAL at 05:38

## 2017-11-03 RX ADMIN — DOCUSATE SODIUM 100 MG: 100 CAPSULE, LIQUID FILLED ORAL at 08:44

## 2017-11-03 RX ADMIN — TRIAMCINOLONE ACETONIDE: 1 CREAM TOPICAL at 17:01

## 2017-11-03 RX ADMIN — METFORMIN HYDROCHLORIDE 1000 MG: 500 TABLET ORAL at 16:57

## 2017-11-03 RX ADMIN — CHOLECALCIFEROL (VITAMIN D3) 25 MCG (1,000 UNIT) TABLET 2000 UNITS: at 08:44

## 2017-11-03 RX ADMIN — CELECOXIB 200 MG: 100 CAPSULE ORAL at 08:44

## 2017-11-03 RX ADMIN — TRIAMCINOLONE ACETONIDE: 1 CREAM TOPICAL at 14:10

## 2017-11-03 RX ADMIN — ONDANSETRON 4 MG: 4 TABLET, ORALLY DISINTEGRATING ORAL at 08:49

## 2017-11-03 RX ADMIN — PANTOPRAZOLE SODIUM 40 MG: 40 TABLET, DELAYED RELEASE ORAL at 08:44

## 2017-11-03 RX ADMIN — METFORMIN HYDROCHLORIDE 1000 MG: 500 TABLET ORAL at 08:45

## 2017-11-03 RX ADMIN — SIMVASTATIN 20 MG: 20 TABLET, FILM COATED ORAL at 21:56

## 2017-11-03 NOTE — PROGRESS NOTES
Bedside shift change report given to MELISSA Cox RN (oncoming nurse) by NICHOLAS Wallace RN (offgoing nurse). Report included the following information SBAR, Kardex and MAR.

## 2017-11-03 NOTE — ROUTINE PROCESS
Bedside shift change report given to [address] (oncoming nurse) by Anita Zuniga RN (offgoing nurse). Report included the following information SBAR, Kardex, MAR and Recent Results. VISUALLY CHECKED PT Q 1 HR BY NURSING STAFF. 24 hour order chart check done

## 2017-11-03 NOTE — PROGRESS NOTES
Problem: Mobility Impaired (Adult and Pediatric)  Goal: *Acute Goals and Plan of Care (Insert Text)  PHYSICAL THERAPY STG GOALS :  Initiated 10/13/2017 and to be accomplished within 2 Weeks (updated 10/26/17)    1. Patient will move from supine to sit and sit to supine  and roll side to side in bed with minimal assistance/contact guard assist with WB compliance. (Achieved)  2. Patient will transfer from bed to chair and chair to bed with minimal assistance/contact guard assist using platform walker with WB compliance. (Achieved)  3. Patient will perform sit to stand with minimal assistance/contact guard assist with Fair balance and safety awareness with WB compliance. (Achieved)  4. Patient will ambulate with minimal assistance/contact guard assist for 75 feet with platform walker on level surfaces with 2 turns with WB compliance. (Progressing; 16 ft using platform RW with CGA)  5. Patient will ascend/descend 14 stairs with right handrail(s) withminimal assistance to allow for safe home access/exit with WB compliance. (Attempted, pt currently unable)  6. Patient will improve standardized test score for [place] Standing Balance Scale 1+ with WB compliance. (Achieved)    PHYSICAL THERAPY STG GOALS :  Initiated 10/13/2017 and to be accomplished within 2 Weeks (updated 10/19/17)    1. Patient will move from supine to sit and sit to supine  and roll side to side in bed with minimal assistance/contact guard assist with WB compliance. (Achieved)  2. Patient will transfer from bed to chair and chair to bed with minimal assistance/contact guard assist using platform walker with WB compliance. (Progressing modA)  3. Patient will perform sit to stand with minimal assistance/contact guard assist with Fair balance and safety awareness with WB compliance. (Progressing modA)  4.   Patient will ambulate with minimal assistance/contact guard assist for 75 feet with platform walker on level surfaces with 2 turns with WB compliance. (not tested due to TTWB on LLE)  5. Patient will ascend/descend 14 stairs with right handrail(s) withminimal assistance to allow for safe home access/exit with WB compliance. (Not tested due to WB status)  6. Patient will improve standardized test score for [place] Standing Balance Scale 1+ with WB compliance. PHYSICAL THERAPY LTG GOALS :  Initiated 10/13/2017 and to be accomplished within 4 Weeks    1. Patient will move from supine to sit and sit to supine  and roll side to side in bed with modified independence with WB compliance. 2.  Patient will transfer from bed to chair and chair to bed with modified independence using LRAD with WB compliance. 3.  Patient will perform sit to stand with modified independence with Good balance and safety awareness with WB compliance. 4.  Patient will ambulate with modified independence for 150 feet with LRAD on level surfaces and be able to maneuver through narrow spaces and obstacles without loss of balance with WB compliance. 5.  Patient will ascend/descend 14 stairs with right handrail(s) with supervision/set-up to allow for safe home access/exit with WB compliance. 6.  Patient will improve standardized test score for [place] Standing Balance Scale 3+ to reduce fall risk with WB compliance.       Physical Therapist:   Vane Marc PT  on 10/13/2017              [address] [place] [address]   physical Therapy Daily Treatment note      Patient: Elsie Dey (4 y.o. female)               Date: 11/3/2017    Physician: Oscar Tripathi MD  Primary Diagnosis: left hip fracture  Hip fracture [facility])          Treatment Diagnosis  Treatment Diagnosis: muscle weakness  Treatment Diagnosis 2: increased need for assist w/ self care  Precautions: Fall, WBAT (Splint at L wrist;ROM activities @L wrist)  Vital Signs  Vital Signs  Pulse (Heart Rate): 69  Level of Consciousness: Alert  BP: 125/67  MAP (Calculated): 86     Cognitive Status:  Mental Status  Neurologic State: Alert  Orientation Level: Oriented X4  Cognition: Appropriate for age attention/concentration  Pain  Pain Screen  Pain Scale 1: Numeric (0 - 10)  Pain Intensity 1: 0  Pain Onset 1: movement  Pain Location 1: Hip  Pain Orientation 1: Left  Pain Description 1: Aching  Pain Intervention(s) 1: Medication (see MAR)  Patient Stated Pain Goal: 0  Pain Reassessment 1: Yes  Bed Mobility Training  Bed Mobility Training  Supine to Sit: Stand-by asssistance  Scooting: Stand-by asssistance  Interventions: Safety awareness training;Verbal cues  Balance  Sitting: Intact  Sitting - Static: Good (unsupported)  Sitting - Dynamic: Good (unsupported)  Standing: With support  Standing - Static: Fair (+)  Standing - Dynamic : Fair  Transfer Training  Transfer Training  Sit to Stand: Stand-by asssistance  Stand to Sit: Stand-by asssistance  Interventions: Safety awareness training;Verbal cues  Sit to Stand: Stand-by asssistance  Gait Training  Gait  Base of Support: Center of gravity altered  Speed/Polly: Pace decreased (<100 feet/min)  Step Length: Right shortened;Left shortened  Stance: Left decreased  Gait Abnormalities: Decreased step clearance; Antalgic  Ambulation - Level of Assistance: Contact guard assistance  Distance (ft): 15 Feet (ft)  Assistive Device: Gait belt;Walker, rolling  Gait Abnormalities: Decreased step clearance; Antalgic   With 1 turns. Therapeutic Exercise: Session one: Pt presented supine in bed. Bed mobility as noted above with additional time for supine>sit. Pt ambulated EOB>toilet with RW and CGA as noted above. Pt ambulated an additional ~15ft from toilet to w/c. Therapist discussed HEP to include seated B LE TE's to build strength for ease of gait and stair negotiation. Therapist assisted pt with positioning of elevated leg-rest with pillow placed under L LE for comfort to allow pt to sit up OOB for breakfast.   Session Two: Tap ups at // with B UE support and CGA on 1\" step 2x10. Pt progressed to 2\" tap-ups with CGA with some difficulty noted with weightshifting to allow for R LE clearance. Patient/Caregiver Education:   Pt /Caregiver Education on safety and fall prevention, and HEP was provided to reduce risk of falls and maximize functional gains. ASSESSMENT:  Patient continues to benefit from Skilled PT services to improve bed mobility, transfers, strength, balance, gait and stair negotiation. Progression toward goals:  []      Improving appropriately and progressing toward goals  [x]      Improving slowly and progressing toward goals  []      Not making progress toward goals and plan of care will be adjusted     Treatment session: 52 minutes.   Therapist:   Renata Julien PTA,          11/3/2017

## 2017-11-03 NOTE — PROGRESS NOTES
Community Care Team Documentation for Patient in [facility]     Patient discharged from [facility] 10/9/2017 - 10/12/2017 to [facility] , on 10/12/2017. Hospital Discharge diagnosis:  Hip fracture     SNF Attending Provider:  Jeffery Brown    Anticipated discharge date from SNF:  TBD      PCP : Norma Iglesias MD    Nurse Navigator: Buzz Paz Team rounds completed,updates provided by facility. SW arranged for family conference on Wed 11/8/17 with patient and and her son    Medium Risk            15       Total Score        3 Has Seen PCP in Last 6 Months (Yes=3, No=0)    4 IP Visits Last 12 Months (1-3=4, 4=9, >4=11)    8 Charlson Comorbidity Score (Age + Comorbid Conditions)        Criteria that do not apply:    . Living with Significant Other. Assisted Living. LTAC. SNF. or   Rehab    Patient Length of Stay (>5 days = 3)    Pt.  Coverage (Medicare=5 , Medicaid, or Self-Pay=4)

## 2017-11-03 NOTE — PROGRESS NOTES
Community Care Team Documentation for Patient in [facility]     Patient discharged from [facility] 10/9/2017 - 10/12/2017 to [facility] , on 10/12/2017. Hospital Discharge diagnosis:  Hip fracture     SNF Attending Provider:  Freddy Dangelo    Anticipated discharge date from SNF:  TBD      PCP : Tami Steiner MD    Nurse Navigator: Buzz Riggs Team rounds completed,updates provided by facility. Medium Risk            15       Total Score        3 Has Seen PCP in Last 6 Months (Yes=3, No=0)    4 IP Visits Last 12 Months (1-3=4, 4=9, >4=11)    8 Charlson Comorbidity Score (Age + Comorbid Conditions)        Criteria that do not apply:    . Living with Significant Other. Assisted Living. LTAC. SNF. or   Rehab    Patient Length of Stay (>5 days = 3)    Pt.  Coverage (Medicare=5 , Medicaid, or Self-Pay=4)

## 2017-11-03 NOTE — PROGRESS NOTES
Community Care Team Documentation for Patient in [facility]     Patient discharged from [facility] 10/9/2017 - 10/12/2017 to [facility] [address] , on 10/12/2017. Hospital Discharge diagnosis:  Hip fracture     SNF Attending Provider:  Rupesh Ornelas    Anticipated discharge date from SNF:  TBD      PCP : Bibiana Davis MD    Nurse Navigator: Buzz Gilmore Team rounds completed,updates provided by facility. Medium Risk            15       Total Score        3 Has Seen PCP in Last 6 Months (Yes=3, No=0)    4 IP Visits Last 12 Months (1-3=4, 4=9, >4=11)    8 Charlson Comorbidity Score (Age + Comorbid Conditions)        Criteria that do not apply:    . Living with Significant Other. Assisted Living. LTAC. SNF. or   Rehab    Patient Length of Stay (>5 days = 3)    Pt.  Coverage (Medicare=5 , Medicaid, or Self-Pay=4)

## 2017-11-04 PROCEDURE — 74011250636 HC RX REV CODE- 250/636: Performed by: INTERNAL MEDICINE

## 2017-11-04 PROCEDURE — 74011250637 HC RX REV CODE- 250/637: Performed by: INTERNAL MEDICINE

## 2017-11-04 RX ADMIN — LEVOTHYROXINE SODIUM 75 MCG: 75 TABLET ORAL at 06:08

## 2017-11-04 RX ADMIN — CELECOXIB 200 MG: 100 CAPSULE ORAL at 17:08

## 2017-11-04 RX ADMIN — CELECOXIB 200 MG: 100 CAPSULE ORAL at 09:59

## 2017-11-04 RX ADMIN — METFORMIN HYDROCHLORIDE 1000 MG: 500 TABLET ORAL at 17:08

## 2017-11-04 RX ADMIN — TRIAMCINOLONE ACETONIDE: 1 CREAM TOPICAL at 18:00

## 2017-11-04 RX ADMIN — OXYCODONE HYDROCHLORIDE AND ACETAMINOPHEN 2 TABLET: 5; 325 TABLET ORAL at 02:26

## 2017-11-04 RX ADMIN — PANTOPRAZOLE SODIUM 40 MG: 40 TABLET, DELAYED RELEASE ORAL at 09:59

## 2017-11-04 RX ADMIN — METFORMIN HYDROCHLORIDE 1000 MG: 500 TABLET ORAL at 09:59

## 2017-11-04 RX ADMIN — OXYCODONE HYDROCHLORIDE AND ACETAMINOPHEN 1 TABLET: 5; 325 TABLET ORAL at 21:26

## 2017-11-04 RX ADMIN — SIMVASTATIN 20 MG: 20 TABLET, FILM COATED ORAL at 21:26

## 2017-11-04 RX ADMIN — ENOXAPARIN SODIUM 40 MG: 40 INJECTION SUBCUTANEOUS at 09:59

## 2017-11-04 RX ADMIN — CHOLECALCIFEROL (VITAMIN D3) 25 MCG (1,000 UNIT) TABLET 2000 UNITS: at 09:59

## 2017-11-04 RX ADMIN — TRIAMCINOLONE ACETONIDE: 1 CREAM TOPICAL at 09:00

## 2017-11-04 RX ADMIN — OXYCODONE HYDROCHLORIDE AND ACETAMINOPHEN 2 TABLET: 5; 325 TABLET ORAL at 10:30

## 2017-11-04 RX ADMIN — OXYCODONE HYDROCHLORIDE AND ACETAMINOPHEN 1 TABLET: 5; 325 TABLET ORAL at 17:07

## 2017-11-04 RX ADMIN — LOSARTAN POTASSIUM 25 MG: 50 TABLET ORAL at 09:59

## 2017-11-04 NOTE — ROUTINE PROCESS
Bedside and Verbal shift change report given to Salvador Erickson RN (oncoming nurse) by Alfred Cannon RN (offgoing nurse). Report included the following information SBAR, Kardex and MAR.

## 2017-11-04 NOTE — PROGRESS NOTES
Problem: Self Care Deficits Care Plan (Adult)  Goal: *Acute Goals and Plan of Care (Insert Text)  OCCUPATIONAL THERAPY SHORT TERM GOALS    Updated 11/02/17    1. Patient will perform Upper body ADL's with adaptive equipment & compensatory techniques as needed with contact guard assist.   2.  Patient will perform Lower body ADL's with adaptive equipment & compensatory techniques as needed Min assistance. 3.  Patient will perform toileting task with SBA with Good safety to reduce falls risk. 4.  Patient will perform toilet transfers with Platform Walker and SBA. 5.  Patient will perform standing static/dynamic balance activities for improved ADL/IADL function with SBA x 1 and Good balance and safety awareness. 6.  Patient will improve Barthel index scores to atleast 70/100 to improve functional mobility. 7.  Patient will tolerate standing x 15 minutes during functional activity while adhering to LLE TTWB And Left wrist NWB utilizing platform walker to increase participation in ADL routine. Updated 10/26/17    1. Patient will perform Upper body ADL's with adaptive equipment & compensatory techniques as needed with minimal assistance/contact guard assist. (goal met-UPG SBA)  2.  Patient will perform Lower body ADL's with adaptive equipment & compensatory techniques as needed moderate assistance. (goal met-UPG Min A)  3. Patient will perform toileting task with moderate assistance  with Good safety to reduce falls risk. (goal met-UPG SBA)  4. Patient will perform toilet transfers with Platform Walker and moderate assistance x 1.(goal met-UPG SBA)  5. Patient will perform standing static/dynamic balance activities for improved ADL/IADL function with moderate assistance x 1 and Good balance and safety awareness. (goal met-UPG SBA)  6. Patient will improve Barthel index scores to atleast 50/100 to improve functional mobility. (goal met-UPG 70/100)  7.   Patient will tolerate standing x 5 minutes during functional activity while adhering to LLE TTWB And Left wrist NWB utilizing platform walker to increase participation in ADL routine. (goal met-UPG 15)       Initiated 10/13/2017 and to be accomplished within 2 Week(s)    1. Patient will perform Upper body ADL's with adaptive equipment & compensatory techniques as needed with minimal assistance/contact guard assist. (progressing)  2. Patient will perform Lower body ADL's with adaptive equipment & compensatory techniques as needed moderate assistance. (progressing)  3. Patient will perform toileting task with moderate assistance x 2 with Good safety to reduce falls risk. (goal met-UPG Mod A)  4. Patient will perform toilet transfers with Platform Walker and moderate assistance x 2. (goal met-UPG Mod A x 1)  5. Patient will perform standing static/dynamic balance activities for improved ADL/IADL function with moderate assistance x,2 and Good balance and safety awareness. (goal met-UPG Mod A)  6. Patient will improve Barthel index scores to atleast 35/100 to improve functional mobility. (goal met-UPG 60/100)  7. Patient will tolerate standing x 5 minutes during functional activity while adhering to LLE TTWB And Left wrist NWB utilizing platform walker to increase participation in ADL routine. OCCUPATIONAL THERAPY LONG TERM GOALS   Initiated 10/13/2017 and to be accomplished within 4 Week(s)    1. Patient will perform Upper body ADL's with adaptive equipment & compensatory techniques as needed with supervision/set-up. 2.  Patient will perform Lower body ADL's with adaptive equipment & compensatory techniques as needed with         supervision/set-up . 3. Patient will perform toileting task with supervision/set-up with Good safety to reduce falls risk. 4.  Patient will perform functional transfers with Platform Walker and  supervision/set-up and Good balance and safety awareness.   5.  Patient will perform standing static/dynamic activity for improved ADL/IADL function    with supervision/set-up an and Good balance and safety awareness. 6.  Patient will improve Barthel index score to 50/100 to improve independence with mobility. Therapist: Nuria Vanessa    10/13/2017             [facility]   Occupational Therapy Daily Treatment note      Patient: Yamel Ruelas (65 y.o. female)       Date: 11/4/2017  Attending Physician: Joel Veliz MD  Primary Diagnosis: left hip fracture  Hip fracture [facility])    Treatment Diagnosis  Treatment Diagnosis: muscle weakness  Treatment Diagnosis 2: increased need for assist w/ self care   Precautions : Precautions at Admission: Fall, WBAT (Splint at L wrist;ROM activities @L wrist)  Vital Signs:  Vital Signs  Pulse (Heart Rate): 70  BP: 133/60     Cognitive Status:     Pain:  Pain Screen  Pain Scale 1: Numeric (0 - 10)  Pain Intensity 1: 6  Pain Onset 1: prior to tx  Pain Location 1: Leg  Pain Orientation 1: Left  Pain Description 1: Aching  Pain Intervention(s) 1: Medication (see MAR); Other (comment)  Patient Stated Pain Goal: 0  Pain Scale 1: Numeric (0 - 10)  Transfers:  Functional Transfers  Sit to Stand: Stand-by asssistance  Stand to Sit: Stand-by asssistance     Balance:  Balance  Sitting: Intact  Sitting - Static: Good (unsupported)  Sitting - Dynamic: Good (unsupported)  Standing: With support  Standing - Static: Fair (+)  Standing - Dynamic : Fair  Therapeutic Activities:  Pt stood ~ 5.5 mins with 1 UE support SBA to challenge balance and stability for increased standing tolerance needed for toileting routine. No LOB noted. Coloring activity with L UE on vertical surface seated for increased overall strength to improve fnxl performance. Therapeutic Exercises: L UE ex with green theraputty and small item retrieval and power web red side flexion/extension 3 x 10 to increase strength, ROM, and finger dexterity for ADL carryover.     Patient/Caregiver Education:    Pt educated on fall prevention for optimal safety. ASSESSMENT:  Patient continues to demonstrate the need for skilled Occupational Therapy services to improve strength, balance, and endurance.    Progression toward goals:  [x]      Improving appropriately and progressing toward goals  []      Improving slowly and progressing toward goals  []      Not making progress toward goals and plan of care will be adjusted     Treatment session:  40 minutes    Therapist:    JUDY Ge,  11/4/2017

## 2017-11-05 PROCEDURE — 74011250637 HC RX REV CODE- 250/637: Performed by: INTERNAL MEDICINE

## 2017-11-05 PROCEDURE — 74011250636 HC RX REV CODE- 250/636: Performed by: INTERNAL MEDICINE

## 2017-11-05 RX ADMIN — CELECOXIB 200 MG: 100 CAPSULE ORAL at 09:26

## 2017-11-05 RX ADMIN — METFORMIN HYDROCHLORIDE 1000 MG: 500 TABLET ORAL at 09:26

## 2017-11-05 RX ADMIN — CELECOXIB 200 MG: 100 CAPSULE ORAL at 17:30

## 2017-11-05 RX ADMIN — LOSARTAN POTASSIUM 25 MG: 50 TABLET ORAL at 09:26

## 2017-11-05 RX ADMIN — OXYCODONE HYDROCHLORIDE AND ACETAMINOPHEN 2 TABLET: 5; 325 TABLET ORAL at 10:20

## 2017-11-05 RX ADMIN — SIMVASTATIN 20 MG: 20 TABLET, FILM COATED ORAL at 21:00

## 2017-11-05 RX ADMIN — TRIAMCINOLONE ACETONIDE: 1 CREAM TOPICAL at 09:00

## 2017-11-05 RX ADMIN — ENOXAPARIN SODIUM 40 MG: 40 INJECTION SUBCUTANEOUS at 09:26

## 2017-11-05 RX ADMIN — METFORMIN HYDROCHLORIDE 1000 MG: 500 TABLET ORAL at 20:59

## 2017-11-05 RX ADMIN — LEVOTHYROXINE SODIUM 75 MCG: 75 TABLET ORAL at 05:30

## 2017-11-05 RX ADMIN — TRIAMCINOLONE ACETONIDE: 1 CREAM TOPICAL at 18:00

## 2017-11-05 RX ADMIN — CHOLECALCIFEROL (VITAMIN D3) 25 MCG (1,000 UNIT) TABLET 2000 UNITS: at 11:55

## 2017-11-05 RX ADMIN — DOCUSATE SODIUM 100 MG: 100 CAPSULE, LIQUID FILLED ORAL at 09:26

## 2017-11-05 RX ADMIN — PANTOPRAZOLE SODIUM 40 MG: 40 TABLET, DELAYED RELEASE ORAL at 09:27

## 2017-11-05 NOTE — ROUTINE PROCESS
Bedside and Verbal shift change report given to Ida Kidd (oncoming nurse) by Payton Hutchison RN (offgoing nurse). Report included the following information SBAR, Kardex and MAR.  QH VISUAL CHECKS DONE

## 2017-11-05 NOTE — ROUTINE PROCESS
Bedside shift change report given to Kody Alcaraz RN (oncoming nurse) by Anita Zuniga RN (offgoing nurse). Report included the following information SBAR, Kardex, MAR and Recent Results. VISUALLY CHECKED PT Q 1 HR BY NURSING STAFF. 24 hour order chart check done

## 2017-11-05 NOTE — PROGRESS NOTES
Problem: Self Care Deficits Care Plan (Adult)  Goal: *Acute Goals and Plan of Care (Insert Text)  OCCUPATIONAL THERAPY SHORT TERM GOALS    Updated 11/02/17    1. Patient will perform Upper body ADL's with adaptive equipment & compensatory techniques as needed with contact guard assist.   2.  Patient will perform Lower body ADL's with adaptive equipment & compensatory techniques as needed Min assistance. 3.  Patient will perform toileting task with SBA with Good safety to reduce falls risk. 4.  Patient will perform toilet transfers with Platform Walker and SBA. 5.  Patient will perform standing static/dynamic balance activities for improved ADL/IADL function with SBA x 1 and Good balance and safety awareness. 6.  Patient will improve Barthel index scores to atleast 70/100 to improve functional mobility. 7.  Patient will tolerate standing x 15 minutes during functional activity while adhering to LLE TTWB And Left wrist NWB utilizing platform walker to increase participation in ADL routine. Updated 10/26/17    1. Patient will perform Upper body ADL's with adaptive equipment & compensatory techniques as needed with minimal assistance/contact guard assist. (goal met-UPG SBA)  2.  Patient will perform Lower body ADL's with adaptive equipment & compensatory techniques as needed moderate assistance. (goal met-UPG Min A)  3. Patient will perform toileting task with moderate assistance  with Good safety to reduce falls risk. (goal met-UPG SBA)  4. Patient will perform toilet transfers with Platform Walker and moderate assistance x 1.(goal met-UPG SBA)  5. Patient will perform standing static/dynamic balance activities for improved ADL/IADL function with moderate assistance x 1 and Good balance and safety awareness. (goal met-UPG SBA)  6. Patient will improve Barthel index scores to atleast 50/100 to improve functional mobility. (goal met-UPG 70/100)  7.   Patient will tolerate standing x 5 minutes during functional activity while adhering to LLE TTWB And Left wrist NWB utilizing platform walker to increase participation in ADL routine. (goal met-UPG 15)       Initiated 10/13/2017 and to be accomplished within 2 Week(s)    1. Patient will perform Upper body ADL's with adaptive equipment & compensatory techniques as needed with minimal assistance/contact guard assist. (progressing)  2. Patient will perform Lower body ADL's with adaptive equipment & compensatory techniques as needed moderate assistance. (progressing)  3. Patient will perform toileting task with moderate assistance x 2 with Good safety to reduce falls risk. (goal met-UPG Mod A)  4. Patient will perform toilet transfers with Platform Walker and moderate assistance x 2. (goal met-UPG Mod A x 1)  5. Patient will perform standing static/dynamic balance activities for improved ADL/IADL function with moderate assistance x,2 and Good balance and safety awareness. (goal met-UPG Mod A)  6. Patient will improve Barthel index scores to atleast 35/100 to improve functional mobility. (goal met-UPG 60/100)  7. Patient will tolerate standing x 5 minutes during functional activity while adhering to LLE TTWB And Left wrist NWB utilizing platform walker to increase participation in ADL routine. OCCUPATIONAL THERAPY LONG TERM GOALS   Initiated 10/13/2017 and to be accomplished within 4 Week(s)    1. Patient will perform Upper body ADL's with adaptive equipment & compensatory techniques as needed with supervision/set-up. 2.  Patient will perform Lower body ADL's with adaptive equipment & compensatory techniques as needed with         supervision/set-up . 3. Patient will perform toileting task with supervision/set-up with Good safety to reduce falls risk. 4.  Patient will perform functional transfers with Platform Walker and  supervision/set-up and Good balance and safety awareness.   5.  Patient will perform standing static/dynamic activity for improved ADL/IADL function    with supervision/set-up an and Good balance and safety awareness. 6.  Patient will improve Barthel index score to 50/100 to improve independence with mobility. Therapist: Daryle Coop    10/13/2017             [facility]   Occupational Therapy Daily Treatment note      Patient: Brittany Villavicencio (27 y.o. female)       Date: 11/5/2017  Attending Physician: Dia Spears MD  Primary Diagnosis: left hip fracture  Hip fracture [facility])    Treatment Diagnosis  Treatment Diagnosis: muscle weakness  Treatment Diagnosis 2: increased need for assist w/ self care   Precautions : Precautions at Admission: Fall, WBAT (Splint at L wrist;ROM activities @L wrist)  Vital Signs:  Vital Signs  Pulse (Heart Rate): 70  BP: 127/64     Cognitive Status:  Mental Status  Neurologic State: Alert; Appropriate for age  Orientation Level: Oriented X4  Cognition: Appropriate for age attention/concentration  Pain:  Pain Screen  Pain Scale 1: Numeric (0 - 10)  Pain Intensity 1: 6  Pain Onset 1: now  Pain Location 1: Leg  Pain Orientation 1: Left  Pain Description 1: Aching  Pain Intervention(s) 1: Elevation;Medication (see MAR)  Patient Stated Pain Goal: 0  Pain Scale 1: Numeric (0 - 10)  Balance:  Balance  Sitting: Intact  Sitting - Static: Good (unsupported)  Sitting - Dynamic: Good (unsupported)  Therapeutic Activities:  Pt presented supine in bed. Pt reported not feeling well and fatigued requesting bed level tx. Graded clothespin activity with B UE's to increase pinch strength and bilateral coordination. Therapeutic Exercises:   L UE ex with power web, red side flexion/extension 2 x 20 to increase L UE strength for ADL carryover. Patient/Caregiver Education:    Pt educated on importance of OOB activity for increased strength and endurance. ASSESSMENT:  Patient continues to demonstrate the need for skilled Occupational Therapy services to improve strength and endurance.    Progression toward goals:  [x]      Improving appropriately and progressing toward goals  []      Improving slowly and progressing toward goals  []      Not making progress toward goals and plan of care will be adjusted     Treatment session:   35 minutes    Therapist:    JUDY Swan,  11/5/2017

## 2017-11-06 PROCEDURE — 74011250637 HC RX REV CODE- 250/637: Performed by: INTERNAL MEDICINE

## 2017-11-06 PROCEDURE — 74011250636 HC RX REV CODE- 250/636: Performed by: INTERNAL MEDICINE

## 2017-11-06 RX ADMIN — CHOLECALCIFEROL (VITAMIN D3) 25 MCG (1,000 UNIT) TABLET 2000 UNITS: at 11:05

## 2017-11-06 RX ADMIN — PANTOPRAZOLE SODIUM 40 MG: 40 TABLET, DELAYED RELEASE ORAL at 11:05

## 2017-11-06 RX ADMIN — OXYCODONE HYDROCHLORIDE AND ACETAMINOPHEN 2 TABLET: 5; 325 TABLET ORAL at 22:15

## 2017-11-06 RX ADMIN — OXYCODONE HYDROCHLORIDE AND ACETAMINOPHEN 2 TABLET: 5; 325 TABLET ORAL at 01:40

## 2017-11-06 RX ADMIN — TRIAMCINOLONE ACETONIDE: 1 CREAM TOPICAL at 18:00

## 2017-11-06 RX ADMIN — DOCUSATE SODIUM 100 MG: 100 CAPSULE, LIQUID FILLED ORAL at 18:00

## 2017-11-06 RX ADMIN — SIMVASTATIN 20 MG: 20 TABLET, FILM COATED ORAL at 21:12

## 2017-11-06 RX ADMIN — OXYCODONE HYDROCHLORIDE AND ACETAMINOPHEN 2 TABLET: 5; 325 TABLET ORAL at 06:19

## 2017-11-06 RX ADMIN — LEVOTHYROXINE SODIUM 75 MCG: 75 TABLET ORAL at 06:11

## 2017-11-06 RX ADMIN — LOSARTAN POTASSIUM 25 MG: 50 TABLET ORAL at 11:06

## 2017-11-06 RX ADMIN — CELECOXIB 200 MG: 100 CAPSULE ORAL at 18:00

## 2017-11-06 RX ADMIN — METFORMIN HYDROCHLORIDE 1000 MG: 500 TABLET ORAL at 17:00

## 2017-11-06 RX ADMIN — CELECOXIB 200 MG: 100 CAPSULE ORAL at 11:05

## 2017-11-06 RX ADMIN — ENOXAPARIN SODIUM 40 MG: 40 INJECTION SUBCUTANEOUS at 11:04

## 2017-11-06 RX ADMIN — DOCUSATE SODIUM 100 MG: 100 CAPSULE, LIQUID FILLED ORAL at 11:06

## 2017-11-06 NOTE — PROGRESS NOTES
Problem: Mobility Impaired (Adult and Pediatric)  Goal: *Acute Goals and Plan of Care (Insert Text)  PHYSICAL THERAPY STG GOALS :  Initiated 10/13/2017 and to be accomplished within 2 Weeks (updated 10/26/17)    1. Patient will move from supine to sit and sit to supine  and roll side to side in bed with minimal assistance/contact guard assist with WB compliance. (Achieved)  2. Patient will transfer from bed to chair and chair to bed with minimal assistance/contact guard assist using platform walker with WB compliance. (Achieved)  3. Patient will perform sit to stand with minimal assistance/contact guard assist with Fair balance and safety awareness with WB compliance. (Achieved)  4. Patient will ambulate with minimal assistance/contact guard assist for 75 feet with platform walker on level surfaces with 2 turns with WB compliance. (Progressing; 16 ft using platform RW with CGA)  5. Patient will ascend/descend 14 stairs with right handrail(s) withminimal assistance to allow for safe home access/exit with WB compliance. (Attempted, pt currently unable)  6. Patient will improve standardized test score for [place] Standing Balance Scale 1+ with WB compliance. (Achieved)    PHYSICAL THERAPY STG GOALS :  Initiated 10/13/2017 and to be accomplished within 2 Weeks (updated 10/19/17)    1. Patient will move from supine to sit and sit to supine  and roll side to side in bed with minimal assistance/contact guard assist with WB compliance. (Achieved)  2. Patient will transfer from bed to chair and chair to bed with minimal assistance/contact guard assist using platform walker with WB compliance. (Progressing modA)  3. Patient will perform sit to stand with minimal assistance/contact guard assist with Fair balance and safety awareness with WB compliance. (Progressing modA)  4.   Patient will ambulate with minimal assistance/contact guard assist for 75 feet with platform walker on level surfaces with 2 turns with WB compliance. (not tested due to TTWB on LLE)  5. Patient will ascend/descend 14 stairs with right handrail(s) withminimal assistance to allow for safe home access/exit with WB compliance. (Not tested due to WB status)  6. Patient will improve standardized test score for [place] Standing Balance Scale 1+ with WB compliance. PHYSICAL THERAPY LTG GOALS :  Initiated 10/13/2017 and to be accomplished within 4 Weeks    1. Patient will move from supine to sit and sit to supine  and roll side to side in bed with modified independence with WB compliance. 2.  Patient will transfer from bed to chair and chair to bed with modified independence using LRAD with WB compliance. 3.  Patient will perform sit to stand with modified independence with Good balance and safety awareness with WB compliance. 4.  Patient will ambulate with modified independence for 150 feet with LRAD on level surfaces and be able to maneuver through narrow spaces and obstacles without loss of balance with WB compliance. 5.  Patient will ascend/descend 14 stairs with right handrail(s) with supervision/set-up to allow for safe home access/exit with WB compliance. 6.  Patient will improve standardized test score for [place] Standing Balance Scale 3+ to reduce fall risk with WB compliance.       Physical Therapist:   Nat Pitts, PT  on 10/13/2017              [address] [place] [address]   physical Therapy Daily Treatment note      Patient: Herman Nevarez (89 y.o. female)               Date: 11/6/2017    Physician: Queenie Reardon MD  Primary Diagnosis: left hip fracture  Hip fracture [facility])          Treatment Diagnosis  Treatment Diagnosis: muscle weakness  Treatment Diagnosis 2: increased need for assist w/ self care  Precautions: Fall, WBAT (Splint at L wrist;ROM activities @L wrist)  Vital Signs  Cognitive Status:  Mental Status  Neurologic State: Alert  Orientation Level: Oriented X4  Cognition: Appropriate for age attention/concentration  Pain  Pain Screen  Pain Scale 1: Numeric (0 - 10)  Pain Intensity 1: 5  Pain Onset 1: movement  Pain Location 1: Leg  Pain Orientation 1: Left  Pain Description 1: Aching  Pain Intervention(s) 1: Medication (see MAR); Repositioned; Rest  Patient Stated Pain Goal: 0  Bed Mobility Training  Bed Mobility Training  Supine to Sit: Supervision (close (S))  Scooting: Stand-by asssistance  Interventions: Safety awareness training  Balance  Sitting: Intact  Sitting - Static: Good (unsupported)  Sitting - Dynamic: Good (unsupported)  Standing: With support  Standing - Static: Good  Standing - Dynamic : Fair  Transfer Training  Transfer Training  Sit to Stand: Stand-by asssistance  Stand to Sit: Stand-by asssistance  Bed to Chair: Stand-by asssistance  Interventions: Safety awareness training;Verbal cues  Sit to Stand: Stand-by asssistance  Gait Training  Gait  Base of Support: Center of gravity altered  Speed/Polly: Pace decreased (<100 feet/min)  Step Length: Right shortened;Left shortened  Gait Abnormalities: Decreased step clearance  Ambulation - Level of Assistance: Stand-by asssistance  Distance (ft): 130 Feet (ft)  Assistive Device: Gait belt;Walker, rolling  Rail Use: Both  Stairs - Level of Assistance: Contact guard assistance  Number of Stairs Trained: 3 (4\" steps x2)  Interventions: Safety awareness training;Verbal cues  Gait Abnormalities: Decreased step clearance   With 3 turns. Therapeutic Exercise: Pt presented supine in bed. Bed mobility as noted above. Pt ambulated from EOB>toilet with RW and SBA with cues for safety with turns due to decreased step height. Gait training with abovementioned details and w/c follow for safety. Following training; pt negotiated 3 4\" steps x2 with CGA and verbal cues for sequencing/safety. Pt reported feeling better this morning with no noted pain.  Seated B LE TE rendered to promote strength and endurance for improved functional mobility: HR/TR, LAQ, hip flexion, ball squeezes, resisted hip abd (orange band) 2x20. Patient/Caregiver Education:   Pt /Caregiver Education on safety and fall prevention, safety with gait, turns and stairs was provided to reduce risk of falls. ASSESSMENT:  Patient continues to benefit from Skilled PT services to improve transfers, strength, balance, gait and stair negotiation. Progression toward goals:  [x]      Improving appropriately and progressing toward goals  [x]      Improving slowly and progressing toward goals  []      Not making progress toward goals and plan of care will be adjusted     Treatment session: 53 minutes.   Therapist:   Nicolas Cintron PTA,          11/6/2017

## 2017-11-06 NOTE — ROUTINE PROCESS
Bedside and Verbal shift change report given to [address] (oncoming nurse) by Marijean Soulier RN (offgoing nurse). Report included the following information SBAR, Kardex and MAR.

## 2017-11-06 NOTE — PROGRESS NOTES
[facility]     Patient: Nick Gonzalez MRN: [id number]  CSN: [id number]    YOB: 1940  Age: 65 y.o. Sex: female    DOA: 10/12/2017 LOS:  LOS: 25 days                    Subjective:     Pt with fx L hip and wrist. Feels better and improving with therapy    Objective:      Visit Vitals    /64    Pulse 83    Temp 97.8 °F (36.6 °C)    Resp 18    Ht 5' (1.524 m)    Wt 81.2 kg (179 lb)    SpO2 100%    Breastfeeding No    BMI 34.96 kg/m2       Physical Exam:  Chest clear  Cor rr  abd soft  No edema    Intake and Output:  Current Shift:     Last three shifts:       No results found for this or any previous visit (from the past 24 hour(s)).     Current Facility-Administered Medications   Medication Dose Route Frequency    celecoxib (CELEBREX) capsule 200 mg  200 mg Oral BID    bismuth subsalicylate (PEPTO-BISMOL) oral suspension 30 mL  30 mL Oral Q6H PRN    losartan (COZAAR) tablet 25 mg  25 mg Oral DAILY    cholecalciferol (VITAMIN D3) tablet 2,000 Units  2,000 Units Oral DAILY    [facility] ANESTHESIA   Other PRN    [facility] EMERGENCY/STAT BOX   Other PRN    alum-mag hydroxide-simeth (MYLANTA) oral suspension 30 mL  30 mL Oral QID PRN    ondansetron (ZOFRAN ODT) tablet 4 mg  4 mg Oral Q6H PRN    docusate sodium (COLACE) capsule 100 mg  100 mg Oral BID    enoxaparin (LOVENOX) injection 40 mg  40 mg SubCUTAneous DAILY    levothyroxine (SYNTHROID) tablet 75 mcg  75 mcg Oral ACB    metFORMIN (GLUCOPHAGE) tablet 1,000 mg  1,000 mg Oral BID WITH MEALS    oxyCODONE-acetaminophen (PERCOCET) 5-325 mg per tablet 1-2 Tab  1-2 Tab Oral Q4H PRN    pantoprazole (PROTONIX) tablet 40 mg  40 mg Oral DAILY    simvastatin (ZOCOR) tablet 20 mg  20 mg Oral QHS    triamcinolone acetonide (KENALOG) 0.1 % cream   Topical BID    glucose chewable tablet 16 g  4 Tab Oral PRN    glucagon (GLUCAGEN) injection 1 mg  1 mg IntraMUSCular PRN    dextrose (D50W) injection syrg 12.5-25 g  25-50 mL IntraVENous PRN Lab Results   Component Value Date/Time    Glucose 116 10/12/2017 04:00 AM    Glucose 167 10/11/2017 03:50 AM    Glucose 180 10/10/2017 06:36 PM    Glucose 161 10/09/2017 05:30 PM    Glucose 112 04/20/2017 11:23 AM        Assessment/Plan     Active Problems:    Hip fracture ([facility] [address] (10/9/2017)        Colin Nguyen MD  11/6/2017, 11:57 AM

## 2017-11-07 PROCEDURE — 74011250636 HC RX REV CODE- 250/636: Performed by: INTERNAL MEDICINE

## 2017-11-07 PROCEDURE — 74011250637 HC RX REV CODE- 250/637: Performed by: INTERNAL MEDICINE

## 2017-11-07 RX ORDER — ADHESIVE BANDAGE
30 BANDAGE TOPICAL DAILY PRN
Status: DISCONTINUED | OUTPATIENT
Start: 2017-11-07 | End: 2017-11-21 | Stop reason: HOSPADM

## 2017-11-07 RX ADMIN — METFORMIN HYDROCHLORIDE 1000 MG: 500 TABLET ORAL at 17:28

## 2017-11-07 RX ADMIN — CHOLECALCIFEROL (VITAMIN D3) 25 MCG (1,000 UNIT) TABLET 2000 UNITS: at 11:49

## 2017-11-07 RX ADMIN — LEVOTHYROXINE SODIUM 75 MCG: 75 TABLET ORAL at 06:03

## 2017-11-07 RX ADMIN — TRIAMCINOLONE ACETONIDE: 1 CREAM TOPICAL at 18:00

## 2017-11-07 RX ADMIN — METFORMIN HYDROCHLORIDE 1000 MG: 500 TABLET ORAL at 11:48

## 2017-11-07 RX ADMIN — OXYCODONE HYDROCHLORIDE AND ACETAMINOPHEN 2 TABLET: 5; 325 TABLET ORAL at 20:55

## 2017-11-07 RX ADMIN — PANTOPRAZOLE SODIUM 40 MG: 40 TABLET, DELAYED RELEASE ORAL at 11:48

## 2017-11-07 RX ADMIN — DOCUSATE SODIUM 100 MG: 100 CAPSULE, LIQUID FILLED ORAL at 11:48

## 2017-11-07 RX ADMIN — MAGNESIUM HYDROXIDE 30 ML: 400 SUSPENSION ORAL at 06:03

## 2017-11-07 RX ADMIN — CELECOXIB 200 MG: 100 CAPSULE ORAL at 11:48

## 2017-11-07 RX ADMIN — TRIAMCINOLONE ACETONIDE: 1 CREAM TOPICAL at 09:00

## 2017-11-07 RX ADMIN — DOCUSATE SODIUM 100 MG: 100 CAPSULE, LIQUID FILLED ORAL at 17:28

## 2017-11-07 RX ADMIN — ENOXAPARIN SODIUM 40 MG: 40 INJECTION SUBCUTANEOUS at 11:49

## 2017-11-07 RX ADMIN — LOSARTAN POTASSIUM 25 MG: 50 TABLET ORAL at 11:48

## 2017-11-07 RX ADMIN — CELECOXIB 200 MG: 100 CAPSULE ORAL at 17:28

## 2017-11-07 RX ADMIN — OXYCODONE HYDROCHLORIDE AND ACETAMINOPHEN 2 TABLET: 5; 325 TABLET ORAL at 06:11

## 2017-11-07 RX ADMIN — SIMVASTATIN 20 MG: 20 TABLET, FILM COATED ORAL at 21:07

## 2017-11-07 NOTE — ROUTINE PROCESS
Bedside shift change report given to Lorelei David Lpn (oncoming nurse) by Edward Givens RN (offgoing nurse). Report included the following information SBAR, Kardex, MAR and Recent Results. 24 hour order chart check done. VISUALLY CHECKED PT Q 1 HR BY NURSING STAFF.

## 2017-11-07 NOTE — ROUTINE PROCESS
Bedside and Verbal shift change report given to MELISSA Alexis (oncoming nurse) by Sharee Vernon LPN(offgoing nurse).  Report included the following information SBAR, Kardex and [facility]

## 2017-11-07 NOTE — PROGRESS NOTES
conducted a Follow up consultation and Spiritual Assessment for Willeen Boast, who is a 65 y.o.,female. The  provided the following Interventions:  Continued the relationship of care and support. Listened empathically. Offered prayer and assurance of continued prayer on patients behalf. Chart reviewed. The following outcomes were achieved:  Patient expressed gratitude for 's visit. Assessment:  There are no spiritual or Mandaeism issues which require intervention at this time. Plan:  Chaplains will continue to follow and will provide pastoral care on an as needed/requested basis.  recommends bedside caregivers page  on duty if patient shows signs of acute spiritual or emotional distress. Sam Salguero M.Div.   [facility] [address]  [phone]

## 2017-11-07 NOTE — PROGRESS NOTES
Late entry due to log in problem: Patient performed 2 x15 reps bicep curls, 2 x15 reps shoulder flex with 3# dowel to increase UB strength for functional transfers. Patient preformed functional ambulation with FWW  SBA. . Tolieting performed with SUP for vc for proper AD placement thera activity while standing engaged in FM activity x 11 min. 38 seconds. Katy Marcumpool

## 2017-11-07 NOTE — ROUTINE PROCESS
Bedside shift change report given to April Beryle Levee Lpn (oncoming nurse) by Kathleen Mays RN (offgoing nurse). Report included the following information SBAR, Kardex, MAR and Recent Results. 24 hour order chart check done. VISUALLY CHECKED PT Q 1 HR BY NURSING STAFF.

## 2017-11-07 NOTE — PROGRESS NOTES
I have reviewed this patient's current medication list and recent laboratory results. At this time, I do not suggest any drug therapy adjustments or additional laboratory monitoring. Thank you,  Jos KINSEY  Ph. M. S.  11/7/2017

## 2017-11-07 NOTE — PROGRESS NOTES
Problem: Mobility Impaired (Adult and Pediatric)  Goal: *Acute Goals and Plan of Care (Insert Text)  PHYSICAL THERAPY STG GOALS :  Initiated 10/13/2017 and to be accomplished within 2 Weeks (updated 10/26/17)    1. Patient will move from supine to sit and sit to supine  and roll side to side in bed with minimal assistance/contact guard assist with WB compliance. (Achieved)  2. Patient will transfer from bed to chair and chair to bed with minimal assistance/contact guard assist using platform walker with WB compliance. (Achieved)  3. Patient will perform sit to stand with minimal assistance/contact guard assist with Fair balance and safety awareness with WB compliance. (Achieved)  4. Patient will ambulate with minimal assistance/contact guard assist for 75 feet with platform walker on level surfaces with 2 turns with WB compliance. (Progressing; 16 ft using platform RW with CGA)  5. Patient will ascend/descend 14 stairs with right handrail(s) withminimal assistance to allow for safe home access/exit with WB compliance. (Attempted, pt currently unable)  6. Patient will improve standardized test score for [place] Standing Balance Scale 1+ with WB compliance. (Achieved)    PHYSICAL THERAPY STG GOALS :  Initiated 10/13/2017 and to be accomplished within 2 Weeks (updated 10/19/17)    1. Patient will move from supine to sit and sit to supine  and roll side to side in bed with minimal assistance/contact guard assist with WB compliance. (Achieved)  2. Patient will transfer from bed to chair and chair to bed with minimal assistance/contact guard assist using platform walker with WB compliance. (Progressing modA)  3. Patient will perform sit to stand with minimal assistance/contact guard assist with Fair balance and safety awareness with WB compliance. (Progressing modA)  4.   Patient will ambulate with minimal assistance/contact guard assist for 75 feet with platform walker on level surfaces with 2 turns with WB compliance. (not tested due to TTWB on LLE)  5. Patient will ascend/descend 14 stairs with right handrail(s) withminimal assistance to allow for safe home access/exit with WB compliance. (Not tested due to WB status)  6. Patient will improve standardized test score for [place] Standing Balance Scale 1+ with WB compliance. PHYSICAL THERAPY LTG GOALS :  Initiated 10/13/2017 and to be accomplished within 4 Weeks    1. Patient will move from supine to sit and sit to supine  and roll side to side in bed with modified independence with WB compliance. 2.  Patient will transfer from bed to chair and chair to bed with modified independence using LRAD with WB compliance. 3.  Patient will perform sit to stand with modified independence with Good balance and safety awareness with WB compliance. 4.  Patient will ambulate with modified independence for 150 feet with LRAD on level surfaces and be able to maneuver through narrow spaces and obstacles without loss of balance with WB compliance. 5.  Patient will ascend/descend 14 stairs with right handrail(s) with supervision/set-up to allow for safe home access/exit with WB compliance. 6.  Patient will improve standardized test score for [place] Standing Balance Scale 3+ to reduce fall risk with WB compliance.       Physical Therapist:   Deedee Rosales, PT  on 10/13/2017              [address] [place] [address]   physical Therapy Daily Treatment note      Patient: Alfredo Pearson (54 y.o. female)               Date: 11/7/2017    Physician: Josué Morrison MD  Primary Diagnosis: left hip fracture  Hip fracture [facility])          Treatment Diagnosis  Treatment Diagnosis: muscle weakness  Treatment Diagnosis 2: increased need for assist w/ self care  Precautions: Fall, WBAT (Splint at L wrist;ROM activities @L wrist)  Vital Signs  Cognitive Status:  Mental Status  Neurologic State: Alert  Orientation Level: Oriented X4  Cognition: Appropriate for age attention/concentration  Pain  Pain Screen  Pain Scale 1: Numeric (0 - 10)  Pain Intensity 1: 0  Pain Onset 1: movement  Pain Location 1: Leg  Pain Orientation 1: Left  Pain Description 1: Aching  Pain Intervention(s) 1: Medication (see MAR)  Patient Stated Pain Goal: 0  Pain Reassessment 1: Yes  Bed Mobility Training  Balance  Sitting: Intact  Sitting - Static: Good (unsupported)  Sitting - Dynamic: Good (unsupported)  Standing: With support  Standing - Static: Good  Standing - Dynamic : Fair (+)  Transfer Training  Transfer Training  Sit to Stand: Stand-by asssistance  Stand to Sit: Stand-by asssistance  Interventions: Safety awareness training  Sit to Stand: Stand-by asssistance  Gait Training  Gait  Base of Support: Center of gravity altered  Speed/Sri: Pace decreased (<100 feet/min)  Step Length: Right shortened;Left shortened  Gait Abnormalities: Decreased step clearance  Ambulation - Level of Assistance: Stand-by asssistance  Distance (ft): 142 Feet (ft)  Assistive Device: Gait belt;Walker, rolling  Rail Use: Both  Stairs - Level of Assistance: Contact guard assistance  Number of Stairs Trained: 6 (4\" steps x2)  Interventions: Safety awareness training;Verbal cues  Gait Abnormalities: Decreased step clearance   With 2 turns. Therapeutic Exercise: Gait training as noted above with w/c follow for safety. Pt with improved step length, but continues with very slow sri. Pt negotiated 6 4\" steps x2 with B HR and CGA with occasional verbal cues for sequencing. Tap-ups on 6\" step with B HR and CGA/Min A with much difficulty noted. Seated B LE TE rendered to promote strength and endurance for ease of ambulation and stair negotiation: HR/TR, LAQ, hip flexion, ball squeezes, resisted hip abd (orange band) 2x20. PT reported feeling slightly nauseated during TE's. Pt stated \" this just happens sometimes\".  Pt remained sitting in w/c at bedside for breakfast.       Patient/Caregiver Education:   Pt Suman Hannon Education on safety and fall prevention, and HEP was provided to reduce risk of falls and maximize functional gains. ASSESSMENT:  Patient continues to benefit from Skilled PT services to improve bed mobility, transfers, strength, balance, gait and stair negotiation. Progression toward goals:  [x]      Improving appropriately and progressing toward goals  [x]      Improving slowly and progressing toward goals  []      Not making progress toward goals and plan of care will be adjusted     Treatment session: 52 minutes.   Therapist:   Suzette Sweet PTA,          11/7/2017

## 2017-11-07 NOTE — PROGRESS NOTES
Problem: Self Care Deficits Care Plan (Adult)  Goal: *Acute Goals and Plan of Care (Insert Text)  OCCUPATIONAL THERAPY SHORT TERM GOALS    Updated 11/02/17    1. Patient will perform Upper body ADL's with adaptive equipment & compensatory techniques as needed with contact guard assist.   2.  Patient will perform Lower body ADL's with adaptive equipment & compensatory techniques as needed Min assistance. 3.  Patient will perform toileting task with SBA with Good safety to reduce falls risk. 4.  Patient will perform toilet transfers with Platform Walker and SBA. 5.  Patient will perform standing static/dynamic balance activities for improved ADL/IADL function with SBA x 1 and Good balance and safety awareness. 6.  Patient will improve Barthel index scores to atleast 70/100 to improve functional mobility. 7.  Patient will tolerate standing x 15 minutes during functional activity while adhering to LLE TTWB And Left wrist NWB utilizing platform walker to increase participation in ADL routine. Updated 10/26/17    1. Patient will perform Upper body ADL's with adaptive equipment & compensatory techniques as needed with minimal assistance/contact guard assist. (goal met-UPG SBA)  2.  Patient will perform Lower body ADL's with adaptive equipment & compensatory techniques as needed moderate assistance. (goal met-UPG Min A)  3. Patient will perform toileting task with moderate assistance  with Good safety to reduce falls risk. (goal met-UPG SBA)  4. Patient will perform toilet transfers with Platform Walker and moderate assistance x 1.(goal met-UPG SBA)  5. Patient will perform standing static/dynamic balance activities for improved ADL/IADL function with moderate assistance x 1 and Good balance and safety awareness. (goal met-UPG SBA)  6. Patient will improve Barthel index scores to atleast 50/100 to improve functional mobility. (goal met-UPG 70/100)  7.   Patient will tolerate standing x 5 minutes during functional activity while adhering to LLE TTWB And Left wrist NWB utilizing platform walker to increase participation in ADL routine. (goal met-UPG 15)       Initiated 10/13/2017 and to be accomplished within 2 Week(s)    1. Patient will perform Upper body ADL's with adaptive equipment & compensatory techniques as needed with minimal assistance/contact guard assist. (progressing)  2. Patient will perform Lower body ADL's with adaptive equipment & compensatory techniques as needed moderate assistance. (progressing)  3. Patient will perform toileting task with moderate assistance x 2 with Good safety to reduce falls risk. (goal met-UPG Mod A)  4. Patient will perform toilet transfers with Platform Walker and moderate assistance x 2. (goal met-UPG Mod A x 1)  5. Patient will perform standing static/dynamic balance activities for improved ADL/IADL function with moderate assistance x,2 and Good balance and safety awareness. (goal met-UPG Mod A)  6. Patient will improve Barthel index scores to atleast 35/100 to improve functional mobility. (goal met-UPG 60/100)  7. Patient will tolerate standing x 5 minutes during functional activity while adhering to LLE TTWB And Left wrist NWB utilizing platform walker to increase participation in ADL routine. OCCUPATIONAL THERAPY LONG TERM GOALS   Initiated 10/13/2017 and to be accomplished within 4 Week(s)    1. Patient will perform Upper body ADL's with adaptive equipment & compensatory techniques as needed with supervision/set-up. 2.  Patient will perform Lower body ADL's with adaptive equipment & compensatory techniques as needed with         supervision/set-up . 3. Patient will perform toileting task with supervision/set-up with Good safety to reduce falls risk. 4.  Patient will perform functional transfers with Platform Walker and  supervision/set-up and Good balance and safety awareness.   5.  Patient will perform standing static/dynamic activity for improved ADL/IADL function    with supervision/set-up an and Good balance and safety awareness. 6.  Patient will improve Barthel index score to 50/100 to improve independence with mobility. Therapist: Neela Davis    10/13/2017             [facility]   Occupational Therapy Daily Treatment note      Patient: Garry Young (76 y.o. female)       Date: 11/7/2017  Attending Physician: Viviana Olivera MD  Primary Diagnosis: left hip fracture  Hip fracture [facility])    Treatment Diagnosis  Treatment Diagnosis: muscle weakness  Treatment Diagnosis 2: increased need for assist w/ self care   Precautions : Precautions at Admission: Fall, WBAT (Splint at L wrist;ROM activities @L wrist)  Vital Signs:  Vital Signs  Pulse (Heart Rate): 73  BP: (!) 155/110     Cognitive Status:     Pain:  Pain Screen  Pain Scale 1: Numeric (0 - 10)  Pain Intensity 1: 0  Pain Onset 1: movement  Pain Location 1: Leg  Pain Orientation 1: Left  Pain Description 1: Aching  Pain Intervention(s) 1: Medication (see MAR)  Patient Stated Pain Goal: 0  Pain Reassessment 1: Yes  Pain Scale 1: Numeric (0 - 10)  Gross Assessment:     Coordination:     Bed Mobility:  Bed Mobility  Supine to Sit: Contact guard assistance; Additional time  Transfers:  Functional Transfers  Sit to Stand: Stand-by asssistance  Stand to Sit: Stand-by asssistance  Tub Transfer: Modified independent  Functional Transfers  Bathroom Mobility: Modified independent (utilizing w/c)  Tub Transfer: Modified independent  Balance:  Balance  Sitting: Intact  Sitting - Static: Good (unsupported)  Sitting - Dynamic: Good (unsupported)  Standing: With support  Standing - Static: Good  Standing - Dynamic : Fair (+)          Therapeutic Activities:  Folding laundry task, at standing, in order to challenge balance and stability as well as increase standing balance and tolerance needed for ADLS. Patient was able to complete task with SBA, F+ for 6 mins prior to requesting to sit. Shadowed patient with bathroom mobility utilizing w/c in order to assess safety and independence with toileting task. Cueing needed for patient to avoid extensive turns and reaching over sink during hand washing task at standing to reduce risk for falls. KIM cleared patient to only complete task when OOB and utilizing w/c for optimal safety and independence. Therapeutic Exercises:  Power Web exercises utilizing L LE, in order to increase  strengthening and ROM needed for ADLS. UB strengthening with 2#, L LE 2 sets x 20 reps in order to increase functional activity tolerance and UB muscle strength needed for ADLs. Patient/Caregiver Education:    Pt. Anderson Arriaga Education on see above.       ASSESSMENT:  Patient continues to demonstrate the need for skilled Occupational Therapy services to improve independencde with showering  Progression toward goals:  [x]      Improving appropriately and progressing toward goals  []      Improving slowly and progressing toward goals  []      Not making progress toward goals and plan of care will be adjusted     Treatment session:  43 minutes    Therapist:    JUDY Najera,  11/7/2017

## 2017-11-07 NOTE — PROGRESS NOTES
Nutrition follow up-[facility]/  Plan of care      RECOMMENDATIONS:     1. No concentrated sweets diet  2. SF CIB daily  3. Monitor weight, labs and PO intake  4. RD to follow     GOALS:     1. Ongoing: PO intake meets >75% of protein/calorie needs by 11/14  2. Met/Ongoing: Weight Maintenance (+/- 1-2 lb by 11/14)    ASSESSMENT:     Weight status is classified as obese per BMI of 35.3. PO intake is variable. Weight stable over past week. New labs n/a on record. Nutrition recommendations listed. RD to follow. Nutrition Risk:  [] High  [x] Moderate []  Low    SUBJECTIVE/OBJECTIVE:      Transferred from  to [facility] on 10/20/17. S/P femur insertion intra medullary nail short for left hip fracture on 1/10/17. Patient with gradual weight loss over past year. Patient reports poor appetite. Had 75% of soup during lunch meal but reported eating most of breakfast this morning. Patient getting tired of Ensure and would like to try SF CIB supplements. Will implement request. Encouraged adequate intake. Will monitor.     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    Meningioma (HCC) D32.9    Hip fracture ([facility] [address] S72.009A     Past Medical History:   Diagnosis Date    Arthritis     Bronchitis     Diabetes ([address]     Diverticulitis     GERD (gastroesophageal reflux disease)     Hypercholesterolemia     Hypertension     Hypothyroid     Pancreatitis       Labs:    Lab Results Component Value Date/Time    Sodium 138 10/12/2017 04:00 AM    Potassium 3.7 10/12/2017 04:00 AM    Chloride 102 10/12/2017 04:00 AM    CO2 23 10/12/2017 04:00 AM    Anion gap 13 10/12/2017 04:00 AM    Glucose 116 10/12/2017 04:00 AM    BUN 14 10/12/2017 04:00 AM    Creatinine 0.89 10/12/2017 04:00 AM    Calcium 7.1 10/12/2017 04:00 AM    Magnesium 1.4 12/04/2009 06:45 AM    Phosphorus 3.5 12/04/2009 06:45 AM    Albumin 3.9 04/20/2017 11:23 AM     Anthropometrics: BMI (calculated): 35.3  Last 3 Recorded Weights in this Encounter    10/24/17 1333 10/31/17 1248 11/07/17 1550   Weight: 78.6 kg (173 lb 3.2 oz) 81.2 kg (179 lb) 81.9 kg (180 lb 9.6 oz)      Ht Readings from Last 1 Encounters:   11/07/17 5' (1.524 m)     No data found.    [] Weight Loss [] Weight Gain (3% x 1 week) [x] Weight Stable    Nutrition Needs:   Calories: 0194-0050 Kcal   Protein:    g      [x] No Cultural, Rastafari or ethnic dietary need identified.     [] Cultural, Rastafari and ethnic food preferences identified and addressed     Wt Status:  [] Normal (18.6 - 24.9) [] Underweight (<18.5) [] Overweight (25 - 29.9)   [] Mild Obesity (30 - 34.9)  [x] Moderate Obesity (35 - 39.9) [] Morbid Obesity (40+)     Nutrition Problems Identified:   [x] Fair PO intake   [] Food Allergies  [] Difficulty chewing/swallowing/poor dentition  [] Constipation/Diarrhea   [] Nausea/Vomiting   [] None  [] Other:     Plan:   [x] Therapeutic Diet  [x]  Obtained/adjusted food preferences/tolerances and/or snacks options   [x]  Dietary supplements (CIB daily)  [] Occupational therapy following for feeding techniques  []  HS snack added   []  Modify diet texture   []  Modify diet for food allergies   []  Assist with menu selection   [x]  Monitor PO intake on meal rounds   [x]  Continue inpatient monitoring and intervention   [x]  Participated in discharge planning/Interdisciplinary rounds/Team meetings   []  Other:     Education Needs:   [] Not appropriate for teaching at this time due to:   [x] Identified and addressed    Nutrition Monitoring and Evaluation:  [x] Continue ongoing monitoring and intervention  [] Mike Mendoza

## 2017-11-08 PROCEDURE — 74011250636 HC RX REV CODE- 250/636: Performed by: INTERNAL MEDICINE

## 2017-11-08 PROCEDURE — 74011250637 HC RX REV CODE- 250/637: Performed by: INTERNAL MEDICINE

## 2017-11-08 RX ADMIN — ENOXAPARIN SODIUM 40 MG: 40 INJECTION SUBCUTANEOUS at 08:55

## 2017-11-08 RX ADMIN — OXYCODONE HYDROCHLORIDE AND ACETAMINOPHEN 2 TABLET: 5; 325 TABLET ORAL at 18:33

## 2017-11-08 RX ADMIN — ALUMINUM HYDROXIDE, MAGNESIUM HYDROXIDE, AND SIMETHICONE 30 ML: 200; 200; 20 SUSPENSION ORAL at 21:49

## 2017-11-08 RX ADMIN — PANTOPRAZOLE SODIUM 40 MG: 40 TABLET, DELAYED RELEASE ORAL at 08:49

## 2017-11-08 RX ADMIN — TRIAMCINOLONE ACETONIDE: 1 CREAM TOPICAL at 09:00

## 2017-11-08 RX ADMIN — METFORMIN HYDROCHLORIDE 1000 MG: 500 TABLET ORAL at 08:00

## 2017-11-08 RX ADMIN — BISMUTH SUBSALICYLATE 30 ML: 262 LIQUID ORAL at 15:09

## 2017-11-08 RX ADMIN — TRIAMCINOLONE ACETONIDE: 1 CREAM TOPICAL at 18:34

## 2017-11-08 RX ADMIN — LEVOTHYROXINE SODIUM 75 MCG: 75 TABLET ORAL at 05:38

## 2017-11-08 RX ADMIN — CELECOXIB 200 MG: 100 CAPSULE ORAL at 09:00

## 2017-11-08 RX ADMIN — LOSARTAN POTASSIUM 25 MG: 50 TABLET ORAL at 08:50

## 2017-11-08 RX ADMIN — CELECOXIB 200 MG: 100 CAPSULE ORAL at 17:15

## 2017-11-08 RX ADMIN — OXYCODONE HYDROCHLORIDE AND ACETAMINOPHEN 2 TABLET: 5; 325 TABLET ORAL at 06:42

## 2017-11-08 RX ADMIN — CHOLECALCIFEROL (VITAMIN D3) 25 MCG (1,000 UNIT) TABLET 2000 UNITS: at 08:49

## 2017-11-08 RX ADMIN — BISMUTH SUBSALICYLATE 30 ML: 262 LIQUID ORAL at 21:46

## 2017-11-08 RX ADMIN — OXYCODONE HYDROCHLORIDE AND ACETAMINOPHEN 2 TABLET: 5; 325 TABLET ORAL at 21:47

## 2017-11-08 RX ADMIN — SIMVASTATIN 20 MG: 20 TABLET, FILM COATED ORAL at 21:47

## 2017-11-08 RX ADMIN — ONDANSETRON 4 MG: 4 TABLET, ORALLY DISINTEGRATING ORAL at 21:46

## 2017-11-08 RX ADMIN — METFORMIN HYDROCHLORIDE 1000 MG: 500 TABLET ORAL at 17:14

## 2017-11-08 RX ADMIN — DOCUSATE SODIUM 100 MG: 100 CAPSULE, LIQUID FILLED ORAL at 09:00

## 2017-11-08 NOTE — PROGRESS NOTES
Late entry: family conference was held earlier today with pt and her son with Lokesh Be, PT, Edwin Kimble and PT student Joelle Eng to discuss pt's progress and d/c needs. Pt is progressing with therapy but stairs are a barrier to her safely discharging home. Pt was given a d/c date of 11/21/17. Pt informed that she can receive home health and that SW will verify her Medicaid benefits to see if she has regular Medicaid to obtain personal care services at home. Pt also inquired about a stair-lift. SW agreed to location information for pt re: this. Sw will follow.

## 2017-11-08 NOTE — ROUTINE PROCESS
Bedside shift change report given to Fanny Vernon rn (oncoming nurse) by austin rivera (offgoing nurse). Report included the following information Kardex, MAR and Recent Results.

## 2017-11-08 NOTE — PROGRESS NOTES
Problem: Mobility Impaired (Adult and Pediatric)  Goal: *Acute Goals and Plan of Care (Insert Text)  PHYSICAL THERAPY STG GOALS :   Initiated 10/13/2017 and to be accomplished within 2 Weeks (updated 11/02/17)    1. Patient will move from supine to sit and sit to supine  and roll side to side in bed with minimal assistance/contact guard assist with WB compliance. (Achieved)  2. Patient will transfer from bed to chair and chair to bed with minimal assistance/contact guard assist using platform walker with WB compliance. (Achieved)  3. Patient will perform sit to stand with minimal assistance/contact guard assist with Fair balance and safety awareness with WB compliance. (Achieved)  4. Patient will ambulate with minimal assistance/contact guard assist for 75 feet with platform walker on level surfaces with 2 turns with WB compliance. (Progressing; 46 ft using platform RW with CGA)  5. Patient will ascend/descend 14 stairs with right handrail(s) withminimal assistance to allow for safe home access/exit with WB compliance. (Attempted, pt completing stair training in // bars with 2 inch step,CGA/min A)  6. Patient will improve standardized test score for [place] Standing Balance Scale 1+ with WB compliance. (Achieved)      PHYSICAL THERAPY STG GOALS :   Initiated 10/13/2017 and to be accomplished within 2 Weeks (updated 10/26/17)    1. Patient will move from supine to sit and sit to supine  and roll side to side in bed with minimal assistance/contact guard assist with WB compliance. (Achieved)  2. Patient will transfer from bed to chair and chair to bed with minimal assistance/contact guard assist using platform walker with WB compliance. (Achieved)  3. Patient will perform sit to stand with minimal assistance/contact guard assist with Fair balance and safety awareness with WB compliance. (Achieved)  4.   Patient will ambulate with minimal assistance/contact guard assist for 75 feet with platform walker on level surfaces with 2 turns with WB compliance. (Progressing; 16 ft using platform RW with CGA)  5. Patient will ascend/descend 14 stairs with right handrail(s) withminimal assistance to allow for safe home access/exit with WB compliance. (Attempted, pt currently unable)  6. Patient will improve standardized test score for [place] Standing Balance Scale 1+ with WB compliance. (Achieved)    PHYSICAL THERAPY STG GOALS :  Initiated 10/13/2017 and to be accomplished within 2 Weeks (updated 10/19/17)    1. Patient will move from supine to sit and sit to supine  and roll side to side in bed with minimal assistance/contact guard assist with WB compliance. (Achieved)  2. Patient will transfer from bed to chair and chair to bed with minimal assistance/contact guard assist using platform walker with WB compliance. (Progressing modA)  3. Patient will perform sit to stand with minimal assistance/contact guard assist with Fair balance and safety awareness with WB compliance. (Progressing modA)  4. Patient will ambulate with minimal assistance/contact guard assist for 75 feet with platform walker on level surfaces with 2 turns with WB compliance. (not tested due to TTWB on LLE)  5. Patient will ascend/descend 14 stairs with right handrail(s) withminimal assistance to allow for safe home access/exit with WB compliance. (Not tested due to WB status)  6. Patient will improve standardized test score for [place] Standing Balance Scale 1+ with WB compliance. PHYSICAL THERAPY LTG GOALS :  Initiated 10/13/2017 and to be accomplished within 4 Weeks    1. Patient will move from supine to sit and sit to supine  and roll side to side in bed with modified independence with WB compliance. 2.  Patient will transfer from bed to chair and chair to bed with modified independence using LRAD with WB compliance.   3.  Patient will perform sit to stand with modified independence with Good balance and safety awareness with WB compliance. 4.  Patient will ambulate with modified independence for 150 feet with LRAD on level surfaces and be able to maneuver through narrow spaces and obstacles without loss of balance with WB compliance. 5.  Patient will ascend/descend 14 stairs with right handrail(s) with supervision/set-up to allow for safe home access/exit with WB compliance. 6.  Patient will improve standardized test score for [place] Standing Balance Scale 3+ to reduce fall risk with WB compliance.       Physical Therapist:   Brandi Rincon PT  on 10/13/2017               [address] [place] [address]   physical Therapy Daily Treatment note      Patient: Qamar Chau (69 y.o. female)               Date: 11/8/2017    Physician: Stephon Mir MD  Primary Diagnosis: left hip fracture  Hip fracture [facility])          Treatment Diagnosis  Treatment Diagnosis: muscle weakness  Treatment Diagnosis 2: increased need for assist w/ self care  Precautions: Fall, WBAT (Splint at L wrist;ROM activities @L wrist)  Vital Signs  Vital Signs  Pulse (Heart Rate): 85  BP: 158/78     Cognitive Status:     Pain  Pain Screen  Pain Scale 1: Numeric (0 - 10)  Pain Intensity 1: 0  Pain Onset 1: movement  Pain Location 1: Leg  Pain Orientation 1: Left  Pain Description 1: Aching  Pain Intervention(s) 1: Elevation;Medication (see MAR)  Patient Stated Pain Goal: 0  Pain Reassessment 1: Yes  Bed Mobility Training  Bed Mobility Training  Supine to Sit: Supervision  Balance  Sitting: Intact  Sitting - Static: Good (unsupported)  Sitting - Dynamic: Good (unsupported)  Standing: With support  Standing - Static: Fair (+)  Standing - Dynamic : Fair (+)  Transfer Training  Transfer Training  Sit to Stand: Stand-by asssistance  Stand to Sit: Stand-by asssistance  Bed to Chair: Stand-by asssistance  Sit to Stand: Stand-by asssistance  Gait Training  Gait  Rail Use: Right  (BHR x 5 steps, R HR + quad cane x 5 steps)  Stairs - Level of Assistance: Contact guard assistance  Number of Stairs Trained: 10  Interventions: Safety awareness training;Verbal cues                    Therapeutic Exercise:    Family meeting with pt, son Enriqueta eB, student PT, and PT. Recommended additional time to address stairs as pt has no available downstairs set-up at home. Family training will be held with son for safe stair negotiation. D/c est for 11/21/17. Stair training x 10 stairs using B HR x 5 stairs, and R HR + quad cane; all with CGA and verbal cueing for proper sequencing. Son Ml Hughes present for training. Continue POC. Patient/Caregiver Education:   Pt /Caregiver Education on safety and fall prevention to reduce fall risk. ASSESSMENT:  Patient continues to benefit from Skilled PT services to improve strength, mobility, gait, balance, stair negotiation. Progression toward goals:  []      Improving appropriately and progressing toward goals  [x]      Improving slowly and progressing toward goals  []      Not making progress toward goals and plan of care will be adjusted     Treatment session: 47 minutes.   Therapist:   Ovi Garnica, PT,          11/8/2017

## 2017-11-08 NOTE — PROGRESS NOTES
Problem: Mobility Impaired (Adult and Pediatric)  Goal: *Acute Goals and Plan of Care (Insert Text)  PHYSICAL THERAPY STG GOALS :   Initiated 10/13/2017 and to be accomplished within 2 Weeks (updated 11/02/17)    1. Patient will move from supine to sit and sit to supine  and roll side to side in bed with minimal assistance/contact guard assist with WB compliance. (Achieved)  2. Patient will transfer from bed to chair and chair to bed with minimal assistance/contact guard assist using platform walker with WB compliance. (Achieved)  3. Patient will perform sit to stand with minimal assistance/contact guard assist with Fair balance and safety awareness with WB compliance. (Achieved)  4. Patient will ambulate with minimal assistance/contact guard assist for 75 feet with platform walker on level surfaces with 2 turns with WB compliance. (Progressing; 46 ft using platform RW with CGA)  5. Patient will ascend/descend 14 stairs with right handrail(s) withminimal assistance to allow for safe home access/exit with WB compliance. (Attempted, pt completing stair training in // bars with 2 inch step,CGA/min A)  6. Patient will improve standardized test score for [place] Standing Balance Scale 1+ with WB compliance. (Achieved)      PHYSICAL THERAPY STG GOALS :   Initiated 10/13/2017 and to be accomplished within 2 Weeks (updated 10/26/17)    1. Patient will move from supine to sit and sit to supine  and roll side to side in bed with minimal assistance/contact guard assist with WB compliance. (Achieved)  2. Patient will transfer from bed to chair and chair to bed with minimal assistance/contact guard assist using platform walker with WB compliance. (Achieved)  3. Patient will perform sit to stand with minimal assistance/contact guard assist with Fair balance and safety awareness with WB compliance. (Achieved)  4.   Patient will ambulate with minimal assistance/contact guard assist for 75 feet with platform walker on level surfaces with 2 turns with WB compliance. (Progressing; 16 ft using platform RW with CGA)  5. Patient will ascend/descend 14 stairs with right handrail(s) withminimal assistance to allow for safe home access/exit with WB compliance. (Attempted, pt currently unable)  6. Patient will improve standardized test score for [place] Standing Balance Scale 1+ with WB compliance. (Achieved)    PHYSICAL THERAPY STG GOALS :  Initiated 10/13/2017 and to be accomplished within 2 Weeks (updated 10/19/17)    1. Patient will move from supine to sit and sit to supine  and roll side to side in bed with minimal assistance/contact guard assist with WB compliance. (Achieved)  2. Patient will transfer from bed to chair and chair to bed with minimal assistance/contact guard assist using platform walker with WB compliance. (Progressing modA)  3. Patient will perform sit to stand with minimal assistance/contact guard assist with Fair balance and safety awareness with WB compliance. (Progressing modA)  4. Patient will ambulate with minimal assistance/contact guard assist for 75 feet with platform walker on level surfaces with 2 turns with WB compliance. (not tested due to TTWB on LLE)  5. Patient will ascend/descend 14 stairs with right handrail(s) withminimal assistance to allow for safe home access/exit with WB compliance. (Not tested due to WB status)  6. Patient will improve standardized test score for [place] Standing Balance Scale 1+ with WB compliance. PHYSICAL THERAPY LTG GOALS :  Initiated 10/13/2017 and to be accomplished within 4 Weeks    1. Patient will move from supine to sit and sit to supine  and roll side to side in bed with modified independence with WB compliance. 2.  Patient will transfer from bed to chair and chair to bed with modified independence using LRAD with WB compliance.   3.  Patient will perform sit to stand with modified independence with Good balance and safety awareness with WB compliance. 4.  Patient will ambulate with modified independence for 150 feet with LRAD on level surfaces and be able to maneuver through narrow spaces and obstacles without loss of balance with WB compliance. 5.  Patient will ascend/descend 14 stairs with right handrail(s) with supervision/set-up to allow for safe home access/exit with WB compliance. 6.  Patient will improve standardized test score for [place] Standing Balance Scale 3+ to reduce fall risk with WB compliance. Physical Therapist:   Anh Saez PT  on 10/13/2017              [facility], [place]   PHYSICAL THERAPY WEEKLY PROGRESS REPORT  Reporting Period:  Date:   10/26/17*  to 11/02/17      Patient: Saba Maria (39 y.o. female)                         Date: 11/8/2017    Primary Diagnosis: left hip fracture  Hip fracture [facility])      Attending Physician: Sadiq Parsons MD   Treatment Diagnosis  Treatment Diagnosis: muscle weakness  Treatment Diagnosis 2: difficulty in walking  Precautions:  Fall, WBAT (Splint at L wrist;ROM activities @L wrist)  Rehab Potential : Good:    Skill interventions and education provided with clinical rationale (include individualized treatment techniques and standardized tests):   Skilled Physical Therapy services were provided with: TE rendered to address strength, endurance, mobility. TA rendered to address bed mobility, sit <> stand and bed <> w/c transfers. Gait training to address gait deficits, using platform walker for compliance with NWB through L wrist with splint donned. Using a comparative statement, summarize significant progress toward goals as a result of skilled intervention provided:  Patient has made Fair progress towards their Physical Therapy goals in the areas of bed mobility, transfers, ambulation, balance. Pt has achieved 4/6 STGs.     Identify remaining functional areas, impairments limiting progress and/or barriers to improvement:  Patient would benefit from continues PT services to address the following functional deficits in strength, endurance, mobility, ambulation, balance.  Barriers to improvement include: pain, nausea and vomiting, NWB through L wrist.     OBJECTIVE DATA SUMMARY:     INITIAL ASSESSMENT WEEKLY ASSESSMENT   COGNITIVE STATUS COGNITIVE STATUS   Neurologic State: Alert, Appropriate for age  Orientation Level: Oriented X4  Cognition: Appropriate for age attention/concentration  Perception: Appears intact  Perseveration: No perseveration noted  Safety/Judgement: Fall prevention Neurologic State: Alert, Appropriate for age  Orientation Level: Oriented X4  Cognition: Appropriate for age attention/concentration  Perception: Appears intact  Perseveration: No perseveration noted  Safety/Judgement: Fall prevention   PAIN PAIN   Pain Scale 1: Numeric (0 - 10)  Pain Intensity 1: 0  Pain Onset 1: now  Pain Location 1: Hip, Hand  Pain Orientation 1: Left  Pain Description 1: Aching  Pain Intervention(s) 1: Elevation, Ice, Medication (see MAR)  Patient Stated Pain Goal: 0  Pain Reassessment 1: Yes Pain Scale 1: Numeric (0 - 10)  Pain Intensity 1: 0  Pain Onset 1: movement  Pain Location 1: Leg  Pain Orientation 1: Left  Pain Description 1: Aching  Pain Intervention(s) 1: Elevation, Medication (see MAR)  Patient Stated Pain Goal: 0  Pain Reassessment 1: Yes   GROSS ASSESSMENT GROSS ASSESSMENT   AROM: Generally decreased, functional  PROM: Generally decreased, functional  Strength: Generally decreased, functional (RLE 3+/5 grossly, LLE demonstrates 3/5)  Coordination: Generally decreased, functional  Tone: Normal  Sensation: Intact AROM: Generally decreased, functional  PROM: Generally decreased, functional  Strength: Generally decreased, functional  Coordination: Generally decreased, functional  Tone: Normal  Sensation: Intact   BED MOBILITY BED MOBILITY   Rolling: Stand-by asssistance  Supine to Sit: Maximum assistance  Sit to Supine: Maximum assistance  Scooting: Maximum assistance Rolling: Stand-by asssistance  Supine to Sit: Supervision  Sit to Supine: Moderate assistance  Scooting: Stand-by asssistance   GAIT GAIT   Base of Support: Center of gravity altered  Speed/Polly: Slow  Step Length: Left shortened, Right shortened  Stance: Left decreased  Gait Abnormalities: Decreased step clearance  Ambulation - Level of Assistance:  Moderate assistance, Additional time  Distance (ft): 5 Feet (ft)  Assistive Device: Other (comment) (Rolling walker with platform on left)  Rail Use: Both  Stairs - Level of Assistance: Contact guard assistance  Number of Stairs Trained: 3 (4\" steps x2)  Interventions: Safety awareness training, Verbal cues Base of Support: Center of gravity altered  Speed/Polly: Pace decreased (<100 feet/min)  Step Length: Right shortened, Left shortened  Stance: Left decreased  Gait Abnormalities: Decreased step clearance  Ambulation - Level of Assistance: Stand-by asssistance  Distance (ft): 142 Feet (ft)  Assistive Device: Gait belt, Walker, rolling  Rail Use: Right  (BHR x 5 steps, R HR + quad cane x 5 steps)  Stairs - Level of Assistance: Contact guard assistance  Number of Stairs Trained: 10  Interventions: Safety awareness training, Verbal cues    (unable to assess)  (unable to assess)               TRANSFERS TRANSFERS   Sit to Stand: Maximum assistance  Stand to Sit: Moderate assistance  Bed to Chair: Maximum assistance (x2) Sit to Stand: Stand-by asssistance  Stand to Sit: Stand-by asssistance  Bed to Chair: Stand-by asssistance   BALANCE BALANCE   Sitting: Impaired, With support  Sitting - Static: Fair (occasional) (+)  Sitting - Dynamic: Fair (occasional)  Standing: Impaired  Standing - Static: Poor  Standing - Dynamic : Fair Sitting: Intact  Sitting - Static: Good (unsupported)  Sitting - Dynamic: Good (unsupported)  Standing: With support  Standing - Static: Fair (+)  Standing - Dynamic : Fair (+)   WHEELCHAIR MOBILITY/MGMT WHEELCHAIR MOBILITY/MGMT         Activity Tolerance:  Fair Activity Tolerance: Fair   Visual/Perceptual   Corrective Lenses: Glasses      Visual/Perceptual   Vision  Corrective Lenses: Glasses        Auditory:   Auditory Impairment: None    Auditory:   Auditory  Auditory Impairment: None       Steps: both, training in // bars   Clinical Decision makin+ Clinical Decision makin     Treatment:   Please see treatment note from 17 for details on tx. Patient's response to treatment rendered:      Patient expected Discharge Location:  [x]Private Residence  [] DREW/ILF  []LTC  []Other:    Plan: Continue Skilled PT services as established by the Plan of Care for 5-6 times a week. PT and Assistant have had a weekly case conference regarding the above treatment:  [x] Yes     [] No       Treatment session:  -- minutes. Therapist: Susannah Bhatt, PT       Date:2017, late entry for 17  Forward to PT for co-signature when completed.

## 2017-11-08 NOTE — ROUTINE PROCESS
Bedside and Verbal shift change report given to Cisco Payne (oncoming nurse) by Va Martinez RN (offgoing nurse). Report included the following information SBAR, Kardex and MAR. QH VISUAL CHECKS DONE. AND 24H CHART CHECKS DONE.

## 2017-11-08 NOTE — PROGRESS NOTES
Problem: Self Care Deficits Care Plan (Adult)  Goal: *Acute Goals and Plan of Care (Insert Text)  OCCUPATIONAL THERAPY SHORT TERM GOALS    Updated 11/02/17    1. Patient will perform Upper body ADL's with adaptive equipment & compensatory techniques as needed with contact guard assist.   2.  Patient will perform Lower body ADL's with adaptive equipment & compensatory techniques as needed Min assistance. 3.  Patient will perform toileting task with SBA with Good safety to reduce falls risk. 4.  Patient will perform toilet transfers with Platform Walker and SBA. 5.  Patient will perform standing static/dynamic balance activities for improved ADL/IADL function with SBA x 1 and Good balance and safety awareness. 6.  Patient will improve Barthel index scores to atleast 70/100 to improve functional mobility. 7.  Patient will tolerate standing x 15 minutes during functional activity while adhering to LLE TTWB And Left wrist NWB utilizing platform walker to increase participation in ADL routine. Updated 10/26/17    1. Patient will perform Upper body ADL's with adaptive equipment & compensatory techniques as needed with minimal assistance/contact guard assist. (goal met-UPG SBA)  2.  Patient will perform Lower body ADL's with adaptive equipment & compensatory techniques as needed moderate assistance. (goal met-UPG Min A)  3. Patient will perform toileting task with moderate assistance  with Good safety to reduce falls risk. (goal met-UPG SBA)  4. Patient will perform toilet transfers with Platform Walker and moderate assistance x 1.(goal met-UPG SBA)  5. Patient will perform standing static/dynamic balance activities for improved ADL/IADL function with moderate assistance x 1 and Good balance and safety awareness. (goal met-UPG SBA)  6. Patient will improve Barthel index scores to atleast 50/100 to improve functional mobility. (goal met-UPG 70/100)  7.   Patient will tolerate standing x 5 minutes during functional activity while adhering to LLE TTWB And Left wrist NWB utilizing platform walker to increase participation in ADL routine. (goal met-UPG 15)       Initiated 10/13/2017 and to be accomplished within 2 Week(s)    1. Patient will perform Upper body ADL's with adaptive equipment & compensatory techniques as needed with minimal assistance/contact guard assist. (progressing)  2. Patient will perform Lower body ADL's with adaptive equipment & compensatory techniques as needed moderate assistance. (progressing)  3. Patient will perform toileting task with moderate assistance x 2 with Good safety to reduce falls risk. (goal met-UPG Mod A)  4. Patient will perform toilet transfers with Platform Walker and moderate assistance x 2. (goal met-UPG Mod A x 1)  5. Patient will perform standing static/dynamic balance activities for improved ADL/IADL function with moderate assistance x,2 and Good balance and safety awareness. (goal met-UPG Mod A)  6. Patient will improve Barthel index scores to atleast 35/100 to improve functional mobility. (goal met-UPG 60/100)  7. Patient will tolerate standing x 5 minutes during functional activity while adhering to LLE TTWB And Left wrist NWB utilizing platform walker to increase participation in ADL routine. OCCUPATIONAL THERAPY LONG TERM GOALS   Initiated 10/13/2017 and to be accomplished within 4 Week(s)    1. Patient will perform Upper body ADL's with adaptive equipment & compensatory techniques as needed with supervision/set-up. 2.  Patient will perform Lower body ADL's with adaptive equipment & compensatory techniques as needed with         supervision/set-up . 3. Patient will perform toileting task with supervision/set-up with Good safety to reduce falls risk. 4.  Patient will perform functional transfers with Platform Walker and  supervision/set-up and Good balance and safety awareness.   5.  Patient will perform standing static/dynamic activity for improved ADL/IADL function    with supervision/set-up an and Good balance and safety awareness. 6.  Patient will improve Barthel index score to 50/100 to improve independence with mobility. Therapist: Tree Overton    10/13/2017             [facility]   Occupational Therapy Daily Treatment note      Patient: Venita Ortega (37 y.o. female)       Date: 11/8/2017  Attending Physician: Shane Lester MD  Primary Diagnosis: left hip fracture  Hip fracture [facility])    Treatment Diagnosis  Treatment Diagnosis: muscle weakness  Treatment Diagnosis 2: increased need for assist w/ self care   Precautions : Precautions at Admission: Fall, WBAT (Splint at L wrist;ROM activities @L wrist)  Vital Signs:  Vital Signs  Pulse (Heart Rate): 85  BP: 158/78     Cognitive Status:  Mental Status  Neurologic State: Alert; Appropriate for age  Orientation Level: Oriented X4  Cognition: Appropriate for age attention/concentration  Pain:  Pain Screen  Pain Scale 1: Numeric (0 - 10)  Pain Intensity 1: 0  Pain Onset 1: movement  Pain Location 1: Leg  Pain Orientation 1: Left  Pain Description 1: Aching  Pain Intervention(s) 1: Elevation;Medication (see MAR)  Patient Stated Pain Goal: 0  Pain Reassessment 1: Yes  Pain Scale 1: Numeric (0 - 10)  Gross Assessment:     Coordination:     Bed Mobility:  Bed Mobility  Supine to Sit: Supervision  Transfers:  Functional Transfers  Sit to Stand: Stand-by asssistance  Bed to Chair: Stand-by asssistance  Toilet Transfer : Modified independent  Functional Transfers  Bathroom Mobility: Modified independent  Toilet Transfer : Modified independent  Balance:  Balance  Sitting: Intact  Sitting - Static: Good (unsupported)  Sitting - Dynamic: Good (unsupported)  Standing: With support  Standing - Static: Good  Standing - Dynamic : Fair (+)  ADL Self Care:     ADL Intervention:  Upper Body Bathing  Bathing Assistance: Supervision/set-up  Position Performed: Seated edge of bed  Upper Body Dressing Assistance  Dressing Assistance: Modified independent  Pullover Shirt: Modified independent  Lower Body Bathing  Bathing Assistance: Supervision/set-up  Perineal  : Supervision/set-up  Position Performed: Standing  Lower Body : Supervision/set-up  Position Performed: Seated in chair        Lower Body Dressing Assistance  Dressing Assistance: Supervision/set up  Underpants: Supervision/set-up  Pants With Elastic Waist: Supervision/set-up  Socks: Minimum assistance  Leg Crossed Method Used: No  Position Performed: Bending forward method;Seated in chair          Therapeutic Activities:  Shadowed patient with bed mobility, bed <>w/c transfers, bathroom mobility utilizing w/c, and morning ADLS in order to assess safety and independence during task. See above for levels of A needed. Patient continues to require min cueing for safety techniques (avoiding extensive turns and picking up items at standing) to reduce risk for falls. Collaborated with PT, SW, patients family and patient regarding current progression towards OT goals and discharge recommendations. JUDY informed patient's son, patient has demonstrated increased progression towards OT goals however currently completing ADLS with Supervision and w/c level. JUDY also informed patient and patient's son we will increase independence with ADL tasks and transfers utilizing RW for optimal independence and safety upon return home. Patient/Caregiver Education:    PtJhony Galan Education on see above.       ASSESSMENT:  Patient continues to demonstrate the need for skilled Occupational Therapy services to improve dynamic standing balance needed for bathing  Progression toward goals:  [x]      Improving appropriately and progressing toward goals  []      Improving slowly and progressing toward goals  []      Not making progress toward goals and plan of care will be adjusted     Treatment session:  72 minutes    Therapist:    JUDY Palma,  11/8/2017

## 2017-11-09 PROCEDURE — 74011250636 HC RX REV CODE- 250/636: Performed by: INTERNAL MEDICINE

## 2017-11-09 PROCEDURE — 74011250637 HC RX REV CODE- 250/637: Performed by: INTERNAL MEDICINE

## 2017-11-09 RX ADMIN — ENOXAPARIN SODIUM 40 MG: 40 INJECTION SUBCUTANEOUS at 08:31

## 2017-11-09 RX ADMIN — CELECOXIB 200 MG: 100 CAPSULE ORAL at 18:39

## 2017-11-09 RX ADMIN — METFORMIN HYDROCHLORIDE 1000 MG: 500 TABLET ORAL at 18:40

## 2017-11-09 RX ADMIN — PANTOPRAZOLE SODIUM 40 MG: 40 TABLET, DELAYED RELEASE ORAL at 08:26

## 2017-11-09 RX ADMIN — TRIAMCINOLONE ACETONIDE: 1 CREAM TOPICAL at 18:00

## 2017-11-09 RX ADMIN — LEVOTHYROXINE SODIUM 75 MCG: 75 TABLET ORAL at 05:34

## 2017-11-09 RX ADMIN — CHOLECALCIFEROL (VITAMIN D3) 25 MCG (1,000 UNIT) TABLET 2000 UNITS: at 08:26

## 2017-11-09 RX ADMIN — OXYCODONE HYDROCHLORIDE AND ACETAMINOPHEN 2 TABLET: 5; 325 TABLET ORAL at 15:01

## 2017-11-09 RX ADMIN — SIMVASTATIN 20 MG: 20 TABLET, FILM COATED ORAL at 21:14

## 2017-11-09 RX ADMIN — LOSARTAN POTASSIUM 25 MG: 50 TABLET ORAL at 08:28

## 2017-11-09 RX ADMIN — OXYCODONE HYDROCHLORIDE AND ACETAMINOPHEN 2 TABLET: 5; 325 TABLET ORAL at 08:26

## 2017-11-09 RX ADMIN — METFORMIN HYDROCHLORIDE 1000 MG: 500 TABLET ORAL at 08:26

## 2017-11-09 RX ADMIN — CELECOXIB 200 MG: 100 CAPSULE ORAL at 08:26

## 2017-11-09 NOTE — ROUTINE PROCESS
Bedside shift change report given to Angus Krt. 28. (oncoming nurse) by Tiffanie Lynne RN (offgoing nurse). Report included the following information SBAR, Kardex, Intake/Output and Recent Results. VISUALLY CHECKED PT Q 1 HR BY NURSING STAFF. 24 hour order chart check done.

## 2017-11-09 NOTE — ROUTINE PROCESS
Bedside shift change report given to Palak rn (oncoming nurse) by austin rn (offgoing nurse). Report included the following information Kardex, MAR and Recent Results.

## 2017-11-09 NOTE — PROGRESS NOTES
SW provided information to pt on chairlifts. Pt informed SW that she spoke with another resident and was told that the chairlifts are very expensive. SW suggested that pt call a provider to obtain estimates.

## 2017-11-09 NOTE — PROGRESS NOTES
Problem: Self Care Deficits Care Plan (Adult)  Goal: *Acute Goals and Plan of Care (Insert Text)  OCCUPATIONAL THERAPY SHORT TERM GOALS    Updated 11/02/17    1. Patient will perform Upper body ADL's with adaptive equipment & compensatory techniques as needed with contact guard assist.   2.  Patient will perform Lower body ADL's with adaptive equipment & compensatory techniques as needed Min assistance. 3.  Patient will perform toileting task with SBA with Good safety to reduce falls risk. 4.  Patient will perform toilet transfers with Platform Walker and SBA. 5.  Patient will perform standing static/dynamic balance activities for improved ADL/IADL function with SBA x 1 and Good balance and safety awareness. 6.  Patient will improve Barthel index scores to atleast 70/100 to improve functional mobility. 7.  Patient will tolerate standing x 15 minutes during functional activity while adhering to LLE TTWB And Left wrist NWB utilizing platform walker to increase participation in ADL routine. Updated 10/26/17    1. Patient will perform Upper body ADL's with adaptive equipment & compensatory techniques as needed with minimal assistance/contact guard assist. (goal met-UPG SBA)  2.  Patient will perform Lower body ADL's with adaptive equipment & compensatory techniques as needed moderate assistance. (goal met-UPG Min A)  3. Patient will perform toileting task with moderate assistance  with Good safety to reduce falls risk. (goal met-UPG SBA)  4. Patient will perform toilet transfers with Platform Walker and moderate assistance x 1.(goal met-UPG SBA)  5. Patient will perform standing static/dynamic balance activities for improved ADL/IADL function with moderate assistance x 1 and Good balance and safety awareness. (goal met-UPG SBA)  6. Patient will improve Barthel index scores to atleast 50/100 to improve functional mobility. (goal met-UPG 70/100)  7.   Patient will tolerate standing x 5 minutes during functional activity while adhering to LLE TTWB And Left wrist NWB utilizing platform walker to increase participation in ADL routine. (goal met-UPG 15)       Initiated 10/13/2017 and to be accomplished within 2 Week(s)    1. Patient will perform Upper body ADL's with adaptive equipment & compensatory techniques as needed with minimal assistance/contact guard assist. (progressing)  2. Patient will perform Lower body ADL's with adaptive equipment & compensatory techniques as needed moderate assistance. (progressing)  3. Patient will perform toileting task with moderate assistance x 2 with Good safety to reduce falls risk. (goal met-UPG Mod A)  4. Patient will perform toilet transfers with Platform Walker and moderate assistance x 2. (goal met-UPG Mod A x 1)  5. Patient will perform standing static/dynamic balance activities for improved ADL/IADL function with moderate assistance x,2 and Good balance and safety awareness. (goal met-UPG Mod A)  6. Patient will improve Barthel index scores to atleast 35/100 to improve functional mobility. (goal met-UPG 60/100)  7. Patient will tolerate standing x 5 minutes during functional activity while adhering to LLE TTWB And Left wrist NWB utilizing platform walker to increase participation in ADL routine. OCCUPATIONAL THERAPY LONG TERM GOALS   Initiated 10/13/2017 and to be accomplished within 4 Week(s)    1. Patient will perform Upper body ADL's with adaptive equipment & compensatory techniques as needed with supervision/set-up. 2.  Patient will perform Lower body ADL's with adaptive equipment & compensatory techniques as needed with         supervision/set-up . 3. Patient will perform toileting task with supervision/set-up with Good safety to reduce falls risk. 4.  Patient will perform functional transfers with Platform Walker and  supervision/set-up and Good balance and safety awareness.   5.  Patient will perform standing static/dynamic activity for improved ADL/IADL function    with supervision/set-up an and Good balance and safety awareness. 6.  Patient will improve Barthel index score to 50/100 to improve independence with mobility. Therapist: Marianne Smith    10/13/2017             [facility]  OCCUPATIONAL THERAPY WEEKLY SUMMARY   Reporting period:  from 11/02/17 through 11/09/17       Patient: Alfredo Pearson (24 y.o. female)   Date: 11/9/2017    Primary Diagnosis: left hip fracture  Hip fracture [facility])       Attending Physician: Josué Morrison MD Treatment Diagnosis  Treatment Diagnosis: muscle weakness  Treatment Diagnosis 2: increased need for assist w/ self care  Precautions: Fall, WBAT (Splint at L wrist;ROM activities @L wrist)  Rehab Potential : Good    Skill interventions and education provided with clinical rationale (include individualized treatment techniques and standardized tests):  Skilled Occupational services were provided utilizing Therapeutic exercises, therapeutic activities, functional mobility, functional transfers, and EC/WS. Using a comparative statement, summarize significant progress toward goals as a result of skilled intervention provided:  Patient has made Good progress towards their Occupational Therapy goals in the following areas: increased independence and performance with grooming, bathing, upper body dressing, lower body dressing, toileting and toilet transfer. Patient has met 7/7 STGS and making good progression towards LTGS. Identify remaining functional areas, impairments limiting progress and/or barriers to improvement:  Patient would benefit from continued skilled Occupational Therapy Services to address the following functional deficits in decreased dynamic standing balance and tolerance needed for safely complete ADL/IADLS.         OBJECTIVE DATA SUMMARY:       INITIAL ASSESSMENT WEEKLY PROGRESS   COGNITIVE STATUS: COGNITIVE STATUS:   Neurologic State: Alert, Appropriate for age  Orientation Level: Oriented X4  Cognition: Appropriate for age attention/concentration  Perception: Appears intact  Perseveration: No perseveration noted  Safety/Judgement: Fall prevention Neurologic State: Alert  Orientation Level: Oriented X4  Cognition: Appropriate for age attention/concentration  Perception: Appears intact  Perseveration: No perseveration noted  Safety/Judgement: Fall prevention   PAIN: PAIN:   Pain Scale 1: Numeric (0 - 10)  Pain Intensity 1: 0  Pain Onset 1: now  Pain Location 1: Hip, Hand  Pain Orientation 1: Left  Pain Description 1: Aching  Pain Intervention(s) 1: Elevation, Ice, Medication (see MAR)  Patient Stated Pain Goal: 0  Pain Reassessment 1: Yes     Pain Scale 1: Numeric (0 - 10)  Pain Intensity 1: 0  Pain Onset 1: movement  Pain Location 1: Leg  Pain Orientation 1: Left  Pain Description 1: Aching  Pain Intervention(s) 1: Elevation, Medication (see MAR)  Patient Stated Pain Goal: 0  Pain Reassessment 1: Yes   BED MOBILITY BED MOBILITY   Rolling: Stand-by asssistance  Supine to Sit: Maximum assistance  Sit to Supine: Maximum assistance  Scooting: Maximum assistance Rolling: Stand-by asssistance  Supine to Sit: Stand-by asssistance, Additional time (from flat bed)  Sit to Supine:  Moderate assistance  Scooting: Stand-by asssistance   ADL SELF CARE ADL SELF CARE     Bathing Assistance: Supervision/set-up  Position Performed: Seated edge of bed    Dressing Assistance: Modified independent  Orthotics(Brace): Stand-by assistance  [facility] Gown: Minimum  assistance  Pullover Shirt: Modified independent    Bathing Assistance: Supervision/set-up  Perineal  : Supervision/set-up  Position Performed: Standing  Lower Body : Supervision/set-up  Position Performed: Seated in chair    Toileting Assistance: Modified independent  Bladder Hygiene: Modified independent  Bowel Hygiene: Contact guard assistance  Clothing Management: Modified independent  Adaptive Equipment: Grab bars, Other (comment) (platform walker)    Grooming Assistance: Stand-by assistance (at standing)  Washing Hands: Stand-by assistance  Brushing Teeth: Modified independent  Brushing/Combing Hair: Modified independent  Cues: Verbal cues provided    Dressing Assistance: Supervision/set up  Underpants: Supervision/set-up  Pants With Elastic Waist: Supervision/set-up  Socks: Minimum assistance  Leg Crossed Method Used: No  Position Performed: Bending forward method, Seated in chair  Cues: Verbal cues provided  Adaptive Equipment Used: Sock aid, Reacher    Feeding Assistance: Modified independent   TRANSFERS TRANSFERS   Sit to Stand: Maximum assistance  Stand to Sit: Moderate assistance  Bed to Chair: Maximum assistance (x2)  Toilet Transfer : Other (comment) (unable 2/2/  inability to adhere to LLE TTWB) Sit to Stand: Stand-by asssistance  Stand to Sit: Stand-by asssistance  Bed to Chair: Stand-by asssistance  Toilet Transfer : Supervision   Bathroom Mobility: Contact guard assistance  Toilet Transfer : Other (comment) (unable 2/2/  inability to adhere to LLE TTWB)  Cues: Verbal cues provided  Adaptive Equipment:  (RW ) Bathroom Mobility: Stand-by assistance (utillizing RW)  Toilet Transfer : Supervision  Cues: Verbal cues provided  Adaptive Equipment: Grab bars, Walker (comment)   BALANCE BALANCE   Sitting: Impaired, With support  Sitting - Static: Fair (occasional) (+)  Sitting - Dynamic: Fair (occasional)  Standing: Impaired  Standing - Static: Poor  Standing - Dynamic : Fair Sitting: Intact  Sitting - Static: Good (unsupported)  Sitting - Dynamic: Good (unsupported)  Standing: With support  Standing - Static: Good  Standing - Dynamic : Fair (+)       GROSS ASSESSMENT  GROSS ASSESSMENT   AROM: Generally decreased, functional  PROM: Generally decreased, functional  Strength: Generally decreased, functional (RLE 3+/5 grossly, LLE demonstrates 3/5)  Coordination: Generally decreased, functional  Tone: Normal  Sensation: Intact AROM: Generally decreased, functional  PROM: Generally decreased, functional  Strength: Generally decreased, functional  Coordination: Generally decreased, functional  Tone: Normal  Sensation: Intact   COORDINATION COORDINATION   Fine Motor Skills-Upper: Left Impaired, Right Intact (L hand splint intact)  Gross Motor Skills-Upper: Left Intact, Right Intact Fine Motor Skills-Upper: Left Impaired, Right Intact (L hand splint intact)  Gross Motor Skills-Upper: Left Intact, Right Intact   VISUAL/PERCEPTUAL VISUAL/PERCEPTUAL   Corrective Lenses: Glasses   Corrective Lenses: Glasses     AUDITORY: AUDITORY:   Auditory Impairment: None Auditory Impairment: None       INSTRUMENTAL  ADL'S:    INSTRUMENTAL ADL'S:          THE BARTHEL INDEX  ACTIVITY   SCORE   FEEDING  0=unable  5=needs help cutting,spreading butter,etc., or modified diet  10= independent   10   BATHING  0=dependent  5=independent (or in shower   0   GROOMING  0=needs help  5=independent face/hair/teeth/shaving (implements provided)   5   DRESSING  0=dependent  5=needs help but can do about half unaided  10=independent(including buttons, zips,laces etc.)   5   BOWELS  0=incontinent  5=occasional accident  10=continent   10   BLADDER  0=incontinent, or catheterized and unable to manage alone  5=occasional accident  10=continent   10   TOILET USE  0=dependent  5=needs some help, but can do something alone  10=independent (on and off, dressing, wiping)   10   TRANSFER (BED TO CHAIR AND BACK)  0=unable, no sitting balance  5=major help(one or two people,physical), can sit  10=minor help(verbal or physical)  15=independent   10   MOBILITY (ON LEVEL SURFACES)  0=immobile or <50 yards  5=wheelchair independent,including corners,>50 yards  10=walkes with help of one person (verbal or physical) >50 yards  15=independent(but may use any aid; for example, stick) >50 yards   10   STAIRS  0=unable  5=needs help (verbal, physical, carrying aid)  10=independent   5            TOTAL: 75/100     Treatment: Practiced bed mobility from flat bed in order to increase independence and performance during task. See above for levels of A needed. Practiced bathroom mobility utilizing RW to/from bed in order to increase functional activity tolerance and safety during task. See above for levels of A needed. Cueing needed for patient to keep B LE within RW for optimal safety and independence during task. Functional mobility utilizing RW from patient's room to therapy room in order to increase functional activity tolerance and independence during task. Weight bearing activity with green T Putty in order to increase  strengthening and hand dexerity needed for ADL tasks and transfer. Patient's response to Treatment rendered:  Patient is pleasant and receptive to therapist cueing and recommendations. Patient expected Discharge Location:  [x]Private Residence  [] shelter/ILF  []LTC  []Other:    Plan: Continue OT services as established on the Plan of Care for 3-6 times a week.     Treatment Minutes:  40  OT and Assistant have had a weekly case conference regarding the above treatment:  [x] Yes     [] No    Therapist:   Estrellita Millan,         Date:11/9/2017     Forward to OT for co-signature when completed

## 2017-11-09 NOTE — PROGRESS NOTES
Bedside shift change report given to MELISSA Griggs RN (oncoming nurse) by Meliton Long. Camille Strauss RN (offgoing nurse). Report included the following information SBAR, Kardex and MAR.

## 2017-11-09 NOTE — PROGRESS NOTES
Pt participated in music with the Harpist for the duration of 60 minutes. Pt was observed to be conversing with peers and pt's family members. Pt interacted well with peers.

## 2017-11-10 PROCEDURE — 74011250636 HC RX REV CODE- 250/636: Performed by: INTERNAL MEDICINE

## 2017-11-10 PROCEDURE — 74011250637 HC RX REV CODE- 250/637: Performed by: INTERNAL MEDICINE

## 2017-11-10 RX ADMIN — OXYCODONE HYDROCHLORIDE AND ACETAMINOPHEN 1 TABLET: 5; 325 TABLET ORAL at 14:49

## 2017-11-10 RX ADMIN — DOCUSATE SODIUM 100 MG: 100 CAPSULE, LIQUID FILLED ORAL at 09:01

## 2017-11-10 RX ADMIN — PANTOPRAZOLE SODIUM 40 MG: 40 TABLET, DELAYED RELEASE ORAL at 09:00

## 2017-11-10 RX ADMIN — CHOLECALCIFEROL (VITAMIN D3) 25 MCG (1,000 UNIT) TABLET 2000 UNITS: at 09:01

## 2017-11-10 RX ADMIN — OXYCODONE HYDROCHLORIDE AND ACETAMINOPHEN 1 TABLET: 5; 325 TABLET ORAL at 14:54

## 2017-11-10 RX ADMIN — CELECOXIB 200 MG: 100 CAPSULE ORAL at 17:05

## 2017-11-10 RX ADMIN — TRIAMCINOLONE ACETONIDE: 1 CREAM TOPICAL at 18:00

## 2017-11-10 RX ADMIN — METFORMIN HYDROCHLORIDE 1000 MG: 500 TABLET ORAL at 17:05

## 2017-11-10 RX ADMIN — DOCUSATE SODIUM 100 MG: 100 CAPSULE, LIQUID FILLED ORAL at 17:05

## 2017-11-10 RX ADMIN — METFORMIN HYDROCHLORIDE 1000 MG: 500 TABLET ORAL at 08:58

## 2017-11-10 RX ADMIN — LEVOTHYROXINE SODIUM 75 MCG: 75 TABLET ORAL at 05:30

## 2017-11-10 RX ADMIN — ENOXAPARIN SODIUM 40 MG: 40 INJECTION SUBCUTANEOUS at 08:58

## 2017-11-10 RX ADMIN — CELECOXIB 200 MG: 100 CAPSULE ORAL at 09:01

## 2017-11-10 RX ADMIN — SIMVASTATIN 20 MG: 20 TABLET, FILM COATED ORAL at 21:38

## 2017-11-10 RX ADMIN — TRIAMCINOLONE ACETONIDE: 1 CREAM TOPICAL at 09:00

## 2017-11-10 RX ADMIN — LOSARTAN POTASSIUM 25 MG: 50 TABLET ORAL at 08:59

## 2017-11-10 RX ADMIN — BISMUTH SUBSALICYLATE 30 ML: 262 LIQUID ORAL at 17:04

## 2017-11-10 NOTE — ROUTINE PROCESS
Bedside and Verbal shift change report given to Rosario Escamilla (oncoming nurse) by Angel Luis Andres RN (offgoing nurse). Report included the following information SBAR, Kardex and MAR. Q VISUAL CHECKS  DONE.

## 2017-11-10 NOTE — PROGRESS NOTES
Pt's brother Wily Mcgowan called rehab gym, pt approved for therapist to speak to her brother on yesterday. Therapist unavailable to talk with him at that time, reported will call back. When attempted to call back at [phone] on several occasions, number could not be completed as dialed. Therapist asked pt for the correct number, pt did not have and reported she would have her sister call her brother to return call to gym.

## 2017-11-10 NOTE — PROGRESS NOTES
Problem: Mobility Impaired (Adult and Pediatric)  Goal: *Acute Goals and Plan of Care (Insert Text)  PHYSICAL THERAPY STG GOALS :   Initiated 10/13/2017 and to be accomplished within 2 Weeks (updated 11/09/17)    1. Patient will move from supine to sit and sit to supine  and roll side to side in bed with minimal assistance/contact guard assist with WB compliance. (Achieved)  2. Patient will transfer from bed to chair and chair to bed with minimal assistance/contact guard assist using platform walker with WB compliance. (Achieved)  3. Patient will perform sit to stand with minimal assistance/contact guard assist with Fair balance and safety awareness with WB compliance. (Achieved)  4. Patient will ambulate with minimal assistance/contact guard assist for 75 feet with platform walker on level surfaces with 2 turns with WB compliance. (Achieved)  5. Patient will ascend/descend 14 stairs with right handrail(s) withminimal assistance to allow for safe home access/exit with WB compliance. (Progressing; 5 stairs with CGA)  6. Patient will improve standardized test score for [place] Standing Balance Scale 1+ with WB compliance. (Achieved)    PHYSICAL THERAPY STG GOALS :   Initiated 10/13/2017 and to be accomplished within 2 Weeks (updated 11/02/17)    1. Patient will move from supine to sit and sit to supine  and roll side to side in bed with minimal assistance/contact guard assist with WB compliance. (Achieved)  2. Patient will transfer from bed to chair and chair to bed with minimal assistance/contact guard assist using platform walker with WB compliance. (Achieved)  3. Patient will perform sit to stand with minimal assistance/contact guard assist with Fair balance and safety awareness with WB compliance. (Achieved)  4. Patient will ambulate with minimal assistance/contact guard assist for 75 feet with platform walker on level surfaces with 2 turns with WB compliance.   (Progressing; 46 ft using platform RW with CGA)  5. Patient will ascend/descend 14 stairs with right handrail(s) withminimal assistance to allow for safe home access/exit with WB compliance. (Attempted, pt completing stair training in // bars with 2 inch step,CGA/min A)  6. Patient will improve standardized test score for [place] Standing Balance Scale 1+ with WB compliance. (Achieved)      PHYSICAL THERAPY STG GOALS :   Initiated 10/13/2017 and to be accomplished within 2 Weeks (updated 10/26/17)    1. Patient will move from supine to sit and sit to supine  and roll side to side in bed with minimal assistance/contact guard assist with WB compliance. (Achieved)  2. Patient will transfer from bed to chair and chair to bed with minimal assistance/contact guard assist using platform walker with WB compliance. (Achieved)  3. Patient will perform sit to stand with minimal assistance/contact guard assist with Fair balance and safety awareness with WB compliance. (Achieved)  4. Patient will ambulate with minimal assistance/contact guard assist for 75 feet with platform walker on level surfaces with 2 turns with WB compliance. (Progressing; 16 ft using platform RW with CGA)  5. Patient will ascend/descend 14 stairs with right handrail(s) withminimal assistance to allow for safe home access/exit with WB compliance. (Attempted, pt currently unable)  6. Patient will improve standardized test score for [place] Standing Balance Scale 1+ with WB compliance. (Achieved)    PHYSICAL THERAPY STG GOALS :  Initiated 10/13/2017 and to be accomplished within 2 Weeks (updated 10/19/17)    1. Patient will move from supine to sit and sit to supine  and roll side to side in bed with minimal assistance/contact guard assist with WB compliance. (Achieved)  2. Patient will transfer from bed to chair and chair to bed with minimal assistance/contact guard assist using platform walker with WB compliance. (Progressing modA)  3.   Patient will perform sit to stand with minimal assistance/contact guard assist with Fair balance and safety awareness with WB compliance. (Progressing modA)  4. Patient will ambulate with minimal assistance/contact guard assist for 75 feet with platform walker on level surfaces with 2 turns with WB compliance. (not tested due to TTWB on LLE)  5. Patient will ascend/descend 14 stairs with right handrail(s) withminimal assistance to allow for safe home access/exit with WB compliance. (Not tested due to WB status)  6. Patient will improve standardized test score for [place] Standing Balance Scale 1+ with WB compliance. PHYSICAL THERAPY LTG GOALS :  Initiated 10/13/2017 and to be accomplished within 4 Weeks    1. Patient will move from supine to sit and sit to supine  and roll side to side in bed with modified independence with WB compliance. 2.  Patient will transfer from bed to chair and chair to bed with modified independence using LRAD with WB compliance. 3.  Patient will perform sit to stand with modified independence with Good balance and safety awareness with WB compliance. 4.  Patient will ambulate with modified independence for 150 feet with LRAD on level surfaces and be able to maneuver through narrow spaces and obstacles without loss of balance with WB compliance. 5.  Patient will ascend/descend 14 stairs with right handrail(s) with supervision/set-up to allow for safe home access/exit with WB compliance. 6.  Patient will improve standardized test score for [place] Standing Balance Scale 3+ to reduce fall risk with WB compliance.       Physical Therapist:   Marta Epperson PT  on 10/13/2017               [facility]   PHYSICAL THERAPY WEEKLY PROGRESS REPORT  Reporting Period:  Date:   11/02/17*  to 11/09/17      Patient: Sonya Wade (86 y.o. female)                         Date: 11/10/2017    Primary Diagnosis: left hip fracture  Hip fracture [facility])      Attending Physician: Fela Shah MD Treatment Diagnosis  Treatment Diagnosis: muscle weakness  Treatment Diagnosis 2: increased need for assist w/ self care  Precautions:  Fall, WBAT (Splint at L wrist;ROM activities @L wrist)  Rehab Potential : Excellent:    Skill interventions and education provided with clinical rationale (include individualized treatment techniques and standardized tests):   Skilled Physical Therapy services were provided with : TA rendered to address transfers. TE to address strength, endurance, mobility. Gait training to address gait deficits. Stair training to improve negotiation of stairs. Neuromuscular re-education to address balance impairments. Using a comparative statement, summarize significant progress toward goals as a result of skilled intervention provided:  Patient has made Good progress towards their Physical Therapy goals in the areas of bed mobility, transfers, ambulation, balance. Identify remaining functional areas, impairments limiting progress and/or barriers to improvement:  Patient would benefit from continues PT services to address the following functional deficits in stair negotiation, dynamic standing balance, ambulation using RW.      OBJECTIVE DATA SUMMARY:     INITIAL ASSESSMENT WEEKLY ASSESSMENT   COGNITIVE STATUS COGNITIVE STATUS   Neurologic State: Alert, Appropriate for age  Orientation Level: Oriented X4  Cognition: Appropriate for age attention/concentration  Perception: Appears intact  Perseveration: No perseveration noted  Safety/Judgement: Fall prevention Neurologic State: Alert, Appropriate for age  Orientation Level: Oriented X4  Cognition: Appropriate decision making  Perception: Appears intact  Perseveration: No perseveration noted  Safety/Judgement: Fall prevention   PAIN PAIN   Pain Scale 1: Numeric (0 - 10)  Pain Intensity 1: 0  Pain Onset 1: now  Pain Location 1: Hip, Hand  Pain Orientation 1: Left  Pain Description 1: Aching  Pain Intervention(s) 1: Elevation, Ice, Medication (see MAR)  Patient Stated Pain Goal: 0  Pain Reassessment 1: Yes Pain Scale 1: Numeric (0 - 10)  Pain Intensity 1: 0  Pain Onset 1: movement  Pain Location 1: Leg  Pain Orientation 1: Left  Pain Description 1: Aching  Pain Intervention(s) 1: Medication (see MAR)  Patient Stated Pain Goal: 0  Pain Reassessment 1: Yes   GROSS ASSESSMENT GROSS ASSESSMENT   AROM: Generally decreased, functional  PROM: Generally decreased, functional  Strength: Generally decreased, functional (RLE 3+/5 grossly, LLE demonstrates 3/5)  Coordination: Generally decreased, functional  Tone: Normal  Sensation: Intact AROM: Generally decreased, functional  PROM: Generally decreased, functional  Strength: Generally decreased, functional  Coordination: Generally decreased, functional  Tone: Normal  Sensation: Intact   BED MOBILITY BED MOBILITY   Rolling: Stand-by asssistance  Supine to Sit: Maximum assistance  Sit to Supine: Maximum assistance  Scooting: Maximum assistance Rolling: Stand-by asssistance  Supine to Sit: Stand-by asssistance, Additional time (from flat bed)  Sit to Supine: Moderate assistance  Scooting: Stand-by asssistance   GAIT GAIT   Base of Support: Center of gravity altered  Speed/Polly: Slow  Step Length: Left shortened, Right shortened  Stance: Left decreased  Gait Abnormalities: Decreased step clearance  Ambulation - Level of Assistance:  Moderate assistance, Additional time  Distance (ft): 5 Feet (ft)  Assistive Device: Other (comment) (Rolling walker with platform on left)  Rail Use: Both  Stairs - Level of Assistance: Contact guard assistance  Number of Stairs Trained: 3 (4\" steps x2)  Interventions: Safety awareness training, Verbal cues Base of Support: Center of gravity altered  Speed/Polly: Pace decreased (<100 feet/min)  Step Length: Right shortened, Left shortened  Stance: Left decreased  Gait Abnormalities: Decreased step clearance  Ambulation - Level of Assistance: Stand-by asssistance  Distance (ft): 142 Feet (ft)  Assistive Device: Gait belt, Walker, rolling  Rail Use: Right  (BHR x 5 steps, R HR + quad cane x 5 steps)  Stairs - Level of Assistance: Contact guard assistance  Number of Stairs Trained: 10  Interventions: Safety awareness training, Verbal cues    (unable to assess)  (unable to assess)               TRANSFERS TRANSFERS   Sit to Stand: Maximum assistance  Stand to Sit: Moderate assistance  Bed to Chair: Maximum assistance (x2) Sit to Stand: Stand-by asssistance  Stand to Sit: Stand-by asssistance  Bed to Chair: Stand-by asssistance   BALANCE BALANCE   Sitting: Impaired, With support  Sitting - Static: Fair (occasional) (+)  Sitting - Dynamic: Fair (occasional)  Standing: Impaired  Standing - Static: Poor  Standing - Dynamic : Fair Sitting: Intact  Sitting - Static: Good (unsupported)  Sitting - Dynamic: Good (unsupported)  Standing: With support  Standing - Static: Good  Standing - Dynamic : Fair (+)   WHEELCHAIR MOBILITY/MGMT WHEELCHAIR MOBILITY/MGMT         Activity Tolerance:  Fair Activity Tolerance: Good   Visual/Perceptual   Corrective Lenses: Glasses      Visual/Perceptual   Vision  Corrective Lenses: Glasses        Auditory:   Auditory Impairment: None    Auditory:   Auditory  Auditory Impairment: None       Steps: right + quad cane   Clinical Decision makin+ Clinical Decision making:  3 on [place] Standing Balance Scale     Treatment:   Pt progressing well. Stair training x 5 stairs and x 10 stairs using R HR + quad cane with CGA and verbal cueing for proper sequencing. Gait training x 142 ft using RW with CGA. Pt progressing well. D/c for 17    Patient's response to treatment rendered:  Good    Patient expected Discharge Location:  [x]Private Residence  [] DREW/ILF  []LTC  []Other:    Plan: Continue Skilled PT services as established by the Plan of Care for 5-6 times a week.     PT and Assistant have had a weekly case conference regarding the above treatment:  [x] Yes     [] No       Treatment session:  48 minutes. Therapist: Fide Felix, PT       Date:11/09/2017  Forward to PT for co-signature when completed.

## 2017-11-10 NOTE — ROUTINE PROCESS
Bedside shift change report given to Tiny Richardson (oncoming nurse) by Nicole Albrecht RN (offgoing nurse). Report included the following information SBAR, Kardex, MAR and Recent Results. VISUALLY CHECKED PT Q 1 HR BY NURSING STAFF. 24 hour order chart check done

## 2017-11-11 LAB — GLUCOSE BLD STRIP.AUTO-MCNC: 112 MG/DL (ref 70–110)

## 2017-11-11 PROCEDURE — 74011250637 HC RX REV CODE- 250/637: Performed by: INTERNAL MEDICINE

## 2017-11-11 PROCEDURE — 74011250636 HC RX REV CODE- 250/636: Performed by: INTERNAL MEDICINE

## 2017-11-11 PROCEDURE — 82962 GLUCOSE BLOOD TEST: CPT

## 2017-11-11 RX ADMIN — CELECOXIB 200 MG: 100 CAPSULE ORAL at 18:22

## 2017-11-11 RX ADMIN — CHOLECALCIFEROL (VITAMIN D3) 25 MCG (1,000 UNIT) TABLET 2000 UNITS: at 10:17

## 2017-11-11 RX ADMIN — DOCUSATE SODIUM 100 MG: 100 CAPSULE, LIQUID FILLED ORAL at 18:00

## 2017-11-11 RX ADMIN — PANTOPRAZOLE SODIUM 40 MG: 40 TABLET, DELAYED RELEASE ORAL at 10:17

## 2017-11-11 RX ADMIN — LOSARTAN POTASSIUM 25 MG: 50 TABLET ORAL at 10:17

## 2017-11-11 RX ADMIN — LEVOTHYROXINE SODIUM 75 MCG: 75 TABLET ORAL at 05:38

## 2017-11-11 RX ADMIN — OXYCODONE HYDROCHLORIDE AND ACETAMINOPHEN 2 TABLET: 5; 325 TABLET ORAL at 06:30

## 2017-11-11 RX ADMIN — ENOXAPARIN SODIUM 40 MG: 40 INJECTION SUBCUTANEOUS at 10:17

## 2017-11-11 RX ADMIN — METFORMIN HYDROCHLORIDE 1000 MG: 500 TABLET ORAL at 10:17

## 2017-11-11 RX ADMIN — SIMVASTATIN 20 MG: 20 TABLET, FILM COATED ORAL at 21:26

## 2017-11-11 RX ADMIN — OXYCODONE HYDROCHLORIDE AND ACETAMINOPHEN 2 TABLET: 5; 325 TABLET ORAL at 01:13

## 2017-11-11 RX ADMIN — DOCUSATE SODIUM 100 MG: 100 CAPSULE, LIQUID FILLED ORAL at 10:17

## 2017-11-11 RX ADMIN — CELECOXIB 200 MG: 100 CAPSULE ORAL at 10:17

## 2017-11-11 RX ADMIN — METFORMIN HYDROCHLORIDE 1000 MG: 500 TABLET ORAL at 16:47

## 2017-11-11 NOTE — PROGRESS NOTES
Problem: Self Care Deficits Care Plan (Adult)  Goal: *Acute Goals and Plan of Care (Insert Text)  OCCUPATIONAL THERAPY SHORT TERM GOALS    Updated 11/02/17    1. Patient will perform Upper body ADL's with adaptive equipment & compensatory techniques as needed with contact guard assist.   2.  Patient will perform Lower body ADL's with adaptive equipment & compensatory techniques as needed Min assistance. 3.  Patient will perform toileting task with SBA with Good safety to reduce falls risk. 4.  Patient will perform toilet transfers with Platform Walker and SBA. 5.  Patient will perform standing static/dynamic balance activities for improved ADL/IADL function with SBA x 1 and Good balance and safety awareness. 6.  Patient will improve Barthel index scores to atleast 70/100 to improve functional mobility. 7.  Patient will tolerate standing x 15 minutes during functional activity while adhering to LLE TTWB And Left wrist NWB utilizing platform walker to increase participation in ADL routine. Updated 10/26/17    1. Patient will perform Upper body ADL's with adaptive equipment & compensatory techniques as needed with minimal assistance/contact guard assist. (goal met-UPG SBA)  2.  Patient will perform Lower body ADL's with adaptive equipment & compensatory techniques as needed moderate assistance. (goal met-UPG Min A)  3. Patient will perform toileting task with moderate assistance  with Good safety to reduce falls risk. (goal met-UPG SBA)  4. Patient will perform toilet transfers with Platform Walker and moderate assistance x 1.(goal met-UPG SBA)  5. Patient will perform standing static/dynamic balance activities for improved ADL/IADL function with moderate assistance x 1 and Good balance and safety awareness. (goal met-UPG SBA)  6. Patient will improve Barthel index scores to atleast 50/100 to improve functional mobility. (goal met-UPG 70/100)  7.   Patient will tolerate standing x 5 minutes during functional activity while adhering to LLE TTWB And Left wrist NWB utilizing platform walker to increase participation in ADL routine. (goal met-UPG 15)       Initiated 10/13/2017 and to be accomplished within 2 Week(s)    1. Patient will perform Upper body ADL's with adaptive equipment & compensatory techniques as needed with minimal assistance/contact guard assist. (progressing)  2. Patient will perform Lower body ADL's with adaptive equipment & compensatory techniques as needed moderate assistance. (progressing)  3. Patient will perform toileting task with moderate assistance x 2 with Good safety to reduce falls risk. (goal met-UPG Mod A)  4. Patient will perform toilet transfers with Platform Walker and moderate assistance x 2. (goal met-UPG Mod A x 1)  5. Patient will perform standing static/dynamic balance activities for improved ADL/IADL function with moderate assistance x,2 and Good balance and safety awareness. (goal met-UPG Mod A)  6. Patient will improve Barthel index scores to atleast 35/100 to improve functional mobility. (goal met-UPG 60/100)  7. Patient will tolerate standing x 5 minutes during functional activity while adhering to LLE TTWB And Left wrist NWB utilizing platform walker to increase participation in ADL routine. OCCUPATIONAL THERAPY LONG TERM GOALS   Initiated 10/13/2017 and to be accomplished within 4 Week(s)    1. Patient will perform Upper body ADL's with adaptive equipment & compensatory techniques as needed with supervision/set-up. 2.  Patient will perform Lower body ADL's with adaptive equipment & compensatory techniques as needed with         supervision/set-up . 3. Patient will perform toileting task with supervision/set-up with Good safety to reduce falls risk. 4.  Patient will perform functional transfers with Platform Walker and  supervision/set-up and Good balance and safety awareness.   5.  Patient will perform standing static/dynamic activity for improved ADL/IADL function    with supervision/set-up an and Good balance and safety awareness. 6.  Patient will improve Barthel index score to 50/100 to improve independence with mobility. Therapist: Francis Galvan    10/13/2017             [facility]   Occupational Therapy Daily Treatment note      Patient: Herman Nevarez (82 y.o. female)       Date: 11/11/2017  Attending Physician: Queenie Reardon MD  Primary Diagnosis: left hip fracture  Hip fracture [facility])    Treatment Diagnosis  Treatment Diagnosis: muscle weakness  Treatment Diagnosis 2: increased need for assist w/ self care   Precautions : Precautions at Admission: Fall, WBAT (Splint at L wrist;ROM activities @L wrist)  Vital Signs:  Vital Signs  Pulse (Heart Rate): 89  BP: 181/86     Cognitive Status:     Pain:  Pain Screen  Pain Scale 1: Numeric (0 - 10)  Pain Intensity 1: 0  Pain Onset 1: movement  Pain Location 1: Leg  Pain Orientation 1: Left  Pain Description 1: Aching  Pain Intervention(s) 1: Medication (see MAR); Repositioned; Rest  Patient Stated Pain Goal: 0  Pain Reassessment 1: Yes  Pain Scale 1: Numeric (0 - 10)  Gross Assessment:     Coordination:     Bed Mobility:  Bed Mobility  Supine to Sit: Stand-by asssistance  Transfers:  Functional Transfers  Sit to Stand: Stand-by asssistance  Stand to Sit: Stand-by asssistance     Balance:  Balance  Sitting: Intact  Sitting - Static: Good (unsupported)  Sitting - Dynamic: Good (unsupported)  Standing: With support  Standing - Static: Good  Standing - Dynamic : Fair  Therapeutic Activities:  Pt stood x 7 mins to complete table top activity no UE support SBA with F+ balance to increase standing tolerance and challenge stability for increased I and safety with ADL/IADL tasks. No LOB noted. FM task with L UE and small item retrieval to increase finger dexterity needed for ADL activities. SBA fnxl room and hallway mobility with RW ~ 70 ft with min vc's for proper tech.    Therapeutic Exercises:   L UE ex with red sided power web 2 x 20 and 5# hand gripper 2 reps x 20 to increase strength for ADL carryover. Patient/Caregiver Education:    Pt educated on fall prevention and safety to reduce risk of falls. ASSESSMENT:  Patient continues to demonstrate the need for skilled Occupational Therapy services to improve strength, balance, and endurance.    Progression toward goals:  [x]      Improving appropriately and progressing toward goals  []      Improving slowly and progressing toward goals  []      Not making progress toward goals and plan of care will be adjusted     Treatment session:   47 minutes    Therapist:    JUDY Elizabeth,  11/11/2017

## 2017-11-11 NOTE — ROUTINE PROCESS
Bedside and Verbal shift change report given to PASCUAL JONES LPN (oncoming nurse) by Jola Oppenheim, RN (offgoing nurse). Report included the following information SBAR, Kardex and MAR.  QH VISUAL CHECKS DONE

## 2017-11-11 NOTE — PROGRESS NOTES
Problem: Mobility Impaired (Adult and Pediatric)  Goal: *Acute Goals and Plan of Care (Insert Text)  PHYSICAL THERAPY STG GOALS :   Initiated 10/13/2017 and to be accomplished within 2 Weeks (updated 11/09/17)    1. Patient will move from supine to sit and sit to supine  and roll side to side in bed with minimal assistance/contact guard assist with WB compliance. (Achieved)  2. Patient will transfer from bed to chair and chair to bed with minimal assistance/contact guard assist using platform walker with WB compliance. (Achieved)  3. Patient will perform sit to stand with minimal assistance/contact guard assist with Fair balance and safety awareness with WB compliance. (Achieved)  4. Patient will ambulate with minimal assistance/contact guard assist for 75 feet with platform walker on level surfaces with 2 turns with WB compliance. (Achieved)  5. Patient will ascend/descend 14 stairs with right handrail(s) withminimal assistance to allow for safe home access/exit with WB compliance. (Progressing; 5 stairs with CGA)  6. Patient will improve standardized test score for [place] Standing Balance Scale 1+ with WB compliance. (Achieved)    PHYSICAL THERAPY STG GOALS :   Initiated 10/13/2017 and to be accomplished within 2 Weeks (updated 11/02/17)    1. Patient will move from supine to sit and sit to supine  and roll side to side in bed with minimal assistance/contact guard assist with WB compliance. (Achieved)  2. Patient will transfer from bed to chair and chair to bed with minimal assistance/contact guard assist using platform walker with WB compliance. (Achieved)  3. Patient will perform sit to stand with minimal assistance/contact guard assist with Fair balance and safety awareness with WB compliance. (Achieved)  4. Patient will ambulate with minimal assistance/contact guard assist for 75 feet with platform walker on level surfaces with 2 turns with WB compliance.   (Progressing; 46 ft using platform RW with CGA)  5. Patient will ascend/descend 14 stairs with right handrail(s) withminimal assistance to allow for safe home access/exit with WB compliance. (Attempted, pt completing stair training in // bars with 2 inch step,CGA/min A)  6. Patient will improve standardized test score for [place] Standing Balance Scale 1+ with WB compliance. (Achieved)      PHYSICAL THERAPY STG GOALS :   Initiated 10/13/2017 and to be accomplished within 2 Weeks (updated 10/26/17)    1. Patient will move from supine to sit and sit to supine  and roll side to side in bed with minimal assistance/contact guard assist with WB compliance. (Achieved)  2. Patient will transfer from bed to chair and chair to bed with minimal assistance/contact guard assist using platform walker with WB compliance. (Achieved)  3. Patient will perform sit to stand with minimal assistance/contact guard assist with Fair balance and safety awareness with WB compliance. (Achieved)  4. Patient will ambulate with minimal assistance/contact guard assist for 75 feet with platform walker on level surfaces with 2 turns with WB compliance. (Progressing; 16 ft using platform RW with CGA)  5. Patient will ascend/descend 14 stairs with right handrail(s) withminimal assistance to allow for safe home access/exit with WB compliance. (Attempted, pt currently unable)  6. Patient will improve standardized test score for [place] Standing Balance Scale 1+ with WB compliance. (Achieved)    PHYSICAL THERAPY STG GOALS :  Initiated 10/13/2017 and to be accomplished within 2 Weeks (updated 10/19/17)    1. Patient will move from supine to sit and sit to supine  and roll side to side in bed with minimal assistance/contact guard assist with WB compliance. (Achieved)  2. Patient will transfer from bed to chair and chair to bed with minimal assistance/contact guard assist using platform walker with WB compliance. (Progressing modA)  3.   Patient will perform sit to stand with minimal assistance/contact guard assist with Fair balance and safety awareness with WB compliance. (Progressing modA)  4. Patient will ambulate with minimal assistance/contact guard assist for 75 feet with platform walker on level surfaces with 2 turns with WB compliance. (not tested due to TTWB on LLE)  5. Patient will ascend/descend 14 stairs with right handrail(s) withminimal assistance to allow for safe home access/exit with WB compliance. (Not tested due to WB status)  6. Patient will improve standardized test score for [place] Standing Balance Scale 1+ with WB compliance. PHYSICAL THERAPY LTG GOALS :  Initiated 10/13/2017 and to be accomplished within 4 Weeks    1. Patient will move from supine to sit and sit to supine  and roll side to side in bed with modified independence with WB compliance. 2.  Patient will transfer from bed to chair and chair to bed with modified independence using LRAD with WB compliance. 3.  Patient will perform sit to stand with modified independence with Good balance and safety awareness with WB compliance. 4.  Patient will ambulate with modified independence for 150 feet with LRAD on level surfaces and be able to maneuver through narrow spaces and obstacles without loss of balance with WB compliance. 5.  Patient will ascend/descend 14 stairs with right handrail(s) with supervision/set-up to allow for safe home access/exit with WB compliance. 6.  Patient will improve standardized test score for [place] Standing Balance Scale 3+ to reduce fall risk with WB compliance.       Physical Therapist:   Bertin Pacheco PT  on 10/13/2017                [facility]   physical Therapy Daily Treatment note      Patient: Tawanda Funk (61 y.o. female)               Date: 11/11/2017    Physician: Shy Hauser MD  Primary Diagnosis: left hip fracture  Hip fracture [facility])          Treatment Diagnosis  Treatment Diagnosis: muscle weakness  Treatment Diagnosis 2: increased need for assist w/ self care  Precautions: Fall, WBAT (Splint at L wrist;ROM activities @L wrist)  Vital Signs        Cognitive Status:     Pain  Pain Screen  Pain Scale 1: Numeric (0 - 10)  Pain Intensity 1: 0  Pain Onset 1: movement  Pain Location 1: Leg  Pain Orientation 1: Left  Pain Description 1: Aching  Pain Intervention(s) 1: Medication (see MAR); Repositioned; Rest  Patient Stated Pain Goal: 0  Pain Reassessment 1: Yes  Bed Mobility Training  Bed Mobility Training  Supine to Sit: Stand-by asssistance  Balance  Sitting: Intact  Standing: With support  Standing - Static: Good  Standing - Dynamic : Fair  Transfer Training  Transfer Training  Sit to Stand: Stand-by asssistance  Sit to Stand: Stand-by asssistance  Gait Training  Gait  Base of Support: Narrowed; Center of gravity altered  Speed/Polly: Slow  Step Length: Left shortened;Right shortened  Gait Abnormalities: Decreased step clearance  Ambulation - Level of Assistance: Stand-by asssistance  Distance (ft): 240 Feet (ft)  Assistive Device: Gait belt;Walker, rolling  Rail Use: Right  (quad cane (L))  Stairs - Level of Assistance: Contact guard assistance  Number of Stairs Trained: 10  Interventions: Safety awareness training;Verbal cues     Gait Abnormalities: Decreased step clearance            With 4 turns. Therapeutic Exercise:   Pt reports no c/o pain today, \"I had my pain meds around 6 this morning, I feel good\". Pt tolerated sessino well today. Bed mobility SBA. Pt ambulate from bed to toilet, transferred SBA. Ambulation as noted above x240ft from room to gym, SBA with w/c follow for safety, pt required two short standing rest breaks. TE for LE strengthening and mobility: heel/toe raises, LAQs, marches, resisted hip add/abd with OTB 2x20 reps of each. Stair training x10 steps, using R HR and L quad cane with GCA and verbal cuing for sequencing and safety.    Patient/Caregiver Education:   Pt /Caregiver Education on safety and fall prevention,  Stair training was provided to promote safety and reduce risk for falls. ASSESSMENT:  Patient continues to benefit from Skilled PT services to improve overall strength and mobility, improve gait and balance, improve functional mobility  Progression toward goals:  [x]      Improving appropriately and progressing toward goals  []      Improving slowly and progressing toward goals  []      Not making progress toward goals and plan of care will be adjusted     Treatment session: 53 minutes.   Therapist:   Dl Angel PTA,          11/11/2017

## 2017-11-11 NOTE — ROUTINE PROCESS
Bedside and Verbal shift change report given to Alda Ayala RN (oncoming nurse) by Shena Morales RN (offgoing nurse). Report included the following information SBAR, Kardex and MAR.

## 2017-11-11 NOTE — PROGRESS NOTES
attempted to conduct a follow-up consultation and spiritual assessment for Justice Banerjee, who is a 65 y.o.,female. However, patient was not in her room or in the dining area. The  provided the following interventions:  Continued the relationship of care and support, leaving patient a card with a personal note. Offered silent prayer and, in the note, assurance of continued prayer on patient's behalf. Plan:  Chaplains will continue to follow and will provide pastoral care on an as-needed/requested basis.     [facility] Certified [address] [place] [address],[address] [place] [address]    [phone]

## 2017-11-12 PROCEDURE — 74011250637 HC RX REV CODE- 250/637: Performed by: INTERNAL MEDICINE

## 2017-11-12 PROCEDURE — 74011250636 HC RX REV CODE- 250/636: Performed by: INTERNAL MEDICINE

## 2017-11-12 RX ADMIN — LEVOTHYROXINE SODIUM 75 MCG: 75 TABLET ORAL at 05:30

## 2017-11-12 RX ADMIN — TRIAMCINOLONE ACETONIDE: 1 CREAM TOPICAL at 18:00

## 2017-11-12 RX ADMIN — ENOXAPARIN SODIUM 40 MG: 40 INJECTION SUBCUTANEOUS at 09:17

## 2017-11-12 RX ADMIN — CHOLECALCIFEROL (VITAMIN D3) 25 MCG (1,000 UNIT) TABLET 2000 UNITS: at 09:17

## 2017-11-12 RX ADMIN — CELECOXIB 200 MG: 100 CAPSULE ORAL at 09:17

## 2017-11-12 RX ADMIN — PANTOPRAZOLE SODIUM 40 MG: 40 TABLET, DELAYED RELEASE ORAL at 09:17

## 2017-11-12 RX ADMIN — DOCUSATE SODIUM 100 MG: 100 CAPSULE, LIQUID FILLED ORAL at 09:00

## 2017-11-12 RX ADMIN — METFORMIN HYDROCHLORIDE 1000 MG: 500 TABLET ORAL at 09:17

## 2017-11-12 RX ADMIN — TRIAMCINOLONE ACETONIDE: 1 CREAM TOPICAL at 09:00

## 2017-11-12 RX ADMIN — OXYCODONE HYDROCHLORIDE AND ACETAMINOPHEN 2 TABLET: 5; 325 TABLET ORAL at 10:00

## 2017-11-12 RX ADMIN — DOCUSATE SODIUM 100 MG: 100 CAPSULE, LIQUID FILLED ORAL at 16:14

## 2017-11-12 RX ADMIN — METFORMIN HYDROCHLORIDE 1000 MG: 500 TABLET ORAL at 16:13

## 2017-11-12 RX ADMIN — LOSARTAN POTASSIUM 25 MG: 50 TABLET ORAL at 09:17

## 2017-11-12 RX ADMIN — CELECOXIB 200 MG: 100 CAPSULE ORAL at 16:13

## 2017-11-12 RX ADMIN — SIMVASTATIN 20 MG: 20 TABLET, FILM COATED ORAL at 21:38

## 2017-11-12 NOTE — ROUTINE PROCESS
Bedside shift change report given to [id number] Nilson Michaud (oncoming nurse) by Stephanie Villalta RN (offgoing nurse). Report included the following information SBAR, Kardex, MAR and Recent Results. VISUALLY CHECKED PT Q 1 HR BY NURSING STAFF. 24 hour order chart check done.

## 2017-11-13 PROCEDURE — 74011250637 HC RX REV CODE- 250/637: Performed by: INTERNAL MEDICINE

## 2017-11-13 PROCEDURE — 74011250636 HC RX REV CODE- 250/636: Performed by: INTERNAL MEDICINE

## 2017-11-13 RX ADMIN — METFORMIN HYDROCHLORIDE 1000 MG: 500 TABLET ORAL at 18:15

## 2017-11-13 RX ADMIN — LEVOTHYROXINE SODIUM 75 MCG: 75 TABLET ORAL at 05:35

## 2017-11-13 RX ADMIN — PANTOPRAZOLE SODIUM 40 MG: 40 TABLET, DELAYED RELEASE ORAL at 09:31

## 2017-11-13 RX ADMIN — SIMVASTATIN 20 MG: 20 TABLET, FILM COATED ORAL at 21:44

## 2017-11-13 RX ADMIN — BISMUTH SUBSALICYLATE 30 ML: 262 LIQUID ORAL at 08:00

## 2017-11-13 RX ADMIN — DOCUSATE SODIUM 100 MG: 100 CAPSULE, LIQUID FILLED ORAL at 09:31

## 2017-11-13 RX ADMIN — LOSARTAN POTASSIUM 25 MG: 50 TABLET ORAL at 09:32

## 2017-11-13 RX ADMIN — OXYCODONE HYDROCHLORIDE AND ACETAMINOPHEN 2 TABLET: 5; 325 TABLET ORAL at 05:53

## 2017-11-13 RX ADMIN — OXYCODONE HYDROCHLORIDE AND ACETAMINOPHEN 2 TABLET: 5; 325 TABLET ORAL at 00:16

## 2017-11-13 RX ADMIN — ENOXAPARIN SODIUM 40 MG: 40 INJECTION SUBCUTANEOUS at 09:33

## 2017-11-13 RX ADMIN — METFORMIN HYDROCHLORIDE 1000 MG: 500 TABLET ORAL at 09:33

## 2017-11-13 RX ADMIN — CHOLECALCIFEROL (VITAMIN D3) 25 MCG (1,000 UNIT) TABLET 2000 UNITS: at 09:33

## 2017-11-13 RX ADMIN — CELECOXIB 200 MG: 100 CAPSULE ORAL at 18:15

## 2017-11-13 RX ADMIN — CELECOXIB 200 MG: 100 CAPSULE ORAL at 09:33

## 2017-11-13 NOTE — ROUTINE PROCESS
Bedside and Verbal shift change report given Bryce Smart RN(oncoming nurse) by Yudith Brewer nurse). Report included the following information SBAR, Kardex and MAR.

## 2017-11-13 NOTE — ROUTINE PROCESS
Written shift change report given to janelle malhotrarn (oncoming nurse) by Sanjeev Ybarra RN (offgoing nurse). Report included the following information SBAR and Kardex.  VISUAL CHECKS DONE.

## 2017-11-13 NOTE — PROGRESS NOTES
Spoke with pt's brother Tena Hall at [phone], per pt's request. He inquired about recovery time of hip fractures \"considering her age\", reporting that he has been looking online average recovery times and that broken hips usually 6-12 months before going home and that he is surprised over her d/c on 11/21, considering she has a lot of steps. Therapist inquired if he had seen the pt as he would be pleased with her mobility and strength. He admitted that he had not seen pt since her admission as he lives in [place], [place] but is planning to visit in early December. Therapist informed that pt wants to d/c home and that our concern is that she d/c safely. She'll have RW upon return home and we continue to address stair negotiation. Call ended with Merlyn Katerine saying that he is now more comfortable about her d/c home. Bebo Green, RODYT

## 2017-11-13 NOTE — PROGRESS NOTES
Problem: Mobility Impaired (Adult and Pediatric)  Goal: *Acute Goals and Plan of Care (Insert Text)  PHYSICAL THERAPY STG GOALS :   Initiated 10/13/2017 and to be accomplished within 2 Weeks (updated 11/09/17)    1. Patient will move from supine to sit and sit to supine  and roll side to side in bed with minimal assistance/contact guard assist with WB compliance. (Achieved)  2. Patient will transfer from bed to chair and chair to bed with minimal assistance/contact guard assist using platform walker with WB compliance. (Achieved)  3. Patient will perform sit to stand with minimal assistance/contact guard assist with Fair balance and safety awareness with WB compliance. (Achieved)  4. Patient will ambulate with minimal assistance/contact guard assist for 75 feet with platform walker on level surfaces with 2 turns with WB compliance. (Achieved)  5. Patient will ascend/descend 14 stairs with right handrail(s) withminimal assistance to allow for safe home access/exit with WB compliance. (Progressing; 5 stairs with CGA)  6. Patient will improve standardized test score for [place] Standing Balance Scale 1+ with WB compliance. (Achieved)    PHYSICAL THERAPY STG GOALS :   Initiated 10/13/2017 and to be accomplished within 2 Weeks (updated 11/02/17)    1. Patient will move from supine to sit and sit to supine  and roll side to side in bed with minimal assistance/contact guard assist with WB compliance. (Achieved)  2. Patient will transfer from bed to chair and chair to bed with minimal assistance/contact guard assist using platform walker with WB compliance. (Achieved)  3. Patient will perform sit to stand with minimal assistance/contact guard assist with Fair balance and safety awareness with WB compliance. (Achieved)  4. Patient will ambulate with minimal assistance/contact guard assist for 75 feet with platform walker on level surfaces with 2 turns with WB compliance.   (Progressing; 46 ft using platform RW with CGA)  5. Patient will ascend/descend 14 stairs with right handrail(s) withminimal assistance to allow for safe home access/exit with WB compliance. (Attempted, pt completing stair training in // bars with 2 inch step,CGA/min A)  6. Patient will improve standardized test score for [place] Standing Balance Scale 1+ with WB compliance. (Achieved)      PHYSICAL THERAPY STG GOALS :   Initiated 10/13/2017 and to be accomplished within 2 Weeks (updated 10/26/17)    1. Patient will move from supine to sit and sit to supine  and roll side to side in bed with minimal assistance/contact guard assist with WB compliance. (Achieved)  2. Patient will transfer from bed to chair and chair to bed with minimal assistance/contact guard assist using platform walker with WB compliance. (Achieved)  3. Patient will perform sit to stand with minimal assistance/contact guard assist with Fair balance and safety awareness with WB compliance. (Achieved)  4. Patient will ambulate with minimal assistance/contact guard assist for 75 feet with platform walker on level surfaces with 2 turns with WB compliance. (Progressing; 16 ft using platform RW with CGA)  5. Patient will ascend/descend 14 stairs with right handrail(s) withminimal assistance to allow for safe home access/exit with WB compliance. (Attempted, pt currently unable)  6. Patient will improve standardized test score for [place] Standing Balance Scale 1+ with WB compliance. (Achieved)    PHYSICAL THERAPY STG GOALS :  Initiated 10/13/2017 and to be accomplished within 2 Weeks (updated 10/19/17)    1. Patient will move from supine to sit and sit to supine  and roll side to side in bed with minimal assistance/contact guard assist with WB compliance. (Achieved)  2. Patient will transfer from bed to chair and chair to bed with minimal assistance/contact guard assist using platform walker with WB compliance. (Progressing modA)  3.   Patient will perform sit to stand with minimal assistance/contact guard assist with Fair balance and safety awareness with WB compliance. (Progressing modA)  4. Patient will ambulate with minimal assistance/contact guard assist for 75 feet with platform walker on level surfaces with 2 turns with WB compliance. (not tested due to TTWB on LLE)  5. Patient will ascend/descend 14 stairs with right handrail(s) withminimal assistance to allow for safe home access/exit with WB compliance. (Not tested due to WB status)  6. Patient will improve standardized test score for [place] Standing Balance Scale 1+ with WB compliance. PHYSICAL THERAPY LTG GOALS :  Initiated 10/13/2017 and to be accomplished within 4 Weeks    1. Patient will move from supine to sit and sit to supine  and roll side to side in bed with modified independence with WB compliance. 2.  Patient will transfer from bed to chair and chair to bed with modified independence using LRAD with WB compliance. 3.  Patient will perform sit to stand with modified independence with Good balance and safety awareness with WB compliance. 4.  Patient will ambulate with modified independence for 150 feet with LRAD on level surfaces and be able to maneuver through narrow spaces and obstacles without loss of balance with WB compliance. 5.  Patient will ascend/descend 14 stairs with right handrail(s) with supervision/set-up to allow for safe home access/exit with WB compliance. 6.  Patient will improve standardized test score for [place] Standing Balance Scale 3+ to reduce fall risk with WB compliance.       Physical Therapist:   Shahbaz Ardon PT  on 10/13/2017                [facility]   physical Therapy Daily Treatment note      Patient: Sirisha Ortiz (89 y.o. female)               Date: 11/13/2017    Physician: Brandy Boudreaux MD  Primary Diagnosis: left hip fracture  Hip fracture [facility])          Treatment Diagnosis  Treatment Diagnosis: muscle weakness  Treatment Diagnosis 2: increased need for assist w/ self care  Precautions: Fall, WBAT (Splint at L wrist;ROM activities @L wrist)  Vital Signs  Vital Signs  Pulse (Heart Rate): 67  BP: 151/68     Cognitive Status:  Mental Status  Neurologic State: Alert; Appropriate for age  Orientation Level: Oriented X4  Cognition: Appropriate decision making  Pain  Pain Screen  Pain Scale 1: Numeric (0 - 10)  Pain Intensity 1: 0  Pain Onset 1: movement  Pain Location 1: Hip  Pain Orientation 1: Left  Pain Description 1: Aching  Patient Stated Pain Goal: 0  Pain Reassessment 1: Yes  Bed Mobility Training     Balance  Sitting: Intact  Sitting - Static: Good (unsupported)  Sitting - Dynamic: Good (unsupported)  Standing: With support  Standing - Static: Good  Standing - Dynamic : Fair (+)  Transfer Training  Transfer Training  Sit to Stand: Supervision  Stand to Sit: Supervision  Bed to Chair: Supervision  Sit to Stand: Supervision  Gait Training  Gait  Base of Support: Center of gravity altered  Speed/Polly: Fluctuations  Step Length: Left shortened;Right shortened  Gait Abnormalities: Decreased step clearance  Ambulation - Level of Assistance: Stand-by asssistance ([place] (S))  Distance (ft): 240 Feet (ft)  Assistive Device: Gait belt;Walker, rolling  Rail Use: Right  (+quad cane)  Stairs - Level of Assistance: Contact guard assistance;Stand-by asssistance  Number of Stairs Trained: 15 (10)  Interventions: Safety awareness training;Verbal cues     Gait Abnormalities: Decreased step clearance            With 4 turns. Therapeutic Exercise:    Gait training x 240 ft using RW with SBA/close (S), verbal cueing to address discontinuous steps, step to gait pattern, and lifting RW as if it were standard walker; deficits improved after verbal cueing. Stair training to address stair negotiation for improved quality of and safety, completed 15 stairs using R HR + quad cane.  Son later came for training; pt completed 10 stairs using R HR + quad cane with SBA, pt's son completed stair training with pt after being training on proper stand by assistance and proper sequencing. Therapist spoke with pt's brother Louise Pineda, per pt's request. Updated pt's brother on her progress and current level as well as continued goals. Patient/Caregiver Education:   Pt /Caregiver Education on safety and fall prevention to reduce fall risk. ASSESSMENT:  Patient continues to benefit from Skilled PT services to improve strength, endurance, mobility, gait, balance. Progression toward goals:  [x]      Improving appropriately and progressing toward goals  []      Improving slowly and progressing toward goals  []      Not making progress toward goals and plan of care will be adjusted     Treatment session: 70 minutes.   Therapist:   Quoc Adan PT,          11/13/2017

## 2017-11-13 NOTE — PROGRESS NOTES
I have reviewed this patient's current medication list and recent laboratory results. At this time, I do not suggest any drug therapy adjustments or additional laboratory monitoring. Thank you,  Antonio KINSEY  Ph. M. S.  11/13/2017

## 2017-11-13 NOTE — ROUTINE PROCESS
Bedside shift change report given to Miguel Yousif RN (oncoming nurse) by Amada Ahumada, RN (offgoing nurse). Report included the following information SBAR, Kardex, MAR and Recent Results. VISUALLY CHECKED PT Q 1 HR BY NURSING STAFF. 24 hour order chart check done

## 2017-11-13 NOTE — PROGRESS NOTES
Bedside shift change report given to Jhony Fernandez RN (oncoming nurse) by Anita Zuniga RN (offgoing nurse). Report included the following information SBAR, Kardex, MAR and Recent Results. VISUALLY CHECKED PT Q 1 HR BY NURSING STAFF. 24 hour order chart check done

## 2017-11-13 NOTE — PROGRESS NOTES
[facility]     Patient: Willeen Boast MRN: [id number]  CSN: [id number]    YOB: 1940  Age: 65 y.o. Sex: female    DOA: 10/12/2017 LOS:  LOS: 32 days                    Subjective:     Pt s/p fx of L hip and L wrist and doing much better. Blood sugar controlled. Objective:      Visit Vitals    /68    Pulse 67    Temp 97.9 °F (36.6 °C)    Resp 17    Ht 5' (1.524 m)    Wt 81.9 kg (180 lb 9.6 oz)    SpO2 96%    Breastfeeding No    BMI 35.27 kg/m2       Physical Exam:  Chest clear  Cor rr  abd soft  No edema    Intake and Output:  Current Shift:     Last three shifts:       No results found for this or any previous visit (from the past 24 hour(s)).     Current Facility-Administered Medications   Medication Dose Route Frequency    magnesium hydroxide (MILK OF MAGNESIA) 400 mg/5 mL oral suspension 30 mL  30 mL Oral DAILY PRN    celecoxib (CELEBREX) capsule 200 mg  200 mg Oral BID    bismuth subsalicylate (PEPTO-BISMOL) oral suspension 30 mL  30 mL Oral Q6H PRN    losartan (COZAAR) tablet 25 mg  25 mg Oral DAILY    cholecalciferol (VITAMIN D3) tablet 2,000 Units  2,000 Units Oral DAILY    [facility] ANESTHESIA   Other PRN    [facility] EMERGENCY/STAT BOX   Other PRN    alum-mag hydroxide-simeth (MYLANTA) oral suspension 30 mL  30 mL Oral QID PRN    ondansetron (ZOFRAN ODT) tablet 4 mg  4 mg Oral Q6H PRN    docusate sodium (COLACE) capsule 100 mg  100 mg Oral BID    enoxaparin (LOVENOX) injection 40 mg  40 mg SubCUTAneous DAILY    levothyroxine (SYNTHROID) tablet 75 mcg  75 mcg Oral ACB    metFORMIN (GLUCOPHAGE) tablet 1,000 mg  1,000 mg Oral BID WITH MEALS    oxyCODONE-acetaminophen (PERCOCET) 5-325 mg per tablet 1-2 Tab  1-2 Tab Oral Q4H PRN    pantoprazole (PROTONIX) tablet 40 mg  40 mg Oral DAILY    simvastatin (ZOCOR) tablet 20 mg  20 mg Oral QHS    triamcinolone acetonide (KENALOG) 0.1 % cream   Topical BID    glucose chewable tablet 16 g  4 Tab Oral PRN    glucagon (GLUCAGEN) injection 1 mg  1 mg IntraMUSCular PRN    dextrose (D50W) injection syrg 12.5-25 g  25-50 mL IntraVENous PRN       Lab Results   Component Value Date/Time    Glucose 116 10/12/2017 04:00 AM    Glucose 167 10/11/2017 03:50 AM    Glucose 180 10/10/2017 06:36 PM    Glucose 161 10/09/2017 05:30 PM    Glucose 112 04/20/2017 11:23 AM        Assessment/Plan     Active Problems:    Hip fracture ([address] (10/9/2017)        Viviana Olivera MD  11/13/2017, 10:54 AM

## 2017-11-13 NOTE — PROGRESS NOTES
Problem: Self Care Deficits Care Plan (Adult)  Goal: *Acute Goals and Plan of Care (Insert Text)  OCCUPATIONAL THERAPY SHORT TERM GOALS    Updated 11/02/17    1. Patient will perform Upper body ADL's with adaptive equipment & compensatory techniques as needed with contact guard assist.   2.  Patient will perform Lower body ADL's with adaptive equipment & compensatory techniques as needed Min assistance. 3.  Patient will perform toileting task with SBA with Good safety to reduce falls risk. 4.  Patient will perform toilet transfers with Platform Walker and SBA. 5.  Patient will perform standing static/dynamic balance activities for improved ADL/IADL function with SBA x 1 and Good balance and safety awareness. 6.  Patient will improve Barthel index scores to atleast 70/100 to improve functional mobility. 7.  Patient will tolerate standing x 15 minutes during functional activity while adhering to LLE TTWB And Left wrist NWB utilizing platform walker to increase participation in ADL routine. Updated 10/26/17    1. Patient will perform Upper body ADL's with adaptive equipment & compensatory techniques as needed with minimal assistance/contact guard assist. (goal met-UPG SBA)  2.  Patient will perform Lower body ADL's with adaptive equipment & compensatory techniques as needed moderate assistance. (goal met-UPG Min A)  3. Patient will perform toileting task with moderate assistance  with Good safety to reduce falls risk. (goal met-UPG SBA)  4. Patient will perform toilet transfers with Platform Walker and moderate assistance x 1.(goal met-UPG SBA)  5. Patient will perform standing static/dynamic balance activities for improved ADL/IADL function with moderate assistance x 1 and Good balance and safety awareness. (goal met-UPG SBA)  6. Patient will improve Barthel index scores to atleast 50/100 to improve functional mobility. (goal met-UPG 70/100)  7.   Patient will tolerate standing x 5 minutes during functional activity while adhering to LLE TTWB And Left wrist NWB utilizing platform walker to increase participation in ADL routine. (goal met-UPG 15)       Initiated 10/13/2017 and to be accomplished within 2 Week(s)    1. Patient will perform Upper body ADL's with adaptive equipment & compensatory techniques as needed with minimal assistance/contact guard assist. (progressing)  2. Patient will perform Lower body ADL's with adaptive equipment & compensatory techniques as needed moderate assistance. (progressing)  3. Patient will perform toileting task with moderate assistance x 2 with Good safety to reduce falls risk. (goal met-UPG Mod A)  4. Patient will perform toilet transfers with Platform Walker and moderate assistance x 2. (goal met-UPG Mod A x 1)  5. Patient will perform standing static/dynamic balance activities for improved ADL/IADL function with moderate assistance x,2 and Good balance and safety awareness. (goal met-UPG Mod A)  6. Patient will improve Barthel index scores to atleast 35/100 to improve functional mobility. (goal met-UPG 60/100)  7. Patient will tolerate standing x 5 minutes during functional activity while adhering to LLE TTWB And Left wrist NWB utilizing platform walker to increase participation in ADL routine. OCCUPATIONAL THERAPY LONG TERM GOALS   Initiated 10/13/2017 and to be accomplished within 4 Week(s)    1. Patient will perform Upper body ADL's with adaptive equipment & compensatory techniques as needed with supervision/set-up. 2.  Patient will perform Lower body ADL's with adaptive equipment & compensatory techniques as needed with         supervision/set-up . 3. Patient will perform toileting task with supervision/set-up with Good safety to reduce falls risk. 4.  Patient will perform functional transfers with Platform Walker and  supervision/set-up and Good balance and safety awareness.   5.  Patient will perform standing static/dynamic activity for improved ADL/IADL function    with supervision/set-up an and Good balance and safety awareness. 6.  Patient will improve Barthel index score to 50/100 to improve independence with mobility. Therapist: Manpreet Ibrahim    10/13/2017             [facility] [place]   Occupational Therapy Daily Treatment note      Patient: Wade Boateng (7 y.o. female)       Date: 11/13/2017  Attending Physician: Lev Valenzuela MD  Primary Diagnosis: left hip fracture  Hip fracture [facility])    Treatment Diagnosis  Treatment Diagnosis: muscle weakness  Treatment Diagnosis 2: increased need for assist w/ self care   Precautions : Precautions at Admission: Fall, WBAT (Splint at L wrist;ROM activities @L wrist)  Vital Signs:  Vital Signs  Pulse (Heart Rate): 67  BP: 151/68     Cognitive Status:  Mental Status  Neurologic State: Alert; Appropriate for age  Orientation Level: Oriented X4  Cognition: Appropriate decision making  Pain:  Pain Screen  Pain Scale 1: Numeric (0 - 10)  Pain Intensity 1: 0  Pain Onset 1: movement  Pain Location 1: Hip  Pain Orientation 1: Left  Pain Description 1: Aching  Patient Stated Pain Goal: 0  Pain Reassessment 1: Yes  Pain Scale 1: Numeric (0 - 10)  Gross Assessment:     Coordination:     Bed Mobility:     Transfers:  Functional Transfers  Sit to Stand: Supervision  Stand to Sit: Supervision  Bed to Chair: Stand-by asssistance  Toilet Transfer : Stand-by asssistance  Functional Transfers  Toilet Transfer : Stand-by asssistance  Adaptive Equipment: Arturo Bis (comment)  Balance:  Balance  Sitting: Intact  Sitting - Static: Good (unsupported)  Sitting - Dynamic: Good (unsupported)  Standing: With support  Standing - Static: Good  Standing - Dynamic : Fair (+)  ADL Self Care:     ADL Intervention:           Toileting  Bladder Hygiene: Supervision/set-up  Clothing Management: Supervision/set-up  Adaptive Equipment: Walker (3 in 1 commode)                   Therapeutic Activities:  Patient participated in therapeutic activities addressing incorporation of gentle ROM L wrist, patient able to doff and don L wrist splint (I), participated well with L wrist flexion/extension, pronation/supination, fine motor dexterity e.g. pincer grasp and 3 jaw quang without c/o pain and/or discomfort. Co -tx with P.T. Addressing safety awareness w/ family education for safety awareness w/ stair climbing utilizing quadcane. ADL level of assist as mentioned above w/ toilet transfer w/ FWW and washing hands in stance at sink side. Patient/Caregiver Education:    Patient education for safety w/ FWW during toilet transfer i.e. keeping FWW in front of self during transfer versus pushing aside in order to prevent falls.     ASSESSMENT:  Patient continues to demonstrate the need for skilled Occupational Therapy services to improve   grooming, bathing, upper body dressing, lower body dressing, toileting and toilet transfer  Progression toward goals:  [x]      Improving appropriately and progressing toward goals  []      Improving slowly and progressing toward goals  []      Not making progress toward goals and plan of care will be adjusted     Treatment session:   39 minutes    Therapist:    Boubacar Arciniega,  11/13/2017

## 2017-11-14 PROCEDURE — 74011250637 HC RX REV CODE- 250/637: Performed by: INTERNAL MEDICINE

## 2017-11-14 PROCEDURE — 74011250636 HC RX REV CODE- 250/636: Performed by: INTERNAL MEDICINE

## 2017-11-14 RX ADMIN — CELECOXIB 200 MG: 100 CAPSULE ORAL at 09:00

## 2017-11-14 RX ADMIN — ENOXAPARIN SODIUM 40 MG: 40 INJECTION SUBCUTANEOUS at 08:30

## 2017-11-14 RX ADMIN — DOCUSATE SODIUM 100 MG: 100 CAPSULE, LIQUID FILLED ORAL at 08:30

## 2017-11-14 RX ADMIN — METFORMIN HYDROCHLORIDE 1000 MG: 500 TABLET ORAL at 16:42

## 2017-11-14 RX ADMIN — OXYCODONE HYDROCHLORIDE AND ACETAMINOPHEN 2 TABLET: 5; 325 TABLET ORAL at 22:32

## 2017-11-14 RX ADMIN — BISMUTH SUBSALICYLATE 30 ML: 262 LIQUID ORAL at 18:40

## 2017-11-14 RX ADMIN — SIMVASTATIN 20 MG: 20 TABLET, FILM COATED ORAL at 18:40

## 2017-11-14 RX ADMIN — METFORMIN HYDROCHLORIDE 1000 MG: 500 TABLET ORAL at 08:31

## 2017-11-14 RX ADMIN — CHOLECALCIFEROL (VITAMIN D3) 25 MCG (1,000 UNIT) TABLET 2000 UNITS: at 08:30

## 2017-11-14 RX ADMIN — PANTOPRAZOLE SODIUM 40 MG: 40 TABLET, DELAYED RELEASE ORAL at 08:30

## 2017-11-14 RX ADMIN — DOCUSATE SODIUM 100 MG: 100 CAPSULE, LIQUID FILLED ORAL at 18:39

## 2017-11-14 RX ADMIN — LEVOTHYROXINE SODIUM 75 MCG: 75 TABLET ORAL at 05:32

## 2017-11-14 RX ADMIN — LOSARTAN POTASSIUM 25 MG: 50 TABLET ORAL at 08:31

## 2017-11-14 RX ADMIN — SIMVASTATIN 20 MG: 20 TABLET, FILM COATED ORAL at 22:00

## 2017-11-14 RX ADMIN — TRIAMCINOLONE ACETONIDE: 1 CREAM TOPICAL at 09:00

## 2017-11-14 RX ADMIN — OXYCODONE HYDROCHLORIDE AND ACETAMINOPHEN 2 TABLET: 5; 325 TABLET ORAL at 09:53

## 2017-11-14 RX ADMIN — CELECOXIB 200 MG: 100 CAPSULE ORAL at 18:39

## 2017-11-14 NOTE — PROGRESS NOTES
Problem: Self Care Deficits Care Plan (Adult)  Goal: *Acute Goals and Plan of Care (Insert Text)  OCCUPATIONAL THERAPY SHORT TERM GOALS    Updated 11/02/17    1. Patient will perform Upper body ADL's with adaptive equipment & compensatory techniques as needed with contact guard assist.   2.  Patient will perform Lower body ADL's with adaptive equipment & compensatory techniques as needed Min assistance. 3.  Patient will perform toileting task with SBA with Good safety to reduce falls risk. 4.  Patient will perform toilet transfers with Platform Walker and SBA. 5.  Patient will perform standing static/dynamic balance activities for improved ADL/IADL function with SBA x 1 and Good balance and safety awareness. 6.  Patient will improve Barthel index scores to atleast 70/100 to improve functional mobility. 7.  Patient will tolerate standing x 15 minutes during functional activity while adhering to LLE TTWB And Left wrist NWB utilizing platform walker to increase participation in ADL routine. Updated 10/26/17    1. Patient will perform Upper body ADL's with adaptive equipment & compensatory techniques as needed with minimal assistance/contact guard assist. (goal met-UPG SBA)  2.  Patient will perform Lower body ADL's with adaptive equipment & compensatory techniques as needed moderate assistance. (goal met-UPG Min A)  3. Patient will perform toileting task with moderate assistance  with Good safety to reduce falls risk. (goal met-UPG SBA)  4. Patient will perform toilet transfers with Platform Walker and moderate assistance x 1.(goal met-UPG SBA)  5. Patient will perform standing static/dynamic balance activities for improved ADL/IADL function with moderate assistance x 1 and Good balance and safety awareness. (goal met-UPG SBA)  6. Patient will improve Barthel index scores to atleast 50/100 to improve functional mobility. (goal met-UPG 70/100)  7.   Patient will tolerate standing x 5 minutes during functional activity while adhering to LLE TTWB And Left wrist NWB utilizing platform walker to increase participation in ADL routine. (goal met-UPG 15)       Initiated 10/13/2017 and to be accomplished within 2 Week(s)    1. Patient will perform Upper body ADL's with adaptive equipment & compensatory techniques as needed with minimal assistance/contact guard assist. (progressing)  2. Patient will perform Lower body ADL's with adaptive equipment & compensatory techniques as needed moderate assistance. (progressing)  3. Patient will perform toileting task with moderate assistance x 2 with Good safety to reduce falls risk. (goal met-UPG Mod A)  4. Patient will perform toilet transfers with Platform Walker and moderate assistance x 2. (goal met-UPG Mod A x 1)  5. Patient will perform standing static/dynamic balance activities for improved ADL/IADL function with moderate assistance x,2 and Good balance and safety awareness. (goal met-UPG Mod A)  6. Patient will improve Barthel index scores to atleast 35/100 to improve functional mobility. (goal met-UPG 60/100)  7. Patient will tolerate standing x 5 minutes during functional activity while adhering to LLE TTWB And Left wrist NWB utilizing platform walker to increase participation in ADL routine. OCCUPATIONAL THERAPY LONG TERM GOALS   Initiated 10/13/2017 and to be accomplished within 4 Week(s)    1. Patient will perform Upper body ADL's with adaptive equipment & compensatory techniques as needed with supervision/set-up. 2.  Patient will perform Lower body ADL's with adaptive equipment & compensatory techniques as needed with         supervision/set-up . 3. Patient will perform toileting task with supervision/set-up with Good safety to reduce falls risk. 4.  Patient will perform functional transfers with Platform Walker and  supervision/set-up and Good balance and safety awareness.   5.  Patient will perform standing static/dynamic activity for improved ADL/IADL function    with supervision/set-up an and Good balance and safety awareness. 6.  Patient will improve Barthel index score to 50/100 to improve independence with mobility. Therapist: Carmela Gutierrez    10/13/2017             [facility]   Occupational Therapy Daily Treatment note      Patient: Elsie Dey (69 y.o. female)       Date: 11/14/2017  Attending Physician: Oscar Tripathi MD  Primary Diagnosis: left hip fracture  Hip fracture [facility])    Treatment Diagnosis  Treatment Diagnosis: muscle weakness  Treatment Diagnosis 2: increased need for assist w/ self care   Precautions : Precautions at Admission: Fall, WBAT (Splint at L wrist;ROM activities @L wrist)  Vital Signs:  Vital Signs  Temp: 97.5 °F (36.4 °C)  Temp Source: Oral  Pulse (Heart Rate): 69  Heart Rate Source: Monitor  Resp Rate: 18  Level of Consciousness: Alert  BP: 147/68  MAP (Calculated): 94  BP 1 Method: Automatic  BP 1 Location: Right arm  BP Patient Position: Head of bed elevated (Comment degrees)  MEWS Score: 1     Cognitive Status:  Mental Status  Neurologic State: Appropriate for age; Alert  Orientation Level: Oriented X4  Cognition: Appropriate for age attention/concentration; Appropriate decision making; Follows commands; Appropriate safety awareness  Pain:        Gross Assessment:     Coordination:     Bed Mobility:  Bed Mobility  Rolling: Modified independent  Supine to Sit: Modified independent  Transfers:  Functional Transfers  Sit to Stand: Supervision  Stand to Sit: Supervision  Toilet Transfer : Supervision  Functional Transfers  Toilet Transfer : Supervision  Adaptive Equipment: Walker (comment)  Balance:  Balance  Sitting: Intact  Sitting - Static: Good (unsupported)  Sitting - Dynamic: Fair (occasional)  Standing: With support  Standing - Static: Fair (+)  Standing - Dynamic : Fair ((+))  ADL Self Care:     ADL Intervention:           Toileting  Bladder Hygiene: Modified independent  Clothing Management: Modified independent  Adaptive Equipment: Walker;Grab bars (3 in 1 commode over toilet)                Therapeutic Activities:  Therapeutic activity:  BUE reaching w/ R 1# wrist weights & no resistance L wrist in various planes to increase gross motor skills, visual perceptual skills and functional activity tolerance to improve ADL performance skills with good participation of placement & removal of cards onto vertical card board while seated, good participation and no c/o pain and/or discomfort in L wrist w/ extension, flexion, ulnar and radial deviation incorporated. Patient/Caregiver Education:     Pt.  Education on correct hand placement e.g. Push up from & reach back for w/c arm rests for sit to stand was provided to improved functional transfers and prevention of falls.     ASSESSMENT:  Patient continues to demonstrate the need for skilled Occupational Therapy services to improve   grooming, bathing, upper body dressing, lower body dressing, toileting and toilet transfer  Progression toward goals:  [x]      Improving appropriately and progressing toward goals  []      Improving slowly and progressing toward goals  []      Not making progress toward goals and plan of care will be adjusted     Treatment session:   35 minutes    Therapist:    Chantelle Marvin,  11/14/2017

## 2017-11-14 NOTE — PROGRESS NOTES
Problem: Mobility Impaired (Adult and Pediatric)  Goal: *Acute Goals and Plan of Care (Insert Text)  PHYSICAL THERAPY STG GOALS :   Initiated 10/13/2017 and to be accomplished within 2 Weeks (updated 11/09/17)    1. Patient will move from supine to sit and sit to supine  and roll side to side in bed with minimal assistance/contact guard assist with WB compliance. (Achieved)  2. Patient will transfer from bed to chair and chair to bed with minimal assistance/contact guard assist using platform walker with WB compliance. (Achieved)  3. Patient will perform sit to stand with minimal assistance/contact guard assist with Fair balance and safety awareness with WB compliance. (Achieved)  4. Patient will ambulate with minimal assistance/contact guard assist for 75 feet with platform walker on level surfaces with 2 turns with WB compliance. (Achieved)  5. Patient will ascend/descend 14 stairs with right handrail(s) withminimal assistance to allow for safe home access/exit with WB compliance. (Progressing; 5 stairs with CGA)  6. Patient will improve standardized test score for [place] Standing Balance Scale 1+ with WB compliance. (Achieved)    PHYSICAL THERAPY STG GOALS :   Initiated 10/13/2017 and to be accomplished within 2 Weeks (updated 11/02/17)    1. Patient will move from supine to sit and sit to supine  and roll side to side in bed with minimal assistance/contact guard assist with WB compliance. (Achieved)  2. Patient will transfer from bed to chair and chair to bed with minimal assistance/contact guard assist using platform walker with WB compliance. (Achieved)  3. Patient will perform sit to stand with minimal assistance/contact guard assist with Fair balance and safety awareness with WB compliance. (Achieved)  4. Patient will ambulate with minimal assistance/contact guard assist for 75 feet with platform walker on level surfaces with 2 turns with WB compliance.   (Progressing; 46 ft using platform RW with CGA)  5. Patient will ascend/descend 14 stairs with right handrail(s) withminimal assistance to allow for safe home access/exit with WB compliance. (Attempted, pt completing stair training in // bars with 2 inch step,CGA/min A)  6. Patient will improve standardized test score for [place] Standing Balance Scale 1+ with WB compliance. (Achieved)      PHYSICAL THERAPY STG GOALS :   Initiated 10/13/2017 and to be accomplished within 2 Weeks (updated 10/26/17)    1. Patient will move from supine to sit and sit to supine  and roll side to side in bed with minimal assistance/contact guard assist with WB compliance. (Achieved)  2. Patient will transfer from bed to chair and chair to bed with minimal assistance/contact guard assist using platform walker with WB compliance. (Achieved)  3. Patient will perform sit to stand with minimal assistance/contact guard assist with Fair balance and safety awareness with WB compliance. (Achieved)  4. Patient will ambulate with minimal assistance/contact guard assist for 75 feet with platform walker on level surfaces with 2 turns with WB compliance. (Progressing; 16 ft using platform RW with CGA)  5. Patient will ascend/descend 14 stairs with right handrail(s) withminimal assistance to allow for safe home access/exit with WB compliance. (Attempted, pt currently unable)  6. Patient will improve standardized test score for [place] Standing Balance Scale 1+ with WB compliance. (Achieved)    PHYSICAL THERAPY STG GOALS :  Initiated 10/13/2017 and to be accomplished within 2 Weeks (updated 10/19/17)    1. Patient will move from supine to sit and sit to supine  and roll side to side in bed with minimal assistance/contact guard assist with WB compliance. (Achieved)  2. Patient will transfer from bed to chair and chair to bed with minimal assistance/contact guard assist using platform walker with WB compliance. (Progressing modA)  3.   Patient will perform sit to stand with minimal assistance/contact guard assist with Fair balance and safety awareness with WB compliance. (Progressing modA)  4. Patient will ambulate with minimal assistance/contact guard assist for 75 feet with platform walker on level surfaces with 2 turns with WB compliance. (not tested due to TTWB on LLE)  5. Patient will ascend/descend 14 stairs with right handrail(s) withminimal assistance to allow for safe home access/exit with WB compliance. (Not tested due to WB status)  6. Patient will improve standardized test score for [place] Standing Balance Scale 1+ with WB compliance. PHYSICAL THERAPY LTG GOALS :  Initiated 10/13/2017 and to be accomplished within 4 Weeks    1. Patient will move from supine to sit and sit to supine  and roll side to side in bed with modified independence with WB compliance. 2.  Patient will transfer from bed to chair and chair to bed with modified independence using LRAD with WB compliance. 3.  Patient will perform sit to stand with modified independence with Good balance and safety awareness with WB compliance. 4.  Patient will ambulate with modified independence for 150 feet with LRAD on level surfaces and be able to maneuver through narrow spaces and obstacles without loss of balance with WB compliance. 5.  Patient will ascend/descend 14 stairs with right handrail(s) with supervision/set-up to allow for safe home access/exit with WB compliance. 6.  Patient will improve standardized test score for [place] Standing Balance Scale 3+ to reduce fall risk with WB compliance.       Physical Therapist:   Pavithra Allen PT  on 10/13/2017                [facility]   physical Therapy Daily Treatment note      Patient: Micheal Victoria (57 y.o. female)               Date: 11/14/2017    Physician: Colin Nguyen MD  Primary Diagnosis: left hip fracture  Hip fracture [facility])          Treatment Diagnosis  Treatment Diagnosis: muscle weakness  Treatment Diagnosis 2: increased need for assist w/ self care  Precautions: Fall, WBAT (Splint at L wrist;ROM activities @L wrist)  Vital Signs        Cognitive Status:  Mental Status  Neurologic State: Appropriate for age; Alert  Orientation Level: Oriented X4  Cognition: Appropriate for age attention/concentration; Appropriate decision making; Follows commands; Appropriate safety awareness  Pain     Bed Mobility Training  Bed Mobility Training  Rolling: Modified independent  Supine to Sit: Modified independent  Balance  Sitting: Intact  Standing: With support  Standing - Static: Fair (+)  Standing - Dynamic : Fair (+)  Transfer Training  Transfer Training  Sit to Stand: Supervision  Stand to Sit: Supervision  Sit to Stand: Supervision  Gait Training  Gait  Base of Support: Center of gravity altered  Speed/Polly: Slow  Step Length: Left shortened;Right shortened  Gait Abnormalities: Decreased step clearance  Ambulation - Level of Assistance: Stand-by asssistance (Joshua Keas (S))  Distance (ft): 240 Feet (ft)  Assistive Device: Walker, rolling  Rail Use: Right  (+quad cane)  Stairs - Level of Assistance: Stand-by asssistance  Number of Stairs Trained: 15 (10)  Interventions: Safety awareness training;Verbal cues     Gait Abnormalities: Decreased step clearance            With 4 turns. Therapeutic Exercise:    Gait training as mentioned above with verbal cueing for increased step length and continuous steps, 3-4 standing rest breaks required. Stair training to address improved quality of stair negotiation, completed with SBA x 15 stairs. Training with pt's son Lydia Darnell completed x 10 stairs using R HR + quad cane, son verbalizes correct sequencing and coordination without additional cueing. Patient/Caregiver Education:   Pt /Caregiver Education on safety and fall prevention to reduce fall risk.       ASSESSMENT:  Patient continues to benefit from Skilled PT services to improve stair negotiation, ambulation using RW  Progression toward goals:  []      Improving appropriately and progressing toward goals  [x]      Improving slowly and progressing toward goals  []      Not making progress toward goals and plan of care will be adjusted     Treatment session: 53 minutes.   Therapist:   Darci Reich PT,          11/14/2017

## 2017-11-14 NOTE — ROUTINE PROCESS
Bedside shift change report given to [address] [place] [address] (oncoming nurse) by Fuentes Mcclendon RN (offgoing nurse). Report included the following information SBAR, Kardex, MAR and Recent Results. VISUALLY CHECKED PT Q 1 HR BY NURSING STAFF. 24 hour order chart check done

## 2017-11-14 NOTE — ROUTINE PROCESS
Bedside and Verbal shift change report given to Valeria Zaidi (oncoming nurse) by Yasmine Baker RN (offgoing nurse). Report included the following information SBAR, Kardex and MAR. QH VISUAL CHECKS DONE.

## 2017-11-15 PROCEDURE — 74011250637 HC RX REV CODE- 250/637: Performed by: INTERNAL MEDICINE

## 2017-11-15 PROCEDURE — 74011250636 HC RX REV CODE- 250/636: Performed by: INTERNAL MEDICINE

## 2017-11-15 RX ADMIN — CELECOXIB 200 MG: 100 CAPSULE ORAL at 10:42

## 2017-11-15 RX ADMIN — PANTOPRAZOLE SODIUM 40 MG: 40 TABLET, DELAYED RELEASE ORAL at 10:42

## 2017-11-15 RX ADMIN — OXYCODONE HYDROCHLORIDE AND ACETAMINOPHEN 2 TABLET: 5; 325 TABLET ORAL at 10:53

## 2017-11-15 RX ADMIN — OXYCODONE HYDROCHLORIDE AND ACETAMINOPHEN 2 TABLET: 5; 325 TABLET ORAL at 19:53

## 2017-11-15 RX ADMIN — SIMVASTATIN 20 MG: 20 TABLET, FILM COATED ORAL at 22:00

## 2017-11-15 RX ADMIN — SIMVASTATIN 20 MG: 20 TABLET, FILM COATED ORAL at 19:55

## 2017-11-15 RX ADMIN — CELECOXIB 200 MG: 100 CAPSULE ORAL at 19:54

## 2017-11-15 RX ADMIN — DOCUSATE SODIUM 100 MG: 100 CAPSULE, LIQUID FILLED ORAL at 19:54

## 2017-11-15 RX ADMIN — LEVOTHYROXINE SODIUM 75 MCG: 75 TABLET ORAL at 06:22

## 2017-11-15 RX ADMIN — CHOLECALCIFEROL (VITAMIN D3) 25 MCG (1,000 UNIT) TABLET 2000 UNITS: at 13:50

## 2017-11-15 RX ADMIN — ENOXAPARIN SODIUM 40 MG: 40 INJECTION SUBCUTANEOUS at 10:45

## 2017-11-15 RX ADMIN — TRIAMCINOLONE ACETONIDE: 1 CREAM TOPICAL at 09:00

## 2017-11-15 RX ADMIN — BISMUTH SUBSALICYLATE 30 ML: 262 LIQUID ORAL at 19:58

## 2017-11-15 RX ADMIN — LOSARTAN POTASSIUM 25 MG: 50 TABLET ORAL at 10:43

## 2017-11-15 RX ADMIN — DOCUSATE SODIUM 100 MG: 100 CAPSULE, LIQUID FILLED ORAL at 10:42

## 2017-11-15 RX ADMIN — METFORMIN HYDROCHLORIDE 1000 MG: 500 TABLET ORAL at 19:54

## 2017-11-15 RX ADMIN — METFORMIN HYDROCHLORIDE 1000 MG: 500 TABLET ORAL at 10:45

## 2017-11-15 NOTE — PROGRESS NOTES
Problem: Self Care Deficits Care Plan (Adult)  Goal: *Acute Goals and Plan of Care (Insert Text)  OCCUPATIONAL THERAPY SHORT TERM GOALS    Updated 11/02/17    1. Patient will perform Upper body ADL's with adaptive equipment & compensatory techniques as needed with contact guard assist.   2.  Patient will perform Lower body ADL's with adaptive equipment & compensatory techniques as needed Min assistance. 3.  Patient will perform toileting task with SBA with Good safety to reduce falls risk. 4.  Patient will perform toilet transfers with Platform Walker and SBA. 5.  Patient will perform standing static/dynamic balance activities for improved ADL/IADL function with SBA x 1 and Good balance and safety awareness. 6.  Patient will improve Barthel index scores to atleast 70/100 to improve functional mobility. 7.  Patient will tolerate standing x 15 minutes during functional activity while adhering to LLE TTWB And Left wrist NWB utilizing platform walker to increase participation in ADL routine. Updated 10/26/17    1. Patient will perform Upper body ADL's with adaptive equipment & compensatory techniques as needed with minimal assistance/contact guard assist. (goal met-UPG SBA)  2.  Patient will perform Lower body ADL's with adaptive equipment & compensatory techniques as needed moderate assistance. (goal met-UPG Min A)  3. Patient will perform toileting task with moderate assistance  with Good safety to reduce falls risk. (goal met-UPG SBA)  4. Patient will perform toilet transfers with Platform Walker and moderate assistance x 1.(goal met-UPG SBA)  5. Patient will perform standing static/dynamic balance activities for improved ADL/IADL function with moderate assistance x 1 and Good balance and safety awareness. (goal met-UPG SBA)  6. Patient will improve Barthel index scores to atleast 50/100 to improve functional mobility. (goal met-UPG 70/100)  7.   Patient will tolerate standing x 5 minutes during functional activity while adhering to LLE TTWB And Left wrist NWB utilizing platform walker to increase participation in ADL routine. (goal met-UPG 15)       Initiated 10/13/2017 and to be accomplished within 2 Week(s)    1. Patient will perform Upper body ADL's with adaptive equipment & compensatory techniques as needed with minimal assistance/contact guard assist. (progressing)  2. Patient will perform Lower body ADL's with adaptive equipment & compensatory techniques as needed moderate assistance. (progressing)  3. Patient will perform toileting task with moderate assistance x 2 with Good safety to reduce falls risk. (goal met-UPG Mod A)  4. Patient will perform toilet transfers with Platform Walker and moderate assistance x 2. (goal met-UPG Mod A x 1)  5. Patient will perform standing static/dynamic balance activities for improved ADL/IADL function with moderate assistance x,2 and Good balance and safety awareness. (goal met-UPG Mod A)  6. Patient will improve Barthel index scores to atleast 35/100 to improve functional mobility. (goal met-UPG 60/100)  7. Patient will tolerate standing x 5 minutes during functional activity while adhering to LLE TTWB And Left wrist NWB utilizing platform walker to increase participation in ADL routine. OCCUPATIONAL THERAPY LONG TERM GOALS   Initiated 10/13/2017 and to be accomplished within 4 Week(s)    1. Patient will perform Upper body ADL's with adaptive equipment & compensatory techniques as needed with supervision/set-up. 2.  Patient will perform Lower body ADL's with adaptive equipment & compensatory techniques as needed with         supervision/set-up . 3. Patient will perform toileting task with supervision/set-up with Good safety to reduce falls risk. 4.  Patient will perform functional transfers with Platform Walker and  supervision/set-up and Good balance and safety awareness.   5.  Patient will perform standing static/dynamic activity for improved ADL/IADL function    with supervision/set-up an and Good balance and safety awareness. 6.  Patient will improve Barthel index score to 50/100 to improve independence with mobility. Therapist: Adams Vuong    10/13/2017             [facility]   Occupational Therapy Daily Treatment note      Patient: Princess Rogers (33 y.o. female)       Date: 11/15/2017  Attending Physician: John Mai MD  Primary Diagnosis: left hip fracture  Hip fracture [facility])    Treatment Diagnosis  Treatment Diagnosis: muscle weakness  Treatment Diagnosis 2: increased need for assist w/ self care   Precautions : Precautions at Admission: Fall, WBAT (Splint at L wrist;ROM activities @L wrist)  Vital Signs:        Cognitive Status:  Mental Status  Neurologic State: Sleeping  Pain:  Pain Screen  Pain Scale 1: Numeric (0 - 10)  Pain Intensity 1: 0  Pain Location 1: Hip  Pain Orientation 1: Left  Pain Description 1: Aching  Pain Intervention(s) 1: Medication (see MAR)  Patient Stated Pain Goal: 0  Pain Scale 1: Numeric (0 - 10)  Gross Assessment:     Coordination:     Bed Mobility:     Transfers:  Functional Transfers  Tub Transfer: Modified independent  Functional Transfers  Tub Transfer: Modified independent  Adaptive Equipment: Walker (comment)  Balance:  Balance  Sitting: Intact  Sitting - Static: Good (unsupported)  Sitting - Dynamic: Fair (occasional)  Standing: With support  Standing - Static: Good  Standing - Dynamic : Good  ADL Self Care:     ADL Intervention:  Upper Body Bathing  Bathing Assistance:  (patient reports ad vinny w/ Mod I in stance at sink)  Upper Body Dressing Assistance  Dressing Assistance: Modified independent  Lower Body Bathing  Bathing Assistance:  (patient reports ad vinny w/ Mod I in stance at sink)  Toileting  Bladder Hygiene: Modified independent  Clothing Management: Modified independent  Adaptive Equipment: Grab bars; [address] [place] [address] Body Dressing Assistance  Dressing Assistance: Modified independent  Protective Undergarmet: Modified independent  Socks: Modified independent  Adaptive Equipment Used: Sock aid          Therapeutic Activities:  Patient participated in therapeutic activities addressing incorporation of gentle ROM L wrist, patient able to doff and don L wrist splint (I), participated well with L wrist flexion/extension, pronation/supination, fine motor dexterity e.g. pincer grasp and 3 jaw quang without c/o pain and/or discomfort. Patient participated in [facility] [address] reaching  e.g. Crossing midline and overhead for placement followed by removal of graded resistive clamps onto dowel tree, patient avoided black 8# and blue 6# resistive clamps w/ L hand 2/2 difficulty w/ increased resistance. Functional mobility w/ FWW x ' w/ SBA to improve ADL & IADL performance skills. Patient/Caregiver Education:     Pt.  Education on correct hand placement e.g. Push up from edge of bed for sit to stand was provided to improve functional transfers and prevention of falls.       ASSESSMENT:  Patient continues to demonstrate the need for skilled Occupational Therapy services to improve grooming, bathing, upper body dressing, lower body dressing, toileting, toilet transfer, tub transfer and simple home management  Progression toward goals:  [x]      Improving appropriately and progressing toward goals  []      Improving slowly and progressing toward goals  []      Not making progress toward goals and plan of care will be adjusted     Treatment session:   52 minutes    Therapist:    Alexsander Ruiz,  11/15/2017

## 2017-11-15 NOTE — ROUTINE PROCESS
Bedside and Verbal shift change report given to Lorelei Ortiz LPN (oncoming nurse) by Elaine Lopez RN (offgoing nurse). Report included the following information SBAR, Kardex and MAR.

## 2017-11-15 NOTE — PROGRESS NOTES
conducted a Follow up consultation and Spiritual Assessment for Eulalia Ivy, who is a 65 y.o.,female. The  provided the following Interventions:  Continued the relationship of care and support. Listened empathically. Offered prayer and assurance of continued prayer on patients behalf. Chart reviewed. The following outcomes were achieved:  Patient expressed gratitude for pastoral care visit. Assessment:  There are no further spiritual or Cheondoism issues which require Spiritual Care Services interventions at this time. Plan:  Chaplains will continue to follow and will provide pastoral care on an as needed/requested basis.  recommends bedside caregivers page  on duty if patient shows signs of acute spiritual or emotional distress.      [address] [facility]   Staff [address] [facility]   [phone]

## 2017-11-15 NOTE — PROGRESS NOTES
SEVERO ordered pt a rolling walker and quad cane via [employer]. SEVERO requested that dme provider call SW to let SW know if both items will be covered. SEVERO requested delivery to pt's room on Mon 11/20/17 for pt's d/c home on 11/21/17. SEVERO informed pt that SW wasn't sure if her insurance will cover both items.

## 2017-11-15 NOTE — ROUTINE PROCESS
Bedside and Verbal shift change report given to LENNY Bryson RN (oncoming nurse) by Rebeca Morales   (offgoing nurse).  Report included the following information SBAR, Kardex and MAR.

## 2017-11-15 NOTE — PROGRESS NOTES
Problem: Mobility Impaired (Adult and Pediatric)  Goal: *Acute Goals and Plan of Care (Insert Text)  PHYSICAL THERAPY STG GOALS :   Initiated 10/13/2017 and to be accomplished within 2 Weeks (updated 11/09/17)    1. Patient will move from supine to sit and sit to supine  and roll side to side in bed with minimal assistance/contact guard assist with WB compliance. (Achieved)  2. Patient will transfer from bed to chair and chair to bed with minimal assistance/contact guard assist using platform walker with WB compliance. (Achieved)  3. Patient will perform sit to stand with minimal assistance/contact guard assist with Fair balance and safety awareness with WB compliance. (Achieved)  4. Patient will ambulate with minimal assistance/contact guard assist for 75 feet with platform walker on level surfaces with 2 turns with WB compliance. (Achieved)  5. Patient will ascend/descend 14 stairs with right handrail(s) withminimal assistance to allow for safe home access/exit with WB compliance. (Progressing; 5 stairs with CGA)  6. Patient will improve standardized test score for [place] Standing Balance Scale 1+ with WB compliance. (Achieved)    PHYSICAL THERAPY STG GOALS :   Initiated 10/13/2017 and to be accomplished within 2 Weeks (updated 11/02/17)    1. Patient will move from supine to sit and sit to supine  and roll side to side in bed with minimal assistance/contact guard assist with WB compliance. (Achieved)  2. Patient will transfer from bed to chair and chair to bed with minimal assistance/contact guard assist using platform walker with WB compliance. (Achieved)  3. Patient will perform sit to stand with minimal assistance/contact guard assist with Fair balance and safety awareness with WB compliance. (Achieved)  4. Patient will ambulate with minimal assistance/contact guard assist for 75 feet with platform walker on level surfaces with 2 turns with WB compliance.   (Progressing; 46 ft using platform RW with CGA)  5. Patient will ascend/descend 14 stairs with right handrail(s) withminimal assistance to allow for safe home access/exit with WB compliance. (Attempted, pt completing stair training in // bars with 2 inch step,CGA/min A)  6. Patient will improve standardized test score for [place] Standing Balance Scale 1+ with WB compliance. (Achieved)      PHYSICAL THERAPY STG GOALS :   Initiated 10/13/2017 and to be accomplished within 2 Weeks (updated 10/26/17)    1. Patient will move from supine to sit and sit to supine  and roll side to side in bed with minimal assistance/contact guard assist with WB compliance. (Achieved)  2. Patient will transfer from bed to chair and chair to bed with minimal assistance/contact guard assist using platform walker with WB compliance. (Achieved)  3. Patient will perform sit to stand with minimal assistance/contact guard assist with Fair balance and safety awareness with WB compliance. (Achieved)  4. Patient will ambulate with minimal assistance/contact guard assist for 75 feet with platform walker on level surfaces with 2 turns with WB compliance. (Progressing; 16 ft using platform RW with CGA)  5. Patient will ascend/descend 14 stairs with right handrail(s) withminimal assistance to allow for safe home access/exit with WB compliance. (Attempted, pt currently unable)  6. Patient will improve standardized test score for [place] Standing Balance Scale 1+ with WB compliance. (Achieved)    PHYSICAL THERAPY STG GOALS :  Initiated 10/13/2017 and to be accomplished within 2 Weeks (updated 10/19/17)    1. Patient will move from supine to sit and sit to supine  and roll side to side in bed with minimal assistance/contact guard assist with WB compliance. (Achieved)  2. Patient will transfer from bed to chair and chair to bed with minimal assistance/contact guard assist using platform walker with WB compliance. (Progressing modA)  3.   Patient will perform sit to stand with minimal assistance/contact guard assist with Fair balance and safety awareness with WB compliance. (Progressing modA)  4. Patient will ambulate with minimal assistance/contact guard assist for 75 feet with platform walker on level surfaces with 2 turns with WB compliance. (not tested due to TTWB on LLE)  5. Patient will ascend/descend 14 stairs with right handrail(s) withminimal assistance to allow for safe home access/exit with WB compliance. (Not tested due to WB status)  6. Patient will improve standardized test score for [place] Standing Balance Scale 1+ with WB compliance. PHYSICAL THERAPY LTG GOALS :  Initiated 10/13/2017 and to be accomplished within 4 Weeks    1. Patient will move from supine to sit and sit to supine  and roll side to side in bed with modified independence with WB compliance. 2.  Patient will transfer from bed to chair and chair to bed with modified independence using LRAD with WB compliance. 3.  Patient will perform sit to stand with modified independence with Good balance and safety awareness with WB compliance. 4.  Patient will ambulate with modified independence for 150 feet with LRAD on level surfaces and be able to maneuver through narrow spaces and obstacles without loss of balance with WB compliance. 5.  Patient will ascend/descend 14 stairs with right handrail(s) with supervision/set-up to allow for safe home access/exit with WB compliance. 6.  Patient will improve standardized test score for [place] Standing Balance Scale 3+ to reduce fall risk with WB compliance.       Physical Therapist:   Darci Reich PT  on 10/13/2017                [facility]   physical Therapy Daily Treatment note      Patient: Nick Gonzalez (14 y.o. female)               Date: 11/15/2017    Physician: Miguel Ángel Yanez MD  Primary Diagnosis: left hip fracture  Hip fracture [facility])          Treatment Diagnosis  Treatment Diagnosis: muscle weakness  Treatment Diagnosis 2: increased need for assist w/ self care  Precautions: Fall, WBAT (Splint at L wrist;ROM activities @L wrist)  Vital Signs  Vital Signs  Pulse (Heart Rate): 79  BP: 154/85  MAP (Calculated): 108     Cognitive Status:     Pain     Bed Mobility Training     Balance  Sitting: Intact  Sitting - Static: Good (unsupported)  Sitting - Dynamic: Fair (occasional)  Standing: With support  Standing - Static: Fair (+)  Standing - Dynamic : Fair (+)  Transfer Training  Transfer Training  Sit to Stand: Modified independent  Stand to Sit: Modified independent  Sit to Stand: Modified independent  Gait Training  Gait  Base of Support: Center of gravity altered  Speed/Polly: Slow  Step Length: Left shortened;Right shortened  Gait Abnormalities: Decreased step clearance  Ambulation - Level of Assistance: Contact guard assistance  Distance (ft): 88 Feet (ft)  Assistive Device: Gait belt;Walker, rolling     Gait Abnormalities: Decreased step clearance            With 4 turns. Therapeutic Exercise:    Gait training as mentioned above. Pt allowed break for lunch. Upon return, she described wanting to get back in the bed as she did not feel well. BP assessed at 154/85 mmHg. It is noted that BLEs also present with non-pitting edema. Pt desired to perform stair training, therapist educated on safety as pt was not feeling well and saying that her head felt funny but denied headache. Educated pt and lizette Bertrand on safety awareness techniques and safe stair negotiation upon return home. Recommended scheduling home health appointments for Monday, Tuesday, Wednesday when lizette Bertrand is home due to supervision that is required for stair negotiation. Returned pt to room, nurse brought meds, updated nurse on pt's symptoms and BP. Patient/Caregiver Education:   Pt /Caregiver Education on safety and fall prevention to reduce fall risk.       ASSESSMENT:  Patient continues to benefit from Skilled PT services to improve stair negotiation, ambulation. Progression toward goals:  [x]      Improving appropriately and progressing toward goals  []      Improving slowly and progressing toward goals  []      Not making progress toward goals and plan of care will be adjusted     Treatment session: 48 minutes.   Therapist:   Pavithra Allen, PT,          11/15/2017

## 2017-11-15 NOTE — PROGRESS NOTES
[facility]     Patient: Anabel Lopes MRN: [id number]  CSN: [id number]    YOB: 1940  Age: 65 y.o. Sex: female    DOA: 10/12/2017 LOS:  LOS: 34 days                    Subjective:     Pt continues to improve. And prob d/c next week. Blood sugars controlled    Objective:      Visit Vitals    /75    Pulse 89    Temp 97.5 °F (36.4 °C)    Resp 18    Ht 5' (1.524 m)    Wt 83.3 kg (183 lb 11.2 oz)    SpO2 100%    Breastfeeding No    BMI 35.88 kg/m2       Physical Exam:  Chest clear  Cor rr  abd soft  No edema    Intake and Output:  Current Shift:     Last three shifts:       No results found for this or any previous visit (from the past 24 hour(s)).     Current Facility-Administered Medications   Medication Dose Route Frequency    magnesium hydroxide (MILK OF MAGNESIA) 400 mg/5 mL oral suspension 30 mL  30 mL Oral DAILY PRN    celecoxib (CELEBREX) capsule 200 mg  200 mg Oral BID    bismuth subsalicylate (PEPTO-BISMOL) oral suspension 30 mL  30 mL Oral Q6H PRN    losartan (COZAAR) tablet 25 mg  25 mg Oral DAILY    cholecalciferol (VITAMIN D3) tablet 2,000 Units  2,000 Units Oral DAILY    [facility] ANESTHESIA   Other PRN    [facility] EMERGENCY/STAT BOX   Other PRN    alum-mag hydroxide-simeth (MYLANTA) oral suspension 30 mL  30 mL Oral QID PRN    ondansetron (ZOFRAN ODT) tablet 4 mg  4 mg Oral Q6H PRN    docusate sodium (COLACE) capsule 100 mg  100 mg Oral BID    enoxaparin (LOVENOX) injection 40 mg  40 mg SubCUTAneous DAILY    levothyroxine (SYNTHROID) tablet 75 mcg  75 mcg Oral ACB    metFORMIN (GLUCOPHAGE) tablet 1,000 mg  1,000 mg Oral BID WITH MEALS    oxyCODONE-acetaminophen (PERCOCET) 5-325 mg per tablet 1-2 Tab  1-2 Tab Oral Q4H PRN    pantoprazole (PROTONIX) tablet 40 mg  40 mg Oral DAILY    simvastatin (ZOCOR) tablet 20 mg  20 mg Oral QHS    triamcinolone acetonide (KENALOG) 0.1 % cream   Topical BID    glucose chewable tablet 16 g  4 Tab Oral PRN    glucagon (GLUCAGEN) injection 1 mg  1 mg IntraMUSCular PRN    dextrose (D50W) injection syrg 12.5-25 g  25-50 mL IntraVENous PRN       Lab Results   Component Value Date/Time    Glucose 116 10/12/2017 04:00 AM    Glucose 167 10/11/2017 03:50 AM    Glucose 180 10/10/2017 06:36 PM    Glucose 161 10/09/2017 05:30 PM    Glucose 112 04/20/2017 11:23 AM        Assessment/Plan     Active Problems:    Hip fracture ([address] (10/9/2017)        Colin Nguyen MD  11/15/2017, 11:10 AM

## 2017-11-15 NOTE — PROGRESS NOTES
Nutrition follow up-[facility]/  Plan of care      RECOMMENDATIONS:     1. No concentrated sweets diet  2. SF CIB daily  3. Monitor weight, labs and PO intake  4. RD to follow     GOALS:     1. Ongoing: PO intake meets >75% of protein/calorie needs by 11/22  2. Met/Ongoing: Weight Maintenance (+/- 1-2 lb by 11/22)    ASSESSMENT:     Weight status is classified as obese per BMI of 35.9. PO intake is variable. 2% weight gain over past week. New labs n/a on record. Nutrition recommendations listed. RD to follow. Nutrition Risk:  [] High  [x] Moderate []  Low    SUBJECTIVE/OBJECTIVE:      Transferred from  to [facility] on 10/20/17. S/P femur insertion intra medullary nail short for left hip fracture on 1/10/17. Patient with gradual weight loss over past year. Patient with variable PO intake. 50% intake of breakfast tray and CIB supplement. States liking CIB supplements better than Ensure supplements. Encouraged adequate intake. Will monitor.     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    Meningioma (HCC) D32.9    Hip fracture ([facility] [address] S72.009A     Past Medical History:   Diagnosis Date    Arthritis     Bronchitis     Diabetes ([address]     Diverticulitis     GERD (gastroesophageal reflux disease)     Hypercholesterolemia     Hypertension     Hypothyroid     Pancreatitis       Labs:    Lab Results   Component Value Date/Time    Sodium 138 10/12/2017 04:00 AM    Potassium 3.7 10/12/2017 04:00 AM    Chloride 102 10/12/2017 04:00 AM    CO2 23 10/12/2017 04:00 AM    Anion gap 13 10/12/2017 04:00 AM    Glucose 116 10/12/2017 04:00 AM    BUN 14 10/12/2017 04:00 AM    Creatinine 0.89 10/12/2017 04:00 AM    Calcium 7.1 10/12/2017 04:00 AM    Magnesium 1.4 12/04/2009 06:45 AM    Phosphorus 3.5 12/04/2009 06:45 AM    Albumin 3.9 04/20/2017 11:23 AM     Anthropometrics: BMI (calculated): 35.9  Last 3 Recorded Weights in this Encounter    10/31/17 1248 11/07/17 1550 11/14/17 1545   Weight: 81.2 kg (179 lb) 81.9 kg (180 lb 9.6 oz) 83.3 kg (183 lb 11.2 oz)      Ht Readings from Last 1 Encounters:   11/14/17 5' (1.524 m)     No data found.    [] Weight Loss [] Weight Gain (2% x 1 week) [] Weight Stable    Nutrition Needs:   Calories: 1407-0973 Kcal   Protein:    g      [x] No Cultural, Alevism or ethnic dietary need identified.     [] Cultural, Alevism and ethnic food preferences identified and addressed     Wt Status:  [] Normal (18.6 - 24.9) [] Underweight (<18.5) [] Overweight (25 - 29.9)   [] Mild Obesity (30 - 34.9)  [x] Moderate Obesity (35 - 39.9) [] Morbid Obesity (40+)     Nutrition Problems Identified:   [x] Fair PO intake   [] Food Allergies  [] Difficulty chewing/swallowing/poor dentition  [] Constipation/Diarrhea   [] Nausea/Vomiting   [] None  [] Other:     Plan:   [x] Therapeutic Diet  [x]  Obtained/adjusted food preferences/tolerances and/or snacks options   [x]  Dietary supplements (CIB daily)  [] Occupational therapy following for feeding techniques  []  HS snack added   []  Modify diet texture   []  Modify diet for food allergies   []  Assist with menu selection   [x]  Monitor PO intake on meal rounds   [x]  Continue inpatient monitoring and intervention   [x]  Participated in discharge planning/Interdisciplinary rounds/Team meetings   []  Other:     Education Needs:   [] Not appropriate for teaching at this time due to:   [x] Identified and addressed    Nutrition Monitoring and Evaluation:  [x] Continue ongoing monitoring and intervention  [] Other    Vernadine Links CEE [place]

## 2017-11-16 PROCEDURE — 74011250636 HC RX REV CODE- 250/636: Performed by: INTERNAL MEDICINE

## 2017-11-16 PROCEDURE — 74011250637 HC RX REV CODE- 250/637: Performed by: INTERNAL MEDICINE

## 2017-11-16 RX ADMIN — CELECOXIB 200 MG: 100 CAPSULE ORAL at 18:16

## 2017-11-16 RX ADMIN — PANTOPRAZOLE SODIUM 40 MG: 40 TABLET, DELAYED RELEASE ORAL at 10:12

## 2017-11-16 RX ADMIN — BISMUTH SUBSALICYLATE 30 ML: 262 LIQUID ORAL at 18:33

## 2017-11-16 RX ADMIN — METFORMIN HYDROCHLORIDE 1000 MG: 500 TABLET ORAL at 10:12

## 2017-11-16 RX ADMIN — METFORMIN HYDROCHLORIDE 1000 MG: 500 TABLET ORAL at 18:15

## 2017-11-16 RX ADMIN — LOSARTAN POTASSIUM 25 MG: 50 TABLET ORAL at 10:12

## 2017-11-16 RX ADMIN — TRIAMCINOLONE ACETONIDE: 1 CREAM TOPICAL at 09:00

## 2017-11-16 RX ADMIN — LEVOTHYROXINE SODIUM 75 MCG: 75 TABLET ORAL at 05:31

## 2017-11-16 RX ADMIN — OXYCODONE HYDROCHLORIDE AND ACETAMINOPHEN 2 TABLET: 5; 325 TABLET ORAL at 10:13

## 2017-11-16 RX ADMIN — TRIAMCINOLONE ACETONIDE: 1 CREAM TOPICAL at 18:00

## 2017-11-16 RX ADMIN — DOCUSATE SODIUM 100 MG: 100 CAPSULE, LIQUID FILLED ORAL at 10:12

## 2017-11-16 RX ADMIN — CELECOXIB 200 MG: 100 CAPSULE ORAL at 10:12

## 2017-11-16 RX ADMIN — OXYCODONE HYDROCHLORIDE AND ACETAMINOPHEN 2 TABLET: 5; 325 TABLET ORAL at 18:33

## 2017-11-16 RX ADMIN — DOCUSATE SODIUM 100 MG: 100 CAPSULE, LIQUID FILLED ORAL at 18:16

## 2017-11-16 RX ADMIN — ENOXAPARIN SODIUM 40 MG: 40 INJECTION SUBCUTANEOUS at 10:11

## 2017-11-16 RX ADMIN — SIMVASTATIN 20 MG: 20 TABLET, FILM COATED ORAL at 22:15

## 2017-11-16 NOTE — PROGRESS NOTES
Problem: Self Care Deficits Care Plan (Adult)  Goal: *Acute Goals and Plan of Care (Insert Text)  OCCUPATIONAL THERAPY SHORT TERM GOALS    Updated 11/16/17  1. Patient will perform Upper body ADL's with adaptive equipment & compensatory techniques as needed independence. 2.  Patient will perform standing static/dynamic balance activities for improved ADL/IADL function with SBA x 1 and Good balance and safety awareness. 3.  Patient will improve Barthel index scores to atleast 70/100 to improve functional mobility. 4.  Patient will tolerate standing x 15 minutes during functional activity with walker to increase participation in ADL routine. 5. Patient will utilize energy conservation techniques during functional activities with minimal verbal cues. 6. Patient will perform tub/shower combo transfer with durable medical equipment as needed & tasks w/ Supv and good safety awareness. Updated 11/16/17  1. Patient will perform Upper body ADL's with adaptive equipment & compensatory techniques as needed with contact guard assist. - goal met, upgrade  2. Patient will perform Lower body ADL's with adaptive equipment & compensatory techniques as needed Min assistance. - goal met, d/c goal  3. Patient will perform toileting task with SBA with Good safety to reduce falls risk. - goal met, d/c goal  4. Patient will perform toilet transfers with Platform Walker and SBA. - goal met, d/c goal   5. Patient will perform standing static/dynamic balance activities for improved ADL/IADL function with SBA x 1 and Good balance and safety awareness. - met goal, upgrade  6. Patient will improve Barthel index scores to atleast 70/100 to improve functional mobility. - met goal, upgrade  7. Patient will tolerate standing x 15 minutes during functional activity while adhering to LLE TTWB And Left wrist NWB utilizing platform walker to increase participation in ADL routine. - met goal, upgrade  Updated 11/02/17    1.   Patient will perform Upper body ADL's with adaptive equipment & compensatory techniques as needed with contact guard assist.   2.  Patient will perform Lower body ADL's with adaptive equipment & compensatory techniques as needed Min assistance. 3.  Patient will perform toileting task with SBA with Good safety to reduce falls risk. 4.  Patient will perform toilet transfers with Platform Walker and SBA. 5.  Patient will perform standing static/dynamic balance activities for improved ADL/IADL function with SBA x 1 and Good balance and safety awareness. 6.  Patient will improve Barthel index scores to atleast 70/100 to improve functional mobility. 7.  Patient will tolerate standing x 15 minutes during functional activity while adhering to LLE TTWB And Left wrist NWB utilizing platform walker to increase participation in ADL routine. Updated 10/26/17    1. Patient will perform Upper body ADL's with adaptive equipment & compensatory techniques as needed with minimal assistance/contact guard assist. (goal met-[facility] SBA)  2.  Patient will perform Lower body ADL's with adaptive equipment & compensatory techniques as needed moderate assistance. (goal met-[facility] Min A)  3. Patient will perform toileting task with moderate assistance  with Good safety to reduce falls risk. (goal met-[facility] SBA)  4. Patient will perform toilet transfers with Platform Walker and moderate assistance x 1.(goal met-[facility] SBA)  5. Patient will perform standing static/dynamic balance activities for improved ADL/IADL function with moderate assistance x 1 and Good balance and safety awareness. (goal met-[facility] SBA)  6. Patient will improve Barthel index scores to atleast 50/100 to improve functional mobility. (goal met-[facility] 70/100)  7.   Patient will tolerate standing x 5 minutes during functional activity while adhering to LLE TTWB And Left wrist NWB utilizing platform walker to increase participation in ADL routine. (goal met-U[facility] 15)       Initiated 10/13/2017 and to be accomplished within 2 Week(s)    1. Patient will perform Upper body ADL's with adaptive equipment & compensatory techniques as needed with minimal assistance/contact guard assist. (progressing)  2. Patient will perform Lower body ADL's with adaptive equipment & compensatory techniques as needed moderate assistance. (progressing)  3. Patient will perform toileting task with moderate assistance x 2 with Good safety to reduce falls risk. (goal met-[facility] Mod A)  4. Patient will perform toilet transfers with Platform Walker and moderate assistance x 2. (goal met-UPG Mod A x 1)  5. Patient will perform standing static/dynamic balance activities for improved ADL/IADL function with moderate assistance x,2 and Good balance and safety awareness. (goal met-[facility] Mod A)  6. Patient will improve Barthel index scores to atleast 35/100 to improve functional mobility. (goal met-U[facility] 60/100)  7. Patient will tolerate standing x 5 minutes during functional activity while adhering to LLE TTWB And Left wrist NWB utilizing platform walker to increase participation in ADL routine. OCCUPATIONAL THERAPY LONG TERM GOALS   Initiated 10/13/2017 and to be accomplished within 4 Week(s)    1. Patient will perform Upper body ADL's with adaptive equipment & compensatory techniques as needed with supervision/set-up. 2.  Patient will perform Lower body ADL's with adaptive equipment & compensatory techniques as needed with         supervision/set-up . 3. Patient will perform toileting task with supervision/set-up with Good safety to reduce falls risk. 4.  Patient will perform functional transfers with Platform Walker and  supervision/set-up and Good balance and safety awareness. 5.  Patient will perform standing static/dynamic activity for improved ADL/IADL function    with supervision/set-up an and Good balance and safety awareness.   6.  Patient will improve Barthel index score to 50/100 to improve independence with mobility. Therapist: Marianne Smith    10/13/2017             [facility]  OCCUPATIONAL THERAPY WEEKLY SUMMARY   Reporting period:  from 11/2/17 through 11/16/17       Patient: Alfredo Pearson (61 y.o. female)   Date: 11/16/2017    Primary Diagnosis: left hip fracture  Hip fracture [facility])       Attending Physician: Josué Morrison MD Treatment Diagnosis  Treatment Diagnosis: muscle weakness  Treatment Diagnosis 2: increased need for assist w/ self care  Precautions: Fall, WBAT (Splint at L wrist;ROM activities @L wrist)  Rehab Potential : Excellent    Skill interventions and education provided with clinical rationale (include individualized treatment techniques and standardized tests):  Skilled Occupational services were provided utilizing ADL retraining, balance retraining, instruction on adaptive equipment training, safety awareness and energy conservation techniques, strengthening and UE ROM techniques to improve ADL performance skills and functional mobility. Using a comparative statement, summarize significant progress toward goals as a result of skilled intervention provided:  Patient has made Good progress towards their Occupational Therapy goals in the following areas: grooming, bathing, upper body dressing, lower body dressing, toileting and toilet transfer using sockaide, reacher and walker. Identify remaining functional areas, impairments limiting progress and/or barriers to improvement:  Patient would benefit from continued skilled Occupational Therapy Services to address the following functional deficits in balance, coordination, safety awareness, strength and functional activity tolerance, which is inhibiting independence with adl performance skills and functional mobility.         OBJECTIVE DATA SUMMARY:       INITIAL ASSESSMENT WEEKLY PROGRESS   COGNITIVE STATUS: COGNITIVE STATUS:   Neurologic State: Alert, Appropriate for age  Orientation Level: Oriented X4  Cognition: Appropriate for age attention/concentration  Perception: Appears intact  Perseveration: No perseveration noted  Safety/Judgement: Fall prevention Neurologic State: Alert, Appropriate for age  Orientation Level: Oriented X4  Cognition: Appropriate for age attention/concentration  Perception: Appears intact  Perseveration: No perseveration noted  Safety/Judgement: Fall prevention   PAIN: PAIN:   Pain Scale 1: Numeric (0 - 10)  Pain Intensity 1: 0  Pain Onset 1: now  Pain Location 1: Hip, Hand  Pain Orientation 1: Left  Pain Description 1: Aching  Pain Intervention(s) 1: Elevation, Ice, Medication (see MAR)  Patient Stated Pain Goal: 0  Pain Reassessment 1: Yes     Pain Scale 1: Numeric (0 - 10)  Pain Intensity 1: 0  Pain Onset 1: movement  Pain Location 1: Hip  Pain Orientation 1: Left  Pain Description 1: Aching  Pain Intervention(s) 1: Medication (see MAR)  Patient Stated Pain Goal: 0  Pain Reassessment 1: Yes   BED MOBILITY BED MOBILITY   Rolling: Stand-by asssistance  Supine to Sit: Maximum assistance  Sit to Supine: Maximum assistance  Scooting: Maximum assistance Rolling: Modified independent  Supine to Sit: Modified independent  Sit to Supine:  Moderate assistance  Scooting: Stand-by asssistance   ADL SELF CARE ADL SELF CARE     Bathing Assistance:  (patient reports ad vinny w/ Mod I in stance at sink)  Position Performed: Seated edge of bed    Dressing Assistance: Modified independent  Orthotics(Brace): Stand-by assistance  Hospital Gown: Minimum  assistance  Pullover Shirt: Modified independent    Bathing Assistance:  (patient reports ad vinny w/ Mod I in stance at sink)  Perineal  : Supervision/set-up  Position Performed: Standing  Lower Body : Supervision/set-up  Position Performed: Seated in chair    Toileting Assistance: Modified independent  Bladder Hygiene: Modified independent  Bowel Hygiene: Contact guard assistance  Clothing Management: Modified independent  Adaptive Equipment: Walker    Grooming Assistance: Stand-by assistance (at standing)  Washing Hands: Stand-by assistance  Brushing Teeth: Modified independent  Brushing/Combing Hair: Modified independent  Cues: Verbal cues provided    Dressing Assistance: Modified independent  Underpants: Supervision/set-up  Protective Undergarmet: Modified independent  Pants With Elastic Waist: Supervision/set-up  Socks: Modified independent  Leg Crossed Method Used: No  Position Performed: Bending forward method, Seated in chair  Cues: Verbal cues provided  Adaptive Equipment Used: Sock aid    Feeding Assistance: Modified independent   TRANSFERS TRANSFERS   Sit to Stand: Maximum assistance  Stand to Sit: Moderate assistance  Bed to Chair: Maximum assistance (x2)  Toilet Transfer : Other (comment) (unable 2/2/  inability to adhere to LLE TTWB)  Tub Transfer: Modified independent Sit to Stand: Modified independent  Stand to Sit: Modified independent  Bed to Chair: Stand-by asssistance  Toilet Transfer : Modified independent  Tub Transfer: Modified independent   Bathroom Mobility: Contact guard assistance  Toilet Transfer : Other (comment) (unable 2/2/  inability to adhere to LLE TTWB)  Tub Transfer: Modified independent  Cues: Verbal cues provided  Adaptive Equipment:  (RW ) Bathroom Mobility: Stand-by assistance (utillizing RW)  Toilet Transfer : Modified independent  Tub Transfer: Modified independent  Cues: Verbal cues provided  Adaptive Equipment: Walker (comment)   BALANCE BALANCE   Sitting: Impaired, With support  Sitting - Static: Fair (occasional) (+)  Sitting - Dynamic: Fair (occasional)  Standing: Impaired  Standing - Static: Poor  Standing - Dynamic : Fair Sitting: Intact  Sitting - Static: Good (unsupported)  Sitting - Dynamic: Fair (occasional)  Standing: With support  Standing - Static: Fair (+)  Standing - Dynamic : Fair (+)       GROSS ASSESSMENT  GROSS ASSESSMENT   AROM: Generally decreased, functional  PROM: Generally decreased, functional  Strength: Generally decreased, functional (RLE 3+/5 grossly, LLE demonstrates 3/5)  Coordination: Generally decreased, functional  Tone: Normal  Sensation: Intact AROM: Generally decreased, functional  PROM: Generally decreased, functional  Strength: Generally decreased, functional  Coordination: Generally decreased, functional  Tone: Normal  Sensation: Intact   COORDINATION COORDINATION   Fine Motor Skills-Upper: Left Impaired, Right Intact (L hand splint intact)  Gross Motor Skills-Upper: Left Intact, Right Intact Fine Motor Skills-Upper: Left Impaired, Right Intact (L hand splint intact)  Gross Motor Skills-Upper: Left Intact, Right Intact   VISUAL/PERCEPTUAL VISUAL/PERCEPTUAL   Corrective Lenses: Glasses   Corrective Lenses: Glasses     AUDITORY: AUDITORY:   Auditory Impairment: None Auditory Impairment: None       INSTRUMENTAL  ADL'S:    INSTRUMENTAL ADL'S:          THE BARTHEL INDEX  ACTIVITY   SCORE   FEEDING  0=unable  5=needs help cutting,spreading butter,etc., or modified diet  10= independent   10   BATHING  0=dependent  5=independent (or in shower   5   GROOMING  0=needs help  5=independent face/hair/teeth/shaving (implements provided)   5   DRESSING  0=dependent  5=needs help but can do about half unaided  10=independent(including buttons, zips,laces etc.)   10   BOWELS  0=incontinent  5=occasional accident  10=continent   10   BLADDER  0=incontinent, or catheterized and unable to manage alone  5=occasional accident  10=continent   10   TOILET USE  0=dependent  5=needs some help, but can do something alone  10=independent (on and off, dressing, wiping)   10   TRANSFER (BED TO CHAIR AND BACK)  0=unable, no sitting balance  5=major help(one or two people,physical), can sit  10=minor help(verbal or physical)  15=independent   15   MOBILITY (ON LEVEL SURFACES)  0=immobile or <50 yards  5=wheelchair independent,including corners,>50 yards  10=walkes with help of one person (verbal or physical) >50 yards  15=independent(but may use any aid; for example, stick) >50 yards   10   STAIRS  0=unable  5=needs help (verbal, physical, carrying aid)  10=independent   5            TOTAL:                  95/100     Treatment:   Toilet transfer and tasks level of assist as mentioned above. Theraputty (green) exercises with bilateral hand integration at patient's tolerance w/o c/o pain to improve ROM and instrinsic muscles of digits. Instructed on and issued visual handout for continued review for increased carryover for safe discharge home. Recommendation made to patient for [facility] services to assess cooking utilizing hot burner to ensure safety awareness and modifications as needed, patient verbalized understanding. Patient's response to Treatment rendered:  excellent    Patient expected Discharge Location:  [x]Private Residence  [] DREW/ILF  []LTC  []Other:    Plan: Continue OT services as established on the Plan of Care for 3-6 times per week times a week.     Treatment Minutes:  47  OT and Assistant have had a weekly case conference regarding the above treatment:  [x] Yes     [] No    Therapist:   Cortez Farley,         Date:11/16/2017     Forward to OT for co-signature when completed

## 2017-11-16 NOTE — ROUTINE PROCESS
Verbal shift change report given to MELISSA Montemayor RN (oncoming nurse) by Asia Moctezuma   (offgoing nurse).  Report included the following information SBAR, Kardex and MAR.

## 2017-11-16 NOTE — ROUTINE PROCESS
Bedside shift change report given to Lorelei Palacios Lpn (oncoming nurse) by Jackeline Edwards RN (offgoing nurse). Report included the following information SBAR, Kardex, MAR and Recent Results. VISUALLY CHECKED PT Q 1 HR BY NURSING STAFF. 24 hour rder chart check done

## 2017-11-16 NOTE — PROGRESS NOTES
Problem: Mobility Impaired (Adult and Pediatric)  Goal: *Acute Goals and Plan of Care (Insert Text)  PHYSICAL THERAPY LTG GOALS :  Initiated 10/13/2017 and to be accomplished within 4 Weeks (updated 11/16/17)    1. Patient will move from supine to sit and sit to supine  and roll side to side in bed with modified independence with WB compliance. (Achieved)     2. Patient will transfer from bed to chair and chair to bed with modified independence using LRAD with WB compliance. (Achieved using RW with WBAT)  3. Patient will perform sit to stand with modified independence with Good balance and safety awareness with WB compliance. (Achieved)  4. Patient will ambulate with modified independence for 150 feet with LRAD on level surfaces and be able to maneuver through narrow spaces and obstacles without loss of balance with WB compliance. (Progressing; 240 ft using RW with Supervision)  5. Patient will ascend/descend 14 stairs with right handrail(s) with supervision/set-up to allow for safe home access/exit with WB compliance. (Progressing; SBA x 15 stairs using R HR + quad cane)  6. Patient will improve standardized test score for [place] Standing Balance Scale 3+ to reduce fall risk with WB compliance. (Progressing; 3)    PHYSICAL THERAPY STG GOALS :   Initiated 10/13/2017 and to be accomplished within 2 Weeks (updated 11/09/17)    1. Patient will move from supine to sit and sit to supine  and roll side to side in bed with minimal assistance/contact guard assist with WB compliance. (Achieved)  2. Patient will transfer from bed to chair and chair to bed with minimal assistance/contact guard assist using platform walker with WB compliance. (Achieved)  3. Patient will perform sit to stand with minimal assistance/contact guard assist with Fair balance and safety awareness with WB compliance. (Achieved)  4.   Patient will ambulate with minimal assistance/contact guard assist for 75 feet with platform walker on level surfaces with 2 turns with WB compliance. (Achieved)  5. Patient will ascend/descend 14 stairs with right handrail(s) withminimal assistance to allow for safe home access/exit with WB compliance. (Progressing; 5 stairs with CGA)  6. Patient will improve standardized test score for [place] Standing Balance Scale 1+ with WB compliance. (Achieved)    PHYSICAL THERAPY STG GOALS :   Initiated 10/13/2017 and to be accomplished within 2 Weeks (updated 11/02/17)    1. Patient will move from supine to sit and sit to supine  and roll side to side in bed with minimal assistance/contact guard assist with WB compliance. (Achieved)  2. Patient will transfer from bed to chair and chair to bed with minimal assistance/contact guard assist using platform walker with WB compliance. (Achieved)  3. Patient will perform sit to stand with minimal assistance/contact guard assist with Fair balance and safety awareness with WB compliance. (Achieved)  4. Patient will ambulate with minimal assistance/contact guard assist for 75 feet with platform walker on level surfaces with 2 turns with WB compliance. (Progressing; 46 ft using platform RW with CGA)  5. Patient will ascend/descend 14 stairs with right handrail(s) withminimal assistance to allow for safe home access/exit with WB compliance. (Attempted, pt completing stair training in // bars with 2 inch step,CGA/min A)  6. Patient will improve standardized test score for [place] Standing Balance Scale 1+ with WB compliance. (Achieved)      PHYSICAL THERAPY STG GOALS :   Initiated 10/13/2017 and to be accomplished within 2 Weeks (updated 10/26/17)    1. Patient will move from supine to sit and sit to supine  and roll side to side in bed with minimal assistance/contact guard assist with WB compliance. (Achieved)  2.   Patient will transfer from bed to chair and chair to bed with minimal assistance/contact guard assist using platform walker with WB compliance. (Achieved)  3. Patient will perform sit to stand with minimal assistance/contact guard assist with Fair balance and safety awareness with WB compliance. (Achieved)  4. Patient will ambulate with minimal assistance/contact guard assist for 75 feet with platform walker on level surfaces with 2 turns with WB compliance. (Progressing; 16 ft using platform RW with CGA)  5. Patient will ascend/descend 14 stairs with right handrail(s) withminimal assistance to allow for safe home access/exit with WB compliance. (Attempted, pt currently unable)  6. Patient will improve standardized test score for [place] Standing Balance Scale 1+ with WB compliance. (Achieved)    PHYSICAL THERAPY STG GOALS :  Initiated 10/13/2017 and to be accomplished within 2 Weeks (updated 10/19/17)    1. Patient will move from supine to sit and sit to supine  and roll side to side in bed with minimal assistance/contact guard assist with WB compliance. (Achieved)  2. Patient will transfer from bed to chair and chair to bed with minimal assistance/contact guard assist using platform walker with WB compliance. (Progressing modA)  3. Patient will perform sit to stand with minimal assistance/contact guard assist with Fair balance and safety awareness with WB compliance. (Progressing modA)  4. Patient will ambulate with minimal assistance/contact guard assist for 75 feet with platform walker on level surfaces with 2 turns with WB compliance. (not tested due to TTWB on LLE)  5. Patient will ascend/descend 14 stairs with right handrail(s) withminimal assistance to allow for safe home access/exit with WB compliance. (Not tested due to WB status)  6. Patient will improve standardized test score for [place] Standing Balance Scale 1+ with WB compliance. PHYSICAL THERAPY LTG GOALS :  Initiated 10/13/2017 and to be accomplished within 4 Weeks    1.   Patient will move from supine to sit and sit to supine  and roll side to side in bed with modified independence with WB compliance. 2.  Patient will transfer from bed to chair and chair to bed with modified independence using LRAD with WB compliance. 3.  Patient will perform sit to stand with modified independence with Good balance and safety awareness with WB compliance. 4.  Patient will ambulate with modified independence for 150 feet with LRAD on level surfaces and be able to maneuver through narrow spaces and obstacles without loss of balance with WB compliance. 5.  Patient will ascend/descend 14 stairs with right handrail(s) with supervision/set-up to allow for safe home access/exit with WB compliance. 6.  Patient will improve standardized test score for [place] Standing Balance Scale 3+ to reduce fall risk with WB compliance. Physical Therapist:   Leonor Perez PT  on 10/13/2017                [facility]   PHYSICAL THERAPY WEEKLY PROGRESS REPORT  Reporting Period:  Date:   11/09/17*  to 11/16/17      Patient: Sharren Hashimoto (89 y.o. female)                         Date: 11/16/2017    Primary Diagnosis: left hip fracture  Hip fracture [facility])      Attending Physician: Shun Aquino MD   Treatment Diagnosis  Treatment Diagnosis: muscle weakness  Treatment Diagnosis 2: difficulty in walking  Precautions:  Fall, WBAT (Splint at L wrist;ROM activities @L wrist)  Rehab Potential : Excellent:    Skill interventions and education provided with clinical rationale (include individualized treatment techniques and standardized tests):   Skilled Physical Therapy services were provided with: TE rendered to address strength, endurance, and mobility. TA rendered to address bed mobility, transfers. Gait training rendered to address gait impairments using RW; stair training to address safe stair negotiation using R HR + quad cane. Neuromuscular re-education rendered to address balance impairments. Family training on safe stair negotiation to prepare for d/c home. Using a comparative statement, summarize significant progress toward goals as a result of skilled intervention provided:  Patient has made Good progress towards their Physical Therapy goals in the areas of bed mobility, transfers, ambulation using RW, stair negotiation. Identify remaining functional areas, impairments limiting progress and/or barriers to improvement:  Patient would benefit from continues PT services to address the following functional deficits in dynamic standing balance, stair negotiation, and antalgic gait pattern. Barriers to improvement include: balance impairments, pain in BLEs LLE > RLE.      OBJECTIVE DATA SUMMARY:     INITIAL ASSESSMENT WEEKLY ASSESSMENT   COGNITIVE STATUS COGNITIVE STATUS   Neurologic State: Alert, Appropriate for age  Orientation Level: Oriented X4  Cognition: Appropriate for age attention/concentration  Perception: Appears intact  Perseveration: No perseveration noted  Safety/Judgement: Fall prevention Neurologic State: Alert, Appropriate for age  Orientation Level: Oriented X4  Cognition: Appropriate for age attention/concentration  Perception: Appears intact  Perseveration: No perseveration noted  Safety/Judgement: Fall prevention   PAIN PAIN   Pain Scale 1: Numeric (0 - 10)  Pain Intensity 1: 0  Pain Onset 1: now  Pain Location 1: Hip, Hand  Pain Orientation 1: Left  Pain Description 1: Aching  Pain Intervention(s) 1: Elevation, Ice, Medication (see MAR)  Patient Stated Pain Goal: 0  Pain Reassessment 1: Yes Pain Scale 1: Numeric (0 - 10)  Pain Intensity 1: 0  Pain Onset 1: movement  Pain Location 1: Hip  Pain Orientation 1: Left  Pain Description 1: Aching  Pain Intervention(s) 1: Medication (see MAR)  Patient Stated Pain Goal: 0  Pain Reassessment 1: Yes   GROSS ASSESSMENT GROSS ASSESSMENT   AROM: Generally decreased, functional  PROM: Generally decreased, functional  Strength: Generally decreased, functional (RLE 3+/5 grossly, LLE demonstrates 3/5)  Coordination: Generally decreased, functional  Tone: Normal  Sensation: Intact AROM: Generally decreased, functional  PROM: Generally decreased, functional  Strength: Generally decreased, functional  Coordination: Generally decreased, functional  Tone: Normal  Sensation: Intact   BED MOBILITY BED MOBILITY   Rolling: Stand-by asssistance  Supine to Sit: Maximum assistance  Sit to Supine: Maximum assistance  Scooting: Maximum assistance Rolling: Modified independent  Supine to Sit: Modified independent  Sit to Supine: Moderate assistance  Scooting: Stand-by asssistance   GAIT GAIT   Base of Support: Center of gravity altered  Speed/Polly: Slow  Step Length: Left shortened, Right shortened  Stance: Left decreased  Gait Abnormalities: Decreased step clearance  Ambulation - Level of Assistance:  Moderate assistance, Additional time  Distance (ft): 5 Feet (ft)  Assistive Device: Other (comment) (Rolling walker with platform on left)  Rail Use: Both  Stairs - Level of Assistance: Contact guard assistance  Number of Stairs Trained: 3 (4\" steps x2)  Interventions: Safety awareness training, Verbal cues Base of Support: Center of gravity altered  Speed/Polly: Slow  Step Length: Left shortened, Right shortened  Stance: Left decreased  Gait Abnormalities: Decreased step clearance  Ambulation - Level of Assistance: Supervision  Distance (ft): 113 Feet (ft)  Assistive Device: Gait belt, Walker, rolling  Rail Use: Right  (+quad cane)  Stairs - Level of Assistance: Stand-by asssistance  Number of Stairs Trained: 15  Interventions: Verbal cues    (unable to assess)                TRANSFERS TRANSFERS   Sit to Stand: Maximum assistance  Stand to Sit: Moderate assistance  Bed to Chair: Maximum assistance (x2) Sit to Stand: Modified independent  Stand to Sit: Modified independent  Bed to Chair: Stand-by asssistance   BALANCE BALANCE   Sitting: Impaired, With support  Sitting - Static: Fair (occasional) (+)  Sitting - Dynamic: Fair (occasional)  Standing: Impaired  Standing - Static: Poor  Standing - Dynamic : Fair Sitting: Intact  Sitting - Static: Good (unsupported)  Sitting - Dynamic: Fair (occasional)  Standing: With support  Standing - Static: Fair (+)  Standing - Dynamic : Fair (+)   WHEELCHAIR MOBILITY/MGMT WHEELCHAIR MOBILITY/MGMT         Activity Tolerance:  Good Activity Tolerance: Good   Visual/Perceptual   Corrective Lenses: Glasses      Visual/Perceptual   Vision  Corrective Lenses: Glasses        Auditory:   Auditory Impairment: None    Auditory:   Auditory  Auditory Impairment: None       Steps: right + quad cane   Clinical Decision makin+ Clinical Decision making:  3 on [place] Standing Balance     Treatment:   Gait training 113 ft x 2 using Rw with SBA/close (S), SBA required due to increase in antalgic gait pattern due to pain and swelling in BLEs. Pain improved with sitting rest breaks, administered to manage pain. Stair training x 15 stairs using R HR + quad cane with SBA. Pt progressing well with d/c date est for . Family training as been administered with son Paige Mall for safe stair negotiation upon return home. Patient's response to treatment rendered:  Fair+    Patient expected Discharge Location:  [x]Private Residence  [] DREW/ILF  []LTC  []Other:    Plan: Continue Skilled PT services as established by the Plan of Care for 5-6 times a week. PT and Assistant have had a weekly case conference regarding the above treatment:  [x] Yes     [] No       Treatment session:  58 minutes. Therapist: Anna Black, PT       Date:2017  Forward to PT for co-signature when completed.

## 2017-11-17 PROCEDURE — 74011250636 HC RX REV CODE- 250/636: Performed by: INTERNAL MEDICINE

## 2017-11-17 PROCEDURE — 74011250637 HC RX REV CODE- 250/637: Performed by: INTERNAL MEDICINE

## 2017-11-17 RX ADMIN — PANTOPRAZOLE SODIUM 40 MG: 40 TABLET, DELAYED RELEASE ORAL at 08:38

## 2017-11-17 RX ADMIN — OXYCODONE HYDROCHLORIDE AND ACETAMINOPHEN 2 TABLET: 5; 325 TABLET ORAL at 20:15

## 2017-11-17 RX ADMIN — CELECOXIB 200 MG: 100 CAPSULE ORAL at 08:38

## 2017-11-17 RX ADMIN — TRIAMCINOLONE ACETONIDE: 1 CREAM TOPICAL at 09:00

## 2017-11-17 RX ADMIN — ENOXAPARIN SODIUM 40 MG: 40 INJECTION SUBCUTANEOUS at 08:40

## 2017-11-17 RX ADMIN — METFORMIN HYDROCHLORIDE 1000 MG: 500 TABLET ORAL at 17:05

## 2017-11-17 RX ADMIN — CELECOXIB 200 MG: 100 CAPSULE ORAL at 17:05

## 2017-11-17 RX ADMIN — LEVOTHYROXINE SODIUM 75 MCG: 75 TABLET ORAL at 05:54

## 2017-11-17 RX ADMIN — CHOLECALCIFEROL (VITAMIN D3) 25 MCG (1,000 UNIT) TABLET 2000 UNITS: at 08:40

## 2017-11-17 RX ADMIN — DOCUSATE SODIUM 100 MG: 100 CAPSULE, LIQUID FILLED ORAL at 08:40

## 2017-11-17 RX ADMIN — OXYCODONE HYDROCHLORIDE AND ACETAMINOPHEN 2 TABLET: 5; 325 TABLET ORAL at 08:36

## 2017-11-17 RX ADMIN — SIMVASTATIN 20 MG: 20 TABLET, FILM COATED ORAL at 20:15

## 2017-11-17 RX ADMIN — LOSARTAN POTASSIUM 25 MG: 50 TABLET ORAL at 08:37

## 2017-11-17 RX ADMIN — METFORMIN HYDROCHLORIDE 1000 MG: 500 TABLET ORAL at 08:38

## 2017-11-17 NOTE — ROUTINE PROCESS
Verbal shift change report given to MELISSA Patel RN (oncoming nurse) by Ronda Jensen   (offgoing nurse).  Report included the following information SBAR, Kardex and MAR.

## 2017-11-17 NOTE — ROUTINE PROCESS
Bedside shift change report given to Milad Piedra Rn (oncoming nurse) by Benjamin Cheng RN (offgoing nurse). Report included the following information SBAR, Kardex, MAR and Recent Results. VISUALLY CHECKED PT Q 1 HR BY NURSING STAFF. 24 hour order chart check done.

## 2017-11-17 NOTE — PROGRESS NOTES
Problem: Self Care Deficits Care Plan (Adult)  Goal: *Acute Goals and Plan of Care (Insert Text)  OCCUPATIONAL THERAPY SHORT TERM GOALS    Updated 11/16/17  1. Patient will perform Upper body ADL's with adaptive equipment & compensatory techniques as needed independence. 2.  Patient will perform standing static/dynamic balance activities for improved ADL/IADL function with SBA x 1 and Good balance and safety awareness. 3.  Patient will improve Barthel index scores to atleast 70/100 to improve functional mobility. 4.  Patient will tolerate standing x 15 minutes during functional activity with walker to increase participation in ADL routine. 5. Patient will utilize energy conservation techniques during functional activities with minimal verbal cues. 6. Patient will perform tub/shower combo transfer with durable medical equipment as needed & tasks w/ Supv and good safety awareness. Updated 11/16/17  1. Patient will perform Upper body ADL's with adaptive equipment & compensatory techniques as needed with contact guard assist. - goal met, upgrade  2. Patient will perform Lower body ADL's with adaptive equipment & compensatory techniques as needed Min assistance. - goal met, d/c goal  3. Patient will perform toileting task with SBA with Good safety to reduce falls risk. - goal met, d/c goal  4. Patient will perform toilet transfers with Platform Walker and SBA. - goal met, d/c goal   5. Patient will perform standing static/dynamic balance activities for improved ADL/IADL function with SBA x 1 and Good balance and safety awareness. - met goal, upgrade  6. Patient will improve Barthel index scores to atleast 70/100 to improve functional mobility. - met goal, d/c goal  7. Patient will tolerate standing x 15 minutes during functional activity while adhering to LLE TTWB And Left wrist NWB utilizing platform walker to increase participation in ADL routine. - met goal, upgrade  Updated 11/02/17    1.   Patient will perform Upper body ADL's with adaptive equipment & compensatory techniques as needed with contact guard assist.   2.  Patient will perform Lower body ADL's with adaptive equipment & compensatory techniques as needed Min assistance. 3.  Patient will perform toileting task with SBA with Good safety to reduce falls risk. 4.  Patient will perform toilet transfers with Platform Walker and SBA. 5.  Patient will perform standing static/dynamic balance activities for improved ADL/IADL function with SBA x 1 and Good balance and safety awareness. 6.  Patient will improve Barthel index scores to atleast 70/100 to improve functional mobility. 7.  Patient will tolerate standing x 15 minutes during functional activity while adhering to LLE TTWB And Left wrist NWB utilizing platform walker to increase participation in ADL routine. Updated 10/26/17    1. Patient will perform Upper body ADL's with adaptive equipment & compensatory techniques as needed with minimal assistance/contact guard assist. (goal met-UPG SBA)  2.  Patient will perform Lower body ADL's with adaptive equipment & compensatory techniques as needed moderate assistance. (goal met-UPG Min A)  3. Patient will perform toileting task with moderate assistance  with Good safety to reduce falls risk. (goal met-UPG SBA)  4. Patient will perform toilet transfers with Platform Walker and moderate assistance x 1.(goal met-UPG SBA)  5. Patient will perform standing static/dynamic balance activities for improved ADL/IADL function with moderate assistance x 1 and Good balance and safety awareness. (goal met-UPG SBA)  6. Patient will improve Barthel index scores to atleast 50/100 to improve functional mobility. (goal met-UPG 70/100)  7.   Patient will tolerate standing x 5 minutes during functional activity while adhering to LLE TTWB And Left wrist NWB utilizing platform walker to increase participation in ADL routine. (goal met-UPG 15)       Initiated 10/13/2017 and to be accomplished within 2 Week(s)    1. Patient will perform Upper body ADL's with adaptive equipment & compensatory techniques as needed with minimal assistance/contact guard assist. (progressing)  2. Patient will perform Lower body ADL's with adaptive equipment & compensatory techniques as needed moderate assistance. (progressing)  3. Patient will perform toileting task with moderate assistance x 2 with Good safety to reduce falls risk. (goal met-UPG Mod A)  4. Patient will perform toilet transfers with Platform Walker and moderate assistance x 2. (goal met-UPG Mod A x 1)  5. Patient will perform standing static/dynamic balance activities for improved ADL/IADL function with moderate assistance x,2 and Good balance and safety awareness. (goal met-UPG Mod A)  6. Patient will improve Barthel index scores to atleast 35/100 to improve functional mobility. (goal met-UPG 60/100)  7. Patient will tolerate standing x 5 minutes during functional activity while adhering to LLE TTWB And Left wrist NWB utilizing platform walker to increase participation in ADL routine. OCCUPATIONAL THERAPY LONG TERM GOALS   Initiated 10/13/2017 and to be accomplished within 4 Week(s)    1. Patient will perform Upper body ADL's with adaptive equipment & compensatory techniques as needed with supervision/set-up. 2.  Patient will perform Lower body ADL's with adaptive equipment & compensatory techniques as needed with         supervision/set-up . 3. Patient will perform toileting task with supervision/set-up with Good safety to reduce falls risk. 4.  Patient will perform functional transfers with Platform Walker and  supervision/set-up and Good balance and safety awareness. 5.  Patient will perform standing static/dynamic activity for improved ADL/IADL function    with supervision/set-up an and Good balance and safety awareness.   6.  Patient will improve Barthel index score to 50/100 to improve independence with mobility. Therapist: Blair Neves    10/13/2017              [facility]   Occupational Therapy Daily Treatment note      Patient: Artemio Harry (26 y.o. female)       Date: 11/17/2017  Attending Physician: Zarina Hermosillo MD  Primary Diagnosis: left hip fracture  Hip fracture [facility])    Treatment Diagnosis  Treatment Diagnosis: muscle weakness  Treatment Diagnosis 2: increased need for assist w/ self care   Precautions : Precautions at Admission: Fall, WBAT (Splint at L wrist;ROM activities @L wrist)  Vital Signs:  Vital Signs  Pulse (Heart Rate): 66  BP: 146/67     Cognitive Status:  Mental Status  Neurologic State: Appropriate for age  Orientation Level: Oriented X4  Cognition: Appropriate for age attention/concentration  Pain:        Gross Assessment:     Coordination:     Bed Mobility:     Transfers:        Balance:  Balance  Sitting: Intact  Standing: With support  Standing - Static: Fair  Standing - Dynamic : Fair  ADL Self Care:     ADL Intervention:                          Therapeutic Activities:  Functional mobility w/ FWW x 204' w/o lob in prep for ADL & IADL routines. Therapeutic Exercises:  Theraputty (green) exercises with bilateral hand integration at patient's tolerance w/o c/o pain to improve ROM and instrinsic muscles of digits. Patient/Caregiver Education:    Co-tx with P.T. To maximize functional potential w/ functional mobility & safety awareness utilzing QC & rail w/ stair climbing for safe d/c home.     ASSESSMENT:  Patient continues to demonstrate the need for skilled Occupational Therapy services to improve   grooming, bathing, upper body dressing, lower body dressing, toileting, toilet transfer and simple home management  Progression toward goals:  [x]      Improving appropriately and progressing toward goals  []      Improving slowly and progressing toward goals  []      Not making progress toward goals and plan of care will be adjusted     Treatment session: 33 minutes    Therapist:    Manpreet Ibrahim,  11/17/2017

## 2017-11-17 NOTE — PROGRESS NOTES
Problem: Mobility Impaired (Adult and Pediatric)  Goal: *Acute Goals and Plan of Care (Insert Text)  PHYSICAL THERAPY LTG GOALS :  Initiated 10/13/2017 and to be accomplished within 4 Weeks (updated 11/16/17)    1. Patient will move from supine to sit and sit to supine  and roll side to side in bed with modified independence with WB compliance. (Achieved)     2. Patient will transfer from bed to chair and chair to bed with modified independence using LRAD with WB compliance. (Achieved using RW with WBAT)  3. Patient will perform sit to stand with modified independence with Good balance and safety awareness with WB compliance. (Achieved)  4. Patient will ambulate with modified independence for 150 feet with LRAD on level surfaces and be able to maneuver through narrow spaces and obstacles without loss of balance with WB compliance. (Progressing; 240 ft using RW with Supervision)  5. Patient will ascend/descend 14 stairs with right handrail(s) with supervision/set-up to allow for safe home access/exit with WB compliance. (Progressing; SBA x 15 stairs using R HR + quad cane)  6. Patient will improve standardized test score for [place] Standing Balance Scale 3+ to reduce fall risk with WB compliance. (Progressing; 3)    PHYSICAL THERAPY STG GOALS :   Initiated 10/13/2017 and to be accomplished within 2 Weeks (updated 11/09/17)    1. Patient will move from supine to sit and sit to supine  and roll side to side in bed with minimal assistance/contact guard assist with WB compliance. (Achieved)  2. Patient will transfer from bed to chair and chair to bed with minimal assistance/contact guard assist using platform walker with WB compliance. (Achieved)  3. Patient will perform sit to stand with minimal assistance/contact guard assist with Fair balance and safety awareness with WB compliance. (Achieved)  4.   Patient will ambulate with minimal assistance/contact guard assist for 75 feet with platform walker on level surfaces with 2 turns with WB compliance. (Achieved)  5. Patient will ascend/descend 14 stairs with right handrail(s) withminimal assistance to allow for safe home access/exit with WB compliance. (Progressing; 5 stairs with CGA)  6. Patient will improve standardized test score for [place] Standing Balance Scale 1+ with WB compliance. (Achieved)    PHYSICAL THERAPY STG GOALS :   Initiated 10/13/2017 and to be accomplished within 2 Weeks (updated 11/02/17)    1. Patient will move from supine to sit and sit to supine  and roll side to side in bed with minimal assistance/contact guard assist with WB compliance. (Achieved)  2. Patient will transfer from bed to chair and chair to bed with minimal assistance/contact guard assist using platform walker with WB compliance. (Achieved)  3. Patient will perform sit to stand with minimal assistance/contact guard assist with Fair balance and safety awareness with WB compliance. (Achieved)  4. Patient will ambulate with minimal assistance/contact guard assist for 75 feet with platform walker on level surfaces with 2 turns with WB compliance. (Progressing; 46 ft using platform RW with CGA)  5. Patient will ascend/descend 14 stairs with right handrail(s) withminimal assistance to allow for safe home access/exit with WB compliance. (Attempted, pt completing stair training in // bars with 2 inch step,CGA/min A)  6. Patient will improve standardized test score for [place] Standing Balance Scale 1+ with WB compliance. (Achieved)      PHYSICAL THERAPY STG GOALS :   Initiated 10/13/2017 and to be accomplished within 2 Weeks (updated 10/26/17)    1. Patient will move from supine to sit and sit to supine  and roll side to side in bed with minimal assistance/contact guard assist with WB compliance. (Achieved)  2.   Patient will transfer from bed to chair and chair to bed with minimal assistance/contact guard assist using platform walker with WB compliance. (Achieved)  3. Patient will perform sit to stand with minimal assistance/contact guard assist with Fair balance and safety awareness with WB compliance. (Achieved)  4. Patient will ambulate with minimal assistance/contact guard assist for 75 feet with platform walker on level surfaces with 2 turns with WB compliance. (Progressing; 16 ft using platform RW with CGA)  5. Patient will ascend/descend 14 stairs with right handrail(s) withminimal assistance to allow for safe home access/exit with WB compliance. (Attempted, pt currently unable)  6. Patient will improve standardized test score for [place] Standing Balance Scale 1+ with WB compliance. (Achieved)    PHYSICAL THERAPY STG GOALS :  Initiated 10/13/2017 and to be accomplished within 2 Weeks (updated 10/19/17)    1. Patient will move from supine to sit and sit to supine  and roll side to side in bed with minimal assistance/contact guard assist with WB compliance. (Achieved)  2. Patient will transfer from bed to chair and chair to bed with minimal assistance/contact guard assist using platform walker with WB compliance. (Progressing modA)  3. Patient will perform sit to stand with minimal assistance/contact guard assist with Fair balance and safety awareness with WB compliance. (Progressing modA)  4. Patient will ambulate with minimal assistance/contact guard assist for 75 feet with platform walker on level surfaces with 2 turns with WB compliance. (not tested due to TTWB on LLE)  5. Patient will ascend/descend 14 stairs with right handrail(s) withminimal assistance to allow for safe home access/exit with WB compliance. (Not tested due to WB status)  6. Patient will improve standardized test score for [place] Standing Balance Scale 1+ with WB compliance. PHYSICAL THERAPY LTG GOALS :  Initiated 10/13/2017 and to be accomplished within 4 Weeks    1.   Patient will move from supine to sit and sit to supine  and roll side to side in bed with modified independence with WB compliance. 2.  Patient will transfer from bed to chair and chair to bed with modified independence using LRAD with WB compliance. 3.  Patient will perform sit to stand with modified independence with Good balance and safety awareness with WB compliance. 4.  Patient will ambulate with modified independence for 150 feet with LRAD on level surfaces and be able to maneuver through narrow spaces and obstacles without loss of balance with WB compliance. 5.  Patient will ascend/descend 14 stairs with right handrail(s) with supervision/set-up to allow for safe home access/exit with WB compliance. 6.  Patient will improve standardized test score for [place] Standing Balance Scale 3+ to reduce fall risk with WB compliance.       Physical Therapist:   Sherren Berkeley, PT  on 10/13/2017                 [address] [place] [address]   physical Therapy Daily Treatment note      Patient: Lynnette Engle (40 y.o. female)               Date: 11/17/2017    Physician: Corin Alston MD  Primary Diagnosis: left hip fracture  Hip fracture [facility])          Treatment Diagnosis  Treatment Diagnosis: muscle weakness  Treatment Diagnosis 2: increased need for assist w/ self care  Precautions: Fall, WBAT (Splint at L wrist;ROM activities @L wrist)  Vital Signs  Vital Signs  Pulse (Heart Rate): 66  BP: 146/67     Cognitive Status:  Mental Status  Neurologic State: Appropriate for age  Orientation Level: Oriented X4  Cognition: Appropriate for age attention/concentration  Pain     Bed Mobility Training  Bed Mobility Training  Supine to Sit: Modified independent  Balance  Sitting: Intact  Standing: With support  Standing - Static: Fair  Standing - Dynamic : Fair  Transfer Training  Transfer Training  Sit to Stand: Modified independent  Stand to Sit: Modified independent  Sit to Stand: Modified independent  Gait Training  Gait  Base of Support: Center of gravity altered  Speed/Polly: Slow  Step Length: Left shortened;Right shortened  Gait Abnormalities: Decreased step clearance  Ambulation - Level of Assistance: Modified independent  Distance (ft): 30 Feet (ft)  Assistive Device: Gait belt;Walker, rolling  Rail Use: Right  (+quad cane)  Stairs - Level of Assistance: Stand-by asssistance  Number of Stairs Trained: 15     Gait Abnormalities: Decreased step clearance            With 2 turns. Therapeutic Exercise:    Upon initial presentation, pt was upset and crying about an encounter with staff. Therapist calmed and encouraged pt to focus on improving to prepare for d/c on 11/21. Pt then expressed concern about d/c home due to continued swelling and pain in B knees and legs; denies pain in hip. She reports having had pain meds already but pain in knees and swelling in legs persist. Therapist followed-up with nurse to inform. Upon return, gait training and stair training as mentioned above to address deficits, continues to progress. Printed image of small based quad cane for pt's son to purchase to prepare for d/c. Patient/Caregiver Education:   Pt /Caregiver Education on safety and fall prevention to reduce fall risk. ASSESSMENT:  Patient continues to benefit from Skilled PT services to improve mobility. Progression toward goals:  []      Improving appropriately and progressing toward goals  [x]      Improving slowly and progressing toward goals  []      Not making progress toward goals and plan of care will be adjusted     Treatment session: 49 minutes.   Therapist:   Jason Bell PT,          11/17/2017

## 2017-11-18 PROCEDURE — 74011250636 HC RX REV CODE- 250/636: Performed by: INTERNAL MEDICINE

## 2017-11-18 PROCEDURE — 74011250637 HC RX REV CODE- 250/637: Performed by: INTERNAL MEDICINE

## 2017-11-18 RX ADMIN — BISMUTH SUBSALICYLATE 30 ML: 262 LIQUID ORAL at 17:46

## 2017-11-18 RX ADMIN — METFORMIN HYDROCHLORIDE 1000 MG: 500 TABLET ORAL at 17:30

## 2017-11-18 RX ADMIN — ENOXAPARIN SODIUM 40 MG: 40 INJECTION SUBCUTANEOUS at 09:22

## 2017-11-18 RX ADMIN — OXYCODONE HYDROCHLORIDE AND ACETAMINOPHEN 2 TABLET: 5; 325 TABLET ORAL at 09:17

## 2017-11-18 RX ADMIN — LOSARTAN POTASSIUM 25 MG: 50 TABLET ORAL at 09:18

## 2017-11-18 RX ADMIN — TRIAMCINOLONE ACETONIDE: 1 CREAM TOPICAL at 18:00

## 2017-11-18 RX ADMIN — LEVOTHYROXINE SODIUM 75 MCG: 75 TABLET ORAL at 05:51

## 2017-11-18 RX ADMIN — OXYCODONE HYDROCHLORIDE AND ACETAMINOPHEN 2 TABLET: 5; 325 TABLET ORAL at 21:25

## 2017-11-18 RX ADMIN — DOCUSATE SODIUM 100 MG: 100 CAPSULE, LIQUID FILLED ORAL at 17:30

## 2017-11-18 RX ADMIN — CELECOXIB 200 MG: 100 CAPSULE ORAL at 17:30

## 2017-11-18 RX ADMIN — SIMVASTATIN 20 MG: 20 TABLET, FILM COATED ORAL at 21:25

## 2017-11-18 RX ADMIN — CHOLECALCIFEROL (VITAMIN D3) 25 MCG (1,000 UNIT) TABLET 2000 UNITS: at 09:17

## 2017-11-18 RX ADMIN — PANTOPRAZOLE SODIUM 40 MG: 40 TABLET, DELAYED RELEASE ORAL at 09:17

## 2017-11-18 RX ADMIN — METFORMIN HYDROCHLORIDE 1000 MG: 500 TABLET ORAL at 09:17

## 2017-11-18 RX ADMIN — CELECOXIB 200 MG: 100 CAPSULE ORAL at 09:17

## 2017-11-18 RX ADMIN — TRIAMCINOLONE ACETONIDE: 1 CREAM TOPICAL at 09:00

## 2017-11-18 NOTE — ROUTINE PROCESS
Bedside and Verbal shift change report given to Tejas Saab RN (oncoming nurse) by Sawyer Dove RN (offgoing nurse). Report included the following information SBAR, Kardex and Intake/Output.

## 2017-11-18 NOTE — PROGRESS NOTES
Bedside shift change report given to AMELIE Mena RN (oncoming nurse) by NICHOLAS Mccormick RN (offgoing nurse). Report included the following information SBAR, Kardex and MAR.

## 2017-11-19 PROCEDURE — 74011250636 HC RX REV CODE- 250/636: Performed by: INTERNAL MEDICINE

## 2017-11-19 PROCEDURE — 74011250637 HC RX REV CODE- 250/637: Performed by: INTERNAL MEDICINE

## 2017-11-19 RX ADMIN — TRIAMCINOLONE ACETONIDE: 1 CREAM TOPICAL at 18:00

## 2017-11-19 RX ADMIN — BISMUTH SUBSALICYLATE 30 ML: 262 LIQUID ORAL at 14:29

## 2017-11-19 RX ADMIN — PANTOPRAZOLE SODIUM 40 MG: 40 TABLET, DELAYED RELEASE ORAL at 08:51

## 2017-11-19 RX ADMIN — METFORMIN HYDROCHLORIDE 1000 MG: 500 TABLET ORAL at 17:04

## 2017-11-19 RX ADMIN — METFORMIN HYDROCHLORIDE 1000 MG: 500 TABLET ORAL at 08:45

## 2017-11-19 RX ADMIN — CELECOXIB 200 MG: 100 CAPSULE ORAL at 17:04

## 2017-11-19 RX ADMIN — DOCUSATE SODIUM 100 MG: 100 CAPSULE, LIQUID FILLED ORAL at 08:51

## 2017-11-19 RX ADMIN — SIMVASTATIN 20 MG: 20 TABLET, FILM COATED ORAL at 21:17

## 2017-11-19 RX ADMIN — ENOXAPARIN SODIUM 40 MG: 40 INJECTION SUBCUTANEOUS at 08:52

## 2017-11-19 RX ADMIN — TRIAMCINOLONE ACETONIDE: 1 CREAM TOPICAL at 09:00

## 2017-11-19 RX ADMIN — LOSARTAN POTASSIUM 25 MG: 50 TABLET ORAL at 08:45

## 2017-11-19 RX ADMIN — CELECOXIB 200 MG: 100 CAPSULE ORAL at 08:51

## 2017-11-19 RX ADMIN — BISMUTH SUBSALICYLATE 30 ML: 262 LIQUID ORAL at 21:15

## 2017-11-19 RX ADMIN — LEVOTHYROXINE SODIUM 75 MCG: 75 TABLET ORAL at 05:30

## 2017-11-19 RX ADMIN — CHOLECALCIFEROL (VITAMIN D3) 25 MCG (1,000 UNIT) TABLET 2000 UNITS: at 08:45

## 2017-11-19 NOTE — ROUTINE PROCESS
edside shift change report given to Yamini Khan RN (oncoming nurse) by Bishop Luis Manuel RN (offgoing nurse). Report included the following information SBAR, Kardex, MAR and Recent Results. VISUALLY CHECKED PT Q 1 HR BY NURSING STAFF. 24 hour order chart check done

## 2017-11-19 NOTE — ROUTINE PROCESS
Bedside and Verbal shift change report given to MELISSA Ray RN (oncoming nurse) by AMELIE Mena RN (offgoing nurse). Report included the following information SBAR, Kardex and MAR.

## 2017-11-20 VITALS
WEIGHT: 183.7 LBS | RESPIRATION RATE: 18 BRPM | DIASTOLIC BLOOD PRESSURE: 76 MMHG | OXYGEN SATURATION: 99 % | TEMPERATURE: 98 F | HEART RATE: 74 BPM | BODY MASS INDEX: 36.06 KG/M2 | SYSTOLIC BLOOD PRESSURE: 167 MMHG | HEIGHT: 60 IN

## 2017-11-20 PROCEDURE — 74011250636 HC RX REV CODE- 250/636: Performed by: INTERNAL MEDICINE

## 2017-11-20 PROCEDURE — 74011250637 HC RX REV CODE- 250/637: Performed by: INTERNAL MEDICINE

## 2017-11-20 RX ORDER — CELECOXIB 100 MG/1
200 CAPSULE ORAL DAILY PRN
Status: DISCONTINUED | OUTPATIENT
Start: 2017-11-20 | End: 2017-11-21 | Stop reason: HOSPADM

## 2017-11-20 RX ORDER — PANTOPRAZOLE SODIUM 40 MG/1
40 TABLET, DELAYED RELEASE ORAL DAILY
Qty: 60 TAB | Refills: 0 | Status: SHIPPED | OUTPATIENT
Start: 2017-11-21 | End: 2018-01-16 | Stop reason: SDUPTHER

## 2017-11-20 RX ORDER — SIMVASTATIN 20 MG/1
20 TABLET, FILM COATED ORAL
Qty: 60 TAB | Refills: 0 | Status: SHIPPED | OUTPATIENT
Start: 2017-11-20 | End: 2018-02-20 | Stop reason: SDUPTHER

## 2017-11-20 RX ORDER — OXYCODONE AND ACETAMINOPHEN 5; 325 MG/1; MG/1
1-2 TABLET ORAL
Qty: 60 TAB | Refills: 0 | Status: SHIPPED | OUTPATIENT
Start: 2017-11-20 | End: 2019-01-17 | Stop reason: ALTCHOICE

## 2017-11-20 RX ORDER — CELECOXIB 200 MG/1
200 CAPSULE ORAL
Qty: 30 CAP | Refills: 0 | Status: SHIPPED | OUTPATIENT
Start: 2017-11-20 | End: 2018-02-18

## 2017-11-20 RX ORDER — LOSARTAN POTASSIUM 25 MG/1
25 TABLET ORAL DAILY
Qty: 60 TAB | Refills: 0 | Status: SHIPPED | OUTPATIENT
Start: 2017-11-21 | End: 2017-12-13 | Stop reason: SDUPTHER

## 2017-11-20 RX ORDER — MELATONIN
2000 DAILY
Qty: 60 TAB | Refills: 0 | Status: SHIPPED | OUTPATIENT
Start: 2017-11-21

## 2017-11-20 RX ORDER — LEVOTHYROXINE SODIUM 75 UG/1
75 TABLET ORAL
Qty: 60 TAB | Refills: 0 | Status: SHIPPED | OUTPATIENT
Start: 2017-11-21 | End: 2018-01-17 | Stop reason: SDUPTHER

## 2017-11-20 RX ORDER — METFORMIN HYDROCHLORIDE 1000 MG/1
1000 TABLET ORAL 2 TIMES DAILY WITH MEALS
Qty: 60 TAB | Refills: 0 | Status: SHIPPED | OUTPATIENT
Start: 2017-11-20 | End: 2018-01-16 | Stop reason: SDUPTHER

## 2017-11-20 RX ADMIN — CELECOXIB 200 MG: 100 CAPSULE ORAL at 09:22

## 2017-11-20 RX ADMIN — TRIAMCINOLONE ACETONIDE: 1 CREAM TOPICAL at 09:00

## 2017-11-20 RX ADMIN — SIMVASTATIN 20 MG: 20 TABLET, FILM COATED ORAL at 21:49

## 2017-11-20 RX ADMIN — METFORMIN HYDROCHLORIDE 1000 MG: 500 TABLET ORAL at 17:35

## 2017-11-20 RX ADMIN — METFORMIN HYDROCHLORIDE 1000 MG: 500 TABLET ORAL at 09:22

## 2017-11-20 RX ADMIN — TRIAMCINOLONE ACETONIDE: 1 CREAM TOPICAL at 18:00

## 2017-11-20 RX ADMIN — LEVOTHYROXINE SODIUM 75 MCG: 75 TABLET ORAL at 05:41

## 2017-11-20 RX ADMIN — ENOXAPARIN SODIUM 40 MG: 40 INJECTION SUBCUTANEOUS at 09:22

## 2017-11-20 RX ADMIN — DOCUSATE SODIUM 100 MG: 100 CAPSULE, LIQUID FILLED ORAL at 09:22

## 2017-11-20 RX ADMIN — PANTOPRAZOLE SODIUM 40 MG: 40 TABLET, DELAYED RELEASE ORAL at 09:22

## 2017-11-20 RX ADMIN — LOSARTAN POTASSIUM 25 MG: 50 TABLET ORAL at 09:22

## 2017-11-20 RX ADMIN — CHOLECALCIFEROL (VITAMIN D3) 25 MCG (1,000 UNIT) TABLET 2000 UNITS: at 09:22

## 2017-11-20 NOTE — PROGRESS NOTES
Problem: Self Care Deficits Care Plan (Adult)  Goal: *Acute Goals and Plan of Care (Insert Text)  OCCUPATIONAL THERAPY SHORT TERM GOALS    Updated 11/16/17  1. Patient will perform Upper body ADL's with adaptive equipment & compensatory techniques as needed independence. (goal met)  2. Patient will perform standing static/dynamic balance activities for improved ADL/IADL function with SBA x 1 and Good balance and safety awareness. (goal met)  3. Patient will improve Barthel index scores to atleast 70/100 to improve functional mobility. (goal met)  4. Patient will tolerate standing x 15 minutes during functional activity with walker to increase participation in ADL routine. (goal met)  5. Patient will utilize energy conservation techniques during functional activities with minimal verbal cues. (goal met)  6. Patient will perform tub/shower combo transfer with durable medical equipment as needed & tasks w/ Supv and good safety awareness.(goal met)    Updated 11/16/17  1. Patient will perform Upper body ADL's with adaptive equipment & compensatory techniques as needed with contact guard assist. - goal met, upgrade  2. Patient will perform Lower body ADL's with adaptive equipment & compensatory techniques as needed Min assistance. - goal met, d/c goal  3. Patient will perform toileting task with SBA with Good safety to reduce falls risk. - goal met, d/c goal  4. Patient will perform toilet transfers with Platform Walker and SBA. - goal met, d/c goal   5. Patient will perform standing static/dynamic balance activities for improved ADL/IADL function with SBA x 1 and Good balance and safety awareness. - met goal, upgrade  6. Patient will improve Barthel index scores to atleast 70/100 to improve functional mobility. - met goal, d/c goal  7.   Patient will tolerate standing x 15 minutes during functional activity while adhering to LLE TTWB And Left wrist NWB utilizing platform walker to increase participation in ADL routine. - met goal, upgrade  Updated 11/02/17    1. Patient will perform Upper body ADL's with adaptive equipment & compensatory techniques as needed with contact guard assist.   2.  Patient will perform Lower body ADL's with adaptive equipment & compensatory techniques as needed Min assistance. 3.  Patient will perform toileting task with SBA with Good safety to reduce falls risk. 4.  Patient will perform toilet transfers with Platform Walker and SBA. 5.  Patient will perform standing static/dynamic balance activities for improved ADL/IADL function with SBA x 1 and Good balance and safety awareness. 6.  Patient will improve Barthel index scores to atleast 70/100 to improve functional mobility. 7.  Patient will tolerate standing x 15 minutes during functional activity while adhering to LLE TTWB And Left wrist NWB utilizing platform walker to increase participation in ADL routine. Updated 10/26/17    1. Patient will perform Upper body ADL's with adaptive equipment & compensatory techniques as needed with minimal assistance/contact guard assist. (goal met-UPG SBA)  2.  Patient will perform Lower body ADL's with adaptive equipment & compensatory techniques as needed moderate assistance. (goal met-UPG Min A)  3. Patient will perform toileting task with moderate assistance  with Good safety to reduce falls risk. (goal met-UPG SBA)  4. Patient will perform toilet transfers with Platform Walker and moderate assistance x 1.(goal met-UPG SBA)  5. Patient will perform standing static/dynamic balance activities for improved ADL/IADL function with moderate assistance x 1 and Good balance and safety awareness. (goal met-UPG SBA)  6. Patient will improve Barthel index scores to atleast 50/100 to improve functional mobility. (goal met-UPG 70/100)  7.   Patient will tolerate standing x 5 minutes during functional activity while adhering to LLE TTWB And Left wrist NWB utilizing platform walker to increase participation in ADL routine. (goal met-UPG 15)       Initiated 10/13/2017 and to be accomplished within 2 Week(s)    1. Patient will perform Upper body ADL's with adaptive equipment & compensatory techniques as needed with minimal assistance/contact guard assist. (progressing)  2. Patient will perform Lower body ADL's with adaptive equipment & compensatory techniques as needed moderate assistance. (progressing)  3. Patient will perform toileting task with moderate assistance x 2 with Good safety to reduce falls risk. (goal met-UPG Mod A)  4. Patient will perform toilet transfers with Platform Walker and moderate assistance x 2. (goal met-UPG Mod A x 1)  5. Patient will perform standing static/dynamic balance activities for improved ADL/IADL function with moderate assistance x,2 and Good balance and safety awareness. (goal met-UPG Mod A)  6. Patient will improve Barthel index scores to atleast 35/100 to improve functional mobility. (goal met-UPG 60/100)  7. Patient will tolerate standing x 5 minutes during functional activity while adhering to LLE TTWB And Left wrist NWB utilizing platform walker to increase participation in ADL routine. OCCUPATIONAL THERAPY LONG TERM GOALS   Initiated 10/13/2017 and to be accomplished within 4 Week(s)    1. Patient will perform Upper body ADL's with adaptive equipment & compensatory techniques as needed with supervision/set-up. (goal met)  2. Patient will perform Lower body ADL's with adaptive equipment & compensatory techniques as needed with supervision/set-up . (goal met)  3. Patient will perform toileting task with supervision/set-up with Good safety to reduce falls risk. (goal met)  4. Patient will perform functional transfers with Platform Walker and  supervision/set-up and Good balance and safety awareness.(goal met)  5.   Patient will perform standing static/dynamic activity for improved ADL/IADL function    with supervision/set-up an and Good balance and safety awareness(goal met). 6.  Patient will improve Barthel index score to 50/100 to improve independence with mobility.(goal met)      Therapist: Chantelle Marvin    10/13/2017               [id number] [facility]  OCCUPATIONAL THERAPY DISCHARGE SUMMARY      Patient: Tawanna Matthew (77 y.o. female)               Date: 2017    Attending Physician: Claudia Barroso MD  Primary Diagnosis: left hip fracture  Hip fracture [facility])       Treatment Diagnosis  Treatment Diagnosis: muscle weakness  Treatment Diagnosis 2: increased need for assist w/ self care         Precautions: Fall, WBAT (Splint at L wrist;ROM activities @L wrist)     Medical History:   Past Medical History:   Diagnosis Date    Arthritis     Bronchitis     Diabetes ([address]     Diverticulitis     GERD (gastroesophageal reflux disease)     Hypercholesterolemia     Hypertension     Hypothyroid     Pancreatitis      Past Surgical History:   Procedure Laterality Date    COLONOSCOPY N/A 2017    COLONOSCOPY  w/bx polyp performed by Charli Bravo MD at [facility] ENDOSCOPY       Reason for Discharge: [x]Goals Met   []Met highest potential  []Hospitalized   []  Progress ceased  []   []Other: Patient/family requesting D/C from facility. Discharge Location:  [x]Private Residence  [] DREW []LTC  [] [facility] [] 24 hr. Supervision for safety    Summarize skilled services provided and significant progress attained since last daily/weekly note (include individualized treatment techniques and standardized tests):   Patient has received skilled OT services from 10/13/17 to 17 and has made Good progress towards their OT goals.   Improvements noted in: increased independence and performance with grooming, bathing, upper body dressing, lower body dressing, toileting, toilet transfer and tub transfer    Summary of education/recommendation provided to Patient/Caregivers in the following areas: Remove barriers/rugs, [employer], mobility w/Rolling Walker for grooming, bathing, upper body dressing, lower body dressing, toileting, toilet transfer and tub transfer       Objective Data:      INITIAL ASSESSMENT DISCHARGE ASSESSMENT   COGNITIVE STATUS: COGNITIVE STATUS:   Neurologic State: Alert, Appropriate for age  Orientation Level: Oriented X4  Cognition: Appropriate for age attention/concentration  Perception: Appears intact  Perseveration: No perseveration noted  Safety/Judgement: Fall prevention Mental Status  Neurologic State: Alert  Orientation Level: Oriented X4  Cognition: Appropriate for age attention/concentration  Perception: Appears intact  Perseveration: No perseveration noted  Safety/Judgement: Fall prevention   PAIN: PAIN:   Pain Scale 1: Numeric (0 - 10)  Pain Intensity 1: 0  Pain Onset 1: now  Pain Location 1: Hip, Hand  Pain Orientation 1: Left  Pain Description 1: Aching  Pain Intervention(s) 1: Elevation, Ice, Medication (see MAR)  Patient Stated Pain Goal: 0  Pain Reassessment 1: Yes Pain Screen  Pain Scale 1: Numeric (0 - 10)  Pain Intensity 1: 0  Pain Onset 1: movement  Pain Location 1: Hip  Pain Orientation 1: Lateral, Left  Pain Description 1: Aching  Pain Intervention(s) 1: Medication (see MAR)  Patient Stated Pain Goal: 0  Pain Reassessment 1: Yes   BED MOBILITY BED MOBILITY   Rolling: Stand-by asssistance  Supine to Sit: Maximum assistance  Sit to Supine: Maximum assistance  Scooting: Maximum assistance Bed Mobility  Rolling: Modified independent  Supine to Sit: Modified independent  Sit to Supine: Moderate assistance  Scooting: Stand-by asssistance   ADL SELF CARE ADL SELF CARE         ADL INTERVENTION ADL INTERVENTION   Bathing Assistance:  Moderate assistance  Position Performed: Seated in chair Upper Body Bathing  Bathing Assistance:  (patient reports ad vinny w/ Mod I in stance at sink)  Position Performed: Seated edge of bed    Upper Body Dressing Assistance  Dressing Assistance: Modified independent  Orthotics(Brace): Stand-by assistance  Hospital Gown: Minimum  assistance  Pullover Shirt: Modified independent    Lower Body Bathing  Bathing Assistance:  (patient reports ad vinny w/ Mod I in stance at sink)  Perineal  : Supervision/set-up  Position Performed: Standing  Lower Body : Supervision/set-up  Position Performed: Seated in chair    Toileting  Toileting Assistance: Modified independent  Bladder Hygiene: Modified independent  Bowel Hygiene: Contact guard assistance  Clothing Management: Modified independent  Adaptive Equipment: Walker    Grooming  Grooming Assistance: Stand-by assistance (at standing)  Washing Hands: Stand-by assistance  Brushing Teeth: Modified independent  Brushing/Combing Hair: Modified independent  Cues: Verbal cues provided    Feeding  Feeding Assistance: Modified independent   TRANSFERS TRANSFERS   Sit to Stand: Maximum assistance  Stand to Sit: Moderate assistance  Bed to Chair: Maximum assistance (x2)  Toilet Transfer : Other (comment) (unable 2/2/  inability to adhere to LLE TTWB)  Tub Transfer: Modified independent Functional Transfers  Sit to Stand: Modified independent  Stand to Sit: Modified independent  Bed to Chair: Stand-by asssistance  Toilet Transfer : Modified independent  Tub Transfer: Modified independent   BALANCE BALANCE   Sitting: Impaired, With support  Sitting - Static: Fair (occasional) (+)  Sitting - Dynamic: Fair (occasional)  Standing: Impaired  Standing - Static: Poor  Standing - Dynamic : Fair Balance  Sitting: Intact  Sitting - Static: Good (unsupported)  Sitting - Dynamic: Fair (occasional)  Standing: With support  Standing - Static: Fair  Standing - Dynamic : Fair   GROSS ASSESSMENT  GROSS ASSESSMENT   AROM: Generally decreased, functional  PROM: Generally decreased, functional  Strength: Generally decreased, functional (RLE 3+/5 grossly, LLE demonstrates 3/5)  Coordination: Generally decreased, functional  Tone: Normal  Sensation: Intact Gross Assessment  AROM: Generally decreased, functional  PROM: Generally decreased, functional  Strength: Generally decreased, functional  Coordination: Generally decreased, functional  Tone: Normal  Sensation: Intact   COORDINATION COORDINATION   Fine Motor Skills-Upper: Left Impaired, Right Intact (L hand splint intact)  Gross Motor Skills-Upper: Left Intact, Right Intact Coordination  Fine Motor Skills-Upper: Left Impaired, Right Intact (L hand splint intact)  Gross Motor Skills-Upper: Left Intact, Right Intact   VISUAL/PERCEPTUAL VISUAL/PERCEPTUAL   Corrective Lenses: Glasses   Vision  Corrective Lenses: Glasses     AUDITORY: AUDITORY:   Auditory Impairment: None Auditory  Auditory Impairment: None   I ADL'S:  I ADL'S:           THE BARTHEL INDEX    ACTIVITY   SCORE   FEEDING  0=unable  5=needs help cutting,spreading butter,etc., or modified diet  10= independent   10   BATHING  0=dependent  5=independent (or in shower   5   GROOMING  0=needs help  5=independent face/hair/teeth/shaving (implements provided)   5   DRESSING  0=dependent  5=needs help but can do about half unaided  10=independent(including buttons, zips,laces etc.)   10   BOWELS  0=incontinent  5=occasional accident  10=continent   10   BLADDER  0=incontinent, or catheterized and unable to manage alone  5=occasional accident  10=continent   10   TOILET USE  0=dependent  5=needs some help, but can do something alone  10=independent (on and off, dressing, wiping)   10   TRANSFER (BED TO CHAIR AND BACK)  0=unable, no sitting balance  5=major help(one or two people,physical), can sit  10=minor help(verbal or physical)  15=independent   10   MOBILITY (ON LEVEL SURFACES)  0=immobile or <50 yards  5=wheelchair independent,including corners,>50 yards  10=walkes with help of one person (verbal or physical) >50 yards  15=independent(but may use any aid; for example, stick) >50 yards   10   STAIRS  0=unable  5=needs help (verbal, physical, carrying aid)  10=independent   5              TOTAL:              85/100     Treatment :  Provided patient with T-Putty HEP and green T-putty in order to increase good L  and digit strengthening upon discharge home needed for ADL/IADL task. Patient purchased new transfer belt and asked KIM to complete training/demonstration with son regarding proper placement and application for optimal safety with stair training. Discharge Recommendations:  [x] Home Exercise Program    [] Elevated toilet seat/3 in 1 commode   [] Shower Chair      []Shower Bench    [] Life Line/alert   [] Splint/orthotic application/schedule  [] Grab Bars: Location   [x] Home Health OT       [x] Rolling Walker    Treatment session:  20 minutes.     Therapist: JUDY Couch      Date:11/20/2017

## 2017-11-20 NOTE — PROGRESS NOTES
Late entry: SEVERO received a fax from [employer] from Blanca ''SAVANNAH''  In [facility] re: dme order for pt. SEVERO informed that Medicare won't allow for rolling walker and quad cane. SEVERO spoke with pt re: this. Pt stated that she has rollater walker at home. SW informed by therapy that a rollater isn't safe for pt to use. SW obtained a donated walker for pt use at home. SEVERO called [employer] to request that RW order be cancelled since pt obtained the walker and requested that they deliver the quad cane. SEVERO informed by Javon Gruber that the rolling walker was delivered to pt's home and they spoke with someone who told them where to leave the walker. SEVERO spoke with pt and informed her that her son spoke with dme provider and the walker was delivered to her home. She agreed to follow-up with her son re; this. SEVERO requested that the quad cane be delivered to pt's room before 12:00noon for pt's d/c home. Pt's requesting transportation home. SEVERO agreed to call [employer] [place] [address] re: coverage with pt's Medicaid.

## 2017-11-20 NOTE — PROGRESS NOTES
conducted a Follow up consultation and Spiritual Assessment for Sharren Hashimoto, who is a 65 y.o.,female. The  provided the following Interventions:  Continued the relationship of care and support. Listened empathically. Offered prayer and assurance of continued prayer on patients behalf. Chart reviewed. The following outcomes were achieved:  Patient expressed gratitude for 's visit. Assessment:  There are no spiritual or Hoahaoism issues which require intervention at this time. Plan:  Chaplains will continue to follow and will provide pastoral care on an as needed/requested basis.  recommends bedside caregivers page  on duty if patient shows signs of acute spiritual or emotional distress. Alba Ro M.Div.   [facility] [address]  [phone]

## 2017-11-20 NOTE — PROGRESS NOTES
Problem: Mobility Impaired (Adult and Pediatric)  Goal: *Acute Goals and Plan of Care (Insert Text)  PHYSICAL THERAPY LTG GOALS :  Initiated 10/13/2017 and to be accomplished within 4 Weeks (updated 11/20/17)    1. Patient will move from supine to sit and sit to supine  and roll side to side in bed with modified independence with WB compliance. (Achieved)     2. Patient will transfer from bed to chair and chair to bed with modified independence using LRAD with WB compliance. (Achieved using RW with WBAT)  3. Patient will perform sit to stand with modified independence with Good balance and safety awareness with WB compliance. (Achieved)  4. Patient will ambulate with modified independence for 150 feet with LRAD on level surfaces and be able to maneuver through narrow spaces and obstacles without loss of balance with WB compliance. (Progressed; ~50-75  ft mod I; >75 ft (S) due to occasional pain in B knees that increases antalgic gait pattern)  5. Patient will ascend/descend 14 stairs with right handrail(s) with supervision/set-up to allow for safe home access/exit with WB compliance. (Progressed; SBA x 15 stairs using R HR + quad cane)  6. Patient will improve standardized test score for [place] Standing Balance Scale 3+ to reduce fall risk with WB compliance. (Achieved)    PHYSICAL THERAPY LTG GOALS :  Initiated 10/13/2017 and to be accomplished within 4 Weeks (updated 11/16/17)    1. Patient will move from supine to sit and sit to supine  and roll side to side in bed with modified independence with WB compliance. (Achieved)     2. Patient will transfer from bed to chair and chair to bed with modified independence using LRAD with WB compliance. (Achieved using RW with WBAT)  3. Patient will perform sit to stand with modified independence with Good balance and safety awareness with WB compliance. (Achieved)  4.   Patient will ambulate with modified independence for 150 feet with LRAD on level surfaces and be able to maneuver through narrow spaces and obstacles without loss of balance with WB compliance. (Progressing; 240 ft using RW with Supervision)  5. Patient will ascend/descend 14 stairs with right handrail(s) with supervision/set-up to allow for safe home access/exit with WB compliance. (Progressing; SBA x 15 stairs using R HR + quad cane)  6. Patient will improve standardized test score for [place] Standing Balance Scale 3+ to reduce fall risk with WB compliance. (Progressing; 3)    PHYSICAL THERAPY STG GOALS :   Initiated 10/13/2017 and to be accomplished within 2 Weeks (updated 11/09/17)    1. Patient will move from supine to sit and sit to supine  and roll side to side in bed with minimal assistance/contact guard assist with WB compliance. (Achieved)  2. Patient will transfer from bed to chair and chair to bed with minimal assistance/contact guard assist using platform walker with WB compliance. (Achieved)  3. Patient will perform sit to stand with minimal assistance/contact guard assist with Fair balance and safety awareness with WB compliance. (Achieved)  4. Patient will ambulate with minimal assistance/contact guard assist for 75 feet with platform walker on level surfaces with 2 turns with WB compliance. (Achieved)  5. Patient will ascend/descend 14 stairs with right handrail(s) withminimal assistance to allow for safe home access/exit with WB compliance. (Progressing; 5 stairs with CGA)  6. Patient will improve standardized test score for [place] Standing Balance Scale 1+ with WB compliance. (Achieved)    PHYSICAL THERAPY STG GOALS :   Initiated 10/13/2017 and to be accomplished within 2 Weeks (updated 11/02/17)    1. Patient will move from supine to sit and sit to supine  and roll side to side in bed with minimal assistance/contact guard assist with WB compliance. (Achieved)  2.   Patient will transfer from bed to chair and chair to bed with minimal assistance/contact guard assist using platform walker with WB compliance. (Achieved)  3. Patient will perform sit to stand with minimal assistance/contact guard assist with Fair balance and safety awareness with WB compliance. (Achieved)  4. Patient will ambulate with minimal assistance/contact guard assist for 75 feet with platform walker on level surfaces with 2 turns with WB compliance. (Progressing; 46 ft using platform RW with CGA)  5. Patient will ascend/descend 14 stairs with right handrail(s) withminimal assistance to allow for safe home access/exit with WB compliance. (Attempted, pt completing stair training in // bars with 2 inch step,CGA/min A)  6. Patient will improve standardized test score for [place] Standing Balance Scale 1+ with WB compliance. (Achieved)      PHYSICAL THERAPY STG GOALS :   Initiated 10/13/2017 and to be accomplished within 2 Weeks (updated 10/26/17)    1. Patient will move from supine to sit and sit to supine  and roll side to side in bed with minimal assistance/contact guard assist with WB compliance. (Achieved)  2. Patient will transfer from bed to chair and chair to bed with minimal assistance/contact guard assist using platform walker with WB compliance. (Achieved)  3. Patient will perform sit to stand with minimal assistance/contact guard assist with Fair balance and safety awareness with WB compliance. (Achieved)  4. Patient will ambulate with minimal assistance/contact guard assist for 75 feet with platform walker on level surfaces with 2 turns with WB compliance. (Progressing; 16 ft using platform RW with CGA)  5. Patient will ascend/descend 14 stairs with right handrail(s) withminimal assistance to allow for safe home access/exit with WB compliance. (Attempted, pt currently unable)  6. Patient will improve standardized test score for [place] Standing Balance Scale 1+ with WB compliance.    (Achieved)    PHYSICAL THERAPY STG GOALS :  Initiated 10/13/2017 and to be accomplished within 2 Weeks (updated 10/19/17)    1. Patient will move from supine to sit and sit to supine  and roll side to side in bed with minimal assistance/contact guard assist with WB compliance. (Achieved)  2. Patient will transfer from bed to chair and chair to bed with minimal assistance/contact guard assist using platform walker with WB compliance. (Progressing modA)  3. Patient will perform sit to stand with minimal assistance/contact guard assist with Fair balance and safety awareness with WB compliance. (Progressing modA)  4. Patient will ambulate with minimal assistance/contact guard assist for 75 feet with platform walker on level surfaces with 2 turns with WB compliance. (not tested due to TTWB on LLE)  5. Patient will ascend/descend 14 stairs with right handrail(s) withminimal assistance to allow for safe home access/exit with WB compliance. (Not tested due to WB status)  6. Patient will improve standardized test score for [place] Standing Balance Scale 1+ with WB compliance. PHYSICAL THERAPY LTG GOALS :  Initiated 10/13/2017 and to be accomplished within 4 Weeks    1. Patient will move from supine to sit and sit to supine  and roll side to side in bed with modified independence with WB compliance. 2.  Patient will transfer from bed to chair and chair to bed with modified independence using LRAD with WB compliance. 3.  Patient will perform sit to stand with modified independence with Good balance and safety awareness with WB compliance. 4.  Patient will ambulate with modified independence for 150 feet with LRAD on level surfaces and be able to maneuver through narrow spaces and obstacles without loss of balance with WB compliance. 5.  Patient will ascend/descend 14 stairs with right handrail(s) with supervision/set-up to allow for safe home access/exit with WB compliance.   6.  Patient will improve standardized test score for [place] Standing Balance Scale 3+ to reduce fall risk with WB compliance. Physical Therapist:   Kala Figueroa, PT  on 10/13/2017                  [address] [place] [address] physical Therapy Discharge Summary      Patient: Anabel Lopes (45 y.o. female)                  Date: 2017    Attending Physician: Hazel Martinez MD  Primary Diagnosis: left hip fracture  Hip fracture [facility])       Treatment Diagnosis  Treatment Diagnosis: muscle weakness  Treatment Diagnosis 2: difficulty in walking         Precautions: Fall, WBAT (Splint at L wrist;ROM activities @L wrist)   MEDICAL HISTORY:   Past Medical History:   Diagnosis Date    Arthritis     Bronchitis     Diabetes ([address]     Diverticulitis     GERD (gastroesophageal reflux disease)     Hypercholesterolemia     Hypertension     Hypothyroid     Pancreatitis      Past Surgical History:   Procedure Laterality Date    COLONOSCOPY N/A 2017    COLONOSCOPY  w/bx polyp performed by Morgan Guerrero MD at [facility] ENDOSCOPY       Reason for Discharge: [x] Goals Met   []Met highest potential  []    [x] Progress ceased  []Hospitalized  []Other: Pt/family request for early D/C from facility. Discharge Location:  [x] Private Residence  [] DREW  [] LTC  []  [address] []Other: 24 hr.supervision for safety. Summarize skilled services provided and significant progress attained since last daily/weekly note (include individualized treatment techniques and standardized tests): This Patient has received skilled PT services from 10/13/17  through 17 and has made Good progress towards their PT goals. Improvements noted in bed mobility, transfers, ambulation using RW, stair negotiation, balance. Pt has achieved all of STGs and 4/6 LTGs. Summary of education/recommendation provided to Patient/Caregivers:    Pt. Education provided to use [employer]. HEP has also been administered to maintain gains in strength, endurance, mobility.        Objective Data:     INITIAL  ASSESSMENT DISCHARGE ASSESSMENT   COGNITIVE STATUS COGNITIVE STATUS   Neurologic State: Alert, Appropriate for age  Orientation Level: Oriented X4  Cognition: Appropriate for age attention/concentration  Perception: Appears intact  Perseveration: No perseveration noted  Safety/Judgement: Fall prevention Neurologic State: Alert, Appropriate for age  Orientation Level: Oriented X4  Cognition: Appropriate for age attention/concentration  Perception: Appears intact  Perseveration: No perseveration noted  Safety/Judgement: Fall prevention   PAIN PAIN   Pain Scale 1: Numeric (0 - 10)  Pain Intensity 1: 0  Pain Onset 1: now  Pain Location 1: Hip, Hand  Pain Orientation 1: Left  Pain Description 1: Aching  Pain Intervention(s) 1: Elevation, Ice, Medication (see MAR)  Patient Stated Pain Goal: 0  Pain Reassessment 1: Yes Pain Scale 1: Numeric (0 - 10)  Pain Intensity 1: 0  Pain Onset 1: movement  Pain Location 1: Hip  Pain Orientation 1: Lateral, Left  Pain Description 1: Aching  Pain Intervention(s) 1: Medication (see MAR)  Patient Stated Pain Goal: 0  Pain Reassessment 1: Yes   GROSS ASSESSMENT GROSS ASSESSMENT   AROM: Generally decreased, functional  PROM: Generally decreased, functional  Strength: Generally decreased, functional (RLE 3+/5 grossly, LLE demonstrates 3/5)  Coordination: Generally decreased, functional  Tone: Normal  Sensation: Intact AROM: Generally decreased, functional  PROM: Generally decreased, functional  Strength: Generally decreased, functional  Coordination: Generally decreased, functional  Tone: Normal  Sensation: Intact   BED MOBILITY BED MOBILITY   Rolling: Stand-by asssistance  Supine to Sit: Maximum assistance  Sit to Supine: Maximum assistance  Scooting: Maximum assistance Rolling: Modified independent  Supine to Sit: Modified independent  Sit to Supine:  Moderate assistance  Scooting: Stand-by asssistance   GAIT GAIT   Base of Support: Center of gravity altered  Speed/Polly: Slow  Step Length: Left shortened, Right shortened  Stance: Left decreased  Gait Abnormalities: Decreased step clearance  Ambulation - Level of Assistance: Moderate assistance, Additional time  Distance (ft): 5 Feet (ft)  Assistive Device: Other (comment) (Rolling walker with platform on left)  Rail Use: Both  Stairs - Level of Assistance: Contact guard assistance  Number of Stairs Trained: 3 (4\" steps x2)  Interventions: Safety awareness training, Verbal cues Base of Support: Center of gravity altered  Speed/Polly: Slow  Step Length: Left shortened, Right shortened  Stance: Left decreased  Gait Abnormalities: Decreased step clearance  Ambulation - Level of Assistance: Supervision  Distance (ft): 234 Feet (ft)  Assistive Device: Gait belt, Walker, rolling  Rail Use: Right  (+ quad cane)  Stairs - Level of Assistance: Contact guard assistance, Stand-by asssistance  Number of Stairs Trained: 15  Interventions: Verbal cues    (unable to assess)  (unable to assess)                With 5 turns.    TRANSFERS TRANSFERS   Sit to Stand: Maximum assistance  Stand to Sit: Moderate assistance  Bed to Chair: Maximum assistance (x2) Sit to Stand: Modified independent  Stand to Sit: Modified independent  Bed to Chair: Stand-by asssistance   BALANCE BALANCE   Sitting: Impaired, With support  Sitting - Static: Fair (occasional) (+)  Sitting - Dynamic: Fair (occasional)  Standing: Impaired  Standing - Static: Poor  Standing - Dynamic : Fair Sitting: Intact  Sitting - Static: Good (unsupported)  Sitting - Dynamic: Fair (occasional)  Standing: With support  Standing - Static: Good  Standing - Dynamic : Fair (+)   WHEELCHAIR MOBILITY/MGMT WHEELCHAIR MOBILITY/MGMT             Visual/Perceptual      Corrective Lenses: Glasses    Auditory:   Auditory  Auditory Impairment: None         Visual/Perceptual      Corrective Lenses: Glasses    Auditory:   Auditory  Auditory Impairment: None          Activity Tolerance:  Good                     Treatment:   Pt ambulated 234 ft using RW with supervision, no c/o B knee pain on this visit. Stair training using R HR + quad cane x 15 stairs with CGA/SBA. Pt reports that her son was unable to purchase quad cane so therapist assessed pt's safety using   Both hands on R HR; deemed unsafe. Then assessed stair negotiation using SPC, as pt has one at home; pt requires a bit more support for optimal safety. Pt may qualify for quad as  donated RW to pt. Pt has made great progressions with skilled PT services, continuing to require supervision with long distance ambulation due to antalgic gait pattern than increases when in pain and SBA/CGA is required for stair negotiation; pt's lizette Ward has had 3 sessions of training for stair negotiation. Therapist also recommended that pt schedule her home health visits for the days that lizette Ward is off work so that she doesn't have to negotiation 2 flights of stairs. She is d/c at this time to home health PT. Discharge Recommendations:  [x]  Home Exercise Program     [x]  Home Health PT   [x]  Remove throw rugs/clear environmental barriers    [x]  Assistance with: stair negotiation     [x]  Ambulation Device: Rolling Walker   [x]  Steps with right & Narrow Base Quad Cane    []  Wheelchair    []  Life Line/alert   []  WC  Ramp       Treatment minutes: 30 minutes.     Therapist :  Vane Marc PT            Date:11/20/2017

## 2017-11-20 NOTE — ROUTINE PROCESS
Bedside shift change report given to Marlene Valles RN (oncoming nurse) by Carmen Cano RN (offgoing nurse). Report included the following information SBAR, Kardex, MAR and Recent Results. VISUALLY CHECKED PT Q 1 HR BY NURSING STAFF. 24 hour order chart check done

## 2017-11-20 NOTE — ROUTINE PROCESS
Bedside shift change report given to Christina rn (oncoming nurse) by caron rn (offgoing nurse). Report included the following information Kardex, MAR and Recent Results.

## 2017-11-20 NOTE — PROGRESS NOTES
SW informed by pt that she spoke with her son didn't speak with anyone from [address] [place] [address],[address] and no walker was delivered to their home. SEVERO  called dme provider and informed her that SW spoke with pt and was informed that no dme was delivered to to the home today. SEVERO requested that pt have her son look around the home to see if the walker was left any place on their property. SEVERO will follow-up tomorrow.

## 2017-11-20 NOTE — DISCHARGE SUMMARY
[facility] SUMMARY    Name:  Shereen Giordano  MR#:  [id number]  :  1940  Account #:  [de-identified]  Date of Adm:  10/12/2017  Date of Discharge: The patient is a 76-year-old female who was admitted to [facility] following  a fall with a resultant fracture in her left hip as well as a fracture of her  left forearm. She was treated with a brace for the forearm fracture and  underwent a medullary nail insertion in her femur. She had a slow  recovery while at this facility and gradually improved and had much  better pain control in her other joints with p.r.n. Celebrex. She was  seen back by Orthopedics for the brace and she has been released  from the brace with her wrist.    She has additional problems of:  1. Hypertension. 2. Diabetes mellitus. 3. Hypothyroidism. 4. Hyperlipidemia. DISCHARGE MEDICATIONS:  She is being discharged on the following medications which include:  1. Celebrex 200 mg daily p.r.n. for pain. 2. Vitamin D 2000 units a day. 3. Synthroid 75 mcg a day. 4. Cozaar 25 mg a day. 5. Metformin 1000 mg twice a day. 6. Percocet 5/325 1-2 tablets every 4 hours as needed for pain. 7. Protonix 40 mg a day. 8. Zocor 20 mg nightly. While in the facility, she had been on Lovenox - this was discontinued  at the time of her discharge. And she had been on glipizide and this  was discontinued as adequate control was obtained with the metformin  alone. LABORATORY DATA: Showed blood sugars of . Hemoglobin  was 10.1. White count was 9.7. Creatinine is 0.89 with a BUN of 14.             Ismael Paez MD    [facility] / [facility] Torres  D:  2017   11:41  T:  2017   14:43  Job #:  [id number]

## 2017-11-21 PROCEDURE — 74011250637 HC RX REV CODE- 250/637: Performed by: INTERNAL MEDICINE

## 2017-11-21 PROCEDURE — 74011250636 HC RX REV CODE- 250/636: Performed by: INTERNAL MEDICINE

## 2017-11-21 RX ADMIN — LOSARTAN POTASSIUM 25 MG: 50 TABLET ORAL at 10:04

## 2017-11-21 RX ADMIN — METFORMIN HYDROCHLORIDE 1000 MG: 500 TABLET ORAL at 10:04

## 2017-11-21 RX ADMIN — LEVOTHYROXINE SODIUM 75 MCG: 75 TABLET ORAL at 06:30

## 2017-11-21 RX ADMIN — CHOLECALCIFEROL (VITAMIN D3) 25 MCG (1,000 UNIT) TABLET 2000 UNITS: at 10:04

## 2017-11-21 RX ADMIN — ENOXAPARIN SODIUM 40 MG: 40 INJECTION SUBCUTANEOUS at 10:08

## 2017-11-21 RX ADMIN — PANTOPRAZOLE SODIUM 40 MG: 40 TABLET, DELAYED RELEASE ORAL at 10:04

## 2017-11-21 NOTE — ROUTINE PROCESS
Bedside shift change report given to [employer] (oncoming nurse) by Husam Perez RN (offgoing nurse). Report included the following information SBAR, Kardex, MAR and Recent Results. 24 hr order chart check done  VISUALLY CHECKED PT Q 1 HR BY NURSING STAFF.

## 2017-11-21 NOTE — PROGRESS NOTES
Pt participated in the Arts and Crafts activity for the duration of 65 minutes. With minimal prompting pt was able to follow instructions to complete the holiday craft task. Pt interacted well with peers.

## 2017-11-21 NOTE — PROGRESS NOTES
Late entry for 11/20/17. SW spoke with pt re: home health referral. Pt stated that \"I don't need it\". Pt stated that she is able to bathe and dress self and has her home exercises.

## 2017-11-21 NOTE — ROUTINE PROCESS
Bedside and Verbal shift change report given to [place] (oncoming nurse) by Shantelle Alexandra RN  (offgoing nurse). Report included the following information SBAR, Kardex and MAR. Q VISUAL CHECKS DONE.

## 2017-11-21 NOTE — ROUTINE PROCESS
Bedside shift change report given to [employer] (oncoming nurse) by Ana Chambers RN (offgoing nurse). Report included the following information SBAR, Kardex, MAR and Recent Results. 24 hr order chart check done  VISUALLY CHECKED PT Q 1 HR BY NURSING STAFF.

## 2017-11-21 NOTE — PROGRESS NOTES
SEVERO spoke with Rebeca Palma at [facility] re: large base quad cane that pt received. She informed SW that they don't carry the small base quad cane but will get back with SW today once she checks with the warehouse. SW informed pt and her son of the information relayed by dme provider. Pt requested assistance with transportation home today. Pt stated that she didn't have money for a cab home nor did her son. SEVERO spoke with Fermin Rascon,  and pt was given a cab voucher home today.

## 2017-11-21 NOTE — PROGRESS NOTES
SW spoke with pt's son and he informed SW that pt received a walker at home. SW informed pt's son that his mom to;d SW yesterday that no walker was delivered and SW informed DME provider of this. SW informed pt that she'll need to call DME provider to pick-up the walker or she'll receive a bill because she can't receive the quad cane and the RW. Pt's son wasn't at home when pt spoke with him and he had no knowledge that the walker was delivered as dme provider reported to [facility] left message for [place] at [address] her that pt did receive the walker and it will need to be picked up from pt's home tomorrow.

## 2017-11-22 ENCOUNTER — PATIENT OUTREACH (OUTPATIENT)
Dept: FAMILY MEDICINE CLINIC | Age: 77
End: 2017-11-22

## 2017-11-22 NOTE — PROGRESS NOTES
Nurse Guido 10 Follow Up Note  -10/09/17 Admitted at [facility] for s/p fall, Left hip fracture  -10/12/17 Discharged to [facility]  -17 Discharge to Home   -17 NN contact              Reached patient on phone and verified identity using name and . Introduced self/role and purpose of call. Pt stated: \"I'm doing fine. \" Pt c/o lower leg swelling, reported swelling has decreased since being home. Pt denied any CP, SOB, fever/chills, NVD, HA/dizziness, numbness/tingling, bleeding, fall. Pt reported she is currently living on the 2nd floor of her home. She is using a walker and a 4quad cane. Pt stated her brother ordered a bedside commode online, she is currently using a bedside commode she previously made. Pt stated she declined home PT services. Her son lives with her and is assisting her at home. Pt stated she did not like the hospital food, [facility] yesterday. Med Reconciliation:   Reviewed home medication and patient verbalized understanding self management of medications. Newly prescribed meds: celebrex, zocor, percocet, metformin, vit. D3, protonix, losartan, synthroid  Discontinued meds: glipizide  Medication dosage changes:       Reviewed red flags for:    CP, SOB, fever/chills, HA/dizziness, increased swelling, bleeding, numbness/tingling, fall, LOC  and patient understands when to seek medical attention from PCP/ED. Patients next follow up visit:   scheduled for 17 at 12:45pm with PCP Dr. Donato Levy. Pt offered an earlier appt date, pt declined. Discussed with patient importance of following up. Pt verbalized understanding. Pt stated she is scheduled for appt with Ortho Dr. Eugenie King on 17  Patient verbalized understanding of discharge plan and special follow up with PCP/Ortho. Reviewed plan of care. Patient has provided input to plan and agrees with goals. Answered any questions patient had.  Provided contact info for any additional questions. RRAT Score: 14 moderate          [facility] DC summary- per Dr. Collette Vines 11/21/17: The patient is a 67-year-old female who was admitted to [facility] following  a fall with a resultant fracture in her left hip as well as a fracture of her  left forearm. She was treated with a brace for the forearm fracture and  underwent a medullary nail insertion in her femur. She had a slow  recovery while at this facility and gradually improved and had much  better pain control in her other joints with p.r.n. Celebrex. She was  seen back by Orthopedics for the brace and she has been released  from the brace with her wrist.     She has additional problems of:  1. Hypertension. 2. Diabetes mellitus. 3. Hypothyroidism. 4. Hyperlipidemia.     DISCHARGE MEDICATIONS:  She is being discharged on the following medications which include:  1. Celebrex 200 mg daily p.r.n. for pain. 2. Vitamin D 2000 units a day. 3. Synthroid 75 mcg a day. 4. Cozaar 25 mg a day. 5. Metformin 1000 mg twice a day. 6. Percocet 5/325 1-2 tablets every 4 hours as needed for pain. 7. Protonix 40 mg a day. 8. Zocor 20 mg nightly.     While in the facility, she had been on Lovenox - this was discontinued  at the time of her discharge. And she had been on glipizide and this  was discontinued as adequate control was obtained with the metformin  alone. This note will not be viewable in [address]

## 2017-12-13 DIAGNOSIS — I10 ESSENTIAL HYPERTENSION: ICD-10-CM

## 2017-12-13 RX ORDER — LOSARTAN POTASSIUM 25 MG/1
25 TABLET ORAL DAILY
Qty: 60 TAB | Refills: 0 | Status: SHIPPED | OUTPATIENT
Start: 2017-12-13 | End: 2017-12-17

## 2017-12-13 RX ORDER — GLIPIZIDE 5 MG/1
5 TABLET ORAL 2 TIMES DAILY
Qty: 60 TAB | Refills: 3 | Status: SHIPPED | OUTPATIENT
Start: 2017-12-13 | End: 2017-12-13 | Stop reason: SDUPTHER

## 2017-12-13 RX ORDER — GLIPIZIDE 5 MG/1
TABLET ORAL
Qty: 180 TAB | Refills: 3 | Status: CANCELLED | OUTPATIENT
Start: 2017-12-13

## 2017-12-15 RX ORDER — GLIPIZIDE 5 MG/1
TABLET ORAL
Qty: 180 TAB | Refills: 3 | Status: SHIPPED | OUTPATIENT
Start: 2017-12-15 | End: 2018-10-10 | Stop reason: SDUPTHER

## 2017-12-17 RX ORDER — LOSARTAN POTASSIUM 50 MG/1
TABLET ORAL
Qty: 90 TAB | Refills: 3 | Status: SHIPPED | OUTPATIENT
Start: 2017-12-17 | End: 2018-01-12 | Stop reason: SDUPTHER

## 2018-01-08 DIAGNOSIS — I10 ESSENTIAL HYPERTENSION: ICD-10-CM

## 2018-01-08 RX ORDER — HYDROCHLOROTHIAZIDE 25 MG/1
TABLET ORAL
Qty: 90 TAB | Refills: 2 | Status: SHIPPED | OUTPATIENT
Start: 2018-01-08 | End: 2018-03-05

## 2018-01-12 DIAGNOSIS — I10 ESSENTIAL HYPERTENSION: ICD-10-CM

## 2018-01-12 RX ORDER — LOSARTAN POTASSIUM 50 MG/1
50 TABLET ORAL DAILY
Qty: 90 TAB | Refills: 2 | Status: SHIPPED | OUTPATIENT
Start: 2018-01-12 | End: 2018-04-04 | Stop reason: SDUPTHER

## 2018-01-12 NOTE — TELEPHONE ENCOUNTER
Spoke with patient and confirmed that she only should be taking losartan 50mg one tab daily, and if she have 25mg tablets she can take two until bottle is done. This encounter will be closed.

## 2018-01-12 NOTE — TELEPHONE ENCOUNTER
Please call patient regarding medication clarification. . She has some confusion over medications and instructions. She is taking Losartan 50 mg taking 1 tablet in the am and 25 mg 1 tab evening. She also needs clarification on hydrochlorothiazide 25 mg 1 tablet daily. Reminder: Pharmacy filled losartan 25 mg this month.

## 2018-01-16 RX ORDER — METFORMIN HYDROCHLORIDE 1000 MG/1
1000 TABLET ORAL 2 TIMES DAILY WITH MEALS
Qty: 60 TAB | Refills: 0 | Status: SHIPPED | OUTPATIENT
Start: 2018-01-16 | End: 2018-06-25 | Stop reason: SDUPTHER

## 2018-01-16 RX ORDER — PANTOPRAZOLE SODIUM 40 MG/1
40 TABLET, DELAYED RELEASE ORAL DAILY
Qty: 30 TAB | Refills: 0 | Status: SHIPPED | OUTPATIENT
Start: 2018-01-16 | End: 2018-03-17 | Stop reason: SDUPTHER

## 2018-01-17 RX ORDER — LEVOTHYROXINE SODIUM 75 UG/1
75 TABLET ORAL
Qty: 60 TAB | Refills: 0 | Status: SHIPPED | OUTPATIENT
Start: 2018-01-17 | End: 2018-01-17 | Stop reason: SDUPTHER

## 2018-01-18 DIAGNOSIS — L30.9 ECZEMA, UNSPECIFIED TYPE: ICD-10-CM

## 2018-01-18 RX ORDER — LEVOTHYROXINE SODIUM 75 UG/1
TABLET ORAL
Qty: 90 TAB | Refills: 0 | Status: CANCELLED | OUTPATIENT
Start: 2018-01-18

## 2018-01-18 RX ORDER — TRIAMCINOLONE ACETONIDE 1 MG/G
CREAM TOPICAL 2 TIMES DAILY
Qty: 15 G | Refills: 0 | Status: SHIPPED | OUTPATIENT
Start: 2018-01-18 | End: 2018-03-13

## 2018-01-18 RX ORDER — LEVOTHYROXINE SODIUM 75 UG/1
TABLET ORAL
Qty: 90 TAB | Refills: 0 | Status: SHIPPED | OUTPATIENT
Start: 2018-01-18 | End: 2018-06-18 | Stop reason: SDUPTHER

## 2018-01-23 ENCOUNTER — OFFICE VISIT (OUTPATIENT)
Dept: FAMILY MEDICINE CLINIC | Age: 78
End: 2018-01-23

## 2018-01-23 VITALS
SYSTOLIC BLOOD PRESSURE: 131 MMHG | OXYGEN SATURATION: 97 % | RESPIRATION RATE: 16 BRPM | DIASTOLIC BLOOD PRESSURE: 60 MMHG | BODY MASS INDEX: 33.38 KG/M2 | TEMPERATURE: 97.3 F | HEIGHT: 60 IN | HEART RATE: 82 BPM | WEIGHT: 170 LBS

## 2018-01-23 DIAGNOSIS — I10 ESSENTIAL HYPERTENSION: ICD-10-CM

## 2018-01-23 DIAGNOSIS — E66.09 CLASS 1 OBESITY DUE TO EXCESS CALORIES WITH SERIOUS COMORBIDITY IN ADULT, UNSPECIFIED BMI: ICD-10-CM

## 2018-01-23 DIAGNOSIS — E11.21 TYPE 2 DIABETES MELLITUS WITH NEPHROPATHY (HCC): Primary | ICD-10-CM

## 2018-01-23 DIAGNOSIS — E03.4 HYPOTHYROIDISM DUE TO ACQUIRED ATROPHY OF THYROID: ICD-10-CM

## 2018-01-23 NOTE — PROGRESS NOTES
Jason Khoury is a 65 y.o.  female and presents with     Chief Complaint   Patient presents with    Diabetes    Hypertension    Hypothyroidism    Hip Injury     Pt fell in October and broke her hip. Pt had surgery on her left hip. Pt feels fine. Pt has lost 1- 12 pounds., Her appetits is good. Pt thinks it was the hospital food that made her loose wt. Appetite is good. Past Medical History:   Diagnosis Date    Arthritis     Bronchitis     Diabetes ([address]     Diverticulitis     GERD (gastroesophageal reflux disease)     Hypercholesterolemia     Hypertension     Hypothyroid     Pancreatitis      Past Surgical History:   Procedure Laterality Date    COLONOSCOPY N/A 2/2/2017    COLONOSCOPY  w/bx polyp performed by Eduardo Grace MD at [facility] ENDOSCOPY     Current Outpatient Prescriptions   Medication Sig    levothyroxine (SYNTHROID) 75 mcg tablet TAKE 1 TABLET BY MOUTH DAILY BEFORE BREAKFAST    triamcinolone acetonide (KENALOG) 0.1 % topical cream Apply  to affected area two (2) times a day. use thin layer    metFORMIN (GLUCOPHAGE) 1,000 mg tablet Take 1 Tab by mouth two (2) times daily (with meals).  pantoprazole (PROTONIX) 40 mg tablet Take 1 Tab by mouth daily.  losartan (COZAAR) 50 mg tablet Take 1 Tab by mouth daily.  hydroCHLOROthiazide (HYDRODIURIL) 25 mg tablet TAKE 1 TABLET BY MOUTH DAILY    glipiZIDE (GLUCOTROL) 5 mg tablet TAKE 1 TABLET BY MOUTH TWICE DAILY    oxyCODONE-acetaminophen (PERCOCET) 5-325 mg per tablet Take 1-2 Tabs by mouth every four (4) hours as needed. Max Daily Amount: 12 Tabs.  simvastatin (ZOCOR) 20 mg tablet Take 1 Tab by mouth nightly.  celecoxib (CELEBREX) 200 mg capsule Take 1 Cap by mouth daily as needed for Pain for up to 90 days.  cholecalciferol (VITAMIN D3) 1,000 unit tablet Take 2 Tabs by mouth daily.  docusate sodium (COLACE) 100 mg capsule Take 1 Cap by mouth two (2) times a day.  Indications: take while on narcotics for pain No current facility-administered medications for this visit. Health Maintenance   Topic Date Due    FOOT EXAM Q1  07/04/1950    EYE EXAM RETINAL OR DILATED Q1  07/04/1950    GLAUCOMA SCREENING Q2Y  07/04/2004    HEMOGLOBIN A1C Q6M  04/12/2018    MICROALBUMIN Q1  04/20/2018    LIPID PANEL Q1  04/20/2018    MEDICARE YEARLY EXAM  05/04/2018    Pneumococcal 65+ Low/Medium Risk (2 of 2 - PPSV23) 05/17/2018    DTaP/Tdap/Td series (2 - Td) 05/13/2027    OSTEOPOROSIS SCREENING (DEXA)  Completed    ZOSTER VACCINE AGE 60>  Completed    Influenza Age 5 to Adult  Completed     Immunization History   Administered Date(s) Administered    Influenza High Dose Vaccine PF 09/05/2017    Influenza Vaccine 09/01/2017    Influenza Vaccine (Quad) PF 11/09/2016    Pneumococcal Conjugate (PCV-13) 05/17/2017     No LMP recorded. Patient is postmenopausal.        Allergies and Intolerances: Allergies   Allergen Reactions    Ampicillin Itching       Family History:   Family History   Problem Relation Age of Onset    Diabetes Mother     Heart Disease Mother     Cancer Father      melanoma    Stroke Sister     Hypertension Sister     Diabetes Sister     Hypertension Brother     Diabetes Brother     Breast Cancer Paternal Grandmother      older, but not sure age.  Breast Cancer Paternal Aunt      and gyn cancer ?age       Social History:   She  reports that she has never smoked. She has never used smokeless tobacco.  She  reports that she does not drink alcohol.             Review of Systems:   General: negative for - chills, fatigue, fever, pos for weight change  Psych: negative for - anxiety, depression, irritability or mood swings  ENT: negative for - headaches, hearing change, nasal congestion, oral lesions, sneezing or sore throat  Heme/ Lymph: negative for - bleeding problems, bruising, pallor or swollen lymph nodes  Endo: negative for - hot flashes, polydipsia/polyuria or temperature intolerance  Resp: negative for - cough, shortness of breath or wheezing  CV: negative for - chest pain, edema or palpitations  GI: negative for - abdominal pain, change in bowel habits, constipation, diarrhea or nausea/vomiting  : negative for - dysuria, hematuria, incontinence, pelvic pain or vulvar/vaginal symptoms  MSK: negative for - joint pain, joint swelling or muscle pain  Neuro: negative for - confusion, headaches, seizures or weakness  Derm: negative for - dry skin, hair changes, rash or skin lesion changes          Physical:   Vitals:   Vitals:    01/23/18 1337   BP: 131/60   Pulse: 82   Resp: 16   Temp: 97.3 °F (36.3 °C)   TempSrc: Oral   SpO2: 97%   Weight: 170 lb (77.1 kg)   Height: 5' (1.524 m)           Exam:   HEENT- atraumatic,normocephalic, awake, oriented, well nourished  Neck - supple,no enlarged lymph nodes, no JVD, no thyromegaly  Chest- CTA, no rhonchi, no crackles  Heart- rrr, no murmurs / gallop/rub  Abdomen- soft,BS+,NT, no hepatosplenomegaly  Ext - no c/c/edema   Neuro- no focal deficits. Power 5/5 all extremities  Skin - warm,dry, no obvious rashes. Review of Data:   LABS:   Lab Results   Component Value Date/Time    WBC 9.7 10/12/2017 04:00 AM    HGB 10.1 10/12/2017 04:00 AM    HCT 31.2 10/12/2017 04:00 AM    PLATELET 531 23/83/6800 04:00 AM     Lab Results   Component Value Date/Time    Sodium 138 10/12/2017 04:00 AM    Potassium 3.7 10/12/2017 04:00 AM    Chloride 102 10/12/2017 04:00 AM    CO2 23 10/12/2017 04:00 AM    Glucose 116 10/12/2017 04:00 AM    BUN 14 10/12/2017 04:00 AM    Creatinine 0.89 10/12/2017 04:00 AM     Lab Results   Component Value Date/Time    Cholesterol, total 146 04/20/2017 11:23 AM    HDL Cholesterol 57 04/20/2017 11:23 AM    LDL, calculated 68 04/20/2017 11:23 AM    Triglyceride 105 04/20/2017 11:23 AM     No results found for: GPT        Impression / Plan:        ICD-10-CM ICD-9-CM    1.  Type 2 diabetes mellitus with nephropathy (HCC) E11.21 250.40 HEMOGLOBIN A1C WITH EAG     583.81 LIPID PANEL      METABOLIC PANEL, COMPREHENSIVE      CBC WITH AUTOMATED DIFF   2. Hypothyroidism due to acquired atrophy of thyroid E03.4 244.8      246.8    3. Essential hypertension I10 401.9    4. Class 1 obesity due to excess calories with serious comorbidity in adult, unspecified BMI E66.09 278.00      S/p surgery for left hip fracture - doing well. Explained to patient risk benefits of the medications. Advised patient to stop meds if having any side effects. Pt verbalized understanding of the instructions. I have discussed the diagnosis with the patient and the intended plan as seen in the above orders. The patient has received an after-visit summary and questions were answered concerning future plans. I have discussed medication side effects and warnings with the patient as well. I have reviewed the plan of care with the patient, accepted their input and they are in agreement with the treatment goals. Reviewed plan of care. Patient has provided input and agrees with goals. Follow-up Disposition:  Return in about 6 weeks (around 3/6/2018).     [id number] Vernon Lewis MD

## 2018-01-23 NOTE — MR AVS SNAPSHOT
[place] [id number] [facility] [address] [place] [id number] 
[phone] Patient: Nish Morales MRN: [id number] UOR:[birth year] Visit Information Date & Time Provider Department Dept. Phone Encounter #  
 1/23/2018  1:15 PM Hawa Chatterjee, [facility] [phone] [id number] Follow-up Instructions Return in about 6 weeks (around 3/6/2018). Upcoming Health Maintenance Date Due  
 FOOT EXAM Q1 7/4/1950 EYE EXAM RETINAL OR DILATED Q1 7[birth year]4/1950 GLAUCOMA SCREENING Q2Y 7/4/2004 HEMOGLOBIN A1C Q6M 4/12/2018 MICROALBUMIN Q1 4/20/2018 LIPID PANEL Q1 4/20/2018 MEDICARE YEARLY EXAM 5/4/2018 Pneumococcal 65+ Low/Medium Risk (2 of 2 - PPSV23) 5/17/2018 DTaP/Tdap/Td series (2 - Td) 5/13/2027 Allergies as of 1/23/2018  Review Complete On: 10/12/2017 By: Joselyn Johnson Severity Noted Reaction Type Reactions Ampicillin  08/22/2013    Itching Current Immunizations  Reviewed on 5/17/2017 Name Date Influenza High Dose Vaccine PF 9/5/2017 Influenza Vaccine 9/1/2017 Influenza Vaccine (Quad) PF 11/9/2016 11:56 AM  
 Pneumococcal Conjugate (PCV-13) 5/17/2017 Not reviewed this visit You Were Diagnosed With   
  
 Codes Comments Type 2 diabetes mellitus with nephropathy ([facility]    -  Primary ICD-10-CM: E11.21 
ICD-9-CM: 250.40, 583.81 Hypothyroidism due to acquired atrophy of thyroid     ICD-10-CM: E03.4 ICD-9-CM: 244.8, 246.8 Essential hypertension     ICD-10-CM: I10 
ICD-9-CM: 401.9 Class 1 obesity due to excess calories with serious comorbidity in adult, unspecified BMI     ICD-10-CM: E66.09 
ICD-9-CM: 278.00 Vitals BP Pulse Temp Resp Height(growth percentile) Weight(growth percentile) 131/60 82 97.3 °F (36.3 °C) (Oral) 16 5' (1.524 m) 170 lb (77.1 kg) SpO2 BMI OB Status Smoking Status 97% 33.2 kg/m2 Postmenopausal Never Smoker Vitals History BMI and BSA Data Body Mass Index Body Surface Area  
 33.2 kg/m 2 1.81 m 2 Preferred Pharmacy Pharmacy Name Phone [facility] [place] [facility], [address] [place] [address] [phone] Your Updated Medication List  
  
   
This list is accurate as of: 1/23/18  2:29 PM.  Always use your most recent med list.  
  
  
  
  
 celecoxib 200 mg capsule Commonly known as:  CELEBREX Take 1 Cap by mouth daily as needed for Pain for up to 90 days. cholecalciferol 1,000 unit tablet Commonly known as:  VITAMIN D3 Take 2 Tabs by mouth daily. docusate sodium 100 mg capsule Commonly known as:  Geeta Bunkers Take 1 Cap by mouth two (2) times a day. Indications: take while on narcotics for pain  
  
 glipiZIDE 5 mg tablet Commonly known as:  GLUCOTROL  
TAKE 1 TABLET BY MOUTH TWICE DAILY  
  
 hydroCHLOROthiazide 25 mg tablet Commonly known as:  HYDRODIURIL  
TAKE 1 TABLET BY MOUTH DAILY  
  
 levothyroxine 75 mcg tablet Commonly known as:  SYNTHROID  
TAKE 1 TABLET BY MOUTH DAILY BEFORE BREAKFAST  
  
 losartan 50 mg tablet Commonly known as:  COZAAR Take 1 Tab by mouth daily. metFORMIN 1,000 mg tablet Commonly known as:  GLUCOPHAGE Take 1 Tab by mouth two (2) times daily (with meals). oxyCODONE-acetaminophen 5-325 mg per tablet Commonly known as:  PERCOCET Take 1-2 Tabs by mouth every four (4) hours as needed. Max Daily Amount: 12 Tabs. pantoprazole 40 mg tablet Commonly known as:  PROTONIX Take 1 Tab by mouth daily. simvastatin 20 mg tablet Commonly known as:  ZOCOR Take 1 Tab by mouth nightly. triamcinolone acetonide 0.1 % topical cream  
Commonly known as:  KENALOG Apply  to affected area two (2) times a day. use thin layer Follow-up Instructions Return in about 6 weeks (around 3/6/2018). To-Do List   
 01/23/2018   Lab:  CBC WITH AUTOMATED DIFF   
  
 01/23/2018 Lab:  HEMOGLOBIN A1C WITH EAG   
  
 01/23/2018 Lab:  LIPID PANEL   
  
 01/23/2018 Lab:  METABOLIC PANEL, COMPREHENSIVE Introducing [facility]! [facility] introduces [employer] patient portal. Now you can access parts of your medical record, email your doctor's office, and request medication refills online. 1. In your internet browser, go to [email or web address] 2. Click on the First Time User? Click Here link in the Sign In box. You will see the New Member Sign Up page. 3. Enter your [employer] Access Code exactly as it appears below. You will not need to use this code after youve completed the sign-up process. If you do not sign up before the expiration date, you must request a new code. · [employer] Access Code: [email or web address] Expires: 4/23/2018 12:55 PM 
 
4. Enter the last four digits of your Social Security Number (xxxx) and Date of Birth (mm/dd/yyyy) as indicated and click Submit. You will be taken to the next sign-up page. 5. Create a Dec[employer] ID. This will be your [employer] login ID and cannot be changed, so think of one that is secure and easy to remember. 6. Create a [employer] password. You can change your password at any time. 7. Enter your Password Reset Question and Answer. This can be used at a later time if you forget your password. 8. Enter your e-mail address. You will receive e-mail notification when new information is available in [address] 9. Click Sign Up. You can now view and download portions of your medical record. 10. Click the Download Summary menu link to download a portable copy of your medical information. If you have questions, please visit the Frequently Asked Questions section of the Dec[employer] website. Remember, Dec[employer] is NOT to be used for urgent needs. For medical emergencies, dial 911. Now available from your iPhone and Android! Please provide this summary of care documentation to your next provider. Your primary care clinician is listed as Luz Elena Arora. If you have any questions after today's visit, please call [phone].

## 2018-01-23 NOTE — PROGRESS NOTES
1. Have you been to the ER, urgent care clinic since your last visit? Hospitalized since your last visit? 10/9/18 right hip fracture    2. Have you seen or consulted any other health care providers outside of the [address] [place] [address] since your last visit? Include any pap smears or colon screening.  Orthopedics Dr. Roberto Carlos Mackenzie

## 2018-02-20 RX ORDER — SIMVASTATIN 20 MG/1
20 TABLET, FILM COATED ORAL
Qty: 60 TAB | Refills: 0 | Status: SHIPPED | OUTPATIENT
Start: 2018-02-20 | End: 2018-02-20 | Stop reason: SDUPTHER

## 2018-02-21 RX ORDER — SIMVASTATIN 20 MG/1
TABLET, FILM COATED ORAL
Qty: 90 TAB | Refills: 0 | Status: SHIPPED | OUTPATIENT
Start: 2018-02-21 | End: 2018-04-09 | Stop reason: SDUPTHER

## 2018-03-03 ENCOUNTER — HOSPITAL ENCOUNTER (OUTPATIENT)
Age: 78
Setting detail: OBSERVATION
Discharge: HOME | End: 2018-03-05
Attending: EMERGENCY MEDICINE | Admitting: HOSPITALIST
Payer: MEDICARE

## 2018-03-03 ENCOUNTER — APPOINTMENT (OUTPATIENT)
Dept: GENERAL RADIOLOGY | Age: 78
End: 2018-03-03
Attending: EMERGENCY MEDICINE
Payer: MEDICARE

## 2018-03-03 DIAGNOSIS — Z86.39 HISTORY OF DIABETES MELLITUS, TYPE II: ICD-10-CM

## 2018-03-03 DIAGNOSIS — I48.91 ATRIAL FIBRILLATION WITH RAPID VENTRICULAR RESPONSE (HCC): Primary | ICD-10-CM

## 2018-03-03 DIAGNOSIS — R07.9 CHEST PAIN, UNSPECIFIED TYPE: ICD-10-CM

## 2018-03-03 DIAGNOSIS — Z86.79 HISTORY OF HYPERTENSION: ICD-10-CM

## 2018-03-03 PROBLEM — I10 HYPERTENSION: Status: ACTIVE | Noted: 2018-03-03

## 2018-03-03 LAB
ANION GAP SERPL CALC-SCNC: 9 MMOL/L (ref 3–18)
APPEARANCE UR: CLEAR
APTT PPP: 26.7 SEC (ref 23–36.4)
BACTERIA URNS QL MICRO: ABNORMAL /HPF
BASOPHILS # BLD: 0 K/UL (ref 0–0.06)
BASOPHILS NFR BLD: 0 % (ref 0–2)
BILIRUB UR QL: NEGATIVE
BNP SERPL-MCNC: 901 PG/ML (ref 0–1800)
BUN SERPL-MCNC: 17 MG/DL (ref 7–18)
BUN/CREAT SERPL: 18 (ref 12–20)
CALCIUM SERPL-MCNC: 9 MG/DL (ref 8.5–10.1)
CHLORIDE SERPL-SCNC: 105 MMOL/L (ref 100–108)
CK MB CFR SERPL CALC: 3.6 % (ref 0–4)
CK MB SERPL-MCNC: 1 NG/ML (ref 5–25)
CK SERPL-CCNC: 28 U/L (ref 26–192)
CO2 SERPL-SCNC: 25 MMOL/L (ref 21–32)
COLOR UR: YELLOW
CREAT SERPL-MCNC: 0.96 MG/DL (ref 0.6–1.3)
DIFFERENTIAL METHOD BLD: NORMAL
EOSINOPHIL # BLD: 0.2 K/UL (ref 0–0.4)
EOSINOPHIL NFR BLD: 2 % (ref 0–5)
EPITH CASTS URNS QL MICRO: ABNORMAL /LPF (ref 0–5)
ERYTHROCYTE [DISTWIDTH] IN BLOOD BY AUTOMATED COUNT: 13.3 % (ref 11.6–14.5)
EST. AVERAGE GLUCOSE BLD GHB EST-MCNC: 146 MG/DL
GLUCOSE BLD STRIP.AUTO-MCNC: 195 MG/DL (ref 70–110)
GLUCOSE SERPL-MCNC: 93 MG/DL (ref 74–99)
GLUCOSE UR STRIP.AUTO-MCNC: NEGATIVE MG/DL
HBA1C MFR BLD: 6.7 % (ref 4.2–5.6)
HCT VFR BLD AUTO: 41.2 % (ref 35–45)
HGB BLD-MCNC: 13.5 G/DL (ref 12–16)
HGB UR QL STRIP: NEGATIVE
INR PPP: 0.9 (ref 0.8–1.2)
KETONES UR QL STRIP.AUTO: NEGATIVE MG/DL
LEUKOCYTE ESTERASE UR QL STRIP.AUTO: ABNORMAL
LYMPHOCYTES # BLD: 2.2 K/UL (ref 0.9–3.6)
LYMPHOCYTES NFR BLD: 22 % (ref 21–52)
MAGNESIUM SERPL-MCNC: 1 MG/DL (ref 1.6–2.6)
MCH RBC QN AUTO: 27.1 PG (ref 24–34)
MCHC RBC AUTO-ENTMCNC: 32.8 G/DL (ref 31–37)
MCV RBC AUTO: 82.7 FL (ref 74–97)
MONOCYTES # BLD: 0.6 K/UL (ref 0.05–1.2)
MONOCYTES NFR BLD: 6 % (ref 3–10)
NEUTS SEG # BLD: 7.1 K/UL (ref 1.8–8)
NEUTS SEG NFR BLD: 70 % (ref 40–73)
NITRITE UR QL STRIP.AUTO: NEGATIVE
PH UR STRIP: 7 [PH] (ref 5–8)
PLATELET # BLD AUTO: 282 K/UL (ref 135–420)
PMV BLD AUTO: 9.6 FL (ref 9.2–11.8)
POTASSIUM SERPL-SCNC: 4.1 MMOL/L (ref 3.5–5.5)
PROT UR STRIP-MCNC: ABNORMAL MG/DL
PROTHROMBIN TIME: 11.8 SEC (ref 11.5–15.2)
RBC # BLD AUTO: 4.98 M/UL (ref 4.2–5.3)
RBC #/AREA URNS HPF: ABNORMAL /HPF (ref 0–5)
SODIUM SERPL-SCNC: 139 MMOL/L (ref 136–145)
SP GR UR REFRACTOMETRY: 1.01 (ref 1–1.03)
TROPONIN I SERPL-MCNC: <0.02 NG/ML (ref 0–0.04)
TROPONIN I SERPL-MCNC: <0.02 NG/ML (ref 0–0.04)
UROBILINOGEN UR QL STRIP.AUTO: 0.2 EU/DL (ref 0.2–1)
WBC # BLD AUTO: 10.1 K/UL (ref 4.6–13.2)
WBC URNS QL MICRO: ABNORMAL /HPF (ref 0–4)

## 2018-03-03 PROCEDURE — 74011250636 HC RX REV CODE- 250/636: Performed by: EMERGENCY MEDICINE

## 2018-03-03 PROCEDURE — 96375 TX/PRO/DX INJ NEW DRUG ADDON: CPT

## 2018-03-03 PROCEDURE — 36415 COLL VENOUS BLD VENIPUNCTURE: CPT | Performed by: HOSPITALIST

## 2018-03-03 PROCEDURE — 65390000012 HC CONDITION CODE 44 OBSERVATION

## 2018-03-03 PROCEDURE — 93005 ELECTROCARDIOGRAM TRACING: CPT

## 2018-03-03 PROCEDURE — 96372 THER/PROPH/DIAG INJ SC/IM: CPT

## 2018-03-03 PROCEDURE — 96374 THER/PROPH/DIAG INJ IV PUSH: CPT

## 2018-03-03 PROCEDURE — 82550 ASSAY OF CK (CPK): CPT | Performed by: HOSPITALIST

## 2018-03-03 PROCEDURE — 84484 ASSAY OF TROPONIN QUANT: CPT | Performed by: EMERGENCY MEDICINE

## 2018-03-03 PROCEDURE — 71045 X-RAY EXAM CHEST 1 VIEW: CPT

## 2018-03-03 PROCEDURE — 96376 TX/PRO/DX INJ SAME DRUG ADON: CPT

## 2018-03-03 PROCEDURE — 77030032490 HC SLV COMPR SCD KNE COVD -B

## 2018-03-03 PROCEDURE — 83036 HEMOGLOBIN GLYCOSYLATED A1C: CPT | Performed by: HOSPITALIST

## 2018-03-03 PROCEDURE — 74011250637 HC RX REV CODE- 250/637: Performed by: HOSPITALIST

## 2018-03-03 PROCEDURE — 83880 ASSAY OF NATRIURETIC PEPTIDE: CPT | Performed by: EMERGENCY MEDICINE

## 2018-03-03 PROCEDURE — 83735 ASSAY OF MAGNESIUM: CPT | Performed by: EMERGENCY MEDICINE

## 2018-03-03 PROCEDURE — 74011250636 HC RX REV CODE- 250/636: Performed by: HOSPITALIST

## 2018-03-03 PROCEDURE — 74011250636 HC RX REV CODE- 250/636

## 2018-03-03 PROCEDURE — 85025 COMPLETE CBC W/AUTO DIFF WBC: CPT | Performed by: EMERGENCY MEDICINE

## 2018-03-03 PROCEDURE — 74011250637 HC RX REV CODE- 250/637: Performed by: EMERGENCY MEDICINE

## 2018-03-03 PROCEDURE — 81001 URINALYSIS AUTO W/SCOPE: CPT | Performed by: EMERGENCY MEDICINE

## 2018-03-03 PROCEDURE — 74011000250 HC RX REV CODE- 250: Performed by: EMERGENCY MEDICINE

## 2018-03-03 PROCEDURE — 99285 EMERGENCY DEPT VISIT HI MDM: CPT

## 2018-03-03 PROCEDURE — 80048 BASIC METABOLIC PNL TOTAL CA: CPT | Performed by: EMERGENCY MEDICINE

## 2018-03-03 PROCEDURE — 85730 THROMBOPLASTIN TIME PARTIAL: CPT | Performed by: EMERGENCY MEDICINE

## 2018-03-03 PROCEDURE — 74011000250 HC RX REV CODE- 250

## 2018-03-03 PROCEDURE — 74011636637 HC RX REV CODE- 636/637: Performed by: HOSPITALIST

## 2018-03-03 PROCEDURE — 96361 HYDRATE IV INFUSION ADD-ON: CPT

## 2018-03-03 PROCEDURE — 85610 PROTHROMBIN TIME: CPT | Performed by: EMERGENCY MEDICINE

## 2018-03-03 PROCEDURE — 82962 GLUCOSE BLOOD TEST: CPT

## 2018-03-03 PROCEDURE — 65660000000 HC RM CCU STEPDOWN

## 2018-03-03 RX ORDER — DEXTROSE 50 % IN WATER (D50W) INTRAVENOUS SYRINGE
25-50 AS NEEDED
Status: DISCONTINUED | OUTPATIENT
Start: 2018-03-03 | End: 2018-03-05 | Stop reason: HOSPADM

## 2018-03-03 RX ORDER — DEXTROSE, SODIUM CHLORIDE, AND POTASSIUM CHLORIDE 5; .45; .15 G/100ML; G/100ML; G/100ML
125 INJECTION INTRAVENOUS CONTINUOUS
Status: DISCONTINUED | OUTPATIENT
Start: 2018-03-03 | End: 2018-03-05

## 2018-03-03 RX ORDER — GUAIFENESIN 100 MG/5ML
324 LIQUID (ML) ORAL
Status: COMPLETED | OUTPATIENT
Start: 2018-03-03 | End: 2018-03-03

## 2018-03-03 RX ORDER — METOPROLOL TARTRATE 5 MG/5ML
5 INJECTION INTRAVENOUS
Status: DISCONTINUED | OUTPATIENT
Start: 2018-03-03 | End: 2018-03-03

## 2018-03-03 RX ORDER — MAGNESIUM SULFATE 100 %
4 CRYSTALS MISCELLANEOUS AS NEEDED
Status: DISCONTINUED | OUTPATIENT
Start: 2018-03-03 | End: 2018-03-05 | Stop reason: HOSPADM

## 2018-03-03 RX ORDER — LANOLIN ALCOHOL/MO/W.PET/CERES
400 CREAM (GRAM) TOPICAL
Status: COMPLETED | OUTPATIENT
Start: 2018-03-03 | End: 2018-03-03

## 2018-03-03 RX ORDER — SIMVASTATIN 20 MG/1
20 TABLET, FILM COATED ORAL DAILY
Status: DISCONTINUED | OUTPATIENT
Start: 2018-03-04 | End: 2018-03-05 | Stop reason: HOSPADM

## 2018-03-03 RX ORDER — LEVOTHYROXINE SODIUM 75 UG/1
75 TABLET ORAL
Status: DISCONTINUED | OUTPATIENT
Start: 2018-03-04 | End: 2018-03-05 | Stop reason: HOSPADM

## 2018-03-03 RX ORDER — ENOXAPARIN SODIUM 150 MG/ML
120 INJECTION SUBCUTANEOUS EVERY 24 HOURS
Status: DISCONTINUED | OUTPATIENT
Start: 2018-03-03 | End: 2018-03-05

## 2018-03-03 RX ORDER — LOSARTAN POTASSIUM 50 MG/1
50 TABLET ORAL DAILY
Status: DISCONTINUED | OUTPATIENT
Start: 2018-03-04 | End: 2018-03-05 | Stop reason: HOSPADM

## 2018-03-03 RX ORDER — METOPROLOL TARTRATE 5 MG/5ML
INJECTION INTRAVENOUS
Status: COMPLETED
Start: 2018-03-03 | End: 2018-03-03

## 2018-03-03 RX ORDER — DEXTROSE, SODIUM CHLORIDE, AND POTASSIUM CHLORIDE 5; .45; .15 G/100ML; G/100ML; G/100ML
INJECTION INTRAVENOUS
Status: COMPLETED
Start: 2018-03-03 | End: 2018-03-03

## 2018-03-03 RX ORDER — PANTOPRAZOLE SODIUM 40 MG/1
40 TABLET, DELAYED RELEASE ORAL
Status: DISCONTINUED | OUTPATIENT
Start: 2018-03-04 | End: 2018-03-05 | Stop reason: HOSPADM

## 2018-03-03 RX ORDER — INSULIN LISPRO 100 [IU]/ML
INJECTION, SOLUTION INTRAVENOUS; SUBCUTANEOUS
Status: DISCONTINUED | OUTPATIENT
Start: 2018-03-03 | End: 2018-03-05 | Stop reason: HOSPADM

## 2018-03-03 RX ORDER — ONDANSETRON 2 MG/ML
4 INJECTION INTRAMUSCULAR; INTRAVENOUS
Status: COMPLETED | OUTPATIENT
Start: 2018-03-03 | End: 2018-03-03

## 2018-03-03 RX ORDER — METOPROLOL TARTRATE 5 MG/5ML
5 INJECTION INTRAVENOUS
Status: COMPLETED | OUTPATIENT
Start: 2018-03-03 | End: 2018-03-03

## 2018-03-03 RX ADMIN — METOPROLOL TARTRATE 5 MG: 5 INJECTION INTRAVENOUS at 15:54

## 2018-03-03 RX ADMIN — ASPIRIN 81 MG 324 MG: 81 TABLET ORAL at 15:50

## 2018-03-03 RX ADMIN — DEXTROSE MONOHYDRATE, SODIUM CHLORIDE, AND POTASSIUM CHLORIDE 1000 ML: 50; 4.5; 1.49 INJECTION, SOLUTION INTRAVENOUS at 19:30

## 2018-03-03 RX ADMIN — ONDANSETRON 4 MG: 2 INJECTION INTRAMUSCULAR; INTRAVENOUS at 15:49

## 2018-03-03 RX ADMIN — METOPROLOL TARTRATE 5 MG: 5 INJECTION, SOLUTION INTRAVENOUS at 16:24

## 2018-03-03 RX ADMIN — Medication 400 MG: at 23:01

## 2018-03-03 RX ADMIN — DEXTROSE MONOHYDRATE, SODIUM CHLORIDE, AND POTASSIUM CHLORIDE 125 ML/HR: 50; 4.5; 1.49 INJECTION, SOLUTION INTRAVENOUS at 18:38

## 2018-03-03 RX ADMIN — Medication 400 MG: at 20:52

## 2018-03-03 RX ADMIN — METOPROLOL TARTRATE 5 MG: 5 INJECTION, SOLUTION INTRAVENOUS at 16:10

## 2018-03-03 RX ADMIN — INSULIN LISPRO 2 UNITS: 100 INJECTION, SOLUTION INTRAVENOUS; SUBCUTANEOUS at 22:17

## 2018-03-03 RX ADMIN — SODIUM CHLORIDE 1000 ML: 900 INJECTION, SOLUTION INTRAVENOUS at 15:54

## 2018-03-03 RX ADMIN — METOPROLOL TARTRATE 5 MG: 5 INJECTION, SOLUTION INTRAVENOUS at 15:54

## 2018-03-03 RX ADMIN — ENOXAPARIN SODIUM 120 MG: 120 INJECTION SUBCUTANEOUS at 20:53

## 2018-03-03 NOTE — H&P
History and Physical    Patient: Arnel Clark               Sex: female          DOA: 3/3/2018       YOB: 1940      Age:  65 y.o.        LOS:  LOS: 1 day        HPI:     Arnel Clark is a 65 y.o. female who presented to the ER because she was feeling badly. She had racing in her chest and palpitations. She had very minimal and fleeting chest pain. She had shortness of breath but did not have cough. She did not have fever. After several hours of feeling badly she had a friend bring her to the ER. In the ER she was diagnosed with A fib with RVR. She was given multiple doses of Metoprolol and she ultimately converted back into NSR. She will be admitted for ongoing management. Past Medical History:   Diagnosis Date    Arthritis     Bronchitis     Diabetes ([address]     Diverticulitis     GERD (gastroesophageal reflux disease)     Hypercholesterolemia     Hypertension     Hypothyroid     Pancreatitis        Social History:   Tobacco use:  Patient does not smoke   Alcohol use:  Patient does not use alcohol   Patient lives with her Son    Family History: Mother had diabetes, she  of a \"blood clot. \"   Father had Melanoma    Review of Systems    Constitutional:  No fever or weight loss  HEENT:  No headache or visual changes  Cardiovascular:  Palpitations as above no significant chest pain or diaphoresis  Respiratory:  Shortness of breath of breath without cough  GI:  No nausea or vomitting.   No diarrhea  :  No hematuria or dysuria  Skin:  No rashes or moles  Neuro:  No seizures or syncope  Hematological:  No bruising or bleeding  Endocrine:  No diabetes or thyroid disease    Physical Exam:      Visit Vitals    BP (!) 156/91 (BP 1 Location: Left arm, BP Patient Position: Supine)    Pulse 60    Temp 97.8 °F (36.6 °C)    Resp 18    Ht 5' (1.524 m)    Wt 78.5 kg (173 lb)    SpO2 93%    Breastfeeding No    BMI 33.79 kg/m2       Physical Exam:    Gen:  No distress, alert  HEENT:  Normal cephalic atraumatic, extra-occular movements are intact. Neck:  Supple, No JVD  Lungs:  Clear bilaterally, no wheeze, no rales, normal effort  Heart:  Regular Rate and Rhythm, normal S1 and S2, no edema  Abdomen:  Soft, non tender, normal bowel sounds, no guarding.   Extremities:  Well perfused, no cyanosis or edema  Neurological:  Awake and alert, CN's are intact, normal strength throughout extremities  Skin:  No rashes or moles  Mental Status:  Normal thought process, does not appear anxious    Laboratory Studies:    BMP:   Lab Results   Component Value Date/Time     03/04/2018 04:02 AM    K 4.0 03/04/2018 04:02 AM     03/04/2018 04:02 AM    CO2 26 03/04/2018 04:02 AM    AGAP 7 03/04/2018 04:02 AM     (H) 03/04/2018 04:02 AM    BUN 18 03/04/2018 04:02 AM    CREA 0.81 03/04/2018 04:02 AM    GFRAA >60 03/04/2018 04:02 AM    GFRNA >60 03/04/2018 04:02 AM     CBC:   Lab Results   Component Value Date/Time    WBC 7.4 03/04/2018 04:02 AM    HGB 10.4 (L) 03/04/2018 04:02 AM    HCT 32.7 (L) 03/04/2018 04:02 AM     03/04/2018 04:02 AM       Assessment/Plan     Principal Problem:    Atrial fibrillation with rapid ventricular response ([address] (3/3/2018)    Active Problems:    Hypertension (3/3/2018)      Type 2 diabetes mellitus with nephropathy ([address] (1/23/2018)      Obesity (2/24/2014)        PLAN:    Continue with Rate control  Monitor rhythm  Call cardiology consult tomorrow  Anticoagulation with Lovenox, treatment dose  Echo  BP control  BS control

## 2018-03-03 NOTE — ED PROVIDER NOTES
EMERGENCY DEPARTMENT HISTORY AND PHYSICAL EXAM    3:42 PM      Date: 3/3/2018  Patient Name: oS Foster    History of Presenting Illness     Chief Complaint   Patient presents with    Chest Pain         History Provided By: Patient    Chief Complaint: Chest tightness  Duration: This morning  Timing:  Acute  Location: Chest  Quality: Tightness  Severity: Mild  Modifying Factors: N/A  Associated Symptoms: palpitations      Additional History (Context): So Foster is a 65 y.o. female with PMHx of diabetes, hypertension and hypercholesterolemia who presents with acute, mild chest tightness with onset this morning in the setting of one week cold like symptoms. Associated symptoms include palpitations. Denies any further complaints or symptoms at the moment. PCP: Srinivas Lee MD    Current Outpatient Prescriptions   Medication Sig Dispense Refill    simvastatin (ZOCOR) 20 mg tablet TAKE 1 TABLET BY MOUTH EVERY NIGHT 90 Tab 0    levothyroxine (SYNTHROID) 75 mcg tablet TAKE 1 TABLET BY MOUTH DAILY BEFORE BREAKFAST 90 Tab 0    triamcinolone acetonide (KENALOG) 0.1 % topical cream Apply  to affected area two (2) times a day. use thin layer 15 g 0    metFORMIN (GLUCOPHAGE) 1,000 mg tablet Take 1 Tab by mouth two (2) times daily (with meals). 60 Tab 0    pantoprazole (PROTONIX) 40 mg tablet Take 1 Tab by mouth daily. 30 Tab 0    losartan (COZAAR) 50 mg tablet Take 1 Tab by mouth daily. 90 Tab 2    hydroCHLOROthiazide (HYDRODIURIL) 25 mg tablet TAKE 1 TABLET BY MOUTH DAILY 90 Tab 2    glipiZIDE (GLUCOTROL) 5 mg tablet TAKE 1 TABLET BY MOUTH TWICE DAILY 180 Tab 3    oxyCODONE-acetaminophen (PERCOCET) 5-325 mg per tablet Take 1-2 Tabs by mouth every four (4) hours as needed. Max Daily Amount: 12 Tabs. 60 Tab 0    cholecalciferol (VITAMIN D3) 1,000 unit tablet Take 2 Tabs by mouth daily. 60 Tab 0    docusate sodium (COLACE) 100 mg capsule Take 1 Cap by mouth two (2) times a day.  Indications: take while on narcotics for pain 60 Cap 2       Past History     Past Medical History:  Past Medical History:   Diagnosis Date    Arthritis     Bronchitis     Diabetes ([address]     Diverticulitis     GERD (gastroesophageal reflux disease)     Hypercholesterolemia     Hypertension     Hypothyroid     Pancreatitis        Past Surgical History:  Past Surgical History:   Procedure Laterality Date    COLONOSCOPY N/A 2/2/2017    COLONOSCOPY  w/bx polyp performed by Britney Moraes MD at [address] [place] [address] Drive ENDOSCOPY       Family History:  Family History   Problem Relation Age of Onset    Diabetes Mother     Heart Disease Mother     Cancer Father      melanoma    Stroke Sister     Hypertension Sister     Diabetes Sister     Hypertension Brother     Diabetes Brother     Breast Cancer Paternal Grandmother      older, but not sure age.  Breast Cancer Paternal Aunt      and gyn cancer ?age       Social History:  Social History   Substance Use Topics    Smoking status: Never Smoker    Smokeless tobacco: Never Used    Alcohol use No       Allergies: Allergies   Allergen Reactions    Ampicillin Itching         Review of Systems       Review of Systems   Constitutional: Negative for chills and fever. HENT: Negative for ear pain and sore throat. Eyes: Negative for pain and visual disturbance. Respiratory: Positive for chest tightness. Negative for cough and shortness of breath. Cardiovascular: Positive for palpitations. Negative for chest pain. Gastrointestinal: Negative for abdominal pain, diarrhea, nausea and vomiting. Genitourinary: Negative for flank pain. Musculoskeletal: Negative for back pain and neck pain. Neurological: Negative for syncope and headaches. Psychiatric/Behavioral: Negative for agitation. The patient is not nervous/anxious.           Physical Exam     Visit Vitals    /71    Pulse 62    Resp 18    Ht 5' (1.524 m)    Wt 78.5 kg (173 lb)    SpO2 98%    BMI 33.79 kg/m2 Physical Exam   Constitutional: She is oriented to person, place, and time. She appears well-developed and well-nourished. HENT:   Head: Normocephalic and atraumatic. Mouth/Throat: Oropharynx is clear and moist.   Eyes: Pupils are equal, round, and reactive to light. No scleral icterus. Neck: Neck supple. No tracheal deviation present. Cardiovascular: Regular rhythm. No murmur heard. Pulmonary/Chest: Effort normal and breath sounds normal. No respiratory distress. Abdominal: Soft. There is no tenderness. Musculoskeletal: Normal range of motion. She exhibits no deformity. Neurological: She is alert and oriented to person, place, and time. No gross neuro deficit   Skin: Skin is warm and dry. No rash noted. She is not diaphoretic. Psychiatric: She has a normal mood and affect. Nursing note and vitals reviewed. Diagnostic Study Results     Labs -  Labs Reviewed   METABOLIC PANEL, BASIC - Abnormal; Notable for the following:        Result Value    GFR est non-AA 56 (*)     All other components within normal limits   MAGNESIUM - Abnormal; Notable for the following:     Magnesium 1.0 (*)     All other components within normal limits   CBC WITH AUTOMATED DIFF   NT-PRO BNP   PROTHROMBIN TIME + INR   PTT   TROPONIN I   URINALYSIS W/ RFLX MICROSCOPIC       Radiologic Studies -   XR CHEST PORT   Final Result   IMPRESSION  Impression:  No radiographic evidence of an acute abnormality. Medical Decision Making   I am the first provider for this patient. I reviewed the vital signs, available nursing notes, past medical history, past surgical history, family history and social history. Vital Signs-Reviewed the patient's vital signs. Pulse Oximetry Analysis -  100% on room air     EKG: Interpreted by the EP. Time: 15:25  Irregularly irregular tachycardia at rate of 153 bpm. Right axis deviation. Borderline prolonged QRS. Diffuse, nonspecific st depression likely rate related. No acute ST elevations    Interpreted by the EP. Time 16:37  Normal Sinus rhythm, rate of 62 pm. Nml axis, Intervals within normal limits. No acute ST elevations or depression. Records Reviewed: Nursing Notes (Time of Review: 3:42 PM)    ED Course: Progress Notes, Reevaluation, and Consults:  ED Course       Consult:  Discussed care with Dr. Tate Bah, Specialty: Hospitalist  Standard discussion; including history of patients chief complaint, available diagnostic results, and treatment course. Will admit for new onset atrial flutter/fib with RVR, who appears responsive to beta blockers and will need further workup, echo, cardiology consult, and coagulation, and ACS rule out. 4:34 PM, 3/3/2018     Provider Notes (Medical Decision Making):     DDX: atrial flutter, fibrillation, ACS, SVT, occult infection, electrolyte abnormality    65 y.o. female with noted past medical history who presented with CP and palpitation found to have irregular tachycardia >150 on arrival, vagal maneuvers unsuccessful, most likely in a-flutter. Had shared decision making conversation and we prefer rate control vs cardioversion, onset is suspected to be today based on history but pt cannot be certain. Will try metoprolol as it has greater cardioprotective affect with ACS given her concomitant CP. Pt CHADS2 score > 3 and will likely need anti-coagulation, explain risk benefit conversation would be had by cardiology team as an inpatient. HR down is low 1-teens from 140-50's after 1st dose of metoprolol. Cardioverted to NSR during 3rd dose of metoprolol    Vitals were notable for tachycardia and hypertension. The differential above was considered. The patient was given metoprolol for rate control and ASA for CP. Diagnostics notable for no significant abnormalities. For Hospitalized Patients:    1. Hospitalization Decision Time:  The decision to hospitalize the patient was made by Dr. Deonte Douglas at 4:30PM on 3/3/2018    2.  Aspirin: Aspirin was given    Diagnosis     Clinical Impression:   1. Atrial fibrillation with rapid ventricular response (HCC)    2. Chest pain, unspecified type    3. History of hypertension    4. History of diabetes mellitus, type II        Disposition: Admit    Follow-up Information     None           Patient's Medications   Start Taking    No medications on file   Continue Taking    CHOLECALCIFEROL (VITAMIN D3) 1,000 UNIT TABLET    Take 2 Tabs by mouth daily. DOCUSATE SODIUM (COLACE) 100 MG CAPSULE    Take 1 Cap by mouth two (2) times a day. Indications: take while on narcotics for pain    GLIPIZIDE (GLUCOTROL) 5 MG TABLET    TAKE 1 TABLET BY MOUTH TWICE DAILY    HYDROCHLOROTHIAZIDE (HYDRODIURIL) 25 MG TABLET    TAKE 1 TABLET BY MOUTH DAILY    LEVOTHYROXINE (SYNTHROID) 75 MCG TABLET    TAKE 1 TABLET BY MOUTH DAILY BEFORE BREAKFAST    LOSARTAN (COZAAR) 50 MG TABLET    Take 1 Tab by mouth daily. METFORMIN (GLUCOPHAGE) 1,000 MG TABLET    Take 1 Tab by mouth two (2) times daily (with meals). OXYCODONE-ACETAMINOPHEN (PERCOCET) 5-325 MG PER TABLET    Take 1-2 Tabs by mouth every four (4) hours as needed. Max Daily Amount: 12 Tabs. PANTOPRAZOLE (PROTONIX) 40 MG TABLET    Take 1 Tab by mouth daily. SIMVASTATIN (ZOCOR) 20 MG TABLET    TAKE 1 TABLET BY MOUTH EVERY NIGHT    TRIAMCINOLONE ACETONIDE (KENALOG) 0.1 % TOPICAL CREAM    Apply  to affected area two (2) times a day. use thin layer   These Medications have changed    No medications on file   Stop Taking    No medications on file     _______________________________  [address] Alex Sterling acting as a scribe for and in the presence of Jane Moulton DO      March 03, 2018 at 3:42 PM       Provider Attestation:      I personally performed the services described in the documentation, reviewed the documentation, as recorded by the scribe in my presence, and it accurately and completely records my words and actions.  March 03, 2018 at 3:42 PM - Marianela Martínez DO

## 2018-03-03 NOTE — IP AVS SNAPSHOT
Desiemilie Zapien 
 
 
 [address] [place] [address] 
[phone] Patient: Miguel Johnson MRN: [id number] OPN:[id number] A check valerie indicates which time of day the medication should be taken. My Medications START taking these medications Instructions Each Dose to Equal  
 Morning Noon Evening Bedtime  
 apixaban 5 mg tablet Commonly known as:  Trevon [place] Take 1 Tab by mouth two (2) times a day. 5 mg  
    
  
   
   
  
   
  
 metoprolol tartrate 25 mg tablet Commonly known as:  LOPRESSOR Take 0.5 Tabs by mouth every twelve (12) hours. 12.5 mg  
    
  
   
   
   
  
  
  
CONTINUE taking these medications Instructions Each Dose to Equal  
 Morning Noon Evening Bedtime  
 cholecalciferol 1,000 unit tablet Commonly known as:  VITAMIN D3 Take 2 Tabs by mouth daily. 2000 Units  
    
  
   
   
   
  
 docusate sodium 100 mg capsule Commonly known as:  Carlos Erm Take 1 Cap by mouth two (2) times a day. Indications: take while on narcotics for pain 100 mg  
    
  
   
   
  
   
  
 glipiZIDE 5 mg tablet Commonly known as:  GLUCOTROL  
   
 TAKE 1 TABLET BY MOUTH TWICE DAILY  
     
   
   
   
  
 levothyroxine 75 mcg tablet Commonly known as:  SYNTHROID  
   
 TAKE 1 TABLET BY MOUTH DAILY BEFORE BREAKFAST  
     
   
   
   
  
 losartan 50 mg tablet Commonly known as:  COZAAR Take 1 Tab by mouth daily. 50 mg  
    
  
   
   
   
  
 metFORMIN 1,000 mg tablet Commonly known as:  GLUCOPHAGE Take 1 Tab by mouth two (2) times daily (with meals). 1000 mg  
    
  
   
   
  
   
  
 oxyCODONE-acetaminophen 5-325 mg per tablet Commonly known as:  PERCOCET Take 1-2 Tabs by mouth every four (4) hours as needed. Max Daily Amount: 12 Tabs. 1-2 Tab  
    
   
   
   
  
 pantoprazole 40 mg tablet Commonly known as:  PROTONIX Take 1 Tab by mouth daily.   
 40 mg  
    
  
 simvastatin 20 mg tablet Commonly known as:  ZOCOR  
   
 TAKE 1 TABLET BY MOUTH EVERY NIGHT  
     
   
   
   
  
 triamcinolone acetonide 0.1 % topical cream  
Commonly known as:  KENALOG Apply  to affected area two (2) times a day. use thin layer STOP taking these medications   
 hydroCHLOROthiazide 25 mg tablet Commonly known as:  HYDRODIURIL Where to Get Your Medications Information on where to get these meds will be given to you by the nurse or doctor. ! Ask your nurse or doctor about these medications  
  apixaban 5 mg tablet  
 metoprolol tartrate 25 mg tablet

## 2018-03-03 NOTE — ROUTINE PROCESS
[phone] - Patient to [phone] via stretcher from ER - alert and oriented, denies any pain or SOB. Ambulatory to [facility]. Placed on tele and confirmed SR with monitor tech.   IV fluids started - SCD's placed - Dr. Rupinder Turner at bedside     2155 - patient provided snack     1915 - update given to Loulou Lagos RN

## 2018-03-03 NOTE — Clinical Note
Status[de-identified] Inpatient [101] Type of Bed: Stepdown [17] Inpatient Hospitalization Certified Necessary for the Following Reasons: 3. Patient receiving treatment that can only be provided in an inpatient setting (further clarification in H&P documentation) Admitting Diagnosis: Atrial fibrillation ([facility] [address] [427.31. ICD-9-CM] Admitting Diagnosis: Atrial fibrillation with rapid ventricular response ([facility] [address] [5816840] Admitting Physician: Mary Reed Attending Physician: Mary Reed Estimated Length of Stay: 3-4 Midnights Discharge Plan[de-identified] Home with Office Follow-up

## 2018-03-03 NOTE — IP AVS SNAPSHOT
[address] [address] 
[phone] Patient: Db Lewis MRN: [id number] SAPPHIRE:[birth year] About your hospitalization You were admitted on:  March 3, 2018 You last received care in the:  [address] [place] [address] You were discharged on:  March 5, 2018 Why you were hospitalized Your primary diagnosis was:  Atrial Fibrillation With Rapid Ventricular Response (Hcc) Your diagnoses also included:  Atrial Fibrillation (Hcc), Type 2 Diabetes Mellitus With Nephropathy (Hcc), Obesity, Hypertension, Atrial Fibrillation With Rvr (Hcc) Follow-up Information Follow up With Details Comments Contact Info Nikki Schulte MD   [address] [address] [facility] [address] 
[phone] Nikki Schulte MD In 1 week Follow up in one week with Primary MD [id number] [place] [address] [facility] [id number] 
[phone] Nikki Schulte MD Schedule an appointment as soon as possible for a visit in 1 week Follow up [address] [place] [address] [facility] [address] [phone] Discharge Orders None A check valerie indicates which time of day the medication should be taken. My Medications START taking these medications Instructions Each Dose to Equal  
 Morning Noon Evening Bedtime  
 apixaban 5 mg tablet Commonly known as:  Stefania Scott Take 1 Tab by mouth two (2) times a day. 5 mg  
    
  
   
   
  
   
  
 metoprolol tartrate 25 mg tablet Commonly known as:  LOPRESSOR Take 0.5 Tabs by mouth every twelve (12) hours. 12.5 mg  
    
  
   
   
   
  
  
  
CONTINUE taking these medications Instructions Each Dose to Equal  
 Morning Noon Evening Bedtime  
 cholecalciferol 1,000 unit tablet Commonly known as:  VITAMIN D3 Take 2 Tabs by mouth daily. 2000 Units  
    
  
   
   
   
  
 docusate sodium 100 mg capsule Commonly known as:  Issac Alejandro  
   
 Take 1 Cap by mouth two (2) times a day. Indications: take while on narcotics for pain 100 mg  
    
  
   
   
  
   
  
 glipiZIDE 5 mg tablet Commonly known as:  GLUCOTROL  
   
 TAKE 1 TABLET BY MOUTH TWICE DAILY  
     
   
   
   
  
 levothyroxine 75 mcg tablet Commonly known as:  SYNTHROID  
   
 TAKE 1 TABLET BY MOUTH DAILY BEFORE BREAKFAST  
     
   
   
   
  
 losartan 50 mg tablet Commonly known as:  COZAAR Take 1 Tab by mouth daily. 50 mg  
    
  
   
   
   
  
 metFORMIN 1,000 mg tablet Commonly known as:  GLUCOPHAGE Take 1 Tab by mouth two (2) times daily (with meals). 1000 mg  
    
  
   
   
  
   
  
 oxyCODONE-acetaminophen 5-325 mg per tablet Commonly known as:  PERCOCET Take 1-2 Tabs by mouth every four (4) hours as needed. Max Daily Amount: 12 Tabs. 1-2 Tab  
    
   
   
   
  
 pantoprazole 40 mg tablet Commonly known as:  PROTONIX Take 1 Tab by mouth daily. 40 mg  
    
  
   
   
   
  
 simvastatin 20 mg tablet Commonly known as:  ZOCOR  
   
 TAKE 1 TABLET BY MOUTH EVERY NIGHT  
     
   
   
   
  
 triamcinolone acetonide 0.1 % topical cream  
Commonly known as:  KENALOG Apply  to affected area two (2) times a day. use thin layer STOP taking these medications   
 hydroCHLOROthiazide 25 mg tablet Commonly known as:  HYDRODIURIL Where to Get Your Medications Information on where to get these meds will be given to you by the nurse or doctor. ! Ask your nurse or doctor about these medications  
  apixaban 5 mg tablet  
 metoprolol tartrate 25 mg tablet Discharge Instructions Patient armband removed and shredded DISCHARGE SUMMARY from Nurse PATIENT INSTRUCTIONS: 
 
 
F-face looks uneven A-arms unable to move or move unevenly S-speech slurred or non-existent T-time-call 911 as soon as signs and symptoms begin-DO NOT go Back to bed or wait to see if you get better-TIME IS BRAIN. Warning Signs of HEART ATTACK Call 911 if you have these symptoms: 
? Chest discomfort. Most heart attacks involve discomfort in the center of the chest that lasts more than a few minutes, or that goes away and comes back. It can feel like uncomfortable pressure, squeezing, fullness, or pain. ? Discomfort in other areas of the upper body. Symptoms can include pain or discomfort in one or both arms, the back, neck, jaw, or stomach. ? Shortness of breath with or without chest discomfort. ? Other signs may include breaking out in a cold sweat, nausea, or lightheadedness. Don't wait more than five minutes to call [address]! Fast action can save your life. Calling 911 is almost always the fastest way to get lifesaving treatment. Emergency Medical Services staff can begin treatment when they arrive  up to an hour sooner than if someone gets to the hospital by car. The discharge information has been reviewed with the patient. The patient verbalized understanding. Discharge medications reviewed with the patient and appropriate educational materials and side effects teaching were provided. ___________________________________________________________________________________________________________________________________ Atrial Fibrillation: Care Instructions Your Care Instructions Atrial fibrillation is an irregular and often fast heartbeat. Treating this condition is important for several reasons. It can cause blood clots, which can travel from your heart to your brain and cause a stroke. If you have a fast heartbeat, you may feel lightheaded, dizzy, and weak.  An irregular heartbeat can also increase your risk for heart failure. Atrial fibrillation is often the result of another heart condition, such as high blood pressure or coronary artery disease. Making changes to improve your heart condition will help you stay healthy and active. Follow-up care is a key part of your treatment and safety. Be sure to make and go to all appointments, and call your doctor if you are having problems. It's also a good idea to know your test results and keep a list of the medicines you take. How can you care for yourself at home? Medicines ? · Take your medicines exactly as prescribed. Call your doctor if you think you are having a problem with your medicine. You will get more details on the specific medicines your doctor prescribes. ? · If your doctor has given you a blood thinner to prevent a stroke, be sure you get instructions about how to take your medicine safely. Blood thinners can cause serious bleeding problems. ? · Do not take any vitamins, over-the-counter drugs, or herbal products without talking to your doctor first. ? Lifestyle changes ? · Do not smoke. Smoking can increase your chance of a stroke and heart attack. If you need help quitting, talk to your doctor about stop-smoking programs and medicines. These can increase your chances of quitting for good. ? · Eat a heart-healthy diet. ? · Stay at a healthy weight. Lose weight if you need to.  
? · Limit alcohol to 2 drinks a day for men and 1 drink a day for women. Too much alcohol can cause health problems. ? · Avoid colds and flu. Get a pneumococcal vaccine shot. If you have had one before, ask your doctor whether you need another dose. Get a flu shot every year. If you must be around people with colds or flu, wash your hands often. Activity ? · If your doctor recommends it, get more exercise. Walking is a good choice. Bit by bit, increase the amount you walk every day.  Try for at least 30 minutes on most days of the week. You also may want to swim, bike, or do other activities. Your doctor may suggest that you join a cardiac rehabilitation program so that you can have help increasing your physical activity safely. ? · Start light exercise if your doctor says it is okay. Even a small amount will help you get stronger, have more energy, and manage stress. Walking is an easy way to get exercise. Start out by walking a little more than you did in the hospital. Gradually increase the amount you walk. ? · When you exercise, watch for signs that your heart is working too hard. You are pushing too hard if you cannot talk while you are exercising. If you become short of breath or dizzy or have chest pain, sit down and rest immediately. ? · Check your pulse regularly. Place two fingers on the artery at the palm side of your wrist, in line with your thumb. If your heartbeat seems uneven or fast, talk to your doctor. When should you call for help? Call 911 anytime you think you may need emergency care. For example, call if: 
? · You have symptoms of a heart attack. These may include: ¨ Chest pain or pressure, or a strange feeling in the chest. 
¨ Sweating. ¨ Shortness of breath. ¨ Nausea or vomiting. ¨ Pain, pressure, or a strange feeling in the back, neck, jaw, or upper belly or in one or both shoulders or arms. ¨ Lightheadedness or sudden weakness. ¨ A fast or irregular heartbeat. After you call 911, the  may tell you to chew 1 adult-strength or 2 to 4 low-dose aspirin. Wait for an ambulance. Do not try to drive yourself. ? · You have symptoms of a stroke. These may include: 
¨ Sudden numbness, tingling, weakness, or loss of movement in your face, arm, or leg, especially on only one side of your body. ¨ Sudden vision changes. ¨ Sudden trouble speaking. ¨ Sudden confusion or trouble understanding simple statements. ¨ Sudden problems with walking or balance. ¨ A sudden, severe headache that is different from past headaches. ? · You passed out (lost consciousness). ?Call your doctor now or seek immediate medical care if: 
? · You have new or increased shortness of breath. ? · You feel dizzy or lightheaded, or you feel like you may faint. ? · Your heart rate becomes irregular. ? · You can feel your heart flutter in your chest or skip heartbeats. Tell your doctor if these symptoms are new or worse. ? Watch closely for changes in your health, and be sure to contact your doctor if you have any problems. Where can you learn more? Go to http://aravind-farzana.info/. Enter U020 in the search box to learn more about \"Atrial Fibrillation: Care Instructions. \" Current as of: September 21, 2016 Content Version: 11.4 © 1428-0857 [employer]. Care instructions adapted under license by [employer] (which disclaims liability or warranty for this information). If you have questions about a medical condition or this instruction, always ask your healthcare professional. [address] [place] [address] any warranty or liability for your use of this information. DISCHARGE SUMMARY from Nurse PATIENT INSTRUCTIONS: 
 
 
F-face looks uneven A-arms unable to move or move unevenly S-speech slurred or non-existent T-time-call 911 as soon as signs and symptoms begin-DO NOT go Back to bed or wait to see if you get better-TIME IS BRAIN. Warning Signs of HEART ATTACK Call 911 if you have these symptoms: 
? Chest discomfort.  Most heart attacks involve discomfort in the center of the chest that lasts more than a few minutes, or that goes away and comes back. It can feel like uncomfortable pressure, squeezing, fullness, or pain. ? Discomfort in other areas of the upper body. Symptoms can include pain or discomfort in one or both arms, the back, neck, jaw, or stomach. ? Shortness of breath with or without chest discomfort. ? Other signs may include breaking out in a cold sweat, nausea, or lightheadedness. Don't wait more than five minutes to call [address]! Fast action can save your life. Calling 911 is almost always the fastest way to get lifesaving treatment. Emergency Medical Services staff can begin treatment when they arrive  up to an hour sooner than if someone gets to the hospital by car. The discharge information has been reviewed with the patient. The patient verbalized understanding. Discharge medications reviewed with the patient and appropriate educational materials and side effects teaching were provided. ___________________________________________________________________________________________________________________________________ 
___________________________________________________________________________________________________________________________________ ACO Transitions of Care Introducing [employer] Carolyne Shannon offers a voluntary care coordination program to provide high quality service and care to [facility] fee-for-service beneficiaries. Aliya Goss was designed to help you enhance your health and well-being through the following services: ? Transitions of Care  support for individuals who are transitioning from one care setting to another (example: Hospital to home). ?  Chronic and Complex Care Coordination  support for individuals and caregivers of those with serious or chronic illnesses or with more than one chronic (ongoing) condition and those who take a number of different medications. If you meet specific medical criteria, a [facility] [address] may call you directly to coordinate your care with your primary care physician and your other care providers. For questions about the [facility] programs, please, contact your physicians office. For general questions or additional information about Accountable Care Organizations: 
Please visit www.medicare.gov/acos. html or call 1-800-MEDICARE (7-506.618.2738) TTY users should call 2-587.555.7185. Introducing [facility]! Mulugetafranco Lazaro introduces [employer] patient portal. Now you can access parts of your medical record, email your doctor's office, and request medication refills online. 1. In your internet browser, go to [email or web address] 2. Click on the First Time User? Click Here link in the Sign In box. You will see the New Member Sign Up page. 3. Enter your [employer] Access Code exactly as it appears below. You will not need to use this code after youve completed the sign-up process. If you do not sign up before the expiration date, you must request a new code. · [employer] Access Code: [email or web address] Expires: 4/23/2018 12:55 PM 
 
4. Enter the last four digits of your Social Security Number (xxxx) and Date of Birth (mm/dd/yyyy) as indicated and click Submit. You will be taken to the next sign-up page. 5. Create a [employer]t ID. This will be your Ath[employer] login ID and cannot be changed, so think of one that is secure and easy to remember. 6. Create a [employer] password. You can change your password at any time. 7. Enter your Password Reset Question and Answer. This can be used at a later time if you forget your password. 8. Enter your e-mail address. You will receive e-mail notification when new information is available in [address] 9. Click Sign Up. You can now view and download portions of your medical record. 10. Click the Download Summary menu link to download a portable copy of your medical information. If you have questions, please visit the Frequently Asked Questions section of the [employer] in [place] website. Remember, [employer] is NOT to be used for urgent needs. For medical emergencies, dial 911. Now available from your iPhone and Android! Providers Seen During Your Hospitalization Provider Specialty Primary office phone Stacey Leon DO Emergency Medicine [phone] Fara Segovia MD Family Practice [phone] Brayden Owens DO Internal Medicine [phone] Gayle Arellano MD Internal Medicine [phone] Your Primary Care Physician (PCP) Primary Care Physician Office Phone Office Fax [facility] [phone] You are allergic to the following Allergen Reactions Ampicillin Itching Recent Documentation Height Weight Breastfeeding? BMI OB Status Smoking Status 1.524 m 78.4 kg No 33.76 kg/m2 Postmenopausal Never Smoker Emergency Contacts Name Discharge Info Relation Home Work Mobile [facility] DISCHARGE CAREGIVER [3] Son [22] [phone] Patient Belongings The following personal items are in your possession at time of discharge: 
  Dental Appliances: None  Visual Aid: Glasses, With patient      Home Medications: None   Jewelry: None  Clothing: With patient    Other Valuables: None Please provide this summary of care documentation to your next provider. Signatures-by signing, you are acknowledging that this After Visit Summary has been reviewed with you and you have received a copy. Patient Signature:  ____________________________________________________________  Date:  ____________________________________________________________  
  
Arnel Valdivia    
    
 Provider Signature:  ____________________________________________________________ Date:  ____________________________________________________________

## 2018-03-03 NOTE — ED TRIAGE NOTES
Pt c/o chest pain for 45 mins
Plan: Reviewed etiology and treatment options. Will follow above regimen. Follow up in three weeks. Call with questions or concerns.
Initiate Treatment: TAC BID x 2-3 weeks, PRN for flares\\nSarna Lotion
Detail Level: Simple

## 2018-03-04 PROBLEM — I48.91 ATRIAL FIBRILLATION WITH RVR (HCC): Status: ACTIVE | Noted: 2018-03-04

## 2018-03-04 LAB
ANION GAP SERPL CALC-SCNC: 7 MMOL/L (ref 3–18)
BASOPHILS # BLD: 0 K/UL (ref 0–0.06)
BASOPHILS NFR BLD: 0 % (ref 0–2)
BUN SERPL-MCNC: 18 MG/DL (ref 7–18)
BUN/CREAT SERPL: 22 (ref 12–20)
CALCIUM SERPL-MCNC: 7.7 MG/DL (ref 8.5–10.1)
CHLORIDE SERPL-SCNC: 108 MMOL/L (ref 100–108)
CK MB CFR SERPL CALC: 3.8 % (ref 0–4)
CK MB CFR SERPL CALC: ABNORMAL % (ref 0–4)
CK MB CFR SERPL CALC: ABNORMAL % (ref 0–4)
CK MB CFR SERPL CALC: NORMAL % (ref 0–4)
CK MB SERPL-MCNC: 1 NG/ML (ref 5–25)
CK MB SERPL-MCNC: <1 NG/ML (ref 5–25)
CK SERPL-CCNC: 25 U/L (ref 26–192)
CK SERPL-CCNC: 25 U/L (ref 26–192)
CK SERPL-CCNC: 26 U/L (ref 26–192)
CK SERPL-CCNC: 28 U/L (ref 26–192)
CO2 SERPL-SCNC: 26 MMOL/L (ref 21–32)
CREAT SERPL-MCNC: 0.81 MG/DL (ref 0.6–1.3)
DIFFERENTIAL METHOD BLD: ABNORMAL
EOSINOPHIL # BLD: 0.2 K/UL (ref 0–0.4)
EOSINOPHIL NFR BLD: 2 % (ref 0–5)
ERYTHROCYTE [DISTWIDTH] IN BLOOD BY AUTOMATED COUNT: 13.8 % (ref 11.6–14.5)
GLUCOSE BLD STRIP.AUTO-MCNC: 110 MG/DL (ref 70–110)
GLUCOSE BLD STRIP.AUTO-MCNC: 123 MG/DL (ref 70–110)
GLUCOSE BLD STRIP.AUTO-MCNC: 155 MG/DL (ref 70–110)
GLUCOSE BLD STRIP.AUTO-MCNC: 185 MG/DL (ref 70–110)
GLUCOSE SERPL-MCNC: 120 MG/DL (ref 74–99)
HCT VFR BLD AUTO: 32.7 % (ref 35–45)
HGB BLD-MCNC: 10.4 G/DL (ref 12–16)
LYMPHOCYTES # BLD: 2.9 K/UL (ref 0.9–3.6)
LYMPHOCYTES NFR BLD: 40 % (ref 21–52)
MAGNESIUM SERPL-MCNC: 1.1 MG/DL (ref 1.6–2.6)
MCH RBC QN AUTO: 26.5 PG (ref 24–34)
MCHC RBC AUTO-ENTMCNC: 31.8 G/DL (ref 31–37)
MCV RBC AUTO: 83.4 FL (ref 74–97)
MONOCYTES # BLD: 0.6 K/UL (ref 0.05–1.2)
MONOCYTES NFR BLD: 9 % (ref 3–10)
NEUTS SEG # BLD: 3.7 K/UL (ref 1.8–8)
NEUTS SEG NFR BLD: 49 % (ref 40–73)
PLATELET # BLD AUTO: 239 K/UL (ref 135–420)
PMV BLD AUTO: 9.5 FL (ref 9.2–11.8)
POTASSIUM SERPL-SCNC: 4 MMOL/L (ref 3.5–5.5)
RBC # BLD AUTO: 3.92 M/UL (ref 4.2–5.3)
SODIUM SERPL-SCNC: 141 MMOL/L (ref 136–145)
TROPONIN I SERPL-MCNC: <0.02 NG/ML (ref 0–0.04)
TSH SERPL DL<=0.05 MIU/L-ACNC: 1.28 UIU/ML (ref 0.36–3.74)
WBC # BLD AUTO: 7.4 K/UL (ref 4.6–13.2)

## 2018-03-04 PROCEDURE — 82550 ASSAY OF CK (CPK): CPT | Performed by: HOSPITALIST

## 2018-03-04 PROCEDURE — 74011250636 HC RX REV CODE- 250/636: Performed by: HOSPITALIST

## 2018-03-04 PROCEDURE — 83735 ASSAY OF MAGNESIUM: CPT | Performed by: HOSPITALIST

## 2018-03-04 PROCEDURE — 74011250637 HC RX REV CODE- 250/637: Performed by: HOSPITALIST

## 2018-03-04 PROCEDURE — 36415 COLL VENOUS BLD VENIPUNCTURE: CPT | Performed by: HOSPITALIST

## 2018-03-04 PROCEDURE — 80048 BASIC METABOLIC PNL TOTAL CA: CPT | Performed by: HOSPITALIST

## 2018-03-04 PROCEDURE — 84443 ASSAY THYROID STIM HORMONE: CPT | Performed by: HOSPITALIST

## 2018-03-04 PROCEDURE — 96372 THER/PROPH/DIAG INJ SC/IM: CPT

## 2018-03-04 PROCEDURE — 99218 HC RM OBSERVATION: CPT

## 2018-03-04 PROCEDURE — 65390000012 HC CONDITION CODE 44 OBSERVATION

## 2018-03-04 PROCEDURE — 74011636637 HC RX REV CODE- 636/637: Performed by: HOSPITALIST

## 2018-03-04 PROCEDURE — 85025 COMPLETE CBC W/AUTO DIFF WBC: CPT | Performed by: HOSPITALIST

## 2018-03-04 PROCEDURE — 96361 HYDRATE IV INFUSION ADD-ON: CPT

## 2018-03-04 PROCEDURE — 93306 TTE W/DOPPLER COMPLETE: CPT

## 2018-03-04 PROCEDURE — 82962 GLUCOSE BLOOD TEST: CPT

## 2018-03-04 RX ORDER — METOPROLOL TARTRATE 25 MG/1
12.5 TABLET, FILM COATED ORAL EVERY 12 HOURS
Status: DISCONTINUED | OUTPATIENT
Start: 2018-03-04 | End: 2018-03-05 | Stop reason: HOSPADM

## 2018-03-04 RX ADMIN — LOSARTAN POTASSIUM 50 MG: 50 TABLET ORAL at 08:10

## 2018-03-04 RX ADMIN — LEVOTHYROXINE SODIUM 75 MCG: 75 TABLET ORAL at 08:10

## 2018-03-04 RX ADMIN — INSULIN LISPRO 2 UNITS: 100 INJECTION, SOLUTION INTRAVENOUS; SUBCUTANEOUS at 14:29

## 2018-03-04 RX ADMIN — INSULIN LISPRO 2 UNITS: 100 INJECTION, SOLUTION INTRAVENOUS; SUBCUTANEOUS at 21:38

## 2018-03-04 RX ADMIN — DEXTROSE MONOHYDRATE, SODIUM CHLORIDE, AND POTASSIUM CHLORIDE 125 ML/HR: 50; 4.5; 1.49 INJECTION, SOLUTION INTRAVENOUS at 04:31

## 2018-03-04 RX ADMIN — DEXTROSE MONOHYDRATE, SODIUM CHLORIDE, AND POTASSIUM CHLORIDE 125 ML/HR: 50; 4.5; 1.49 INJECTION, SOLUTION INTRAVENOUS at 14:36

## 2018-03-04 RX ADMIN — ENOXAPARIN SODIUM 120 MG: 120 INJECTION SUBCUTANEOUS at 20:33

## 2018-03-04 RX ADMIN — PANTOPRAZOLE SODIUM 40 MG: 40 TABLET, DELAYED RELEASE ORAL at 08:10

## 2018-03-04 RX ADMIN — METOPROLOL TARTRATE 12.5 MG: 25 TABLET ORAL at 17:55

## 2018-03-04 RX ADMIN — SIMVASTATIN 20 MG: 20 TABLET, FILM COATED ORAL at 08:10

## 2018-03-04 RX ADMIN — METOPROLOL TARTRATE 12.5 MG: 25 TABLET ORAL at 20:32

## 2018-03-04 NOTE — PROGRESS NOTES
met with Patient completed the initial Spiritual Assessment of the patient, and offered Pastoral Care, see flow sheets for interventions. Patient does not have any Hoahaoism/cultural needs that will affect patients preferences in health care. Charted reviewed. Chaplains will continue to follow and will provide pastoral care on an as needed/requested basis.       Jin   [phone]

## 2018-03-04 NOTE — ROUTINE PROCESS
3555- assumed care of pt. Pt resting in bed, watching tv. On room air. AxOx4. No c/o pain. NAD. Call light within reach. Will continue to monitor. 2016- Shift assessment complete. 2053- due meds given. No other needs at this time. 3662- New bag replaced. Bedside shift change report given to Sherrie Alcala RN (oncoming nurse) by Arianna Suazo RN (offgoing nurse). Report included the following information SBAR, Kardex, Intake/Output, MAR, Accordion and Cardiac Rhythm Sinus rhythm .

## 2018-03-04 NOTE — ACP (ADVANCE CARE PLANNING)
Patient has designated her son to participate in his/her discharge plan and to receive any needed information.      Name: Zacheryin Trevor  Address:  Phone number: [phone]

## 2018-03-04 NOTE — PROGRESS NOTES
Progress Note      Patient: Sabrina Salcedo               Sex: female          DOA: 3/3/2018       YOB: 1940      Age:  65 y.o.        LOS:  LOS: 1 day             CHIEF COMPLAINT:    Subjective:     She feels better today and denies CP, SOB, palpitations, or other complaints today. Objective:      Visit Vitals    /84 (BP 1 Location: Left arm, BP Patient Position: Supine)    Pulse 65    Temp 97.7 °F (36.5 °C)    Resp 18    Ht 5' (1.524 m)    Wt 173 lb (78.5 kg)    SpO2 94%    Breastfeeding No    BMI 33.79 kg/m2       Physical Exam:  GEN: AAO X 3, NAD  CVS: Normal S1S2, RRR  RESP: CTAB  ABD: Soft, NT/ND, +BS  EXT: No C/C/E  NEURO: CN 2 - 12 intact with no focal deficits noted.         Lab/Data Reviewed:  CMP:   Lab Results   Component Value Date/Time     03/04/2018 04:02 AM    K 4.0 03/04/2018 04:02 AM     03/04/2018 04:02 AM    CO2 26 03/04/2018 04:02 AM    AGAP 7 03/04/2018 04:02 AM     (H) 03/04/2018 04:02 AM    BUN 18 03/04/2018 04:02 AM    CREA 0.81 03/04/2018 04:02 AM    GFRAA >60 03/04/2018 04:02 AM    GFRNA >60 03/04/2018 04:02 AM    CA 7.7 (L) 03/04/2018 04:02 AM    MG 1.1 (L) 03/04/2018 04:02 AM     CBC:   Lab Results   Component Value Date/Time    WBC 7.4 03/04/2018 04:02 AM    HGB 10.4 (L) 03/04/2018 04:02 AM    HCT 32.7 (L) 03/04/2018 04:02 AM     03/04/2018 04:02 AM           Assessment/Plan     Principal Problem:    Atrial fibrillation with rapid ventricular response ([address] (3/3/2018)    Active Problems:    Obesity (2/24/2014)      Type 2 diabetes mellitus with nephropathy ([address] (1/23/2018)      Hypertension (3/3/2018)      Atrial fibrillation with RVR ([address] (3/4/2018)        Plan:  New onset Atrial fib RVR  Rate now controlled and in SR  Check TSH (ordered)  Echo ordered and pending  Cardiac enzymes negative and continue to trend  ANDREA score 3 (Age, HTN, and DM) and thus she needs anticoagulation  Continue Lovenox 1 mg/kg SC BID for now  [facility] cardiology regarding appropriate anticoagulation prior to discharge and I discussed with   Dr. Vadim Scott    Hypothyroidism  Consult Synthroid and will check TSH especially in light of Atrial fib above    Chest pain  Resolved  Cardiac enzymes negative  Echo ordered and pending  Continue tele monitoring    Hypertension  Controlled  Continue current meds and close BP monitoring    Hyperlipidemia  Continue Simvastatin 20 mg daily    DM 2  HBA1C 6.7 this admission  Glucose controlled  Continue correctional insulin and close blood glucose monitoring    DVT prophylaxis  Lovenox    GI prophylaxis  Protonix      Mihaela Sánchez DO, MPH  Internal Medicine

## 2018-03-04 NOTE — PROGRESS NOTES
Problem: Falls - Risk of  Goal: *Absence of Falls  Document Khris Fall Risk and appropriate interventions in the flowsheet.    Outcome: Progressing Towards Goal  Fall Risk Interventions:  Mobility Interventions: Patient to call before getting OOB              Elimination Interventions: Patient to call for help with toileting needs, Toileting schedule/hourly rounds

## 2018-03-04 NOTE — PROGRESS NOTES
Care Management Interventions  PCP Verified by CM: Yes  Mode of Transport at Discharge: Self  Current Support Network: Relative's Home  Confirm Follow Up Transport: Friends  Plan discussed with Pt/Family/Caregiver:  Yes

## 2018-03-04 NOTE — PROGRESS NOTES
[facility]   Discharge Planning/ Assessment    Reasons for Intervention: Interviewed patient. Verified demographics listed on face sheet with patient; all information correct. Patient stated their PCP is Dr. Sury Riley last seen about a month ago . Patient lives with her son, Gustabo Fraire. . Patient independent with ADLs prior to admission. She ambulated with a cane prior to admission after a hip fracture last fall. She has no concerns about returning home. They do not have a car but she does have a friend who gives her rides to appointments and will drive her home. Bart Cartwright Discharge plan is home.        High Risk Criteria  [x] Yes  []No   Physician Referral  [] Yes  [x]No        Date    Nursing Referral  [] Yes  [x]No        Date    Patient/Family Request  [] Yes  [x]No        Date       Resources:    Medicare  [x] Yes  []No   Medicaid  [x] Yes  []No   No Resources  [] Yes  [x]No   Private Insurance  [] Yes  [x]No    Name/Phone Number    Other  [] Yes  [x]No        (i.e. Workman's Comp)         Prior Services:    Prior Services  [] Yes  [x]No   Home Health  [] Yes  [x]No   [address] [place]  [] Yes  [x]No        Number of [address]  [] Yes  [x]No       Meals on Wheels  [] Yes  [x]No   Office on Aging  [] Yes  [x]No   Transportation Services  [] Yes  [x]No   Nursing Home  [] Yes  [x]No        Nursing Home Name    [address]  [] Yes  [x]No        [address] Name    Other       Information Source:      Information obtained from  [x] Patient  [] Parent   [] [address]   [] Child  [] Spouse   [] Significant Other/Partner   [] Friend      [] EMS    [] Nursing Home Chart          [] Other:   Chart Review  [x] Yes  []No     Family/Support System:    Patient lives with  [] Alone    [] Spouse   [] Significant Other  [x] Children  [] Caretaker   [] Parent  [] Sibling     [] Other       Other Support System:    Is the patient responsible for care of others  [] Yes  [x]No   Information of person caring for patient on  discharge    Managers financial affairs independently  [x] Yes  []No   If no, explain:      Status Prior to Admission:    Mental Status  [x] Awake  [x] Alert  [x] Oriented  [] Quiet/Calm [] Lethargic/Sedated   [] Disoriented  [] Restless/Anxious  [] Combative   Personal Care  [] Dependent  [x] [address]  [] Requires Assistance   Meal Preparation Ability  [x] Independent   [] Standby Assistance   [] Minimal Assistance   [] Moderate Assistance  [] Maximum Assistance     [] Total Assistance   Chores  [] Independent with Chores   [] N/A Nursing Home Resident   [x] Requires Assistance   Bowel/Bladder  [x] Continent  [] Catheter  [] Incontinent  [] Ostomy Self-Care    [] Urine Diversion Self-Care  [] Maximum Assistance     [] Total Assistance   Number of Persons needed for assistance    DME at home  [] Fernanda Dean  [x] Nguyen Dean   [] Commode    [] Bathroom/Grab Bars  [] Hospital Bed  [] Nebulizer  [] Oxygen           [] Raised Toilet Seat  [] Shower Chair  [] Side Rails for Bed   [] Tub Transfer Bench   [] Del Sons  [] Sanford Lazaro, Standard      [] Other:   Vendor      Treatment Presently Receiving:    Current Treatments  [] Chemotherapy  [] Dialysis  [] Insulin  [] IVAB [] IVF   [] O2  [] PCA   [] PT   [] RT   [] Tube Feedings   [] Wound Care     Psychosocial Evaluation:    Verbalized Knowledge of Disease Process  [x] Patient  []Family   Coping with Disease Process  [x] Patient  []Family   Requires Further Counseling Coping with Disease Process  [] Patient  []Family     Identified Projected Needs:    Home Health Aid  [] Yes  [x]No   Transportation  [] Yes  [x]No   Education  [] Yes  [x]No        Specific Education     Financial Counseling  [] Yes  [x]No   Inability to Care for Self/Will Require 24 hour care  [] Yes  [x]No   Pain Management  [] Yes  [x]No   Home Infusion Therapy  [] Yes  [x]No   Oxygen Therapy  [] Yes  [x]No   DME  [] Yes [x]No   Long Term Care Placement  [] Yes  [x]No   Rehab  [] Yes  [x]No   Physical Therapy  [] Yes  [x]No   Needs Anticipated At This Time  [] Yes  [x]No     Intra-Hospital Referral:    [address] [facility]  [] Yes  [x]No     [] Yes  [x]No   Patient Representative  [] Yes  [x]No   Staff for Teaching Needs  [] Yes  [x]No   Specialty Teaching Needs     Diabetic Educator  [] Yes  [x]No   Referral for Diabetic Educator Needed  [] Yes  [x]No  If Yes, place order for Nutritionist or Diabetic Consult     Tentative Discharge Plan:    Home with No Services  [x] Yes  []No   Home with [address]  [] Yes  [x]No        If Yes, specify type    Home Care Program  [] Yes  [x]No        If Yes, specify type    Meals on Wheels  [] Yes  [x]No   Office of Aging  [] Yes  [x]No   NHP  [] Yes  [x]No   Return to the Nursing Home  [] Yes  [x]No   Rehab Therapy  [] Yes  [x]No   Acute Rehab  [] Yes  [x]No   Subacute Rehab  [] Yes  [x]No   Private Care  [] Yes  [x]No   Substance Abuse Referral  [] Yes  [x]No   Transportation  [] Yes  [x]No   Chore Service  [] Yes  [x]No   Inpatient Hospice  [] Yes  [x]No   OP RT  [] Yes  [x] No   OP Hemo  [] Yes  [x] No   OP PT  [] Yes  [x]No   Support Group  [] Yes  [x]No   Reach to Recovery  [] Yes  [x]No   OP Oncology Clinic  [] Yes  [x]No   Clinic Appointment  [] Yes  [x]No   DME  [] Yes  [x]No   Comments    Name of D/C Planner or  Given to Patient or Family Clara Antunez RN BSN   Phone Number         Extension    Date 3/4/18   Time 0815   If you are discharged home, whom do you designate to participate in your discharge plan and receive any information needed?      Enter name of designee         Phone # of designee         Address of designee         Updated         Patient refused to designate any           individual

## 2018-03-04 NOTE — PROGRESS NOTES
Bedside and Verbal shift change report given to AL (oncoming nurse) by Lucie Agosto (offgoing nurse). Report included the following information SBAR, Kardex and MAR. Pt is AOx4 with no complaints of chest pain or SOB. She is on room air and is scheduled for an echo this am. Pt has no complaints at this time.

## 2018-03-04 NOTE — PROGRESS NOTES
Problem: Discharge Planning  Goal: *Discharge to safe environment  Outcome: Progressing Towards Goal  Discharge plan is home.

## 2018-03-05 ENCOUNTER — HOME HEALTH ADMISSION (OUTPATIENT)
Dept: HOME HEALTH SERVICES | Facility: HOME HEALTH | Age: 78
End: 2018-03-05

## 2018-03-05 VITALS
HEIGHT: 60 IN | RESPIRATION RATE: 16 BRPM | HEART RATE: 64 BPM | SYSTOLIC BLOOD PRESSURE: 178 MMHG | BODY MASS INDEX: 33.93 KG/M2 | DIASTOLIC BLOOD PRESSURE: 71 MMHG | OXYGEN SATURATION: 97 % | WEIGHT: 172.84 LBS | TEMPERATURE: 97.5 F

## 2018-03-05 LAB
ALBUMIN SERPL-MCNC: 2.7 G/DL (ref 3.4–5)
ALBUMIN/GLOB SERPL: 0.7 {RATIO} (ref 0.8–1.7)
ALP SERPL-CCNC: 58 U/L (ref 45–117)
ALT SERPL-CCNC: 10 U/L (ref 13–56)
ANION GAP SERPL CALC-SCNC: 9 MMOL/L (ref 3–18)
AST SERPL-CCNC: 13 U/L (ref 15–37)
ATRIAL RATE: 153 BPM
ATRIAL RATE: 62 BPM
BASOPHILS # BLD: 0 K/UL (ref 0–0.06)
BASOPHILS NFR BLD: 0 % (ref 0–2)
BILIRUB SERPL-MCNC: 0.4 MG/DL (ref 0.2–1)
BUN SERPL-MCNC: 10 MG/DL (ref 7–18)
BUN/CREAT SERPL: 16 (ref 12–20)
CALCIUM SERPL-MCNC: 7.9 MG/DL (ref 8.5–10.1)
CALCULATED P AXIS, ECG09: 60 DEGREES
CALCULATED R AXIS, ECG10: 37 DEGREES
CALCULATED R AXIS, ECG10: 45 DEGREES
CALCULATED T AXIS, ECG11: 46 DEGREES
CHLORIDE SERPL-SCNC: 110 MMOL/L (ref 100–108)
CK MB CFR SERPL CALC: NORMAL % (ref 0–4)
CK MB SERPL-MCNC: <1 NG/ML (ref 5–25)
CK SERPL-CCNC: 26 U/L (ref 26–192)
CO2 SERPL-SCNC: 23 MMOL/L (ref 21–32)
CREAT SERPL-MCNC: 0.63 MG/DL (ref 0.6–1.3)
DIAGNOSIS, 93000: NORMAL
DIAGNOSIS, 93000: NORMAL
DIFFERENTIAL METHOD BLD: ABNORMAL
EOSINOPHIL # BLD: 0.2 K/UL (ref 0–0.4)
EOSINOPHIL NFR BLD: 3 % (ref 0–5)
ERYTHROCYTE [DISTWIDTH] IN BLOOD BY AUTOMATED COUNT: 13.7 % (ref 11.6–14.5)
GLOBULIN SER CALC-MCNC: 3.8 G/DL (ref 2–4)
GLUCOSE BLD STRIP.AUTO-MCNC: 104 MG/DL (ref 70–110)
GLUCOSE SERPL-MCNC: 83 MG/DL (ref 74–99)
HCT VFR BLD AUTO: 33.7 % (ref 35–45)
HGB BLD-MCNC: 10.8 G/DL (ref 12–16)
LYMPHOCYTES # BLD: 2.4 K/UL (ref 0.9–3.6)
LYMPHOCYTES NFR BLD: 38 % (ref 21–52)
MCH RBC QN AUTO: 26.8 PG (ref 24–34)
MCHC RBC AUTO-ENTMCNC: 32 G/DL (ref 31–37)
MCV RBC AUTO: 83.6 FL (ref 74–97)
MONOCYTES # BLD: 0.5 K/UL (ref 0.05–1.2)
MONOCYTES NFR BLD: 8 % (ref 3–10)
NEUTS SEG # BLD: 3.2 K/UL (ref 1.8–8)
NEUTS SEG NFR BLD: 51 % (ref 40–73)
P-R INTERVAL, ECG05: 124 MS
P-R INTERVAL, ECG05: 132 MS
PLATELET # BLD AUTO: 235 K/UL (ref 135–420)
PMV BLD AUTO: 9.4 FL (ref 9.2–11.8)
POTASSIUM SERPL-SCNC: 4.1 MMOL/L (ref 3.5–5.5)
PROT SERPL-MCNC: 6.5 G/DL (ref 6.4–8.2)
Q-T INTERVAL, ECG07: 308 MS
Q-T INTERVAL, ECG07: 410 MS
QRS DURATION, ECG06: 138 MS
QRS DURATION, ECG06: 72 MS
QTC CALCULATION (BEZET), ECG08: 416 MS
QTC CALCULATION (BEZET), ECG08: 491 MS
RBC # BLD AUTO: 4.03 M/UL (ref 4.2–5.3)
SODIUM SERPL-SCNC: 142 MMOL/L (ref 136–145)
TROPONIN I SERPL-MCNC: <0.02 NG/ML (ref 0–0.04)
VENTRICULAR RATE, ECG03: 153 BPM
VENTRICULAR RATE, ECG03: 62 BPM
WBC # BLD AUTO: 6.3 K/UL (ref 4.6–13.2)

## 2018-03-05 PROCEDURE — 82962 GLUCOSE BLOOD TEST: CPT

## 2018-03-05 PROCEDURE — 36415 COLL VENOUS BLD VENIPUNCTURE: CPT | Performed by: HOSPITALIST

## 2018-03-05 PROCEDURE — 82553 CREATINE MB FRACTION: CPT | Performed by: HOSPITALIST

## 2018-03-05 PROCEDURE — 74011250637 HC RX REV CODE- 250/637: Performed by: HOSPITALIST

## 2018-03-05 PROCEDURE — 74011250636 HC RX REV CODE- 250/636: Performed by: HOSPITALIST

## 2018-03-05 PROCEDURE — 99218 HC RM OBSERVATION: CPT

## 2018-03-05 PROCEDURE — 96361 HYDRATE IV INFUSION ADD-ON: CPT

## 2018-03-05 PROCEDURE — 85025 COMPLETE CBC W/AUTO DIFF WBC: CPT | Performed by: HOSPITALIST

## 2018-03-05 PROCEDURE — 80053 COMPREHEN METABOLIC PANEL: CPT | Performed by: HOSPITALIST

## 2018-03-05 RX ORDER — METOPROLOL TARTRATE 25 MG/1
12.5 TABLET, FILM COATED ORAL EVERY 12 HOURS
Qty: 30 TAB | Refills: 0 | Status: SHIPPED | OUTPATIENT
Start: 2018-03-05 | End: 2018-03-27 | Stop reason: SDUPTHER

## 2018-03-05 RX ADMIN — METOPROLOL TARTRATE 12.5 MG: 25 TABLET ORAL at 09:15

## 2018-03-05 RX ADMIN — PANTOPRAZOLE SODIUM 40 MG: 40 TABLET, DELAYED RELEASE ORAL at 09:15

## 2018-03-05 RX ADMIN — LOSARTAN POTASSIUM 50 MG: 50 TABLET ORAL at 09:15

## 2018-03-05 RX ADMIN — DEXTROSE MONOHYDRATE, SODIUM CHLORIDE, AND POTASSIUM CHLORIDE 125 ML/HR: 50; 4.5; 1.49 INJECTION, SOLUTION INTRAVENOUS at 04:17

## 2018-03-05 RX ADMIN — LEVOTHYROXINE SODIUM 75 MCG: 75 TABLET ORAL at 06:44

## 2018-03-05 NOTE — PROGRESS NOTES
Pt is agreeable for a [facility] visit. Referral sent to intake via [facility] and called to the [address] [place] [address] for f/u. Care Management Interventions  PCP Verified by CM:  Yes  Mode of Transport at Discharge: Self  Transition of Care Consult (CM Consult): [address]: Yes  Current Support Network: Relative's Home  Confirm Follow Up Transport: Friends  Plan discussed with Pt/Family/Caregiver: Yes  Freedom of Choice Offered: Yes  Discharge Location  Discharge Placement: Home with home health

## 2018-03-05 NOTE — DISCHARGE SUMMARY
Discharge Summary    Patient: Eugenie Dutton               Sex: female          DOA: 3/3/2018         YOB: 1940      Age:  65 y.o.        LOS:  LOS: 1 day                Admit Date: 3/3/2018    Discharge Date: 3/5/2018    Discharge Medications:     Discharge Medication List as of 3/5/2018 12:14 PM      START taking these medications    Details   apixaban (ELIQUIS) 5 mg tablet Take 1 Tab by mouth two (2) times a day., Print, Disp-60 Tab, R-0      metoprolol tartrate (LOPRESSOR) 25 mg tablet Take 0.5 Tabs by mouth every twelve (12) hours. , Print, Disp-30 Tab, R-0         CONTINUE these medications which have NOT CHANGED    Details   simvastatin (ZOCOR) 20 mg tablet TAKE 1 TABLET BY MOUTH EVERY NIGHT, Normal**Patient requests 90 days supply**Disp-90 Tab, R-0      levothyroxine (SYNTHROID) 75 mcg tablet TAKE 1 TABLET BY MOUTH DAILY BEFORE BREAKFAST, Normal**Patient requests 90 days supply**Disp-90 Tab, R-0      triamcinolone acetonide (KENALOG) 0.1 % topical cream Apply  to affected area two (2) times a day. use thin layer, Normal, Disp-15 g, R-0      metFORMIN (GLUCOPHAGE) 1,000 mg tablet Take 1 Tab by mouth two (2) times daily (with meals). , Normal, Disp-60 Tab, R-0      pantoprazole (PROTONIX) 40 mg tablet Take 1 Tab by mouth daily. , Normal, Disp-30 Tab, R-0      losartan (COZAAR) 50 mg tablet Take 1 Tab by mouth daily. , Normal**Patient requests 90 days supply**Disp-90 Tab, R-2      glipiZIDE (GLUCOTROL) 5 mg tablet TAKE 1 TABLET BY MOUTH TWICE DAILY, Normal**Patient requests 90 days supply**Disp-180 Tab, R-3      oxyCODONE-acetaminophen (PERCOCET) 5-325 mg per tablet Take 1-2 Tabs by mouth every four (4) hours as needed. Max Daily Amount: 12 Tabs., Print, Disp-60 Tab, R-0      cholecalciferol (VITAMIN D3) 1,000 unit tablet Take 2 Tabs by mouth daily. , Print, Disp-60 Tab, R-0      docusate sodium (COLACE) 100 mg capsule Take 1 Cap by mouth two (2) times a day.  Indications: take while on narcotics for pain, Normal, Disp-60 Cap, R-2         STOP taking these medications       hydroCHLOROthiazide (HYDRODIURIL) 25 mg tablet Comments:   Reason for Stopping: Follow-up: PCP, cardiology    Discharge Condition: Stable    Activity: Activity as tolerated    Diet: Resume previous diet    Labs:  Labs: Results:       Chemistry Recent Labs      03/05/18 0445  03/04/18   0402 03/03/18   1535   GLU  83  120*  93   NA  142  141  139   K  4.1  4.0  4.1   CL  110*  108  105   CO2  23  26  25   BUN  10  18  17   CREA  0.63  0.81  0.96   CA  7.9*  7.7*  9.0   AGAP  9  7  9   BUCR  16  22*  18   AP  58   --    --    TP  6.5   --    --    ALB  2.7*   --    --    GLOB  3.8   --    --    AGRAT  0.7*   --    --       CBC w/Diff Recent Labs      03/05/18 0445 03/04/18   0402 03/03/18   1535   WBC  6.3  7.4  10.1   RBC  4.03*  3.92*  4.98   HGB  10.8*  10.4*  13.5   HCT  33.7*  32.7*  41.2   PLT  235  239  282   GRANS  51  49  70   LYMPH  38  40  22   EOS  3  2  2      Cardiac Enzymes Recent Labs      03/05/18 0445  03/04/18   2117   CPK  26  25*   CKND1  CALCULATION NOT PERFORMED WHEN RESULT IS BELOW LINEAR LIMIT  CALCULATION NOT PERFORMED WHEN RESULT IS BELOW LINEAR LIMIT      Coagulation Recent Labs      03/03/18   1535   PTP  11.8   INR  0.9   APTT  26.7       Lipid Panel Lab Results   Component Value Date/Time    Cholesterol, total 146 04/20/2017 11:23 AM    HDL Cholesterol 57 04/20/2017 11:23 AM    LDL, calculated 68 04/20/2017 11:23 AM    VLDL, calculated 21 04/20/2017 11:23 AM    Triglyceride 105 04/20/2017 11:23 AM    CHOL/HDL Ratio 2.6 04/20/2017 11:23 AM      BNP No results for input(s): BNPP in the last 72 hours.    Liver Enzymes Recent Labs      03/05/18   0445   TP  6.5   ALB  2.7*   AP  58   SGOT  13*      Thyroid Studies Lab Results   Component Value Date/Time    TSH 1.28 03/04/2018 03:28 PM          Imaging:      cxray - no abnormality      Consults: Cardiology    Treatment Team: Treatment Team: Consulting Provider: Geo Dee MD; Utilization Review: Serena Gonzalez, RN; Consulting Provider: Damari Gomez MD; Utilization Review: Lexx Vasquez, RN; Care Manager: Tamara Bernard, RN; Care Manager: Jennyfer Rivera RN    Significant Diagnostic Studies: labs:   Recent Results (from the past 24 hour(s))   TSH 3RD GENERATION    Collection Time: 03/04/18  3:28 PM   Result Value Ref Range    TSH 1.28 0.36 - 3.74 uIU/mL   CARDIAC PANEL,(CK, CKMB & TROPONIN)    Collection Time: 03/04/18  3:28 PM   Result Value Ref Range    CK 25 (L) 26 - 192 U/L    CK - MB <1.0 <3.6 ng/ml    CK-MB Index  0.0 - 4.0 %     CALCULATION NOT PERFORMED WHEN RESULT IS BELOW LINEAR LIMIT    Troponin-I, Qt. <0.02 0.0 - 0.045 NG/ML   GLUCOSE, POC    Collection Time: 03/04/18  3:54 PM   Result Value Ref Range    Glucose (POC) 123 (H) 70 - 110 mg/dL   GLUCOSE, POC    Collection Time: 03/04/18  7:41 PM   Result Value Ref Range    Glucose (POC) 185 (H) 70 - 110 mg/dL   CARDIAC PANEL,(CK, CKMB & TROPONIN)    Collection Time: 03/04/18  9:17 PM   Result Value Ref Range    CK 25 (L) 26 - 192 U/L    CK - MB <1.0 <3.6 ng/ml    CK-MB Index  0.0 - 4.0 %     CALCULATION NOT PERFORMED WHEN RESULT IS BELOW LINEAR LIMIT    Troponin-I, Qt. <0.02 0.0 - 0.045 NG/ML   CBC WITH AUTOMATED DIFF    Collection Time: 03/05/18  4:45 AM   Result Value Ref Range    WBC 6.3 4.6 - 13.2 K/uL    RBC 4.03 (L) 4.20 - 5.30 M/uL    HGB 10.8 (L) 12.0 - 16.0 g/dL    HCT 33.7 (L) 35.0 - 45.0 %    MCV 83.6 74.0 - 97.0 FL    MCH 26.8 24.0 - 34.0 PG    MCHC 32.0 31.0 - 37.0 g/dL    RDW 13.7 11.6 - 14.5 %    PLATELET 359 791 - 969 K/uL    MPV 9.4 9.2 - 11.8 FL    NEUTROPHILS 51 40 - 73 %    LYMPHOCYTES 38 21 - 52 %    MONOCYTES 8 3 - 10 %    EOSINOPHILS 3 0 - 5 %    BASOPHILS 0 0 - 2 %    ABS. NEUTROPHILS 3.2 1.8 - 8.0 K/UL    ABS. LYMPHOCYTES 2.4 0.9 - 3.6 K/UL    ABS. MONOCYTES 0.5 0.05 - 1.2 K/UL    ABS. EOSINOPHILS 0.2 0.0 - 0.4 K/UL    ABS.  BASOPHILS 0.0 0.0 - 0.06 K/UL    DF AUTOMATED     METABOLIC PANEL, COMPREHENSIVE    Collection Time: 03/05/18  4:45 AM   Result Value Ref Range    Sodium 142 136 - 145 mmol/L    Potassium 4.1 3.5 - 5.5 mmol/L    Chloride 110 (H) 100 - 108 mmol/L    CO2 23 21 - 32 mmol/L    Anion gap 9 3.0 - 18 mmol/L    Glucose 83 74 - 99 mg/dL    BUN 10 7.0 - 18 MG/DL    Creatinine 0.63 0.6 - 1.3 MG/DL    BUN/Creatinine ratio 16 12 - 20      GFR est AA >60 >60 ml/min/1.73m2    GFR est non-AA >60 >60 ml/min/1.73m2    Calcium 7.9 (L) 8.5 - 10.1 MG/DL    Bilirubin, total 0.4 0.2 - 1.0 MG/DL    ALT (SGPT) 10 (L) 13 - 56 U/L    AST (SGOT) 13 (L) 15 - 37 U/L    Alk.  phosphatase 58 45 - 117 U/L    Protein, total 6.5 6.4 - 8.2 g/dL    Albumin 2.7 (L) 3.4 - 5.0 g/dL    Globulin 3.8 2.0 - 4.0 g/dL    A-G Ratio 0.7 (L) 0.8 - 1.7     CARDIAC PANEL,(CK, CKMB & TROPONIN)    Collection Time: 03/05/18  4:45 AM   Result Value Ref Range    CK 26 26 - 192 U/L    CK - MB <1.0 <3.6 ng/ml    CK-MB Index  0.0 - 4.0 %     CALCULATION NOT PERFORMED WHEN RESULT IS BELOW LINEAR LIMIT    Troponin-I, Qt. <0.02 0.0 - 0.045 NG/ML   GLUCOSE, POC    Collection Time: 03/05/18  7:27 AM   Result Value Ref Range    Glucose (POC) 104 70 - 110 mg/dL         Discharge diagnoses:    Problem List as of 3/5/2018  Date Reviewed: 10/10/2017          Codes Class Noted - Resolved    Atrial fibrillation with RVR ([facility] ICD-10-CM: I48.91  ICD-9-CM: 427.31  3/4/2018 - Present        * (Principal)Atrial fibrillation with rapid ventricular response (HCC) ICD-10-CM: I48.91  ICD-9-CM: 427.31  3/3/2018 - Present        Hypertension ICD-10-CM: I10  ICD-9-CM: 401.9  3/3/2018 - Present        Type 2 diabetes mellitus with nephropathy ([facility] ICD-10-CM: E11.21  ICD-9-CM: 250.40, 583.81  1/23/2018 - Present        Hip fracture ([facility] ICD-10-CM: V21.232Y  ICD-9-CM: 820.8  10/9/2017 - Present        Meningioma ([facility] ICD-10-CM: D32.9  ICD-9-CM: 225.2  7/25/2017 - Present        Hypothyroidism due to acquired atrophy of thyroid ICD-10-CM: E03.4  ICD-9-CM: 244.8, 246.8  6/13/2017 - Present        Episcleritis of right eye ICD-10-CM: H15.101  ICD-9-CM: 379.00  5/17/2017 - Present        Pancreatitis ICD-10-CM: K85.90  ICD-9-CM: 577.0  11/7/2016 - Present        Acute pancreatitis ICD-10-CM: K85.90  ICD-9-CM: 692.2  11/7/2016 - Present        Diverticulitis ICD-10-CM: K57.92  ICD-9-CM: 562.11  11/7/2016 - Present        Hypothyroid ICD-10-CM: E03.9  ICD-9-CM: 244.9  2/24/2014 - Present        HTN (hypertension) ICD-10-CM: I10  ICD-9-CM: 401.9  2/24/2014 - Present        Obesity ICD-10-CM: E66.9  ICD-9-CM: 278.00  2/24/2014 - Present        DM (diabetes mellitus) ([facility] ICD-10-CM: E11.9  ICD-9-CM: 250.00  2/24/2014 - Present        Family hx-breast malignancy ICD-10-CM: Z80.3  ICD-9-CM: V16.3  2/24/2014 - Present        RESOLVED: Atrial fibrillation ([facility] ICD-10-CM: I48.91  ICD-9-CM: 427.31  3/3/2018 - 3/3/2018            Hospital Course:  Major issues addressed during hospitalization outlined  below. Sabrina Salcedo is a 65 y.o. female who presented to the ER because she was feeling badly. She had racing in her chest and palpitations. She had very minimal and fleeting chest pain. She had shortness of breath but did not have cough. She did not have fever. After several hours of feeling badly she had a friend bring her to the ER. In the ER she was diagnosed with A fib with RVR. She was given multiple doses of Metoprolol and she ultimately converted back into NSR. She will be admitted for ongoing management. Patient was see by cardiology , given multiple metoprolol doses in ED and continued on low dose metoprolol . Discussed with patient need for anticoagulation and she agreed to eliquis. This was prescribed at ID. Patient did convert to sinus while in house. PCP and cardiology f/u at ID recommended.     Higinio Hager MD  March 5, 2018        Total time spent 35 minutes

## 2018-03-05 NOTE — PROGRESS NOTES
0730 Report received from [address] [place] [address] 56. Patient received in bed awake and alert x 4 and is able to make all her needs known at this time. Patient is continent of bowel and bladder and uses the toilet to void. Patient is up at vinny. Patient uses zero devices to ambulate. Patient noted on room air, noted with clear lung sounds. Patient is noted with skin intact. Plans are to discharge home when medically stable. Patient currently c/o zero pain and is noted with zero s/s of distress. Patient came in c/o chest pain and palpitations, patient has a hx of DM, HTN, Hyper cholesterol. Patient noted with A-fib with RVR, patient was converted chemically and is currently running NSR. Patient is currently being treated with cardiac medications. BP noted at 178/71, medication provided. Patient was seen by MD Nav Hauser today with plans to discharge home. Patient has reviewed all medications and understands to follow up with PCP within 1 week of discharge. Family provided transportation. Patient discharges in stable condition.

## 2018-03-05 NOTE — ROUTINE PROCESS
Assumed care of pt. Pt resting in bed, watching tv. On room air. AxOx4. No c/o pain. NAD. Call light within reach. Will continue to monitor. 2033- Due meds given. Shift assessment complete. 2255- pt resting in bed    0405- reassessment complete. No changes noted. Bedside shift change report given to appAttach Edilia RN (oncoming nurse) by Nikhil Goldman RN (offgoing nurse). Report included the following information SBAR, Kardex, Intake/Output, MAR, Accordion and Cardiac Rhythm Normal Sinus Rhythm.

## 2018-03-05 NOTE — PROGRESS NOTES
Bedside and Verbal shift change report given to Sayda Tapia (oncoming nurse) by Patton Essex (offgoing nurse). Report included the following information SBAR, Kardex and MAR.

## 2018-03-05 NOTE — CONSULTS
[facility] - Consult Note    Consultation request by Dr. Markell Martinez for advice/opinion related to evaluating atrial fibrillation    Date of  Admission: 3/3/2018  3:28 PM   Primary Care Physician:  Bhumika Yi MD      I saw, evaluated, interviewed and examined the patient personally. I agree with the findings and plan of care as documented below with PA-C note  Admitted with URT symptoms and then chest tightness in setting of likely a.flutter with 2:1 block  Resolved  HTN, DM, Age and female for CHADS2 VASC score  Discussed about a.fib and stroke prophylaxis. ASA vs. Anticoagulation ( with coumadin / newer agent ). RIsk, benefit, side effects of medications and alternatives were discussed with patient regarding each medications. SHe would like to consider newer agent. Continue Low dose BB  Can consider ablation in future if recurrent episode  No further cardiac work up planned at this time unless clinical status changes. Call us back if needed. Will be available as needed. Will need to follow up in cardiology clinic in 2-3 weeks after discharge. Thank you. Bib Ospina MD         Assessment:     -Atrial flutter with 2:1 conduction, converted to NSR s/p 5 mg Lopressor IV x 3, CHADS-VASC score of 5 (+ HTN, + DM, +2 Age, + Female)  -Echo 3/4/2018: EF 60%, no obvious WMA, grade 2 diastolic dysfunction, mildly dilated RV with normal systolic function, mildly dilated LA, mild to moderate aortic stenosis with trivial aortic insufficiency and valve mean gradient 21 mmHg, mild tricuspid regurgitation, dilated IVC  -HTN  -DM, A1c 6.7 on 3/3/18  -HLD, on Zocor  -Hypothyroidism, TSH 1.28 on 3/4/18, on Synthroid     Plan:     Pt with new onset atrial flutter, resolved in ED s/p IV Lopressor x 3. Pt had mechanical fall resulting in hip Fx in 10/2017 but states that she has been doing well in regards to ambulation without falls since then.   She uses a 4-point cane when ambulating throughout the hose and a walker when she goes out. She does not have any known bleeding problems. Agree with starting NOAC with Eliquis given elevated CHADS-VASC score. Pt can follow-up as outpatient in 1-2 weeks. History of Present Illness: This is a 65 y.o. female admitted for Atrial fibrillation ([facility]  Atrial fibrillation with rapid ventricular response (HCC)  Atrial fibrillation with rapid ventricular response (HCC)  Atrial fibrillation with RVR ([facility] Patient complains of:  Palpitations    Pt is a 66 yo F with Hx HTN, DM, HLD who presented to the ED 2 days ago c/o chest tightness onset 2 days ago. Pt states that she was not feeling well that day and went to visit her sister, who was recently discharged to rehab. She noted some chest tightness and decided to go to the ER for evaluation. She was found to be in tachycardic rhythm, ? 2:1 atrial flutter, that resolved s/p IV Metoprolol x 3. She denies any associated palpitations. She denies recurrence of symptoms. Pt states that she had not been feeling well over the past week with nasal congestion and productive cough of clear sputum. She also noted a low-grade temperature of 100.2F the day prior to presentation, which resolved with Aleve. Cardiac risk factors: family history, dyslipidemia, diabetes mellitus, hypertension, post-menopausal      Review of Symptoms:  Except as stated above include:  Constitutional:  Low-grade temperature of 100.2, resolved with Aleve.   ENT: + nasal congestion  Respiratory:  + productive cough  Cardiovascular:  + chest tightness  Gastrointestinal: negative  Genitourinary:  negative  Musculoskeletal:  No leg swelling  Neurological:  No syncope  Dermatological:  Negative  Endocrinological: Negative  Psychological:  Negative       Past Medical History:     Past Medical History:   Diagnosis Date    Arthritis     Bronchitis     Diabetes ([facility]     Diverticulitis     GERD (gastroesophageal reflux disease)     Hypercholesterolemia  Hypertension     Hypothyroid     Pancreatitis          Social History:     Social History     Social History    Marital status: SINGLE     Spouse name: N/A    Number of children: N/A    Years of education: N/A     Social History Main Topics    Smoking status: Never Smoker    Smokeless tobacco: Never Used    Alcohol use No    Drug use: No    Sexual activity: Not Asked     Other Topics Concern    None     Social History Narrative        Family History:     Family History   Problem Relation Age of Onset    Diabetes Mother     Heart Disease Mother     Cancer Father      melanoma    Stroke Sister     Hypertension Sister     Diabetes Sister     Hypertension Brother     Diabetes Brother     Breast Cancer Paternal Grandmother      older, but not sure age.  Breast Cancer Paternal Aunt      and gyn cancer ?age        Medications:      Allergies   Allergen Reactions    Ampicillin Itching        Current Facility-Administered Medications   Medication Dose Route Frequency    metoprolol tartrate (LOPRESSOR) tablet 12.5 mg  12.5 mg Oral Q12H    levothyroxine (SYNTHROID) tablet 75 mcg  75 mcg Oral 6am    losartan (COZAAR) tablet 50 mg  50 mg Oral DAILY    pantoprazole (PROTONIX) tablet 40 mg  40 mg Oral ACB    simvastatin (ZOCOR) tablet 20 mg  20 mg Oral DAILY    dextrose 5% - 0.45% NaCl with KCl 20 mEq/L infusion  125 mL/hr IntraVENous CONTINUOUS    insulin lispro (HUMALOG) injection   SubCUTAneous AC&HS    glucose chewable tablet 16 g  4 Tab Oral PRN    glucagon (GLUCAGEN) injection 1 mg  1 mg IntraMUSCular PRN    dextrose (D50W) injection syrg 12.5-25 g  25-50 mL IntraVENous PRN    enoxaparin (LOVENOX) injection 120 mg  120 mg SubCUTAneous Q24H         Physical Exam:     Visit Vitals    /81 (BP 1 Location: Right arm, BP Patient Position: At rest)    Pulse 62    Temp 97.8 °F (36.6 °C)    Resp 18    Ht 5' (1.524 m)    Wt 172 lb 13.5 oz (78.4 kg)    SpO2 96%    Breastfeeding No  BMI 33.76 kg/m2     BP Readings from Last 3 Encounters:   03/05/18 159/81   01/23/18 131/60   11/20/17 167/76     Pulse Readings from Last 3 Encounters:   03/05/18 62   01/23/18 82   11/20/17 74     Wt Readings from Last 3 Encounters:   03/05/18 172 lb 13.5 oz (78.4 kg)   01/23/18 170 lb (77.1 kg)   11/14/17 183 lb 11.2 oz (83.3 kg)       General:  alert, cooperative, no distress, appears stated age  Neck:  No JVD  Lungs:  clear to auscultation bilaterally  Heart:  Regular rate and rhythm  Abdomen:  abdomen is soft without significant tenderness, masses, organomegaly or guarding  Extremities:  no edema  Skin: Warm and dry. Neuro: alert, oriented x3, affect appropriate, no focal neurological deficits, moves all extremities well, no involuntary movements  Psych: non focal     Data Review:     Recent Labs      03/05/18   0445  03/04/18   0402  03/03/18   1535   WBC  6.3  7.4  10.1   HGB  10.8*  10.4*  13.5   HCT  33.7*  32.7*  41.2   PLT  235  239  282     Recent Labs      03/05/18   0445  03/04/18   0402  03/03/18   1535   NA  142  141  139   K  4.1  4.0  4.1   CL  110*  108  105   CO2  23  26  25   GLU  83  120*  93   BUN  10  18  17   CREA  0.63  0.81  0.96   CA  7.9*  7.7*  9.0   MG   --   1.1*  1.0*   ALB  2.7*   --    --    SGOT  13*   --    --    ALT  10*   --    --    INR   --    --   0.9       Results for orders placed or performed during the hospital encounter of 03/03/18   EKG, 12 LEAD, INITIAL   Result Value Ref Range    Ventricular Rate 62 BPM    Atrial Rate 62 BPM    P-R Interval 124 ms    QRS Duration 72 ms    Q-T Interval 410 ms    QTC Calculation (Bezet) 416 ms    Calculated P Axis 60 degrees    Calculated R Axis 45 degrees    Calculated T Axis 46 degrees    Diagnosis       Normal sinus rhythm  Normal ECG  When compared with ECG of 03-MAR-2018 15:25,  Vent.  rate has decreased BY  91 BPM  Right bundle branch block is no longer present         All Cardiac Markers in the last 24 hours:    Lab Results   Component Value Date/Time    CPK 26 03/05/2018 04:45 AM    CPK 25 (L) 03/04/2018 09:17 PM    CPK 25 (L) 03/04/2018 03:28 PM    CPK 28 03/04/2018 09:57 AM    CKMB <1.0 03/05/2018 04:45 AM    CKMB <1.0 03/04/2018 09:17 PM    CKMB <1.0 03/04/2018 03:28 PM    CKMB <1.0 03/04/2018 09:57 AM    CKND1  03/05/2018 04:45 AM     CALCULATION NOT PERFORMED WHEN RESULT IS BELOW LINEAR LIMIT    CKND1  03/04/2018 09:17 PM     CALCULATION NOT PERFORMED WHEN RESULT IS BELOW LINEAR LIMIT    CKND1  03/04/2018 03:28 PM     CALCULATION NOT PERFORMED WHEN RESULT IS BELOW LINEAR LIMIT    CKND1  03/04/2018 09:57 AM     CALCULATION NOT PERFORMED WHEN RESULT IS BELOW LINEAR LIMIT    TROIQ <0.02 03/05/2018 04:45 AM    TROIQ <0.02 03/04/2018 09:17 PM    TROIQ <0.02 03/04/2018 03:28 PM    TROIQ <0.02 03/04/2018 09:57 AM       Last Lipid:    Lab Results   Component Value Date/Time    Cholesterol, total 146 04/20/2017 11:23 AM    HDL Cholesterol 57 04/20/2017 11:23 AM    LDL, calculated 68 04/20/2017 11:23 AM    Triglyceride 105 04/20/2017 11:23 AM    CHOL/HDL Ratio 2.6 04/20/2017 11:23 AM       Signed By: Christa Egan PA-C     March 5, 2018

## 2018-03-05 NOTE — DISCHARGE INSTRUCTIONS
Patient armband removed and shredded    DISCHARGE SUMMARY from Nurse    PATIENT INSTRUCTIONS:    After general anesthesia or intravenous sedation, for 24 hours or while taking prescription Narcotics:  · Limit your activities  · Do not drive and operate hazardous machinery  · Do not make important personal or business decisions  · Do  not drink alcoholic beverages  · If you have not urinated within 8 hours after discharge, please contact your surgeon on call. Report the following to your surgeon:  · Excessive pain, swelling, redness or odor of or around the surgical area  · Temperature over 100.5  · Nausea and vomiting lasting longer than 4 hours or if unable to take medications  · Any signs of decreased circulation or nerve impairment to extremity: change in color, persistent  numbness, tingling, coldness or increase pain  · Any questions    What to do at Home:  Recommended activity: Activity as tolerated,     If you experience any of the following symptoms shortness of breath or chest pain, please follow up with 911. *  Please give a list of your current medications to your Primary Care Provider. *  Please update this list whenever your medications are discontinued, doses are      changed, or new medications (including over-the-counter products) are added. *  Please carry medication information at all times in case of emergency situations. These are general instructions for a healthy lifestyle:    No smoking/ No tobacco products/ Avoid exposure to second hand smoke  Surgeon General's Warning:  Quitting smoking now greatly reduces serious risk to your health.     Obesity, smoking, and sedentary lifestyle greatly increases your risk for illness    A healthy diet, regular physical exercise & weight monitoring are important for maintaining a healthy lifestyle    You may be retaining fluid if you have a history of heart failure or if you experience any of the following symptoms:  Weight gain of 3 pounds or more overnight or 5 pounds in a week, increased swelling in our hands or feet or shortness of breath while lying flat in bed. Please call your doctor as soon as you notice any of these symptoms; do not wait until your next office visit. Recognize signs and symptoms of STROKE:    F-face looks uneven    A-arms unable to move or move unevenly    S-speech slurred or non-existent    T-time-call 911 as soon as signs and symptoms begin-DO NOT go       Back to bed or wait to see if you get better-TIME IS BRAIN. Warning Signs of HEART ATTACK     Call 911 if you have these symptoms:   Chest discomfort. Most heart attacks involve discomfort in the center of the chest that lasts more than a few minutes, or that goes away and comes back. It can feel like uncomfortable pressure, squeezing, fullness, or pain.  Discomfort in other areas of the upper body. Symptoms can include pain or discomfort in one or both arms, the back, neck, jaw, or stomach.  Shortness of breath with or without chest discomfort.  Other signs may include breaking out in a cold sweat, nausea, or lightheadedness. Don't wait more than five minutes to call 911 - MINUTES MATTER! Fast action can save your life. Calling 911 is almost always the fastest way to get lifesaving treatment. Emergency Medical Services staff can begin treatment when they arrive -- up to an hour sooner than if someone gets to the hospital by car. The discharge information has been reviewed with the patient. The patient verbalized understanding. Discharge medications reviewed with the patient and appropriate educational materials and side effects teaching were provided. ___________________________________________________________________________________________________________________________________       Atrial Fibrillation: Care Instructions  Your Care Instructions    Atrial fibrillation is an irregular and often fast heartbeat.  Treating this condition is important for several reasons. It can cause blood clots, which can travel from your heart to your brain and cause a stroke. If you have a fast heartbeat, you may feel lightheaded, dizzy, and weak. An irregular heartbeat can also increase your risk for heart failure. Atrial fibrillation is often the result of another heart condition, such as high blood pressure or coronary artery disease. Making changes to improve your heart condition will help you stay healthy and active. Follow-up care is a key part of your treatment and safety. Be sure to make and go to all appointments, and call your doctor if you are having problems. It's also a good idea to know your test results and keep a list of the medicines you take. How can you care for yourself at home? Medicines  ? · Take your medicines exactly as prescribed. Call your doctor if you think you are having a problem with your medicine. You will get more details on the specific medicines your doctor prescribes. ? · If your doctor has given you a blood thinner to prevent a stroke, be sure you get instructions about how to take your medicine safely. Blood thinners can cause serious bleeding problems. ? · Do not take any vitamins, over-the-counter drugs, or herbal products without talking to your doctor first.   ? Lifestyle changes  ? · Do not smoke. Smoking can increase your chance of a stroke and heart attack. If you need help quitting, talk to your doctor about stop-smoking programs and medicines. These can increase your chances of quitting for good. ? · Eat a heart-healthy diet. ? · Stay at a healthy weight. Lose weight if you need to.   ? · Limit alcohol to 2 drinks a day for men and 1 drink a day for women. Too much alcohol can cause health problems. ? · Avoid colds and flu. Get a pneumococcal vaccine shot. If you have had one before, ask your doctor whether you need another dose. Get a flu shot every year.  If you must be around people with colds or flu, wash your hands often.   Activity  ? · If your doctor recommends it, get more exercise. Walking is a good choice. Bit by bit, increase the amount you walk every day. Try for at least 30 minutes on most days of the week. You also may want to swim, bike, or do other activities. Your doctor may suggest that you join a cardiac rehabilitation program so that you can have help increasing your physical activity safely. ? · Start light exercise if your doctor says it is okay. Even a small amount will help you get stronger, have more energy, and manage stress. Walking is an easy way to get exercise. Start out by walking a little more than you did in the hospital. Gradually increase the amount you walk. ? · When you exercise, watch for signs that your heart is working too hard. You are pushing too hard if you cannot talk while you are exercising. If you become short of breath or dizzy or have chest pain, sit down and rest immediately. ? · Check your pulse regularly. Place two fingers on the artery at the palm side of your wrist, in line with your thumb. If your heartbeat seems uneven or fast, talk to your doctor. When should you call for help? Call 911 anytime you think you may need emergency care. For example, call if:  ? · You have symptoms of a heart attack. These may include:  ¨ Chest pain or pressure, or a strange feeling in the chest.  ¨ Sweating. ¨ Shortness of breath. ¨ Nausea or vomiting. ¨ Pain, pressure, or a strange feeling in the back, neck, jaw, or upper belly or in one or both shoulders or arms. ¨ Lightheadedness or sudden weakness. ¨ A fast or irregular heartbeat. After you call 911, the  may tell you to chew 1 adult-strength or 2 to 4 low-dose aspirin. Wait for an ambulance. Do not try to drive yourself. ? · You have symptoms of a stroke. These may include:  ¨ Sudden numbness, tingling, weakness, or loss of movement in your face, arm, or leg, especially on only one side of your body.   ¨ Sudden vision changes. ¨ Sudden trouble speaking. ¨ Sudden confusion or trouble understanding simple statements. ¨ Sudden problems with walking or balance. ¨ A sudden, severe headache that is different from past headaches. ? · You passed out (lost consciousness). ?Call your doctor now or seek immediate medical care if:  ? · You have new or increased shortness of breath. ? · You feel dizzy or lightheaded, or you feel like you may faint. ? · Your heart rate becomes irregular. ? · You can feel your heart flutter in your chest or skip heartbeats. Tell your doctor if these symptoms are new or worse. ? Watch closely for changes in your health, and be sure to contact your doctor if you have any problems. Where can you learn more? Go to http://aravind-farzana.info/. Enter U020 in the search box to learn more about \"Atrial Fibrillation: Care Instructions. \"  Current as of: September 21, 2016  Content Version: 11.4  © 9207-7555 [employer]. Care instructions adapted under license by [employer] (which disclaims liability or warranty for this information). If you have questions about a medical condition or this instruction, always ask your healthcare professional. [place] [address] any warranty or liability for your use of this information. DISCHARGE SUMMARY from Nurse    PATIENT INSTRUCTIONS:    After general anesthesia or intravenous sedation, for 24 hours or while taking prescription Narcotics:  · Limit your activities  · Do not drive and operate hazardous machinery  · Do not make important personal or business decisions  · Do  not drink alcoholic beverages  · If you have not urinated within 8 hours after discharge, please contact your surgeon on call.     Report the following to your surgeon:  · Excessive pain, swelling, redness or odor of or around the surgical area  · Temperature over 100.5  · Nausea and vomiting lasting longer than 4 hours or if unable to take medications  · Any signs of decreased circulation or nerve impairment to extremity: change in color, persistent  numbness, tingling, coldness or increase pain  · Any questions    What to do at Home:  Recommended activity: Activity as tolerated. If you experience any of the following symptoms palpitations, dizziness, chest discomfort or shortness of breath, please follow up with Dr. Raymond Galvez. [phone]  *  Please give a list of your current medications to your Primary Care Provider. *  Please update this list whenever your medications are discontinued, doses are      changed, or new medications (including over-the-counter products) are added. *  Please carry medication information at all times in case of emergency situations. These are general instructions for a healthy lifestyle:    No smoking/ No tobacco products/ Avoid exposure to second hand smoke  Surgeon General's Warning:  Quitting smoking now greatly reduces serious risk to your health. Obesity, smoking, and sedentary lifestyle greatly increases your risk for illness    A healthy diet, regular physical exercise & weight monitoring are important for maintaining a healthy lifestyle    You may be retaining fluid if you have a history of heart failure or if you experience any of the following symptoms:  Weight gain of 3 pounds or more overnight or 5 pounds in a week, increased swelling in our hands or feet or shortness of breath while lying flat in bed. Please call your doctor as soon as you notice any of these symptoms; do not wait until your next office visit. Recognize signs and symptoms of STROKE:    F-face looks uneven    A-arms unable to move or move unevenly    S-speech slurred or non-existent    T-time-call 911 as soon as signs and symptoms begin-DO NOT go       Back to bed or wait to see if you get better-TIME IS BRAIN. Warning Signs of HEART ATTACK     Call 911 if you have these symptoms:   Chest discomfort.  Most heart attacks involve discomfort in the center of the chest that lasts more than a few minutes, or that goes away and comes back. It can feel like uncomfortable pressure, squeezing, fullness, or pain.  Discomfort in other areas of the upper body. Symptoms can include pain or discomfort in one or both arms, the back, neck, jaw, or stomach.  Shortness of breath with or without chest discomfort.  Other signs may include breaking out in a cold sweat, nausea, or lightheadedness. Don't wait more than five minutes to call 911 - MINUTES MATTER! Fast action can save your life. Calling 911 is almost always the fastest way to get lifesaving treatment. Emergency Medical Services staff can begin treatment when they arrive -- up to an hour sooner than if someone gets to the hospital by car. The discharge information has been reviewed with the patient. The patient verbalized understanding. Discharge medications reviewed with the patient and appropriate educational materials and side effects teaching were provided.   ___________________________________________________________________________________________________________________________________  ___________________________________________________________________________________________________________________________________

## 2018-03-05 NOTE — PROGRESS NOTES
Problem: Falls - Risk of  Goal: *Absence of Falls  Document Khris Fall Risk and appropriate interventions in the flowsheet. Outcome: Progressing Towards Goal  Fall Risk Interventions:  Mobility Interventions: Patient to call before getting OOB              Elimination Interventions: Patient to call for help with toileting needs, Toileting schedule/hourly rounds    History of Falls Interventions:  Investigate reason for fall

## 2018-03-07 ENCOUNTER — PATIENT OUTREACH (OUTPATIENT)
Dept: FAMILY MEDICINE CLINIC | Age: 78
End: 2018-03-07

## 2018-03-07 ENCOUNTER — TELEPHONE (OUTPATIENT)
Dept: FAMILY MEDICINE CLINIC | Age: 78
End: 2018-03-07

## 2018-03-07 ENCOUNTER — HOME CARE VISIT (OUTPATIENT)
Dept: HOME HEALTH SERVICES | Facility: HOME HEALTH | Age: 78
End: 2018-03-07

## 2018-03-07 NOTE — TELEPHONE ENCOUNTER
Dahlia from [facility] [address] just wanted to let  Dr. Wilver Lee know that patient refused the H to H visit.

## 2018-03-07 NOTE — PROGRESS NOTES
Nurse Lora 10 Follow Up Note  -18 Admitted at [facility]/[address] for Afib  -18 Discharged to Home   -18 NN contact       Hospital Discharge Follow-Up        Nurse Navigator(NN) contacted the patient  by telephone to perform post hospital discharge assessment. Verified name and  with patient as identifiers. Provided introduction to self, and explanation of the Nurses Navigator role. The physician discharge summary was available at the time of outreach. Patient contacted within 2 business days of discharge. Inpatient RRAT score: 34    Top challenges reviewed with the provider:    1.) Afib--follow up with Cardio  2.) leg swelling  3.) DM  4.) hypothyroidism  5.) HTN    Method of communication with provider :face to face prior to patient's office visit      Reviewed discharge instructions and red flags with  patient who verbalizes understanding. patient given an opportunity to ask questions and does not have any further questions or concerns at this time. The patient agrees to contact the PCP office for questions related to their healthcare. NN provided contact information for future reference. Summary of patients top problems:  1. Afib  2. DM  3. Hypothyroidism  4. HTN    Home Health orders at discharge: [facility]: [facility]  Date of initial visit: n/a - Per patient, she declined [facility] visit    Durable Medical Equipment ordered/company: n/a  Durable Medical Equipment received: n/a    Barriers to care? lack of knowledge about disease, transportation    Medication:   Medication reconciliation was preformed with patient, who verbalizes understanding of administration of home medications. There were no barriers to obtaining medications identified at this time.     New medications at discharge include eliquis, lopressor  Does the patient have new prescription(s) to last until follow up with prescribing provider: yes  Prescription Medication total: 12 (pharmacy consult for polypharm needed?) no   Medication changes (dose adjustments or discontinued meds): n/a    Pt reported that her lopressor bottle has instructions to take 25mg daily. Reviewed with patient DC instructions to take 1/2 tablet every 12hrs. Pt reported she has taken 1 whole tablet today. Advised patient to not take an additional 1/2 tablet today and start taking 1/2 tablet starting tomorrow. Pt verbalized understanding. Discussed with PCP regarding incident            Advance Care Planning:   Does patient have an Advance Directive:  not reviewed     PCP/Specialist follow up:   Future Appointments  Date Time Provider Zuleika Ruvalcaba   3/13/2018 12:45 MD ELROY Hickman   3/20/2018 1:45 PM Jeronimo Hudson  [facility]   Discussed with patient to follow up with Cardio. Pt requesting NN to schedule her appt with Cardio and will let her know during her appt with PCP       -Appt made with Dr. Maude Cosby ([phone]) 3/20/18 at 1:45pm, with arrival time of 1:30pm.      Goals      Attends follow-up appointments as directed. -Pt will attend follow up appts as scheduled with PCP and specialty provider       Knowledge and adherence of prescribed medication (ie. action, side effects, missed dose, etc.).            -Pt will take meds as directed  -Pt will be able to identity her meds and verbalize understanding for its indication       Understands red flags post discharge.            -Pt will be able to identity red flags and report  -Pt will contact PCP office, or seek emergency assistance from PCP office or ED                              This note will not be viewable in [address]

## 2018-03-07 NOTE — PROGRESS NOTES
Nurse Guido 10 Follow Up Note  -03/03/18 Admitted at [facility] for A-fib  -03/05/18 Discharged to Home with [facility]           Attempted to reach patient for post hosp f/u. Unable to reach. Number went straight to voicemail. Left message requesting call back. Contact info provided. Noted patient has an appt previously scheduled with PCP on 03/13/18. Also noted, no Cardio appt currently scheduled at this time. Will try to call patient again later              This note will not be viewable in [address]

## 2018-03-09 ENCOUNTER — TELEPHONE (OUTPATIENT)
Dept: FAMILY MEDICINE CLINIC | Age: 78
End: 2018-03-09

## 2018-03-09 NOTE — TELEPHONE ENCOUNTER
Spoke with patient and patient will take Benadryl until physician responds back to message. Pt verbalized understanding. This encounter will be forwarded to provider.

## 2018-03-09 NOTE — TELEPHONE ENCOUNTER
Pt is asking for a prescription for her itching back. She says she has had something prescribed before but doesn't have the bottle, but she is suffering. Please advise.

## 2018-03-09 NOTE — TELEPHONE ENCOUNTER
Patient calling to check the status of medication request. States she is suffering due to severe itching on her back.    Please advise

## 2018-03-13 ENCOUNTER — OFFICE VISIT (OUTPATIENT)
Dept: FAMILY MEDICINE CLINIC | Age: 78
End: 2018-03-13

## 2018-03-13 VITALS
OXYGEN SATURATION: 97 % | SYSTOLIC BLOOD PRESSURE: 186 MMHG | DIASTOLIC BLOOD PRESSURE: 82 MMHG | TEMPERATURE: 97.8 F | WEIGHT: 173 LBS | RESPIRATION RATE: 18 BRPM | HEIGHT: 60 IN | HEART RATE: 69 BPM | BODY MASS INDEX: 33.96 KG/M2

## 2018-03-13 DIAGNOSIS — I48.3 TYPICAL ATRIAL FLUTTER (HCC): ICD-10-CM

## 2018-03-13 DIAGNOSIS — E11.21 TYPE 2 DIABETES MELLITUS WITH NEPHROPATHY (HCC): ICD-10-CM

## 2018-03-13 DIAGNOSIS — E03.4 HYPOTHYROIDISM DUE TO ACQUIRED ATROPHY OF THYROID: ICD-10-CM

## 2018-03-13 DIAGNOSIS — L25.9 CONTACT DERMATITIS, UNSPECIFIED CONTACT DERMATITIS TYPE, UNSPECIFIED TRIGGER: ICD-10-CM

## 2018-03-13 DIAGNOSIS — I10 ESSENTIAL HYPERTENSION: ICD-10-CM

## 2018-03-13 DIAGNOSIS — M79.89 LEG SWELLING: ICD-10-CM

## 2018-03-13 DIAGNOSIS — I48.0 PAROXYSMAL ATRIAL FIBRILLATION (HCC): Primary | ICD-10-CM

## 2018-03-13 RX ORDER — FUROSEMIDE 40 MG/1
40 TABLET ORAL DAILY
Qty: 10 TAB | Refills: 0 | Status: SHIPPED | OUTPATIENT
Start: 2018-03-13 | End: 2018-03-13 | Stop reason: SDUPTHER

## 2018-03-13 RX ORDER — CLOBETASOL PROPIONATE 0.5 MG/G
OINTMENT TOPICAL 2 TIMES DAILY
Qty: 60 G | Refills: 3 | Status: SHIPPED | OUTPATIENT
Start: 2018-03-13 | End: 2018-10-10

## 2018-03-13 NOTE — PROGRESS NOTES
Scottie Malone is a 65 y.o.  female and presents with     Chief Complaint   Patient presents with    Allergic Reaction     back    Medication Evaluation     pt states her food does not taste good, thinks it may be her medicine     Irregular Heart Beat    Diabetes    Hypothyroidism    Leg Swelling    Skin Problem       Pt was admitted with chest tightness. Pt had Aflutter with rapid heart rate. Pt was treated with IV lopressor and she quicly reverted back to NSR. Pt was palced on Eliquis for prevention of cardiac thrombus formation. Pt is supposed to see cardiology . Pts TSh was normal , cardiac enzymes were neg. CXr was normal  Pt feels fine  Today. Pt has funny taste in her mouth. Pt has itching to her back for few months. Past Medical History:   Diagnosis Date    Arthritis     Bronchitis     Diabetes ([address]     Diverticulitis     GERD (gastroesophageal reflux disease)     Hypercholesterolemia     Hypertension     Hypothyroid     Pancreatitis      Past Surgical History:   Procedure Laterality Date    COLONOSCOPY N/A 2/2/2017    COLONOSCOPY  w/bx polyp performed by Licha Kirby MD at [facility] END[facility]COPY     Current Outpatient Prescriptions   Medication Sig    clobetasol (TEMOVATE) 0.05 % ointment Apply  to affected area two (2) times a day.  apixaban (ELIQUIS) 5 mg tablet Take 1 Tab by mouth two (2) times a day.  metoprolol tartrate (LOPRESSOR) 25 mg tablet Take 0.5 Tabs by mouth every twelve (12) hours.  simvastatin (ZOCOR) 20 mg tablet TAKE 1 TABLET BY MOUTH EVERY NIGHT    levothyroxine (SYNTHROID) 75 mcg tablet TAKE 1 TABLET BY MOUTH DAILY BEFORE BREAKFAST    metFORMIN (GLUCOPHAGE) 1,000 mg tablet Take 1 Tab by mouth two (2) times daily (with meals).  pantoprazole (PROTONIX) 40 mg tablet Take 1 Tab by mouth daily.  losartan (COZAAR) 50 mg tablet Take 1 Tab by mouth daily.     glipiZIDE (GLUCOTROL) 5 mg tablet TAKE 1 TABLET BY MOUTH TWICE DAILY    oxyCODONE-acetaminophen (PERCOCET) 5-325 mg per tablet Take 1-2 Tabs by mouth every four (4) hours as needed. Max Daily Amount: 12 Tabs.  cholecalciferol (VITAMIN D3) 1,000 unit tablet Take 2 Tabs by mouth daily.  docusate sodium (COLACE) 100 mg capsule Take 1 Cap by mouth two (2) times a day. Indications: take while on narcotics for pain     No current facility-administered medications for this visit. Health Maintenance   Topic Date Due    FOOT EXAM Q1  07/04/1950    EYE EXAM RETINAL OR DILATED Q1  07/04/1950    GLAUCOMA SCREENING Q2Y  07/04/2004    MICROALBUMIN Q1  04/20/2018    LIPID PANEL Q1  04/20/2018    MEDICARE YEARLY EXAM  05/04/2018    Pneumococcal 65+ Low/Medium Risk (2 of 2 - PPSV23) 05/17/2018    HEMOGLOBIN A1C Q6M  09/03/2018    DTaP/Tdap/Td series (2 - Td) 05/13/2027    Bone Densitometry (Dexa) Screening  Completed    ZOSTER VACCINE AGE 60>  Completed    Influenza Age 5 to Adult  Completed     Immunization History   Administered Date(s) Administered    Influenza High Dose Vaccine PF 09/05/2017    Influenza Vaccine 09/01/2017    Influenza Vaccine (Quad) PF 11/09/2016    Pneumococcal Conjugate (PCV-13) 05/17/2017     No LMP recorded. Patient is postmenopausal.        Allergies and Intolerances: Allergies   Allergen Reactions    Ampicillin Itching       Family History:   Family History   Problem Relation Age of Onset    Diabetes Mother     Heart Disease Mother     Cancer Father      melanoma    Stroke Sister     Hypertension Sister     Diabetes Sister     Hypertension Brother     Diabetes Brother     Breast Cancer Paternal Grandmother      older, but not sure age.  Breast Cancer Paternal Aunt      and gyn cancer ?age       Social History:   She  reports that she has never smoked. She has never used smokeless tobacco.  She  reports that she does not drink alcohol.             Review of Systems: pos for itching   General: negative for - chills, fatigue, fever, weight change  Psych: negative for - anxiety, depression, irritability or mood swings  ENT: negative for - headaches, hearing change, nasal congestion, oral lesions, sneezing or sore throat  Heme/ Lymph: negative for - bleeding problems, bruising, pallor or swollen lymph nodes  Endo: negative for - hot flashes, polydipsia/polyuria or temperature intolerance  Resp: negative for - cough, shortness of breath or wheezing  CV: negative for - chest pain, edema or palpitations  GI: negative for - abdominal pain, change in bowel habits, constipation, diarrhea or nausea/vomiting  : negative for - dysuria, hematuria, incontinence, pelvic pain or vulvar/vaginal symptoms  MSK: negative for - joint pain, joint swelling or muscle pain  Neuro: negative for - confusion, headaches, seizures or weakness  Derm: negative for - dry skin, hair changes, rash or skin lesion changes          Physical:   Vitals:   Vitals:    03/13/18 1227   BP: 186/82   Pulse: 69   Resp: 18   Temp: 97.8 °F (36.6 °C)   TempSrc: Oral   SpO2: 97%   Weight: 173 lb (78.5 kg)   Height: 5' (1.524 m)           Exam:   HEENT- atraumatic,normocephalic, awake, oriented, well nourished  Neck - supple,no enlarged lymph nodes, no JVD, no thyromegaly  Chest- CTA, no rhonchi, no crackles  Heart- rrr, no murmurs / gallop/rub, currently in NSR  Abdomen- soft,BS+,NT, no hepatosplenomegaly  Ext - no c/c/edema   Neuro- no focal deficits. Power 5/5 all extremities  Skin - warm,dry, no obvious rashes. , mild hyperpigmented lesion on the back          Review of Data:   LABS:   Lab Results   Component Value Date/Time    WBC 6.3 03/05/2018 04:45 AM    HGB 10.8 (L) 03/05/2018 04:45 AM    HCT 33.7 (L) 03/05/2018 04:45 AM    PLATELET 243 63/93/9749 04:45 AM     Lab Results   Component Value Date/Time    Sodium 142 03/05/2018 04:45 AM    Potassium 4.1 03/05/2018 04:45 AM    Chloride 110 (H) 03/05/2018 04:45 AM    CO2 23 03/05/2018 04:45 AM    Glucose 83 03/05/2018 04:45 AM    BUN 10 03/05/2018 04:45 AM    Creatinine 0.63 03/05/2018 04:45 AM     Lab Results   Component Value Date/Time    Cholesterol, total 146 04/20/2017 11:23 AM    HDL Cholesterol 57 04/20/2017 11:23 AM    LDL, calculated 68 04/20/2017 11:23 AM    Triglyceride 105 04/20/2017 11:23 AM     No results found for: GPT        Impression / Plan:        ICD-10-CM ICD-9-CM    1. Paroxysmal atrial fibrillation (HCC) I48.0 427.31 REFERRAL TO CARDIOLOGY   2. Type 2 diabetes mellitus with nephropathy (HCC) E11.21 250.40      583.81    3. Hypothyroidism due to acquired atrophy of thyroid E03.4 244.8      246.8    4. Essential hypertension I10 401.9    5. Typical atrial flutter (HCC) I48.3 427.32    6. Contact dermatitis, unspecified contact dermatitis type, unspecified trigger L25.9 692.9 clobetasol (TEMOVATE) 0.05 % ointment     Currently in NSR. Explained to patient risk benefits of the medications. Advised patient to stop meds if having any side effects. Pt verbalized understanding of the instructions. I have discussed the diagnosis with the patient and the intended plan as seen in the above orders. The patient has received an after-visit summary and questions were answered concerning future plans. I have discussed medication side effects and warnings with the patient as well. I have reviewed the plan of care with the patient, accepted their input and they are in agreement with the treatment goals. Reviewed plan of care. Patient has provided input and agrees with goals.     Follow-up Disposition: Not on Bea Mcleod MD

## 2018-03-13 NOTE — ANCILLARY DISCHARGE INSTRUCTIONS
[facility] [address]  Discharge Phone Call       After-Care Discharge Phone Call Questions:    Were you able to get your prescriptions filled? Comment:      [x] Yes  []No    Comment if answer is \"No\"   Are you taking your medication(s) as your doctor ordered? Do you understand the purpose of your medications? Comment:    [x] Yes  []No    Comment if answer is \"No\"   Are you taking any other medications that are not on the list?  Comment:      [x] Yes  []No    Comment if answer is \"Yes\"   Do you have any questions about your medications? N  Are you aware of potential side effects? Y   Comment:    [x] Yes  [x]No    Comment if answer is \"Yes\"   Did you make your follow-up appointments (if the hospital did not do this before  discharge)? Comment:    [x] Yes  []No    Comment if answer is \"No\"   Is there any reason you might not be able to keep your follow-up appointments? Comment:     [] Yes  [x]No    Comment if answer is \"Yes\"   Do you have any questions about your care plan? N  Are you aware of what health problems to be alert for? Y   Comment:    [x] Yes  [x]No    Comment if answer is \"Yes\"   Do you have a good understanding of how you should manage your health? Comment:    [x] Yes  []No    Comment if answer is \"Yes\"   Do you know which symptoms to watch for that would mean you would need to call your doctor right away? Comment:      [x] Yes  []No    Comment if answer is \"No\"   Do you have any questions about the follow up process or any instructions that we have provided? Comment:    [] Yes  [x]No    Comment if answer is \"Yes\"   Did staff take your preferences into account?         [x] Yes  []No    Comment if answer is \"Yes\"

## 2018-03-13 NOTE — MR AVS SNAPSHOT
[facility] 
 
 
 [address] [facility] [address]  [address] [address] 
[phone] Patient: Jarred Sauer MRN: [id number] OUN:[birth year] Visit Information Date & Time Provider Department Dept. Phone Encounter #  
 3/13/2018 12:45 PM [address] ZAIDA Lewis, [address] [place] [address] [phone] [id number] Follow-up Instructions Return in about 3 months (around 6/13/2018). Your Appointments 3/20/2018  1:45 PM  
Follow Up with Franklyn Grande MD  
Cardio Specialist at [facility]/[address]) Appt Note: ref by Dr. Alice Kauffman ofc //  dx afib // notes in cc per [facility] [phone] [address] Upcoming Health Maintenance Date Due  
 FOOT EXAM Q1 7/4/1950 EYE EXAM RETINAL OR DILATED Q1 7/4/1950 GLAUCOMA SCREENING Q2Y 7/4/2004 MICROALBUMIN Q1 4/20/2018 LIPID PANEL Q1 4/20/2018 MEDICARE YEARLY EXAM 5/4/2018 Pneumococcal 65+ Low/Medium Risk (2 of 2 - PPSV23) 5/17/2018 HEMOGLOBIN A1C Q6M 9/3/2018 DTaP/Tdap/Td series (2 - Td) 5/13/2027 Allergies as of 3/13/2018  Review Complete On: 3/3/2018 By: Charles Crooks RN Severity Noted Reaction Type Reactions Ampicillin  08/22/2013    Itching Current Immunizations  Reviewed on 3/5/2018 Name Date Influenza High Dose Vaccine PF 9/5/2017 Influenza Vaccine 9/1/2017 Influenza Vaccine (Quad) PF 11/9/2016 11:56 AM  
 Pneumococcal Conjugate (PCV-13) 5/17/2017 Not reviewed this visit You Were Diagnosed With   
  
 Codes Comments Paroxysmal atrial fibrillation (HCC)    -  Primary ICD-10-CM: I48.0 ICD-9-CM: 427.31 Type 2 diabetes mellitus with nephropathy (HCC)     ICD-10-CM: E11.21 
ICD-9-CM: 250.40, 583.81 Hypothyroidism due to acquired atrophy of thyroid     ICD-10-CM: E03.4 ICD-9-CM: 244.8, 246.8 Essential hypertension     ICD-10-CM: I10 
ICD-9-CM: 401.9 Typical atrial flutter (HCC)     ICD-10-CM: I48.3 ICD-9-CM: 427.32 Contact dermatitis, unspecified contact dermatitis type, unspecified trigger     ICD-10-CM: L25.9 ICD-9-CM: 692.9 Leg swelling     ICD-10-CM: M79.89 ICD-9-CM: 729.81 Vitals BP Pulse Temp Resp Height(growth percentile) Weight(growth percentile) 186/82 (BP 1 Location: Right arm, BP Patient Position: At rest) 69 97.8 °F (36.6 °C) (Oral) 18 5' (1.524 m) 173 lb (78.5 kg) SpO2 BMI OB Status Smoking Status 97% 33.79 kg/m2 Postmenopausal Never Smoker Vitals History BMI and BSA Data Body Mass Index Body Surface Area 33.79 kg/m 2 1.82 m 2 Preferred Pharmacy Pharmacy Name Phone [facility] [address] [phone] Your Updated Medication List  
  
   
This list is accurate as of 3/13/18  1:16 PM.  Always use your most recent med list.  
  
  
  
  
 apixaban 5 mg tablet Commonly known as:  Juliene Skill Take 1 Tab by mouth two (2) times a day. cholecalciferol 1,000 unit tablet Commonly known as:  VITAMIN D3 Take 2 Tabs by mouth daily. clobetasol 0.05 % ointment Commonly known as:  Norva Panning Apply  to affected area two (2) times a day. docusate sodium 100 mg capsule Commonly known as:  Thedore Dial Take 1 Cap by mouth two (2) times a day. Indications: take while on narcotics for pain  
  
 furosemide 40 mg tablet Commonly known as:  LASIX Take 1 Tab by mouth daily. glipiZIDE 5 mg tablet Commonly known as:  GLUCOTROL  
TAKE 1 TABLET BY MOUTH TWICE DAILY  
  
 levothyroxine 75 mcg tablet Commonly known as:  SYNTHROID  
TAKE 1 TABLET BY MOUTH DAILY BEFORE BREAKFAST  
  
 losartan 50 mg tablet Commonly known as:  COZAAR Take 1 Tab by mouth daily. metFORMIN 1,000 mg tablet Commonly known as:  GLUCOPHAGE  
 Take 1 Tab by mouth two (2) times daily (with meals). metoprolol tartrate 25 mg tablet Commonly known as:  LOPRESSOR Take 0.5 Tabs by mouth every twelve (12) hours. oxyCODONE-acetaminophen 5-325 mg per tablet Commonly known as:  PERCOCET Take 1-2 Tabs by mouth every four (4) hours as needed. Max Daily Amount: 12 Tabs. pantoprazole 40 mg tablet Commonly known as:  PROTONIX Take 1 Tab by mouth daily. simvastatin 20 mg tablet Commonly known as:  ZOCOR  
TAKE 1 TABLET BY MOUTH EVERY NIGHT Prescriptions Sent to Pharmacy Refills  
 clobetasol (TEMOVATE) 0.05 % ointment 3 Sig: Apply  to affected area two (2) times a day. Class: Normal  
 Pharmacy: [address] Ph #: [phone] Route: Topical  
 furosemide (LASIX) 40 mg tablet 0 Sig: Take 1 Tab by mouth daily. Class: Normal  
 Pharmacy: [facility] Ph #: [phone] Route: Oral  
  
We Performed the Following REFERRAL TO CARDIOLOGY [JMP88 Custom] Follow-up Instructions Return in about 3 months (around 6/13/2018). Referral Information Referral ID Referred By Referred To  
  
 [id number] [address] [facility] Phone: [phone] Fax: [phone] Visits Status Start Date End Date 1 New Request 3/13/18 3/13/19 If your referral has a status of pending review or denied, additional information will be sent to support the outcome of this decision. Introducing [facility]! Lima Jones introduces [employer] patient portal. Now you can access parts of your medical record, email your doctor's office, and request medication refills online. 1. In your internet browser, go to [email or web address] 2. Click on the First Time User? Click Here link in the Sign In box. You will see the New Member Sign Up page. 3. Enter your [employer] Access Code exactly as it appears below. You will not need to use this code after youve completed the sign-up process. If you do not sign up before the expiration date, you must request a new code. · [employer] Access Code: [email or web address] Expires: 4/23/2018  1:55 PM 
 
4. Enter the last four digits of your Social Security Number (xxxx) and Date of Birth (mm/dd/yyyy) as indicated and click Submit. You will be taken to the next sign-up page. 5. Create a [email or web address] ID. This will be your [employer] login ID and cannot be changed, so think of one that is secure and easy to remember. 6. Create a [employer] password. You can change your password at any time. 7. Enter your Password Reset Question and Answer. This can be used at a later time if you forget your password. 8. Enter your e-mail address. You will receive e-mail notification when new information is available in [address] 9. Click Sign Up. You can now view and download portions of your medical record. 10. Click the Download Summary menu link to download a portable copy of your medical information. If you have questions, please visit the Frequently Asked Questions section of the [employer] website. Remember, [employer] is NOT to be used for urgent needs. For medical emergencies, dial 911. Now available from your iPhone and Android! Please provide this summary of care documentation to your next provider. Your primary care clinician is listed as Mariam Rojas. If you have any questions after today's visit, please call [phone].

## 2018-03-13 NOTE — PROGRESS NOTES
Chief Complaint   Patient presents with    Allergic Reaction     back    Medication Evaluation     pt states her food does not taste good, thinks it may be her medicine      1. Have you been to the ER, urgent care clinic since your last visit? Hospitalized since your last visit? Yes hospital     2. Have you seen or consulted any other health care providers outside of the [address] [place] [address] since your last visit? Include any pap smears or colon screening.  No

## 2018-03-14 RX ORDER — FUROSEMIDE 40 MG/1
TABLET ORAL
Qty: 90 TAB | Refills: 0 | Status: SHIPPED | OUTPATIENT
Start: 2018-03-14 | End: 2019-01-17 | Stop reason: ALTCHOICE

## 2018-03-15 ENCOUNTER — TELEPHONE (OUTPATIENT)
Dept: FAMILY MEDICINE CLINIC | Age: 78
End: 2018-03-15

## 2018-03-15 NOTE — TELEPHONE ENCOUNTER
Pt is calling stating that the cream that was ordered for her itching was going to cost over $500. Is asking if it can be changed and if possible can she get something oral as it is hard for her to put cream on her back. Please advise.

## 2018-03-18 RX ORDER — PANTOPRAZOLE SODIUM 40 MG/1
TABLET, DELAYED RELEASE ORAL
Qty: 30 TAB | Refills: 0 | Status: SHIPPED | OUTPATIENT
Start: 2018-03-18 | End: 2018-04-21 | Stop reason: SDUPTHER

## 2018-03-19 DIAGNOSIS — L29.9 ITCHING: Primary | ICD-10-CM

## 2018-03-19 RX ORDER — HYDROXYZINE HYDROCHLORIDE 10 MG/1
10 TABLET, FILM COATED ORAL
Qty: 30 TAB | Refills: 2 | Status: SHIPPED | OUTPATIENT
Start: 2018-03-19 | End: 2018-03-29

## 2018-03-20 NOTE — TELEPHONE ENCOUNTER
Lvm to return call. Please advise patient that hydrOXYzine HCl (ATARAX) 10 mg tablet  Sent to pharmacy. This encounter will be closed.

## 2018-03-23 DIAGNOSIS — M79.89 LEG SWELLING: ICD-10-CM

## 2018-03-25 RX ORDER — FUROSEMIDE 40 MG/1
TABLET ORAL
Qty: 10 TAB | Refills: 0 | Status: SHIPPED | OUTPATIENT
Start: 2018-03-25 | End: 2018-07-10 | Stop reason: SDUPTHER

## 2018-03-27 ENCOUNTER — OFFICE VISIT (OUTPATIENT)
Dept: CARDIOLOGY CLINIC | Age: 78
End: 2018-03-27

## 2018-03-27 VITALS
SYSTOLIC BLOOD PRESSURE: 186 MMHG | BODY MASS INDEX: 32.98 KG/M2 | WEIGHT: 168 LBS | DIASTOLIC BLOOD PRESSURE: 86 MMHG | HEIGHT: 60 IN | HEART RATE: 71 BPM

## 2018-03-27 DIAGNOSIS — I48.3 TYPICAL ATRIAL FLUTTER (HCC): Primary | ICD-10-CM

## 2018-03-27 RX ORDER — METOPROLOL TARTRATE 25 MG/1
25 TABLET, FILM COATED ORAL EVERY 12 HOURS
Qty: 60 TAB | Refills: 6 | Status: SHIPPED | OUTPATIENT
Start: 2018-03-27 | End: 2018-03-27 | Stop reason: SDUPTHER

## 2018-03-27 NOTE — PROGRESS NOTES
Subjective:   Ms. Freddy Rosas is here for evaluation post hospitalization. She has a history of hypertension, diabetes, dyslipidemia, left hip fracture s/p repair in 11/2017, and hypothyroidism. She was diagnosed with atrial flutter with a 2:1 conduction when seen by cardiology during her hospitalization at [facility] 03/04/18. She had an echo done with an EF of 60% and grade two diastolic dysfunction. She denies any feelings similar to what she felt prior to hospitalization, which at that time, was a chest tightness. She also denies palpitations, dyspnea on exertion, lightheadedness, syncope, falls or bleeding issues. She had on instance of a left upper wall chest pain after drinking a cold lemonade, but this resolved and has not returned since then. It was not associated with diaphoresis, nausea or vomiting. No aggravating or alleviating factors, and it did not occur during exertion. She does not have orthopnea, but she sleeps in a recliner, for years, and this is unchanged. She states she could lie flat if she desired to. She has LE edema but has not been taking her Lasix.       Patient's cardiac risk factors are dyslipidemia, diabetes mellitus, obesity, hypertension, post-menopausal.        Patient Active Problem List    Diagnosis Date Noted    Atrial fibrillation with RVR ([address] 03/04/2018    Atrial fibrillation with rapid ventricular response ([address] 03/03/2018    Hypertension 03/03/2018    Type 2 diabetes mellitus with nephropathy ([address] 01/23/2018    Hip fracture ([address] 10/09/2017    Meningioma ([address] 07/25/2017    Hypothyroidism due to acquired atrophy of thyroid 06/13/2017    Episcleritis of right eye 05/17/2017    Pancreatitis 11/07/2016    Acute pancreatitis 11/07/2016    Diverticulitis 11/07/2016    Hypothyroid 02/24/2014    HTN (hypertension) 02/24/2014    Obesity 02/24/2014    DM (diabetes mellitus) ([address] 02/24/2014    Family hx-breast malignancy 02/24/2014     Current Outpatient Prescriptions Medication Sig Dispense Refill    furosemide (LASIX) 40 mg tablet TAKE 1 TABLET BY MOUTH DAILY 10 Tab 0    hydrOXYzine HCl (ATARAX) 10 mg tablet Take 1 Tab by mouth three (3) times daily as needed for Itching for up to 10 days. 30 Tab 2    pantoprazole (PROTONIX) 40 mg tablet TAKE 1 TABLET BY MOUTH DAILY 30 Tab 0    furosemide (LASIX) 40 mg tablet TAKE 1 TABLET BY MOUTH DAILY 90 Tab 0    clobetasol (TEMOVATE) 0.05 % ointment Apply  to affected area two (2) times a day. 60 g 3    apixaban (ELIQUIS) 5 mg tablet Take 1 Tab by mouth two (2) times a day. 60 Tab 0    metoprolol tartrate (LOPRESSOR) 25 mg tablet Take 0.5 Tabs by mouth every twelve (12) hours. 30 Tab 0    simvastatin (ZOCOR) 20 mg tablet TAKE 1 TABLET BY MOUTH EVERY NIGHT 90 Tab 0    levothyroxine (SYNTHROID) 75 mcg tablet TAKE 1 TABLET BY MOUTH DAILY BEFORE BREAKFAST 90 Tab 0    metFORMIN (GLUCOPHAGE) 1,000 mg tablet Take 1 Tab by mouth two (2) times daily (with meals). 60 Tab 0    losartan (COZAAR) 50 mg tablet Take 1 Tab by mouth daily. 90 Tab 2    glipiZIDE (GLUCOTROL) 5 mg tablet TAKE 1 TABLET BY MOUTH TWICE DAILY 180 Tab 3    oxyCODONE-acetaminophen (PERCOCET) 5-325 mg per tablet Take 1-2 Tabs by mouth every four (4) hours as needed. Max Daily Amount: 12 Tabs. 60 Tab 0    cholecalciferol (VITAMIN D3) 1,000 unit tablet Take 2 Tabs by mouth daily. 60 Tab 0    docusate sodium (COLACE) 100 mg capsule Take 1 Cap by mouth two (2) times a day.  Indications: take while on narcotics for pain 60 Cap 2     Allergies   Allergen Reactions    Ampicillin Itching     Past Medical History:   Diagnosis Date    Arthritis     Bronchitis     Diabetes ([address]     Diverticulitis     GERD (gastroesophageal reflux disease)     Hypercholesterolemia     Hypertension     Hypothyroid     Pancreatitis      Past Surgical History:   Procedure Laterality Date    COLONOSCOPY N/A 2/2/2017    COLONOSCOPY  w/bx polyp performed by Helen Tavarez [facility] at [facility] ENDOSCOPY     Family History   Problem Relation Age of Onset    Diabetes Mother     Heart Disease Mother     Cancer Father      melanoma    Stroke Sister     Hypertension Sister     Diabetes Sister     Hypertension Brother     Diabetes Brother     Breast Cancer Paternal Grandmother      older, but not sure age.  Breast Cancer Paternal Aunt      and gyn cancer ?age     History   Smoking Status    Never Smoker   Smokeless Tobacco    Never Used          Review of Systems, additional:  Constitutional: negative  Eyes: negative  Respiratory: negative for cough, sputum or dyspnea on exertion  Cardiovascular: per HPI  Gastrointestinal: negative for nausea and vomiting  Musculoskeletal:negative  Neurological: negative  Behvioral/Psych: negative  Endocrine: negative  ENT: negative    Objective:     Visit Vitals    /86    Pulse 71    Ht 5' (1.524 m)    Wt 168 lb (76.2 kg)    BMI 32.81 kg/m2     General:  alert, cooperative, no distress   Chest Wall: inspection normal - no chest wall deformities or tenderness, respiratory effort normal   Lung: clear to auscultation bilaterally, no rales   Heart:  normal rate, regular rhythm, normal S1, S2, no murmurs, rubs, clicks or gallops   Abdomen: soft, non-tender. Bowel sounds normal. No masses,  no organomegaly   Extremities: edema 1-2+ bilateral LE's Skin: no rashes   Neuro: alert, oriented, normal speech, no focal findings or movement disorder noted       Echo 03/2018  Left ventricle: Systolic function was normal by visual assessment. Ejection   fraction was estimated to be 60 %. No  obvious wall motion abnormalities identified in the views obtained. Features   were consistent with a pseudonormal left  ventricular filling pattern, with concomitant abnormal relaxation and   increased filling pressure (grade 2 diastolic  dysfunction). Right ventricle: The ventricle was mildly dilated.  Systolic function was   normal.    Left atrium: The atrium was mildly dilated. Aortic valve: The valve was trileaflet. Leaflets exhibited mild to moderate   calcification and mildly reduced cuspal  separation. Transaortic velocity was increased due to valvular stenosis. There was mild to moderate stenosis. There was  trivial regurgitation. Valve mean gradient was 21 mmHg. Tricuspid valve: There was mild regurgitation. Inferior vena cava, hepatic veins: The inferior vena cava was dilated. The   respirophasic change in diameter was less  than 50%. Assessment/Plan:     Atrial flutter: In sinus on exam today, rate is 71. Denies any symptoms similar to what she felt prior to hospitalization. She is to continue with Eliquis and refills sent to pharmacy. Lopressor dose has been increased to 25 mg BID due to elevated BP. Episode of chest pain: She describes one episode of CP which sounds very atypical in nature. I offered patient a nuclear stress test, and she declined. Explained to patient if this recurs, or if it worsens in severity or duration, then she should present to the ED and she understood. Hypertension: She is on Lopressor and Losartan, Lopressor dose increased to 25 mg BID. Dyslipidemia: continue with simvastatin, last LDL 68 in April 2017. LE edema: advised to take Lasix which she has not been taking. Frequency of urination has been bothersome to her, advised patient she can take every other day. She is not in CHF by history or exam findings. Follow up in three months or sooner as symptoms dictate.      Hussain Alcaraz PA-C

## 2018-03-27 NOTE — PROGRESS NOTES
1. Have you been to the ER, urgent care clinic since your last visit? Hospitalized since your last visit? No    2. Have you seen or consulted any other health care providers outside of the [address] [place] [address] since your last visit? Include any pap smears or colon screening.  No

## 2018-03-27 NOTE — MR AVS SNAPSHOT
[facility] 
 
 
 [address] [address] 
[phone] Patient: Miguel Johnson MRN: [id number] FWY:[id number] Visit Information Date & Time Provider Department Dept. Phone Encounter #  
 3/27/2018  1:00 PM Jennifer Reyez. Brijesh Severino Specialist at [facility] 21  Follow-up Instructions Return in about 3 months (around 6/27/2018). Your Appointments 6/13/2018 10:30 AM  
ROUTINE CARE with Alexia Cisneros MD  
[facility] Appt Note: Return in about 3 months (around 6/13/2018). [address]  
  
   
 [address] [facility] [address]  
  
    
 6/26/2018  1:15 PM  
Follow Up with Isaiah Cisneros MD  
Cardio Specialist at [facility] Appt Note: 3 months follow up  
 [address] [address] [address]  
  
   
 [address] Upcoming Health Maintenance Date Due  
 FOOT EXAM Q1 7/4/1950 EYE EXAM RETINAL OR DILATED Q1 7/4/1950 GLAUCOMA SCREENING Q2Y 7/4/2004 MICROALBUMIN Q1 4/20/2018 LIPID PANEL Q1 4/20/2018 MEDICARE YEARLY EXAM 5/4/2018 Pneumococcal 65+ Low/Medium Risk (2 of 2 - PPSV23) 5/17/2018 HEMOGLOBIN A1C Q6M 9/3/2018 DTaP/Tdap/Td series (2 - Td) 5/13/2027 Allergies as of 3/27/2018  Review Complete On: 3/27/2018 By: Gaby Cisneros RN Severity Noted Reaction Type Reactions Ampicillin  08/22/2013    Itching Current Immunizations  Reviewed on 3/5/2018 Name Date Influenza High Dose Vaccine PF 9/5/2017 Influenza Vaccine 9/1/2017 Influenza Vaccine (Quad) PF 11/9/2016 11:56 AM  
 Pneumococcal Conjugate (PCV-13) 5/17/2017 Not reviewed this visit Vitals BP Pulse Height(growth percentile) Weight(growth percentile) BMI OB Status 186/86 71 5' (1.524 m) 168 lb (76.2 kg) 32.81 kg/m2 Postmenopausal  
 Smoking Status Never Smoker Vitals History BMI and BSA Data Body Mass Index Body Surface Area  
 32.81 kg/m 2 1.8 m 2 Preferred Pharmacy Pharmacy Name Phone [facility] [address] [phone] Your Updated Medication List  
  
   
This list is accurate as of 3/27/18  1:40 PM.  Always use your most recent med list.  
  
  
  
  
 apixaban 5 mg tablet Commonly known as:  Mahala [place] Take 1 Tab by mouth two (2) times a day. cholecalciferol 1,000 unit tablet Commonly known as:  VITAMIN D3 Take 2 Tabs by mouth daily. clobetasol 0.05 % ointment Commonly known as:  Royal Riches Apply  to affected area two (2) times a day. docusate sodium 100 mg capsule Commonly known as:  Geeta Bunkers Take 1 Cap by mouth two (2) times a day. Indications: take while on narcotics for pain * furosemide 40 mg tablet Commonly known as:  LASIX TAKE 1 TABLET BY MOUTH DAILY * furosemide 40 mg tablet Commonly known as:  LASIX TAKE 1 TABLET BY MOUTH DAILY  
  
 glipiZIDE 5 mg tablet Commonly known as:  GLUCOTROL  
TAKE 1 TABLET BY MOUTH TWICE DAILY  
  
 hydrOXYzine HCl 10 mg tablet Commonly known as:  ATARAX Take 1 Tab by mouth three (3) times daily as needed for Itching for up to 10 days. levothyroxine 75 mcg tablet Commonly known as:  SYNTHROID  
TAKE 1 TABLET BY MOUTH DAILY BEFORE BREAKFAST  
  
 losartan 50 mg tablet Commonly known as:  COZAAR Take 1 Tab by mouth daily. metFORMIN 1,000 mg tablet Commonly known as:  GLUCOPHAGE Take 1 Tab by mouth two (2) times daily (with meals). metoprolol tartrate 25 mg tablet Commonly known as:  LOPRESSOR Take 0.5 Tabs by mouth every twelve (12) hours. oxyCODONE-acetaminophen 5-325 mg per tablet Commonly known as:  PERCOCET Take 1-2 Tabs by mouth every four (4) hours as needed. Max Daily Amount: 12 Tabs. pantoprazole 40 mg tablet Commonly known as:  PROTONIX  
TAKE 1 TABLET BY MOUTH DAILY  
  
 simvastatin 20 mg tablet Commonly known as:  ZOCOR  
TAKE 1 TABLET BY MOUTH EVERY NIGHT * Notice: This list has 2 medication(s) that are the same as other medications prescribed for you. Read the directions carefully, and ask your doctor or other care provider to review them with you. Follow-up Instructions Return in about 3 months (around 6/27/2018). Patient Instructions Increase Lopressor to 25mg twice per day Introducing hospitals & HEALTH SERVICES! Abdirizak Hamilton introduces [employer] patient portal. Now you can access parts of your medical record, email your doctor's office, and request medication refills online. 1. In your internet browser, go to [email or web address] 2. Click on the First Time User? Click Here link in the Sign In box. You will see the New Member Sign Up page. 3. Enter your [employer] Access Code exactly as it appears below. You will not need to use this code after youve completed the sign-up process. If you do not sign up before the expiration date, you must request a new code. · [employer] Access Code: [email or web address] Expires: 4/23/2018  1:55 PM 
 
4. Enter the last four digits of your Social Security Number (xxxx) and Date of Birth (mm/dd/yyyy) as indicated and click Submit. You will be taken to the next sign-up page. 5. Create a Moodyot ID. This will be your [employer] login ID and cannot be changed, so think of one that is secure and easy to remember. 6. Create a [employer] password. You can change your password at any time. 7. Enter your Password Reset Question and Answer. This can be used at a later time if you forget your password. 8. Enter your e-mail address.  You will receive e-mail notification when new information is available in [employer]. 9. Click Sign Up. You can now view and download portions of your medical record. 10. Click the Download Summary menu link to download a portable copy of your medical information. If you have questions, please visit the Frequently Asked Questions section of the [employer] website. Remember, [employer] is NOT to be used for urgent needs. For medical emergencies, dial 911. Now available from your iPhone and Android! Please provide this summary of care documentation to your next provider. Your primary care clinician is listed as [facility]. If you have any questions after today's visit, please call [phone].

## 2018-03-27 NOTE — PROGRESS NOTES
1. Have you been to the ER, urgent care clinic since your last visit? Hospitalized since your last visit? Yes, [place]     2. Have you seen or consulted any other health care providers outside of the [address] [place] [address] since your last visit? Include any pap smears or colon screening.  No

## 2018-03-29 RX ORDER — METOPROLOL TARTRATE 25 MG/1
TABLET, FILM COATED ORAL
Qty: 180 TAB | Refills: 6 | Status: SHIPPED | OUTPATIENT
Start: 2018-03-29 | End: 2018-06-18 | Stop reason: SDUPTHER

## 2018-04-04 DIAGNOSIS — I10 ESSENTIAL HYPERTENSION: ICD-10-CM

## 2018-04-05 ENCOUNTER — TELEPHONE (OUTPATIENT)
Dept: FAMILY MEDICINE CLINIC | Age: 78
End: 2018-04-05

## 2018-04-05 RX ORDER — LOSARTAN POTASSIUM 50 MG/1
50 TABLET ORAL DAILY
Qty: 90 TAB | Refills: 2 | Status: SHIPPED | OUTPATIENT
Start: 2018-04-05 | End: 2018-10-10 | Stop reason: SDUPTHER

## 2018-04-05 NOTE — TELEPHONE ENCOUNTER
Patient requesting a refill on triamcinolone acetonide (KENALOG) 0.1 % topical cream  Please forward to rx on file No

## 2018-04-07 DIAGNOSIS — L30.9 ECZEMA, UNSPECIFIED TYPE: ICD-10-CM

## 2018-04-08 RX ORDER — TRIAMCINOLONE ACETONIDE 1 MG/G
CREAM TOPICAL
Qty: 15 G | Refills: 0 | Status: SHIPPED | OUTPATIENT
Start: 2018-04-08 | End: 2018-07-23 | Stop reason: SDUPTHER

## 2018-04-09 RX ORDER — GLIPIZIDE 5 MG/1
TABLET ORAL
Qty: 180 TAB | Refills: 3 | Status: SHIPPED | OUTPATIENT
Start: 2018-04-09 | End: 2018-07-10 | Stop reason: SDUPTHER

## 2018-04-09 RX ORDER — SIMVASTATIN 20 MG/1
TABLET, FILM COATED ORAL
Qty: 90 TAB | Refills: 0 | Status: SHIPPED | OUTPATIENT
Start: 2018-04-09 | End: 2018-08-17 | Stop reason: SDUPTHER

## 2018-04-22 RX ORDER — PANTOPRAZOLE SODIUM 40 MG/1
TABLET, DELAYED RELEASE ORAL
Qty: 30 TAB | Refills: 0 | Status: SHIPPED | OUTPATIENT
Start: 2018-04-22 | End: 2018-05-21 | Stop reason: SDUPTHER

## 2018-05-21 DIAGNOSIS — E03.4 HYPOTHYROIDISM DUE TO ACQUIRED ATROPHY OF THYROID: ICD-10-CM

## 2018-05-21 RX ORDER — LEVOTHYROXINE SODIUM 75 UG/1
TABLET ORAL
Qty: 30 TAB | Refills: 0 | Status: SHIPPED | OUTPATIENT
Start: 2018-05-21 | End: 2018-08-17 | Stop reason: SDUPTHER

## 2018-05-21 RX ORDER — PANTOPRAZOLE SODIUM 40 MG/1
TABLET, DELAYED RELEASE ORAL
Qty: 30 TAB | Refills: 0 | Status: SHIPPED | OUTPATIENT
Start: 2018-05-21 | End: 2018-06-18 | Stop reason: SDUPTHER

## 2018-06-13 ENCOUNTER — OFFICE VISIT (OUTPATIENT)
Dept: FAMILY MEDICINE CLINIC | Age: 78
End: 2018-06-13

## 2018-06-13 VITALS
HEIGHT: 60 IN | OXYGEN SATURATION: 96 % | TEMPERATURE: 96.3 F | BODY MASS INDEX: 32.98 KG/M2 | WEIGHT: 168 LBS | DIASTOLIC BLOOD PRESSURE: 60 MMHG | RESPIRATION RATE: 18 BRPM | HEART RATE: 67 BPM | SYSTOLIC BLOOD PRESSURE: 151 MMHG

## 2018-06-13 DIAGNOSIS — I48.91 ATRIAL FIBRILLATION WITH RVR (HCC): Primary | ICD-10-CM

## 2018-06-13 DIAGNOSIS — E03.4 HYPOTHYROIDISM DUE TO ACQUIRED ATROPHY OF THYROID: ICD-10-CM

## 2018-06-13 DIAGNOSIS — E11.21 TYPE 2 DIABETES MELLITUS WITH NEPHROPATHY (HCC): ICD-10-CM

## 2018-06-13 DIAGNOSIS — I10 ESSENTIAL HYPERTENSION: ICD-10-CM

## 2018-06-13 NOTE — PROGRESS NOTES
1. Have you been to the ER, urgent care clinic since your last visit? Hospitalized since your last visit? No    2. Have you seen or consulted any other health care providers outside of the [address] [place] [address] since your last visit? Include any pap smears or colon screening.  No

## 2018-06-13 NOTE — MR AVS SNAPSHOT
[facility] 
 
 
 [address] [facility] [address] [facility] [address] [phone] Patient: Mar Eden MRN: [id number] ANC:[id number] Visit Information Date & Time Provider Department Dept. Phone Encounter #  
 6/13/2018 10:30 AM Mauro Cook[address] [phone] [id number] Follow-up Instructions Return in about 2 months (around 8/13/2018). Your Appointments 6/26/2018  1:15 PM  
Follow Up with Franklyn Marin MD  
Cardio Specialist at [facility]) Appt Note: 3 months follow up  
 [address] [facility] [address] [facility] [address] [address] [address]  
  
   
 [id number] [place] [address] [facility] Upcoming Health Maintenance Date Due  
 FOOT EXAM Q1 7/4/1950 EYE EXAM RETINAL OR DILATED Q1 7/4/1950 GLAUCOMA SCREENING Q2Y 7/4/2004 MICROALBUMIN Q1 4/20/2018 LIPID PANEL Q1 4/20/2018 MEDICARE YEARLY EXAM 5/4/2018 Pneumococcal 65+ Low/Medium Risk (2 of 2 - PPSV23) 5/17/2018 Influenza Age 5 to Adult 8/1/2018 HEMOGLOBIN A1C Q6M 9/3/2018 DTaP/Tdap/Td series (2 - Td) 5/13/2027 Allergies as of 6/13/2018  Review Complete On: 3/27/2018 By: Genesis Cummings PA-C Severity Noted Reaction Type Reactions Ampicillin  08/22/2013    Itching Current Immunizations  Reviewed on 3/5/2018 Name Date Influenza High Dose Vaccine PF 9/5/2017 Influenza Vaccine 9/1/2017 Influenza Vaccine (Quad) PF 11/9/2016 11:56 AM  
 Pneumococcal Conjugate (PCV-13) 5/17/2017 Not reviewed this visit You Were Diagnosed With   
  
 Codes Comments Atrial fibrillation with RVR ([facility] [address]    -  Primary ICD-10-CM: I48.91 
ICD-9-CM: 427.31 Type 2 diabetes mellitus with nephropathy (HCC)     ICD-10-CM: E11.21 
ICD-9-CM: 250.40, 583.81 Essential hypertension     ICD-10-CM: I10 
ICD-9-CM: 401.9 Hypothyroidism due to acquired atrophy of thyroid     ICD-10-CM: E03.4 ICD-9-CM: 244.8, 246.8 Vitals BP Pulse Temp Resp Height(growth percentile) Weight(growth percentile) 151/60 67 96.3 °F (35.7 °C) (Oral) 18 5' (1.524 m) 168 lb (76.2 kg) SpO2 BMI OB Status Smoking Status 96% 32.81 kg/m2 Postmenopausal Never Smoker Vitals History BMI and BSA Data Body Mass Index Body Surface Area  
 32.81 kg/m 2 1.8 m 2 Preferred Pharmacy Pharmacy Name Phone [facility] [place], [address] [place] [address] Avenue [phone] Your Updated Medication List  
  
   
This list is accurate as of 6/13/18 11:58 AM.  Always use your most recent med list.  
  
  
  
  
 apixaban 5 mg tablet Commonly known as:  Gwenetta [place] Take 1 Tab by mouth two (2) times a day. cholecalciferol 1,000 unit tablet Commonly known as:  VITAMIN D3 Take 2 Tabs by mouth daily. clobetasol 0.05 % ointment Commonly known as:  Haritha Rodolfo Apply  to affected area two (2) times a day. docusate sodium 100 mg capsule Commonly known as:  Toya Walsh Take 1 Cap by mouth two (2) times a day. Indications: take while on narcotics for pain * furosemide 40 mg tablet Commonly known as:  LASIX TAKE 1 TABLET BY MOUTH DAILY * furosemide 40 mg tablet Commonly known as:  LASIX TAKE 1 TABLET BY MOUTH DAILY * glipiZIDE 5 mg tablet Commonly known as:  GLUCOTROL  
TAKE 1 TABLET BY MOUTH TWICE DAILY * glipiZIDE 5 mg tablet Commonly known as:  GLUCOTROL  
TAKE 1 TABLET BY MOUTH TWICE DAILY * levothyroxine 75 mcg tablet Commonly known as:  SYNTHROID  
TAKE 1 TABLET BY MOUTH DAILY BEFORE BREAKFAST * levothyroxine 75 mcg tablet Commonly known as:  SYNTHROID  
TAKE 1 TABLET BY MOUTH DAILY BEFORE BREAKFAST  
  
 losartan 50 mg tablet Commonly known as:  COZAAR Take 1 Tab by mouth daily. metFORMIN 1,000 mg tablet Commonly known as:  GLUCOPHAGE  
 Take 1 Tab by mouth two (2) times daily (with meals). metoprolol tartrate 25 mg tablet Commonly known as:  LOPRESSOR  
TAKE 1 TABLET BY MOUTH EVERY 12 HOURS  
  
 oxyCODONE-acetaminophen 5-325 mg per tablet Commonly known as:  PERCOCET Take 1-2 Tabs by mouth every four (4) hours as needed. Max Daily Amount: 12 Tabs. pantoprazole 40 mg tablet Commonly known as:  PROTONIX  
TAKE 1 TABLET BY MOUTH DAILY  
  
 simvastatin 20 mg tablet Commonly known as:  ZOCOR  
TAKE 1 TABLET BY MOUTH EVERY NIGHT  
  
 triamcinolone acetonide 0.1 % topical cream  
Commonly known as:  KENALOG  
APPLY THIN LAYER TO AFFECTED AREA TWICE DAILY * Notice: This list has 6 medication(s) that are the same as other medications prescribed for you. Read the directions carefully, and ask your doctor or other care provider to review them with you. Follow-up Instructions Return in about 2 months (around 8/13/2018). Introducing [facility] & HEALTH SERVICES! Mayda Padgett introduces [employer] patient portal. Now you can access parts of your medical record, email your doctor's office, and request medication refills online. 1. In your internet browser, go to [email or web address] 2. Click on the First Time User? Click Here link in the Sign In box. You will see the New Member Sign Up page. 3. Enter your [employer] Access Code exactly as it appears below. You will not need to use this code after youve completed the sign-up process. If you do not sign up before the expiration date, you must request a new code. · [employer] Access Code: [email or web address] Expires: 9/11/2018 10:07 AM 
 
4. Enter the last four digits of your Social Security Number (xxxx) and Date of Birth (mm/dd/yyyy) as indicated and click Submit. You will be taken to the next sign-up page. 5. Create a MYR ID. This will be your [employer] login ID and cannot be changed, so think of one that is secure and easy to remember. 6. Create a [employer] password. You can change your password at any time. 7. Enter your Password Reset Question and Answer. This can be used at a later time if you forget your password. 8. Enter your e-mail address. You will receive e-mail notification when new information is available in [address] 9. Click Sign Up. You can now view and download portions of your medical record. 10. Click the Download Summary menu link to download a portable copy of your medical information. If you have questions, please visit the Frequently Asked Questions section of the [employer] website. Remember, [employer] is NOT to be used for urgent needs. For medical emergencies, dial 911. Now available from your iPhone and Android! Please provide this summary of care documentation to your next provider. Your primary care clinician is listed as [id number]. If you have any questions after today's visit, please call [phone].

## 2018-06-13 NOTE — PROGRESS NOTES
Amy Wills is a 65 y.o.  female and presents with     Chief Complaint   Patient presents with    Hypertension    Diabetes    Irregular Heart Beat    Hypothyroidism    Weight Loss       Pt is here for routine follow up. She has lost some wt. She says she has been trying to loose wt. She lost her sister recently who was in Rehab  Pt is not having any chest pain ,SOB, orthopnoea, PND. Past Medical History:   Diagnosis Date    Arthritis     Bronchitis     Diabetes ([address]     Diverticulitis     GERD (gastroesophageal reflux disease)     Hypercholesterolemia     Hypertension     Hypothyroid     Pancreatitis      Past Surgical History:   Procedure Laterality Date    COLONOSCOPY N/A 2/2/2017    COLONOSCOPY  w/bx polyp performed by Lam Mcnulty MD at [facility] ENDOSCOPY     Current Outpatient Prescriptions   Medication Sig    pantoprazole (PROTONIX) 40 mg tablet TAKE 1 TABLET BY MOUTH DAILY    levothyroxine (SYNTHROID) 75 mcg tablet TAKE 1 TABLET BY MOUTH DAILY BEFORE BREAKFAST    glipiZIDE (GLUCOTROL) 5 mg tablet TAKE 1 TABLET BY MOUTH TWICE DAILY    simvastatin (ZOCOR) 20 mg tablet TAKE 1 TABLET BY MOUTH EVERY NIGHT    triamcinolone acetonide (KENALOG) 0.1 % topical cream APPLY THIN LAYER TO AFFECTED AREA TWICE DAILY    losartan (COZAAR) 50 mg tablet Take 1 Tab by mouth daily.  metoprolol tartrate (LOPRESSOR) 25 mg tablet TAKE 1 TABLET BY MOUTH EVERY 12 HOURS    furosemide (LASIX) 40 mg tablet TAKE 1 TABLET BY MOUTH DAILY    furosemide (LASIX) 40 mg tablet TAKE 1 TABLET BY MOUTH DAILY    clobetasol (TEMOVATE) 0.05 % ointment Apply  to affected area two (2) times a day.  levothyroxine (SYNTHROID) 75 mcg tablet TAKE 1 TABLET BY MOUTH DAILY BEFORE BREAKFAST    metFORMIN (GLUCOPHAGE) 1,000 mg tablet Take 1 Tab by mouth two (2) times daily (with meals).     glipiZIDE (GLUCOTROL) 5 mg tablet TAKE 1 TABLET BY MOUTH TWICE DAILY    oxyCODONE-acetaminophen (PERCOCET) 5-325 mg per tablet Take 1-2 Tabs by mouth every four (4) hours as needed. Max Daily Amount: 12 Tabs.  cholecalciferol (VITAMIN D3) 1,000 unit tablet Take 2 Tabs by mouth daily.  docusate sodium (COLACE) 100 mg capsule Take 1 Cap by mouth two (2) times a day. Indications: take while on narcotics for pain    apixaban (ELIQUIS) 5 mg tablet Take 1 Tab by mouth two (2) times a day. No current facility-administered medications for this visit. Health Maintenance   Topic Date Due    FOOT EXAM Q1  07/04/1950    EYE EXAM RETINAL OR DILATED Q1  07/04/1950    GLAUCOMA SCREENING Q2Y  07/04/2004    MICROALBUMIN Q1  04/20/2018    LIPID PANEL Q1  04/20/2018    MEDICARE YEARLY EXAM  05/04/2018    Pneumococcal 65+ Low/Medium Risk (2 of 2 - PPSV23) 05/17/2018    Influenza Age 9 to Adult  08/01/2018    HEMOGLOBIN A1C Q6M  09/03/2018    DTaP/Tdap/Td series (2 - Td) 05/13/2027    Bone Densitometry (Dexa) Screening  Completed    ZOSTER VACCINE AGE 60>  Completed     Immunization History   Administered Date(s) Administered    Influenza High Dose Vaccine PF 09/05/2017    Influenza Vaccine 09/01/2017    Influenza Vaccine (Quad) PF 11/09/2016    Pneumococcal Conjugate (PCV-13) 05/17/2017     No LMP recorded. Patient is postmenopausal.        Allergies and Intolerances: Allergies   Allergen Reactions    Ampicillin Itching       Family History:   Family History   Problem Relation Age of Onset    Diabetes Mother     Heart Disease Mother     Cancer Father      melanoma    Stroke Sister     Hypertension Sister     Diabetes Sister     Hypertension Brother     Diabetes Brother     Breast Cancer Paternal Grandmother      older, but not sure age.  Breast Cancer Paternal Aunt      and gyn cancer ?age       Social History:   She  reports that she has never smoked. She has never used smokeless tobacco.  She  reports that she does not drink alcohol.             Review of Systems:   General: negative for - chills, fatigue, fever, weight change  Psych: negative for - anxiety, depression, irritability or mood swings  ENT: negative for - headaches, hearing change, nasal congestion, oral lesions, sneezing or sore throat  Heme/ Lymph: negative for - bleeding problems, bruising, pallor or swollen lymph nodes  Endo: negative for - hot flashes, polydipsia/polyuria or temperature intolerance  Resp: negative for - cough, shortness of breath or wheezing  CV: negative for - chest pain, edema or palpitations  GI: negative for - abdominal pain, change in bowel habits, constipation, diarrhea or nausea/vomiting  : negative for - dysuria, hematuria, incontinence, pelvic pain or vulvar/vaginal symptoms  MSK: negative for - joint pain, joint swelling or muscle pain  Neuro: negative for - confusion, headaches, seizures or weakness  Derm: negative for - dry skin, hair changes, rash or skin lesion changes          Physical:   Vitals:   Vitals:    06/13/18 1050   BP: 151/60   Pulse: 67   Resp: 18   Temp: 96.3 °F (35.7 °C)   TempSrc: Oral   SpO2: 96%   Weight: 168 lb (76.2 kg)   Height: 5' (1.524 m)           Exam:   HEENT- atraumatic,normocephalic, awake, oriented, well nourished  Neck - supple,no enlarged lymph nodes, no JVD, no thyromegaly  Chest- CTA, no rhonchi, no crackles  Heart- rrr, no murmurs / gallop/rub  Abdomen- soft,BS+,NT, no hepatosplenomegaly  Ext - no c/c/edema   Neuro- no focal deficits. Power 5/5 all extremities  Skin - warm,dry, no obvious rashes.           Review of Data:   LABS:   Lab Results   Component Value Date/Time    WBC 6.3 03/05/2018 04:45 AM    HGB 10.8 (L) 03/05/2018 04:45 AM    HCT 33.7 (L) 03/05/2018 04:45 AM    PLATELET 564 74/69/9320 04:45 AM     Lab Results   Component Value Date/Time    Sodium 142 03/05/2018 04:45 AM    Potassium 4.1 03/05/2018 04:45 AM    Chloride 110 (H) 03/05/2018 04:45 AM    CO2 23 03/05/2018 04:45 AM    Glucose 83 03/05/2018 04:45 AM    BUN 10 03/05/2018 04:45 AM    Creatinine 0.63 03/05/2018 04:45 AM Lab Results   Component Value Date/Time    Cholesterol, total 146 04/20/2017 11:23 AM    HDL Cholesterol 57 04/20/2017 11:23 AM    LDL, calculated 68 04/20/2017 11:23 AM    Triglyceride 105 04/20/2017 11:23 AM     No results found for: GPT        Impression / Plan:        ICD-10-CM ICD-9-CM    1. Atrial fibrillation with RVR (HCC) I48.91 427.31    2. Type 2 diabetes mellitus with nephropathy (HCC) E11.21 250.40      583.81    3. Essential hypertension I10 401.9    4. Hypothyroidism due to acquired atrophy of thyroid E03.4 244.8      246.8      DM/HTN/Jypothyroidism - stable        Explained to patient risk benefits of the medications. Advised patient to stop meds if having any side effects. Pt verbalized understanding of the instructions. I have discussed the diagnosis with the patient and the intended plan as seen in the above orders. The patient has received an after-visit summary and questions were answered concerning future plans. I have discussed medication side effects and warnings with the patient as well. I have reviewed the plan of care with the patient, accepted their input and they are in agreement with the treatment goals. Reviewed plan of care. Patient has provided input and agrees with goals. Follow-up Disposition:  Return in about 2 months (around 8/13/2018).     [id number] Lewis MD

## 2018-06-19 RX ORDER — LEVOTHYROXINE SODIUM 75 UG/1
75 TABLET ORAL
Qty: 90 TAB | Refills: 0 | Status: SHIPPED | OUTPATIENT
Start: 2018-06-19 | End: 2018-07-10 | Stop reason: SDUPTHER

## 2018-06-19 RX ORDER — METOPROLOL TARTRATE 25 MG/1
25 TABLET, FILM COATED ORAL 2 TIMES DAILY
Qty: 180 TAB | Refills: 6 | Status: SHIPPED | OUTPATIENT
Start: 2018-06-19 | End: 2019-01-17 | Stop reason: ALTCHOICE

## 2018-06-19 RX ORDER — PANTOPRAZOLE SODIUM 40 MG/1
40 TABLET, DELAYED RELEASE ORAL DAILY
Qty: 90 TAB | Refills: 3 | Status: SHIPPED | OUTPATIENT
Start: 2018-06-19 | End: 2019-05-30 | Stop reason: SDUPTHER

## 2018-06-25 DIAGNOSIS — I10 ESSENTIAL HYPERTENSION: ICD-10-CM

## 2018-06-25 RX ORDER — LOSARTAN POTASSIUM 50 MG/1
TABLET ORAL
Qty: 30 TAB | Refills: 0 | Status: SHIPPED | OUTPATIENT
Start: 2018-06-25 | End: 2018-07-10 | Stop reason: SDUPTHER

## 2018-06-25 RX ORDER — METFORMIN HYDROCHLORIDE 1000 MG/1
TABLET ORAL
Qty: 60 TAB | Refills: 0 | Status: SHIPPED | OUTPATIENT
Start: 2018-06-25 | End: 2018-07-10 | Stop reason: SDUPTHER

## 2018-06-25 RX ORDER — METFORMIN HYDROCHLORIDE 1000 MG/1
1000 TABLET ORAL 2 TIMES DAILY WITH MEALS
Qty: 60 TAB | Refills: 0 | Status: SHIPPED | OUTPATIENT
Start: 2018-06-25 | End: 2018-07-23 | Stop reason: SDUPTHER

## 2018-07-10 ENCOUNTER — OFFICE VISIT (OUTPATIENT)
Dept: CARDIOLOGY CLINIC | Age: 78
End: 2018-07-10

## 2018-07-10 VITALS
HEART RATE: 68 BPM | WEIGHT: 173 LBS | DIASTOLIC BLOOD PRESSURE: 80 MMHG | OXYGEN SATURATION: 97 % | BODY MASS INDEX: 33.96 KG/M2 | HEIGHT: 60 IN | SYSTOLIC BLOOD PRESSURE: 166 MMHG

## 2018-07-10 DIAGNOSIS — E78.00 PURE HYPERCHOLESTEROLEMIA: ICD-10-CM

## 2018-07-10 DIAGNOSIS — I48.3 TYPICAL ATRIAL FLUTTER (HCC): ICD-10-CM

## 2018-07-10 DIAGNOSIS — I10 ESSENTIAL HYPERTENSION WITH GOAL BLOOD PRESSURE LESS THAN 140/90: Primary | ICD-10-CM

## 2018-07-10 NOTE — PROGRESS NOTES
Ms. Sourav Cardozo is a 77year old female with a history of atrial flutter, diabetes, hypertension, dyslipidemia and hypothyroidism. Ms. Sourav Cardozo is here today for a follow up appointment. She denies any symptoms to suggest angina or heart failure. She denies any palpitations, presyncope or syncope. She is taking all her medications regularly. She says sometimes at home her blood pressure runs on the higher side. Patient's cardiac risk factors are dyslipidemia, diabetes mellitus, obesity, hypertension, post-menopausal.      Past Medical History:   Diagnosis Date    Aortic stenosis     Arthritis     Atrial flutter (HCC)     Bronchitis     Diabetes (HCC)     Diverticulitis     GERD (gastroesophageal reflux disease)     Hypercholesterolemia     Hypertension     Hypothyroid     Pancreatitis        Current Outpatient Prescriptions   Medication Sig Dispense Refill    metFORMIN (GLUCOPHAGE) 1,000 mg tablet Take 1 Tab by mouth two (2) times daily (with meals). 60 Tab 0    pantoprazole (PROTONIX) 40 mg tablet Take 1 Tab by mouth daily. 90 Tab 3    metoprolol tartrate (LOPRESSOR) 25 mg tablet Take 1 Tab by mouth two (2) times a day. 180 Tab 6    levothyroxine (SYNTHROID) 75 mcg tablet TAKE 1 TABLET BY MOUTH DAILY BEFORE BREAKFAST 30 Tab 0    simvastatin (ZOCOR) 20 mg tablet TAKE 1 TABLET BY MOUTH EVERY NIGHT 90 Tab 0    triamcinolone acetonide (KENALOG) 0.1 % topical cream APPLY THIN LAYER TO AFFECTED AREA TWICE DAILY 15 g 0    losartan (COZAAR) 50 mg tablet Take 1 Tab by mouth daily. 90 Tab 2    apixaban (ELIQUIS) 5 mg tablet Take 1 Tab by mouth two (2) times a day. 60 Tab 6    clobetasol (TEMOVATE) 0.05 % ointment Apply  to affected area two (2) times a day. 60 g 3    glipiZIDE (GLUCOTROL) 5 mg tablet TAKE 1 TABLET BY MOUTH TWICE DAILY 180 Tab 3    oxyCODONE-acetaminophen (PERCOCET) 5-325 mg per tablet Take 1-2 Tabs by mouth every four (4) hours as needed. Max Daily Amount: 12 Tabs.  60 Tab 0    cholecalciferol (VITAMIN D3) 1,000 unit tablet Take 2 Tabs by mouth daily. 60 Tab 0    docusate sodium (COLACE) 100 mg capsule Take 1 Cap by mouth two (2) times a day. Indications: take while on narcotics for pain 60 Cap 2    furosemide (LASIX) 40 mg tablet TAKE 1 TABLET BY MOUTH DAILY 90 Tab 0     Allergies   Allergen Reactions    Ampicillin Itching     Past Medical History:   Diagnosis Date    Arthritis     Bronchitis     Diabetes ([address]     Diverticulitis     GERD (gastroesophageal reflux disease)     Hypercholesterolemia     Hypertension     Hypothyroid     Pancreatitis      Past Surgical History:   Procedure Laterality Date    COLONOSCOPY N/A 2/2/2017    COLONOSCOPY  w/bx polyp performed by Layla Byrne MD at [facility] ENDOSCOPY     Family History   Problem Relation Age of Onset    Diabetes Mother     Heart Disease Mother     Cancer Father      melanoma    Stroke Sister     Hypertension Sister     Diabetes Sister     Hypertension Brother     Diabetes Brother     Breast Cancer Paternal Grandmother      older, but not sure age.     Breast Cancer Paternal Aunt      and gyn cancer ?age     History   Smoking Status    Never Smoker   Smokeless Tobacco    Never Used          Review of Systems, additional:  Constitutional: negative  Eyes: negative  Respiratory: negative for cough, sputum or dyspnea on exertion  Cardiovascular: per HPI  Gastrointestinal: negative for nausea and vomiting  Musculoskeletal:negative  Neurological: negative  Behvioral/Psych: negative  Endocrine: negative  ENT: negative    Objective:     Visit Vitals    /80    Pulse 68    Ht 5' (1.524 m)    Wt 173 lb (78.5 kg)    SpO2 97%    BMI 33.79 kg/m2     General:  alert, cooperative, no distress   Chest Wall: inspection normal - no chest wall deformities or tenderness, respiratory effort normal   Lung: clear to auscultation bilaterally, no rales   Heart:  normal rate, regular rhythm, normal S1, S2, Mild 2/6 MADDI on aortic area. Abdomen: soft, non-tender. Bowel sounds normal. No masses,  no organomegaly   Extremities: edema trace bilateral LE's Skin: no rashes   Neuro: alert, oriented, normal speech, no focal findings or movement disorder noted       Echo 03/2018  Left ventricle: Systolic function was normal by visual assessment. Ejection   fraction was estimated to be 60 %. No  obvious wall motion abnormalities identified in the views obtained. Features   were consistent with a pseudonormal left  ventricular filling pattern, with concomitant abnormal relaxation and   increased filling pressure (grade 2 diastolic  dysfunction). Right ventricle: The ventricle was mildly dilated. Systolic function was   normal.    Left atrium: The atrium was mildly dilated. Aortic valve: The valve was trileaflet. Leaflets exhibited mild to moderate calcification and mildly reduced cuspal  separation. Transaortic velocity was increased due to valvular stenosis. There was mild to moderate stenosis. There was  trivial regurgitation. Valve mean gradient was 21 mmHg. Tricuspid valve: There was mild regurgitation. Assessment/Plan:     Atrial flutter: In sinus on exam today, rate is controlled. Continue lopressor. ALso on Eliquis. No bleeding side effects. Episode of chest pain:   None since last visit. Was very atypical in nature when she complaint last time. Continue to observe. On BB and statin already  Continue clinical observation    Hypertension: She is on Lopressor and Losartan. Will increase losartan to 75 mg daily. Dyslipidemia: continue with simvastatin, last LDL 68 in April 2017. Aortic stenosis: Mild-moderate by echo in 2018. Mean gradient 21 mm Hg. Repeat ECHo in one year. Follow up in 6 months or sooner as symptoms dictate.

## 2018-07-10 NOTE — MR AVS SNAPSHOT
[address] [facility] [address] [id number] 
[phone] Patient: Yamel Ruelas MRN: [id number] Visit Information Date & Time Provider Department Dept. Phone Encounter #  
 7/10/2018  1:15 PM Franklyn Chaudhry MD Cardio Specialist at [facility]/[address] [phone] [id number] Follow-up Instructions Return in about 6 months (around 1/10/2019). Your Appointments 8/14/2018  1:00 PM  
ROUTINE CARE with Hans Pino MD  
[facility]) Appt Note: Return in about 2 months (around 8/13/2018). [phone]  
  
   
 [address] [facility] [address] Upcoming Health Maintenance Date Due  
 FOOT EXAM Q1 7/4/1950 EYE EXAM RETINAL OR DILATED Q1 7/4/1950 GLAUCOMA SCREENING Q2Y 7/4/2004 MICROALBUMIN Q1 4/20/2018 LIPID PANEL Q1 4/20/2018 MEDICARE YEARLY EXAM 5/4/2018 Pneumococcal 65+ Low/Medium Risk (2 of 2 - PPSV23) 5/17/2018 Influenza Age 5 to Adult 8/1/2018 HEMOGLOBIN A1C Q6M 9/3/2018 DTaP/Tdap/Td series (2 - Td) 5/13/2027 Allergies as of 7/10/2018  Review Complete On: 7/10/2018 By: Adonis Jimenez RN Severity Noted Reaction Type Reactions Ampicillin  08/22/2013    Itching Current Immunizations  Reviewed on 3/5/2018 Name Date Influenza High Dose Vaccine PF 9/5/2017 Influenza Vaccine 9/1/2017 Influenza Vaccine (Quad) PF 11/9/2016 11:56 AM  
 Pneumococcal Conjugate (PCV-13) 5/17/2017 Not reviewed this visit Vitals BP Pulse Height(growth percentile) Weight(growth percentile) SpO2 BMI  
 166/80 68 5' (1.524 m) 173 lb (78.5 kg) 97% 33.79 kg/m2 OB Status Smoking Status Postmenopausal Never Smoker BMI and BSA Data Body Mass Index Body Surface Area 33.79 kg/m 2 1.82 m 2 Preferred Pharmacy Pharmacy Name Phone [facility], [address] [phone] Your Updated Medication List  
  
   
This list is accurate as of 7/10/18  1:34 PM.  Always use your most recent med list.  
  
  
  
  
 apixaban 5 mg tablet Commonly known as:  Timmothy [place] Take 1 Tab by mouth two (2) times a day. cholecalciferol 1,000 unit tablet Commonly known as:  VITAMIN D3 Take 2 Tabs by mouth daily. clobetasol 0.05 % ointment Commonly known as:  Khushbu Katerine Apply  to affected area two (2) times a day. docusate sodium 100 mg capsule Commonly known as:  Ltanya Earle Take 1 Cap by mouth two (2) times a day. Indications: take while on narcotics for pain  
  
 furosemide 40 mg tablet Commonly known as:  LASIX TAKE 1 TABLET BY MOUTH DAILY  
  
 glipiZIDE 5 mg tablet Commonly known as:  GLUCOTROL  
TAKE 1 TABLET BY MOUTH TWICE DAILY  
  
 levothyroxine 75 mcg tablet Commonly known as:  SYNTHROID  
TAKE 1 TABLET BY MOUTH DAILY BEFORE BREAKFAST  
  
 losartan 50 mg tablet Commonly known as:  COZAAR Take 1 Tab by mouth daily. metFORMIN 1,000 mg tablet Commonly known as:  GLUCOPHAGE Take 1 Tab by mouth two (2) times daily (with meals). metoprolol tartrate 25 mg tablet Commonly known as:  LOPRESSOR Take 1 Tab by mouth two (2) times a day. oxyCODONE-acetaminophen 5-325 mg per tablet Commonly known as:  PERCOCET Take 1-2 Tabs by mouth every four (4) hours as needed. Max Daily Amount: 12 Tabs. pantoprazole 40 mg tablet Commonly known as:  PROTONIX Take 1 Tab by mouth daily. simvastatin 20 mg tablet Commonly known as:  ZOCOR  
TAKE 1 TABLET BY MOUTH EVERY NIGHT  
  
 triamcinolone acetonide 0.1 % topical cream  
Commonly known as:  KENALOG  
APPLY THIN LAYER TO AFFECTED AREA TWICE DAILY Follow-up Instructions Return in about 6 months (around 1/10/2019). Patient Instructions Increase losartan to 75 mg daily F/U in 6 months Introducing [facility]! Allayessy Fletcher introduces ReviewPro patient portal. Now you can access parts of your medical record, email your doctor's office, and request medication refills online. 1. In your internet browser, go to [email or web address] Can [email or web address] Car 2. Click on the First Time User? Click Here link in the Sign In box. You will see the New Member Sign Up page. 3. Enter your Review[employer] Access Code exactly as it appears below. You will not need to use this code after youve completed the sign-up process. If you do not sign up before the expiration date, you must request a new code. · Review[employer] Access Code: [email or web address] Expires: 9/11/2018 10:07 AM 
 
4. Enter the last four digits of your Social Security Number (xxxx) and Date of Birth (mm/dd/yyyy) as indicated and click Submit. You will be taken to the next sign-up page. 5. Create a ReviewPro ID. This will be your ReviewPro login ID and cannot be changed, so think of one that is secure and easy to remember. 6. Create a ReviewPro password. You can change your password at any time. 7. Enter your Password Reset Question and Answer. This can be used at a later time if you forget your password. 8. Enter your e-mail address. You will receive e-mail notification when new information is available in [address] 9. Click Sign Up. You can now view and download portions of your medical record. 10. Click the Download Summary menu link to download a portable copy of your medical information. If you have questions, please visit the Frequently Asked Questions section of the Review[employer] website. Remember, ReviewPro is NOT to be used for urgent needs. For medical emergencies, dial 911. Now available from your iPhone and Android! Please provide this summary of care documentation to your next provider. Your primary care clinician is listed as [facility]. If you have any questions after today's visit, please call [phone].

## 2018-07-10 NOTE — PROGRESS NOTES
1. Have you been to the ER, urgent care clinic since your last visit? Hospitalized since your last visit? No    2. Have you seen or consulted any other health care providers outside of the [address] [place] [address] since your last visit? Include any pap smears or colon screening.  No

## 2018-07-23 DIAGNOSIS — L30.9 ECZEMA, UNSPECIFIED TYPE: ICD-10-CM

## 2018-07-24 RX ORDER — TRIAMCINOLONE ACETONIDE 1 MG/G
CREAM TOPICAL
Qty: 15 G | Refills: 0 | Status: SHIPPED | OUTPATIENT
Start: 2018-07-24 | End: 2018-10-10 | Stop reason: SDUPTHER

## 2018-07-24 RX ORDER — METFORMIN HYDROCHLORIDE 1000 MG/1
TABLET ORAL
Qty: 180 TAB | Refills: 0 | Status: SHIPPED | OUTPATIENT
Start: 2018-07-24 | End: 2018-10-10 | Stop reason: SDUPTHER

## 2018-07-24 RX ORDER — METFORMIN HYDROCHLORIDE 1000 MG/1
1000 TABLET ORAL 2 TIMES DAILY WITH MEALS
Qty: 60 TAB | Refills: 0 | Status: SHIPPED | OUTPATIENT
Start: 2018-07-24 | End: 2018-07-24 | Stop reason: SDUPTHER

## 2018-08-17 DIAGNOSIS — E03.4 HYPOTHYROIDISM DUE TO ACQUIRED ATROPHY OF THYROID: ICD-10-CM

## 2018-08-18 RX ORDER — LEVOTHYROXINE SODIUM 75 UG/1
75 TABLET ORAL
Qty: 30 TAB | Refills: 0 | Status: SHIPPED | OUTPATIENT
Start: 2018-08-18 | End: 2018-08-21 | Stop reason: SDUPTHER

## 2018-08-18 RX ORDER — SIMVASTATIN 20 MG/1
20 TABLET, FILM COATED ORAL
Qty: 90 TAB | Refills: 0 | Status: SHIPPED | OUTPATIENT
Start: 2018-08-18 | End: 2018-10-10 | Stop reason: SDUPTHER

## 2018-08-21 DIAGNOSIS — E03.4 HYPOTHYROIDISM DUE TO ACQUIRED ATROPHY OF THYROID: ICD-10-CM

## 2018-08-21 RX ORDER — LEVOTHYROXINE SODIUM 75 UG/1
TABLET ORAL
Qty: 90 TAB | Refills: 0 | Status: SHIPPED | OUTPATIENT
Start: 2018-08-21 | End: 2018-10-10 | Stop reason: SDUPTHER

## 2018-09-17 DIAGNOSIS — E03.4 HYPOTHYROIDISM DUE TO ACQUIRED ATROPHY OF THYROID: ICD-10-CM

## 2018-09-17 DIAGNOSIS — M79.89 LEG SWELLING: ICD-10-CM

## 2018-09-17 RX ORDER — LEVOTHYROXINE SODIUM 75 UG/1
TABLET ORAL
Qty: 30 TAB | Refills: 0 | Status: SHIPPED | OUTPATIENT
Start: 2018-09-17 | End: 2019-01-17 | Stop reason: SDUPTHER

## 2018-09-17 RX ORDER — FUROSEMIDE 40 MG/1
TABLET ORAL
Qty: 10 TAB | Refills: 0 | Status: SHIPPED | OUTPATIENT
Start: 2018-09-17 | End: 2019-01-17 | Stop reason: ALTCHOICE

## 2018-09-17 RX ORDER — GLIPIZIDE 5 MG/1
TABLET ORAL
Qty: 60 TAB | Refills: 0 | Status: SHIPPED | OUTPATIENT
Start: 2018-09-17 | End: 2019-04-26 | Stop reason: SDUPTHER

## 2018-09-17 RX ORDER — LOSARTAN POTASSIUM 25 MG/1
TABLET ORAL
Qty: 60 TAB | Refills: 0 | Status: SHIPPED | OUTPATIENT
Start: 2018-09-17 | End: 2018-11-08 | Stop reason: SDUPTHER

## 2018-10-10 ENCOUNTER — HOSPITAL ENCOUNTER (OUTPATIENT)
Dept: GENERAL RADIOLOGY | Age: 78
Discharge: HOME | End: 2018-10-10
Payer: MEDICARE

## 2018-10-10 ENCOUNTER — OFFICE VISIT (OUTPATIENT)
Dept: FAMILY MEDICINE CLINIC | Age: 78
End: 2018-10-10

## 2018-10-10 ENCOUNTER — HOSPITAL ENCOUNTER (OUTPATIENT)
Dept: LAB | Age: 78
Discharge: HOME | End: 2018-10-10
Payer: MEDICARE

## 2018-10-10 VITALS
TEMPERATURE: 98.2 F | DIASTOLIC BLOOD PRESSURE: 75 MMHG | HEIGHT: 60 IN | RESPIRATION RATE: 16 BRPM | OXYGEN SATURATION: 97 % | WEIGHT: 175 LBS | HEART RATE: 71 BPM | BODY MASS INDEX: 34.36 KG/M2 | SYSTOLIC BLOOD PRESSURE: 152 MMHG

## 2018-10-10 DIAGNOSIS — E11.21 TYPE 2 DIABETES MELLITUS WITH NEPHROPATHY (HCC): ICD-10-CM

## 2018-10-10 DIAGNOSIS — M25.561 PAIN IN BOTH KNEES, UNSPECIFIED CHRONICITY: ICD-10-CM

## 2018-10-10 DIAGNOSIS — M25.511 RIGHT SHOULDER PAIN, UNSPECIFIED CHRONICITY: ICD-10-CM

## 2018-10-10 DIAGNOSIS — Z23 ENCOUNTER FOR IMMUNIZATION: ICD-10-CM

## 2018-10-10 DIAGNOSIS — E03.4 HYPOTHYROIDISM DUE TO ACQUIRED ATROPHY OF THYROID: ICD-10-CM

## 2018-10-10 DIAGNOSIS — E11.21 TYPE 2 DIABETES MELLITUS WITH NEPHROPATHY (HCC): Primary | ICD-10-CM

## 2018-10-10 DIAGNOSIS — I10 ESSENTIAL HYPERTENSION: ICD-10-CM

## 2018-10-10 DIAGNOSIS — M25.562 PAIN IN BOTH KNEES, UNSPECIFIED CHRONICITY: ICD-10-CM

## 2018-10-10 DIAGNOSIS — L30.9 ECZEMA, UNSPECIFIED TYPE: ICD-10-CM

## 2018-10-10 DIAGNOSIS — Z12.39 BREAST CANCER SCREENING: ICD-10-CM

## 2018-10-10 DIAGNOSIS — L98.9 FACIAL LESION: ICD-10-CM

## 2018-10-10 LAB
ALBUMIN SERPL-MCNC: 3.6 G/DL (ref 3.4–5)
ALBUMIN/GLOB SERPL: 0.9 {RATIO} (ref 0.8–1.7)
ALP SERPL-CCNC: 75 U/L (ref 45–117)
ALT SERPL-CCNC: 12 U/L (ref 13–56)
ANION GAP SERPL CALC-SCNC: 10 MMOL/L (ref 3–18)
AST SERPL-CCNC: 14 U/L (ref 15–37)
BASOPHILS # BLD: 0 K/UL (ref 0–0.1)
BASOPHILS NFR BLD: 0 % (ref 0–2)
BILIRUB SERPL-MCNC: 0.3 MG/DL (ref 0.2–1)
BUN SERPL-MCNC: 19 MG/DL (ref 7–18)
BUN/CREAT SERPL: 19 (ref 12–20)
CALCIUM SERPL-MCNC: 8.5 MG/DL (ref 8.5–10.1)
CHLORIDE SERPL-SCNC: 109 MMOL/L (ref 100–108)
CHOLEST SERPL-MCNC: 143 MG/DL
CO2 SERPL-SCNC: 23 MMOL/L (ref 21–32)
CREAT SERPL-MCNC: 1.01 MG/DL (ref 0.6–1.3)
DIFFERENTIAL METHOD BLD: NORMAL
EOSINOPHIL # BLD: 0.3 K/UL (ref 0–0.4)
EOSINOPHIL NFR BLD: 3 % (ref 0–5)
ERYTHROCYTE [DISTWIDTH] IN BLOOD BY AUTOMATED COUNT: 13.1 % (ref 11.6–14.5)
EST. AVERAGE GLUCOSE BLD GHB EST-MCNC: 126 MG/DL
GLOBULIN SER CALC-MCNC: 3.9 G/DL (ref 2–4)
GLUCOSE SERPL-MCNC: 88 MG/DL (ref 74–99)
HBA1C MFR BLD: 6 % (ref 4.2–5.6)
HCT VFR BLD AUTO: 39.8 % (ref 35–45)
HDLC SERPL-MCNC: 47 MG/DL (ref 40–60)
HDLC SERPL: 3 {RATIO} (ref 0–5)
HGB BLD-MCNC: 12.6 G/DL (ref 12–16)
LDLC SERPL CALC-MCNC: 67.6 MG/DL (ref 0–100)
LIPID PROFILE,FLP: NORMAL
LYMPHOCYTES # BLD: 3.3 K/UL (ref 0.9–3.6)
LYMPHOCYTES NFR BLD: 39 % (ref 21–52)
MCH RBC QN AUTO: 27.5 PG (ref 24–34)
MCHC RBC AUTO-ENTMCNC: 31.7 G/DL (ref 31–37)
MCV RBC AUTO: 86.9 FL (ref 74–97)
MONOCYTES # BLD: 0.5 K/UL (ref 0.05–1.2)
MONOCYTES NFR BLD: 6 % (ref 3–10)
NEUTS SEG # BLD: 4.3 K/UL (ref 1.8–8)
NEUTS SEG NFR BLD: 52 % (ref 40–73)
PLATELET # BLD AUTO: 260 K/UL (ref 135–420)
PMV BLD AUTO: 10.3 FL (ref 9.2–11.8)
POTASSIUM SERPL-SCNC: 4.7 MMOL/L (ref 3.5–5.5)
PROT SERPL-MCNC: 7.5 G/DL (ref 6.4–8.2)
RBC # BLD AUTO: 4.58 M/UL (ref 4.2–5.3)
SODIUM SERPL-SCNC: 142 MMOL/L (ref 136–145)
TRIGL SERPL-MCNC: 142 MG/DL (ref ?–150)
TSH SERPL DL<=0.05 MIU/L-ACNC: 1.4 UIU/ML (ref 0.36–3.74)
VLDLC SERPL CALC-MCNC: 28.4 MG/DL
WBC # BLD AUTO: 8.3 K/UL (ref 4.6–13.2)

## 2018-10-10 PROCEDURE — 83036 HEMOGLOBIN GLYCOSYLATED A1C: CPT | Performed by: INTERNAL MEDICINE

## 2018-10-10 PROCEDURE — 80053 COMPREHEN METABOLIC PANEL: CPT | Performed by: INTERNAL MEDICINE

## 2018-10-10 PROCEDURE — 80061 LIPID PANEL: CPT | Performed by: INTERNAL MEDICINE

## 2018-10-10 PROCEDURE — 82043 UR ALBUMIN QUANTITATIVE: CPT | Performed by: INTERNAL MEDICINE

## 2018-10-10 PROCEDURE — 36415 COLL VENOUS BLD VENIPUNCTURE: CPT | Performed by: INTERNAL MEDICINE

## 2018-10-10 PROCEDURE — 73560 X-RAY EXAM OF KNEE 1 OR 2: CPT

## 2018-10-10 PROCEDURE — 73030 X-RAY EXAM OF SHOULDER: CPT

## 2018-10-10 PROCEDURE — 85025 COMPLETE CBC W/AUTO DIFF WBC: CPT | Performed by: INTERNAL MEDICINE

## 2018-10-10 PROCEDURE — 84443 ASSAY THYROID STIM HORMONE: CPT | Performed by: INTERNAL MEDICINE

## 2018-10-10 RX ORDER — TRIAMCINOLONE ACETONIDE 1 MG/G
CREAM TOPICAL
Qty: 15 G | Refills: 0 | Status: SHIPPED | OUTPATIENT
Start: 2018-10-10 | End: 2019-02-22 | Stop reason: SDUPTHER

## 2018-10-10 RX ORDER — LOSARTAN POTASSIUM 50 MG/1
50 TABLET ORAL DAILY
Qty: 90 TAB | Refills: 2 | Status: SHIPPED | OUTPATIENT
Start: 2018-10-10 | End: 2019-03-19 | Stop reason: DRUGHIGH

## 2018-10-10 RX ORDER — GLIPIZIDE 5 MG/1
5 TABLET ORAL 2 TIMES DAILY
Qty: 180 TAB | Refills: 3 | Status: SHIPPED | OUTPATIENT
Start: 2018-10-10 | End: 2019-01-17 | Stop reason: SDUPTHER

## 2018-10-10 RX ORDER — METFORMIN HYDROCHLORIDE 1000 MG/1
1000 TABLET ORAL 2 TIMES DAILY
Qty: 180 TAB | Refills: 0 | Status: SHIPPED | OUTPATIENT
Start: 2018-10-10 | End: 2019-01-17 | Stop reason: SDUPTHER

## 2018-10-10 RX ORDER — SIMVASTATIN 20 MG/1
20 TABLET, FILM COATED ORAL
Qty: 90 TAB | Refills: 0 | Status: SHIPPED | OUTPATIENT
Start: 2018-10-10 | End: 2019-02-04 | Stop reason: SDUPTHER

## 2018-10-10 RX ORDER — GLIPIZIDE 5 MG/1
TABLET ORAL
Qty: 60 TAB | Refills: 0 | Status: CANCELLED | OUTPATIENT
Start: 2018-10-10

## 2018-10-10 RX ORDER — LEVOTHYROXINE SODIUM 75 UG/1
75 TABLET ORAL
Qty: 90 TAB | Refills: 0 | Status: SHIPPED | OUTPATIENT
Start: 2018-10-10 | End: 2019-02-04 | Stop reason: SDUPTHER

## 2018-10-10 RX ORDER — LEVOTHYROXINE SODIUM 75 UG/1
TABLET ORAL
Qty: 30 TAB | Refills: 0 | Status: CANCELLED | OUTPATIENT
Start: 2018-10-10

## 2018-10-10 NOTE — PROGRESS NOTES
Salbador Armenta is a 77 y.o.  female and presents with     Chief Complaint   Patient presents with    Diabetes    Hypertension    Hypothyroidism    Cholesterol Problem    Knee Pain    Shoulder Pain    Skin Problem       Pt says she has pain in her rt shoulder and also both her knees  She is not able to go upstairs in her house due to weakness in her shoulders as well as knees. She is requesting power wheel chair. Pt also has lesion on her cheek. She is taking meds for DM/ HTN/hyperchol and hypothyroidism . She needs refills on her meds     Past Medical History:   Diagnosis Date    Aortic stenosis     Arthritis     Atrial flutter (HCC)     Bronchitis     Diabetes (HCC)     Diverticulitis     GERD (gastroesophageal reflux disease)     Hypercholesterolemia     Hypertension     Hypothyroid     Pancreatitis      Past Surgical History:   Procedure Laterality Date    COLONOSCOPY N/A 2/2/2017    COLONOSCOPY  w/bx polyp performed by Vargas Love MD at [facility] ENDOSCOPY     Current Outpatient Prescriptions   Medication Sig    levothyroxine (SYNTHROID) 75 mcg tablet Take 1 Tab by mouth Daily (before breakfast).  glipiZIDE (GLUCOTROL) 5 mg tablet Take 1 Tab by mouth two (2) times a day.  losartan (COZAAR) 50 mg tablet Take 1 Tab by mouth daily.  simvastatin (ZOCOR) 20 mg tablet Take 1 Tab by mouth nightly.  metFORMIN (GLUCOPHAGE) 1,000 mg tablet Take 1 Tab by mouth two (2) times a day.  triamcinolone acetonide (KENALOG) 0.1 % topical cream APPLY THIN LAYER TO AFFECTED AREA TWICE DAILY    levothyroxine (SYNTHROID) 75 mcg tablet TAKE 1 TABLET BY MOUTH DAILY BEFORE BREAKFAST    furosemide (LASIX) 40 mg tablet TAKE 1 TABLET BY MOUTH DAILY    glipiZIDE (GLUCOTROL) 5 mg tablet TAKE 1 TABLET BY MOUTH TWICE DAILY    losartan (COZAAR) 25 mg tablet TAKE 1 TABLET BY MOUTH DAILY    pantoprazole (PROTONIX) 40 mg tablet Take 1 Tab by mouth daily.     metoprolol tartrate (LOPRESSOR) 25 mg tablet Take 1 Tab by mouth two (2) times a day.  apixaban (ELIQUIS) 5 mg tablet Take 1 Tab by mouth two (2) times a day.  furosemide (LASIX) 40 mg tablet TAKE 1 TABLET BY MOUTH DAILY    oxyCODONE-acetaminophen (PERCOCET) 5-325 mg per tablet Take 1-2 Tabs by mouth every four (4) hours as needed. Max Daily Amount: 12 Tabs.  cholecalciferol (VITAMIN D3) 1,000 unit tablet Take 2 Tabs by mouth daily.  docusate sodium (COLACE) 100 mg capsule Take 1 Cap by mouth two (2) times a day. Indications: take while on narcotics for pain     No current facility-administered medications for this visit. Health Maintenance   Topic Date Due    FOOT EXAM Q1  07/04/1950    EYE EXAM RETINAL OR DILATED Q1  07/04/1950    Shingrix Vaccine Age 50> (1 of 2) 07/04/1989    GLAUCOMA SCREENING Q2Y  07/04/2004    MICROALBUMIN Q1  04/20/2018    LIPID PANEL Q1  04/20/2018    MEDICARE YEARLY EXAM  05/04/2018    Pneumococcal 65+ Low/Medium Risk (2 of 2 - PPSV23) 05/17/2018    Influenza Age 9 to Adult  08/01/2018    HEMOGLOBIN A1C Q6M  09/03/2018    DTaP/Tdap/Td series (2 - Td) 05/13/2027    Bone Densitometry (Dexa) Screening  Completed     Immunization History   Administered Date(s) Administered    Influenza High Dose Vaccine PF 09/05/2017    Influenza Vaccine 09/01/2017    Influenza Vaccine (Quad) PF 11/09/2016    Influenza Vaccine (Tri) Adjuvanted 10/10/2018    Pneumococcal Conjugate (PCV-13) 05/17/2017     No LMP recorded. Patient is postmenopausal.        Allergies and Intolerances: Allergies   Allergen Reactions    Ampicillin Itching       Family History:   Family History   Problem Relation Age of Onset    Diabetes Mother     Heart Disease Mother     Cancer Father      melanoma    Stroke Sister     Hypertension Sister     Diabetes Sister     Hypertension Brother     Diabetes Brother     Breast Cancer Paternal Grandmother      older, but not sure age.     Breast Cancer Paternal Aunt      and gyn cancer ?age Social History:   She  reports that she has never smoked. She has never used smokeless tobacco.  She  reports that she does not drink alcohol. Review of Systems:   General: negative for - chills, fatigue, fever, weight change  Psych: negative for - anxiety, depression, irritability or mood swings  ENT: negative for - headaches, hearing change, nasal congestion, oral lesions, sneezing or sore throat  Heme/ Lymph: negative for - bleeding problems, bruising, pallor or swollen lymph nodes  Endo: negative for - hot flashes, polydipsia/polyuria or temperature intolerance  Resp: negative for - cough, shortness of breath or wheezing  CV: negative for - chest pain, edema or palpitations  GI: negative for - abdominal pain, change in bowel habits, constipation, diarrhea or nausea/vomiting  : negative for - dysuria, hematuria, incontinence, pelvic pain or vulvar/vaginal symptoms  MSK: negative for - joint pain, joint swelling or muscle pain, pos for knee pain , shoulder pain  Neuro: negative for - confusion, headaches, seizures or weakness  Derm: negative for - dry skin, hair changes, rash or skin lesion changes, pos for skin lesions on face          Physical:   Vitals:   Vitals:    10/10/18 1255   BP: 152/75   Pulse: 71   Resp: 16   Temp: 98.2 °F (36.8 °C)   TempSrc: Oral   SpO2: 97%   Weight: 175 lb (79.4 kg)   Height: 5' (1.524 m)           Exam:   HEENT- atraumatic,normocephalic, awake, oriented, well nourished, keratotic lesion on rt cheek as well as left cheek  Neck - supple,no enlarged lymph nodes, no JVD, no thyromegaly  Chest- CTA, no rhonchi, no crackles  Heart- rrr, no murmurs / gallop/rub  Abdomen- soft,BS+,NT, no hepatosplenomegaly  Ext - no c/c/edema   Neuro- no focal deficits. Power 5/5 all extremities  Skin - warm,dry, no obvious rashes.   Gait - slow , uses a walker, limps while walking   diminished abduction at rt shoulder joint , crepitus in both knees          Review of Data:   LABS:   Lab Results   Component Value Date/Time    WBC 8.3 10/10/2018 02:17 PM    HGB 12.6 10/10/2018 02:17 PM    HCT 39.8 10/10/2018 02:17 PM    PLATELET 457 71/46/2958 02:17 PM     Lab Results   Component Value Date/Time    Sodium 142 10/10/2018 02:17 PM    Potassium 4.7 10/10/2018 02:17 PM    Chloride 109 (H) 10/10/2018 02:17 PM    CO2 23 10/10/2018 02:17 PM    Glucose 88 10/10/2018 02:17 PM    BUN 19 (H) 10/10/2018 02:17 PM    Creatinine 1.01 10/10/2018 02:17 PM     Lab Results   Component Value Date/Time    Cholesterol, total 143 10/10/2018 02:17 PM    HDL Cholesterol 47 10/10/2018 02:17 PM    LDL, calculated 67.6 10/10/2018 02:17 PM    Triglyceride 142 10/10/2018 02:17 PM     No results found for: GPT        Impression / Plan:        ICD-10-CM ICD-9-CM    1. Type 2 diabetes mellitus with nephropathy (HCC) E11.21 250.40 glipiZIDE (GLUCOTROL) 5 mg tablet     583.81 simvastatin (ZOCOR) 20 mg tablet      HEMOGLOBIN A1C WITH EAG      LIPID PANEL      METABOLIC PANEL, COMPREHENSIVE      MICROALBUMIN, UR, RAND W/ MICROALB/CREAT RATIO      CBC WITH AUTOMATED DIFF      TSH 3RD GENERATION   2. Hypothyroidism due to acquired atrophy of thyroid E03.4 244.8 levothyroxine (SYNTHROID) 75 mcg tablet     246.8    3. Essential hypertension I10 401.9 losartan (COZAAR) 50 mg tablet   4. Eczema, unspecified type L30.9 692.9 triamcinolone acetonide (KENALOG) 0.1 % topical cream   5. Encounter for immunization Z23 V03.89 INFLUENZA VACCINE INACTIVATED (IIV), SUBUNIT, ADJUVANTED, IM   6. Pain in both knees, unspecified chronicity M25.561 719.46 XR KNEE LT MAX 2 VWS    M25.562  XR KNEE RT MAX 2 VWS      REFERRAL TO PHYSICAL THERAPY   7. Right shoulder pain, unspecified chronicity M25.511 719.41 XR SHOULDER RT AP/LAT MIN 2 V      REFERRAL TO PHYSICAL THERAPY   8.  Facial lesion L98.9 709.9 REFERRAL TO DERMATOLOGY   9. Breast cancer screening Z12.31 V76.10 ALOK MAMMO BI SCREENING INCL CAD         Explained to patient risk benefits of the medications. Advised patient to stop meds if having any side effects. Pt verbalized understanding of the instructions. I have discussed the diagnosis with the patient and the intended plan as seen in the above orders. The patient has received an after-visit summary and questions were answered concerning future plans. I have discussed medication side effects and warnings with the patient as well. I have reviewed the plan of care with the patient, accepted their input and they are in agreement with the treatment goals. Reviewed plan of care. Patient has provided input and agrees with goals. Follow-up Disposition:  Return in about 2 months (around 12/10/2018).     Nerissa Burnett MD

## 2018-10-10 NOTE — PROGRESS NOTES
1. Have you been to the ER, urgent care clinic since your last visit? Hospitalized since your last visit? No    2. Have you seen or consulted any other health care providers outside of the [address] [place] [address] since your last visit? Include any pap smears or colon screening.  No

## 2018-10-10 NOTE — MR AVS SNAPSHOT
Pipestephen Mehnaz 
 
 
 [address] [facility] [address] [place] [phone] 
[phone] Patient: Jasmin Woodard MRN: [id number] HDF:[birth year] Visit Information Date & Time Provider Department Dept. Phone Encounter #  
 10/10/2018  1:45 PM Afia Ellington, [address] [place] [address] [phone] [id number] Follow-up Instructions Return in about 2 months (around 12/10/2018). Your Appointments 10/10/2018  1:45 PM  
ROUTINE CARE with Afia Ellington MD  
[facility]) Appt Note: Return in about 2 months (around 8/13/2018). ; pt rescheduled; pt canceled; pt rescheduled; lvm for appt. reminder 10/09/18 4:01pm [address]  
 [address] [facility] [address] [facility] [address]  
  
   
 [address] [facility] [address] Upcoming Health Maintenance Date Due  
 FOOT EXAM Q1 7/4/1950 EYE EXAM RETINAL OR DILATED Q1 7/4/1950 Shingrix Vaccine Age 50> (1 of 2) 7/4/1989 GLAUCOMA SCREENING Q2Y 7/4/2004 MICROALBUMIN Q1 4/20/2018 LIPID PANEL Q1 4/20/2018 MEDICARE YEARLY EXAM 5/4/2018 Pneumococcal 65+ Low/Medium Risk (2 of 2 - PPSV23) 5/17/2018 Influenza Age 5 to Adult 8/1/2018 HEMOGLOBIN A1C Q6M 9/3/2018 DTaP/Tdap/Td series (2 - Td) 5/13/2027 Allergies as of 10/10/2018  Review Complete On: 10/10/2018 By: Dustin Gracia LPN Severity Noted Reaction Type Reactions Ampicillin  08/22/2013    Itching Current Immunizations  Reviewed on 3/5/2018 Name Date Influenza High Dose Vaccine PF 9/5/2017 Influenza Vaccine 9/1/2017 Influenza Vaccine (Quad) PF 11/9/2016 11:56 AM  
 Influenza Vaccine (Tri) Adjuvanted  Incomplete Pneumococcal Conjugate (PCV-13) 5/17/2017 Not reviewed this visit You Were Diagnosed With   
  
 Codes Comments  Type 2 diabetes mellitus with nephropathy ([facility] [address]    -  Primary ICD-10-CM: E11.21 
ICD-9-CM: 250.40, 583.81   
 Hypothyroidism due to acquired atrophy of thyroid     ICD-10-CM: E03.4 ICD-9-CM: 244.8, 246.8 Essential hypertension     ICD-10-CM: I10 
ICD-9-CM: 401.9 Eczema, unspecified type     ICD-10-CM: L30.9 ICD-9-CM: 692.9 Encounter for immunization     ICD-10-CM: E05 ICD-9-CM: V03.89 Pain in both knees, unspecified chronicity     ICD-10-CM: M25.561, M25.562 ICD-9-CM: 719.46 Right shoulder pain, unspecified chronicity     ICD-10-CM: M25.511 ICD-9-CM: 719.41 Facial lesion     ICD-10-CM: L98.9 ICD-9-CM: 709.9 Vitals BP Pulse Temp Resp Height(growth percentile) Weight(growth percentile) 152/75 71 98.2 °F (36.8 °C) (Oral) 16 5' (1.524 m) 175 lb (79.4 kg) SpO2 BMI OB Status Smoking Status 97% 34.18 kg/m2 Postmenopausal Never Smoker Vitals History BMI and BSA Data Body Mass Index Body Surface Area  
 34.18 kg/m 2 1.83 m 2 Preferred Pharmacy Pharmacy Name Phone [facility] [address] [phone] Your Updated Medication List  
  
   
This list is accurate as of 10/10/18  1:38 PM.  Always use your most recent med list.  
  
  
  
  
 apixaban 5 mg tablet Commonly known as:  Sonia [place] Take 1 Tab by mouth two (2) times a day. cholecalciferol 1,000 unit tablet Commonly known as:  VITAMIN D3 Take 2 Tabs by mouth daily. docusate sodium 100 mg capsule Commonly known as:  Denise Roman Take 1 Cap by mouth two (2) times a day. Indications: take while on narcotics for pain * furosemide 40 mg tablet Commonly known as:  LASIX TAKE 1 TABLET BY MOUTH DAILY * furosemide 40 mg tablet Commonly known as:  LASIX TAKE 1 TABLET BY MOUTH DAILY * glipiZIDE 5 mg tablet Commonly known as:  GLUCOTROL  
TAKE 1 TABLET BY MOUTH TWICE DAILY * glipiZIDE 5 mg tablet Commonly known as:  Karle Mocha Take 1 Tab by mouth two (2) times a day. * levothyroxine 75 mcg tablet Commonly known as:  SYNTHROID  
TAKE 1 TABLET BY MOUTH DAILY BEFORE BREAKFAST * levothyroxine 75 mcg tablet Commonly known as:  SYNTHROID Take 1 Tab by mouth Daily (before breakfast). * losartan 25 mg tablet Commonly known as:  COZAAR  
TAKE 1 TABLET BY MOUTH DAILY  
  
 * losartan 50 mg tablet Commonly known as:  COZAAR Take 1 Tab by mouth daily. metFORMIN 1,000 mg tablet Commonly known as:  GLUCOPHAGE Take 1 Tab by mouth two (2) times a day. metoprolol tartrate 25 mg tablet Commonly known as:  LOPRESSOR Take 1 Tab by mouth two (2) times a day. oxyCODONE-acetaminophen 5-325 mg per tablet Commonly known as:  PERCOCET Take 1-2 Tabs by mouth every four (4) hours as needed. Max Daily Amount: 12 Tabs. pantoprazole 40 mg tablet Commonly known as:  PROTONIX Take 1 Tab by mouth daily. simvastatin 20 mg tablet Commonly known as:  ZOCOR Take 1 Tab by mouth nightly. triamcinolone acetonide 0.1 % topical cream  
Commonly known as:  KENALOG  
APPLY THIN LAYER TO AFFECTED AREA TWICE DAILY * Notice: This list has 8 medication(s) that are the same as other medications prescribed for you. Read the directions carefully, and ask your doctor or other care provider to review them with you. Prescriptions Sent to Pharmacy Refills  
 levothyroxine (SYNTHROID) 75 mcg tablet 0 Sig: Take 1 Tab by mouth Daily (before breakfast). Class: Normal  
 Pharmacy: [address] Ph #: [phone] Route: Oral  
 glipiZIDE (GLUCOTROL) 5 mg tablet 3 Sig: Take 1 Tab by mouth two (2) times a day. Class: Normal  
 Pharmacy: [address] Ph #: [phone] Route: Oral  
 losartan (COZAAR) 50 mg tablet 2 Sig: Take 1 Tab by mouth daily. Class: Normal  
 Pharmacy: [address] Ph #: [phone] Route: Oral  
 simvastatin (ZOCOR) 20 mg tablet 0 Sig: Take 1 Tab by mouth nightly. Class: Normal  
 Pharmacy: [address] Ph #: [phone] Route: Oral  
 metFORMIN (GLUCOPHAGE) 1,000 mg tablet 0 Sig: Take 1 Tab by mouth two (2) times a day. Class: Normal  
 Pharmacy: [address] Ph #: [phone] Route: Oral  
 triamcinolone acetonide (KENALOG) 0.1 % topical cream 0 Sig: APPLY THIN LAYER TO AFFECTED AREA TWICE DAILY Class: Normal  
 Pharmacy: [address] Ph #: [phone] We Performed the Following INFLUENZA VACCINE INACTIVATED (IIV), SUBUNIT, ADJUVANTED, IM [id number] CPT(R)] REFERRAL TO DERMATOLOGY [REF19 Custom] REFERRAL TO PHYSICAL THERAPY [BRV14 Custom] Comments:  
 Assess  for electric scooter, shoulder pain , hip pain, knee pain Follow-up Instructions Return in about 2 months (around 12/10/2018). To-Do List   
 10/10/2018 Lab:  CBC WITH AUTOMATED DIFF   
  
 10/10/2018 Lab:  HEMOGLOBIN A1C WITH EAG   
  
 10/10/2018 Lab:  LIPID PANEL   
  
 10/10/2018 Lab:  METABOLIC PANEL, COMPREHENSIVE   
  
 10/10/2018 Lab:  MICROALBUMIN, UR, RAND W/ MICROALB/CREAT RATIO   
  
 10/10/2018 Lab:  TSH 3RD GENERATION   
  
 10/10/2018 Imaging:  XR KNEE LT MAX 2 VWS   
  
 10/10/2018 Imaging:  XR KNEE RT MAX 2 VWS   
  
 10/10/2018 Imaging:  XR SHOULDER RT AP/LAT MIN 2 V Referral Information Referral ID Referred By Referred To  
  
 [id number] [address] Not Available Visits Status Start Date End Date 1 New Request 10/10/18 10/10/19 If your referral has a status of pending review or denied, additional information will be sent to support the outcome of this decision. Referral ID Referred By Referred To  
 [id number] [facility] M Not Available Visits Status Start Date End Date 1 New Request 10/10/18 10/10/19 If your referral has a status of pending review or denied, additional information will be sent to support the outcome of this decision. Introducing \[facility] [place] [facility]\"" & HEALTH SERVICES! [facility] introduces [employer] patient portal. Now you can access parts of your medical record, email your doctor's office, and request medication refills online. 1. In your internet browser, go to [email or web address] 2. Click on the First Time User? Click Here link in the Sign In box. You will see the New Member Sign Up page. 3. Enter your [employer] Access Code exactly as it appears below. You will not need to use this code after youve completed the sign-up process. If you do not sign up before the expiration date, you must request a new code. · [employer] Access Code: [email or web address] Expires: 1/8/2019  1:24 PM 
 
4. Enter the last four digits of your Social Security Number (xxxx) and Date of Birth (mm/dd/yyyy) as indicated and click Submit. You will be taken to the next sign-up page. 5. Create a [employer] ID. This will be your [employer] login ID and cannot be changed, so think of one that is secure and easy to remember. 6. Create a [employer] password. You can change your password at any time. 7. Enter your Password Reset Question and Answer. This can be used at a later time if you forget your password. 8. Enter your e-mail address. You will receive e-mail notification when new information is available in [address] 9. Click Sign Up. You can now view and download portions of your medical record. 10. Click the Download Summary menu link to download a portable copy of your medical information. If you have questions, please visit the Frequently Asked Questions section of the [employer] website. Remember, [email or web address] is NOT to be used for urgent needs. For medical emergencies, dial 911. Now available from your iPhone and Android! Please provide this summary of care documentation to your next provider. Your primary care clinician is listed as [address]. If you have any questions after today's visit, please call [phone].

## 2018-10-11 LAB
CREAT UR-MCNC: 97.88 MG/DL (ref 30–125)
MICROALBUMIN UR-MCNC: 2.7 MG/DL (ref 0–3)
MICROALBUMIN/CREAT UR-RTO: 28 MG/G (ref 0–30)

## 2018-10-12 ENCOUNTER — TELEPHONE (OUTPATIENT)
Dept: FAMILY MEDICINE CLINIC | Age: 78
End: 2018-10-12

## 2018-10-12 NOTE — TELEPHONE ENCOUNTER
Referral sent to   [address]  [address] [facility] [address], [address]   Phone: [phone]   Fax: [phone]  Office Hours:

## 2018-10-15 ENCOUNTER — TELEPHONE (OUTPATIENT)
Dept: FAMILY MEDICINE CLINIC | Age: 78
End: 2018-10-15

## 2018-10-15 NOTE — TELEPHONE ENCOUNTER
Patient called in to request order for Xray of (L) shoulder. States everything was done except that.

## 2018-10-16 DIAGNOSIS — M25.512 LEFT SHOULDER PAIN, UNSPECIFIED CHRONICITY: Primary | ICD-10-CM

## 2018-10-17 ENCOUNTER — HOSPITAL ENCOUNTER (OUTPATIENT)
Dept: GENERAL RADIOLOGY | Age: 78
Discharge: HOME | End: 2018-10-17
Attending: INTERNAL MEDICINE
Payer: MEDICARE

## 2018-10-17 ENCOUNTER — HOSPITAL ENCOUNTER (OUTPATIENT)
Dept: MAMMOGRAPHY | Age: 78
Discharge: HOME | End: 2018-10-17
Attending: INTERNAL MEDICINE
Payer: MEDICARE

## 2018-10-17 DIAGNOSIS — Z12.39 BREAST CANCER SCREENING: ICD-10-CM

## 2018-10-17 DIAGNOSIS — M25.512 LEFT SHOULDER PAIN, UNSPECIFIED CHRONICITY: ICD-10-CM

## 2018-10-17 PROCEDURE — 73030 X-RAY EXAM OF SHOULDER: CPT

## 2018-10-17 PROCEDURE — 77067 SCR MAMMO BI INCL CAD: CPT

## 2018-10-17 NOTE — TELEPHONE ENCOUNTER
XR SHOULDER LT AP/LAT MIN 2 V   Placed in system. Nurse called [phone], lvm detailing that order is in system and she can get it completed at her earliest convenience.

## 2018-10-18 RX ORDER — APIXABAN 5 MG/1
TABLET, FILM COATED ORAL
Qty: 60 TAB | Refills: 0 | Status: SHIPPED | OUTPATIENT
Start: 2018-10-18 | End: 2018-11-22 | Stop reason: SDUPTHER

## 2018-10-25 ENCOUNTER — TELEPHONE (OUTPATIENT)
Dept: FAMILY MEDICINE CLINIC | Age: 78
End: 2018-10-25

## 2018-10-28 NOTE — TELEPHONE ENCOUNTER
Pt has arthritis in both knees and both shoulders and also inflammation of rt shoulder rotator cuff inflammation.

## 2018-10-30 NOTE — TELEPHONE ENCOUNTER
Nurse spoke with patient, patient states she takes Aleve and it helps her pain. Patient stated she fell, and nurse offered her an appointment and patient declined.

## 2018-10-31 ENCOUNTER — TELEPHONE (OUTPATIENT)
Dept: FAMILY MEDICINE CLINIC | Age: 78
End: 2018-10-31

## 2018-10-31 NOTE — TELEPHONE ENCOUNTER
Antonio Childs from [facility] called,wanted clarification on if the patient should continue with physical therapy or just the wheel chair evaluation. Per Dr. Evelio Bradley, patient can continue with PT and he will re evaluate patient for wheelchair. Nurse faxed over PT order to [phone]. This encounter will be closed.

## 2018-11-01 ENCOUNTER — TELEPHONE (OUTPATIENT)
Dept: FAMILY MEDICINE CLINIC | Age: 78
End: 2018-11-01

## 2018-11-01 NOTE — TELEPHONE ENCOUNTER
Nurse spoke with patient and patient is requesting a stair lift chair. Pt misunderstood and stated it was an electric chair so when she spoke to provider, an order was written for power scooter. Nurse advised patient to cancel pt and eval for wheelchair since patient stated she did not need physical therapy or the power scooter. Please write order for stair lift chair and once written, nurse will send it to a participating [employer] company.

## 2018-11-08 RX ORDER — LOSARTAN POTASSIUM 25 MG/1
25 TABLET ORAL DAILY
Qty: 90 TAB | Refills: 0 | Status: SHIPPED | OUTPATIENT
Start: 2018-11-08 | End: 2019-02-01 | Stop reason: SDUPTHER

## 2018-11-08 NOTE — TELEPHONE ENCOUNTER
[address] [address], [address]  Phone: [phone]  Fax: [phone]  [facility]   Will see if insurance is accepted. Pt is aware and nurse advise that [facility] may not accept insurance, if not, nurse will forward order to alternate DME company.

## 2018-11-08 NOTE — TELEPHONE ENCOUNTER
Requested Prescriptions     Pending Prescriptions Disp Refills    losartan (COZAAR) 25 mg tablet 60 Tab 0     Requesting 90 day supply. Please advise, thank you.

## 2018-11-15 ENCOUNTER — TELEPHONE (OUTPATIENT)
Dept: FAMILY MEDICINE CLINIC | Age: 78
End: 2018-11-15

## 2018-11-15 NOTE — TELEPHONE ENCOUNTER
Patient called regarding stair lift that it is too expensive that she has to get a loan out for it. Her insurance will not cover it. Patient was wondering if there was another company to go through that her insurance will cover. Please advise, thank you.

## 2018-11-21 NOTE — TELEPHONE ENCOUNTER
Spoke with patient, advised to call insurance or number on tv for cheaper price. Pt verbalized understanding. This encounter will be closed.

## 2018-11-27 RX ORDER — APIXABAN 5 MG/1
TABLET, FILM COATED ORAL
Qty: 60 TAB | Refills: 0 | Status: SHIPPED | OUTPATIENT
Start: 2018-11-27 | End: 2018-12-26 | Stop reason: SDUPTHER

## 2018-12-20 RX ORDER — PANTOPRAZOLE SODIUM 40 MG/1
TABLET, DELAYED RELEASE ORAL
Qty: 30 TAB | Refills: 0 | Status: SHIPPED | OUTPATIENT
Start: 2018-12-20 | End: 2019-01-17 | Stop reason: SDUPTHER

## 2018-12-21 RX ORDER — LOSARTAN POTASSIUM 25 MG/1
TABLET ORAL
Qty: 60 TAB | Refills: 0 | Status: SHIPPED | OUTPATIENT
Start: 2018-12-21 | End: 2019-01-17 | Stop reason: SDUPTHER

## 2019-01-03 ENCOUNTER — TELEPHONE (OUTPATIENT)
Dept: FAMILY MEDICINE CLINIC | Age: 79
End: 2019-01-03

## 2019-01-17 ENCOUNTER — OFFICE VISIT (OUTPATIENT)
Dept: FAMILY MEDICINE CLINIC | Age: 79
End: 2019-01-17

## 2019-01-17 ENCOUNTER — OFFICE VISIT (OUTPATIENT)
Dept: CARDIOLOGY CLINIC | Age: 79
End: 2019-01-17

## 2019-01-17 VITALS
TEMPERATURE: 98.2 F | BODY MASS INDEX: 34.36 KG/M2 | HEART RATE: 73 BPM | WEIGHT: 175 LBS | OXYGEN SATURATION: 97 % | SYSTOLIC BLOOD PRESSURE: 160 MMHG | DIASTOLIC BLOOD PRESSURE: 75 MMHG | HEIGHT: 60 IN | RESPIRATION RATE: 16 BRPM

## 2019-01-17 VITALS
DIASTOLIC BLOOD PRESSURE: 78 MMHG | WEIGHT: 175 LBS | OXYGEN SATURATION: 96 % | HEART RATE: 71 BPM | BODY MASS INDEX: 34.18 KG/M2 | SYSTOLIC BLOOD PRESSURE: 140 MMHG

## 2019-01-17 DIAGNOSIS — E11.9 DM TYPE 2, GOAL HBA1C < 7% (HCC): ICD-10-CM

## 2019-01-17 DIAGNOSIS — Z00.00 MEDICARE ANNUAL WELLNESS VISIT, SUBSEQUENT: ICD-10-CM

## 2019-01-17 DIAGNOSIS — Z23 ENCOUNTER FOR IMMUNIZATION: ICD-10-CM

## 2019-01-17 DIAGNOSIS — I35.0 AORTIC VALVE STENOSIS, ETIOLOGY OF CARDIAC VALVE DISEASE UNSPECIFIED: Primary | ICD-10-CM

## 2019-01-17 DIAGNOSIS — I10 ESSENTIAL HYPERTENSION: Primary | ICD-10-CM

## 2019-01-17 RX ORDER — METFORMIN HYDROCHLORIDE 1000 MG/1
1000 TABLET ORAL 2 TIMES DAILY
Qty: 180 TAB | Refills: 0 | Status: SHIPPED | OUTPATIENT
Start: 2019-01-17 | End: 2019-03-22 | Stop reason: SDUPTHER

## 2019-01-17 RX ORDER — CELECOXIB 200 MG/1
CAPSULE ORAL 2 TIMES DAILY
COMMUNITY
End: 2019-01-29

## 2019-01-17 NOTE — PROGRESS NOTES
1. Have you been to the ER, urgent care clinic since your last visit? Hospitalized since your last visit? No    2. Have you seen or consulted any other health care providers outside of the [address] [place] [address] since your last visit? Include any pap smears or colon screening. No     Obtained signed consent form from patient. Per verbal order from Dr. Mcclellan Prost- injection of Pneumovax 23 administered. Verified by this nurse and Dario Huerta LPN Patient instructed to remain in clinic for 15 minutes and to report any adverse reactions immediately. Most recent VIS given. No adverse reactions noted after 15 minutes of observation.

## 2019-01-17 NOTE — PROGRESS NOTES
1. Have you been to the ER, urgent care clinic since your last visit? Hospitalized since your last visit? No     2. Have you seen or consulted any other health care providers outside of the [address] [place] [address] since your last visit? Include any pap smears or colon screening.  No

## 2019-01-17 NOTE — PATIENT INSTRUCTIONS
Medicare Wellness Visit, Female     The best way to live healthy is to have a lifestyle where you eat a well-balanced diet, exercise regularly, limit alcohol use, and quit all forms of tobacco/nicotine, if applicable. Regular preventive services are another way to keep healthy. Preventive services (vaccines, screening tests, monitoring & exams) can help personalize your care plan, which helps you manage your own care. Screening tests can find health problems at the earliest stages, when they are easiest to treat. [facility] follows the current, evidence-based guidelines published by the [facility] ([facility]) when recommending preventive services for our patients. Because we follow these guidelines, sometimes recommendations change over time as research supports it. (For example, mammograms used to be recommended annually. Even though Medicare will still pay for an annual mammogram, the newer guidelines recommend a mammogram every two years for women of average risk.)  Of course, you and your doctor may decide to screen more often for some diseases, based on your risk and your health status. Preventive services for you include:  - Medicare offers their members a free annual wellness visit, which is time for you and your primary care provider to discuss and plan for your preventive service needs. Take advantage of this benefit every year!  -All adults over the age of 72 should receive the recommended pneumonia vaccines. Current USPSTF guidelines recommend a series of two vaccines for the best pneumonia protection.   -All adults should have a flu vaccine yearly and a tetanus vaccine every 10 years. All adults age 61 and older should receive a shingles vaccine once in their lifetime.    -A bone mass density test is recommended when a woman turns 65 to screen for osteoporosis. This test is only recommended one time, as a screening.  Some providers will use this same test as a disease monitoring tool if you already have osteoporosis. -All adults age 38-68 who are overweight should have a diabetes screening test once every three years.   -Other screening tests and preventive services for persons with diabetes include: an eye exam to screen for diabetic retinopathy, a kidney function test, a foot exam, and stricter control over your cholesterol.   -Cardiovascular screening for adults with routine risk involves an electrocardiogram (ECG) at intervals determined by your doctor.   -Colorectal cancer screenings should be done for adults age 54-65 with no increased risk factors for colorectal cancer. There are a number of acceptable methods of screening for this type of cancer. Each test has its own benefits and drawbacks. Discuss with your doctor what is most appropriate for you during your annual wellness visit. The different tests include: colonoscopy (considered the best screening method), a fecal occult blood test, a fecal DNA test, and sigmoidoscopy. -Breast cancer screenings are recommended every other year for women of normal risk, age 54-69.  -Cervical cancer screenings for women over age 72 are only recommended with certain risk factors.   -All adults born between [facility] should be screened once for Hepatitis C.      Here is a list of your current Health Maintenance items (your personalized list of preventive services) with a due date:  Health Maintenance Due   Topic Date Due    Diabetic Foot Care  07/04/1950    Eye Exam  07/04/1950    Shingles Vaccine (1 of 2) 07/04/1989    Glaucoma Screening   07/04/2004    Annual Well Visit  05/04/2018    Pneumococcal Vaccine (2 of 2 - PPSV23) 05/17/2018

## 2019-01-17 NOTE — PROGRESS NOTES
Ms. Kiki Bashir is a 77 y.o. female with a history of atrial flutter, diabetes, hypertension, dyslipidemia and hypothyroidism. Ms. Kiki Bashir is here today for a follow up appointment. She denies any symptoms to suggest angina or heart failure. She denies any palpitations, presyncope or syncope. She is taking all her medications regularly. No PND or LE edema    Past Medical History:   Diagnosis Date    Aortic stenosis     Arthritis     Atrial flutter (HCC)     Bronchitis     Diabetes (HCC)     Diverticulitis     GERD (gastroesophageal reflux disease)     Hypercholesterolemia     Hypertension     Hypothyroid     Menopause     Pancreatitis        Current Outpatient Medications   Medication Sig Dispense Refill    celecoxib (CELEBREX) 200 mg capsule Take  by mouth two (2) times a day.  apixaban (ELIQUIS) 5 mg tablet TAKE 1 TABLET BY MOUTH TWICE DAILY. Appt required before further refills. 60 Tab 0    losartan (COZAAR) 25 mg tablet Take 1 Tab by mouth daily. 90 Tab 0    levothyroxine (SYNTHROID) 75 mcg tablet Take 1 Tab by mouth Daily (before breakfast). 90 Tab 0    losartan (COZAAR) 50 mg tablet Take 1 Tab by mouth daily. 90 Tab 2    simvastatin (ZOCOR) 20 mg tablet Take 1 Tab by mouth nightly. 90 Tab 0    metFORMIN (GLUCOPHAGE) 1,000 mg tablet Take 1 Tab by mouth two (2) times a day. 180 Tab 0    triamcinolone acetonide (KENALOG) 0.1 % topical cream APPLY THIN LAYER TO AFFECTED AREA TWICE DAILY 15 g 0    glipiZIDE (GLUCOTROL) 5 mg tablet TAKE 1 TABLET BY MOUTH TWICE DAILY 60 Tab 0    pantoprazole (PROTONIX) 40 mg tablet Take 1 Tab by mouth daily. 90 Tab 3    cholecalciferol (VITAMIN D3) 1,000 unit tablet Take 2 Tabs by mouth daily.  60 Tab 0     Allergies   Allergen Reactions    Ampicillin Itching     Past Medical History:   Diagnosis Date    Aortic stenosis     Arthritis     Atrial flutter (HCC)     Bronchitis     Diabetes (HCC)     Diverticulitis     GERD (gastroesophageal reflux disease)     Hypercholesterolemia     Hypertension     Hypothyroid     Menopause     Pancreatitis      Past Surgical History:   Procedure Laterality Date    COLONOSCOPY N/A 2/2/2017    COLONOSCOPY  w/bx polyp performed by Elda Stallings MD at [facility] ENDOSCOPY     Family History   Problem Relation Age of Onset    Diabetes Mother     Heart Disease Mother     Cancer Father         melanoma    Stroke Sister     Hypertension Sister     Diabetes Sister     Hypertension Brother     Diabetes Brother     Breast Cancer Paternal Grandmother         older, but not sure age.  Breast Cancer Paternal Aunt         and gyn cancer ?age     Social History     Tobacco Use   Smoking Status Never Smoker   Smokeless Tobacco Never Used        Objective:     Visit Vitals  /78   Pulse 71   Wt 175 lb (79.4 kg)   SpO2 96%   BMI 34.18 kg/m²     General:  alert, cooperative, no distress   Chest Wall: inspection normal - no chest wall deformities or tenderness, respiratory effort normal   Lung: clear to auscultation bilaterally, no rales   Heart:  normal rate, regular rhythm, normal S1, S2, Mild 2/6 MADDI on aortic area. Abdomen: soft, non-tender. Bowel sounds normal. No masses,  no organomegaly   Extremities: edema trace bilateral LE's Skin: no rashes   Neuro: alert, oriented, normal speech, no focal findings or movement disorder noted       Echo 03/2018  Left ventricle: Systolic function was normal by visual assessment. Ejection   fraction was estimated to be 60 %. No obvious wall motion abnormalities identified in the views obtained. Features   were consistent with a pseudonormal left ventricular filling pattern, with concomitant abnormal relaxation and   increased filling pressure (grade 2 diastolic dysfunction). Right ventricle: The ventricle was mildly dilated. Systolic function was  normal.  Left atrium: The atrium was mildly dilated. Aortic valve: The valve was trileaflet.  Leaflets exhibited mild to moderate calcification and mildly reduced cuspal  separation. Transaortic velocity was increased due to valvular stenosis. There was mild to moderate stenosis. There was  trivial regurgitation. Valve mean gradient was 21 mmHg. Tricuspid valve: There was mild regurgitation. Assessment/Plan:     Atrial flutter: In sinus on exam today, rate is controlled. HR today 71  ALso on Eliquis. No bleeding side effects. For unknown reason does not seem to be on BB anymore. Advised patient to go home and call us back with list of medications. Hypertension: /78 mm Hg. She is on Losartan. Will increase losartan to 75 mg daily. Dyslipidemia: continue with simvastatin, last LDL 67. Aortic stenosis: Mild-moderate by echo in 2018. Mean gradient 21 mm Hg. Repeat ECHo 6 months. Follow up in 6 months or sooner as symptoms dictate.

## 2019-01-17 NOTE — PROGRESS NOTES
This is the Subsequent Medicare Annual Wellness Exam, performed 12 months or more after the Initial AWV or the last Subsequent AWV    I have reviewed the patient's medical history in detail and updated the computerized patient record. History     Past Medical History:   Diagnosis Date    Aortic stenosis     Arthritis     Atrial flutter (HCC)     Bronchitis     Diabetes ([address]     Diverticulitis     GERD (gastroesophageal reflux disease)     Hypercholesterolemia     Hypertension     Hypothyroid     Menopause     Pancreatitis       Past Surgical History:   Procedure Laterality Date    COLONOSCOPY N/A 2/2/2017    COLONOSCOPY  w/bx polyp performed by Carla [facility]ue[facility] MD at [facility] ENDOSCOPY     Current Outpatient Medications   Medication Sig Dispense Refill    celecoxib (CELEBREX) 200 mg capsule Take  by mouth two (2) times a day.  varicella-zoster recombinant, PF, (SHINGRIX, PF,) 50 mcg/0.5 mL susr injection 0.5mL by IntraMUSCular route once now and then repeat in 2-6 months 0.5 mL 1    apixaban (ELIQUIS) 5 mg tablet TAKE 1 TABLET BY MOUTH TWICE DAILY. Appt required before further refills. 60 Tab 0    losartan (COZAAR) 25 mg tablet Take 1 Tab by mouth daily. 90 Tab 0    levothyroxine (SYNTHROID) 75 mcg tablet Take 1 Tab by mouth Daily (before breakfast). 90 Tab 0    losartan (COZAAR) 50 mg tablet Take 1 Tab by mouth daily. 90 Tab 2    simvastatin (ZOCOR) 20 mg tablet Take 1 Tab by mouth nightly. 90 Tab 0    metFORMIN (GLUCOPHAGE) 1,000 mg tablet Take 1 Tab by mouth two (2) times a day. 180 Tab 0    triamcinolone acetonide (KENALOG) 0.1 % topical cream APPLY THIN LAYER TO AFFECTED AREA TWICE DAILY 15 g 0    glipiZIDE (GLUCOTROL) 5 mg tablet TAKE 1 TABLET BY MOUTH TWICE DAILY 60 Tab 0    pantoprazole (PROTONIX) 40 mg tablet Take 1 Tab by mouth daily. 90 Tab 3    cholecalciferol (VITAMIN D3) 1,000 unit tablet Take 2 Tabs by mouth daily.  60 Tab 0     Allergies   Allergen Reactions    Ampicillin Itching     Family History   Problem Relation Age of Onset    Diabetes Mother     Heart Disease Mother     Cancer Father         melanoma    Stroke Sister     Hypertension Sister     Diabetes Sister     Hypertension Brother     Diabetes Brother     Breast Cancer Paternal Grandmother         older, but not sure age. 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 Functional Ability and Level of Safety:   Hearing Loss  Hearing is good. Activities of Daily Living  The home contains: no safety equipment. Patient does total self care    Fall Risk  Fall Risk Assessment, last 12 mths 1/17/2019   Able to walk? Yes   Fall in past 12 months?  No       Abuse Screen  Patient is not abused    Cognitive Screening   Evaluation of Cognitive Function:  Has your family/caregiver stated any concerns about your memory: no  Normal    Patient Care Team   Patient Care Team:  Alvaro Downing MD as PCP - General (Internal Medicine)  Abdulaziz Kelly MD (General Surgery)  Ezio Can MD (Orthopedic Surgery)  Hoyle Bamberger as Ambulatory Care Navigator  Bobo Loza MD (Cardiology)    Assessment/Plan   Education and counseling provided:  Are appropriate based on today's review and evaluation    Diagnoses and all orders for this visit:    1. Essential hypertension    2. DM type 2, goal HbA1c < 7% ([facility])    3. Medicare annual wellness visit, subsequent    4. Encounter for immunization  -     ADMIN PNEUMOCOCCAL VACCINE  -     PNEUMOCOCCAL POLYSACCHARIDE VACCINE, 23-VALENT, ADULT OR IMMUNOSUPPRESSED PT DOSE,  -     varicella-zoster recombinant, PF, (SHINGRIX, PF,) 50 mcg/0.5 mL susr injection; 0.5mL by IntraMUSCular route once now and then repeat in 2-6 months    Other orders  -     metFORMIN (GLUCOPHAGE) 1,000 mg tablet; Take 1 Tab by mouth two (2) times a day.         Health Maintenance Due   Topic Date Due    FOOT EXAM Q1  07/04/1950    EYE EXAM RETINAL OR DILATED  07/04/1950    Shingrix Vaccine Age 50> (1 of 2) 07/04/1989    GLAUCOMA SCREENING Q2Y  07/04/2004    MEDICARE YEARLY EXAM  05/04/2018    Pneumococcal 65+ Low/Medium Risk (2 of 2 - PPSV23) 05/17/2018

## 2019-01-18 NOTE — ACP (ADVANCE CARE PLANNING)
Advance Care Planning (ACP) Provider Conversation Snapshot    Date of ACP Conversation: 01/17/19  Persons included in Conversation:  pt and son Wiley of ACP Conversation in minutes:  <16 minutes (Non-Billable)    Authorized Decision Maker (if patient is incapable of making informed decisions):    This person is:   pts son Sandra Paget          For Patients with Decision Making Capacity:   Values/Goals: Exploration of values, goals, and preferences if recovery is not expected, even with continued medical treatment in the event of:  Imminent death  Severe, permanent brain injury    Conversation Outcomes / Follow-Up Plan:   Reviewed existing Advance Directive

## 2019-02-04 DIAGNOSIS — E03.4 HYPOTHYROIDISM DUE TO ACQUIRED ATROPHY OF THYROID: ICD-10-CM

## 2019-02-04 DIAGNOSIS — E11.21 TYPE 2 DIABETES MELLITUS WITH NEPHROPATHY (HCC): ICD-10-CM

## 2019-02-04 RX ORDER — LOSARTAN POTASSIUM 25 MG/1
25 TABLET ORAL DAILY
Qty: 90 TAB | Refills: 0 | Status: SHIPPED | OUTPATIENT
Start: 2019-02-04 | End: 2019-04-12 | Stop reason: SDUPTHER

## 2019-02-04 RX ORDER — SIMVASTATIN 20 MG/1
20 TABLET, FILM COATED ORAL
Qty: 90 TAB | Refills: 0 | Status: SHIPPED | OUTPATIENT
Start: 2019-02-04 | End: 2019-04-25 | Stop reason: SDUPTHER

## 2019-02-04 RX ORDER — LEVOTHYROXINE SODIUM 75 UG/1
75 TABLET ORAL
Qty: 90 TAB | Refills: 0 | Status: SHIPPED | OUTPATIENT
Start: 2019-02-04 | End: 2019-02-06 | Stop reason: SDUPTHER

## 2019-02-05 ENCOUNTER — HOSPITAL ENCOUNTER (EMERGENCY)
Age: 79
Discharge: HOME | End: 2019-02-05
Attending: EMERGENCY MEDICINE
Payer: MEDICARE

## 2019-02-05 ENCOUNTER — APPOINTMENT (OUTPATIENT)
Dept: NON INVASIVE DIAGNOSTICS | Age: 79
End: 2019-02-05
Attending: EMERGENCY MEDICINE
Payer: MEDICARE

## 2019-02-05 ENCOUNTER — APPOINTMENT (OUTPATIENT)
Dept: GENERAL RADIOLOGY | Age: 79
End: 2019-02-05
Attending: EMERGENCY MEDICINE
Payer: MEDICARE

## 2019-02-05 ENCOUNTER — APPOINTMENT (OUTPATIENT)
Dept: NUCLEAR MEDICINE | Age: 79
End: 2019-02-05
Attending: EMERGENCY MEDICINE
Payer: MEDICARE

## 2019-02-05 VITALS
DIASTOLIC BLOOD PRESSURE: 75 MMHG | RESPIRATION RATE: 19 BRPM | HEART RATE: 82 BPM | SYSTOLIC BLOOD PRESSURE: 139 MMHG | WEIGHT: 175 LBS | OXYGEN SATURATION: 99 % | BODY MASS INDEX: 34.36 KG/M2 | HEIGHT: 60 IN | TEMPERATURE: 98.4 F

## 2019-02-05 DIAGNOSIS — R07.9 ACUTE CHEST PAIN: Primary | ICD-10-CM

## 2019-02-05 LAB
ALBUMIN SERPL-MCNC: 3 G/DL (ref 3.4–5)
ALBUMIN/GLOB SERPL: 0.7 {RATIO} (ref 0.8–1.7)
ALP SERPL-CCNC: 93 U/L (ref 45–117)
ALT SERPL-CCNC: 19 U/L (ref 13–56)
ANION GAP SERPL CALC-SCNC: 9 MMOL/L (ref 3–18)
APPEARANCE UR: CLEAR
APTT PPP: 36.3 SEC (ref 23–36.4)
AST SERPL-CCNC: 24 U/L (ref 15–37)
BASOPHILS # BLD: 0 K/UL (ref 0–0.1)
BASOPHILS NFR BLD: 0 % (ref 0–2)
BILIRUB DIRECT SERPL-MCNC: <0.1 MG/DL (ref 0–0.2)
BILIRUB SERPL-MCNC: 0.3 MG/DL (ref 0.2–1)
BILIRUB UR QL: NEGATIVE
BUN SERPL-MCNC: 13 MG/DL (ref 7–18)
BUN/CREAT SERPL: 14 (ref 12–20)
CALCIUM SERPL-MCNC: 8.2 MG/DL (ref 8.5–10.1)
CHLORIDE SERPL-SCNC: 106 MMOL/L (ref 100–108)
CK MB CFR SERPL CALC: NORMAL % (ref 0–4)
CK MB CFR SERPL CALC: NORMAL % (ref 0–4)
CK MB SERPL-MCNC: <1 NG/ML (ref 5–25)
CK MB SERPL-MCNC: <1 NG/ML (ref 5–25)
CK SERPL-CCNC: 113 U/L (ref 26–192)
CK SERPL-CCNC: 35 U/L (ref 26–192)
CO2 SERPL-SCNC: 24 MMOL/L (ref 21–32)
COLOR UR: YELLOW
CREAT SERPL-MCNC: 0.94 MG/DL (ref 0.6–1.3)
DIFFERENTIAL METHOD BLD: ABNORMAL
ECHO PULMONARY ARTERY SYSTOLIC PRESSURE (PASP): 36 MMHG
EOSINOPHIL # BLD: 0.1 K/UL (ref 0–0.4)
EOSINOPHIL NFR BLD: 1 % (ref 0–5)
EPITH CASTS URNS QL MICRO: ABNORMAL /LPF (ref 0–5)
ERYTHROCYTE [DISTWIDTH] IN BLOOD BY AUTOMATED COUNT: 13 % (ref 11.6–14.5)
GLOBULIN SER CALC-MCNC: 4.6 G/DL (ref 2–4)
GLUCOSE SERPL-MCNC: 121 MG/DL (ref 74–99)
GLUCOSE UR STRIP.AUTO-MCNC: NEGATIVE MG/DL
HCT VFR BLD AUTO: 39 % (ref 35–45)
HGB BLD-MCNC: 12.8 G/DL (ref 12–16)
HGB UR QL STRIP: ABNORMAL
HYALINE CASTS URNS QL MICRO: ABNORMAL /LPF (ref 0–2)
INR PPP: 1.4 (ref 0.8–1.2)
KETONES UR QL STRIP.AUTO: 15 MG/DL
LEUKOCYTE ESTERASE UR QL STRIP.AUTO: NEGATIVE
LIPASE SERPL-CCNC: 146 U/L (ref 73–393)
LYMPHOCYTES # BLD: 1.5 K/UL (ref 0.9–3.6)
LYMPHOCYTES NFR BLD: 16 % (ref 21–52)
MCH RBC QN AUTO: 27.9 PG (ref 24–34)
MCHC RBC AUTO-ENTMCNC: 32.8 G/DL (ref 31–37)
MCV RBC AUTO: 85.2 FL (ref 74–97)
MONOCYTES # BLD: 0.7 K/UL (ref 0.05–1.2)
MONOCYTES NFR BLD: 8 % (ref 3–10)
MUCOUS THREADS URNS QL MICRO: ABNORMAL /LPF
NEUTS SEG # BLD: 6.9 K/UL (ref 1.8–8)
NEUTS SEG NFR BLD: 75 % (ref 40–73)
NITRITE UR QL STRIP.AUTO: NEGATIVE
PH UR STRIP: 6 [PH] (ref 5–8)
PLATELET # BLD AUTO: 219 K/UL (ref 135–420)
PMV BLD AUTO: 10 FL (ref 9.2–11.8)
POTASSIUM SERPL-SCNC: 4.3 MMOL/L (ref 3.5–5.5)
PROT SERPL-MCNC: 7.6 G/DL (ref 6.4–8.2)
PROT UR STRIP-MCNC: NEGATIVE MG/DL
PROTHROMBIN TIME: 16.7 SEC (ref 11.5–15.2)
RBC # BLD AUTO: 4.58 M/UL (ref 4.2–5.3)
RBC #/AREA URNS HPF: ABNORMAL /HPF (ref 0–5)
SODIUM SERPL-SCNC: 139 MMOL/L (ref 136–145)
SP GR UR REFRACTOMETRY: 1.02 (ref 1–1.03)
STRESS BASELINE HR: 74 BPM
STRESS ESTIMATED WORKLOAD: 1 METS
STRESS EXERCISE DUR MIN: NORMAL
STRESS PEAK DIAS BP: 94 MMHG
STRESS PEAK SYS BP: 150 MMHG
STRESS PERCENT HR ACHIEVED: 80 %
STRESS POST PEAK HR: 96 BPM
STRESS RATE PRESSURE PRODUCT: NORMAL BPM*MMHG
STRESS ST DEPRESSION: 0 MM
STRESS ST ELEVATION: 0 MM
STRESS TARGET HR: 120 BPM
TROPONIN I SERPL-MCNC: <0.02 NG/ML (ref 0–0.04)
TROPONIN I SERPL-MCNC: <0.02 NG/ML (ref 0–0.04)
UROBILINOGEN UR QL STRIP.AUTO: 0.2 EU/DL (ref 0.2–1)
WBC # BLD AUTO: 9.2 K/UL (ref 4.6–13.2)
WBC URNS QL MICRO: ABNORMAL /HPF (ref 0–4)

## 2019-02-05 PROCEDURE — 74011250636 HC RX REV CODE- 250/636: Performed by: EMERGENCY MEDICINE

## 2019-02-05 PROCEDURE — 85730 THROMBOPLASTIN TIME PARTIAL: CPT

## 2019-02-05 PROCEDURE — 71045 X-RAY EXAM CHEST 1 VIEW: CPT

## 2019-02-05 PROCEDURE — 85025 COMPLETE CBC W/AUTO DIFF WBC: CPT

## 2019-02-05 PROCEDURE — 80076 HEPATIC FUNCTION PANEL: CPT

## 2019-02-05 PROCEDURE — 93005 ELECTROCARDIOGRAM TRACING: CPT

## 2019-02-05 PROCEDURE — 80048 BASIC METABOLIC PNL TOTAL CA: CPT

## 2019-02-05 PROCEDURE — 96374 THER/PROPH/DIAG INJ IV PUSH: CPT

## 2019-02-05 PROCEDURE — 93306 TTE W/DOPPLER COMPLETE: CPT

## 2019-02-05 PROCEDURE — 99285 EMERGENCY DEPT VISIT HI MDM: CPT

## 2019-02-05 PROCEDURE — A9500 TC99M SESTAMIBI: HCPCS

## 2019-02-05 PROCEDURE — 96361 HYDRATE IV INFUSION ADD-ON: CPT

## 2019-02-05 PROCEDURE — 83690 ASSAY OF LIPASE: CPT

## 2019-02-05 PROCEDURE — 93017 CV STRESS TEST TRACING ONLY: CPT

## 2019-02-05 PROCEDURE — 85610 PROTHROMBIN TIME: CPT

## 2019-02-05 PROCEDURE — 81001 URINALYSIS AUTO W/SCOPE: CPT

## 2019-02-05 PROCEDURE — 82550 ASSAY OF CK (CPK): CPT

## 2019-02-05 PROCEDURE — 74011250637 HC RX REV CODE- 250/637: Performed by: EMERGENCY MEDICINE

## 2019-02-05 RX ORDER — SODIUM CHLORIDE AND POTASSIUM CHLORIDE .9; .15 G/100ML; G/100ML
SOLUTION INTRAVENOUS CONTINUOUS
Status: DISCONTINUED | OUTPATIENT
Start: 2019-02-05 | End: 2019-02-05 | Stop reason: HOSPADM

## 2019-02-05 RX ORDER — FAMOTIDINE 10 MG/ML
20 INJECTION INTRAVENOUS
Status: COMPLETED | OUTPATIENT
Start: 2019-02-05 | End: 2019-02-05

## 2019-02-05 RX ORDER — MAG HYDROX/ALUMINUM HYD/SIMETH 200-200-20
30 SUSPENSION, ORAL (FINAL DOSE FORM) ORAL
Status: COMPLETED | OUTPATIENT
Start: 2019-02-05 | End: 2019-02-05

## 2019-02-05 RX ORDER — SODIUM CHLORIDE 0.9 % (FLUSH) 0.9 %
5 SYRINGE (ML) INJECTION AS NEEDED
Status: DISCONTINUED | OUTPATIENT
Start: 2019-02-05 | End: 2019-02-05 | Stop reason: HOSPADM

## 2019-02-05 RX ADMIN — SODIUM CHLORIDE AND POTASSIUM CHLORIDE: .9; .15 SOLUTION INTRAVENOUS at 09:21

## 2019-02-05 RX ADMIN — FAMOTIDINE 20 MG: 10 INJECTION, SOLUTION INTRAVENOUS at 09:22

## 2019-02-05 RX ADMIN — REGADENOSON 0.4 MG: 0.08 INJECTION, SOLUTION INTRAVENOUS at 14:01

## 2019-02-05 RX ADMIN — ALUMINUM HYDROXIDE, MAGNESIUM HYDROXIDE, AND SIMETHICONE 30 ML: 200; 200; 20 SUSPENSION ORAL at 09:20

## 2019-02-05 NOTE — ED PROVIDER NOTES
EMERGENCY DEPARTMENT HISTORY AND PHYSICAL EXAM    9:08 AM      Date: 2/5/2019  Patient Name: Jose Nugent    History of Presenting Illness     Chief Complaint   Patient presents with    Epigastric Pain         History Provided By: Patient    Chief Complaint: Chest discomfort   Duration:  Hours  Timing:  Acute and episodic  Location: L chest  Quality: Burning and \"heart burn\", non radiating  Severity: N/A  Modifying Factors: improved temporarily by OTC heartburn medications  Associated Symptoms: appetite change, general ill feeling      Additional History (Context): Jose Nugent is a 77 y.o. female with aortic stenosis, a-flutter, DM, GERD, HLD, HTN who presents with L chest discomfort for less than 1 day. Last night pt felt \"heartburn\", L chest burning sensation for 10 minutes which improved for 30 minutes with OTC meds but returned this morning 3 hrs PTA for about 10-15 minutes again. She c/o generalized ill-feeling and weakness, no appetite for about 1wk as well. She denies melena or hematochezia, exertional SOB or CP, focal weakness. No hx MI, ulcers, frequent heartburn episodes, DVT/PE. Pt is on Eliquis. PCP: Kodi Vines MD    Current Facility-Administered Medications   Medication Dose Route Frequency Provider Last Rate Last Dose    saline peripheral flush soln 5 mL  5 mL InterCATHeter PRN Mingo Ray MD        0.9% sodium chloride with KCl 20 mEq/L infusion   IntraVENous CONTINUOUS Yves Mask,  mL/hr at 02/05/19 0921       Current Outpatient Medications   Medication Sig Dispense Refill    losartan (COZAAR) 25 mg tablet Take 1 Tab by mouth daily. 90 Tab 0    simvastatin (ZOCOR) 20 mg tablet Take 1 Tab by mouth nightly. 90 Tab 0    levothyroxine (SYNTHROID) 75 mcg tablet Take 1 Tab by mouth Daily (before breakfast). 90 Tab 0    apixaban (ELIQUIS) 5 mg tablet TAKE 1 TABLET BY MOUTH TWICE DAILY.  60 Tab 6    metFORMIN (GLUCOPHAGE) 1,000 mg tablet Take 1 Tab by mouth two (2) times a day. 180 Tab 0    varicella-zoster recombinant, PF, (SHINGRIX, PF,) 50 mcg/0.5 mL susr injection 0.5mL by IntraMUSCular route once now and then repeat in 2-6 months 0.5 mL 1    losartan (COZAAR) 50 mg tablet Take 1 Tab by mouth daily. 90 Tab 2    triamcinolone acetonide (KENALOG) 0.1 % topical cream APPLY THIN LAYER TO AFFECTED AREA TWICE DAILY 15 g 0    glipiZIDE (GLUCOTROL) 5 mg tablet TAKE 1 TABLET BY MOUTH TWICE DAILY 60 Tab 0    pantoprazole (PROTONIX) 40 mg tablet Take 1 Tab by mouth daily. 90 Tab 3    cholecalciferol (VITAMIN D3) 1,000 unit tablet Take 2 Tabs by mouth daily. 61 Tab 0       Past History     Past Medical History:  Past Medical History:   Diagnosis Date    Aortic stenosis     Arthritis     Atrial flutter ([address]     Bronchitis     Diabetes ([address]     Diverticulitis     GERD (gastroesophageal reflux disease)     Hypercholesterolemia     Hypertension     Hypothyroid     Menopause     Pancreatitis        Past Surgical History:  Past Surgical History:   Procedure Laterality Date    COLONOSCOPY N/A 2/2/2017    COLONOSCOPY  w/bx polyp performed by Miguel Ángel Conteh MD at [facility] ENDOSCOPY       Family History:  Family History   Problem Relation Age of Onset    Diabetes Mother     Heart Disease Mother     Cancer Father         melanoma    Stroke Sister     Hypertension Sister     Diabetes Sister     Hypertension Brother     Diabetes Brother     Breast Cancer Paternal Grandmother         older, but not sure age.  Breast Cancer Paternal Aunt         and gyn cancer ?age       Social History:  Social History     Tobacco Use    Smoking status: Never Smoker    Smokeless tobacco: Never Used   Substance Use Topics    Alcohol use: No    Drug use: No       Allergies: Allergies   Allergen Reactions    Ampicillin Itching         Review of Systems       Review of Systems   Constitutional: Positive for appetite change. Negative for activity change, fatigue and fever. + general ill feeling   HENT: Negative for congestion and rhinorrhea. Eyes: Negative for visual disturbance. Respiratory: Negative for shortness of breath. Cardiovascular: Positive for chest pain. Negative for palpitations. Gastrointestinal: Negative for abdominal pain, diarrhea, nausea and vomiting.        + \"heart burn\"   Genitourinary: Negative for dysuria and hematuria. Musculoskeletal: Negative for back pain. Skin: Negative for rash. Neurological: Negative for dizziness, weakness and light-headedness. Psychiatric/Behavioral: Negative for agitation. All other systems reviewed and are negative. Physical Exam     Visit Vitals  /73   Pulse 74   Temp 98.2 °F (36.8 °C)   Resp 15   Ht 5' (1.524 m)   Wt 79.4 kg (175 lb)   SpO2 97%   Breastfeeding? No   BMI 34.18 kg/m²         Physical Exam   Constitutional: She appears well-developed and well-nourished. No distress. HENT:   Head: Normocephalic and atraumatic. Right Ear: External ear normal.   Left Ear: External ear normal.   Nose: Nose normal.   Mouth/Throat: Oropharynx is clear and moist.   Eyes: Conjunctivae and EOM are normal. Pupils are equal, round, and reactive to light. No scleral icterus. Neck: Normal range of motion. Neck supple. No JVD present. No tracheal deviation present. No thyromegaly present. Cardiovascular: Normal rate and regular rhythm. Exam reveals no friction rub. No murmur heard. Pulmonary/Chest: Effort normal and breath sounds normal. No stridor. She exhibits no tenderness. Abdominal: Soft. Bowel sounds are normal. She exhibits no distension. There is no tenderness. There is no rebound and no guarding. Musculoskeletal: Normal range of motion. She exhibits no edema or tenderness. Lymphadenopathy:     She has no cervical adenopathy. Neurological: She is alert. No cranial nerve deficit. Coordination normal.   Skin: Skin is warm and dry. Psychiatric: She has a normal mood and affect.  Her behavior is normal. Judgment and thought content normal.   Nursing note and vitals reviewed. Diagnostic Study Results     Labs -  Recent Results (from the past 12 hour(s))   CBC WITH AUTOMATED DIFF    Collection Time: 02/05/19  8:14 AM   Result Value Ref Range    WBC 9.2 4.6 - 13.2 K/uL    RBC 4.58 4.20 - 5.30 M/uL    HGB 12.8 12.0 - 16.0 g/dL    HCT 39.0 35.0 - 45.0 %    MCV 85.2 74.0 - 97.0 FL    MCH 27.9 24.0 - 34.0 PG    MCHC 32.8 31.0 - 37.0 g/dL    RDW 13.0 11.6 - 14.5 %    PLATELET 023 304 - 959 K/uL    MPV 10.0 9.2 - 11.8 FL    NEUTROPHILS 75 (H) 40 - 73 %    LYMPHOCYTES 16 (L) 21 - 52 %    MONOCYTES 8 3 - 10 %    EOSINOPHILS 1 0 - 5 %    BASOPHILS 0 0 - 2 %    ABS. NEUTROPHILS 6.9 1.8 - 8.0 K/UL    ABS. LYMPHOCYTES 1.5 0.9 - 3.6 K/UL    ABS. MONOCYTES 0.7 0.05 - 1.2 K/UL    ABS. EOSINOPHILS 0.1 0.0 - 0.4 K/UL    ABS.  BASOPHILS 0.0 0.0 - 0.1 K/UL    DF AUTOMATED     METABOLIC PANEL, BASIC    Collection Time: 02/05/19  8:14 AM   Result Value Ref Range    Sodium 139 136 - 145 mmol/L    Potassium 4.3 3.5 - 5.5 mmol/L    Chloride 106 100 - 108 mmol/L    CO2 24 21 - 32 mmol/L    Anion gap 9 3.0 - 18 mmol/L    Glucose 121 (H) 74 - 99 mg/dL    BUN 13 7.0 - 18 MG/DL    Creatinine 0.94 0.6 - 1.3 MG/DL    BUN/Creatinine ratio 14 12 - 20      GFR est AA >60 >60 ml/min/1.73m2    GFR est non-AA 57 (L) >60 ml/min/1.73m2    Calcium 8.2 (L) 8.5 - 10.1 MG/DL   PTT    Collection Time: 02/05/19  8:14 AM   Result Value Ref Range    aPTT 36.3 23.0 - 36.4 SEC   PROTHROMBIN TIME + INR    Collection Time: 02/05/19  8:14 AM   Result Value Ref Range    Prothrombin time 16.7 (H) 11.5 - 15.2 sec    INR 1.4 (H) 0.8 - 1.2     CARDIAC PANEL,(CK, CKMB & TROPONIN)    Collection Time: 02/05/19  8:14 AM   Result Value Ref Range    CK 35 26 - 192 U/L    CK - MB <1.0 <3.6 ng/ml    CK-MB Index  0.0 - 4.0 %     CALCULATION NOT PERFORMED WHEN RESULT IS BELOW LINEAR LIMIT    Troponin-I, QT <0.02 0.0 - 0.045 NG/ML   LIPASE    Collection Time: 02/05/19 8:14 AM   Result Value Ref Range    Lipase 146 73 - 393 U/L   HEPATIC FUNCTION PANEL    Collection Time: 02/05/19  8:14 AM   Result Value Ref Range    Protein, total 7.6 6.4 - 8.2 g/dL    Albumin 3.0 (L) 3.4 - 5.0 g/dL    Globulin 4.6 (H) 2.0 - 4.0 g/dL    A-G Ratio 0.7 (L) 0.8 - 1.7      Bilirubin, total 0.3 0.2 - 1.0 MG/DL    Bilirubin, direct <0.1 0.0 - 0.2 MG/DL    Alk.  phosphatase 93 45 - 117 U/L    AST (SGOT) 24 15 - 37 U/L    ALT (SGPT) 19 13 - 56 U/L   EKG, 12 LEAD, INITIAL    Collection Time: 02/05/19  8:15 AM   Result Value Ref Range    Ventricular Rate 85 BPM    Atrial Rate 85 BPM    P-R Interval 146 ms    QRS Duration 66 ms    Q-T Interval 370 ms    QTC Calculation (Bezet) 440 ms    Calculated P Axis 52 degrees    Calculated R Axis 40 degrees    Calculated T Axis 44 degrees    Diagnosis       Normal sinus rhythm  Normal ECG  When compared with ECG of 03-MAR-2018 16:37,  No significant change was found     URINALYSIS W/ RFLX MICROSCOPIC    Collection Time: 02/05/19  9:40 AM   Result Value Ref Range    Color YELLOW      Appearance CLEAR      Specific gravity 1.018 1.005 - 1.030      pH (UA) 6.0 5.0 - 8.0      Protein NEGATIVE  NEG mg/dL    Glucose NEGATIVE  NEG mg/dL    Ketone 15 (A) NEG mg/dL    Bilirubin NEGATIVE  NEG      Blood TRACE (A) NEG      Urobilinogen 0.2 0.2 - 1.0 EU/dL    Nitrites NEGATIVE  NEG      Leukocyte Esterase NEGATIVE  NEG     URINE MICROSCOPIC ONLY    Collection Time: 02/05/19  9:40 AM   Result Value Ref Range    WBC 4 to 10 0 - 4 /hpf    RBC 0 to 4 0 - 5 /hpf    Epithelial cells 2+ 0 - 5 /lpf    Mucus 1+ (A) NEG /lpf    Hyaline cast 0 to 3 0 - 2 /lpf   CARDIAC PANEL,(CK, CKMB & TROPONIN)    Collection Time: 02/05/19 10:53 AM   Result Value Ref Range     26 - 192 U/L    CK - MB <1.0 <3.6 ng/ml    CK-MB Index  0.0 - 4.0 %     CALCULATION NOT PERFORMED WHEN RESULT IS BELOW LINEAR LIMIT    Troponin-I, QT <0.02 0.0 - 0.045 NG/ML   ECHO ADULT COMPLETE    Collection Time: 02/05/19 10:58 AM   Result Value Ref Range    PASP 36.0 mmHg   NUCLEAR CARDIAC STRESS TEST    Collection Time: 02/05/19  2:52 PM   Result Value Ref Range    Target  bpm    ST Elevation (mm) 0 mm    ST Depression (mm) 0 mm    Exercise duration time 00:02:00     Stress Base Systolic  mmHg    Stress Base Diastolic BP 94 mmHg    Post peak HR 96 BPM    Baseline HR 74 BPM    Estimated workload 1.0 METS    Percent HR 80 %    Stress Rate Pressure Product 14,400 BPM*mmHg       Radiologic Studies -   XR CHEST PORT   Final Result   Impression:   No acute radiographic cardiopulmonary abnormality. Xr Chest Port    Result Date: 2/5/2019  Chest, single view Indication: Epigastric abdominal pain. Hypertension Comparison: March 3, 2018 Findings:  Portable upright AP view of the chest was obtained. No focal pneumonic opacity, pneumothorax or pleural effusion. Stable cardiac size and mediastinal contours. No acute osseous abnormality. Impression: No acute radiographic cardiopulmonary abnormality. Medical Decision Making     It should be noted that Latia HURST MD will be the provider of record for this patient. I reviewed the vital signs, available nursing notes, past medical history, past surgical history, family history and social history. Vital Signs-Reviewed the patient's vital signs. Pulse Oximetry Analysis -  97 on room air (Interpretation)RA    Cardiac Monitor:  Rate:   Rhythm:  Normal Sinus Rhythm     EKG: Interpreted by the EP. Time Interpreted: 0815   Rate: 85   Rhythm: Normal Sinus Rhythm    Interpretation: nml intervals and axis, no sign of ischemia       Records Reviewed: Nursing Notes (Time of Review: 9:08 AM)    ED Course: Progress Notes, Reevaluation, and Consults:    09:57 Consult:  Discussed care with JENARO Shields (Cardiology). Standard discussion; including history of patients chief complaint, available diagnostic results, and treatment course.  Agreeable to discuss with  David and order a stress test and echo today. Have also ordered a second set of labs for her. 10:03 UA, CBC, BMP, troponin are all nml. Pt  anticoaugulated for her a-fib, with Eliquis. CXR negative. Provider Notes (Medical Decision Making):   Patient presents to emergency department with 2 episodes of chest burning left-sided constant lasting for 10-15 minutes nonexertional  Patient also states this is in the left upper chest and no radiation of symptoms. No history of the same in the past no heartburn the past no blood or bloody stool no history of gastritis or ulcers. Patient's past medical history includes aortic stenosis atrial further diabetes heartburn as per documentation  Patient's physical exam patient's physical exam did not reveal anything. Patient has no symptoms right now I will check for any cardiac disease   consult cardiology as patient is a diabetic hypertensive individual who presents with concerning symptoms. Her cardiologist is at [facility],  I will have a consultation considering that she may need a stress test and further management with provocative testing. I will also documented heart score. 03/2018 Echo EF 60% with no wall motion abnormality. Critical Care Time:  The services I provided to this patient were to treat and/or prevent clinically significant deterioration that could result in the failure of one or more body systems and/or organ systems due to risk of ischemic heart disease. Services included the following:  -reviewing nursing notes and old charts  -vital sign assessments  -direct patient care  -medication orders and management  -interpreting and reviewing diagnostic studies/labs  -re-evaluations  -documentation time    Aggregate critical care time was 30 minutes, which includes only time during which I was engaged in work directly related to the patient's care as described above, whether I was at bedside or elsewhere in the Emergency Department.  It did not include time spent performing other reported procedures or the services of residents, students, nurses, or advance practice providers. Iris Forrest MD - 10:05 AM      Diagnosis     Clinical Impression:   1. Acute chest pain        Disposition:     10:30 : Pt care transferred to Dr. Mahnaz Moss  ,ED provider. History of patient complaint(s), available diagnostic reports and current treatment plan has been discussed thoroughly. Bedside rounding on patient occured : yes . Intended disposition of patient : HOme if Stress test/echo negative  Pending diagnostics reports and/or labs (please list): Echo stress test, 2nd EKG and troponin. Medication List      ASK your doctor about these medications    apixaban 5 mg tablet  Commonly known as:  ELIQUIS  TAKE 1 TABLET BY MOUTH TWICE DAILY. cholecalciferol 1,000 unit tablet  Commonly known as:  VITAMIN D3  Take 2 Tabs by mouth daily. glipiZIDE 5 mg tablet  Commonly known as:  GLUCOTROL  TAKE 1 TABLET BY MOUTH TWICE DAILY     levothyroxine 75 mcg tablet  Commonly known as:  SYNTHROID  Take 1 Tab by mouth Daily (before breakfast). * losartan 50 mg tablet  Commonly known as:  COZAAR  Take 1 Tab by mouth daily. * losartan 25 mg tablet  Commonly known as:  COZAAR  Take 1 Tab by mouth daily. metFORMIN 1,000 mg tablet  Commonly known as:  GLUCOPHAGE  Take 1 Tab by mouth two (2) times a day. pantoprazole 40 mg tablet  Commonly known as:  PROTONIX  Take 1 Tab by mouth daily. simvastatin 20 mg tablet  Commonly known as:  ZOCOR  Take 1 Tab by mouth nightly. triamcinolone acetonide 0.1 % topical cream  Commonly known as:  KENALOG  APPLY THIN LAYER TO AFFECTED AREA TWICE DAILY     varicella-zoster recombinant (PF) 50 mcg/0.5 mL Susr injection  Commonly known as:  SHINGRIX (PF)  0.5mL by IntraMUSCular route once now and then repeat in 2-6 months         * This list has 2 medication(s) that are the same as other medications prescribed for you. Read the directions carefully, and ask your doctor or other care provider to review them with you.              _______________________________       [facility] [address] [place] [address] acting as a scribe for and in the presence of Anju Ramirez MD      February 05, 2019 at 9:08 AM       Provider Attestation:      I personally performed the services described in the documentation, reviewed the documentation, as recorded by the scribe in my presence, and it accurately and completely records my words and actions.  February 05, 2019 at 9:08 AM - Anju Ramirez MD        _______________________________

## 2019-02-05 NOTE — CONSULTS
[facility] - Consult Note    Consultation request by Dr. Armaan Jacinto for advice/opinion related to evaluating chest pain    Date of  Admission: 2/5/2019  7:55 AM   Primary Care Physician:  Emerson Crump MD    I saw, evaluated, interviewed and examined the patient personally. I agree with the findings and plan of care as documented below with PA-C note  Patient known to me with known history of diabetes hypertension atrial fibrillation and mild aortic stenosis. She came to hospital with 2 episode of epigastric left chest burning discomfort lasting 15-30 minutes. It resolved itself. She admits that she had first event right after she had a fried mesh potato, onion and meat loaf. Second episode woke her up from sleep. Cardiac biomarkers x2 is unremarkable. EKG without dynamic ST changes of ischemia. I reviewed echocardiogram and nuclear stress test myself. Both of the test appears to be low risk nature. I discussed this with patient. Continue with cardiac medication and risk factor modification    Octavio Forbes MD         Assessment:     -Pt presented due to burning chest pain, resolved upon EMS arrival, neg trop x 2, no recurrence since presentation  -Atrial flutter, reports compliance with Eliquis for anticoagulation  -Echo 2/5/19: EF 56-60%, normal LV wall motion, no RWMA, age-appropriate diastolic function, mildly dilated LA, mild aortic stenosis, mild mitral stenosis, trace mitral regurgitation  -HTN  -DM  -Hypercholesterolemia  -Hypothyroidism  -GERD     Plan:     -Pt seen during nuclear stress test this AM.  Echocardiogram was performed this morning, normal EF and diastolic function with no RWMA. Will review nuclear stress findings when complete.  -Pt is on appropriate anticoagulation for her atrial flutter. History of Present Illness: This is a 77 y.o. female admitted for No admission diagnoses are documented for this encounter. .    Patient complains of:  Chest pain    Valerie Ebony Tanja is a 77 y.o. female with PMHx HTN, DM, HLD, atrial flutter, who presented to the hospital due to recurrence of chest pain at around 05:30 this morning. Pt states that she initially developed burning pain in her lower mid to left chest area around 23:00 last night while at rest, approximately 1 hour after eating fried mashed potato with onions, meatloaf, applesauce and 1/2 banana. She reports that the pain lasted for approximately 30 minutes before resolving. She reports recurrence of the pain at around 05:30 this AM while sitting in her chair, so she called EMS. She reports that the pain again was a burning pain, lasted for approximately 30+ minutes, and resolved upon medics' arrival.  She denies any radiation of pain or associated shortness of breath or diaphoresis.         Cardiac risk factors: dyslipidemia, diabetes mellitus, hypertension, post-menopausal      Review of Symptoms:  Except as stated above include:  Constitutional:  negative  Respiratory:  negative  Cardiovascular:  As per HPI  Gastrointestinal: negative  Genitourinary:  negative  Musculoskeletal:  Negative  Neurological:  Negative  Dermatological:  Negative  Endocrinological: Negative  Psychological:  Negative       Past Medical History:     Past Medical History:   Diagnosis Date    Aortic stenosis     Arthritis     Atrial flutter (HCC)     Bronchitis     Diabetes ([address]     Diverticulitis     GERD (gastroesophageal reflux disease)     Hypercholesterolemia     Hypertension     Hypothyroid     Menopause     Pancreatitis          Social History:     Social History     Socioeconomic History    Marital status: SINGLE     Spouse name: Not on file    Number of children: Not on file    Years of education: Not on file    Highest education level: Not on file   Tobacco Use    Smoking status: Never Smoker    Smokeless tobacco: Never Used   Substance and Sexual Activity    Alcohol use: No    Drug use: No        Family History: Family History   Problem Relation Age of Onset    Diabetes Mother     Heart Disease Mother     Cancer Father         melanoma    Stroke Sister     Hypertension Sister     Diabetes Sister     Hypertension Brother     Diabetes Brother     Breast Cancer Paternal Grandmother         older, but not sure age.  Breast Cancer Paternal Aunt         and gyn cancer ?age        Medications: Allergies   Allergen Reactions    Ampicillin Itching        Current Facility-Administered Medications   Medication Dose Route Frequency    saline peripheral flush soln 5 mL  5 mL InterCATHeter PRN    0.9% sodium chloride with KCl 20 mEq/L infusion   IntraVENous CONTINUOUS     Current Outpatient Medications   Medication Sig    losartan (COZAAR) 25 mg tablet Take 1 Tab by mouth daily.  simvastatin (ZOCOR) 20 mg tablet Take 1 Tab by mouth nightly.  levothyroxine (SYNTHROID) 75 mcg tablet Take 1 Tab by mouth Daily (before breakfast).  apixaban (ELIQUIS) 5 mg tablet TAKE 1 TABLET BY MOUTH TWICE DAILY.  metFORMIN (GLUCOPHAGE) 1,000 mg tablet Take 1 Tab by mouth two (2) times a day.  varicella-zoster recombinant, PF, (SHINGRIX, PF,) 50 mcg/0.5 mL susr injection 0.5mL by IntraMUSCular route once now and then repeat in 2-6 months    losartan (COZAAR) 50 mg tablet Take 1 Tab by mouth daily.  triamcinolone acetonide (KENALOG) 0.1 % topical cream APPLY THIN LAYER TO AFFECTED AREA TWICE DAILY    glipiZIDE (GLUCOTROL) 5 mg tablet TAKE 1 TABLET BY MOUTH TWICE DAILY    pantoprazole (PROTONIX) 40 mg tablet Take 1 Tab by mouth daily.  cholecalciferol (VITAMIN D3) 1,000 unit tablet Take 2 Tabs by mouth daily. Physical Exam:     Visit Vitals  /73   Pulse 74   Temp 98.2 °F (36.8 °C)   Resp 15   Ht 5' (1.524 m)   Wt 175 lb (79.4 kg)   SpO2 97%   Breastfeeding?  No   BMI 34.18 kg/m²     BP Readings from Last 3 Encounters:   02/05/19 137/73   01/17/19 160/75   01/17/19 140/78     Pulse Readings from Last 3 Encounters:   02/05/19 74   01/17/19 73   01/17/19 71     Wt Readings from Last 3 Encounters:   02/05/19 175 lb (79.4 kg)   01/17/19 175 lb (79.4 kg)   01/17/19 175 lb (79.4 kg)       General:  alert, cooperative, no distress, appears stated age  Neck:  No JVD  Lungs:  clear to auscultation bilaterally  Heart:  Regular rate and rhythm  Abdomen:  abdomen is soft without significant tenderness, masses, organomegaly or guarding  Extremities:  Atraumatic, no edema  Skin: Warm and dry.    Neuro: alert, oriented x3, affect appropriate, no focal neurological deficits, moves all extremities well, no involuntary movements  Psych: non focal     Data Review:     Recent Labs     02/05/19  0814   WBC 9.2   HGB 12.8   HCT 39.0        Recent Labs     02/05/19  0814      K 4.3      CO2 24   *   BUN 13   CREA 0.94   CA 8.2*   ALB 3.0*   SGOT 24   ALT 19   INR 1.4*       Results for orders placed or performed during the hospital encounter of 02/05/19   EKG, 12 LEAD, INITIAL   Result Value Ref Range    Ventricular Rate 85 BPM    Atrial Rate 85 BPM    P-R Interval 146 ms    QRS Duration 66 ms    Q-T Interval 370 ms    QTC Calculation (Bezet) 440 ms    Calculated P Axis 52 degrees    Calculated R Axis 40 degrees    Calculated T Axis 44 degrees    Diagnosis       Normal sinus rhythm  Normal ECG  When compared with ECG of 03-MAR-2018 16:37,  No significant change was found         All Cardiac Markers in the last 24 hours:    Lab Results   Component Value Date/Time     02/05/2019 10:53 AM    CPK 35 02/05/2019 08:14 AM    CKMB <1.0 02/05/2019 10:53 AM    CKMB <1.0 02/05/2019 08:14 AM    CKND1  02/05/2019 10:53 AM     CALCULATION NOT PERFORMED WHEN RESULT IS BELOW LINEAR LIMIT    CKND1  02/05/2019 08:14 AM     CALCULATION NOT PERFORMED WHEN RESULT IS BELOW LINEAR LIMIT    TROIQ <0.02 02/05/2019 10:53 AM    TROIQ <0.02 02/05/2019 08:14 AM       Last Lipid:    Lab Results   Component Value Date/Time Cholesterol, total 143 10/10/2018 02:17 PM    HDL Cholesterol 47 10/10/2018 02:17 PM    LDL, calculated 67.6 10/10/2018 02:17 PM    Triglyceride 142 10/10/2018 02:17 PM    CHOL/HDL Ratio 3.0 10/10/2018 02:17 PM       Signed By: Iliana Hansen PA-C     February 5, 2019      Total critical care time spent in reviewing the case, multiple EMR databases, physician notes, procedure notes, examining the patient, and documentation, is >70 minutes. Discussed in details with patient at bedside. All questions were answered to their full satisfaction. Risk, benefit and alternatives were discussed. Reviewed and performed stress personally, reviewed labs, ekg, echo images personally. >50% time spent in counseling and coordination of care.

## 2019-02-05 NOTE — ED NOTES
11:00 AM :Pt care assumed from Dr. Donis Sicard , ED provider. Pt complaint(s), current treatment plan, progression and available diagnostic results have been discussed thoroughly. Rounding occurred: no  Intended Disposition: TBD   Pending diagnostic reports and/or labs (please list): Awaiting 2nd troponin, stress test, and echocardiogram     3:28 PM: Stress test supports low risk for ACS. Echocardiogram which shows no wall motion abnormality. Nml EF. Repeat EKG interpreted by Alexandra Judge MD  Time interpreted: 15:28  Rate: 77 bpm  Rhythm: NSR  Interpretation: No STEMI    Pt feeling well and denies pain. Has had 2 EKGs, trop x 2, stress test, and echo without any significant abnormality. Doubt ACS based on these testing. Doubt need for admission at this time. Pt eager to go home and comfortbale with plan for PCP follow up. ICD-10-CM ICD-9-CM   1.  Acute chest pain R07.9 786.50         Natividad Buckley MD

## 2019-02-05 NOTE — ED NOTES
Patient stated understanding of discharge instructions. Patient was ambulatory upon discharge. Patient received no prescriptions.   Patient armband removed and shredded    Patient given a taxi voucher, patient taken to the waiting room in a wheel chair

## 2019-02-05 NOTE — DISCHARGE INSTRUCTIONS

## 2019-02-06 ENCOUNTER — TELEPHONE (OUTPATIENT)
Dept: CARDIOLOGY CLINIC | Age: 79
End: 2019-02-06

## 2019-02-06 DIAGNOSIS — E03.4 HYPOTHYROIDISM DUE TO ACQUIRED ATROPHY OF THYROID: ICD-10-CM

## 2019-02-06 LAB
ATRIAL RATE: 77 BPM
ATRIAL RATE: 85 BPM
CALCULATED P AXIS, ECG09: 52 DEGREES
CALCULATED P AXIS, ECG09: 55 DEGREES
CALCULATED R AXIS, ECG10: 40 DEGREES
CALCULATED R AXIS, ECG10: 45 DEGREES
CALCULATED T AXIS, ECG11: 37 DEGREES
CALCULATED T AXIS, ECG11: 44 DEGREES
DIAGNOSIS, 93000: NORMAL
DIAGNOSIS, 93000: NORMAL
P-R INTERVAL, ECG05: 146 MS
P-R INTERVAL, ECG05: 148 MS
Q-T INTERVAL, ECG07: 370 MS
Q-T INTERVAL, ECG07: 392 MS
QRS DURATION, ECG06: 66 MS
QRS DURATION, ECG06: 72 MS
QTC CALCULATION (BEZET), ECG08: 440 MS
QTC CALCULATION (BEZET), ECG08: 443 MS
VENTRICULAR RATE, ECG03: 77 BPM
VENTRICULAR RATE, ECG03: 85 BPM

## 2019-02-06 NOTE — TELEPHONE ENCOUNTER
Patient states that she was the ER yesterday and that Dr. Victorino Augustine told her she might need a stronger heart burn medicine. She currently takes protonix. She is going to call Dr. Candice Pedroza office and see if they can give her something else because protonix is not working for her.

## 2019-02-06 NOTE — TELEPHONE ENCOUNTER
Patient called in and stated that she needed a refill of the medication and she also wanted to know if Dr. Sara Powers could prescribe something stronger than Protonix if possible. She also wanted to let him know she was in the hospital all day. Please advise.

## 2019-02-07 ENCOUNTER — HOSPITAL ENCOUNTER (EMERGENCY)
Age: 79
Discharge: HOME | End: 2019-02-08
Attending: EMERGENCY MEDICINE
Payer: MEDICARE

## 2019-02-07 DIAGNOSIS — E11.21 TYPE 2 DIABETES MELLITUS WITH NEPHROPATHY (HCC): ICD-10-CM

## 2019-02-07 DIAGNOSIS — I10 ESSENTIAL HYPERTENSION: ICD-10-CM

## 2019-02-07 DIAGNOSIS — I48.20 CHRONIC ATRIAL FIBRILLATION (HCC): ICD-10-CM

## 2019-02-07 DIAGNOSIS — K57.92 ACUTE DIVERTICULITIS: Primary | ICD-10-CM

## 2019-02-07 PROCEDURE — 99285 EMERGENCY DEPT VISIT HI MDM: CPT

## 2019-02-07 PROCEDURE — 96374 THER/PROPH/DIAG INJ IV PUSH: CPT

## 2019-02-07 PROCEDURE — 93005 ELECTROCARDIOGRAM TRACING: CPT

## 2019-02-07 PROCEDURE — 96375 TX/PRO/DX INJ NEW DRUG ADDON: CPT

## 2019-02-07 PROCEDURE — 96376 TX/PRO/DX INJ SAME DRUG ADON: CPT

## 2019-02-07 RX ORDER — LEVOTHYROXINE SODIUM 75 UG/1
75 TABLET ORAL
Qty: 90 TAB | Refills: 0 | Status: SHIPPED | OUTPATIENT
Start: 2019-02-07 | End: 2019-05-10 | Stop reason: SDUPTHER

## 2019-02-08 ENCOUNTER — APPOINTMENT (OUTPATIENT)
Dept: GENERAL RADIOLOGY | Age: 79
End: 2019-02-08
Attending: EMERGENCY MEDICINE
Payer: MEDICARE

## 2019-02-08 ENCOUNTER — APPOINTMENT (OUTPATIENT)
Dept: CT IMAGING | Age: 79
End: 2019-02-08
Attending: EMERGENCY MEDICINE
Payer: MEDICARE

## 2019-02-08 VITALS
TEMPERATURE: 100 F | OXYGEN SATURATION: 92 % | DIASTOLIC BLOOD PRESSURE: 52 MMHG | WEIGHT: 175 LBS | BODY MASS INDEX: 34.36 KG/M2 | SYSTOLIC BLOOD PRESSURE: 123 MMHG | HEART RATE: 76 BPM | HEIGHT: 60 IN | RESPIRATION RATE: 19 BRPM

## 2019-02-08 LAB
ANION GAP BLD CALC-SCNC: 18 MMOL/L (ref 10–20)
ATRIAL RATE: 53 BPM
BUN BLD-MCNC: 17 MG/DL (ref 7–18)
CA-I BLD-MCNC: 1.08 MMOL/L (ref 1.12–1.32)
CALCULATED P AXIS, ECG09: 62 DEGREES
CALCULATED R AXIS, ECG10: 47 DEGREES
CALCULATED T AXIS, ECG11: 38 DEGREES
CHLORIDE BLD-SCNC: 106 MMOL/L (ref 100–108)
CO2 BLD-SCNC: 23 MMOL/L (ref 19–24)
CREAT UR-MCNC: 0.8 MG/DL (ref 0.6–1.3)
DIAGNOSIS, 93000: NORMAL
GLUCOSE BLD STRIP.AUTO-MCNC: 146 MG/DL (ref 74–106)
HCT VFR BLD CALC: 44 % (ref 36–49)
HGB BLD-MCNC: 15 G/DL (ref 12–16)
P-R INTERVAL, ECG05: 132 MS
POTASSIUM BLD-SCNC: 5 MMOL/L (ref 3.5–5.5)
Q-T INTERVAL, ECG07: 446 MS
QRS DURATION, ECG06: 68 MS
QTC CALCULATION (BEZET), ECG08: 418 MS
SODIUM BLD-SCNC: 141 MMOL/L (ref 136–145)
TROPONIN I BLD-MCNC: <0.04 NG/ML (ref 0–0.08)
VENTRICULAR RATE, ECG03: 53 BPM

## 2019-02-08 PROCEDURE — 96375 TX/PRO/DX INJ NEW DRUG ADDON: CPT

## 2019-02-08 PROCEDURE — 74011250636 HC RX REV CODE- 250/636: Performed by: EMERGENCY MEDICINE

## 2019-02-08 PROCEDURE — 80047 BASIC METABLC PNL IONIZED CA: CPT

## 2019-02-08 PROCEDURE — 96376 TX/PRO/DX INJ SAME DRUG ADON: CPT

## 2019-02-08 PROCEDURE — 84484 ASSAY OF TROPONIN QUANT: CPT

## 2019-02-08 PROCEDURE — 71045 X-RAY EXAM CHEST 1 VIEW: CPT

## 2019-02-08 PROCEDURE — 74011250637 HC RX REV CODE- 250/637: Performed by: EMERGENCY MEDICINE

## 2019-02-08 PROCEDURE — 74011636320 HC RX REV CODE- 636/320: Performed by: EMERGENCY MEDICINE

## 2019-02-08 PROCEDURE — 96374 THER/PROPH/DIAG INJ IV PUSH: CPT

## 2019-02-08 PROCEDURE — 74177 CT ABD & PELVIS W/CONTRAST: CPT

## 2019-02-08 RX ORDER — ONDANSETRON 2 MG/ML
4 INJECTION INTRAMUSCULAR; INTRAVENOUS
Status: COMPLETED | OUTPATIENT
Start: 2019-02-08 | End: 2019-02-08

## 2019-02-08 RX ORDER — MORPHINE SULFATE 4 MG/ML
2 INJECTION INTRAVENOUS
Status: COMPLETED | OUTPATIENT
Start: 2019-02-08 | End: 2019-02-08

## 2019-02-08 RX ORDER — METRONIDAZOLE 250 MG/1
500 TABLET ORAL
Status: COMPLETED | OUTPATIENT
Start: 2019-02-08 | End: 2019-02-08

## 2019-02-08 RX ORDER — CIPROFLOXACIN 500 MG/1
500 TABLET ORAL 2 TIMES DAILY
Qty: 14 TAB | Refills: 0 | Status: SHIPPED | OUTPATIENT
Start: 2019-02-08 | End: 2019-02-15

## 2019-02-08 RX ORDER — DICYCLOMINE HYDROCHLORIDE 20 MG/1
20 TABLET ORAL EVERY 6 HOURS
Qty: 20 TAB | Refills: 0 | Status: SHIPPED | OUTPATIENT
Start: 2019-02-08 | End: 2019-02-13

## 2019-02-08 RX ORDER — DICYCLOMINE HYDROCHLORIDE 10 MG/1
20 CAPSULE ORAL
Status: COMPLETED | OUTPATIENT
Start: 2019-02-08 | End: 2019-02-08

## 2019-02-08 RX ORDER — METRONIDAZOLE 500 MG/1
500 TABLET ORAL 2 TIMES DAILY
Qty: 14 TAB | Refills: 0 | Status: SHIPPED | OUTPATIENT
Start: 2019-02-08 | End: 2019-02-15

## 2019-02-08 RX ORDER — CIPROFLOXACIN 500 MG/1
500 TABLET ORAL
Status: COMPLETED | OUTPATIENT
Start: 2019-02-08 | End: 2019-02-08

## 2019-02-08 RX ADMIN — MORPHINE SULFATE 2 MG: 4 INJECTION INTRAVENOUS at 00:20

## 2019-02-08 RX ADMIN — DICYCLOMINE HYDROCHLORIDE 20 MG: 10 CAPSULE ORAL at 00:20

## 2019-02-08 RX ADMIN — IOPAMIDOL 100 ML: 612 INJECTION, SOLUTION INTRAVENOUS at 06:44

## 2019-02-08 RX ADMIN — CIPROFLOXACIN HYDROCHLORIDE 500 MG: 500 TABLET, FILM COATED ORAL at 08:24

## 2019-02-08 RX ADMIN — DICYCLOMINE HYDROCHLORIDE 20 MG: 10 CAPSULE ORAL at 08:24

## 2019-02-08 RX ADMIN — METRONIDAZOLE 500 MG: 250 TABLET ORAL at 08:24

## 2019-02-08 RX ADMIN — ONDANSETRON 4 MG: 2 INJECTION INTRAMUSCULAR; INTRAVENOUS at 00:20

## 2019-02-08 RX ADMIN — MORPHINE SULFATE 2 MG: 4 INJECTION INTRAVENOUS at 02:14

## 2019-02-08 NOTE — ED PROVIDER NOTES
EMERGENCY DEPARTMENT HISTORY AND PHYSICAL EXAM    Date: 2/7/2019  Patient Name: Arlin Gray    History of Presenting Illness     Chief Complaint   Patient presents with    Chest Pain    Vomiting         History Provided By: Patient    Chief Complaint: CP  Duration: 6 hours   Timing:  Constant  Location: Chest  Quality: Burning  Severity: Moderate  Modifying Factors:  Pt notes taking Pepto-Bismol and heartburn medication PTA with no relief of sx  Associated Symptoms: sweating, vomiting, diarrhea    Additional History (Context): Arlin Gray is a 77 y.o. female with PMHX of GERD, DM, HTN who presents to the emergency department C/O constant and moderate CP. Pt describes pain as \"burning\" but denies radiation to back or shoulders. Pt notes taking Pepto-Bismol and heartburn medication x2 PTA with no relief of sx. Associated sxs include sweating, vomiting, and \"dark\" diarrhea. Pt denies hx of MI and any other sxs or complaints. Allergy to Ampicillin. PCP: Corey Godinez MD    Current Outpatient Medications   Medication Sig Dispense Refill    metroNIDAZOLE (FLAGYL) 500 mg tablet Take 1 Tab by mouth two (2) times a day for 7 days. 14 Tab 0    ciprofloxacin HCl (CIPRO) 500 mg tablet Take 1 Tab by mouth two (2) times a day for 7 days. 14 Tab 0    dicyclomine (BENTYL) 20 mg tablet Take 1 Tab by mouth every six (6) hours for 20 doses. 20 Tab 0    levothyroxine (SYNTHROID) 75 mcg tablet Take 1 Tab by mouth Daily (before breakfast). 90 Tab 0    losartan (COZAAR) 25 mg tablet Take 1 Tab by mouth daily. 90 Tab 0    simvastatin (ZOCOR) 20 mg tablet Take 1 Tab by mouth nightly. 90 Tab 0    apixaban (ELIQUIS) 5 mg tablet TAKE 1 TABLET BY MOUTH TWICE DAILY. 60 Tab 6    metFORMIN (GLUCOPHAGE) 1,000 mg tablet Take 1 Tab by mouth two (2) times a day.  180 Tab 0    varicella-zoster recombinant, PF, (SHINGRIX, PF,) 50 mcg/0.5 mL susr injection 0.5mL by IntraMUSCular route once now and then repeat in 2-6 months 0.5 mL 1    losartan (COZAAR) 50 mg tablet Take 1 Tab by mouth daily. 90 Tab 2    triamcinolone acetonide (KENALOG) 0.1 % topical cream APPLY THIN LAYER TO AFFECTED AREA TWICE DAILY 15 g 0    glipiZIDE (GLUCOTROL) 5 mg tablet TAKE 1 TABLET BY MOUTH TWICE DAILY 60 Tab 0    pantoprazole (PROTONIX) 40 mg tablet Take 1 Tab by mouth daily. 90 Tab 3    cholecalciferol (VITAMIN D3) 1,000 unit tablet Take 2 Tabs by mouth daily. 61 Tab 0       Past History     Past Medical History:  Past Medical History:   Diagnosis Date    Aortic stenosis     Arthritis     Atrial flutter ([address]     Bronchitis     Diabetes ([address]     Diverticulitis     GERD (gastroesophageal reflux disease)     Hypercholesterolemia     Hypertension     Hypothyroid     Menopause     Pancreatitis        Past Surgical History:  Past Surgical History:   Procedure Laterality Date    COLONOSCOPY N/A 2/2/2017    COLONOSCOPY  w/bx polyp performed by Svetlana Butts MD at [facility] ENDOSCOPY       Family History:  Family History   Problem Relation Age of Onset    Diabetes Mother     Heart Disease Mother     Cancer Father         melanoma    Stroke Sister     Hypertension Sister     Diabetes Sister     Hypertension Brother     Diabetes Brother     Breast Cancer Paternal Grandmother         older, but not sure age.  Breast Cancer Paternal Aunt         and gyn cancer ?age       Social History:  Social History     Tobacco Use    Smoking status: Never Smoker    Smokeless tobacco: Never Used   Substance Use Topics    Alcohol use: No    Drug use: No       Allergies: Allergies   Allergen Reactions    Ampicillin Itching         Review of Systems   Review of Systems   Constitutional: Positive for diaphoresis. Negative for chills, fever and unexpected weight change. HENT: Negative for congestion, drooling, ear pain, rhinorrhea, sore throat, tinnitus and trouble swallowing. Eyes: Negative for photophobia, pain, redness and visual disturbance. Respiratory: Negative for cough, choking, chest tightness, shortness of breath, wheezing and stridor. Cardiovascular: Positive for chest pain. Negative for palpitations and leg swelling. Gastrointestinal: Positive for diarrhea and vomiting. Negative for abdominal distention, abdominal pain, anal bleeding, blood in stool, constipation and nausea. Endocrine: Negative for cold intolerance, heat intolerance, polydipsia and polyuria. Genitourinary: Negative for difficulty urinating, dysuria, flank pain, frequency, hematuria and urgency. Musculoskeletal: Negative for arthralgias, back pain and neck pain. Skin: Negative for color change, rash and wound. Allergic/Immunologic: Negative for immunocompromised state. Neurological: Negative for dizziness, seizures, syncope, speech difficulty, light-headedness and headaches. Hematological: Does not bruise/bleed easily. Psychiatric/Behavioral: Negative for agitation, behavioral problems, hallucinations, self-injury and suicidal ideas. The patient is not hyperactive. Physical Exam     Vitals:    02/08/19 0715 02/08/19 0730 02/08/19 0745 02/08/19 0800   BP:  141/65  123/52   Pulse: 77 74 76 76   Resp: 20 20 20 19   Temp:       SpO2: 93% 95% 93% 92%   Weight:       Height:         Physical Exam   Constitutional: She is oriented to person, place, and time. She appears well-developed and well-nourished. No distress. Elderly Lady   Non toxic appearing  Well-appearing   HENT:   Head: Normocephalic and atraumatic. Right Ear: External ear normal.   Left Ear: External ear normal.   Mouth/Throat: Oropharynx is clear and moist. No oropharyngeal exudate. Eyes: Conjunctivae and EOM are normal. Pupils are equal, round, and reactive to light. No scleral icterus. Mild pallor   Neck: Normal range of motion. Neck supple. No JVD present. No tracheal deviation present. No thyromegaly present. Cardiovascular: Regular rhythm. Bradycardia present. Murmur heard. Systolic murmur is present with a grade of 2/6. Pulmonary/Chest: Effort normal and breath sounds normal. No stridor. No respiratory distress. Crackles at both bases   Abdominal: Soft. Bowel sounds are normal. She exhibits no distension. There is tenderness (Diffuse). There is no rebound and no guarding. Musculoskeletal: Normal range of motion. She exhibits no edema or tenderness. No soft tissue injuries   Lymphadenopathy:     She has no cervical adenopathy. Neurological: She is alert and oriented to person, place, and time. She has normal reflexes. No cranial nerve deficit. Coordination normal.   Skin: Skin is warm and dry. No rash noted. She is not diaphoretic. No erythema. Psychiatric: She has a normal mood and affect. Her behavior is normal. Judgment and thought content normal.   Nursing note and vitals reviewed.         Diagnostic Study Results     Labs -       Recent Results (from the past 12 hour(s))   EKG, 12 LEAD, INITIAL    Collection Time: 02/07/19 11:41 PM   Result Value Ref Range    Ventricular Rate 53 BPM    Atrial Rate 53 BPM    P-R Interval 132 ms    QRS Duration 68 ms    Q-T Interval 446 ms    QTC Calculation (Bezet) 418 ms    Calculated P Axis 62 degrees    Calculated R Axis 47 degrees    Calculated T Axis 38 degrees    Diagnosis       Sinus bradycardia  Otherwise normal ECG  When compared with ECG of 05-FEB-2019 15:28,  No significant change was found     POC TROPONIN-I    Collection Time: 02/08/19 12:14 AM   Result Value Ref Range    Troponin-I (POC) <0.04 0.00 - 0.08 ng/mL   POC CHEM8    Collection Time: 02/08/19 12:15 AM   Result Value Ref Range    CO2, POC 23 19 - 24 MMOL/L    Glucose,  (H) 74 - 106 MG/DL    BUN, POC 17 7 - 18 MG/DL    Creatinine, POC 0.8 0.6 - 1.3 MG/DL    GFRAA, POC >60 >60 ml/min/1.73m2    GFRNA, POC >60 >60 ml/min/1.73m2    Sodium,  136 - 145 MMOL/L    Potassium, POC 5.0 3.5 - 5.5 MMOL/L    Calcium, ionized (POC) 1.08 (L) 1.12 - 1.32 mmol/L Chloride,  100 - 108 MMOL/L    Anion gap, POC 18 10 - 20      Hematocrit, POC 44 36 - 49 %    Hemoglobin, POC 15.0 12 - 16 G/DL       Radiologic Studies -   XR CHEST PORT    (Results Pending)   CT ABD PELV W CONT    (Results Pending)     CT Results  (Last 48 hours)    None       Radiologic Studies -   XR CHEST PORT   Final Result   Impression:   No radiographic evidence of an acute abnormality. CT ABD PELV W CONT   Final Result   IMPRESSION:      Sigmoid diverticulitis with adjacent stool-containing collection that could   represent a large diverticulum versus chronically contained perforation. This   was present on previous examination but has decreased in size. This is   sandwiched between the sigmoid colon and distal descending colon, better   appreciated on coronal images. Colon colonic fistula or developing fistula in   this location not entirely excluded. No drainable fluid collection or gross free   air or fluid identified. .      Trace pericholecystic fluid. Mild intra and extrahepatic biliary duct   dilatation. Findings of uncertain significance. Recommend correlation with LFTs. Preliminary report was given by the on-call radiology resident. CT Results  (Last 48 hours)    None        CXR Results  (Last 48 hours)    None        CXR read by Felice Funez MD:  No cardiomegaly. No effusion. No infiltrate. No pulmonary edema. No acute process.       Medications given in the ED-  Medications   morphine injection 2 mg (2 mg IntraVENous Given 2/8/19 0020)   ondansetron (ZOFRAN) injection 4 mg (4 mg IntraVENous Given 2/8/19 0020)   dicyclomine (BENTYL) capsule 20 mg (20 mg Oral Given 2/8/19 0020)   morphine injection 2 mg (2 mg IntraVENous Given 2/8/19 0214)   iopamidol (ISOVUE 300) 61 % contrast injection 100 mL (100 mL IntraVENous Given 2/8/19 0644)   dicyclomine (BENTYL) capsule 20 mg (20 mg Oral Given 2/8/19 0824)   ciprofloxacin HCl (CIPRO) tablet 500 mg (500 mg Oral Given 2/8/19 0824)   metroNIDAZOLE (FLAGYL) tablet 500 mg (500 mg Oral Given 2/8/19 0824)         Medical Decision Making   I am the first provider for this patient. I reviewed the vital signs, available nursing notes, past medical history, past surgical history, family history and social history. Vital Signs-Reviewed the patient's vital signs. Pulse Oximetry Analysis - 98% on RA     Cardiac Monitor:  Rate: 56 bpm  Rhythm: Sinus Bradycardia    EKG interpretation: (Preliminary)   Rate: 53 bpm  Rhythm: Sinus Bradycardia  Interpretation: Nml ST segments. No visible block. EKG read by Gia Ross MD at 23:41     Records Reviewed: Nursing Notes, Old Medical Records and Previous echocardiogram   Review of records shows echocardiogram from 3 days ago. Showing she had normal EF, no wall motion abnormality, and no valvular heart dz. Provider Notes (Medical Decision Making):   MDM: Primary concern for acute MI but no STEMI criteria or elevated troponin. Will work on her pain tx. Then again address IV access, EJ vs central line if needed. Procedures:  Procedures    ED Course:    Initial assessment performed. The patients presenting problems have been discussed, and they are in agreement with the care plan formulated and outlined with them. I have encouraged them to ask questions as they arise throughout their visit. Diagnosis and Disposition       DISCHARGE NOTE:  Arminda Agrawal's  results have been reviewed with her. She has been counseled regarding her diagnosis, treatment, and plan. She verbally conveys understanding and agreement of the signs, symptoms, diagnosis, treatment and prognosis and additionally agrees to follow up as discussed. She also agrees with the care-plan and conveys that all of her questions have been answered.   I have also provided discharge instructions for her that include: educational information regarding their diagnosis and treatment, and list of reasons why they would want to return to the ED prior to their follow-up appointment, should her condition change. She has been provided with education for proper emergency department utilization. CLINICAL IMPRESSION:    1. Acute diverticulitis    2. Chronic atrial fibrillation (HCC)    3. Essential hypertension    4. Type 2 diabetes mellitus with nephropathy ([facility] [address]        PLAN:  1. D/C Home  2. Discharge Medication List as of 2/8/2019  7:35 AM      START taking these medications    Details   metroNIDAZOLE (FLAGYL) 500 mg tablet Take 1 Tab by mouth two (2) times a day for 7 days. , Normal, Disp-14 Tab, R-0      ciprofloxacin HCl (CIPRO) 500 mg tablet Take 1 Tab by mouth two (2) times a day for 7 days. , Normal, Disp-14 Tab, R-0      dicyclomine (BENTYL) 20 mg tablet Take 1 Tab by mouth every six (6) hours for 20 doses. , Normal, Disp-20 Tab, R-0         CONTINUE these medications which have NOT CHANGED    Details   levothyroxine (SYNTHROID) 75 mcg tablet Take 1 Tab by mouth Daily (before breakfast). , Normal**Patient requests 90 days supply**Disp-90 Tab, R-0      !! losartan (COZAAR) 25 mg tablet Take 1 Tab by mouth daily. , Normal, Disp-90 Tab, R-0      simvastatin (ZOCOR) 20 mg tablet Take 1 Tab by mouth nightly., Normal, Disp-90 Tab, R-0      apixaban (ELIQUIS) 5 mg tablet TAKE 1 TABLET BY MOUTH TWICE DAILY., Normal, Disp-60 Tab, R-6      metFORMIN (GLUCOPHAGE) 1,000 mg tablet Take 1 Tab by mouth two (2) times a day., Normal**Patient requests 90 days supply**Disp-180 Tab, R-0      varicella-zoster recombinant, PF, (SHINGRIX, PF,) 50 mcg/0.5 mL susr injection 0.5mL by IntraMUSCular route once now and then repeat in 2-6 months, Print, Disp-0.5 mL, R-1      !! losartan (COZAAR) 50 mg tablet Take 1 Tab by mouth daily. , Normal**Patient requests 90 days supply**Disp-90 Tab, R-2      triamcinolone acetonide (KENALOG) 0.1 % topical cream APPLY THIN LAYER TO AFFECTED AREA TWICE DAILY, Normal, Disp-15 g, R-0      glipiZIDE (GLUCOTROL) 5 mg tablet TAKE 1 TABLET BY MOUTH TWICE DAILY, Normal, Disp-60 Tab, R-0      pantoprazole (PROTONIX) 40 mg tablet Take 1 Tab by mouth daily. , Normal, Disp-90 Tab, R-3      cholecalciferol (VITAMIN D3) 1,000 unit tablet Take 2 Tabs by mouth daily. , Print, Disp-60 Tab, R-0       !! - Potential duplicate medications found. Please discuss with provider. 3.   Follow-up Information     Follow up With Specialties Details Why Pretty Love MD Internal Medicine In 5 days  [address]  [facility] [phone]      [facility] EMERGENCY DEPT Emergency Medicine  As needed, If symptoms worsen [address]  [phone]        _______________________________    Attestations: This note is prepared by Dl Sands, acting as Scribe for Donya Price MD.    :  The scribe's documentation has been prepared under my direction and personally reviewed by me in its entirety.   I confirm that the note above accurately reflects all work, treatment, procedures, and medical decision making performed by me.  _______________________________

## 2019-02-08 NOTE — ED NOTES
Patient bradycardic to 43, VS 2 days ago HR was 74. Dr. Chago Livingston notified and came to bedside to evaluate patient. Delay in care due to inability to obtain blood specimen and patient is a difficult stick. 2 RNs and ed tech have attempted multiple times to obtain PIV access.

## 2019-02-08 NOTE — ED TRIAGE NOTES
Patient arrived by EMS with chief c/o chest pain onset 1830 tonight. States she took heartburn medicine and peptobismal 2X with no relief. No meds administered by EMS prior to arrival. Patient ambulatory from EMS stretcher to ER stretcher.  Patient also c/o vomiting

## 2019-02-08 NOTE — DISCHARGE INSTRUCTIONS
Learning About Diverticulosis and Diverticulitis  What are diverticulosis and diverticulitis? In diverticulosis and diverticulitis, pouches called diverticula form in the wall of the large intestine, or colon. · In diverticulosis, the pouches do not cause any pain or other symptoms. · In diverticulitis, the pouches get inflamed or infected and cause symptoms. Doctors aren't sure what causes these pouches in the colon. But they think that a low-fiber diet may play a role. Without fiber to add bulk to the stool, the colon has to work harder than normal to push the stool forward. The pressure from this may cause pouches to form in weak spots along the colon. Some people with diverticulosis get diverticulitis. But experts don't know why this happens. What are the symptoms? · In diverticulosis, most people don't have symptoms. But pouches sometimes bleed. · In diverticulitis, symptoms may last from a few hours to a week or more. They include:  ? Belly pain. This is usually in the lower left side. It is sometimes worse when you move. This is the most common symptom. ? Fever and chills. ? Bloating and gas. ? Diarrhea or constipation. ? Nausea and sometimes vomiting.  ? Not feeling like eating. How can you prevent these problems? You may be able to lower your chance of getting diverticulitis. You can do this by taking steps to prevent constipation. · Eat fruits, vegetables, beans, and whole grains every day. These foods are high in fiber. · Drink plenty of fluids (enough so that your urine is light yellow or clear like water). If you have kidney, heart, or liver disease and have to limit fluids, talk with your doctor before you increase the amount of fluids you drink. · Get at least 30 minutes of exercise on most days of the week. Walking is a good choice. You also may want to do other activities, such as running, swimming, cycling, or playing tennis or team sports.   · Take a fiber supplement, such as Citrucel or Metamucil, every day if needed. Read and follow all instructions on the label. · Schedule time each day for a bowel movement. Having a daily routine may help. Take your time and do not strain when having a bowel movement. Some people avoid nuts, seeds, berries, and popcorn. They believe that these foods might get trapped in the diverticula and cause pain. But there is no proof that these foods cause diverticulitis or make it worse. How are these problems treated? · The best way to treat diverticulosis is to avoid constipation. (See the tips above.)  · Treatment for diverticulitis includes antibiotics and often a change in your diet. You may need only liquids at first. Your doctor may suggest pain medicines for pain or belly cramps. In some cases, surgery may be needed. Follow-up care is a key part of your treatment and safety. Be sure to make and go to all appointments, and call your doctor if you are having problems. It's also a good idea to know your test results and keep a list of the medicines you take. Where can you learn more? Go to http://aravind-farzana.info/. Enter M778 in the search box to learn more about \"Learning About Diverticulosis and Diverticulitis. \"  Current as of: March 27, 2018  Content Version: 11.9  © 1593-2266 [employer]. Care instructions adapted under license by [employer] (which disclaims liability or warranty for this information). If you have questions about a medical condition or this instruction, always ask your healthcare professional. [place] [address] any warranty or liability for your use of this information. Diverticulitis: Care Instructions  Your Care Instructions    Diverticulitis occurs when pouches form in the wall of the colon and become inflamed or infected. It can be very painful. Doctors aren't sure what causes diverticulitis.  There is no proof that foods such as nuts, seeds, or berries cause it or make it worse. A low-fiber diet may cause the colon to work harder to push stool forward. Pouches may form because of this extra work. It may be hard to think about healthy eating while you're in pain. But as you recover, you might think about how you can use healthy eating for overall better health. Healthy eating may help you avoid future attacks. Follow-up care is a key part of your treatment and safety. Be sure to make and go to all appointments, and call your doctor if you are having problems. It's also a good idea to know your test results and keep a list of the medicines you take. How can you care for yourself at home? · Drink plenty of fluids, enough so that your urine is light yellow or clear like water. If you have kidney, heart, or liver disease and have to limit fluids, talk with your doctor before you increase the amount of fluids you drink. · Stick to liquids or a bland diet (plain rice, bananas, dry toast or crackers, applesauce) until you are feeling better. Then you can return to regular foods and gradually increase the amount of fiber in your diet. · Use a heating pad set on low on your belly to relieve mild cramps and pain. · Get extra rest until you are feeling better. · Be safe with medicines. Read and follow all instructions on the label. ? If the doctor gave you a prescription medicine for pain, take it as prescribed. ? If you are not taking a prescription pain medicine, ask your doctor if you can take an over-the-counter medicine. · If your doctor prescribed antibiotics, take them as directed. Do not stop taking them just because you feel better. You need to take the full course of antibiotics. To prevent future attacks of diverticulitis  · Avoid constipation:  ? Include fruits, vegetables, beans, and whole grains in your diet each day. These foods are high in fiber. ? Drink plenty of fluids, enough so that your urine is light yellow or clear like water.  If you have kidney, heart, or liver disease and have to limit fluids, talk with your doctor before you increase the amount of fluids you drink. ? Get some exercise every day. Build up slowly to 30 to 60 minutes a day on 5 or more days of the week. ? Take a fiber supplement, such as Citrucel or Metamucil, every day if needed. Read and follow all instructions on the label. ? Schedule time each day for a bowel movement. Having a daily routine may help. Take your time and do not strain when having a bowel movement. When should you call for help? Call your doctor now or seek immediate medical care if:    · You have a fever.     · You are vomiting.     · You have new or worse belly pain.     · You cannot pass stools or gas.    Watch closely for changes in your health, and be sure to contact your doctor if you have any problems. Where can you learn more? Go to http://aravind-farzana.info/. Enter H901 in the search box to learn more about \"Diverticulitis: Care Instructions. \"  Current as of: March 27, 2018  Content Version: 11.9  © 9045-6426 [employer]. Care instructions adapted under license by [employer] (which disclaims liability or warranty for this information). If you have questions about a medical condition or this instruction, always ask your healthcare professional. [place] [address] any warranty or liability for your use of this information. Patient Education        Atrial Fibrillation: Care Instructions  Your Care Instructions    Atrial fibrillation is an irregular and often fast heartbeat. Treating this condition is important for several reasons. It can cause blood clots, which can travel from your heart to your brain and cause a stroke. If you have a fast heartbeat, you may feel lightheaded, dizzy, and weak. An irregular heartbeat can also increase your risk for heart failure.   Atrial fibrillation is often the result of another heart condition, such as high blood pressure or coronary artery disease. Making changes to improve your heart condition will help you stay healthy and active. Follow-up care is a key part of your treatment and safety. Be sure to make and go to all appointments, and call your doctor if you are having problems. It's also a good idea to know your test results and keep a list of the medicines you take. How can you care for yourself at home? Medicines    · Take your medicines exactly as prescribed. Call your doctor if you think you are having a problem with your medicine. You will get more details on the specific medicines your doctor prescribes.     · If your doctor has given you a blood thinner to prevent a stroke, be sure you get instructions about how to take your medicine safely. Blood thinners can cause serious bleeding problems.     · Do not take any vitamins, over-the-counter drugs, or herbal products without talking to your doctor first.    Lifestyle changes    · Do not smoke. Smoking can increase your chance of a stroke and heart attack. If you need help quitting, talk to your doctor about stop-smoking programs and medicines. These can increase your chances of quitting for good.     · Eat a heart-healthy diet.     · Stay at a healthy weight. Lose weight if you need to.     · Limit alcohol to 2 drinks a day for men and 1 drink a day for women. Too much alcohol can cause health problems.     · Avoid colds and flu. Get a pneumococcal vaccine shot. If you have had one before, ask your doctor whether you need another dose. Get a flu shot every year. If you must be around people with colds or flu, wash your hands often. Activity    · If your doctor recommends it, get more exercise. Walking is a good choice. Bit by bit, increase the amount you walk every day. Try for at least 30 minutes on most days of the week. You also may want to swim, bike, or do other activities.  Your doctor may suggest that you join a cardiac rehabilitation program so that you can have help increasing your physical activity safely.     · Start light exercise if your doctor says it is okay. Even a small amount will help you get stronger, have more energy, and manage stress. Walking is an easy way to get exercise. Start out by walking a little more than you did in the hospital. Gradually increase the amount you walk.     · When you exercise, watch for signs that your heart is working too hard. You are pushing too hard if you cannot talk while you are exercising. If you become short of breath or dizzy or have chest pain, sit down and rest immediately.     · Check your pulse regularly. Place two fingers on the artery at the palm side of your wrist, in line with your thumb. If your heartbeat seems uneven or fast, talk to your doctor. When should you call for help? Call 911 anytime you think you may need emergency care. For example, call if:    · You have symptoms of a heart attack. These may include:  ? Chest pain or pressure, or a strange feeling in the chest.  ? Sweating. ? Shortness of breath. ? Nausea or vomiting. ? Pain, pressure, or a strange feeling in the back, neck, jaw, or upper belly or in one or both shoulders or arms. ? Lightheadedness or sudden weakness. ? A fast or irregular heartbeat. After you call 911, the  may tell you to chew 1 adult-strength or 2 to 4 low-dose aspirin. Wait for an ambulance. Do not try to drive yourself.     · You have symptoms of a stroke. These may include:  ? Sudden numbness, tingling, weakness, or loss of movement in your face, arm, or leg, especially on only one side of your body. ? Sudden vision changes. ? Sudden trouble speaking. ? Sudden confusion or trouble understanding simple statements. ? Sudden problems with walking or balance.   ? A sudden, severe headache that is different from past headaches.     · You passed out (lost consciousness).    Call your doctor now or seek immediate medical care if:    · You have new or increased shortness of breath.     · You feel dizzy or lightheaded, or you feel like you may faint.     · Your heart rate becomes irregular.     · You can feel your heart flutter in your chest or skip heartbeats. Tell your doctor if these symptoms are new or worse.    Watch closely for changes in your health, and be sure to contact your doctor if you have any problems. Where can you learn more? Go to http://aravind-farzana.info/. Enter U020 in the search box to learn more about \"Atrial Fibrillation: Care Instructions. \"  Current as of: July 22, 2018  Content Version: 11.9  © 9619-9761 [employer]. Care instructions adapted under license by [employer] (which disclaims liability or warranty for this information). If you have questions about a medical condition or this instruction, always ask your healthcare professional. [address] any warranty or liability for your use of this information.

## 2019-02-11 ENCOUNTER — TELEPHONE (OUTPATIENT)
Dept: FAMILY MEDICINE CLINIC | Age: 79
End: 2019-02-11

## 2019-02-11 NOTE — TELEPHONE ENCOUNTER
Pt. Grant Balbuena is causing blood in her urine. She would like another prescription. She stated she does not have transportation to get here this morning to be seen. She is asking to be called back. Please advise.

## 2019-02-13 ENCOUNTER — OFFICE VISIT (OUTPATIENT)
Dept: FAMILY MEDICINE CLINIC | Age: 79
End: 2019-02-13

## 2019-02-13 ENCOUNTER — HOSPITAL ENCOUNTER (OUTPATIENT)
Dept: LAB | Age: 79
Discharge: HOME | End: 2019-02-13
Payer: MEDICARE

## 2019-02-13 VITALS
WEIGHT: 176 LBS | SYSTOLIC BLOOD PRESSURE: 131 MMHG | RESPIRATION RATE: 16 BRPM | BODY MASS INDEX: 34.55 KG/M2 | DIASTOLIC BLOOD PRESSURE: 60 MMHG | TEMPERATURE: 96.4 F | HEIGHT: 60 IN | HEART RATE: 70 BPM | OXYGEN SATURATION: 97 %

## 2019-02-13 DIAGNOSIS — N39.0 URINARY TRACT INFECTION WITHOUT HEMATURIA, SITE UNSPECIFIED: ICD-10-CM

## 2019-02-13 DIAGNOSIS — K57.92 DIVERTICULITIS: ICD-10-CM

## 2019-02-13 DIAGNOSIS — R31.9 HEMATURIA, UNSPECIFIED TYPE: Primary | ICD-10-CM

## 2019-02-13 LAB
BASOPHILS # BLD: 0 K/UL (ref 0–0.1)
BASOPHILS NFR BLD: 0 % (ref 0–2)
BILIRUB UR QL STRIP: NORMAL
CRP SERPL-MCNC: 1.3 MG/DL (ref 0–0.3)
DIFFERENTIAL METHOD BLD: NORMAL
EOSINOPHIL # BLD: 0.4 K/UL (ref 0–0.4)
EOSINOPHIL NFR BLD: 4 % (ref 0–5)
ERYTHROCYTE [DISTWIDTH] IN BLOOD BY AUTOMATED COUNT: 13.8 % (ref 11.6–14.5)
ERYTHROCYTE [SEDIMENTATION RATE] IN BLOOD: 57 MM/HR (ref 0–30)
GLUCOSE UR-MCNC: NEGATIVE MG/DL
HCT VFR BLD AUTO: 41.5 % (ref 35–45)
HGB BLD-MCNC: 13.2 G/DL (ref 12–16)
KETONES P FAST UR STRIP-MCNC: NORMAL MG/DL
LYMPHOCYTES # BLD: 2.5 K/UL (ref 0.9–3.6)
LYMPHOCYTES NFR BLD: 29 % (ref 21–52)
MCH RBC QN AUTO: 27.6 PG (ref 24–34)
MCHC RBC AUTO-ENTMCNC: 31.8 G/DL (ref 31–37)
MCV RBC AUTO: 86.8 FL (ref 74–97)
MONOCYTES # BLD: 0.6 K/UL (ref 0.05–1.2)
MONOCYTES NFR BLD: 6 % (ref 3–10)
NEUTS SEG # BLD: 5.2 K/UL (ref 1.8–8)
NEUTS SEG NFR BLD: 61 % (ref 40–73)
PH UR STRIP: 5 [PH] (ref 4.6–8)
PLATELET # BLD AUTO: 368 K/UL (ref 135–420)
PMV BLD AUTO: 10.2 FL (ref 9.2–11.8)
PROT UR QL STRIP: NORMAL
RBC # BLD AUTO: 4.78 M/UL (ref 4.2–5.3)
SP GR UR STRIP: 1.02 (ref 1–1.03)
UA UROBILINOGEN AMB POC: NORMAL (ref 0.2–1)
URINALYSIS CLARITY POC: NORMAL
URINALYSIS COLOR POC: NORMAL
URINE BLOOD POC: NORMAL
URINE LEUKOCYTES POC: NORMAL
URINE NITRITES POC: POSITIVE
WBC # BLD AUTO: 8.6 K/UL (ref 4.6–13.2)

## 2019-02-13 PROCEDURE — 85652 RBC SED RATE AUTOMATED: CPT

## 2019-02-13 PROCEDURE — 85025 COMPLETE CBC W/AUTO DIFF WBC: CPT

## 2019-02-13 PROCEDURE — 36415 COLL VENOUS BLD VENIPUNCTURE: CPT

## 2019-02-13 PROCEDURE — 86140 C-REACTIVE PROTEIN: CPT

## 2019-02-13 PROCEDURE — 87086 URINE CULTURE/COLONY COUNT: CPT

## 2019-02-13 NOTE — PROGRESS NOTES
1. Have you been to the ER, urgent care clinic since your last visit? Hospitalized since your last visit? Yes Where: [facility] 2-8-19    2. Have you seen or consulted any other health care providers outside of the [address] [place] [address] since your last visit? Include any pap smears or colon screening.  No        Chief Complaint   Patient presents with   [facility] Follow Up

## 2019-02-13 NOTE — PROGRESS NOTES
Rosa Snow is a 77 y.o.  female and presents with     Chief Complaint   Patient presents with   [facility] Follow Up    Bladder Infection    Diverticulitis     Pt was recently seen in ER for nausea and symptoms of chest tighthness. Pt had work up that included CT abdomen that showed diverticulitis. Pt stopped taking cipro as thought it caused her to have blood in her urine  Pt still has mild nausea and does not have great appetite. Pt denies fever or chills or abdominal pain,  She does feel slight pressure and pain in left lower quadrant            Past Medical History:   Diagnosis Date    Aortic stenosis     Arthritis     Atrial flutter (HCC)     Bronchitis     Diabetes (HCC)     Diverticulitis     Diverticulitis     GERD (gastroesophageal reflux disease)     Hypercholesterolemia     Hypertension     Hypothyroid     Menopause     Pancreatitis      Past Surgical History:   Procedure Laterality Date    COLONOSCOPY N/A 2/2/2017    COLONOSCOPY  w/bx polyp performed by Miguel Ángel Conteh MD at [facility] ENDOSCOPY     Current Outpatient Medications   Medication Sig    metroNIDAZOLE (FLAGYL) 500 mg tablet Take 1 Tab by mouth two (2) times a day for 7 days.  dicyclomine (BENTYL) 20 mg tablet Take 1 Tab by mouth every six (6) hours for 20 doses.  levothyroxine (SYNTHROID) 75 mcg tablet Take 1 Tab by mouth Daily (before breakfast).  losartan (COZAAR) 25 mg tablet Take 1 Tab by mouth daily.  simvastatin (ZOCOR) 20 mg tablet Take 1 Tab by mouth nightly.  apixaban (ELIQUIS) 5 mg tablet TAKE 1 TABLET BY MOUTH TWICE DAILY.  metFORMIN (GLUCOPHAGE) 1,000 mg tablet Take 1 Tab by mouth two (2) times a day.  losartan (COZAAR) 50 mg tablet Take 1 Tab by mouth daily.     triamcinolone acetonide (KENALOG) 0.1 % topical cream APPLY THIN LAYER TO AFFECTED AREA TWICE DAILY    glipiZIDE (GLUCOTROL) 5 mg tablet TAKE 1 TABLET BY MOUTH TWICE DAILY    pantoprazole (PROTONIX) 40 mg tablet Take 1 Tab by mouth daily.  cholecalciferol (VITAMIN D3) 1,000 unit tablet Take 2 Tabs by mouth daily.  ciprofloxacin HCl (CIPRO) 500 mg tablet Take 1 Tab by mouth two (2) times a day for 7 days.  varicella-zoster recombinant, PF, (SHINGRIX, PF,) 50 mcg/0.5 mL susr injection 0.5mL by IntraMUSCular route once now and then repeat in 2-6 months     No current facility-administered medications for this visit. Health Maintenance   Topic Date Due    FOOT EXAM Q1  07/04/1950    EYE EXAM RETINAL OR DILATED  07/04/1950    Shingrix Vaccine Age 50> (1 of 2) 07/04/1989    GLAUCOMA SCREENING Q2Y  07/04/2004    HEMOGLOBIN A1C Q6M  04/10/2019    MICROALBUMIN Q1  10/10/2019    LIPID PANEL Q1  10/10/2019    MEDICARE YEARLY EXAM  01/18/2020    DTaP/Tdap/Td series (2 - Td) 05/13/2027    Bone Densitometry (Dexa) Screening  Completed    Pneumococcal 65+ Low/Medium Risk  Completed    Influenza Age 5 to Adult  Completed     Immunization History   Administered Date(s) Administered    Influenza High Dose Vaccine PF 09/05/2017    Influenza Vaccine 09/01/2017    Influenza Vaccine (Quad) PF 11/09/2016    Influenza Vaccine (Tri) Adjuvanted 10/10/2018    Pneumococcal Conjugate (PCV-13) 05/17/2017    Pneumococcal Polysaccharide (PPSV-23) 01/17/2019     No LMP recorded. Patient is postmenopausal.        Allergies and Intolerances: Allergies   Allergen Reactions    Ampicillin Itching       Family History:   Family History   Problem Relation Age of Onset    Diabetes Mother     Heart Disease Mother     Cancer Father         melanoma    Stroke Sister     Hypertension Sister     Diabetes Sister     Hypertension Brother     Diabetes Brother     Breast Cancer Paternal Grandmother         older, but not sure age.  Breast Cancer Paternal Aunt         and gyn cancer ?age       Social History:   She  reports that  has never smoked.  she has never used smokeless tobacco.  She  reports that she does not drink alcohol. Review of Systems:   General: negative for - chills, fatigue, fever, weight change  Psych: negative for - anxiety, depression, irritability or mood swings  ENT: negative for - headaches, hearing change, nasal congestion, oral lesions, sneezing or sore throat  Heme/ Lymph: negative for - bleeding problems, bruising, pallor or swollen lymph nodes  Endo: negative for - hot flashes, polydipsia/polyuria or temperature intolerance  Resp: negative for - cough, shortness of breath or wheezing  CV: negative for - chest pain, edema or palpitations  GI: negative for - abdominal pain, change in bowel habits, constipation, diarrhea or nausea/vomiting  : negative for - dysuria, hematuria, incontinence, pelvic pain or vulvar/vaginal symptoms  MSK: negative for - joint pain, joint swelling or muscle pain  Neuro: negative for - confusion, headaches, seizures or weakness  Derm: negative for - dry skin, hair changes, rash or skin lesion changes          Physical:   Vitals:   Vitals:    02/13/19 1253   BP: 131/60   Pulse: 70   Resp: 16   Temp: 96.4 °F (35.8 °C)   TempSrc: Oral   SpO2: 97%   Weight: 176 lb (79.8 kg)   Height: 5' (1.524 m)           Exam:   HEENT- atraumatic,normocephalic, awake, oriented, well nourished  Neck - supple,no enlarged lymph nodes, no JVD, no thyromegaly  Chest- CTA, no rhonchi, no crackles  Heart- rrr, no murmurs / gallop/rub  Abdomen- soft,BS+,NT, no hepatosplenomegaly, mild left lower quadrant tenderenss  Ext - no c/c/edema   Neuro- no focal deficits. Power 5/5 all extremities  Skin - warm,dry, no obvious rashes.           Review of Data:   LABS:   Lab Results   Component Value Date/Time    WBC 9.2 02/05/2019 08:14 AM    HGB 12.8 02/05/2019 08:14 AM    HCT 39.0 02/05/2019 08:14 AM    PLATELET 097 52/18/8731 08:14 AM     Lab Results   Component Value Date/Time    Sodium 139 02/05/2019 08:14 AM    Potassium 4.3 02/05/2019 08:14 AM    Chloride 106 02/05/2019 08:14 AM    CO2 24 02/05/2019 08:14 AM    Glucose 121 (H) 02/05/2019 08:14 AM    BUN 13 02/05/2019 08:14 AM    Creatinine 0.94 02/05/2019 08:14 AM     Lab Results   Component Value Date/Time    Cholesterol, total 143 10/10/2018 02:17 PM    HDL Cholesterol 47 10/10/2018 02:17 PM    LDL, calculated 67.6 10/10/2018 02:17 PM    Triglyceride 142 10/10/2018 02:17 PM     No results found for: GPT        Impression / Plan:        ICD-10-CM ICD-9-CM    1. Hematuria, unspecified type R31.9 599.70 AMB POC URINALYSIS DIP STICK AUTO W/O MICRO   2. Diverticulitis K57.92 562.11 REFERRAL TO SURGERY      CBC WITH AUTOMATED DIFF      SED RATE (ESR)      C REACTIVE PROTEIN, QT   3. Urinary tract infection without hematuria, site unspecified N39.0 599.0 CULTURE, URINE     CT scan report mentions about possibility of colo - colonic fistula and also porrssible perf and localized abscess - will get surgery opinion. Pt looks hemodynamically stable today. Explained to patient risk benefits of the medications. Advised patient to stop meds if having any side effects. Pt verbalized understanding of the instructions. I have discussed the diagnosis with the patient and the intended plan as seen in the above orders. The patient has received an after-visit summary and questions were answered concerning future plans. I have discussed medication side effects and warnings with the patient as well. I have reviewed the plan of care with the patient, accepted their input and they are in agreement with the treatment goals. Reviewed plan of care. Patient has provided input and agrees with goals.     Follow-up Disposition: Not on Alfred Ruvalcaba MD

## 2019-02-15 LAB
BACTERIA SPEC CULT: NORMAL
SERVICE CMNT-IMP: NORMAL

## 2019-02-20 ENCOUNTER — TELEPHONE (OUTPATIENT)
Dept: FAMILY MEDICINE CLINIC | Age: 79
End: 2019-02-20

## 2019-02-20 NOTE — TELEPHONE ENCOUNTER
Patient called in and stated that she would like to speak with Silvina Wade in regards to her Urine test. Please advise.

## 2019-02-20 NOTE — TELEPHONE ENCOUNTER
Patient called and was insisting that she really needs to talk to the doctor to discuss her urinalysis result. Patient was informed that the nurses and the doctor were currently with the patient that she just called recently also. Patient then stated that she is just really worried about her result. Asking to be called back.

## 2019-02-20 NOTE — TELEPHONE ENCOUNTER
Patient called again regarding urine results. Patient would like something sent to [facility] if something is wrong with her urine. Please advise, thank you.

## 2019-02-22 DIAGNOSIS — L30.9 ECZEMA, UNSPECIFIED TYPE: ICD-10-CM

## 2019-02-22 NOTE — TELEPHONE ENCOUNTER
Requested Prescriptions     Pending Prescriptions Disp Refills    triamcinolone acetonide (KENALOG) 0.1 % topical cream 15 g 0     Sig: APPLY THIN LAYER TO AFFECTED AREA TWICE DAILY     Last seen: 2/13/19  Please advise, thank you.

## 2019-02-23 RX ORDER — TRIAMCINOLONE ACETONIDE 1 MG/G
CREAM TOPICAL
Qty: 15 G | Refills: 0 | Status: SHIPPED | OUTPATIENT
Start: 2019-02-23 | End: 2019-06-17 | Stop reason: SDUPTHER

## 2019-03-15 ENCOUNTER — TELEPHONE (OUTPATIENT)
Dept: FAMILY MEDICINE CLINIC | Age: 79
End: 2019-03-15

## 2019-03-15 NOTE — TELEPHONE ENCOUNTER
Called patient confirming patient with 2 patient identifiers. Patient follows up with Dr. Noel Lemos, but states she doesn't have any upcoming appointments with them. She reports she has had diarrhea ever since her ER visit on 2/7/19, not giving an exact number of how often she has been going a day. Patient is currently taking mylanta and loperamide 2mg. Patients diet consists of lots of rice and potatoes. She drinks 1 cup of cafinated coffee a day. Nurse advised that cafeine may cause diarrhea, and to try to limit her intake. Nurse also advised on BRAT diet and to stay well hydrated. Advised patient that if she starts to feel weak or tired to please give us a call back or to seek medical attention asap. Patient has an appt to transition care with Dr. Manohar Lao on Tuesday and agreed to keep that appt. Patient given opportunity to ask questions and does not have any further questions or concerns at this time.

## 2019-03-19 ENCOUNTER — OFFICE VISIT (OUTPATIENT)
Dept: FAMILY MEDICINE CLINIC | Age: 79
End: 2019-03-19

## 2019-03-19 VITALS
DIASTOLIC BLOOD PRESSURE: 66 MMHG | RESPIRATION RATE: 16 BRPM | HEIGHT: 60 IN | HEART RATE: 67 BPM | TEMPERATURE: 96.6 F | BODY MASS INDEX: 33.57 KG/M2 | SYSTOLIC BLOOD PRESSURE: 140 MMHG | WEIGHT: 171 LBS | OXYGEN SATURATION: 100 %

## 2019-03-19 DIAGNOSIS — E11.42 TYPE 2 DIABETES MELLITUS WITH DIABETIC POLYNEUROPATHY, WITHOUT LONG-TERM CURRENT USE OF INSULIN (HCC): ICD-10-CM

## 2019-03-19 DIAGNOSIS — K57.92 DIVERTICULITIS: Primary | ICD-10-CM

## 2019-03-19 DIAGNOSIS — I10 ESSENTIAL HYPERTENSION: ICD-10-CM

## 2019-03-19 DIAGNOSIS — I48.19 PERSISTENT ATRIAL FIBRILLATION (HCC): ICD-10-CM

## 2019-03-19 DIAGNOSIS — R19.7 WATERY DIARRHEA: ICD-10-CM

## 2019-03-19 RX ORDER — CIPROFLOXACIN 500 MG/1
500 TABLET ORAL 2 TIMES DAILY
Qty: 14 TAB | Refills: 0 | Status: SHIPPED | OUTPATIENT
Start: 2019-03-19 | End: 2019-03-26

## 2019-03-19 RX ORDER — DIPHENOXYLATE HYDROCHLORIDE AND ATROPINE SULFATE 2.5; .025 MG/1; MG/1
1 TABLET ORAL
Qty: 30 TAB | Refills: 1 | Status: SHIPPED | OUTPATIENT
Start: 2019-03-19 | End: 2020-03-10

## 2019-03-19 RX ORDER — METRONIDAZOLE 500 MG/1
500 TABLET ORAL 2 TIMES DAILY
Qty: 14 TAB | Refills: 0 | Status: SHIPPED | OUTPATIENT
Start: 2019-03-19 | End: 2019-03-26

## 2019-03-19 RX ORDER — LOSARTAN POTASSIUM 50 MG/1
50 TABLET ORAL DAILY
Qty: 90 TAB | Refills: 2 | Status: SHIPPED | OUTPATIENT
Start: 2019-03-19 | End: 2019-08-29

## 2019-03-19 NOTE — PROGRESS NOTES
Chief Complaint   Patient presents with    Diverticulitis     1. Have you been to the ER, urgent care clinic since your last visit? Hospitalized since your last visit? No    2. Have you seen or consulted any other health care providers outside of the [address] [place] [address] since your last visit? Include any pap smears or colon screening.  No

## 2019-03-19 NOTE — PATIENT INSTRUCTIONS

## 2019-03-20 DIAGNOSIS — I10 ESSENTIAL HYPERTENSION: ICD-10-CM

## 2019-03-20 RX ORDER — LOSARTAN POTASSIUM 50 MG/1
TABLET ORAL
Qty: 90 TAB | Refills: 0 | OUTPATIENT
Start: 2019-03-20

## 2019-03-22 ENCOUNTER — TELEPHONE (OUTPATIENT)
Dept: CARDIOLOGY CLINIC | Age: 79
End: 2019-03-22

## 2019-03-22 NOTE — TELEPHONE ENCOUNTER
Nurse called patient, two patient identifiers used and confirmed by patient. Spoke with patient, advised that per Dr. Sosa Stark notes he prescribed 50 mg table 1 tab daily, pt stated the Dr. Debi Cuevas told her to take 75 mg, advised that she may want to give Dr. Debi Cuevas office for medication clarification due to nurse not seeing it noted in system. Pt will call Dr. Debi Cuevas. This encounter will be closed.

## 2019-03-22 NOTE — TELEPHONE ENCOUNTER
Patient is would like to get clarification on how much Losartan she should be taking daily 50 m g or 75 mg? Please call and advise.

## 2019-03-22 NOTE — TELEPHONE ENCOUNTER
Called patient back, 2 patient identifiers confirmed by patient. Patient reports she is taking 75mg of her losartan. She states she accidentally took (2) 50mg tablets and 1 25 mg of her losartan. She states she got confused but wants  to know that she takes 75mg of her losartan per . She states she does not want to die. Nurse reassured patient and advised it will be noted in her chart that she takes 75mg of the losartan, but advised to write the milligrams for each medication on her bottle caps so the confusion is less likely to happen again. Patient thanked nurse for the call back and was given the opportunity to ask questions and does not have any further questions or concerns at this time.

## 2019-03-22 NOTE — TELEPHONE ENCOUNTER
Pt called in and needs a nurse to give her a call back in regards to the Losartan. She stated she is confused about it. She asked for [facility] specifically and needs a call back by the end of the day. Please advise.

## 2019-03-22 NOTE — TELEPHONE ENCOUNTER
Pt. Is calling back and stated that she is scared and confused. She stated \"I have high blood pressure,I don't want to die\". Please advise.

## 2019-03-22 NOTE — TELEPHONE ENCOUNTER
Per notes 01/17/2019 pt was to take losartan 75 mg daily. Attempted to contact pt at  number, no answer. No vm p/u. Will continue to try to contact pt.

## 2019-03-22 NOTE — TELEPHONE ENCOUNTER
Patient called very scared and upset stating the losartan 50mg was prescribed by Dr. Sinan Kan, but she's suppose to take 75mg per Dr. Isabelle Padilla. Patient has been [id number] mg of losartan. David prescribed 25mg to make it 75mg. Patient wasn't aware that she was only suppose to take 75mg, so shes been taking 125mg. Symptoms: shaky, light headed. Orange juice helped. Patient requesting a phone call back as soon as possible to know what she needs to do now. Please advise, thank you.

## 2019-03-25 RX ORDER — METFORMIN HYDROCHLORIDE 1000 MG/1
1000 TABLET ORAL 2 TIMES DAILY
Qty: 180 TAB | Refills: 0 | Status: SHIPPED | OUTPATIENT
Start: 2019-03-25 | End: 2019-04-12 | Stop reason: SDUPTHER

## 2019-03-25 NOTE — TELEPHONE ENCOUNTER
Contacted pt at [facility] number. Two patient Identifiers confirmed. Advised pt per Dr Arvin Denver notes. Pt stated she had issues with her PCP office because they where stating that she was only on 25 mg losartan. Pt stated it was listed on the printout that she was taking losartan 75 mg daily. Pt stated that her issue has been resolved after she explained Dr Arvin Denver had her on losartan 75 mg daily. No other issues noted.

## 2019-04-12 RX ORDER — LOSARTAN POTASSIUM 25 MG/1
25 TABLET ORAL DAILY
Qty: 90 TAB | Refills: 0 | Status: SHIPPED | OUTPATIENT
Start: 2019-04-12 | End: 2019-06-17 | Stop reason: SDUPTHER

## 2019-04-12 RX ORDER — METFORMIN HYDROCHLORIDE 1000 MG/1
1000 TABLET ORAL 2 TIMES DAILY
Qty: 180 TAB | Refills: 0 | Status: SHIPPED | OUTPATIENT
Start: 2019-04-12 | End: 2019-07-19 | Stop reason: SDUPTHER

## 2019-04-12 NOTE — TELEPHONE ENCOUNTER
Please see medication refill request below. Patient takes 75mg of losartan daily.   Please assist, thank you

## 2019-04-12 NOTE — TELEPHONE ENCOUNTER
Requested Prescriptions     Pending Prescriptions Disp Refills    losartan (COZAAR) 25 mg tablet 90 Tab 0     Sig: Take 1 Tab by mouth daily.  metFORMIN (GLUCOPHAGE) 1,000 mg tablet 180 Tab 0     Sig: Take 1 Tab by mouth two (2) times a day.

## 2019-04-15 RX ORDER — METFORMIN HYDROCHLORIDE 1000 MG/1
TABLET ORAL
Qty: 180 TAB | Refills: 0 | Status: SHIPPED | OUTPATIENT
Start: 2019-04-15 | End: 2019-07-22 | Stop reason: SDUPTHER

## 2019-04-17 ENCOUNTER — OFFICE VISIT (OUTPATIENT)
Dept: FAMILY MEDICINE CLINIC | Age: 79
End: 2019-04-17

## 2019-04-17 VITALS
OXYGEN SATURATION: 98 % | TEMPERATURE: 97 F | RESPIRATION RATE: 14 BRPM | BODY MASS INDEX: 34.12 KG/M2 | HEIGHT: 60 IN | SYSTOLIC BLOOD PRESSURE: 175 MMHG | WEIGHT: 173.8 LBS | HEART RATE: 69 BPM | DIASTOLIC BLOOD PRESSURE: 80 MMHG

## 2019-04-17 DIAGNOSIS — I10 ESSENTIAL HYPERTENSION: ICD-10-CM

## 2019-04-17 DIAGNOSIS — E11.42 TYPE 2 DIABETES MELLITUS WITH DIABETIC POLYNEUROPATHY, WITHOUT LONG-TERM CURRENT USE OF INSULIN (HCC): ICD-10-CM

## 2019-04-17 DIAGNOSIS — I48.19 PERSISTENT ATRIAL FIBRILLATION (HCC): ICD-10-CM

## 2019-04-17 DIAGNOSIS — I10 ESSENTIAL HYPERTENSION: Primary | ICD-10-CM

## 2019-04-17 RX ORDER — METOPROLOL TARTRATE 25 MG/1
25 TABLET, FILM COATED ORAL 2 TIMES DAILY
Qty: 180 TAB | Refills: 3 | Status: SHIPPED | OUTPATIENT
Start: 2019-04-17 | End: 2019-11-01

## 2019-04-17 RX ORDER — METOPROLOL TARTRATE 25 MG/1
25 TABLET, FILM COATED ORAL 2 TIMES DAILY
Qty: 180 TAB | Refills: 6 | Status: SHIPPED | OUTPATIENT
Start: 2019-04-17 | End: 2019-04-17 | Stop reason: SDUPTHER

## 2019-04-17 NOTE — PROGRESS NOTES
Herman Nevarez is a 77 y.o.  female and presents with    Chief Complaint   Patient presents with    Medication Evaluation           Subjective:  Hypertension  Patient is here for follow-up of hypertension. She indicates that she is feeling well and denies any symptoms referable to her hypertension. She is exercising and is adherent to low salt diet. Blood pressure is well controlled at home. Use of agents associated with hypertension: none. Diabetes Mellitus:  She has diabetes mellitus, and  hyperlipidemia and obesity. Diabetic ROS - medication compliance: compliant all of the time, diabetic diet compliance: compliant most of the time, home glucose monitoring: is performed regularly. Lab review: orders written for new lab studies as appropriate; see orders. ROS      General ROS: negative for - chills or fatigue  Ophthalmic ROS: positive for - uses glasses  ENT ROS: negative for - headaches  Endocrine ROS: negative for - polydipsia/polyuria or temperature intolerance  Respiratory ROS: no cough, shortness of breath, or wheezing  Cardiovascular ROS: no chest pain or dyspnea on exertion  Genito-Urinary ROS: no dysuria, trouble voiding, or hematuria  Dermatological ROS: negative for - rash or skin lesion changes    All other systems reviewed and are negative.       Objective:  Vitals:    04/17/19 1416   BP: 175/80   Pulse: 69   Resp: 14   Temp: 97 °F (36.1 °C)   TempSrc: Oral   SpO2: 98%   Weight: 173 lb 12.8 oz (78.8 kg)   Height: 5' (1.524 m)   PainSc:   0 - No pain       alert, well appearing, and in no distress, oriented to person, place, and time and obese  Mental status - normal mood, behavior, speech, dress, motor activity, and thought processes  Chest - clear to auscultation, no wheezes, rales or rhonchi, symmetric air entry  Heart - normal rate, regular rhythm, normal S1, S2, no murmurs, rubs, clicks or gallops  Neurological - cranial nerves II through XII intact, normal gait and station    Assessment/Plan:    1. Essential hypertension  Goal <130/80; take medications as prescribed    2. Type 2 diabetes mellitus with diabetic polyneuropathy, without long-term current use of insulin ([facility] [address]  Foot care    3. Persistent atrial fibrillation (HCC)  Continue apixaban      Lab review: orders written for new lab studies as appropriate; see orders      I have discussed the diagnosis with the patient and the intended plan as seen in the above orders. The patient has received an after-visit summary and questions were answered concerning future plans. I have discussed medication side effects and warnings with the patient as well. I have reviewed the plan of care with the patient, accepted their input and they are in agreement with the treatment goals.

## 2019-04-17 NOTE — PROGRESS NOTES
Chief Complaint   Patient presents with    Medication Evaluation     1. Have you been to the ER, urgent care clinic since your last visit? Hospitalized since your last visit? No    2. Have you seen or consulted any other health care providers outside of the [address] [place] [address] since your last visit? Include any pap smears or colon screening.  No

## 2019-04-25 DIAGNOSIS — E11.21 TYPE 2 DIABETES MELLITUS WITH NEPHROPATHY (HCC): ICD-10-CM

## 2019-04-25 RX ORDER — SIMVASTATIN 20 MG/1
20 TABLET, FILM COATED ORAL
Qty: 90 TAB | Refills: 0 | Status: SHIPPED | OUTPATIENT
Start: 2019-04-25 | End: 2019-07-19 | Stop reason: SDUPTHER

## 2019-04-25 NOTE — TELEPHONE ENCOUNTER
Requested Prescriptions     Pending Prescriptions Disp Refills    simvastatin (ZOCOR) 20 mg tablet 90 Tab 0     Sig: Take 1 Tab by mouth nightly. Also requesting ciprofloxacin hci 500 mg. Please advise, thank you.

## 2019-04-25 NOTE — TELEPHONE ENCOUNTER
Please see medication refill request. Patient is requesting cipro 500 mg as well. Please advise. Thank you.

## 2019-04-26 RX ORDER — GLIPIZIDE 5 MG/1
TABLET ORAL
Qty: 60 TAB | Refills: 0 | Status: SHIPPED | OUTPATIENT
Start: 2019-04-26 | End: 2019-04-27 | Stop reason: SDUPTHER

## 2019-04-29 RX ORDER — GLIPIZIDE 5 MG/1
TABLET ORAL
Qty: 180 TAB | Refills: 0 | Status: SHIPPED | OUTPATIENT
Start: 2019-04-29 | End: 2019-05-30 | Stop reason: SDUPTHER

## 2019-05-10 DIAGNOSIS — E03.4 HYPOTHYROIDISM DUE TO ACQUIRED ATROPHY OF THYROID: ICD-10-CM

## 2019-05-10 RX ORDER — LEVOTHYROXINE SODIUM 75 UG/1
75 TABLET ORAL
Qty: 90 TAB | Refills: 0 | Status: SHIPPED | OUTPATIENT
Start: 2019-05-10 | End: 2019-10-18 | Stop reason: SDUPTHER

## 2019-05-19 DIAGNOSIS — E03.4 HYPOTHYROIDISM DUE TO ACQUIRED ATROPHY OF THYROID: ICD-10-CM

## 2019-05-20 RX ORDER — LEVOTHYROXINE SODIUM 75 UG/1
TABLET ORAL
Qty: 90 TAB | Refills: 0 | OUTPATIENT
Start: 2019-05-20

## 2019-05-30 RX ORDER — GLIPIZIDE 5 MG/1
5 TABLET ORAL 2 TIMES DAILY
Qty: 180 TAB | Refills: 0 | Status: SHIPPED | OUTPATIENT
Start: 2019-05-30 | End: 2019-08-27 | Stop reason: CLARIF

## 2019-05-30 RX ORDER — PANTOPRAZOLE SODIUM 40 MG/1
40 TABLET, DELAYED RELEASE ORAL DAILY
Qty: 90 TAB | Refills: 3 | Status: SHIPPED | OUTPATIENT
Start: 2019-05-30 | End: 2020-06-01

## 2019-06-17 DIAGNOSIS — L30.9 ECZEMA, UNSPECIFIED TYPE: ICD-10-CM

## 2019-06-18 RX ORDER — TRIAMCINOLONE ACETONIDE 1 MG/G
CREAM TOPICAL
Qty: 15 G | Refills: 0 | Status: SHIPPED | OUTPATIENT
Start: 2019-06-18 | End: 2019-10-18 | Stop reason: SDUPTHER

## 2019-06-20 RX ORDER — LOSARTAN POTASSIUM 25 MG/1
TABLET ORAL
Qty: 60 TAB | Refills: 0 | OUTPATIENT
Start: 2019-06-20

## 2019-06-20 NOTE — TELEPHONE ENCOUNTER
Contacted patient and left message to contact the office when available. Awaiting callback. Will advise patient that she should not be taking losartan. Metoprolol 25 mg twice per day is current HTN medications.

## 2019-06-24 RX ORDER — LOSARTAN POTASSIUM 25 MG/1
25 TABLET ORAL DAILY
Qty: 90 TAB | Refills: 3 | Status: SHIPPED | OUTPATIENT
Start: 2019-06-24 | End: 2019-11-01

## 2019-07-08 ENCOUNTER — TELEPHONE (OUTPATIENT)
Dept: FAMILY MEDICINE CLINIC | Age: 79
End: 2019-07-08

## 2019-07-08 NOTE — TELEPHONE ENCOUNTER
Pt. Is checking the status of paperwork. She stated her part of the paperwork was already sent to [place], just waiting on Dr. Barbara Elizabeth to sign his part.

## 2019-07-10 NOTE — TELEPHONE ENCOUNTER
[facility] [employer] paperwork was completed and faxed to [phone] and confirmation received was sent to central scanning.

## 2019-07-11 NOTE — TELEPHONE ENCOUNTER
Awaiting paperwork to appear in [employer] and once received, will re-fax the paperwork to [facility].

## 2019-07-12 NOTE — TELEPHONE ENCOUNTER
Pt. Is calling back upset that paperwork has not been sent. She stated \"they don't know what they are doing, if it was faxed over right the first time, I wouldn't be calling so much\". I advised pt. Paperwork has not been scanned in by central scanning as of yet.

## 2019-07-12 NOTE — TELEPHONE ENCOUNTER
Pt called back in checking on the status of her paperwork and kept repeating oh my god when I told her we were still waiting to refax it. I apologized to her for the inconvenience and the delay but it should be done either the end of the day today or on Monday morning. Please advise.

## 2019-07-15 NOTE — TELEPHONE ENCOUNTER
Pt. Called again regarding paperwork. I informed pt. [facility] has not uploaded documents to her chart and advised pt. To call the people in [place] to send paperwork to our office because it may be quicker. Please advise.

## 2019-07-15 NOTE — TELEPHONE ENCOUNTER
Pt. Stated \"[place]\" will be sending her another copy of the paperwork and she will bring it to the office for Dr. Hanna De Luna to sign again.

## 2019-07-19 DIAGNOSIS — E11.21 TYPE 2 DIABETES MELLITUS WITH NEPHROPATHY (HCC): ICD-10-CM

## 2019-07-20 DIAGNOSIS — E11.21 TYPE 2 DIABETES MELLITUS WITH NEPHROPATHY (HCC): ICD-10-CM

## 2019-07-22 RX ORDER — METFORMIN HYDROCHLORIDE 1000 MG/1
1000 TABLET ORAL 2 TIMES DAILY
Qty: 180 TAB | Refills: 0 | Status: SHIPPED | OUTPATIENT
Start: 2019-07-22 | End: 2019-10-16 | Stop reason: SDUPTHER

## 2019-07-22 RX ORDER — SIMVASTATIN 20 MG/1
TABLET, FILM COATED ORAL
Qty: 90 TAB | Refills: 0 | OUTPATIENT
Start: 2019-07-22

## 2019-07-22 RX ORDER — SIMVASTATIN 20 MG/1
20 TABLET, FILM COATED ORAL
Qty: 90 TAB | Refills: 0 | Status: SHIPPED | OUTPATIENT
Start: 2019-07-22 | End: 2019-10-18 | Stop reason: SDUPTHER

## 2019-07-22 RX ORDER — METFORMIN HYDROCHLORIDE 1000 MG/1
TABLET ORAL
Qty: 180 TAB | Refills: 0 | OUTPATIENT
Start: 2019-07-22

## 2019-07-24 ENCOUNTER — OFFICE VISIT (OUTPATIENT)
Dept: FAMILY MEDICINE CLINIC | Age: 79
End: 2019-07-24

## 2019-07-24 ENCOUNTER — HOSPITAL ENCOUNTER (OUTPATIENT)
Dept: LAB | Age: 79
Discharge: HOME | End: 2019-07-24
Payer: MEDICARE

## 2019-07-24 VITALS
TEMPERATURE: 97.6 F | SYSTOLIC BLOOD PRESSURE: 120 MMHG | HEIGHT: 60 IN | DIASTOLIC BLOOD PRESSURE: 74 MMHG | RESPIRATION RATE: 18 BRPM | BODY MASS INDEX: 31.88 KG/M2 | OXYGEN SATURATION: 98 % | HEART RATE: 73 BPM | WEIGHT: 162.4 LBS

## 2019-07-24 DIAGNOSIS — R11.0 NAUSEA: ICD-10-CM

## 2019-07-24 DIAGNOSIS — Z01.89 PATIENT REQUEST FOR DIAGNOSTIC TESTING: ICD-10-CM

## 2019-07-24 DIAGNOSIS — I10 ESSENTIAL HYPERTENSION: ICD-10-CM

## 2019-07-24 DIAGNOSIS — E03.4 HYPOTHYROIDISM DUE TO ACQUIRED ATROPHY OF THYROID: ICD-10-CM

## 2019-07-24 DIAGNOSIS — E11.21 TYPE 2 DIABETES MELLITUS WITH NEPHROPATHY (HCC): Primary | ICD-10-CM

## 2019-07-24 DIAGNOSIS — I48.19 PERSISTENT ATRIAL FIBRILLATION (HCC): ICD-10-CM

## 2019-07-24 DIAGNOSIS — R63.4 WEIGHT LOSS: ICD-10-CM

## 2019-07-24 LAB
ALBUMIN SERPL-MCNC: 3.6 G/DL (ref 3.4–5)
ALBUMIN/GLOB SERPL: 0.9 {RATIO} (ref 0.8–1.7)
ALP SERPL-CCNC: 87 U/L (ref 45–117)
ALT SERPL-CCNC: 16 U/L (ref 13–56)
ANION GAP SERPL CALC-SCNC: 8 MMOL/L (ref 3–18)
AST SERPL-CCNC: 12 U/L (ref 10–38)
BASOPHILS # BLD: 0 K/UL (ref 0–0.1)
BASOPHILS NFR BLD: 0 % (ref 0–2)
BILIRUB SERPL-MCNC: 0.2 MG/DL (ref 0.2–1)
BILIRUB UR QL STRIP: NEGATIVE
BUN SERPL-MCNC: 19 MG/DL (ref 7–18)
BUN/CREAT SERPL: 18 (ref 12–20)
CALCIUM SERPL-MCNC: 8.8 MG/DL (ref 8.5–10.1)
CHLORIDE SERPL-SCNC: 107 MMOL/L (ref 100–111)
CO2 SERPL-SCNC: 23 MMOL/L (ref 21–32)
CREAT SERPL-MCNC: 1.04 MG/DL (ref 0.6–1.3)
DIFFERENTIAL METHOD BLD: NORMAL
EOSINOPHIL # BLD: 0.2 K/UL (ref 0–0.4)
EOSINOPHIL NFR BLD: 3 % (ref 0–5)
ERYTHROCYTE [DISTWIDTH] IN BLOOD BY AUTOMATED COUNT: 12.9 % (ref 11.6–14.5)
GLOBULIN SER CALC-MCNC: 4.1 G/DL (ref 2–4)
GLUCOSE SERPL-MCNC: 89 MG/DL (ref 74–99)
GLUCOSE UR-MCNC: NEGATIVE MG/DL
HBA1C MFR BLD HPLC: 6.1 %
HCT VFR BLD AUTO: 41.3 % (ref 35–45)
HGB BLD-MCNC: 13.8 G/DL (ref 12–16)
KETONES P FAST UR STRIP-MCNC: NEGATIVE MG/DL
LYMPHOCYTES # BLD: 3.2 K/UL (ref 0.9–3.6)
LYMPHOCYTES NFR BLD: 41 % (ref 21–52)
MCH RBC QN AUTO: 29.1 PG (ref 24–34)
MCHC RBC AUTO-ENTMCNC: 33.4 G/DL (ref 31–37)
MCV RBC AUTO: 86.9 FL (ref 74–97)
MONOCYTES # BLD: 0.4 K/UL (ref 0.05–1.2)
MONOCYTES NFR BLD: 6 % (ref 3–10)
NEUTS SEG # BLD: 3.9 K/UL (ref 1.8–8)
NEUTS SEG NFR BLD: 50 % (ref 40–73)
PH UR STRIP: 5.5 [PH] (ref 4.6–8)
PLATELET # BLD AUTO: 238 K/UL (ref 135–420)
PMV BLD AUTO: 9.7 FL (ref 9.2–11.8)
POTASSIUM SERPL-SCNC: 5 MMOL/L (ref 3.5–5.5)
PROT SERPL-MCNC: 7.7 G/DL (ref 6.4–8.2)
PROT UR QL STRIP: NEGATIVE
RBC # BLD AUTO: 4.75 M/UL (ref 4.2–5.3)
SODIUM SERPL-SCNC: 138 MMOL/L (ref 136–145)
SP GR UR STRIP: 1.02 (ref 1–1.03)
TSH SERPL DL<=0.05 MIU/L-ACNC: 2.33 UIU/ML (ref 0.36–3.74)
UA UROBILINOGEN AMB POC: NORMAL (ref 0.2–1)
URINALYSIS CLARITY POC: CLEAR
URINALYSIS COLOR POC: YELLOW
URINE BLOOD POC: NEGATIVE
URINE LEUKOCYTES POC: NEGATIVE
URINE NITRITES POC: NEGATIVE
WBC # BLD AUTO: 7.8 K/UL (ref 4.6–13.2)

## 2019-07-24 PROCEDURE — 84443 ASSAY THYROID STIM HORMONE: CPT

## 2019-07-24 PROCEDURE — 85025 COMPLETE CBC W/AUTO DIFF WBC: CPT

## 2019-07-24 PROCEDURE — 36415 COLL VENOUS BLD VENIPUNCTURE: CPT

## 2019-07-24 PROCEDURE — 80053 COMPREHEN METABOLIC PANEL: CPT

## 2019-07-24 NOTE — PROGRESS NOTES
Chief Complaint   Patient presents with    Form Completion    Nausea    Weight Loss    Vomiting     1. Have you been to the ER, urgent care clinic since your last visit? Hospitalized since your last visit? No    2. Have you seen or consulted any other health care providers outside of the [address] [place] [address] since your last visit? Include any pap smears or colon screening.  No

## 2019-07-24 NOTE — PROGRESS NOTES
Nazanin Hilton is a [de-identified] y.o.  female and presents with    Chief Complaint   Patient presents with    Form Completion    Nausea    Weight Loss    Vomiting     Subjective:  Mrs. Noelle Lopez presents c/o nausea, vomiting and weight loss; she denies cold intolerance. She denies constipation or diarrhea  Hypertension  Patient is here for follow-up of hypertension. She indicates that she is feeling well and denies any symptoms referable to her hypertension. She is exercising and is adherent to low salt diet. Blood pressure is well controlled at home. Use of agents associated with hypertension: none.     Diabetes Mellitus:  She has diabetes mellitus, and  hyperlipidemia and obesity. Diabetic ROS - medication compliance: compliant all of the time, diabetic diet compliance: compliant most of the time, home glucose monitoring: is performed regularly. Lab review: orders written for new lab studies as appropriate; see orders.      ROS      General ROS: negative for - chills or fatigue  Ophthalmic ROS: positive for - uses glasses  ENT ROS: negative for - headaches  Endocrine ROS: negative for - polydipsia/polyuria or temperature intolerance  Respiratory ROS: no cough, shortness of breath, or wheezing  Cardiovascular ROS: no chest pain or dyspnea on exertion  Genito-Urinary ROS: no dysuria, trouble voiding, or hematuria  Dermatological ROS: negative for - rash or skin lesion changes    All other systems reviewed and are negative.     Objective:  Vitals:    07/24/19 1303   BP: 120/74   Pulse: 73   Resp: 18   Temp: 97.6 °F (36.4 °C)   TempSrc: Oral   SpO2: 98%   Weight: 162 lb 6.4 oz (73.7 kg)   Height: 5' (1.524 m)   PainSc:   0 - No pain     alert, well appearing, and in no distress, oriented to person, place, and time and obese  Mental status - normal mood, behavior, speech, dress, motor activity, and thought processes  Chest - clear to auscultation, no wheezes, rales or rhonchi, symmetric air entry  Heart - normal rate, regular rhythm, normal S1, S2, no murmurs, rubs, clicks or gallops  Neurological - cranial nerves II through XII intact, normal gait and station  Abdomen - soft, NT, ND  Diabetic foot exam:     Left Foot:   Visual Exam: normal    Pulse DP: 2+ (normal)   Filament test: normal sensation       Right Foot:   Visual Exam: normal    Pulse DP: 2+ (normal)   Filament test: normal sensation      LABS   hgb a1c 6.1    Assessment/Plan:    1. Type 2 diabetes mellitus with nephropathy (HCC)  Goal hgb a1c <7  - AMB POC HEMOGLOBIN A1C  -  DIABETES FOOT EXAM    2. Patient request for diagnostic testing    - AMB POC URINALYSIS DIP STICK AUTO W/ MICRO    3. Nausea  Assess for etiology  - TSH 3RD GENERATION; Future  - CBC WITH AUTOMATED DIFF; Future    4. Weight loss    - TSH 3RD GENERATION; Future  - CBC WITH AUTOMATED DIFF; Future    5. Persistent atrial fibrillation (HCC)  Anticoagulation and beta blockaid  6. Essential hypertension  Goal <252/58  - METABOLIC PANEL, COMPREHENSIVE; Future    7. Hypothyroidism due to acquired atrophy of thyroid    - TSH 3RD GENERATION; Future      Lab review: orders written for new lab studies as appropriate; see orders      I have discussed the diagnosis with the patient and the intended plan as seen in the above orders. The patient has received an after-visit summary and questions were answered concerning future plans. I have discussed medication side effects and warnings with the patient as well. I have reviewed the plan of care with the patient, accepted their input and they are in agreement with the treatment goals.

## 2019-07-30 RX ORDER — ONDANSETRON 4 MG/1
4 TABLET, FILM COATED ORAL
COMMUNITY
End: 2019-07-30 | Stop reason: SDUPTHER

## 2019-07-30 NOTE — TELEPHONE ENCOUNTER
Patient is calling again requesting a phone call back regarding this. She stated she really needs her nausea medication, she is getting very weak. Please advise, thank you.

## 2019-07-31 RX ORDER — ONDANSETRON 4 MG/1
4 TABLET, FILM COATED ORAL
Qty: 30 TAB | Refills: 1 | Status: SHIPPED | OUTPATIENT
Start: 2019-07-31 | End: 2020-09-03 | Stop reason: SDUPTHER

## 2019-08-05 ENCOUNTER — OFFICE VISIT (OUTPATIENT)
Dept: FAMILY MEDICINE CLINIC | Age: 79
End: 2019-08-05

## 2019-08-05 VITALS
BODY MASS INDEX: 31.37 KG/M2 | DIASTOLIC BLOOD PRESSURE: 78 MMHG | TEMPERATURE: 97.7 F | SYSTOLIC BLOOD PRESSURE: 158 MMHG | HEART RATE: 80 BPM | HEIGHT: 60 IN | OXYGEN SATURATION: 98 % | WEIGHT: 159.8 LBS | RESPIRATION RATE: 16 BRPM

## 2019-08-05 DIAGNOSIS — N30.91 CYSTITIS WITH HEMATURIA: ICD-10-CM

## 2019-08-05 DIAGNOSIS — F32.1 EPISODE OF MODERATE MAJOR DEPRESSION (HCC): ICD-10-CM

## 2019-08-05 DIAGNOSIS — E11.21 TYPE 2 DIABETES MELLITUS WITH NEPHROPATHY (HCC): ICD-10-CM

## 2019-08-05 DIAGNOSIS — R30.0 DYSURIA: Primary | ICD-10-CM

## 2019-08-05 LAB
BILIRUB UR QL STRIP: ABNORMAL
GLUCOSE UR-MCNC: NEGATIVE MG/DL
KETONES P FAST UR STRIP-MCNC: ABNORMAL MG/DL
PH UR STRIP: 5.5 [PH] (ref 4.6–8)
PROT UR QL STRIP: ABNORMAL
SP GR UR STRIP: 1.03 (ref 1–1.03)
UA UROBILINOGEN AMB POC: ABNORMAL (ref 0.2–1)
URINALYSIS CLARITY POC: ABNORMAL
URINALYSIS COLOR POC: ABNORMAL
URINE BLOOD POC: ABNORMAL
URINE LEUKOCYTES POC: ABNORMAL
URINE NITRITES POC: NEGATIVE

## 2019-08-05 RX ORDER — CITALOPRAM 10 MG/1
10 TABLET ORAL EVERY EVENING
Qty: 30 TAB | Refills: 2 | Status: SHIPPED | OUTPATIENT
Start: 2019-08-05 | End: 2019-09-06 | Stop reason: ALTCHOICE

## 2019-08-05 RX ORDER — NITROFURANTOIN 25; 75 MG/1; MG/1
100 CAPSULE ORAL 2 TIMES DAILY
Qty: 14 CAP | Refills: 0 | Status: SHIPPED | OUTPATIENT
Start: 2019-08-05 | End: 2019-08-10 | Stop reason: ALTCHOICE

## 2019-08-05 NOTE — PROGRESS NOTES
Chief Complaint   Patient presents with    Urinary Burning    Dysuria    Decreased Appetite     1. Have you been to the ER, urgent care clinic since your last visit? Hospitalized since your last visit? No    2. Have you seen or consulted any other health care providers outside of the [address] [place] [address] since your last visit? Include any pap smears or colon screening.  No

## 2019-08-05 NOTE — PROGRESS NOTES
Amor Elder is a [de-identified] y.o.  and  female and presents with    Chief Complaint   Patient presents with    Urinary Burning    Dysuria    Decreased Appetite           Subjective:  Urinary Tract Infection  Patient complains of dysuria, frequency, urgency, abnormal smelling urine, burning with urination, hematuria. Onset was 1 week ago, gradually worsening since that time. Patient complains of stomach ache and nausea. Patient denies congestion, cough, fever, headache and sorethroat. There is not any concern of sexual abuse. There is not a history of trauma to the genital area. Patient does not have a history of recurrent UTI. Patient does not have a history of pyelonephritis. She has been feeling down and having decrease energy  Hypertension  Patient is here for follow-up of hypertension.  She indicates that she is feeling well and denies any symptoms referable to her hypertension. She is exercising and is adherent to low salt diet.  Blood pressure is well controlled at home. Use of agents associated with hypertension: none.     Diabetes Mellitus:  She has diabetes mellitus, and  hyperlipidemia and obesity. Diabetic ROS - medication compliance: compliant all of the time, diabetic diet compliance: compliant most of the time, home glucose monitoring: is performed regularly. Lab review: orders written for new lab studies as appropriate; see orders.      ROS      General ROS: negative for - chills or fatigue  Ophthalmic ROS: positive for - uses glasses  ENT ROS: negative for - headaches  Endocrine ROS: negative for - polydipsia/polyuria or temperature intolerance  Respiratory ROS: no cough, shortness of breath, or wheezing  Cardiovascular ROS: no chest pain or dyspnea on exertion  Genito-Urinary ROS: no dysuria, trouble voiding, or hematuria  Dermatological ROS: negative for - rash or skin lesion changes     All other systems reviewed and are negative.       Objective:  Vitals:    08/05/19 1155   BP: 158/78   Pulse: 80   Resp: 16   Temp: 97.7 °F (36.5 °C)   TempSrc: Oral   SpO2: 98%   Weight: 159 lb 12.8 oz (72.5 kg)   Height: 5' (1.524 m)   PainSc:   0 - No pain     alert, well appearing, and in no distress, oriented to person, place, and time and obese  Mental status - normal mood, behavior, speech, dress, motor activity, and thought processes  Chest - clear to auscultation, no wheezes, rales or rhonchi, symmetric air entry  Heart - normal rate, regular rhythm, normal S1, S2, no murmurs, rubs, clicks or gallops  Neurological - cranial nerves II through XII intact, normal gait and station  Abdomen - soft, NT, ND  Back  - no CVA ttp  LABS   Urinalysis - 3+ leukocyte esterase, 3+ blood    Assessment/Plan:    1. Dysuria    - AMB POC URINALYSIS DIP STICK AUTO W/ MICRO    2. Cystitis with hematuria  Start abx  - nitrofurantoin, macrocrystal-monohydrate, (MACROBID) 100 mg capsule; Take 1 Cap by mouth two (2) times a day. Dispense: 14 Cap; Refill: 0    3. Type 2 diabetes mellitus with nephropathy (HCC)  Goal hgb a1c <7; continue treatment    4. Episode of moderate major depression ([facility] [address]  Start ssri  - citalopram (CELEXA) 10 mg tablet; Take 1 Tab by mouth every evening. Dispense: 30 Tab; Refill: 2    Lab review: labs reviewed, I note that urinalysis abnormal      I have discussed the diagnosis with the patient and the intended plan as seen in the above orders. The patient has received an after-visit summary and questions were answered concerning future plans. I have discussed medication side effects and warnings with the patient as well. I have reviewed the plan of care with the patient, accepted their input and they are in agreement with the treatment goals.

## 2019-08-07 ENCOUNTER — HOSPITAL ENCOUNTER (EMERGENCY)
Age: 79
Discharge: HOME | End: 2019-08-07
Attending: EMERGENCY MEDICINE
Payer: MEDICARE

## 2019-08-07 ENCOUNTER — APPOINTMENT (OUTPATIENT)
Dept: GENERAL RADIOLOGY | Age: 79
End: 2019-08-07
Attending: EMERGENCY MEDICINE
Payer: MEDICARE

## 2019-08-07 VITALS
HEART RATE: 81 BPM | RESPIRATION RATE: 16 BRPM | OXYGEN SATURATION: 96 % | WEIGHT: 159 LBS | BODY MASS INDEX: 31.22 KG/M2 | DIASTOLIC BLOOD PRESSURE: 59 MMHG | SYSTOLIC BLOOD PRESSURE: 154 MMHG | HEIGHT: 60 IN | TEMPERATURE: 98.5 F

## 2019-08-07 DIAGNOSIS — K62.5 RECTAL BLEEDING: ICD-10-CM

## 2019-08-07 DIAGNOSIS — R19.7 DIARRHEA, UNSPECIFIED TYPE: Primary | ICD-10-CM

## 2019-08-07 DIAGNOSIS — R63.4 WEIGHT LOSS, UNINTENTIONAL: ICD-10-CM

## 2019-08-07 LAB
ABO + RH BLD: NORMAL
ANION GAP SERPL CALC-SCNC: 7 MMOL/L (ref 3–18)
APTT PPP: <20 SEC (ref 23–36.4)
BASOPHILS # BLD: 0 K/UL (ref 0–0.1)
BASOPHILS NFR BLD: 0 % (ref 0–2)
BLOOD GROUP ANTIBODIES SERPL: NORMAL
BUN SERPL-MCNC: 19 MG/DL (ref 7–18)
BUN/CREAT SERPL: 18 (ref 12–20)
CALCIUM SERPL-MCNC: 9 MG/DL (ref 8.5–10.1)
CHLORIDE SERPL-SCNC: 106 MMOL/L (ref 100–111)
CO2 SERPL-SCNC: 23 MMOL/L (ref 21–32)
CREAT SERPL-MCNC: 1.03 MG/DL (ref 0.6–1.3)
DIFFERENTIAL METHOD BLD: NORMAL
EOSINOPHIL # BLD: 0.2 K/UL (ref 0–0.4)
EOSINOPHIL NFR BLD: 2 % (ref 0–5)
ERYTHROCYTE [DISTWIDTH] IN BLOOD BY AUTOMATED COUNT: 12.7 % (ref 11.6–14.5)
GLUCOSE SERPL-MCNC: 132 MG/DL (ref 74–99)
HCT VFR BLD AUTO: 38.7 % (ref 35–45)
HGB BLD-MCNC: 12.4 G/DL (ref 12–16)
INR PPP: 1 (ref 0.8–1.2)
LYMPHOCYTES # BLD: 2.2 K/UL (ref 0.9–3.6)
LYMPHOCYTES NFR BLD: 26 % (ref 21–52)
MCH RBC QN AUTO: 27.9 PG (ref 24–34)
MCHC RBC AUTO-ENTMCNC: 32 G/DL (ref 31–37)
MCV RBC AUTO: 87 FL (ref 74–97)
MONOCYTES # BLD: 0.4 K/UL (ref 0.05–1.2)
MONOCYTES NFR BLD: 5 % (ref 3–10)
NEUTS SEG # BLD: 5.7 K/UL (ref 1.8–8)
NEUTS SEG NFR BLD: 67 % (ref 40–73)
PLATELET # BLD AUTO: 230 K/UL (ref 135–420)
PMV BLD AUTO: 9.8 FL (ref 9.2–11.8)
POTASSIUM SERPL-SCNC: 4.8 MMOL/L (ref 3.5–5.5)
PROTHROMBIN TIME: 12.9 SEC (ref 11.5–15.2)
RBC # BLD AUTO: 4.45 M/UL (ref 4.2–5.3)
SODIUM SERPL-SCNC: 136 MMOL/L (ref 136–145)
SPECIMEN EXP DATE BLD: NORMAL
WBC # BLD AUTO: 8.4 K/UL (ref 4.6–13.2)

## 2019-08-07 PROCEDURE — 74022 RADEX COMPL AQT ABD SERIES: CPT

## 2019-08-07 PROCEDURE — 86900 BLOOD TYPING SEROLOGIC ABO: CPT

## 2019-08-07 PROCEDURE — 99285 EMERGENCY DEPT VISIT HI MDM: CPT

## 2019-08-07 PROCEDURE — C9113 INJ PANTOPRAZOLE SODIUM, VIA: HCPCS | Performed by: EMERGENCY MEDICINE

## 2019-08-07 PROCEDURE — 85025 COMPLETE CBC W/AUTO DIFF WBC: CPT

## 2019-08-07 PROCEDURE — 80048 BASIC METABOLIC PNL TOTAL CA: CPT

## 2019-08-07 PROCEDURE — 85610 PROTHROMBIN TIME: CPT

## 2019-08-07 PROCEDURE — 85730 THROMBOPLASTIN TIME PARTIAL: CPT

## 2019-08-07 PROCEDURE — 96374 THER/PROPH/DIAG INJ IV PUSH: CPT

## 2019-08-07 PROCEDURE — 74011250636 HC RX REV CODE- 250/636: Performed by: EMERGENCY MEDICINE

## 2019-08-07 RX ORDER — PANTOPRAZOLE SODIUM 40 MG/10ML
40 INJECTION, POWDER, LYOPHILIZED, FOR SOLUTION INTRAVENOUS
Status: COMPLETED | OUTPATIENT
Start: 2019-08-07 | End: 2019-08-07

## 2019-08-07 RX ADMIN — PANTOPRAZOLE SODIUM 40 MG: 40 INJECTION, POWDER, FOR SOLUTION INTRAVENOUS at 06:55

## 2019-08-07 NOTE — DISCHARGE INSTRUCTIONS
SPECIFIC PATIENT INSTRUCTIONS FROM THE PHYSICIAN WHO TREATED YOU IN THE ER TODAY:  1. Use over the counter imodium as directed on the packaging for diarrhea. 2. Over the counter imodium for diarrhea. IF lomotil was prescribed, take it for diarrhea not controlled after using imodium for at least 24 hours. 3. FOLLOW UP APPOINTMENT:  Your primary doctor in 1-2 days for reevaluation. Patient Education        Diarrhea: Care Instructions  Your Care Instructions    Diarrhea is loose, watery stools (bowel movements). The exact cause is often hard to find. Sometimes diarrhea is your body's way of getting rid of what caused an upset stomach. Viruses, food poisoning, and many medicines can cause diarrhea. Some people get diarrhea in response to emotional stress, anxiety, or certain foods. Almost everyone has diarrhea now and then. It usually isn't serious, and your stools will return to normal soon. The important thing to do is replace the fluids you have lost, so you can prevent dehydration. The doctor has checked you carefully, but problems can develop later. If you notice any problems or new symptoms, get medical treatment right away. Follow-up care is a key part of your treatment and safety. Be sure to make and go to all appointments, and call your doctor if you are having problems. It's also a good idea to know your test results and keep a list of the medicines you take. How can you care for yourself at home? · Watch for signs of dehydration, which means your body has lost too much water. Dehydration is a serious condition and should be treated right away. Signs of dehydration are:  ? Increasing thirst and dry eyes and mouth. ? Feeling faint or lightheaded. ? Darker urine, and a smaller amount of urine than normal.  · To prevent dehydration, drink plenty of fluids, enough so that your urine is light yellow or clear like water. Choose water and other caffeine-free clear liquids until you feel better.  If you have kidney, heart, or liver disease and have to limit fluids, talk with your doctor before you increase the amount of fluids you drink. · Begin eating small amounts of mild foods the next day, if you feel like it. ? Try yogurt that has live cultures of Lactobacillus. (Check the label.)  ? Avoid spicy foods, fruits, alcohol, and caffeine until 48 hours after all symptoms are gone. ? Avoid chewing gum that contains sorbitol. ? Avoid dairy products (except for yogurt with Lactobacillus) while you have diarrhea and for 3 days after symptoms are gone. · The doctor may recommend that you take over-the-counter medicine, such as loperamide (Imodium), if you still have diarrhea after 6 hours. Read and follow all instructions on the label. Do not use this medicine if you have bloody diarrhea, a high fever, or other signs of serious illness. Call your doctor if you think you are having a problem with your medicine. When should you call for help? Call 911 anytime you think you may need emergency care. For example, call if:    · You passed out (lost consciousness).     · Your stools are maroon or very bloody.    Call your doctor now or seek immediate medical care if:    · You are dizzy or lightheaded, or you feel like you may faint.     · Your stools are black and look like tar, or they have streaks of blood.     · You have new or worse belly pain.     · You have symptoms of dehydration, such as:  ? Dry eyes and a dry mouth. ? Passing only a little dark urine. ? Feeling thirstier than usual.     · You have a new or higher fever.    Watch closely for changes in your health, and be sure to contact your doctor if:    · Your diarrhea is getting worse.     · You see pus in the diarrhea.     · You are not getting better after 2 days (48 hours). Where can you learn more? Go to http://aravind-farzana.info/. Enter H532 in the search box to learn more about \"Diarrhea: Care Instructions. \"  Current as of: September 23, 2018  Content Version: 12.1  © 3044-2906 [employer]. Care instructions adapted under license by [employer] (which disclaims liability or warranty for this information). If you have questions about a medical condition or this instruction, always ask your healthcare professional. [address] any warranty or liability for your use of this information. Patient Education        Rectal Bleeding: Care Instructions  Your Care Instructions    Rectal bleeding in small amounts is common. You may see red spotting on toilet paper or drops of blood in the toilet. Rectal bleeding has many possible causes, from something as minor as hemorrhoids to something as serious as colon cancer. You may need more tests to find the cause of your bleeding. Follow-up care is a key part of your treatment and safety. Be sure to make and go to all appointments, and call your doctor if you are having problems. It's also a good idea to know your test results and keep a list of the medicines you take. How can you care for yourself at home? · Avoid aspirin and other nonsteroidal anti-inflammatory drugs (NSAIDs), such as ibuprofen (Advil, Motrin) and naproxen (Aleve). They can cause you to bleed more. Ask your doctor if you can take acetaminophen (Tylenol). Read and follow all instructions on the label. · Use a stool softener that contains bran or psyllium. You can save money by buying bran or psyllium (available in bulk at most health food stores) and sprinkling it on foods or stirring it into fruit juice. You can also use a product such as Metamucil or Citrucel. · Take your medicines exactly as directed. Call your doctor if you think you are having a problem with your medicine. When should you call for help? Call 911 anytime you think you may need emergency care.  For example, call if:    · You passed out (lost consciousness).    Call your doctor now or seek immediate medical care if:    · You have new or worse pain.     · You have new or worse bleeding from the rectum.     · You are dizzy or light-headed, or you feel like you may faint.    Watch closely for changes in your health, and be sure to contact your doctor if:    · You cannot pass stools or gas.     · You do not get better as expected. Where can you learn more? Go to http://aravind-farzana.info/. Enter V194 in the search box to learn more about \"Rectal Bleeding: Care Instructions. \"  Current as of: 2018  Content Version: 12.1  © 8207-7698 [employer]. Care instructions adapted under license by [employer] (which disclaims liability or warranty for this information). If you have questions about a medical condition or this instruction, always ask your healthcare professional. [address] any warranty or liability for your use of this information. [facility] Activation    Thank you for requesting access to [facility]. Please follow the instructions below to securely access and download your online medical record. [facility] allows you to send messages to your doctor, view your test results, renew your prescriptions, schedule appointments, and more. How Do I Sign Up? In your internet browser, go to [email or web address] Open Home [email or web address]. Click on the First Time User? Click Here link in the Sign In box. You will see the New Member Sign Up page. Enter your [employer] Access Code exactly as it appears below. You will not need to use this code after you´ve completed the sign-up process. If you do not sign up before the expiration date, you must request a new code. [facility] Access Code: [email or web address]  Expires: 3/28/2019  2:27 PM (This is the date your [facility] access code will )    Enter the last four digits of your Social Security Number (xxxx) and Date of Birth (mm/dd/yyyy) as indicated and click Submit.  You will be taken to the next sign-up page.  Create a AttorneyFeet ID. This will be your [employer]. login ID and cannot be changed, so think of one that is secure and easy to remember. Create a [employer]. password. You can change your password at any time. Enter your Password Reset Question and Answer. This can be used at a later time if you forget your password. Enter your e-mail address. You will receive e-mail notification when new information is available in [address] Click Sign Up. You can now view and download portions of your medical record. Click the [email or web address] link to download a portable copy of your medical information. Additional Information    If you have questions, please visit the Frequently Asked Questions section of the [employer]. website at [email or web address] Remember, [employer]. is NOT to be used for urgent needs. For medical emergencies, dial 911.

## 2019-08-07 NOTE — ED NOTES
Report received from [place], [address] [place] [address]. Pt is awake, alert, and oriented.

## 2019-08-07 NOTE — ED TRIAGE NOTES
Alert female arrived by EMS c/o blood in stool. Pt stated has hx of diverticulitis, have diarrhea past week, and had smear of blood when wiped after BM this morning. pt has been treated for UTI recently. Pt voiced concerns pt do not have appetite, lost approximately 15 lbs without trying in less that 2 months.

## 2019-08-07 NOTE — ED PROVIDER NOTES
[place] [id number] [facility] EMERGENCY DEPT      6:54 AM    Date: 8/7/2019  Patient Name: Jj Montemayor    History of Presenting Illness     Chief Complaint   Patient presents with    Melena       [de-identified] y.o. female with noted past medical history who presents to the emergency department with diarrhea and rectal bleeding. Patient states she was in her usual state of health until about 2 to 3 weeks ago when started having decreased appetite and is worried about that. She wants to be evaluated for that in the ER although she does have her primary care doctor, Dr. Flavia Azar as well. The patient states that about 1 week ago she started to have some UTI symptoms to include burning urination and frequency. She saw her primary care doctor, Dr. Flavia Azar, and was prescribed nitrofurantoin for it. The symptoms have since resolved. However about the same time, about 1 week ago, patient started having some watery nonbloody diarrhea to the present. The diarrhea is continued, and today she notes that there was a bloody diarrhea movement. Because of that she came to the ER for evaluation treatment. She denies that she is currently on Eliquis for her atrial fibrillation. Patient denies any other associated signs or symptoms. Patient denies any other complaints. Nursing notes regarding the HPI and triage nursing notes were reviewed. Prior medical records were reviewed. Current Outpatient Medications   Medication Sig Dispense Refill    nitrofurantoin, macrocrystal-monohydrate, (MACROBID) 100 mg capsule Take 1 Cap by mouth two (2) times a day. 14 Cap 0    levothyroxine (SYNTHROID) 75 mcg tablet Take 1 Tab by mouth Daily (before breakfast). 90 Tab 0    metoprolol tartrate (LOPRESSOR) 25 mg tablet Take 1 Tab by mouth two (2) times a day. 180 Tab 3    apixaban (ELIQUIS) 5 mg tablet TAKE 1 TABLET BY MOUTH TWICE DAILY. 60 Tab 6    citalopram (CELEXA) 10 mg tablet Take 1 Tab by mouth every evening.  30 Tab 2    ondansetron hcl (ZOFRAN) 4 mg tablet Take 1 Tab by mouth every eight (8) hours as needed for Nausea. 30 Tab 1    simvastatin (ZOCOR) 20 mg tablet Take 1 Tab by mouth nightly. 90 Tab 0    metFORMIN (GLUCOPHAGE) 1,000 mg tablet Take 1 Tab by mouth two (2) times a day. 180 Tab 0    losartan (COZAAR) 25 mg tablet Take 1 Tab by mouth daily. 90 Tab 3    triamcinolone acetonide (KENALOG) 0.1 % topical cream APPLY THIN LAYER TO AFFECTED AREA TWICE DAILY 15 g 0    pantoprazole (PROTONIX) 40 mg tablet Take 1 Tab by mouth daily. 90 Tab 3    glipiZIDE (GLUCOTROL) 5 mg tablet Take 1 Tab by mouth two (2) times a day. 180 Tab 0    losartan (COZAAR) 50 mg tablet Take 1 Tab by mouth daily. 90 Tab 2    diphenoxylate-atropine (LOMOTIL) 2.5-0.025 mg per tablet Take 1 Tab by mouth four (4) times daily as needed for Diarrhea. Max Daily Amount: 4 Tabs. 30 Tab 1    varicella-zoster recombinant, PF, (SHINGRIX, PF,) 50 mcg/0.5 mL susr injection 0.5mL by IntraMUSCular route once now and then repeat in 2-6 months 0.5 mL 1    cholecalciferol (VITAMIN D3) 1,000 unit tablet Take 2 Tabs by mouth daily.  61 Tab 0       Past History     Past Medical History:  Past Medical History:   Diagnosis Date    Aortic stenosis     Arthritis     Atrial flutter ([address]     Bronchitis     Diabetes ([address]     Diverticulitis     Diverticulitis     GERD (gastroesophageal reflux disease)     Hypercholesterolemia     Hypertension     Hypothyroid     Menopause     Pancreatitis        Past Surgical History:  Past Surgical History:   Procedure Laterality Date    COLONOSCOPY N/A 2/2/2017    COLONOSCOPY  w/bx polyp [facility]       Family History:  Family History   Problem Relation Age of Onset    Diabetes Mother     Heart Disease Mother    Nadia Specter Cancer Father         melanoma    Stroke Sister     Hypertension Sister     Diabetes Sister     Hypertension Brother     Diabetes Brother     Breast Cancer Paternal Grandmother older, but not sure age.  Breast Cancer Paternal Aunt         and gyn cancer ?age       Social History:  Social History     Tobacco Use    Smoking status: Never Smoker    Smokeless tobacco: Never Used   Substance Use Topics    Alcohol use: No    Drug use: No       Allergies: Allergies   Allergen Reactions    Ampicillin Itching       Patient's primary care provider (as noted in EPIC):  Radha Azar MD    Review of Systems   Constitutional: Negative for diaphoresis. HENT: Negative for congestion. Eyes: Negative for discharge. Respiratory: Negative for stridor. Cardiovascular: Negative for palpitations. Gastrointestinal: Positive for diarrhea. Negative for vomiting. Endocrine: Negative for heat intolerance. Genitourinary: Negative for flank pain. Musculoskeletal: Negative for back pain. Neurological: Negative for weakness. Psychiatric/Behavioral: Negative for hallucinations. All other systems reviewed and are negative. Visit Vitals  /46   Pulse 81   Temp 98.5 °F (36.9 °C)   Resp 16   Ht 5' (1.524 m)   Wt 72.1 kg (159 lb)   SpO2 93%   BMI 31.05 kg/m²       PHYSICAL EXAM:    CONSTITUTIONAL:  Alert, in no apparent distress;  well developed;  well nourished. HEAD:  Normocephalic, atraumatic. EYES:  EOMI. Non-icteric sclera. Normal conjunctiva. ENTM:  Nose:  no rhinorrhea. Throat:  no erythema or exudate, mucous membranes moist.  NECK:  No JVD. Supple  RESPIRATORY:  Chest clear, equal breath sounds, good air movement. CARDIOVASCULAR:  Regular rate and rhythm. No murmurs, rubs, or gallops. GI:  Normal bowel sounds, abdomen soft and non-tender. No rebound or guarding. No palpable masses. Rectal: Good tone. Grossly mild dark red blood on digital rectal exam.  Stool is medium brown color. BACK:  Non-tender. UPPER EXT:  Normal inspection. LOWER EXT:  No edema, no calf tenderness. Distal pulses intact.   NEURO:  Moves all four extremities, and grossly normal motor exam.  SKIN:  No rashes;  Normal for age. PSYCH:  Alert and normal affect. DIFFERENTIAL DIAGNOSES/ MEDICAL DECISION MAKING:  Viral diarrhea, food poisoning, bacterial diarrhea. Lower on differential diagnosis in this patient is irritable bowel syndrome, crohn's disease, or ulcerative colitis. Diagnostic Study Results     Abnormal lab results from this emergency department encounter:  Labs Reviewed   METABOLIC PANEL, BASIC - Abnormal; Notable for the following components:       Result Value    Glucose 132 (*)     BUN 19 (*)     GFR est non-AA 52 (*)     All other components within normal limits   PTT - Abnormal; Notable for the following components:    aPTT <20.0 (*)     All other components within normal limits   CBC WITH AUTOMATED DIFF   PROTHROMBIN TIME + INR   TYPE & SCREEN       Lab values for this patient within approximately the last 12 hours:  Recent Results (from the past 12 hour(s))   CBC WITH AUTOMATED DIFF    Collection Time: 08/07/19  6:41 AM   Result Value Ref Range    WBC 8.4 4.6 - 13.2 K/uL    RBC 4.45 4.20 - 5.30 M/uL    HGB 12.4 12.0 - 16.0 g/dL    HCT 38.7 35.0 - 45.0 %    MCV 87.0 74.0 - 97.0 FL    MCH 27.9 24.0 - 34.0 PG    MCHC 32.0 31.0 - 37.0 g/dL    RDW 12.7 11.6 - 14.5 %    PLATELET 239 154 - 536 K/uL    MPV 9.8 9.2 - 11.8 FL    NEUTROPHILS 67 40 - 73 %    LYMPHOCYTES 26 21 - 52 %    MONOCYTES 5 3 - 10 %    EOSINOPHILS 2 0 - 5 %    BASOPHILS 0 0 - 2 %    ABS. NEUTROPHILS 5.7 1.8 - 8.0 K/UL    ABS. LYMPHOCYTES 2.2 0.9 - 3.6 K/UL    ABS. MONOCYTES 0.4 0.05 - 1.2 K/UL    ABS. EOSINOPHILS 0.2 0.0 - 0.4 K/UL    ABS.  BASOPHILS 0.0 0.0 - 0.1 K/UL    DF AUTOMATED     METABOLIC PANEL, BASIC    Collection Time: 08/07/19  6:41 AM   Result Value Ref Range    Sodium 136 136 - 145 mmol/L    Potassium 4.8 3.5 - 5.5 mmol/L    Chloride 106 100 - 111 mmol/L    CO2 23 21 - 32 mmol/L    Anion gap 7 3.0 - 18 mmol/L    Glucose 132 (H) 74 - 99 mg/dL    BUN 19 (H) 7.0 - 18 MG/DL    Creatinine 1.03 0.6 - 1.3 MG/DL    BUN/Creatinine ratio 18 12 - 20      GFR est AA >60 >60 ml/min/1.73m2    GFR est non-AA 52 (L) >60 ml/min/1.73m2    Calcium 9.0 8.5 - 10.1 MG/DL   PTT    Collection Time: 08/07/19  6:41 AM   Result Value Ref Range    aPTT <20.0 (L) 23.0 - 36.4 SEC   PROTHROMBIN TIME + INR    Collection Time: 08/07/19  6:41 AM   Result Value Ref Range    Prothrombin time 12.9 11.5 - 15.2 sec    INR 1.0 0.8 - 1.2     TYPE & SCREEN    Collection Time: 08/07/19  6:41 AM   Result Value Ref Range    Crossmatch Expiration 08/10/2019     ABO/Rh(D) B POSITIVE     Antibody screen NEG        Radiologist and cardiologist interpretations if available at time of this note:  Xr Abd Acute W 1 V Chest    Result Date: 8/7/2019  EXAM: ABDOMINAL RADIOGRAPHS WITH PA VIEW OF THE CHEST CLINICAL INDICATION/HISTORY: Abdominal pain   > Additional: None COMPARISON: Chest radiograph 2/8/2019; abdominal/pelvic CT of the same date TECHNIQUE: Supine and upright views of abdomen and PA view of the chest _______________ FINDINGS: HEART AND MEDIASTINUM: Stable appearing cardiac size and mediastinal contours. LUNGS AND PLEURAL SPACES: No focal pneumonic consolidation, pneumothorax, or pleural effusion. BOWEL GAS PATTERN: No free peritoneal gas. Nonobstructive and nonspecific bowel gas pattern. BONES: Multilevel spondylosis throughout the lumbar spine predominantly with facet joint osteoarthritis throughout the lower lumbar spine. Partial visualization of an intramedullary left femoral nail and dynamic hip screw. OTHER: None. _______________     IMPRESSION: 1. No acute radiographic cardiopulmonary abnormality. 2. Nonobstructive and nonspecific bowel gas pattern. Medication(s) ordered for patient during this emergency visit encounter:  Medications   pantoprazole (PROTONIX) injection 40 mg (40 mg IntraVENous Given 8/7/19 2967)       Medical Decision Making     I am the first provider for this patient.     I reviewed the vital signs, available nursing notes, past medical history, past surgical history, family history and social history. Vital Signs:  Reviewed the patient's vital signs. ED COURSE:    6:58 AM  Of note, the patient started having diarrhea before the antibiotics were started. Given this, the chance this being C. difficile is lower. IMPRESSION AND MEDICAL DECISION MAKING:  Based upon the patient's presentation with noted HPI and PE, along with the work up done in the emergency department, I believe that the patient is having diarrhea and abdominal pain, most likely viral etiology. I do believe though that the patient is well enough for discharge home. SPECIFIC PATIENT INSTRUCTIONS FROM THE PHYSICIAN WHO TREATED YOU IN THE ER TODAY:  1. Use over the counter imodium as directed on the packaging for diarrhea. 2. Over the counter imodium for diarrhea. IF lomotil was prescribed, take it for diarrhea not controlled after using imodium for at least 24 hours. 3. FOLLOW UP APPOINTMENT:  Your primary doctor in 1-2 days for reevaluation. Patient is improved, resting quietly and comfortably. The patient will be discharged home. The patient was reassured that these symptoms do not appear to represent a serious or life threatening condition at this time. Warning signs of worsening condition were discussed and understood by the patient. Based on patient's age, coexisting illness, exam, and the results of this ED evaluation, the decision to treat as an outpatient was made. Based on the information available at time of discharge, acute pathology requiring immediate intervention was deemed relative unlikely. While it is impossible to completely exclude the possibility of underlying serious disease or worsening of condition, I feel the relative likelihood is extremely low. I discussed this uncertainty with the patient, who understood ED evaluation and treatment and felt comfortable with the outpatient treatment plan. All questions regarding care, test results, and follow up were answered. The patient is stable and appropriate to discharge. They understand that they should return to the emergency department for any new or worsening symptoms. I stressed the importance of follow up for repeat assessment and possibly further evaluation/treatment. Dictation disclaimer:  Please note that this dictation was completed with [employer], the computer voice recognition software. Quite often unanticipated grammatical, syntax, homophones, and other interpretive errors are inadvertently transcribed by the computer software. Please disregard these errors. Please excuse any errors that have escaped final proofreading. Coding Diagnoses     Clinical Impression:   1. Diarrhea, unspecified type    2. Rectal bleeding    3. Weight loss, unintentional        Disposition     Disposition: Home. Laron Hammans L. Jamey Martini, M.D. SHAZIA Board Certified Emergency Physician    Provider Attestation:  If a scribe was utilized in generation of this patient record, I personally performed the services described in the documentation, reviewed the documentation, as recorded by the scribe in my presence, and it accurately records the patient's history of presenting illness, review of systems, patient physical examination, and procedures performed by me as the attending physician. Laron Hammans L. Jamey Martini, M.D. ABEM Board Certified Emergency Physician  8/7/2019.  6:58 AM

## 2019-08-07 NOTE — ED NOTES
Received bedside report from nurse, patient asking to take her meds from home, per provider, wait for results from blood work, patient on BP and pulse ox in no pain, family at the bedside

## 2019-08-07 NOTE — ED NOTES
Bedside and Verbal shift change report given to Jodi RN, Lacey Rojo RN (oncoming nurse) by Riley Diaz RN (offgoing nurse). Report included the following information SBAR, ED Summary and MAR.

## 2019-08-07 NOTE — ED NOTES
I have reviewed discharge instructions with the patient. The patient verbalized understanding.   Patient armbands removed and shredded

## 2019-08-09 NOTE — TELEPHONE ENCOUNTER
PCP: Kisha Chaves MD    Last appt: 1/17/2019  Future Appointments   Date Time Provider Zuleika Ruvalcaba   8/15/2019  1:00 PM Louie Knapp  [facility]       Requested Prescriptions     Pending Prescriptions Disp Refills    apixaban (ELIQUIS) 5 mg tablet 60 Tab 6     Sig: TAKE 1 TABLET BY MOUTH TWICE DAILY.

## 2019-08-10 DIAGNOSIS — N30.91 CYSTITIS WITH HEMATURIA: Primary | ICD-10-CM

## 2019-08-10 RX ORDER — CIPROFLOXACIN 500 MG/1
500 TABLET ORAL 2 TIMES DAILY
Qty: 14 TAB | Refills: 0 | Status: SHIPPED | OUTPATIENT
Start: 2019-08-10 | End: 2019-08-15 | Stop reason: ALTCHOICE

## 2019-08-12 ENCOUNTER — TELEPHONE (OUTPATIENT)
Dept: FAMILY MEDICINE CLINIC | Age: 79
End: 2019-08-12

## 2019-08-12 NOTE — TELEPHONE ENCOUNTER
Called patient. Patient spoke with NP Sterling Mccallum on Saturday concerned about abnormal urine color. She states the urine color is red. Patient prescribed cipro. Nurse advised patient to complete antibiotics and to follow up if it still has not changed and appointment can be scheduled. She states she has no appetite still but is starting ensure. Patient given opportunity to ask questions and does not have any further questions or concerns at this time.

## 2019-08-12 NOTE — TELEPHONE ENCOUNTER
Pt. Stated her UTI has not cleared up yet. Advised pt. according to the directions, she is to take Cipro twice/ day for 7 days. Please advise.

## 2019-08-14 NOTE — TELEPHONE ENCOUNTER
Patient called back stating her urine is still \"shanelle\" looking. She has an appointment across the street with Dr. Kobe Souza tomorrow at 1, requesting to come in for urine test after appointment. Please advise, thank you.

## 2019-08-14 NOTE — TELEPHONE ENCOUNTER
Patient called back requesting phone call today because she needs to let her transportation know to come later if she can come in tomorrow. Please advise, thank you.

## 2019-08-15 ENCOUNTER — OFFICE VISIT (OUTPATIENT)
Dept: CARDIOLOGY CLINIC | Age: 79
End: 2019-08-15

## 2019-08-15 VITALS
BODY MASS INDEX: 31.44 KG/M2 | HEART RATE: 79 BPM | SYSTOLIC BLOOD PRESSURE: 139 MMHG | WEIGHT: 161 LBS | DIASTOLIC BLOOD PRESSURE: 71 MMHG | OXYGEN SATURATION: 94 %

## 2019-08-15 DIAGNOSIS — N30.91 CYSTITIS WITH HEMATURIA: Primary | ICD-10-CM

## 2019-08-15 DIAGNOSIS — I35.0 AORTIC VALVE STENOSIS, ETIOLOGY OF CARDIAC VALVE DISEASE UNSPECIFIED: Primary | ICD-10-CM

## 2019-08-15 RX ORDER — SULFAMETHOXAZOLE AND TRIMETHOPRIM 800; 160 MG/1; MG/1
1 TABLET ORAL 2 TIMES DAILY
Qty: 14 TAB | Refills: 0 | Status: SHIPPED | OUTPATIENT
Start: 2019-08-15 | End: 2019-08-22

## 2019-08-15 NOTE — PATIENT INSTRUCTIONS
Khari Cesar will call to schedule your testing within 48 hours.  (Echo to be done in 6 months)     If you do not hear from her, then please call central scheduling at [phone] or [phone] Alex Bennett)    All testing/lab work is completed at [facility] [place] , [facility]

## 2019-08-15 NOTE — PROGRESS NOTES
Ms. Sandie Connelly is a [de-identified] y.o. female with a history of atrial flutter, diabetes, hypertension, dyslipidemia and hypothyroidism. Ms. Sandie Connelly is here today for a follow up appointment. She denies any symptoms to suggest angina or heart failure. She denies any palpitations, presyncope or syncope. She is taking all her medications regularly. No PND or LE edema  She is on ciprofloxacin for urinary tract infection. She said that she has some nausea. She has lack of appetite and she thinks she has lost weight. She is going to see her primary care provider after this visit to discuss this symptoms. Past Medical History:   Diagnosis Date    Aortic stenosis     Arthritis     Atrial flutter (HCC)     Bronchitis     Diabetes (HCC)     Diverticulitis     Diverticulitis     GERD (gastroesophageal reflux disease)     Hypercholesterolemia     Hypertension     Hypothyroid     Menopause     Pancreatitis        Current Outpatient Medications   Medication Sig Dispense Refill    apixaban (ELIQUIS) 5 mg tablet TAKE 1 TABLET BY MOUTH TWICE DAILY. 60 Tab 6    ciprofloxacin HCl (CIPRO) 500 mg tablet Take 1 Tab by mouth two (2) times a day for 7 days. 14 Tab 0    citalopram (CELEXA) 10 mg tablet Take 1 Tab by mouth every evening. 30 Tab 2    ondansetron hcl (ZOFRAN) 4 mg tablet Take 1 Tab by mouth every eight (8) hours as needed for Nausea. 30 Tab 1    simvastatin (ZOCOR) 20 mg tablet Take 1 Tab by mouth nightly. 90 Tab 0    metFORMIN (GLUCOPHAGE) 1,000 mg tablet Take 1 Tab by mouth two (2) times a day. 180 Tab 0    losartan (COZAAR) 25 mg tablet Take 1 Tab by mouth daily. 90 Tab 3    triamcinolone acetonide (KENALOG) 0.1 % topical cream APPLY THIN LAYER TO AFFECTED AREA TWICE DAILY 15 g 0    pantoprazole (PROTONIX) 40 mg tablet Take 1 Tab by mouth daily. 90 Tab 3    glipiZIDE (GLUCOTROL) 5 mg tablet Take 1 Tab by mouth two (2) times a day.  180 Tab 0    levothyroxine (SYNTHROID) 75 mcg tablet Take 1 Tab by mouth Daily (before breakfast). 90 Tab 0    metoprolol tartrate (LOPRESSOR) 25 mg tablet Take 1 Tab by mouth two (2) times a day. 180 Tab 3    losartan (COZAAR) 50 mg tablet Take 1 Tab by mouth daily. 90 Tab 2    diphenoxylate-atropine (LOMOTIL) 2.5-0.025 mg per tablet Take 1 Tab by mouth four (4) times daily as needed for Diarrhea. Max Daily Amount: 4 Tabs. 30 Tab 1    cholecalciferol (VITAMIN D3) 1,000 unit tablet Take 2 Tabs by mouth daily. 60 Tab 0     Allergies   Allergen Reactions    Ampicillin Itching     Past Medical History:   Diagnosis Date    Aortic stenosis     Arthritis     Atrial flutter (HCC)     Bronchitis     Diabetes ([address]     Diverticulitis     Diverticulitis     GERD (gastroesophageal reflux disease)     Hypercholesterolemia     Hypertension     Hypothyroid     Menopause     Pancreatitis      Past Surgical History:   Procedure Laterality Date    COLONOSCOPY N/A 2/2/2017    COLONOSCOPY  w/bx polyp performed by Luz Lomas MD at [facility]     Family History   Problem Relation Age of Onset    Diabetes Mother     Heart Disease Mother     Cancer Father         melanoma    Stroke Sister     Hypertension Sister     Diabetes Sister     Hypertension Brother     Diabetes Brother     Breast Cancer Paternal Grandmother         older, but not sure age.  Breast Cancer Paternal Aunt         and gyn cancer ?age     Social History     Tobacco Use   Smoking Status Never Smoker   Smokeless Tobacco Never Used        Objective:     Visit Vitals  /71   Pulse 79   Wt 161 lb (73 kg)   SpO2 94%   BMI 31.44 kg/m²     General:  alert, cooperative, no distress   Chest Wall: inspection normal - no chest wall deformities or tenderness, respiratory effort normal   Lung: clear to auscultation bilaterally, no rales   Heart:  normal rate, regular rhythm, normal S1, S2, Mild 2/6 MADDI on aortic area. Abdomen: soft, non-tender.  Bowel sounds normal. No masses,  no organomegaly Extremities: edema trace bilateral LE's Skin: no rashes   Neuro: alert, oriented, normal speech, no focal findings or movement disorder noted       Echo 03/2018  Left ventricle: Systolic function was normal by visual assessment. Ejection   fraction was estimated to be 60 %. No obvious wall motion abnormalities identified in the views obtained. Features   were consistent with a pseudonormal left ventricular filling pattern, with concomitant abnormal relaxation and   increased filling pressure (grade 2 diastolic dysfunction). Right ventricle: The ventricle was mildly dilated. Systolic function was  normal.  Left atrium: The atrium was mildly dilated. Aortic valve: The valve was trileaflet. Leaflets exhibited mild to moderate calcification and mildly reduced cuspal  separation. Transaortic velocity was increased due to valvular stenosis. There was mild to moderate stenosis. There was  trivial regurgitation. Valve mean gradient was 21 mmHg. Tricuspid valve: There was mild regurgitation. Assessment/Plan:     Atrial flutter: In sinus on exam today, rate is controlled. HR today 79  Also on Eliquis. No bleeding side effects. For unknown reason does not seem to be on BB anymore. Hypertension: /70 mm Hg. She is on Losartan. Continue same    Dyslipidemia: continue with simvastatin, last LDL 67. Aortic stenosis: Mild-moderate by echo in 2018. Mean gradient 21 mm Hg. Repeat ECH0 6 months. Follow up in 6 months or sooner as symptoms dictate.

## 2019-08-15 NOTE — PROGRESS NOTES
1. Have you been to the ER, urgent care clinic since your last visit? Hospitalized since your last visit? No     2. Have you seen or consulted any other health care providers outside of the [address] [place] [address] since your last visit? Include any pap smears or colon screening.   No

## 2019-08-16 ENCOUNTER — TELEPHONE (OUTPATIENT)
Dept: FAMILY MEDICINE CLINIC | Age: 79
End: 2019-08-16

## 2019-08-16 NOTE — TELEPHONE ENCOUNTER
Contacted patient back 2 patient identifiers confirmed. Patient states when she wipes she has \" blood is coming from my pee pee hole\" She started taking bactrim last night. Advised patient to come into clinic to give urine sample. She states she cannot make it into the clinic today that she will need to arrange transportation. Nurse advised to continue taking bactrim as for it is a stronger antibiotic. Advised she can come into clinic on Monday to provide another urine sample. Advised that if other symptoms accompany as far as fever, dizziness or increase in blood volume to go to ER. Patient tolerated well and stated she will be in Monday for urine culture.

## 2019-08-16 NOTE — TELEPHONE ENCOUNTER
Please see medication refill request. Please advise. Thank you. Relevant Problems   No relevant active problems       Anesthetic History   No history of anesthetic complications            Review of Systems / Medical History  Patient summary reviewed, nursing notes reviewed and pertinent labs reviewed    Pulmonary  Within defined limits                 Neuro/Psych   Within defined limits           Cardiovascular    Hypertension                   GI/Hepatic/Renal     GERD           Endo/Other  Within defined limits           Other Findings              Physical Exam    Airway  Mallampati: II  TM Distance: > 6 cm  Neck ROM: normal range of motion   Mouth opening: Normal     Cardiovascular  Regular rate and rhythm,  S1 and S2 normal,  no murmur, click, rub, or gallop             Dental  No notable dental hx       Pulmonary  Breath sounds clear to auscultation               Abdominal  GI exam deferred       Other Findings            Anesthetic Plan    ASA: 2  Anesthesia type: spinal      Post-op pain plan if not by surgeon: peripheral nerve block single    Induction: Intravenous  Anesthetic plan and risks discussed with: Patient

## 2019-08-16 NOTE — TELEPHONE ENCOUNTER
Patient stated she completely understands that she only got her antibiotic yesterday but she is now seeing blood when she wipes. She is curious when she will start seeing results and if that is normal. Please advise, thank you.

## 2019-08-19 ENCOUNTER — DOCUMENTATION ONLY (OUTPATIENT)
Dept: FAMILY MEDICINE CLINIC | Age: 79
End: 2019-08-19

## 2019-08-19 ENCOUNTER — HOSPITAL ENCOUNTER (EMERGENCY)
Age: 79
Discharge: HOME | End: 2019-08-20
Attending: EMERGENCY MEDICINE | Admitting: EMERGENCY MEDICINE
Payer: MEDICARE

## 2019-08-19 DIAGNOSIS — E16.2 HYPOGLYCEMIA: ICD-10-CM

## 2019-08-19 DIAGNOSIS — R31.0 GROSS HEMATURIA: Primary | ICD-10-CM

## 2019-08-19 DIAGNOSIS — N39.0 ACUTE UTI: Primary | ICD-10-CM

## 2019-08-19 LAB — GLUCOSE BLD STRIP.AUTO-MCNC: 50 MG/DL (ref 70–110)

## 2019-08-19 PROCEDURE — 96365 THER/PROPH/DIAG IV INF INIT: CPT

## 2019-08-19 PROCEDURE — 82962 GLUCOSE BLOOD TEST: CPT

## 2019-08-19 PROCEDURE — 96366 THER/PROPH/DIAG IV INF ADDON: CPT

## 2019-08-19 PROCEDURE — 99285 EMERGENCY DEPT VISIT HI MDM: CPT

## 2019-08-19 RX ORDER — DEXTROSE MONOHYDRATE AND SODIUM CHLORIDE 5; .9 G/100ML; G/100ML
500 INJECTION, SOLUTION INTRAVENOUS ONCE
Status: COMPLETED | OUTPATIENT
Start: 2019-08-19 | End: 2019-08-20

## 2019-08-19 NOTE — TELEPHONE ENCOUNTER
Pt called in and stated that she cannot stand up and she was overly sweating and that she just overall didn't feel good and she was wanting nurse Althea Judge to call her back to tell her if this is because of the antibiotics that she's on for the UTI or if she needs to wait for her son to come home to go to the ER. Please advise.

## 2019-08-19 NOTE — PROGRESS NOTES
Returned patients call [phone], Per Dr. Bryan Cobian she needs to go to the ER. Patient states her problem has went away. I told her to go to the ER if her problem returns.

## 2019-08-20 VITALS
TEMPERATURE: 97.7 F | HEART RATE: 69 BPM | SYSTOLIC BLOOD PRESSURE: 115 MMHG | BODY MASS INDEX: 31.02 KG/M2 | WEIGHT: 158 LBS | HEIGHT: 60 IN | RESPIRATION RATE: 18 BRPM | DIASTOLIC BLOOD PRESSURE: 50 MMHG | OXYGEN SATURATION: 100 %

## 2019-08-20 LAB
ALBUMIN SERPL-MCNC: 3.1 G/DL (ref 3.4–5)
ALBUMIN/GLOB SERPL: 0.8 {RATIO} (ref 0.8–1.7)
ALP SERPL-CCNC: 65 U/L (ref 45–117)
ALT SERPL-CCNC: 21 U/L (ref 13–56)
ANION GAP SERPL CALC-SCNC: 8 MMOL/L (ref 3–18)
APPEARANCE UR: ABNORMAL
AST SERPL-CCNC: 26 U/L (ref 10–38)
BACTERIA URNS QL MICRO: ABNORMAL /HPF
BASOPHILS # BLD: 0 K/UL (ref 0–0.1)
BASOPHILS NFR BLD: 0 % (ref 0–2)
BILIRUB SERPL-MCNC: 0.1 MG/DL (ref 0.2–1)
BILIRUB UR QL: NEGATIVE
BUN SERPL-MCNC: 12 MG/DL (ref 7–18)
BUN/CREAT SERPL: 12 (ref 12–20)
CALCIUM SERPL-MCNC: 8.5 MG/DL (ref 8.5–10.1)
CHLORIDE SERPL-SCNC: 106 MMOL/L (ref 100–111)
CO2 SERPL-SCNC: 24 MMOL/L (ref 21–32)
COLOR UR: YELLOW
CREAT SERPL-MCNC: 1.03 MG/DL (ref 0.6–1.3)
DIFFERENTIAL METHOD BLD: ABNORMAL
EOSINOPHIL # BLD: 0.1 K/UL (ref 0–0.4)
EOSINOPHIL NFR BLD: 1 % (ref 0–5)
EPITH CASTS URNS QL MICRO: ABNORMAL /LPF (ref 0–5)
ERYTHROCYTE [DISTWIDTH] IN BLOOD BY AUTOMATED COUNT: 13.4 % (ref 11.6–14.5)
GLOBULIN SER CALC-MCNC: 4.1 G/DL (ref 2–4)
GLUCOSE BLD STRIP.AUTO-MCNC: 169 MG/DL (ref 70–110)
GLUCOSE BLD STRIP.AUTO-MCNC: 172 MG/DL (ref 70–110)
GLUCOSE SERPL-MCNC: 105 MG/DL (ref 74–99)
GLUCOSE UR STRIP.AUTO-MCNC: NEGATIVE MG/DL
HCT VFR BLD AUTO: 35.1 % (ref 35–45)
HGB BLD-MCNC: 11.6 G/DL (ref 12–16)
HGB UR QL STRIP: ABNORMAL
KETONES UR QL STRIP.AUTO: NEGATIVE MG/DL
LEUKOCYTE ESTERASE UR QL STRIP.AUTO: ABNORMAL
LYMPHOCYTES # BLD: 1.7 K/UL (ref 0.9–3.6)
LYMPHOCYTES NFR BLD: 26 % (ref 21–52)
MCH RBC QN AUTO: 28.2 PG (ref 24–34)
MCHC RBC AUTO-ENTMCNC: 33 G/DL (ref 31–37)
MCV RBC AUTO: 85.2 FL (ref 74–97)
MONOCYTES # BLD: 0.5 K/UL (ref 0.05–1.2)
MONOCYTES NFR BLD: 7 % (ref 3–10)
NEUTS SEG # BLD: 4.4 K/UL (ref 1.8–8)
NEUTS SEG NFR BLD: 66 % (ref 40–73)
NITRITE UR QL STRIP.AUTO: NEGATIVE
PH UR STRIP: 5.5 [PH] (ref 5–8)
PLATELET # BLD AUTO: 256 K/UL (ref 135–420)
PMV BLD AUTO: 9.5 FL (ref 9.2–11.8)
POTASSIUM SERPL-SCNC: 4.9 MMOL/L (ref 3.5–5.5)
PROT SERPL-MCNC: 7.2 G/DL (ref 6.4–8.2)
PROT UR STRIP-MCNC: NEGATIVE MG/DL
RBC # BLD AUTO: 4.12 M/UL (ref 4.2–5.3)
RBC #/AREA URNS HPF: ABNORMAL /HPF (ref 0–5)
SODIUM SERPL-SCNC: 138 MMOL/L (ref 136–145)
SP GR UR REFRACTOMETRY: 1.01 (ref 1–1.03)
UROBILINOGEN UR QL STRIP.AUTO: 0.2 EU/DL (ref 0.2–1)
WBC # BLD AUTO: 6.6 K/UL (ref 4.6–13.2)
WBC URNS QL MICRO: >100 /HPF (ref 0–4)

## 2019-08-20 PROCEDURE — 81001 URINALYSIS AUTO W/SCOPE: CPT

## 2019-08-20 PROCEDURE — 74011258636 HC RX REV CODE- 258/636: Performed by: EMERGENCY MEDICINE

## 2019-08-20 PROCEDURE — 85025 COMPLETE CBC W/AUTO DIFF WBC: CPT

## 2019-08-20 PROCEDURE — 96365 THER/PROPH/DIAG IV INF INIT: CPT

## 2019-08-20 PROCEDURE — 96366 THER/PROPH/DIAG IV INF ADDON: CPT

## 2019-08-20 PROCEDURE — 74011250636 HC RX REV CODE- 250/636: Performed by: EMERGENCY MEDICINE

## 2019-08-20 PROCEDURE — 80053 COMPREHEN METABOLIC PANEL: CPT

## 2019-08-20 PROCEDURE — 74011000258 HC RX REV CODE- 258: Performed by: EMERGENCY MEDICINE

## 2019-08-20 PROCEDURE — 82962 GLUCOSE BLOOD TEST: CPT

## 2019-08-20 PROCEDURE — 96367 TX/PROPH/DG ADDL SEQ IV INF: CPT

## 2019-08-20 PROCEDURE — 87086 URINE CULTURE/COLONY COUNT: CPT

## 2019-08-20 RX ADMIN — CEFTRIAXONE 1 G: 1 INJECTION, POWDER, FOR SOLUTION INTRAMUSCULAR; INTRAVENOUS at 02:20

## 2019-08-20 RX ADMIN — DEXTROSE MONOHYDRATE AND SODIUM CHLORIDE 500 ML: 5; .9 INJECTION, SOLUTION INTRAVENOUS at 00:15

## 2019-08-20 NOTE — DISCHARGE INSTRUCTIONS
Patient Education        Hypoglycemia: Care Instructions  Your Care Instructions    Hypoglycemia means that your blood sugar is low and your body is not getting enough fuel. Some people get low blood sugar from not eating often enough. Some medicines to treat diabetes can cause low blood sugar. People who have had surgery on their stomachs or intestines may get hypoglycemia. Problems with the pancreas, kidneys, or liver also can cause low blood sugar. A snack or drink with sugar in it will raise your blood sugar and should ease your symptoms right away. Your doctor may recommend that you change or stop your medicines until you can get your blood sugar levels under control. In the long run, you may need to change your diet and eating habits so that you get enough fuel for your body throughout the day. Follow-up care is a key part of your treatment and safety. Be sure to make and go to all appointments, and call your doctor if you are having problems. It's also a good idea to know your test results and keep a list of the medicines you take. How can you care for yourself at home? · Learn to recognize the early signs of low blood sugar. Signs include:  ? Nausea. ? Hunger. ? Feeling nervous, irritable, or shaky. ? Cold, clammy, wet skin. ? Sweating (when you are not exercising). ? A fast heartbeat.  ? Numbness or tingling of the fingertips or lips. · If you feel an episode of low blood sugar coming on, drink fruit juice or sugared (not diet) soda, or eat sugar in the form of candy, cubes, or tablets. [employer] are another [employer]. · Eat small, frequent meals so that you do not get too hungry between meals. · Balance extra exercise with eating more. · Keep a written record of your low blood sugar episodes, including when you last ate and what you ate, so that you can learn what causes your blood sugar to drop.   · Make sure your family, friends, and coworkers know the symptoms of low blood sugar and know what to do to get your sugar level up. · Wear medical alert jewelry that lists your condition. You can buy this at most drugstores. When should you call for help? Call 911 anytime you think you may need emergency care. For example, call if:    · You passed out (lost consciousness).     · You are confused or cannot think clearly.     · Your blood sugar is very high or very low.    Watch closely for changes in your health, and be sure to contact your doctor if:    · Your blood sugar stays outside the level your doctor set for you.     · You have any problems. Where can you learn more? Go to http://aravind-farzana.info/. Enter C049 in the search box to learn more about \"Hypoglycemia: Care Instructions. \"  Current as of: July 25, 2018  Content Version: 12.1  © 6032-1865 [employer]. Care instructions adapted under license by [employer] (which disclaims liability or warranty for this information). If you have questions about a medical condition or this instruction, always ask your healthcare professional. [place] [address] any warranty or liability for your use of this information. Patient Education        Urinary Tract Infection in Women: Care Instructions  Your Care Instructions    A urinary tract infection, or UTI, is a general term for an infection anywhere between the kidneys and the urethra (where urine comes out). Most UTIs are bladder infections. They often cause pain or burning when you urinate. UTIs are caused by bacteria and can be cured with antibiotics. Be sure to complete your treatment so that the infection goes away. Follow-up care is a key part of your treatment and safety. Be sure to make and go to all appointments, and call your doctor if you are having problems. It's also a good idea to know your test results and keep a list of the medicines you take. How can you care for yourself at home?   · Take your antibiotics as directed. Do not stop taking them just because you feel better. You need to take the full course of antibiotics. · Drink extra water and other fluids for the next day or two. This may help wash out the bacteria that are causing the infection. (If you have kidney, heart, or liver disease and have to limit fluids, talk with your doctor before you increase your fluid intake.)  · Avoid drinks that are carbonated or have caffeine. They can irritate the bladder. · Urinate often. Try to empty your bladder each time. · To relieve pain, take a hot bath or lay a heating pad set on low over your lower belly or genital area. Never go to sleep with a heating pad in place. To prevent UTIs  · Drink plenty of water each day. This helps you urinate often, which clears bacteria from your system. (If you have kidney, heart, or liver disease and have to limit fluids, talk with your doctor before you increase your fluid intake.)  · Urinate when you need to. · Urinate right after you have sex. · Change sanitary pads often. · Avoid douches, bubble baths, feminine hygiene sprays, and other feminine hygiene products that have deodorants. · After going to the bathroom, wipe from front to back. When should you call for help? Call your doctor now or seek immediate medical care if:    · Symptoms such as fever, chills, nausea, or vomiting get worse or appear for the first time.     · You have new pain in your back just below your rib cage. This is called flank pain.     · There is new blood or pus in your urine.     · You have any problems with your antibiotic medicine.    Watch closely for changes in your health, and be sure to contact your doctor if:    · You are not getting better after taking an antibiotic for 2 days.     · Your symptoms go away but then come back. Where can you learn more? Go to http://aravind-farzana.info/.   Enter O712 in the search box to learn more about \"Urinary Tract Infection in Women: Care Instructions. \"  Current as of: December 19, 2018  Content Version: 12.1  © 1657-0243 [employer]. Care instructions adapted under license by [employer] (which disclaims liability or warranty for this information). If you have questions about a medical condition or this instruction, always ask your healthcare professional. [place] [address] any warranty or liability for your use of this information.

## 2019-08-20 NOTE — ED PROVIDER NOTES
Dakota Cummings is a [de-identified] y.o. Female with history of atrial flutter, hypertension and non-insulin-dependent diabetes who is been shaky and not feeling well for the last couple days. It was noted her blood sugar was 50 and was given some D50 prior to arrival.  Patient states she has not been eating much for the last couple days since she started her new antibiotic for the urinary tract infection but still taking her glipizide and metformin. Patient has had no vomiting but is nauseated. She has no new blood in her urine which she had previously. She has no increasing abdominal pain. Symptoms are worse with p.o. intake. Patient thought her symptoms are related to her antibiotic. The history is provided by the patient and medical records. Past Medical History:   Diagnosis Date    Aortic stenosis     Arthritis     Atrial flutter (HCC)     Bronchitis     Diabetes ([address]     Diverticulitis     Diverticulitis     GERD (gastroesophageal reflux disease)     Hypercholesterolemia     Hypertension     Hypothyroid     Menopause     Pancreatitis        Past Surgical History:   Procedure Laterality Date    COLONOSCOPY N/A 2/2/2017    COLONOSCOPY  w/bx polyp performed by Jeanine Gilbert MD at [facility] ENDOSCOPY         Family History:   Problem Relation Age of Onset    Diabetes Mother     Heart Disease Mother     Cancer Father         melanoma    Stroke Sister     Hypertension Sister     Diabetes Sister     Hypertension Brother     Diabetes Brother     Breast Cancer Paternal Grandmother         older, but not sure age.     Breast Cancer Paternal Aunt         and gyn cancer ?age       Social History     Socioeconomic History    Marital status: SINGLE     Spouse name: Not on file    Number of children: Not on file    Years of education: Not on file    Highest education level: Not on file   Occupational History    Not on file   Social Needs    Financial resource strain: Not on file   [place] insecurity:     Worry: Not on file     Inability: Not on file    Transportation needs:     Medical: Not on file     Non-medical: Not on file   Tobacco Use    Smoking status: Never Smoker    Smokeless tobacco: Never Used   Substance and Sexual Activity    Alcohol use: No    Drug use: No    Sexual activity: Not Currently     Partners: Male   Lifestyle    Physical activity:     Days per week: Not on file     Minutes per session: Not on file    Stress: Not on file   Relationships    Social connections:     Talks on phone: Not on file     Gets together: Not on file     Attends Restorationist service: Not on file     Active member of club or organization: Not on file     Attends meetings of clubs or organizations: Not on file     Relationship status: Not on file    Intimate partner violence:     Fear of current or ex partner: Not on file     Emotionally abused: Not on file     Physically abused: Not on file     Forced sexual activity: Not on file   Other Topics Concern    Not on file   Social History Narrative    Not on file         ALLERGIES: Ampicillin    Review of Systems   Constitutional: Positive for appetite change, chills and fatigue. HENT: Negative for sore throat and trouble swallowing. Eyes: Negative for visual disturbance. Respiratory: Negative for shortness of breath. Cardiovascular: Negative for chest pain. Gastrointestinal: Negative for blood in stool. Endocrine: Negative for polyuria. Genitourinary: Positive for frequency. Negative for hematuria. Musculoskeletal: Positive for myalgias. Negative for gait problem. Skin: Negative for rash. Neurological: Positive for light-headedness. Negative for syncope. Psychiatric/Behavioral: Positive for sleep disturbance.        Vitals:    08/19/19 2332   BP: 174/62   Pulse: 78   Resp: 16   Temp: 97.7 °F (36.5 °C)   SpO2: 100%   Weight: 71.7 kg (158 lb)   Height: 5' (1.524 m)            Physical Exam   Constitutional: She is oriented to person, place, and time. She appears well-developed and well-nourished. Non-toxic appearance. She does not have a sickly appearance. She appears ill. She appears distressed. HENT:   Head: Normocephalic and atraumatic. Right Ear: External ear normal.   Left Ear: External ear normal.   Nose: Nose normal.   Mouth/Throat: Uvula is midline, oropharynx is clear and moist and mucous membranes are normal. No oropharyngeal exudate. Eyes: Conjunctivae are normal. No scleral icterus. Neck: Neck supple. Cardiovascular: Normal rate, regular rhythm, normal heart sounds and intact distal pulses. Pulmonary/Chest: Effort normal and breath sounds normal.   Abdominal: Soft. There is no tenderness. Musculoskeletal: She exhibits no edema. Neurological: She is alert and oriented to person, place, and time. Gait normal.   Skin: Skin is warm and dry. She is not diaphoretic. Psychiatric: Her behavior is normal.   Nursing note and vitals reviewed.        MDM       Procedures  Vitals:  Patient Vitals for the past 12 hrs:   Temp Pulse Resp BP SpO2   08/19/19 2332 97.7 °F (36.5 °C) 78 16 174/62 100 %         Medications ordered:   Medications   cefTRIAXone (ROCEPHIN) 1 g in 0.9% sodium chloride (MBP/ADV) 50 mL MBP (1 g IntraVENous New Bag 8/20/19 0220)   dextrose 5% and 0.9% NaCl infusion 500 mL (0 mL IntraVENous IV Completed 8/20/19 0152)         Lab findings:  Recent Results (from the past 12 hour(s))   GLUCOSE, POC    Collection Time: 08/19/19 11:21 PM   Result Value Ref Range    Glucose (POC) 50 (LL) 70 - 110 mg/dL   CBC WITH AUTOMATED DIFF    Collection Time: 08/20/19 12:15 AM   Result Value Ref Range    WBC 6.6 4.6 - 13.2 K/uL    RBC 4.12 (L) 4.20 - 5.30 M/uL    HGB 11.6 (L) 12.0 - 16.0 g/dL    HCT 35.1 35.0 - 45.0 %    MCV 85.2 74.0 - 97.0 FL    MCH 28.2 24.0 - 34.0 PG    MCHC 33.0 31.0 - 37.0 g/dL    RDW 13.4 11.6 - 14.5 %    PLATELET 068 625 - 598 K/uL    MPV 9.5 9.2 - 11.8 FL    NEUTROPHILS 66 40 - 73 % LYMPHOCYTES 26 21 - 52 %    MONOCYTES 7 3 - 10 %    EOSINOPHILS 1 0 - 5 %    BASOPHILS 0 0 - 2 %    ABS. NEUTROPHILS 4.4 1.8 - 8.0 K/UL    ABS. LYMPHOCYTES 1.7 0.9 - 3.6 K/UL    ABS. MONOCYTES 0.5 0.05 - 1.2 K/UL    ABS. EOSINOPHILS 0.1 0.0 - 0.4 K/UL    ABS. BASOPHILS 0.0 0.0 - 0.1 K/UL    DF AUTOMATED     METABOLIC PANEL, COMPREHENSIVE    Collection Time: 08/20/19 12:15 AM   Result Value Ref Range    Sodium 138 136 - 145 mmol/L    Potassium 4.9 3.5 - 5.5 mmol/L    Chloride 106 100 - 111 mmol/L    CO2 24 21 - 32 mmol/L    Anion gap 8 3.0 - 18 mmol/L    Glucose 105 (H) 74 - 99 mg/dL    BUN 12 7.0 - 18 MG/DL    Creatinine 1.03 0.6 - 1.3 MG/DL    BUN/Creatinine ratio 12 12 - 20      GFR est AA >60 >60 ml/min/1.73m2    GFR est non-AA 52 (L) >60 ml/min/1.73m2    Calcium 8.5 8.5 - 10.1 MG/DL    Bilirubin, total 0.1 (L) 0.2 - 1.0 MG/DL    ALT (SGPT) 21 13 - 56 U/L    AST (SGOT) 26 10 - 38 U/L    Alk.  phosphatase 65 45 - 117 U/L    Protein, total 7.2 6.4 - 8.2 g/dL    Albumin 3.1 (L) 3.4 - 5.0 g/dL    Globulin 4.1 (H) 2.0 - 4.0 g/dL    A-G Ratio 0.8 0.8 - 1.7     URINALYSIS W/ RFLX MICROSCOPIC    Collection Time: 08/20/19 12:40 AM   Result Value Ref Range    Color YELLOW      Appearance CLOUDY      Specific gravity 1.013 1.005 - 1.030      pH (UA) 5.5 5.0 - 8.0      Protein NEGATIVE  NEG mg/dL    Glucose NEGATIVE  NEG mg/dL    Ketone NEGATIVE  NEG mg/dL    Bilirubin NEGATIVE  NEG      Blood TRACE (A) NEG      Urobilinogen 0.2 0.2 - 1.0 EU/dL    Nitrites NEGATIVE  NEG      Leukocyte Esterase LARGE (A) NEG     URINE MICROSCOPIC ONLY    Collection Time: 08/20/19 12:40 AM   Result Value Ref Range    WBC >100 (H) 0 - 4 /hpf    RBC NONE 0 - 5 /hpf    Epithelial cells FEW 0 - 5 /lpf    Bacteria FEW (A) NEG /hpf   GLUCOSE, POC    Collection Time: 08/20/19 12:48 AM   Result Value Ref Range    Glucose (POC) 169 (H) 70 - 110 mg/dL   GLUCOSE, POC    Collection Time: 08/20/19  1:56 AM   Result Value Ref Range    Glucose (POC) 172 (H) 70 - 110 mg/dL       EKG interpretation by ED Physician:      X-Ray, CT or other radiology findings or impressions:  No orders to display       Progress notes, Consult notes or additional Procedure notes:   Glucose is stable. Patient tolerating p.o. well. Suspect her hypoglycemia secondary to her poor eating and still taking her glipizide. Patient's cultures are still pending so we will keep her on the Bactrim for now    I have discussed with patient and/or family/sig other the results, interpretation of any imaging if performed, suspected diagnosis and treatment plan to include instructions regarding the diagnoses listed to which understanding was expressed with all questions answered      Reevaluation of patient:   stable    Disposition:  Diagnosis:   1. Acute UTI    2. Hypoglycemia        Disposition: home    Follow-up Information     Follow up With Specialties Details Why Contact Info    Lokesh Bashir MD Family Practice, Internal Medicine Schedule an appointment as soon as possible for a visit within next 2 days for recheck [address] [address]  [phone]      [facility] EMERGENCY DEPT Emergency Medicine  If symptoms worsen [address]  [phone]            Patient's Medications   Start Taking    No medications on file   Continue Taking    APIXABAN (ELIQUIS) 5 MG TABLET    TAKE 1 TABLET BY MOUTH TWICE DAILY. CHOLECALCIFEROL (VITAMIN D3) 1,000 UNIT TABLET    Take 2 Tabs by mouth daily. CITALOPRAM (CELEXA) 10 MG TABLET    Take 1 Tab by mouth every evening. DIPHENOXYLATE-ATROPINE (LOMOTIL) 2.5-0.025 MG PER TABLET    Take 1 Tab by mouth four (4) times daily as needed for Diarrhea. Max Daily Amount: 4 Tabs. GLIPIZIDE (GLUCOTROL) 5 MG TABLET    Take 1 Tab by mouth two (2) times a day. LEVOTHYROXINE (SYNTHROID) 75 MCG TABLET    Take 1 Tab by mouth Daily (before breakfast).     LOSARTAN (COZAAR) 25 MG TABLET    Take 1 Tab by mouth daily. LOSARTAN (COZAAR) 50 MG TABLET    Take 1 Tab by mouth daily. METFORMIN (GLUCOPHAGE) 1,000 MG TABLET    Take 1 Tab by mouth two (2) times a day. METOPROLOL TARTRATE (LOPRESSOR) 25 MG TABLET    Take 1 Tab by mouth two (2) times a day. ONDANSETRON HCL (ZOFRAN) 4 MG TABLET    Take 1 Tab by mouth every eight (8) hours as needed for Nausea. PANTOPRAZOLE (PROTONIX) 40 MG TABLET    Take 1 Tab by mouth daily. SIMVASTATIN (ZOCOR) 20 MG TABLET    Take 1 Tab by mouth nightly. TRIAMCINOLONE ACETONIDE (KENALOG) 0.1 % TOPICAL CREAM    APPLY THIN LAYER TO AFFECTED AREA TWICE DAILY    TRIMETHOPRIM-SULFAMETHOXAZOLE (BACTRIM DS, SEPTRA DS) 160-800 MG PER TABLET    Take 1 Tab by mouth two (2) times a day for 7 days.    These Medications have changed    No medications on file   Stop Taking    No medications on file

## 2019-08-20 NOTE — TELEPHONE ENCOUNTER
Called patient and advised referral has been sent to [facility]. Information given to patient to schedule appointment. Encounter closed.

## 2019-08-20 NOTE — ED NOTES
Pt aaox3 arrived by EMS pt called 911 for \"shaking and sweating\" Pt blood sugar on arrival was 50. Pt aaox3 given juice and a sandwich, Pt reports that since starting her 3rd round of ABX for her UTI she has been nauseated and not eating, Pt does not check her blood sugar at home, but continues to take glipizide and metformin when she is not eating.  vss

## 2019-08-21 LAB
BACTERIA SPEC CULT: NORMAL
SERVICE CMNT-IMP: NORMAL

## 2019-08-27 ENCOUNTER — OFFICE VISIT (OUTPATIENT)
Dept: FAMILY MEDICINE CLINIC | Age: 79
End: 2019-08-27

## 2019-08-27 ENCOUNTER — HOSPITAL ENCOUNTER (OUTPATIENT)
Dept: LAB | Age: 79
Discharge: HOME | End: 2019-08-27
Payer: MEDICARE

## 2019-08-27 VITALS
WEIGHT: 161 LBS | SYSTOLIC BLOOD PRESSURE: 140 MMHG | RESPIRATION RATE: 16 BRPM | OXYGEN SATURATION: 97 % | HEIGHT: 60 IN | TEMPERATURE: 98.7 F | HEART RATE: 77 BPM | DIASTOLIC BLOOD PRESSURE: 70 MMHG | BODY MASS INDEX: 31.61 KG/M2

## 2019-08-27 DIAGNOSIS — D32.9 MENINGIOMA (HCC): ICD-10-CM

## 2019-08-27 DIAGNOSIS — R63.4 WEIGHT LOSS: ICD-10-CM

## 2019-08-27 DIAGNOSIS — R63.4 WEIGHT LOSS: Primary | ICD-10-CM

## 2019-08-27 DIAGNOSIS — R11.2 NON-INTRACTABLE VOMITING WITH NAUSEA, UNSPECIFIED VOMITING TYPE: ICD-10-CM

## 2019-08-27 LAB
ERYTHROCYTE [DISTWIDTH] IN BLOOD BY AUTOMATED COUNT: 13.5 % (ref 11.6–14.5)
HCT VFR BLD AUTO: 37.8 % (ref 35–45)
HGB BLD-MCNC: 12 G/DL (ref 12–16)
MCH RBC QN AUTO: 27.7 PG (ref 24–34)
MCHC RBC AUTO-ENTMCNC: 31.7 G/DL (ref 31–37)
MCV RBC AUTO: 87.3 FL (ref 74–97)
PLATELET # BLD AUTO: 373 K/UL (ref 135–420)
PMV BLD AUTO: 9.5 FL (ref 9.2–11.8)
RBC # BLD AUTO: 4.33 M/UL (ref 4.2–5.3)
WBC # BLD AUTO: 9.3 K/UL (ref 4.6–13.2)

## 2019-08-27 PROCEDURE — 85027 COMPLETE CBC AUTOMATED: CPT

## 2019-08-27 PROCEDURE — 82274 ASSAY TEST FOR BLOOD FECAL: CPT

## 2019-08-27 PROCEDURE — 83615 LACTATE (LD) (LDH) ENZYME: CPT

## 2019-08-27 PROCEDURE — 86141 C-REACTIVE PROTEIN HS: CPT

## 2019-08-27 PROCEDURE — 83605 ASSAY OF LACTIC ACID: CPT

## 2019-08-27 PROCEDURE — 82728 ASSAY OF FERRITIN: CPT

## 2019-08-27 PROCEDURE — 36415 COLL VENOUS BLD VENIPUNCTURE: CPT

## 2019-08-27 NOTE — PROGRESS NOTES
1. Have you been to the ER, urgent care clinic since your last visit? Hospitalized since your last visit? Yes, 8/19/19, [facility], hypoglycemia    2. Have you seen or consulted any other health care providers outside of the [address] [place] [address] since your last visit? Include any pap smears or colon screening. No     Patient presents in office today for routine care.   Patient concerns: ER follow up

## 2019-08-28 ENCOUNTER — TELEPHONE (OUTPATIENT)
Dept: FAMILY MEDICINE CLINIC | Age: 79
End: 2019-08-28

## 2019-08-28 LAB
CRP SERPL HS-MCNC: >9.5 MG/L
FERRITIN SERPL-MCNC: 101 NG/ML (ref 8–388)
LACTATE SERPL-SCNC: 2.9 MMOL/L (ref 0.4–2)
LDH SERPL L TO P-CCNC: 197 U/L (ref 81–234)

## 2019-08-28 NOTE — TELEPHONE ENCOUNTER
Jennifer Omer from [facility] lab stated they need another diagnosis code for Ferritin lab order with provider signature or initials. Please fax to [phone].

## 2019-08-28 NOTE — TELEPHONE ENCOUNTER
Can you call Mrs. Agrawal and ask how she's feeling? Her Labs show Lactic Acidosis which can be secondary to Metformin, No clinical signs of Sepsis in clinic yesterday. Will await MRI of the Brain, etc. To make further recommendations.

## 2019-08-30 ENCOUNTER — TELEPHONE (OUTPATIENT)
Dept: FAMILY MEDICINE CLINIC | Age: 79
End: 2019-08-30

## 2019-08-30 NOTE — TELEPHONE ENCOUNTER
BERTRANDI- Pt. Stated that Dr. Jeanna Cuevas ordered EKG, but she is not due until Feb 23, 2020. But she is scheduled for MRI in September. She stated she was confused. When asked what was she confused about, she said she didn't know, but she was confused because she didn't know Dr. Jeanna Cuevas ordered the EKG. She wanted to let nurse Briana Bravo know that she was scheduled for MRI.

## 2019-09-01 LAB — HEMOCCULT STL QL IA: POSITIVE

## 2019-09-03 ENCOUNTER — HOSPITAL ENCOUNTER (OUTPATIENT)
Dept: MRI IMAGING | Age: 79
Discharge: HOME | End: 2019-09-03
Attending: INTERNAL MEDICINE
Payer: MEDICARE

## 2019-09-03 ENCOUNTER — TELEPHONE (OUTPATIENT)
Dept: FAMILY MEDICINE CLINIC | Age: 79
End: 2019-09-03

## 2019-09-03 VITALS — WEIGHT: 161 LBS | BODY MASS INDEX: 31.44 KG/M2

## 2019-09-03 DIAGNOSIS — R19.5 OCCULT BLOOD IN STOOLS: Primary | ICD-10-CM

## 2019-09-03 DIAGNOSIS — R63.4 WEIGHT LOSS: ICD-10-CM

## 2019-09-03 PROCEDURE — 70551 MRI BRAIN STEM W/O DYE: CPT

## 2019-09-04 ENCOUNTER — TELEPHONE (OUTPATIENT)
Dept: FAMILY MEDICINE CLINIC | Age: 79
End: 2019-09-04

## 2019-09-04 DIAGNOSIS — D32.0 MENINGIOMA, CEREBRAL (HCC): Primary | ICD-10-CM

## 2019-09-04 NOTE — TELEPHONE ENCOUNTER
Called patient [phone]: per Dr. Deion Alcala patient has blood in her stool. Informed her that a GI Doctor would be calling her for an appointment. She expressed understanding.

## 2019-09-04 NOTE — TELEPHONE ENCOUNTER
Please let Mrs. 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 See Orders. Thank you.

## 2019-09-09 ENCOUNTER — HOSPITAL ENCOUNTER (OUTPATIENT)
Dept: ULTRASOUND IMAGING | Age: 79
Discharge: HOME | End: 2019-09-09
Attending: UROLOGY
Payer: MEDICARE

## 2019-09-09 DIAGNOSIS — R31.29 MICROSCOPIC HEMATURIA: ICD-10-CM

## 2019-09-09 PROCEDURE — 76770 US EXAM ABDO BACK WALL COMP: CPT

## 2019-09-11 NOTE — PROGRESS NOTES
Can you ask her to return for a straight cath urine, also set up a cystoscopy in about 1 month (gives us time to get the urine culture and treat any potential infection). You can tell her we think she has a large bladder stone, but the cysto will confirm that diagnosis.  Thx.  res

## 2019-09-17 NOTE — ROUTINE PROCESS
Assessment/Plan


Problems:  


(1) Severe anemia


ICD Codes:  D64.9 - Anemia, unspecified


SNOMED:  541173112


Status:  stable


Status Narrative


Discussed with Dr. Shepard.


Assessment/Plan


OB stool negative


stable H&H


patient refused EGD/colonoscopy





advance diet


prn transfusions


ppi


bowel regimen


zofran prn


dc planning


outpatient GI procedures





The patient was seen and examined at bedside and all new and available data was 

reviewed in the patients chart. I agree with the above findings, impression 

and plan.  (Patient seen earlier today. Signature stamp does not reflect 

patient encounter time.). - Dany Shepard MD





Subjective


Subjective


Patient refused EGD and colonoscopy


Denies patient denies any nausea vomiting





Objective





Last 24 Hour Vital Signs








  Date Time  Temp Pulse Resp B/P (MAP) Pulse Ox O2 Delivery O2 Flow Rate FiO2


 


9/17/19 09:00      Room Air  


 


9/17/19 08:00 98.6 74 19 104/51 (68) 95   


 


9/17/19 04:00 96.8 78 20 106/62 (77)    


 


9/17/19 00:00 98.7 87 20 121/75 (90)    


 


9/16/19 21:00      Room Air  


 


9/16/19 20:00 96.6 88 19 106/62 (77)    


 


9/16/19 16:00 97.3 66 18 117/61 (79) 96   


 


9/16/19 12:00 98.1 65 20 114/59 (77) 96   

















Intake and Output  


 


 9/16/19 9/17/19





 19:00 07:00


 


Intake Total 75 ml 750 ml


 


Balance 75 ml 750 ml


 


  


 


IV Total 75 ml 750 ml


 


# Voids 3 3











Laboratory Tests








Test


  9/17/19


05:35


 


White Blood Count


  4.3 K/UL


(4.8-10.8)  L


 


Red Blood Count


  2.71 M/UL


(4.20-5.40)  L


 


Hemoglobin


  8.6 G/DL


(12.0-16.0)  L


 


Hematocrit


  25.6 %


(37.0-47.0)  L


 


Mean Corpuscular Volume 94 FL (80-99)  


 


Mean Corpuscular Hemoglobin


  31.8 PG


(27.0-31.0)  H


 


Mean Corpuscular Hemoglobin


Concent 33.7 G/DL


(32.0-36.0)


 


Red Cell Distribution Width


  11.4 %


(11.6-14.8)  L


 


Platelet Count


  311 K/UL


(150-450)


 


Mean Platelet Volume


  5.6 FL


(6.5-10.1)  L


 


Neutrophils (%) (Auto)


  64.5 %


(45.0-75.0)


 


Lymphocytes (%) (Auto)


  24.5 %


(20.0-45.0)


 


Monocytes (%) (Auto)


  9.2 %


(1.0-10.0)


 


Eosinophils (%) (Auto)


  1.2 %


(0.0-3.0)


 


Basophils (%) (Auto)


  0.7 %


(0.0-2.0)


 


Sodium Level


  141 MMOL/L


(136-145)


 


Potassium Level


  4.2 MMOL/L


(3.5-5.1)


 


Chloride Level


  111 MMOL/L


()  H


 


Carbon Dioxide Level


  21 MMOL/L


(21-32)


 


Anion Gap


  9 mmol/L


(5-15)


 


Blood Urea Nitrogen


  30 mg/dL


(7-18)  H


 


Creatinine


  1.6 MG/DL


(0.55-1.30)  H


 


Estimat Glomerular Filtration


Rate  mL/min (>60)  


 


 


Glucose Level


  90 MG/DL


()


 


Calcium Level


  8.3 MG/DL


(8.5-10.1)  L


 


Phosphorus Level


  3.6 MG/DL


(2.5-4.9)


 


Magnesium Level


  1.5 MG/DL


(1.8-2.4)  L


 


Iron Level


  52 ug/dL


()


 


Total Iron Binding Capacity


  181 ug/dL


(250-450)  L


 


Percent Iron Saturation 29 % (15-50)  


 


Unsaturated Iron Binding


  129 ug/dL


(112-346)


 


Ferritin


  128 NG/ML


(8-388)


 


Total Bilirubin


  0.3 MG/DL


(0.2-1.0)


 


Aspartate Amino Transf


(AST/SGOT) 15 U/L (15-37)


 


 


Alanine Aminotransferase


(ALT/SGPT) 8 U/L (12-78)


L


 


Alkaline Phosphatase


  64 U/L


()


 


Total Protein


  7.3 G/DL


(6.4-8.2)


 


Albumin


  2.3 G/DL


(3.4-5.0)  L


 


Globulin 5.0 g/dL  


 


Albumin/Globulin Ratio


  0.5 (1.0-2.7)


L








Height (Feet):  5


Height (Inches):  1.00


Weight (Pounds):  91


General Appearance:  WD/WN, no apparent distress, alert


Cardiovascular:  normal rate


Respiratory/Chest:  normal breath sounds, no respiratory distress


Abdominal Exam:  normal bowel sounds, non tender, soft


Extremities:  normal range of motion, non-tender











Jenny Rodriguez NP Sep 17, 2019 11:19 Bedside and Verbal shift change report given to Kirby (oncoming nurse) by Sanjeev Ybarra RN (offgoing nurse). Report included the following information SBAR, Kardex and MAR. QH VISUAL CHECKS DONE.

## 2019-09-20 ENCOUNTER — DOCUMENTATION ONLY (OUTPATIENT)
Dept: FAMILY MEDICINE CLINIC | Age: 79
End: 2019-09-20

## 2019-09-20 ENCOUNTER — TELEPHONE (OUTPATIENT)
Dept: FAMILY MEDICINE CLINIC | Age: 79
End: 2019-09-20

## 2019-09-20 NOTE — TELEPHONE ENCOUNTER
Patient called back to speak to nurse Sunita at 4:21 pm and stated that she was in severe pain. I was unable to get an answer on the phone so I went to the back to inform her. Both she and Dr. Chrissy Drake were sitting at [facility] desk and made aware. Patient is now calling back at 4:57pm very upset wanting to know why she still hasn't received a call back.  Please assist.

## 2019-09-20 NOTE — TELEPHONE ENCOUNTER
Continuous documentation ongoing during patient care:    Called patient (identified self and office, obtained two patient identifiers of name and date of birth.) to let follow up on her call, patient stated that she was previously seen by her Urologist on Tuesday and a urine sample was taken by them 2 days ago as well as an [address]. Patient stated that she then contacted their office and was told that the lab results were in at 9:15 am but when she returned the call to verify the lab results but was told that no one called her from their office. Advised patient that since she was still seeing blood in her urine that the best medical option was to go to the ED for care. Patient very reluctant to go to the ED and states \" what if I wait until Monday, will that be alright\" explained to her that I could not force her to go to the ED but that I can only recommend that she do so as it is within her best interest. Patient also stated that she would have to call an ambulance in order to do so. Asked her if she has another alternative if not then she can. Patient stated that she has a follow up appointment on 10/02/19 with her Urologist. Patient advised by Dr. Samara Cardoso to keep appointment. Patient seemed confused, and asked a second time who I was. Identified myself again to patient and reassured her as she seemed confused. Patient again advised to go to ED.  No other concerns will follow up on this on monday

## 2019-09-20 NOTE — TELEPHONE ENCOUNTER
Patient called in [facility] very upset, she is still having a uti problem and blood in stool. She has appointment with urology Tuesday regarding this but she is questioning what she needs to do in the mean time and what kind of bacteria does she have in her urine. Please advise, thank you.

## 2019-09-23 NOTE — TELEPHONE ENCOUNTER
Completed follow up call. Advised patient to call [facility] for results. Pt states she did not go to ER but will wait for Urology to call. Nurse advised patient to call Urology or continue with previous nurse advice and go to ER. Pt original appt with urology 09/04/2019, lab ordered by Urology on 09/17/2019, completed 09/17/2019 with follow up appt 10/02/2019. This encounter will be called.

## 2019-10-16 RX ORDER — METFORMIN HYDROCHLORIDE 1000 MG/1
TABLET ORAL
Qty: 180 TAB | Refills: 0 | Status: SHIPPED | OUTPATIENT
Start: 2019-10-16 | End: 2020-01-16

## 2019-10-18 DIAGNOSIS — L30.9 ECZEMA, UNSPECIFIED TYPE: ICD-10-CM

## 2019-10-18 DIAGNOSIS — E03.4 HYPOTHYROIDISM DUE TO ACQUIRED ATROPHY OF THYROID: ICD-10-CM

## 2019-10-18 DIAGNOSIS — E11.21 TYPE 2 DIABETES MELLITUS WITH NEPHROPATHY (HCC): ICD-10-CM

## 2019-10-18 NOTE — PROGRESS NOTES
GIM     Patient: Sirisha Ortiz MRN: [id number]  CSN: [id number]    YOB: 1940  Age: 65 y.o. Sex: female    DOA: 10/12/2017 LOS:  LOS: 27 days                    Subjective:     Pt with fx of L hip and L wrist. Improving and progressing with therapy. Feels well and alert. Objective:      Visit Vitals    /78    Pulse 85    Temp 98 °F (36.7 °C)    Resp 18    Ht 5' (1.524 m)    Wt 81.9 kg (180 lb 9.6 oz)    SpO2 98%    Breastfeeding No    BMI 35.27 kg/m2       Physical Exam:  Chest clear  Cor rr  abd soft  No edema    Intake and Output:  Current Shift:     Last three shifts:       No results found for this or any previous visit (from the past 24 hour(s)).     Current Facility-Administered Medications   Medication Dose Route Frequency    magnesium hydroxide (MILK OF MAGNESIA) 400 mg/5 mL oral suspension 30 mL  30 mL Oral DAILY PRN    celecoxib (CELEBREX) capsule 200 mg  200 mg Oral BID    bismuth subsalicylate (PEPTO-BISMOL) oral suspension 30 mL  30 mL Oral Q6H PRN    losartan (COZAAR) tablet 25 mg  25 mg Oral DAILY    cholecalciferol (VITAMIN D3) tablet 2,000 Units  2,000 Units Oral DAILY    [facility] ANESTHESIA   Other PRN    [facility] EMERGENCY/STAT BOX   Other PRN    alum-mag hydroxide-simeth (MYLANTA) oral suspension 30 mL  30 mL Oral QID PRN    ondansetron (ZOFRAN ODT) tablet 4 mg  4 mg Oral Q6H PRN    docusate sodium (COLACE) capsule 100 mg  100 mg Oral BID    enoxaparin (LOVENOX) injection 40 mg  40 mg SubCUTAneous DAILY    levothyroxine (SYNTHROID) tablet 75 mcg  75 mcg Oral ACB    metFORMIN (GLUCOPHAGE) tablet 1,000 mg  1,000 mg Oral BID WITH MEALS    oxyCODONE-acetaminophen (PERCOCET) 5-325 mg per tablet 1-2 Tab  1-2 Tab Oral Q4H PRN    pantoprazole (PROTONIX) tablet 40 mg  40 mg Oral DAILY    simvastatin (ZOCOR) tablet 20 mg  20 mg Oral QHS    triamcinolone acetonide (KENALOG) 0.1 % cream   Topical BID    glucose chewable tablet 16 g  4 Tab Oral PRN    glucagon (GLUCAGEN) injection 1 mg  1 mg IntraMUSCular PRN    dextrose (D50W) injection syrg 12.5-25 g  25-50 mL IntraVENous PRN       Lab Results   Component Value Date/Time    Glucose 116 10/12/2017 04:00 AM    Glucose 167 10/11/2017 03:50 AM    Glucose 180 10/10/2017 06:36 PM    Glucose 161 10/09/2017 05:30 PM    Glucose 112 04/20/2017 11:23 AM        Assessment/Plan     Active Problems:    Hip fracture ([facility] [address] (10/9/2017)        Debbra Litten, MD  11/8/2017, 11:30 AM no

## 2019-10-19 RX ORDER — SIMVASTATIN 20 MG/1
TABLET, FILM COATED ORAL
Qty: 90 TAB | Refills: 0 | Status: SHIPPED | OUTPATIENT
Start: 2019-10-19 | End: 2020-01-16

## 2019-10-19 RX ORDER — LEVOTHYROXINE SODIUM 75 UG/1
TABLET ORAL
Qty: 90 TAB | Refills: 0 | Status: SHIPPED | OUTPATIENT
Start: 2019-10-19 | End: 2020-01-16

## 2019-10-19 RX ORDER — TRIAMCINOLONE ACETONIDE 1 MG/G
CREAM TOPICAL
Qty: 15 G | Refills: 0 | Status: SHIPPED | OUTPATIENT
Start: 2019-10-19 | End: 2020-04-01 | Stop reason: SDUPTHER

## 2019-10-22 ENCOUNTER — HOSPITAL ENCOUNTER (OUTPATIENT)
Dept: MAMMOGRAPHY | Age: 79
Discharge: HOME | End: 2019-10-22
Attending: INTERNAL MEDICINE
Payer: MEDICARE

## 2019-10-22 DIAGNOSIS — Z12.31 VISIT FOR SCREENING MAMMOGRAM: ICD-10-CM

## 2019-10-22 PROCEDURE — 77063 BREAST TOMOSYNTHESIS BI: CPT

## 2019-10-31 ENCOUNTER — HOSPITAL ENCOUNTER (OUTPATIENT)
Age: 79
Setting detail: OBSERVATION
Discharge: HOME | End: 2019-11-01
Attending: EMERGENCY MEDICINE | Admitting: INTERNAL MEDICINE
Payer: MEDICARE

## 2019-10-31 DIAGNOSIS — I48.91 ATRIAL FIBRILLATION WITH RAPID VENTRICULAR RESPONSE (HCC): Primary | ICD-10-CM

## 2019-10-31 DIAGNOSIS — E83.42 HYPOMAGNESEMIA: ICD-10-CM

## 2019-10-31 PROBLEM — I95.9 ACUTE HYPOTENSION: Status: ACTIVE | Noted: 2019-10-31

## 2019-10-31 LAB
ALBUMIN SERPL-MCNC: 3.1 G/DL (ref 3.4–5)
ALBUMIN/GLOB SERPL: 0.8 {RATIO} (ref 0.8–1.7)
ALP SERPL-CCNC: 68 U/L (ref 45–117)
ALT SERPL-CCNC: 7 U/L (ref 13–56)
ANION GAP SERPL CALC-SCNC: 12 MMOL/L (ref 3–18)
AST SERPL-CCNC: 9 U/L (ref 10–38)
BASOPHILS # BLD: 0 K/UL (ref 0–0.1)
BASOPHILS NFR BLD: 0 % (ref 0–2)
BILIRUB SERPL-MCNC: 0.5 MG/DL (ref 0.2–1)
BUN SERPL-MCNC: 10 MG/DL (ref 7–18)
BUN/CREAT SERPL: 10 (ref 12–20)
CALCIUM SERPL-MCNC: 8.1 MG/DL (ref 8.5–10.1)
CHLORIDE SERPL-SCNC: 100 MMOL/L (ref 100–111)
CO2 SERPL-SCNC: 24 MMOL/L (ref 21–32)
CREAT SERPL-MCNC: 0.99 MG/DL (ref 0.6–1.3)
DIFFERENTIAL METHOD BLD: ABNORMAL
EOSINOPHIL # BLD: 0 K/UL (ref 0–0.4)
EOSINOPHIL NFR BLD: 0 % (ref 0–5)
ERYTHROCYTE [DISTWIDTH] IN BLOOD BY AUTOMATED COUNT: 13.2 % (ref 11.6–14.5)
GLOBULIN SER CALC-MCNC: 3.9 G/DL (ref 2–4)
GLUCOSE BLD STRIP.AUTO-MCNC: 149 MG/DL (ref 70–110)
GLUCOSE SERPL-MCNC: 164 MG/DL (ref 74–99)
HCT VFR BLD AUTO: 37.9 % (ref 35–45)
HGB BLD-MCNC: 12.4 G/DL (ref 12–16)
LACTATE BLD-SCNC: 2.59 MMOL/L (ref 0.4–2)
LYMPHOCYTES # BLD: 2.4 K/UL (ref 0.9–3.6)
LYMPHOCYTES NFR BLD: 19 % (ref 21–52)
MAGNESIUM SERPL-MCNC: 0.8 MG/DL (ref 1.6–2.6)
MCH RBC QN AUTO: 29 PG (ref 24–34)
MCHC RBC AUTO-ENTMCNC: 32.7 G/DL (ref 31–37)
MCV RBC AUTO: 88.8 FL (ref 74–97)
MONOCYTES # BLD: 1.2 K/UL (ref 0.05–1.2)
MONOCYTES NFR BLD: 10 % (ref 3–10)
NEUTS SEG # BLD: 9 K/UL (ref 1.8–8)
NEUTS SEG NFR BLD: 71 % (ref 40–73)
PLATELET # BLD AUTO: 271 K/UL (ref 135–420)
PMV BLD AUTO: 9.3 FL (ref 9.2–11.8)
POTASSIUM SERPL-SCNC: 4.3 MMOL/L (ref 3.5–5.5)
PROT SERPL-MCNC: 7 G/DL (ref 6.4–8.2)
RBC # BLD AUTO: 4.27 M/UL (ref 4.2–5.3)
SODIUM SERPL-SCNC: 136 MMOL/L (ref 136–145)
TROPONIN I SERPL-MCNC: <0.02 NG/ML (ref 0–0.04)
WBC # BLD AUTO: 12.7 K/UL (ref 4.6–13.2)

## 2019-10-31 PROCEDURE — 77030038269 HC DRN EXT URIN PURWCK BARD -A

## 2019-10-31 PROCEDURE — 96367 TX/PROPH/DG ADDL SEQ IV INF: CPT

## 2019-10-31 PROCEDURE — 99218 HC RM OBSERVATION: CPT

## 2019-10-31 PROCEDURE — 93005 ELECTROCARDIOGRAM TRACING: CPT

## 2019-10-31 PROCEDURE — 83605 ASSAY OF LACTIC ACID: CPT

## 2019-10-31 PROCEDURE — 83735 ASSAY OF MAGNESIUM: CPT

## 2019-10-31 PROCEDURE — 96375 TX/PRO/DX INJ NEW DRUG ADDON: CPT

## 2019-10-31 PROCEDURE — 74011250636 HC RX REV CODE- 250/636: Performed by: EMERGENCY MEDICINE

## 2019-10-31 PROCEDURE — 96365 THER/PROPH/DIAG IV INF INIT: CPT

## 2019-10-31 PROCEDURE — 85025 COMPLETE CBC W/AUTO DIFF WBC: CPT

## 2019-10-31 PROCEDURE — 80053 COMPREHEN METABOLIC PANEL: CPT

## 2019-10-31 PROCEDURE — 74011000258 HC RX REV CODE- 258: Performed by: EMERGENCY MEDICINE

## 2019-10-31 PROCEDURE — 74011000250 HC RX REV CODE- 250: Performed by: EMERGENCY MEDICINE

## 2019-10-31 PROCEDURE — 74011250636 HC RX REV CODE- 250/636: Performed by: INTERNAL MEDICINE

## 2019-10-31 PROCEDURE — 82962 GLUCOSE BLOOD TEST: CPT

## 2019-10-31 PROCEDURE — 74011250637 HC RX REV CODE- 250/637: Performed by: INTERNAL MEDICINE

## 2019-10-31 PROCEDURE — 99285 EMERGENCY DEPT VISIT HI MDM: CPT

## 2019-10-31 PROCEDURE — 84484 ASSAY OF TROPONIN QUANT: CPT

## 2019-10-31 PROCEDURE — 96376 TX/PRO/DX INJ SAME DRUG ADON: CPT

## 2019-10-31 RX ORDER — FUROSEMIDE 40 MG/1
40 TABLET ORAL DAILY
Status: DISCONTINUED | OUTPATIENT
Start: 2019-11-01 | End: 2019-11-01

## 2019-10-31 RX ORDER — LOSARTAN POTASSIUM 25 MG/1
25 TABLET ORAL DAILY
Status: DISCONTINUED | OUTPATIENT
Start: 2019-11-01 | End: 2019-11-01

## 2019-10-31 RX ORDER — DIGOXIN 0.25 MG/ML
250 INJECTION INTRAMUSCULAR; INTRAVENOUS EVERY 4 HOURS
Status: COMPLETED | OUTPATIENT
Start: 2019-10-31 | End: 2019-11-01

## 2019-10-31 RX ORDER — SODIUM CHLORIDE 9 MG/ML
250 INJECTION, SOLUTION INTRAVENOUS ONCE
Status: COMPLETED | OUTPATIENT
Start: 2019-10-31 | End: 2019-10-31

## 2019-10-31 RX ORDER — SIMVASTATIN 20 MG/1
20 TABLET, FILM COATED ORAL
Status: DISCONTINUED | OUTPATIENT
Start: 2019-10-31 | End: 2019-11-01 | Stop reason: HOSPADM

## 2019-10-31 RX ORDER — MAGNESIUM SULFATE HEPTAHYDRATE 40 MG/ML
2 INJECTION, SOLUTION INTRAVENOUS
Status: COMPLETED | OUTPATIENT
Start: 2019-10-31 | End: 2019-10-31

## 2019-10-31 RX ORDER — HYDROCHLOROTHIAZIDE 25 MG/1
25 TABLET ORAL DAILY
Status: DISCONTINUED | OUTPATIENT
Start: 2019-11-01 | End: 2019-11-01 | Stop reason: HOSPADM

## 2019-10-31 RX ORDER — DILTIAZEM HYDROCHLORIDE 5 MG/ML
20 INJECTION INTRAVENOUS
Status: COMPLETED | OUTPATIENT
Start: 2019-10-31 | End: 2019-10-31

## 2019-10-31 RX ORDER — LEVOTHYROXINE SODIUM 75 UG/1
75 TABLET ORAL
Status: DISCONTINUED | OUTPATIENT
Start: 2019-11-01 | End: 2019-11-01 | Stop reason: HOSPADM

## 2019-10-31 RX ORDER — PANTOPRAZOLE SODIUM 40 MG/1
40 TABLET, DELAYED RELEASE ORAL
Status: DISCONTINUED | OUTPATIENT
Start: 2019-11-01 | End: 2019-11-01 | Stop reason: HOSPADM

## 2019-10-31 RX ORDER — DIPHENOXYLATE HYDROCHLORIDE AND ATROPINE SULFATE 2.5; .025 MG/1; MG/1
1 TABLET ORAL
Status: DISCONTINUED | OUTPATIENT
Start: 2019-10-31 | End: 2019-11-01 | Stop reason: HOSPADM

## 2019-10-31 RX ORDER — CELECOXIB 100 MG/1
200 CAPSULE ORAL 2 TIMES DAILY
Status: DISCONTINUED | OUTPATIENT
Start: 2019-11-01 | End: 2019-11-01 | Stop reason: HOSPADM

## 2019-10-31 RX ORDER — SODIUM CHLORIDE 9 MG/ML
50 INJECTION, SOLUTION INTRAVENOUS CONTINUOUS
Status: DISCONTINUED | OUTPATIENT
Start: 2019-10-31 | End: 2019-11-01 | Stop reason: HOSPADM

## 2019-10-31 RX ADMIN — SIMVASTATIN 20 MG: 20 TABLET, FILM COATED ORAL at 22:26

## 2019-10-31 RX ADMIN — DIGOXIN 250 MCG: 0.25 INJECTION INTRAMUSCULAR; INTRAVENOUS at 19:22

## 2019-10-31 RX ADMIN — MAGNESIUM SULFATE HEPTAHYDRATE 2 G: 40 INJECTION, SOLUTION INTRAVENOUS at 18:49

## 2019-10-31 RX ADMIN — SODIUM CHLORIDE 250 ML: 900 INJECTION, SOLUTION INTRAVENOUS at 18:00

## 2019-10-31 RX ADMIN — SODIUM CHLORIDE 10 MG/HR: 900 INJECTION, SOLUTION INTRAVENOUS at 18:02

## 2019-10-31 RX ADMIN — APIXABAN 5 MG: 2.5 TABLET, FILM COATED ORAL at 22:26

## 2019-10-31 RX ADMIN — SODIUM CHLORIDE 50 ML/HR: 900 INJECTION, SOLUTION INTRAVENOUS at 22:30

## 2019-10-31 RX ADMIN — SODIUM CHLORIDE 500 ML: 900 INJECTION, SOLUTION INTRAVENOUS at 19:13

## 2019-10-31 RX ADMIN — DILTIAZEM HYDROCHLORIDE 20 MG: 5 INJECTION INTRAVENOUS at 18:02

## 2019-10-31 NOTE — ED TRIAGE NOTES
Per EMS pt has been having generalized weakness for a week. She was in rapid afib in the ambulance , and has a history of afib.

## 2019-10-31 NOTE — ED PROVIDER NOTES
EMERGENCY DEPARTMENT HISTORY AND PHYSICAL EXAM    5:46 PM      Date: 10/31/2019  Patient Name: Chidi Watts    History of Presenting Illness     Chief Complaint   Patient presents with    Fatigue         History Provided By: patient    Additional History (Context): Chidi Watts is a [de-identified] y.o. female presents with feeling bad for more than a week but particularly bad over 3 days. She is unable to further clarify. She has no chest pain shortness of breath abdominal pain. Some nausea. Normal bowel movements and no urinary complaints. She has a known history of A. fib and she is on EliquisJhony Aponte PCP: Tim Esquivel DO    Chief Complaint:   Duration:    Timing:    Location:   Quality:   Severity:   Modifying Factors:   Associated Symptoms:       Current Facility-Administered Medications   Medication Dose Route Frequency Provider Last Rate Last Dose    aluminum-magnesium hydroxide (MAALOX) oral suspension 30 mL  30 mL Oral NOW Eli Harper MD        magnesium sulfate 2 g/50 ml IVPB (premix or compounded)  2 g IntraVENous NOW Fabienne HURST MD 50 mL/hr at 10/31/19 1849 2 g at 10/31/19 1849    sodium chloride 0.9 % bolus infusion 500 mL  500 mL IntraVENous Alejandro HURST MD 1,000 mL/hr at 10/31/19 1913 500 mL at 10/31/19 1913    digoxin (LANOXIN) injection 250 mcg  250 mcg IntraVENous Q4H Eli Harper MD         Current Outpatient Medications   Medication Sig Dispense Refill    triamcinolone acetonide (KENALOG) 0.1 % topical cream APPLY THIN LAYER EXTERNALLY TO THE AFFECTED AREA TWICE DAILY 15 g 0    simvastatin (ZOCOR) 20 mg tablet TAKE 1 TABLET BY MOUTH EVERY NIGHT 90 Tab 0    levothyroxine (SYNTHROID) 75 mcg tablet TAKE 1 TABLET BY MOUTH DAILY BEFORE BREAKFAST 90 Tab 0    metFORMIN (GLUCOPHAGE) 1,000 mg tablet TAKE 1 TABLET BY MOUTH TWICE DAILY. 180 Tab 0    nitrofurantoin, macrocrystal-monohydrate, (MACROBID) 100 mg capsule Take 1 Cap by mouth two (2) times a day. 14 Cap 0    betamethasone dipropionate (DIPROSONE) 0.05 % topical cream betamethasone dipropionate 0.05 % topical cream      augmented betamethasone dipropionate (DIPROLENE-AF) 0.05 % topical cream Apply  to affected area.  glucose blood VI test strips (FREESTYLE LITE STRIPS) strip use to check BS once daily      Blood-Glucose Meter (FREESTYLE LITE METER) monitoring kit use to check BS once daily      captopril-hydroCHLOROthiazide (CAPOZIDE) 50-15 mg per tablet       clotrimazole (LOTRIMIN) 1 % topical cream Apply  to affected area.  dicyclomine (BENTYL) 20 mg tablet Take 1 Tab by mouth.  famotidine (PEPCID) 20 mg tablet famotidine 20 mg tablet      glipiZIDE (GLUCOTROL) 5 mg tablet glipizide 5 mg tablet   TK 1 T PO BID B MEALS      hydroCHLOROthiazide (HYDRODIURIL) 25 mg tablet hydrochlorothiazide 25 mg tablet      lancets (FREESTYLE LANCETS) 28 gauge misc use to check BS once daily      loperamide (IMODIUM) 2 mg capsule loperamide 2 mg capsule      metroNIDAZOLE (FLAGYL) 500 mg tablet       losartan (COZAAR) 100 mg tablet Take 100 mg by mouth daily.  furosemide (LASIX) 40 mg tablet Take  by mouth daily.  celecoxib (CELEBREX) 200 mg capsule Take  by mouth two (2) times a day.  apixaban (ELIQUIS) 5 mg tablet TAKE 1 TABLET BY MOUTH TWICE DAILY. 60 Tab 6    ondansetron hcl (ZOFRAN) 4 mg tablet Take 1 Tab by mouth every eight (8) hours as needed for Nausea. 30 Tab 1    losartan (COZAAR) 25 mg tablet Take 1 Tab by mouth daily. 90 Tab 3    pantoprazole (PROTONIX) 40 mg tablet Take 1 Tab by mouth daily. 90 Tab 3    metoprolol tartrate (LOPRESSOR) 25 mg tablet Take 1 Tab by mouth two (2) times a day. 180 Tab 3    diphenoxylate-atropine (LOMOTIL) 2.5-0.025 mg per tablet Take 1 Tab by mouth four (4) times daily as needed for Diarrhea. Max Daily Amount: 4 Tabs. 30 Tab 1    cholecalciferol (VITAMIN D3) 1,000 unit tablet Take 2 Tabs by mouth daily.  60 Tab 0       Past History Past Medical History:  Past Medical History:   Diagnosis Date    Aortic stenosis     Arthritis     Atrial flutter ([address]     Bronchitis     Diabetes (HCC)     Diverticulitis     Diverticulitis     GERD (gastroesophageal reflux disease)     Hypercholesterolemia     Hypertension     Hypothyroid     Menopause     Pancreatitis        Past Surgical History:  Past Surgical History:   Procedure Laterality Date    COLONOSCOPY N/A 2/2/2017    COLONOSCOPY  w/bx polyp performed by Keli Barlow MD at [facility] ENDOSCOPY       Family History:  Family History   Problem Relation Age of Onset    Diabetes Mother     Heart Disease Mother     Cancer Father         melanoma    Stroke Sister     Hypertension Sister     Diabetes Sister     Hypertension Brother     Diabetes Brother     Breast Cancer Paternal Grandmother         older, but not sure age.  Breast Cancer Paternal Aunt         and gyn cancer ?age       Social History:  Social History     Tobacco Use    Smoking status: Never Smoker    Smokeless tobacco: Never Used   Substance Use Topics    Alcohol use: No    Drug use: No       Allergies: Allergies   Allergen Reactions    Ampicillin Itching         Review of Systems     Review of Systems   Constitutional: Negative for diaphoresis and fever. HENT: Negative for congestion and sore throat. Eyes: Negative for pain and itching. Respiratory: Negative for cough and shortness of breath. Cardiovascular: Negative for chest pain and palpitations. Gastrointestinal: Negative for abdominal pain and diarrhea. Endocrine: Negative for polydipsia and polyuria. Genitourinary: Negative for dysuria and hematuria. Musculoskeletal: Negative for arthralgias and myalgias. Skin: Negative for rash and wound. Neurological: Negative for seizures and syncope. Hematological: Does not bruise/bleed easily. Psychiatric/Behavioral: Negative for agitation and hallucinations.          Physical Exam Patient Vitals for the past 12 hrs:   Temp Pulse Resp BP SpO2   10/31/19 1900 -- (!) 137 24 (!) 89/44 96 %   10/31/19 1845 -- (!) 140 23 108/86 97 %   10/31/19 1830 -- (!) 133 25 92/64 97 %   10/31/19 1815 -- (!) 133 26 116/53 97 %   10/31/19 1755 -- (!) 160 27 -- 98 %   10/31/19 1745 -- (!) 171 25 (!) 132/109 98 %   10/31/19 1736 -- (!) 175 27 122/62 97 %   10/31/19 1730 -- (!) 176 28 (!) 137/111 98 %   10/31/19 1725 -- (!) 168 27 -- 98 %   10/31/19 1724 99.5 °F (37.5 °C) -- -- 123/71 98 %       Physical Exam   Constitutional: She appears well-developed and well-nourished. Appears uncomfortable but nontoxic   HENT:   Head: Normocephalic and atraumatic. Eyes: Conjunctivae are normal. No scleral icterus. Neck: Normal range of motion. Neck supple. No JVD present. Cardiovascular: Normal heart sounds. 4 intact extremity pulses, markedly tachycardic   Pulmonary/Chest: Effort normal and breath sounds normal.   Abdominal: Soft. She exhibits no mass. There is no tenderness. Musculoskeletal: Normal range of motion. Lymphadenopathy:     She has no cervical adenopathy. Neurological: She is alert. Skin: Skin is warm and dry. Nursing note and vitals reviewed. Diagnostic Study Results   Labs -  Recent Results (from the past 12 hour(s))   EKG, 12 LEAD, INITIAL    Collection Time: 10/31/19  5:40 PM   Result Value Ref Range    Ventricular Rate 171 BPM    QRS Duration 62 ms    Q-T Interval 258 ms    QTC Calculation (Bezet) 435 ms    Calculated R Axis 65 degrees    Calculated T Axis 55 degrees    Diagnosis        Critical Test Result: High HR  Atrial fibrillation with rapid ventricular response with premature   ventricular or aberrantly conducted complexes  ST depression, consider subendocardial injury  Abnormal ECG  When compared with ECG of 07-FEB-2019 23:41,  Atrial fibrillation has replaced Sinus rhythm  Vent.  rate has increased  BPM  ST now depressed in Inferior leads  ST now depressed in Anterolateral leads  Nonspecific T wave abnormality now evident in Lateral leads     METABOLIC PANEL, COMPREHENSIVE    Collection Time: 10/31/19  5:53 PM   Result Value Ref Range    Sodium 136 136 - 145 mmol/L    Potassium 4.3 3.5 - 5.5 mmol/L    Chloride 100 100 - 111 mmol/L    CO2 24 21 - 32 mmol/L    Anion gap 12 3.0 - 18 mmol/L    Glucose 164 (H) 74 - 99 mg/dL    BUN 10 7.0 - 18 MG/DL    Creatinine 0.99 0.6 - 1.3 MG/DL    BUN/Creatinine ratio 10 (L) 12 - 20      GFR est AA >60 >60 ml/min/1.73m2    GFR est non-AA 54 (L) >60 ml/min/1.73m2    Calcium 8.1 (L) 8.5 - 10.1 MG/DL    Bilirubin, total 0.5 0.2 - 1.0 MG/DL    ALT (SGPT) 7 (L) 13 - 56 U/L    AST (SGOT) 9 (L) 10 - 38 U/L    Alk. phosphatase 68 45 - 117 U/L    Protein, total 7.0 6.4 - 8.2 g/dL    Albumin 3.1 (L) 3.4 - 5.0 g/dL    Globulin 3.9 2.0 - 4.0 g/dL    A-G Ratio 0.8 0.8 - 1.7     MAGNESIUM    Collection Time: 10/31/19  5:53 PM   Result Value Ref Range    Magnesium 0.8 (LL) 1.6 - 2.6 mg/dL   CBC WITH AUTOMATED DIFF    Collection Time: 10/31/19  5:53 PM   Result Value Ref Range    WBC 12.7 4.6 - 13.2 K/uL    RBC 4.27 4.20 - 5.30 M/uL    HGB 12.4 12.0 - 16.0 g/dL    HCT 37.9 35.0 - 45.0 %    MCV 88.8 74.0 - 97.0 FL    MCH 29.0 24.0 - 34.0 PG    MCHC 32.7 31.0 - 37.0 g/dL    RDW 13.2 11.6 - 14.5 %    PLATELET 958 119 - 895 K/uL    MPV 9.3 9.2 - 11.8 FL    NEUTROPHILS 71 40 - 73 %    LYMPHOCYTES 19 (L) 21 - 52 %    MONOCYTES 10 3 - 10 %    EOSINOPHILS 0 0 - 5 %    BASOPHILS 0 0 - 2 %    ABS. NEUTROPHILS 9.0 (H) 1.8 - 8.0 K/UL    ABS. LYMPHOCYTES 2.4 0.9 - 3.6 K/UL    ABS. MONOCYTES 1.2 0.05 - 1.2 K/UL    ABS. EOSINOPHILS 0.0 0.0 - 0.4 K/UL    ABS. BASOPHILS 0.0 0.0 - 0.1 K/UL    DF AUTOMATED     TROPONIN I    Collection Time: 10/31/19  5:53 PM   Result Value Ref Range    Troponin-I, QT <0.02 0.0 - 0.045 NG/ML       Radiologic Studies -   No orders to display     No results found.     Medications ordered:   Medications   aluminum-magnesium hydroxide (MAALOX) oral suspension 30 mL (has no administration in time range)   magnesium sulfate 2 g/50 ml IVPB (premix or compounded) (2 g IntraVENous New Bag 10/31/19 1849)   sodium chloride 0.9 % bolus infusion 500 mL (500 mL IntraVENous New Bag 10/31/19 1913)   digoxin (LANOXIN) injection 250 mcg (has no administration in time range)   dilTIAZem (CARDIZEM) injection 20 mg (20 mg IntraVENous Given 10/31/19 1802)   dilTIAZem (CARDIZEM) 100 mg in 0.9% sodium chloride (MBP/ADV) 100 mL infusion (10 mg/hr IntraVENous New Bag 10/31/19 1802)   0.9% sodium chloride infusion 250 mL (250 mL IntraVENous New Bag 10/31/19 1800)         Medical Decision Making   Initial Medical Decision Making and DDx:  She is in A. fib with RVR. Evaluate for electrolyte abnormality, start Cardizem. Critical care time 35 minutes for management of atrial fibrillation with rapid ventricular response. ED Course: Progress Notes, Reevaluation, and Consults:     7:14 PM [facility] with [facility]. Discussed A. fib, hypotension afterwards Cardizem stopped will start dig. Discussed with Mr. Barbara Givens cardiology, he recommends switching to dig. Also we are repleting her magnesium. Discussed with Dr. Myriam Oropeza intensivist and discussed all of the above. She agrees with admission to the ICU. I am the first provider for this patient. I reviewed the vital signs, available nursing notes, past medical history, past surgical history, family history and social history.     Patient Vitals for the past 12 hrs:   Temp Pulse Resp BP SpO2   10/31/19 1900 -- (!) 137 24 (!) 89/44 96 %   10/31/19 1845 -- (!) 140 23 108/86 97 %   10/31/19 1830 -- (!) 133 25 92/64 97 %   10/31/19 1815 -- (!) 133 26 116/53 97 %   10/31/19 1755 -- (!) 160 27 -- 98 %   10/31/19 1745 -- (!) 171 25 (!) 132/109 98 %   10/31/19 1736 -- (!) 175 27 122/62 97 %   10/31/19 1730 -- (!) 176 28 (!) 137/111 98 %   10/31/19 1725 -- (!) 168 27 -- 98 %   10/31/19 1724 99.5 °F (37.5 °C) -- -- 123/71 98 %       Vital Signs-Reviewed the patient's vital signs. Pulse Oximetry Analysis, Cardiac Monitor, 12 lead ekg:  Twelve-lead EKG at 1740, atrial fibrillation with rapid ventricular response at rate of 171. Telemetry narrow complex tachycardia at a rate of 150. Oximetry 100% room air  Interpreted by the EP. Records Reviewed: Nursing notes reviewed (Time of Review: 5:46 PM)    Procedures:   Critical Care Time:   Aspirin: (was aspirin given for stroke?)    Diagnosis     Clinical Impression:   1. Atrial fibrillation with rapid ventricular response ([address]    2. Hypomagnesemia        Disposition:       Follow-up Information    None          Patient's Medications   Start Taking    No medications on file   Continue Taking    APIXABAN (ELIQUIS) 5 MG TABLET    TAKE 1 TABLET BY MOUTH TWICE DAILY. AUGMENTED BETAMETHASONE DIPROPIONATE (DIPROLENE-AF) 0.05 % TOPICAL CREAM    Apply  to affected area. BETAMETHASONE DIPROPIONATE (DIPROSONE) 0.05 % TOPICAL CREAM    betamethasone dipropionate 0.05 % topical cream    BLOOD-GLUCOSE METER (FREESTYLE LITE METER) MONITORING KIT    use to check BS once daily    CAPTOPRIL-HYDROCHLOROTHIAZIDE (CAPOZIDE) 50-15 MG PER TABLET        CELECOXIB (CELEBREX) 200 MG CAPSULE    Take  by mouth two (2) times a day. CHOLECALCIFEROL (VITAMIN D3) 1,000 UNIT TABLET    Take 2 Tabs by mouth daily. CLOTRIMAZOLE (LOTRIMIN) 1 % TOPICAL CREAM    Apply  to affected area. DICYCLOMINE (BENTYL) 20 MG TABLET    Take 1 Tab by mouth. DIPHENOXYLATE-ATROPINE (LOMOTIL) 2.5-0.025 MG PER TABLET    Take 1 Tab by mouth four (4) times daily as needed for Diarrhea. Max Daily Amount: 4 Tabs. FAMOTIDINE (PEPCID) 20 MG TABLET    famotidine 20 mg tablet    FUROSEMIDE (LASIX) 40 MG TABLET    Take  by mouth daily.     GLIPIZIDE (GLUCOTROL) 5 MG TABLET    glipizide 5 mg tablet   TK 1 T PO BID B MEALS    GLUCOSE BLOOD VI TEST STRIPS (FREESTYLE LITE STRIPS) STRIP    use to check BS once daily    HYDROCHLOROTHIAZIDE (HYDRODIURIL) 25 MG TABLET    hydrochlorothiazide 25 mg tablet    LANCETS (FREESTYLE LANCETS) 28 GAUGE MISC    use to check BS once daily    LEVOTHYROXINE (SYNTHROID) 75 MCG TABLET    TAKE 1 TABLET BY MOUTH DAILY BEFORE BREAKFAST    LOPERAMIDE (IMODIUM) 2 MG CAPSULE    loperamide 2 mg capsule    LOSARTAN (COZAAR) 100 MG TABLET    Take 100 mg by mouth daily. LOSARTAN (COZAAR) 25 MG TABLET    Take 1 Tab by mouth daily. METFORMIN (GLUCOPHAGE) 1,000 MG TABLET    TAKE 1 TABLET BY MOUTH TWICE DAILY. METOPROLOL TARTRATE (LOPRESSOR) 25 MG TABLET    Take 1 Tab by mouth two (2) times a day. METRONIDAZOLE (FLAGYL) 500 MG TABLET        NITROFURANTOIN, MACROCRYSTAL-MONOHYDRATE, (MACROBID) 100 MG CAPSULE    Take 1 Cap by mouth two (2) times a day. ONDANSETRON HCL (ZOFRAN) 4 MG TABLET    Take 1 Tab by mouth every eight (8) hours as needed for Nausea. PANTOPRAZOLE (PROTONIX) 40 MG TABLET    Take 1 Tab by mouth daily.     SIMVASTATIN (ZOCOR) 20 MG TABLET    TAKE 1 TABLET BY MOUTH EVERY NIGHT    TRIAMCINOLONE ACETONIDE (KENALOG) 0.1 % TOPICAL CREAM    APPLY THIN LAYER EXTERNALLY TO THE AFFECTED AREA TWICE DAILY   These Medications have changed    No medications on file   Stop Taking    No medications on file     _______________________________    Notes:    Dereck Enrique MD using Dragon dictation      _______________________________

## 2019-10-31 NOTE — ED NOTES
Per Esme Jordan, pharmacist, cardizem drip and magnesium drip are compatible together to run in one IV line.

## 2019-11-01 ENCOUNTER — HOME HEALTH ADMISSION (OUTPATIENT)
Dept: HOME HEALTH SERVICES | Facility: HOME HEALTH | Age: 79
End: 2019-11-01
Payer: MEDICARE

## 2019-11-01 ENCOUNTER — APPOINTMENT (OUTPATIENT)
Dept: GENERAL RADIOLOGY | Age: 79
End: 2019-11-01
Attending: INTERNAL MEDICINE
Payer: MEDICARE

## 2019-11-01 ENCOUNTER — APPOINTMENT (OUTPATIENT)
Dept: NON INVASIVE DIAGNOSTICS | Age: 79
End: 2019-11-01
Attending: INTERNAL MEDICINE
Payer: MEDICARE

## 2019-11-01 VITALS
HEART RATE: 80 BPM | DIASTOLIC BLOOD PRESSURE: 40 MMHG | SYSTOLIC BLOOD PRESSURE: 120 MMHG | OXYGEN SATURATION: 94 % | BODY MASS INDEX: 31.02 KG/M2 | TEMPERATURE: 98.5 F | RESPIRATION RATE: 23 BRPM | WEIGHT: 158 LBS | HEIGHT: 60 IN

## 2019-11-01 LAB
ANION GAP SERPL CALC-SCNC: 7 MMOL/L (ref 3–18)
APPEARANCE UR: ABNORMAL
ATRIAL RATE: 79 BPM
AV VELOCITY RATIO: 0.39
AV VTI RATIO: 0.5
BACTERIA URNS QL MICRO: ABNORMAL /HPF
BILIRUB UR QL: NEGATIVE
BUN SERPL-MCNC: 9 MG/DL (ref 7–18)
BUN/CREAT SERPL: 12 (ref 12–20)
CALCIUM SERPL-MCNC: 7 MG/DL (ref 8.5–10.1)
CALCULATED P AXIS, ECG09: -29 DEGREES
CALCULATED R AXIS, ECG10: 49 DEGREES
CALCULATED R AXIS, ECG10: 65 DEGREES
CALCULATED T AXIS, ECG11: -28 DEGREES
CALCULATED T AXIS, ECG11: 55 DEGREES
CHLORIDE SERPL-SCNC: 109 MMOL/L (ref 100–111)
CO2 SERPL-SCNC: 26 MMOL/L (ref 21–32)
COLOR UR: YELLOW
CREAT SERPL-MCNC: 0.76 MG/DL (ref 0.6–1.3)
DIAGNOSIS, 93000: NORMAL
DIAGNOSIS, 93000: NORMAL
ECHO AO ASC DIAM: 2.65 CM
ECHO AO ROOT DIAM: 2.92 CM
ECHO AV AREA PEAK VELOCITY: 0.9 CM2
ECHO AV AREA VTI: 1.2 CM2
ECHO AV AREA/BSA PEAK VELOCITY: 0.5 CM2/M2
ECHO AV AREA/BSA VTI: 0.7 CM2/M2
ECHO AV MEAN GRADIENT: 31.4 MMHG
ECHO AV MEAN VELOCITY: 2.65 M/S
ECHO AV PEAK GRADIENT: 47.5 MMHG
ECHO AV PEAK VELOCITY: 344.76 CM/S
ECHO AV VTI: 70.5 CM
ECHO IVC SNIFF: 1.73 CM
ECHO LA MAJOR AXIS: 3.31 CM
ECHO LA TO AORTIC ROOT RATIO: 1.13
ECHO LV EDV TEICHHOLZ: 0.39 ML
ECHO LV ESV TEICHHOLZ: 0.12 ML
ECHO LV INTERNAL DIMENSION DIASTOLIC: 3.75 CM (ref 3.9–5.3)
ECHO LV INTERNAL DIMENSION SYSTOLIC: 2.32 CM
ECHO LV IVSD: 1.13 CM (ref 0.6–0.9)
ECHO LV MASS 2D: 113.1 G (ref 67–162)
ECHO LV MASS INDEX 2D: 67 G/M2 (ref 43–95)
ECHO LV POSTERIOR WALL DIASTOLIC: 0.71 CM (ref 0.6–0.9)
ECHO LVOT CARDIAC OUTPUT: 18.7 L/MIN
ECHO LVOT DIAM: 1.74 CM
ECHO LVOT PEAK GRADIENT: 7.4 MMHG
ECHO LVOT PEAK VELOCITY: 136.01 CM/S
ECHO LVOT SV: 82.9 ML
ECHO LVOT VTI: 34.79 CM
ECHO MV A VELOCITY: 150.68 CM/S
ECHO MV AREA PHT: 2.4 CM2
ECHO MV AREA VTI: 1.9 CM2
ECHO MV E DECELERATION TIME (DT): 305.1 MS
ECHO MV E VELOCITY: 112.1 CM/S
ECHO MV E/A RATIO: 0.74
ECHO MV MAX VELOCITY: 158.91 CM/S
ECHO MV MEAN GRADIENT: 4.3 MMHG
ECHO MV MEAN INFLOW VELOCITY: 0.96 M/S
ECHO MV PEAK GRADIENT: 10.1 MMHG
ECHO MV PRESSURE HALF TIME (PHT): 92.4 MS
ECHO MV VTI: 44.07 CM
ECHO PULMONARY ARTERY SYSTOLIC PRESSURE (PASP): 51.8 MMHG
ECHO RA MINOR AXIS: 3.83 CM
ECHO TV REGURGITANT MAX VELOCITY: 348 CM/S
ECHO TV REGURGITANT PEAK GRADIENT: 48.8 MMHG
EPITH CASTS URNS QL MICRO: ABNORMAL /LPF (ref 0–5)
ERYTHROCYTE [DISTWIDTH] IN BLOOD BY AUTOMATED COUNT: 13.2 % (ref 11.6–14.5)
EST. AVERAGE GLUCOSE BLD GHB EST-MCNC: 137 MG/DL
GLUCOSE BLD STRIP.AUTO-MCNC: 208 MG/DL (ref 70–110)
GLUCOSE SERPL-MCNC: 97 MG/DL (ref 74–99)
GLUCOSE UR STRIP.AUTO-MCNC: NEGATIVE MG/DL
HBA1C MFR BLD: 6.4 % (ref 4.2–5.6)
HCT VFR BLD AUTO: 32.8 % (ref 35–45)
HGB BLD-MCNC: 10.6 G/DL (ref 12–16)
HGB UR QL STRIP: ABNORMAL
KETONES UR QL STRIP.AUTO: 15 MG/DL
LACTATE SERPL-SCNC: 1 MMOL/L (ref 0.4–2)
LEUKOCYTE ESTERASE UR QL STRIP.AUTO: ABNORMAL
LVFS 2D: 38.21 %
LVOT MG: 5.61 MMHG
LVOT MV: 1.15 CM/S
LVSV (MOD BI): 22.58 ML
LVSV (MOD SINGLE 4C): 17.34 ML
LVSV (MOD SINGLE): 28.17 ML
LVSV (TEICH): 23.91 ML
MAGNESIUM SERPL-MCNC: 1.4 MG/DL (ref 1.6–2.6)
MCH RBC QN AUTO: 28.5 PG (ref 24–34)
MCHC RBC AUTO-ENTMCNC: 32.3 G/DL (ref 31–37)
MCV RBC AUTO: 88.2 FL (ref 74–97)
MV DEC SLOPE: 3.67
NITRITE UR QL STRIP.AUTO: NEGATIVE
P-R INTERVAL, ECG05: 120 MS
PH UR STRIP: 6.5 [PH] (ref 5–8)
PHOSPHATE SERPL-MCNC: 3.4 MG/DL (ref 2.5–4.9)
PLATELET # BLD AUTO: 243 K/UL (ref 135–420)
PMV BLD AUTO: 9.2 FL (ref 9.2–11.8)
POTASSIUM SERPL-SCNC: 4 MMOL/L (ref 3.5–5.5)
PROT UR STRIP-MCNC: 100 MG/DL
Q-T INTERVAL, ECG07: 258 MS
Q-T INTERVAL, ECG07: 352 MS
QRS DURATION, ECG06: 62 MS
QRS DURATION, ECG06: 68 MS
QTC CALCULATION (BEZET), ECG08: 403 MS
QTC CALCULATION (BEZET), ECG08: 435 MS
RBC # BLD AUTO: 3.72 M/UL (ref 4.2–5.3)
RBC #/AREA URNS HPF: ABNORMAL /HPF (ref 0–5)
SODIUM SERPL-SCNC: 142 MMOL/L (ref 136–145)
SP GR UR REFRACTOMETRY: 1.01 (ref 1–1.03)
UROBILINOGEN UR QL STRIP.AUTO: 0.2 EU/DL (ref 0.2–1)
VENTRICULAR RATE, ECG03: 171 BPM
VENTRICULAR RATE, ECG03: 79 BPM
WBC # BLD AUTO: 6.6 K/UL (ref 4.6–13.2)
WBC URNS QL MICRO: ABNORMAL /HPF (ref 0–4)

## 2019-11-01 PROCEDURE — 81001 URINALYSIS AUTO W/SCOPE: CPT

## 2019-11-01 PROCEDURE — 77030037877 HC DRSG MEPILEX >48IN BORD MOLN -A

## 2019-11-01 PROCEDURE — 93306 TTE W/DOPPLER COMPLETE: CPT

## 2019-11-01 PROCEDURE — 74011250637 HC RX REV CODE- 250/637: Performed by: INTERNAL MEDICINE

## 2019-11-01 PROCEDURE — 85027 COMPLETE CBC AUTOMATED: CPT

## 2019-11-01 PROCEDURE — 90471 IMMUNIZATION ADMIN: CPT

## 2019-11-01 PROCEDURE — 87077 CULTURE AEROBIC IDENTIFY: CPT

## 2019-11-01 PROCEDURE — 90686 IIV4 VACC NO PRSV 0.5 ML IM: CPT | Performed by: INTERNAL MEDICINE

## 2019-11-01 PROCEDURE — 74011250636 HC RX REV CODE- 250/636: Performed by: INTERNAL MEDICINE

## 2019-11-01 PROCEDURE — 96375 TX/PRO/DX INJ NEW DRUG ADDON: CPT

## 2019-11-01 PROCEDURE — 87086 URINE CULTURE/COLONY COUNT: CPT

## 2019-11-01 PROCEDURE — 82962 GLUCOSE BLOOD TEST: CPT

## 2019-11-01 PROCEDURE — 99218 HC RM OBSERVATION: CPT

## 2019-11-01 PROCEDURE — 87040 BLOOD CULTURE FOR BACTERIA: CPT

## 2019-11-01 PROCEDURE — 83735 ASSAY OF MAGNESIUM: CPT

## 2019-11-01 PROCEDURE — 77030038269 HC DRN EXT URIN PURWCK BARD -A

## 2019-11-01 PROCEDURE — 83605 ASSAY OF LACTIC ACID: CPT

## 2019-11-01 PROCEDURE — 93005 ELECTROCARDIOGRAM TRACING: CPT

## 2019-11-01 PROCEDURE — 71045 X-RAY EXAM CHEST 1 VIEW: CPT

## 2019-11-01 PROCEDURE — 87186 SC STD MICRODIL/AGAR DIL: CPT

## 2019-11-01 PROCEDURE — 97162 PT EVAL MOD COMPLEX 30 MIN: CPT

## 2019-11-01 PROCEDURE — 80048 BASIC METABOLIC PNL TOTAL CA: CPT

## 2019-11-01 PROCEDURE — 74011000258 HC RX REV CODE- 258: Performed by: INTERNAL MEDICINE

## 2019-11-01 PROCEDURE — 74011636637 HC RX REV CODE- 636/637: Performed by: INTERNAL MEDICINE

## 2019-11-01 PROCEDURE — 96376 TX/PRO/DX INJ SAME DRUG ADON: CPT

## 2019-11-01 PROCEDURE — 36415 COLL VENOUS BLD VENIPUNCTURE: CPT

## 2019-11-01 PROCEDURE — 83036 HEMOGLOBIN GLYCOSYLATED A1C: CPT

## 2019-11-01 PROCEDURE — 84100 ASSAY OF PHOSPHORUS: CPT

## 2019-11-01 RX ORDER — METOPROLOL TARTRATE 25 MG/1
12.5 TABLET, FILM COATED ORAL EVERY 12 HOURS
Status: DISCONTINUED | OUTPATIENT
Start: 2019-11-01 | End: 2019-11-01 | Stop reason: HOSPADM

## 2019-11-01 RX ORDER — PHENAZOPYRIDINE HYDROCHLORIDE 100 MG/1
100 TABLET, FILM COATED ORAL
Qty: 9 TAB | Refills: 0 | Status: SHIPPED | OUTPATIENT
Start: 2019-11-01 | End: 2019-11-01

## 2019-11-01 RX ORDER — MAGNESIUM SULFATE 100 %
4 CRYSTALS MISCELLANEOUS AS NEEDED
Status: DISCONTINUED | OUTPATIENT
Start: 2019-11-01 | End: 2019-11-01 | Stop reason: HOSPADM

## 2019-11-01 RX ORDER — PHENAZOPYRIDINE HYDROCHLORIDE 100 MG/1
100 TABLET, FILM COATED ORAL
Status: DISCONTINUED | OUTPATIENT
Start: 2019-11-01 | End: 2019-11-01 | Stop reason: HOSPADM

## 2019-11-01 RX ORDER — PHENAZOPYRIDINE HYDROCHLORIDE 100 MG/1
100 TABLET, FILM COATED ORAL
Qty: 15 TAB | Refills: 0 | Status: SHIPPED | OUTPATIENT
Start: 2019-11-01 | End: 2019-11-06

## 2019-11-01 RX ORDER — MAGNESIUM SULFATE HEPTAHYDRATE 40 MG/ML
2 INJECTION, SOLUTION INTRAVENOUS ONCE
Status: COMPLETED | OUTPATIENT
Start: 2019-11-01 | End: 2019-11-01

## 2019-11-01 RX ORDER — METOPROLOL TARTRATE 25 MG/1
12.5 TABLET, FILM COATED ORAL EVERY 12 HOURS
Qty: 30 TAB | Refills: 0 | Status: SHIPPED | OUTPATIENT
Start: 2019-11-01 | End: 2020-02-14 | Stop reason: SDUPTHER

## 2019-11-01 RX ORDER — CIPROFLOXACIN 500 MG/1
500 TABLET ORAL EVERY 12 HOURS
Status: DISCONTINUED | OUTPATIENT
Start: 2019-11-01 | End: 2019-11-01

## 2019-11-01 RX ORDER — INSULIN LISPRO 100 [IU]/ML
INJECTION, SOLUTION INTRAVENOUS; SUBCUTANEOUS
Status: DISCONTINUED | OUTPATIENT
Start: 2019-11-01 | End: 2019-11-01 | Stop reason: HOSPADM

## 2019-11-01 RX ORDER — DEXTROSE MONOHYDRATE 100 MG/ML
125-250 INJECTION, SOLUTION INTRAVENOUS AS NEEDED
Status: DISCONTINUED | OUTPATIENT
Start: 2019-11-01 | End: 2019-11-01 | Stop reason: HOSPADM

## 2019-11-01 RX ORDER — SULFAMETHOXAZOLE AND TRIMETHOPRIM 800; 160 MG/1; MG/1
1 TABLET ORAL EVERY 12 HOURS
Qty: 6 TAB | Refills: 0 | Status: SHIPPED | OUTPATIENT
Start: 2019-11-01 | End: 2019-11-01

## 2019-11-01 RX ORDER — SULFAMETHOXAZOLE AND TRIMETHOPRIM 800; 160 MG/1; MG/1
1 TABLET ORAL EVERY 12 HOURS
Qty: 14 TAB | Refills: 0 | Status: SHIPPED | OUTPATIENT
Start: 2019-11-01 | End: 2019-11-08

## 2019-11-01 RX ORDER — SULFAMETHOXAZOLE AND TRIMETHOPRIM 800; 160 MG/1; MG/1
1 TABLET ORAL EVERY 12 HOURS
Status: DISCONTINUED | OUTPATIENT
Start: 2019-11-01 | End: 2019-11-01 | Stop reason: HOSPADM

## 2019-11-01 RX ADMIN — SULFAMETHOXAZOLE AND TRIMETHOPRIM 1 TABLET: 800; 160 TABLET ORAL at 10:41

## 2019-11-01 RX ADMIN — INFLUENZA VIRUS VACCINE 0.5 ML: 15; 15; 15; 15 SUSPENSION INTRAMUSCULAR at 14:35

## 2019-11-01 RX ADMIN — CALCIUM GLUCONATE 1 G: 94 INJECTION, SOLUTION INTRAVENOUS at 09:21

## 2019-11-01 RX ADMIN — HYDROCHLOROTHIAZIDE 25 MG: 25 TABLET ORAL at 08:36

## 2019-11-01 RX ADMIN — DIGOXIN 250 MCG: 0.25 INJECTION INTRAMUSCULAR; INTRAVENOUS at 00:00

## 2019-11-01 RX ADMIN — METOPROLOL TARTRATE 12.5 MG: 25 TABLET ORAL at 12:30

## 2019-11-01 RX ADMIN — PHENAZOPYRIDINE HYDROCHLORIDE 100 MG: 100 TABLET ORAL at 12:56

## 2019-11-01 RX ADMIN — DIGOXIN 250 MCG: 0.25 INJECTION INTRAMUSCULAR; INTRAVENOUS at 03:53

## 2019-11-01 RX ADMIN — LEVOTHYROXINE SODIUM 75 MCG: 75 TABLET ORAL at 05:28

## 2019-11-01 RX ADMIN — APIXABAN 5 MG: 2.5 TABLET, FILM COATED ORAL at 08:36

## 2019-11-01 RX ADMIN — INSULIN LISPRO 4 UNITS: 100 INJECTION, SOLUTION INTRAVENOUS; SUBCUTANEOUS at 12:30

## 2019-11-01 RX ADMIN — CELECOXIB 200 MG: 100 CAPSULE ORAL at 08:36

## 2019-11-01 RX ADMIN — MAGNESIUM SULFATE HEPTAHYDRATE 2 G: 40 INJECTION, SOLUTION INTRAVENOUS at 08:36

## 2019-11-01 RX ADMIN — PANTOPRAZOLE SODIUM 40 MG: 40 TABLET, DELAYED RELEASE ORAL at 08:36

## 2019-11-01 NOTE — DIABETES MGMT
NUTRITIONAL ASSESSMENT GLYCEMIC CONTROL/ PLAN OF CARE     Violeta Agrawal           [de-identified] y.o.           10/31/2019                 1. Atrial fibrillation with rapid ventricular response ([address]    2. Hypomagnesemia    T2DM, recurrent UTI   INTERVENTIONS/PLAN:   1. Whole milk TID with meals and soup BID to provide nutrient dense foods  2. CHO consistent diet (1800 calories). 3.  Monitor po intake, labs, glycemic control and weights. ASSESSMENT:   Nutritional Status:  Pt is 158% ideal weight;  BMI (calculated): 30.9 kg/m2 (obese weight classification however pt with reported weight loss of 30 lbs in past 2 months (16% weight loss- severe). Pt is not fond of po supplements but is agreeable to other nutrient dense foods that she anticipates she can tolerate. Nutrition Diagnoses:   Inadequate oral food and beverage intake due to poor appetite as evidenced by RN and pt reporting poor po intake. Unintentional weight loss due to inadequate energy intake as evidenced by pt reporting 30 lb weight loss in past 2 months. Diabetes Management:   Good glycemic control PTA. Recent blood glucose:    11/2:  Lab - 97; POC - 208  10/31:  Lab - 164, POC - 149   Within target range (non-ICU: <140; ICU<180): [] Yes   []  No varied  Current Insulin regimen:   Corrective lispro, normal insulin sensitivity ACHS  Home medication/insulin regimen:   Metformin 1000 mg BID  Glipizide 5 mg BID  HbA1c:  6.1% - ave BG ~ 137 mg/dL over past 3 months  Adequate glycemic control PTA:  [x] Yes  [] No       SUBJECTIVE/OBJECTIVE:   Information obtained from: chart review, pt, RN  Pt admitted with UTI, a fib/a flutter and PMHx including T2DM, HTN, recurrent UTI, decreased po intake  11/1:  Pt reports her usual weight was once 225 lbs but more recently it was 188 LBS. She reports poor po intake since having a UTI in September 2019 when she was weighing 188 lbs and has since lost weight.   Pt denies N/V but relates that she may be experiencing altered taste perception. She denies having food allergies and does not have issues with chewing or swallowing. Pt was seen by RD at [facility] in 2017 when she also had a poor appetite and po supplements were tried but pt reports she did not like them and prefers to not have them at this admission. Diet: cardiac consistent CHO 1800 calories    No data found. Medications: [x]                Reviewed   IVF:  NS at 50 ml/hr    Most Recent POC Glucose:   Recent Labs     11/01/19  0352 10/31/19  1753   GLU 97 164*         Labs:   Lab Results   Component Value Date/Time    Hemoglobin A1c 6.4 (H) 11/01/2019 03:52 AM     Lab Results   Component Value Date/Time    Sodium 142 11/01/2019 03:52 AM    Potassium 4.0 11/01/2019 03:52 AM    Chloride 109 11/01/2019 03:52 AM    CO2 26 11/01/2019 03:52 AM    Anion gap 7 11/01/2019 03:52 AM    Glucose 97 11/01/2019 03:52 AM    BUN 9 11/01/2019 03:52 AM    Creatinine 0.76 11/01/2019 03:52 AM    Calcium 7.0 (L) 11/01/2019 03:52 AM    Magnesium 1.4 (L) 11/01/2019 03:52 AM    Phosphorus 3.4 11/01/2019 08:15 AM    Albumin 3.1 (L) 10/31/2019 05:53 PM       Anthropometrics: IBW : 45.4 kg (100 lb), % IBW (Calculated): 158 %, BMI (calculated): 30.9  Wt Readings from Last 1 Encounters:   11/01/19 71.7 kg (158 lb)     Last Weight Metrics:  Weight Loss Metrics 11/1/2019 9/17/2019 9/3/2019 8/29/2019 8/27/2019 8/19/2019 8/15/2019   Today's Wt 158 lb 161 lb 161 lb 161 lb 161 lb 158 lb 161 lb   BMI 30.86 kg/m2 30.42 kg/m2 31.44 kg/m2 31.44 kg/m2 31.44 kg/m2 30.86 kg/m2 31.44 kg/m2       Ht Readings from Last 1 Encounters:   11/01/19 5' (1.524 m)     Estimated Nutrition Needs:  1558 Kcals/day, Protein (g): 60 g Fluid (ml): 1600 ml  Based on:   [x]          Actual BW    []          ABW   []            Adjusted BW         Nutrition Interventions:  Implement preferences  Send nutrient dense foods  Goal:   Blood glucose will be within target range of  mg/dL by 11/4/19.   Pt will consume > 75% meals by 11/6/19. Weight maintenance (+/- 1-2 kg) by 11/16/19.         Nutrition Monitoring and Evaluation      [x]     Monitor po intake on meal rounds  [x]     Continue inpatient monitoring and intervention  []     Other:      Nutrition Risk:  []   High     [x]  Moderate    []  Minimal/Uncompromised    Amrik Zheng RD, CDE   Office:  [address] [place] [address] Pager:  [phone]

## 2019-11-01 NOTE — H&P
History and Physical    Patient: Juana Adan MRN: [id number]  SSN: [id number]    YOB: 1940  Age: [de-identified] y.o. Sex: female      Subjective: Juana Adan is a [de-identified] y.o. female who presents to [facility] ER with complaints of Malaise, Dysuria, and Weakness. Patient states that she has had as reduced appetite, general malaise, increasing somnolence, weakness, dyspepsia, and dysuria. Patient reports that she has had a UTI since early or mid-September and has taken many antibiotics for it; however, Patient reports that the UTI comes back very rapidly. Patient states that she was supposed to see a Urologist, but had to cancel due to illness and will see him on 11/13/2019. Patient attributes her malaise to this possible infection and reports that she became dizzy today and had to walk down to the EMS and almost couldn't make it due to weakness. Patient admits to a \"burning\" sensation intermittently upon urination for the last approximately 2 months and recently finished a three-times-a-day antibiotic for this that she cannot name and has also been consuming Cranberry juice. Patient states an abdominal pain much like dyspepsia brought her the ER today. Patient reports a known history of Atrial Fibrillation, but states that she's not sure if she can feel when she goes into RVR. Notably, Urologist is Dr. Pankaj Dow. Patient was noted to have A. Fib RVR in [facility] ER with a rate of 140-160s. Patient was started on an IV Diltiazem gtt, but her pressure fell to 89/44 mm Hg. Patient was started on Digoxin at this point. Patient is placed on Observation in ICU for management of A. Fib RVR and Acute, Drug-related Hypotension. Past Medical History:   Diagnosis Date    Aortic stenosis     Patient denies on 10/31/2019.  Arthritis     Atrial flutter ([address]     Bronchitis     Resolved after getting rid of her pet bird in 2000.     Diabetes ([address]     Diverticulitis     Diverticulitis     GERD (gastroesophageal reflux disease)     History of recurrent UTIs     From 9/2019 to 11/2019    Hypercholesterolemia     Hypertension     Hypothyroid     Menopause     Pancreatitis     Peripheral neuropathy     Patient denies on 10/31/2019. Past Surgical History:   Procedure Laterality Date    COLONOSCOPY N/A 2/2/2017    COLONOSCOPY  w/bx polyp performed by Ca Carmen MD at [address] Left 2017      Family History   Problem Relation Age of Onset    Diabetes Mother     Coronary Artery Disease Mother     Cancer Father         melanoma    Stroke Sister     Hypertension Sister     Diabetes Sister     Diabetes Brother     Coronary Artery Disease Brother     Breast Cancer Paternal Grandmother         older, but not sure age.  Breast Cancer Paternal Aunt         and gyn cancer ?age   [facility] No Known Problems Sister     No Known Problems Brother     Kidney Disease Sister     Heart Failure Sister      Social History     Tobacco Use    Smoking status: Never Smoker    Smokeless tobacco: Never Used   Substance Use Topics    Alcohol use: No      Patient lives at home with her Son. Patient is up ad vinny at home. Patient uses HRT-GO and a Rolator when ambulating outside her home. Patient reports having a pet bird until 2000 when she got rid of it, as it was causing her to have Bronchitis. Prior to Admission medications    Medication Sig Start Date End Date Taking? Authorizing Provider   triamcinolone acetonide (KENALOG) 0.1 % topical cream APPLY THIN LAYER EXTERNALLY TO THE AFFECTED AREA TWICE DAILY 10/19/19   Travis Aldridge MD   simvastatin (ZOCOR) 20 mg tablet TAKE 1 TABLET BY MOUTH EVERY NIGHT 10/19/19   Travis Aldridge MD   levothyroxine (SYNTHROID) 75 mcg tablet TAKE 1 TABLET BY MOUTH DAILY BEFORE BREAKFAST 10/19/19   Travis Aldridge MD   metFORMIN (GLUCOPHAGE) 1,000 mg tablet TAKE 1 TABLET BY MOUTH TWICE DAILY.  10/16/19   Travis Aldridge MD   nitrofurantoin, macrocrystal-monohydrate, (MACROBID) 100 mg capsule Take 1 Cap by mouth two (2) times a day. 9/23/19   Melisa Mosher MD   betamethasone dipropionate (DIPROSONE) 0.05 % topical cream betamethasone dipropionate 0.05 % topical cream    Provider, Historical   augmented betamethasone dipropionate (DIPROLENE-AF) 0.05 % topical cream Apply  to affected area. 3/8/13   Provider, Historical   glucose blood VI test strips (FREESTYLE LITE STRIPS) strip use to check BS once daily 11/16/16   Provider, Historical   Blood-Glucose Meter (FREESTYLE LITE METER) monitoring kit use to check BS once daily 11/16/16   Provider, Historical   captopril-hydroCHLOROthiazide (CAPOZIDE) 50-15 mg per tablet  12/6/12   Provider, Historical   clotrimazole (LOTRIMIN) 1 % topical cream Apply  to affected area. 8/10/11   Provider, Historical   dicyclomine (BENTYL) 20 mg tablet Take 1 Tab by mouth. 7/18/16   Provider, Historical   famotidine (PEPCID) 20 mg tablet famotidine 20 mg tablet 12/6/12   Provider, Historical   glipiZIDE (GLUCOTROL) 5 mg tablet glipizide 5 mg tablet   TK 1 T PO BID B MEALS 8/29/16   Provider, Historical   hydroCHLOROthiazide (HYDRODIURIL) 25 mg tablet hydrochlorothiazide 25 mg tablet    Provider, Historical   lancets (FREESTYLE LANCETS) 28 gauge misc use to check BS once daily 11/16/16   Provider, Historical   loperamide (IMODIUM) 2 mg capsule loperamide 2 mg capsule 3/31/17   Provider, Historical   metroNIDAZOLE (FLAGYL) 500 mg tablet  11/9/16   Provider, Historical   losartan (COZAAR) 100 mg tablet Take 100 mg by mouth daily. Provider, Historical   furosemide (LASIX) 40 mg tablet Take  by mouth daily. Provider, Historical   celecoxib (CELEBREX) 200 mg capsule Take  by mouth two (2) times a day. Provider, Historical   apixaban (ELIQUIS) 5 mg tablet TAKE 1 TABLET BY MOUTH TWICE DAILY. 8/12/19   Almita Hardy MD   ondansetron hcl (ZOFRAN) 4 mg tablet Take 1 Tab by mouth every eight (8) hours as needed for Nausea. 7/31/19   Theron Washington MD   losartan (COZAAR) 25 mg tablet Take 1 Tab by mouth daily. 6/24/19   Theron Washington MD   pantoprazole (PROTONIX) 40 mg tablet Take 1 Tab by mouth daily. 5/30/19   Theron Washington MD   metoprolol tartrate (LOPRESSOR) 25 mg tablet Take 1 Tab by mouth two (2) times a day. 4/17/19   Theron Washington MD   diphenoxylate-atropine (LOMOTIL) 2.5-0.025 mg per tablet Take 1 Tab by mouth four (4) times daily as needed for Diarrhea. Max Daily Amount: 4 Tabs. 3/19/19   Theron Washington MD   cholecalciferol (VITAMIN D3) 1,000 unit tablet Take 2 Tabs by mouth daily. 11/21/17   Alonzo Denise MD        Allergies   Allergen Reactions    Ampicillin Itching     Review of Systems:  (+) Loss of Appetite  (+) Fatigue  (+) Generalized Weakness  (~) Palpitation (Patient thinks \"maybe\")  (+) Abdominal Pain (attributed to Heart Burn)  (+) Nausea  (+) Heart Burn  (+) Dysuria (x2 months)  (+) Faintness  (-) Fevers  (-) Chills  (-) Cough  (-) Increased Sputum Production  (-) Pain with Inspiration  (-) Shortness of Breath  (-) BLE Edema  (-) Dyspnea on Exertion  (-) Chest Pain  (-) Vomiting  (-) Muscle Weakness  (-) Loss of Sensation  All other syems have been reviewed and are negative      Objective:     Vitals:    11/01/19 0100 11/01/19 0101 11/01/19 0130 11/01/19 0200   BP: 92/42 92/43 105/52 96/45   Pulse: (!) 112 85 87 83   Resp: 22 24 21 23   Temp:       SpO2: 95% 95% 98% 95%   Weight:       Height:            Physical Exam:  General:  Older, adult female lying in bed in no acute distress  HEENT:  Atraumatic, normocephalic; Pupils equally round and reactive to light with accommodation; Extraocular muscles intact; (+) Glasses in place; Moist Oropharynx without erythema, edema, or exudates  Neck:  No Bruits; No Lymphadenopathy  Chest:  No pectus carinatum;  No pectus excavatum  Cardiovascular:  (+) Tachycardic rate, appreciated as regular without rubs, gallops, or murmurs  Respiratory:  (+) Mildly reduced lung sounds globally; otherwise clear to auscultation with wheeze, rales, or rhonchi; no increased effort of breathing  Abdominal:  Soft, non-tense, (+) Mild to Moderately Tender suprapubic region---abdomen otherwise non-tender; BS present without guarding, rebound, or masses  Extremities:  Pulses 2+ x4 with (+) minimal edema without clubbing or cyanosis  Musculoskeletal:  Strength 5/5 and symmetrical in BUE and BLE  Integument:  No rash on face, forearms, or legs  Neurological:  A&O x4/4; No gross deficits of Visual Acuity, Eye Movement, Jaw Opening, Facial Expression, Hearing, Phonation, or Head Movement; No gross deficits of Tongue Movement or Slurring of Speech  Psychiatric:  Affect, Language, and Behavior are appropriate      Assessment:     Hospital Problems  Date Reviewed: 10/31/2019          Codes Class Noted POA    * (Principal) Atrial fibrillation with RVR ([facility] ICD-10-CM: I48.91  ICD-9-CM: 427.31  3/4/2018 Yes        Acute hypotension ICD-10-CM: I95.9  ICD-9-CM: 458.9  10/31/2019 Yes        Type 2 diabetes mellitus with nephropathy ([facility] ICD-10-CM: E11.21  ICD-9-CM: 250.40, 583.81  1/23/2018 Yes        Hypomagnesemia ICD-10-CM: E83.42  ICD-9-CM: 275.2  10/31/2019 Unknown        Hypertension ICD-10-CM: I10  ICD-9-CM: 401.9  3/3/2018 Yes        Hypothyroid ICD-10-CM: E03.9  ICD-9-CM: 244.9  2/24/2014 Yes        Meningioma ([facility] ICD-10-CM: D32.9  ICD-9-CM: 225.2  7/25/2017 Yes              Plan:     Reduced Apixaban for previous (and possibly continued) Diltiazem use while in-hospital, as Diltiazem potentiates Apixaban. Continue Digoxin. Consider re-initiating IV Diltiazem if Digoxin is not effective. Monitor Blood Pressure. Consider IV boluses as necessary. Continue HCTZ. HOLD Losartan. Continue medications for Hypothyroidism, HLD, OA, and GERD. Obtain Urinalysis and Urine Culture due to report of long-standing dysuria. Will initiate antibiotic coverage if UTI present.     DVT Prophylaxis obtained by continuing REDUCED-DOSE Apixaban.       Signed By: Terri Sharma,      November 1, 2019

## 2019-11-01 NOTE — CONSULTS
[facility]  Pulmonary, Critical Care, and Sleep Medicine    Name: Anabell Guardado MRN: [id number]   : 1940 Hospital: [facility]   Date: 2019  Consulted By: YADY GAN     Critical Care History and Physical      Subjective/History:   Patient is a [de-identified] y.o. female with Afib/Aflutter, DM, GERD, HTN, and recurrent UTI's, who presents to the ER with generalized weakness, decreased appetite, and dysuria, found by EMS to be in Afib with RVR to 160's. She reports she has had recurrent UTI's since mid-September. In the ER patient found to have a UTI with lactic acidosis consistent with urosepsis. She was started on a diltiazem infusion with BP dropping to 89/44. Dilt infusion decreased to 5mg with BP improving. Patient started on Digoxin and admitted to the ICU. Past Medical History:   Diagnosis Date    Aortic stenosis     Patient denies on 10/31/2019.  Arthritis     Atrial flutter ([address]     Bronchitis     Resolved after getting rid of her pet bird in .  Diabetes ([address]     Diverticulitis     Diverticulitis     GERD (gastroesophageal reflux disease)     History of recurrent UTIs     From 2019 to 2019    Hypercholesterolemia     Hypertension     Hypothyroid     Menopause     Pancreatitis     Peripheral neuropathy     Patient denies on 10/31/2019. Prior to Admission medications    Medication Sig Start Date End Date Taking? Authorizing Provider   triamcinolone acetonide (KENALOG) 0.1 % topical cream APPLY THIN LAYER EXTERNALLY TO THE AFFECTED AREA TWICE DAILY 10/19/19   Andres Kyle MD   simvastatin (ZOCOR) 20 mg tablet TAKE 1 TABLET BY MOUTH EVERY NIGHT 10/19/19   Andres Kyle MD   levothyroxine (SYNTHROID) 75 mcg tablet TAKE 1 TABLET BY MOUTH DAILY BEFORE BREAKFAST 10/19/19   Andres Kyle MD   metFORMIN (GLUCOPHAGE) 1,000 mg tablet TAKE 1 TABLET BY MOUTH TWICE DAILY.  10/16/19   Andres Kyle MD   nitromichaeltoBraden galdamez (MACROBID) 100 mg capsule Take 1 Cap by mouth two (2) times a day. 9/23/19   Nely Davis MD   betamethasone dipropionate (DIPROSONE) 0.05 % topical cream betamethasone dipropionate 0.05 % topical cream    Provider, Historical   augmented betamethasone dipropionate (DIPROLENE-AF) 0.05 % topical cream Apply  to affected area. 3/8/13   Provider, Historical   glucose blood VI test strips (FREESTYLE LITE STRIPS) strip use to check BS once daily 11/16/16   Provider, Historical   Blood-Glucose Meter (FREESTYLE LITE METER) monitoring kit use to check BS once daily 11/16/16   Provider, Historical   captopril-hydroCHLOROthiazide (CAPOZIDE) 50-15 mg per tablet  12/6/12   Provider, Historical   clotrimazole (LOTRIMIN) 1 % topical cream Apply  to affected area. 8/10/11   Provider, Historical   dicyclomine (BENTYL) 20 mg tablet Take 1 Tab by mouth. 7/18/16   Provider, Historical   famotidine (PEPCID) 20 mg tablet famotidine 20 mg tablet 12/6/12   Provider, Historical   glipiZIDE (GLUCOTROL) 5 mg tablet glipizide 5 mg tablet   TK 1 T PO BID B MEALS 8/29/16   Provider, Historical   hydroCHLOROthiazide (HYDRODIURIL) 25 mg tablet hydrochlorothiazide 25 mg tablet    Provider, Historical   lancets (FREESTYLE LANCETS) 28 gauge misc use to check BS once daily 11/16/16   Provider, Historical   loperamide (IMODIUM) 2 mg capsule loperamide 2 mg capsule 3/31/17   Provider, Historical   metroNIDAZOLE (FLAGYL) 500 mg tablet  11/9/16   Provider, Historical   losartan (COZAAR) 100 mg tablet Take 100 mg by mouth daily. Provider, Historical   furosemide (LASIX) 40 mg tablet Take  by mouth daily. Provider, Historical   celecoxib (CELEBREX) 200 mg capsule Take  by mouth two (2) times a day. Provider, Historical   apixaban (ELIQUIS) 5 mg tablet TAKE 1 TABLET BY MOUTH TWICE DAILY. 8/12/19   Tanner Parker MD   ondansetron hcl (ZOFRAN) 4 mg tablet Take 1 Tab by mouth every eight (8) hours as needed for Nausea.  7/31/19   Eren Kruger MD   losartan (COZAAR) 25 mg tablet Take 1 Tab by mouth daily. 6/24/19   Dia Fitzgerald MD   pantoprazole (PROTONIX) 40 mg tablet Take 1 Tab by mouth daily. 5/30/19   Dia Fitzgerald MD   metoprolol tartrate (LOPRESSOR) 25 mg tablet Take 1 Tab by mouth two (2) times a day. 4/17/19   Dia Fitzgerald MD   diphenoxylate-atropine (LOMOTIL) 2.5-0.025 mg per tablet Take 1 Tab by mouth four (4) times daily as needed for Diarrhea. Max Daily Amount: 4 Tabs. 3/19/19   Dia Fitzgerald MD   cholecalciferol (VITAMIN D3) 1,000 unit tablet Take 2 Tabs by mouth daily.  11/21/17   Liliane Girard MD     Current Facility-Administered Medications   Medication Dose Route Frequency    dilTIAZem (CARDIZEM) 100 mg in 0.9% sodium chloride (MBP/ADV) 100 mL infusion  5 mg/hr IntraVENous TITRATE    magnesium sulfate 2 g/50 ml IVPB (premix or compounded)  2 g IntraVENous ONCE    calcium gluconate 1 g in 0.9% sodium chloride 100 mL IVPB  1 g IntraVENous ONCE    trimethoprim-sulfamethoxazole (BACTRIM DS, SEPTRA DS) 160-800 mg per tablet 1 Tab  1 Tab Oral Q12H    celecoxib (CELEBREX) capsule 200 mg  200 mg Oral BID    levothyroxine (SYNTHROID) tablet 75 mcg  75 mcg Oral 6am    simvastatin (ZOCOR) tablet 20 mg  20 mg Oral QHS    pantoprazole (PROTONIX) tablet 40 mg  40 mg Oral ACB    apixaban (ELIQUIS) tablet 5 mg  5 mg Oral Q12H    hydroCHLOROthiazide (HYDRODIURIL) tablet 25 mg  25 mg Oral DAILY    0.9% sodium chloride infusion  50 mL/hr IntraVENous CONTINUOUS    influenza vaccine 2019-20 (6 mos+)(PF) (FLUARIX/FLULAVAL/FLUZONE QUAD) injection 0.5 mL  0.5 mL IntraMUSCular PRIOR TO DISCHARGE     Allergies   Allergen Reactions    Ampicillin Itching     Social History     Tobacco Use    Smoking status: Never Smoker    Smokeless tobacco: Never Used   Substance Use Topics    Alcohol use: No      Family History   Problem Relation Age of Onset    Diabetes Mother     Coronary Artery Disease Mother     Cancer Father         melanoma    Stroke Sister     Hypertension Sister     Diabetes Sister     Diabetes Brother     Coronary Artery Disease Brother     Breast Cancer Paternal Grandmother         older, but not sure age.  Breast Cancer Paternal Aunt         and gyn cancer ?age   [de-identified] No Known Problems Sister     No Known Problems Brother     Kidney Disease Sister     Heart Failure Sister       Review of Systems:  A comprehensive review of systems was negative except for that written in the HPI. Objective:   Vital Signs:    Visit Vitals  /44   Pulse 80   Temp 98.7 °F (37.1 °C)   Resp 23   Ht 5' (1.524 m)   Wt 71.8 kg (158 lb 4.6 oz)   SpO2 94%   Breastfeeding? No   BMI 30.91 kg/m²       O2 Device: Room air       Temp (24hrs), Av.9 °F (37.2 °C), Min:98.5 °F (36.9 °C), Max:99.5 °F (37.5 °C)       Intake/Output:   Last shift:      No intake/output data recorded. Last 3 shifts: 10/30 1901 -  0700  In: 1225 [I.V.:1225]  Out: 950 [Urine:950]    Intake/Output Summary (Last 24 hours) at 2019 0859  Last data filed at 2019 0700  Gross per 24 hour   Intake 1225 ml   Output 950 ml   Net 275 ml     Physical Exam:   General:  WDWN Elderly female, cooperative, NAD   HEENT:   NCAT, PERRL, OP clear, MM moist    Neck: Supple, trachea midline. Lungs:   Symmetrical chest rise; good AE bilat; CTAB; no wheezes/rhonchi/rales noted. Heart:  RRR, S1, S2 normal, no m/r/g   Abdomen:   Soft, non-tender. Bowel sounds normal.    Extremities: Extremities normal, atraumatic, no cyanosis or edema. Pulses: 2+ and symmetric all extremities.    Skin: Skin color, texture, turgor normal. No rashes or lesions   Neurologic: Grossly nonfocal, Alert, conversive, moving all extremities    Devices:      Data:     Recent Results (from the past 24 hour(s))   EKG, 12 LEAD, INITIAL    Collection Time: 10/31/19  5:40 PM   Result Value Ref Range    Ventricular Rate 171 BPM    QRS Duration 62 ms    Q-T Interval 258 ms    QTC Calculation (Bezet) 435 ms Calculated R Axis 65 degrees    Calculated T Axis 55 degrees    Diagnosis       Atrial fibrillation with rapid ventricular response with premature   ventricular or aberrantly conducted complexes  ST depression, consider subendocardial injury  Abnormal ECG  When compared with ECG of 07-FEB-2019 23:41,  Atrial fibrillation has replaced Sinus rhythm  Vent. rate has increased  BPM  ST now depressed in Inferior leads  ST now depressed in Anterolateral leads  Nonspecific T wave abnormality now evident in Lateral leads  Confirmed by Daisy Easton ([id number]) on 11/1/2019 0:26:38 AM     METABOLIC PANEL, COMPREHENSIVE    Collection Time: 10/31/19  5:53 PM   Result Value Ref Range    Sodium 136 136 - 145 mmol/L    Potassium 4.3 3.5 - 5.5 mmol/L    Chloride 100 100 - 111 mmol/L    CO2 24 21 - 32 mmol/L    Anion gap 12 3.0 - 18 mmol/L    Glucose 164 (H) 74 - 99 mg/dL    BUN 10 7.0 - 18 MG/DL    Creatinine 0.99 0.6 - 1.3 MG/DL    BUN/Creatinine ratio 10 (L) 12 - 20      GFR est AA >60 >60 ml/min/1.73m2    GFR est non-AA 54 (L) >60 ml/min/1.73m2    Calcium 8.1 (L) 8.5 - 10.1 MG/DL    Bilirubin, total 0.5 0.2 - 1.0 MG/DL    ALT (SGPT) 7 (L) 13 - 56 U/L    AST (SGOT) 9 (L) 10 - 38 U/L    Alk.  phosphatase 68 45 - 117 U/L    Protein, total 7.0 6.4 - 8.2 g/dL    Albumin 3.1 (L) 3.4 - 5.0 g/dL    Globulin 3.9 2.0 - 4.0 g/dL    A-G Ratio 0.8 0.8 - 1.7     MAGNESIUM    Collection Time: 10/31/19  5:53 PM   Result Value Ref Range    Magnesium 0.8 (LL) 1.6 - 2.6 mg/dL   CBC WITH AUTOMATED DIFF    Collection Time: 10/31/19  5:53 PM   Result Value Ref Range    WBC 12.7 4.6 - 13.2 K/uL    RBC 4.27 4.20 - 5.30 M/uL    HGB 12.4 12.0 - 16.0 g/dL    HCT 37.9 35.0 - 45.0 %    MCV 88.8 74.0 - 97.0 FL    MCH 29.0 24.0 - 34.0 PG    MCHC 32.7 31.0 - 37.0 g/dL    RDW 13.2 11.6 - 14.5 %    PLATELET 196 893 - 477 K/uL    MPV 9.3 9.2 - 11.8 FL    NEUTROPHILS 71 40 - 73 %    LYMPHOCYTES 19 (L) 21 - 52 %    MONOCYTES 10 3 - 10 %    EOSINOPHILS 0 0 - 5 % BASOPHILS 0 0 - 2 %    ABS. NEUTROPHILS 9.0 (H) 1.8 - 8.0 K/UL    ABS. LYMPHOCYTES 2.4 0.9 - 3.6 K/UL    ABS. MONOCYTES 1.2 0.05 - 1.2 K/UL    ABS. EOSINOPHILS 0.0 0.0 - 0.4 K/UL    ABS.  BASOPHILS 0.0 0.0 - 0.1 K/UL    DF AUTOMATED     TROPONIN I    Collection Time: 10/31/19  5:53 PM   Result Value Ref Range    Troponin-I, QT <0.02 0.0 - 0.045 NG/ML   POC LACTIC ACID    Collection Time: 10/31/19  7:19 PM   Result Value Ref Range    Lactic Acid (POC) 2.59 (HH) 0.40 - 2.00 mmol/L   GLUCOSE, POC    Collection Time: 10/31/19 10:15 PM   Result Value Ref Range    Glucose (POC) 149 (H) 70 - 110 mg/dL   URINALYSIS W/ RFLX MICROSCOPIC    Collection Time: 11/01/19 12:00 AM   Result Value Ref Range    Color YELLOW      Appearance TURBID      Specific gravity 1.007 1.005 - 1.030      pH (UA) 6.5 5.0 - 8.0      Protein 100 (A) NEG mg/dL    Glucose NEGATIVE  NEG mg/dL    Ketone 15 (A) NEG mg/dL    Bilirubin NEGATIVE  NEG      Blood LARGE (A) NEG      Urobilinogen 0.2 0.2 - 1.0 EU/dL    Nitrites NEGATIVE  NEG      Leukocyte Esterase LARGE (A) NEG     URINE MICROSCOPIC ONLY    Collection Time: 11/01/19 12:00 AM   Result Value Ref Range    WBC TOO NUMEROUS TO COUNT 0 - 4 /hpf    RBC 11 to 20 0 - 5 /hpf    Epithelial cells 1+ 0 - 5 /lpf    Bacteria 3+ (A) NEG /hpf   METABOLIC PANEL, BASIC    Collection Time: 11/01/19  3:52 AM   Result Value Ref Range    Sodium 142 136 - 145 mmol/L    Potassium 4.0 3.5 - 5.5 mmol/L    Chloride 109 100 - 111 mmol/L    CO2 26 21 - 32 mmol/L    Anion gap 7 3.0 - 18 mmol/L    Glucose 97 74 - 99 mg/dL    BUN 9 7.0 - 18 MG/DL    Creatinine 0.76 0.6 - 1.3 MG/DL    BUN/Creatinine ratio 12 12 - 20      GFR est AA >60 >60 ml/min/1.73m2    GFR est non-AA >60 >60 ml/min/1.73m2    Calcium 7.0 (L) 8.5 - 10.1 MG/DL   MAGNESIUM    Collection Time: 11/01/19  3:52 AM   Result Value Ref Range    Magnesium 1.4 (L) 1.6 - 2.6 mg/dL   CBC W/O DIFF    Collection Time: 11/01/19  3:52 AM   Result Value Ref Range    WBC 6.6 4.6 - 13.2 K/uL    RBC 3.72 (L) 4.20 - 5.30 M/uL    HGB 10.6 (L) 12.0 - 16.0 g/dL    HCT 32.8 (L) 35.0 - 45.0 %    MCV 88.2 74.0 - 97.0 FL    MCH 28.5 24.0 - 34.0 PG    MCHC 32.3 31.0 - 37.0 g/dL    RDW 13.2 11.6 - 14.5 %    PLATELET 789 481 - 121 K/uL    MPV 9.2 9.2 - 11.8 FL   LACTIC ACID    Collection Time: 11/01/19  3:52 AM   Result Value Ref Range    Lactic acid 1.0 0.4 - 2.0 MMOL/L   CULTURE, BLOOD    Collection Time: 11/01/19  8:15 AM   Result Value Ref Range    Special Requests: PERIPHERAL      Culture result: PENDING    CULTURE, BLOOD    Collection Time: 11/01/19  8:35 AM   Result Value Ref Range    Special Requests: PERIPHERAL      Culture result: PENDING            No results for input(s): FIO2I, IFO2, HCO3I, IHCO3, HCOPOC, PCO2I, PCOPOC, IPHI, PHI, PHPOC, PO2I, PO2POC in the last 72 hours. No lab exists for component: IPOC2    Imaging:  I have personally reviewed the patients radiographs and have reviewed the reports:    CXR [11/1]: no acute process    IMPRESSION:   80yoF with hx Afib/Aflutter, DM, GERD, HTN, and recurrent UTI's, admitted with Afib with RVR in setting of Urosepsis and Hypomagnesemia      PLAN:   Respiratory: no active issues    Cardiovascular:  1. Iatrogenic Hypotension:   - BP fell after starting dilt infusion, now resolved; currently normotensive    2. Afib RVR:   - now rate-controlled; continue digoxin. Replete electrolytes. Continue Eliquis   - patient is stable for transfer out of ICU to telemetry; PCCM will now sign off. Renal:  1. Hypomagenesemia; Hypocalcemia: repleting; will also check Phos level    Infectious Disease:  1. Sepsis due to UTI:  - UA consistent with UTI; reviewed prior cultures. Urine culture pending. Order Blood cultures. - CXR on my review shows no infiltrates  - lactate initially 2.59 but has since cleared  - start bactrim (failed cipro and macrobid as an outpatient; allergic to PCN)    Endocrine:  1.  DM:  - change diet to diabetic; start SSI PRN    GI:  1. GERD: continue PPI    Hematologic: no active issues; continue eliquis  Neurologic: no active issues      Best Practice:  · Sepsis Bundle per Hospital Protocol  · Glycemic control; avoid Hypoglycemia  · Stress ulcer prophylaxis. PPI  · DVT prophylaxis. Eliquis  · Need for Lines, rios assessed. · Restraints need. · Palliative care evaluation. NA  · Code Status: FULL        Total of  45  min critical care time spent at bedside during the course of care providing evaluation,management and care decisions and ordering appropriate treatment related to critical care problems exclusive of procedures. The reason for providing this level of medical care for this critically ill patient was due a critical illness that impaired one or more vital organ systems such that there was a high probability of imminent or life threatening deterioration in the patients condition. This care involved high complexity decision making to assess, manipulate, and support vital system functions, to treat this degree vital organ system failure and to prevent further life threatening deterioration of the patients condition.       Francis Astudillo MD  Pulmonary & Critical Care

## 2019-11-01 NOTE — ED NOTES
Septic protocol popped up on patient. MD made aware of Lactic no order rec'vd to tx.  Will not treat for sepsis per MD.

## 2019-11-01 NOTE — PROGRESS NOTES
Discharge/Transition Planning    Problem: Discharge Planning  Goal: *Discharge to safe environment  Outcome: Resolved/Met   home      Patient and/or next of kin has been given and has signed the [facility] Outpatient Observation  Notification letter and all questions answered. Copy of this notice given to patient and copy placed on chart. Patient and/or next of kin has been given the Outpatient Observation Information and Notification letter and all questions answered. Reason for Admission:   Atrial fibrillation with RVR               RRAT Score:     34             Resources/supports as identified by patient/family:   Son                Top Challenges facing patient (as identified by patient/family and CM): Finances/Medication cost?                    Transportation? Support system or lack thereof? Living arrangements? 3 story home           Self-care/ADLs/Cognition? Current Advanced Directive/Advance Care Plan: On file                          Plan for utilizing home health:    Not likely                 Transition of Care Plan:           Interviewed patient. Verified demographics listed on face sheet with patient; all information correct. Pt with Medicare and Medicaid. Patient stated their PCP is Dr Rachid Mccrary and was seen 2 mo ago. Patient lives in 3 story home with son. Difficulty with stairs but states home paid off and doesn't want to move. . Patient's NOK is son. Patient mostly independent with ADLs prior to admission. Son assists as needed. DME prior to admission.   : rollator; bedside commode; caneDischarge plan is   Home and will need medicaid cab as neither pt nor son drive      Patient has designated ____son____________________ to participate in his/her discharge plan and to receive any needed information.    [facility] Son [phone]       Care Management Interventions  PCP Verified by CM: Yes(Dr Rachid Mccrary)  Last Visit to PCP: 09/17/19  Mode of Transport at Discharge:  Other (see comment)(cab)  Transition of Care Consult (CM Consult): Discharge Planning  Current Support Network: Own Home  Confirm Follow Up Transport: Other (see comment)(HRT)  Plan discussed with Pt/Family/Caregiver: Yes  Discharge Location  Discharge Placement: Home

## 2019-11-01 NOTE — PROGRESS NOTES
Problem: Discharge Planning  Goal: *Discharge to safe environment  Outcome: Resolved/Met   home with home health    Pt with discharge orders. PT recommends home health. Order and referral placed with B[facility]. Notified them of pt discharge today. Pt son arrived and will accompany home. Pt does not have transportation benefit with Medicaid, but she states her son has brought her credit card so she can pay for cab. Pt denies concerns with plan to discharge home today. [facility] [address] Provider list has been given to the patient and/or patient representative. Patient and/or patient representative has signed the [place] of Choice selecting _[facility]________as their preference agency and a copy given. Both Home Health Provider list and Freedom of Choice have been placed on the chart. Care Management Interventions  PCP Verified by CM: Yes(Dr Antonio Beck)  Last Visit to PCP: 09/17/19  Mode of Transport at Discharge:  Other (see comment)(pt will call cab)  Transition of Care Consult (CM Consult): Discharge Planning  Current Support Network: Own Home  Confirm Follow Up Transport: Cab  Plan discussed with Pt/Family/Caregiver: Yes  Discharge Location  Discharge Placement: Home with home health

## 2019-11-01 NOTE — PROGRESS NOTES
Problem: Falls - Risk of  Goal: *Absence of Falls  Description  Document Sedrick Patrick Fall Risk and appropriate interventions in the flowsheet. Outcome: Progressing Towards Goal  Note:   Fall Risk Interventions: bed is locked and in lowest position, side rails up x 3, bilateral gripper socks on, bedside table and call bell are within reach, exit alarm activated            Medication Interventions: Patient to call before getting OOB                   Problem: Impaired Skin Integrity/Pressure Injury Treatment  Goal: *Improvement of Existing Pressure Injury  Outcome: Progressing Towards Goal  Goal: *Prevention of pressure injury  Description  Document Sae Scale and appropriate interventions in the flowsheet.   Outcome: Progressing Towards Goal  Note:   Pressure Injury Interventions: use of barrier cream and mepilex foam for prophylaxis, use of wedges and pillows, frequent repositioning, adequate hydration, frequent toileting                                            Problem: Patient Education: Go to Patient Education Activity  Goal: Patient/Family Education  Outcome: Progressing Towards Goal

## 2019-11-01 NOTE — CONSULTS
[facility] - Consult Note    Consultation request by Dr. Sanford Kunz for advice/opinion related to evaluating atrial fibrillation with rapid ventricular response    Date of  Admission: 10/31/2019  5:18 PM   Primary Care Physician:  Princess Yuly DO      I saw, evaluated, interviewed and examined the patient personally. I agree with the findings and plan of care as documented below with PATIMMY note  Patient admitted with symptoms of UTI. Also found to have atrial fibrillation with rapid ventricle response in the setting of UTI and significant hypomagnesemia. Paroxysmal in nature. Now back in sinus rhythm  Unsure if she is taking beta-blocker at home or not. We will start Lopressor 12.5 mg twice daily as blood pressure is borderline low. Will uptitrate as outpatient. Replace magnesium to keep about 2 and potassium level around 4. Continue anticoagulation  Echo  No further cardiac work-up is planned at this time. If discharged, will see her back in office in 2 weeks    Burgess Gilford, MD        Assessment:     -UTI. Pt states seen at urgent care within the past week for the same but was not prescribed antibiotics because culture did not show enough growth. -Paroxysmal atrial fibrillation with rapid ventricular response in setting of above. In sinus rhythm at time of initial consultation on 11/1/19. On Eliquis for anticoagulation with history of atrial flutter.  -Echo 2/5/19: EF 56-60%, normal LV wall motion, no RWMA, age-appropriate diastolic function, mildly dilated LA, mild aortic stenosis, mild mitral stenosis, trace mitral regurgitation  -Hypomagnesemia, Mg 0.8 on presentation. -HTN  -DM  -Hypercholesterolemia  -Hypothyroidism  -GERD     Plan:     -Pt currently in sinus rhythm this morning, s/p 250 mcg IV Digoxin x 3. Cardizem infusion previously stopped due to BP, was not re-initiated due to conversion to sinus rhythm.   Will obtain subsequent EKG due to rhythm change.  -Will obtain Echocardiogram (ordered). -Continued on Eliquis for anticoagulation.  -Can resume outpatient Lopressor.  -Continue treatment of hypomagnesemia per primary team.  -Treatment of underlying UTI per primary team.     History of Present Illness: This is a [de-identified] y.o. female admitted for Atrial fibrillation with RVR ([facility] [address] [I48.91]  Acute hypotension [I95.9]  Hypomagnesemia [E83.42]. Patient complains of:  malaise    Salazar Almodovar is a [de-identified] y.o. female with PMHx as described above, who presented to the hospital due to malaise over the past 1+ week that has been worse over the past 3 days, associated with nausea and dysuria. She reports she was seen at urgent care last week and was told she had UTI but the cultures didn't warrant antibiotics. No associated chest pain or shortness of breath. Cardiac risk factors: dyslipidemia, diabetes mellitus, hypertension, post-menopausal      Review of Symptoms:  Except as stated above include:  Constitutional:  negative  Respiratory:  negative  Cardiovascular:  negative  Gastrointestinal: negative  Genitourinary:  negative  Musculoskeletal:  Negative  Neurological:  Negative  Dermatological:  Negative  Endocrinological: Negative  Psychological:  Negative       Past Medical History:     Past Medical History:   Diagnosis Date    Aortic stenosis     Patient denies on 10/31/2019.  Arthritis     Atrial flutter ([facility] [address]     Bronchitis     Resolved after getting rid of her pet bird in 2000.  Diabetes ([facility] [address]     Diverticulitis     Diverticulitis     GERD (gastroesophageal reflux disease)     History of recurrent UTIs     From 9/2019 to 11/2019    Hypercholesterolemia     Hypertension     Hypothyroid     Menopause     Pancreatitis     Peripheral neuropathy     Patient denies on 10/31/2019.          Social History:     Social History     Socioeconomic History    Marital status: SINGLE     Spouse name: Not on file    Number of children: Not on file    Years of education: Not on file    Highest education level: Not on file   Tobacco Use    Smoking status: Never Smoker    Smokeless tobacco: Never Used   Substance and Sexual Activity    Alcohol use: No    Drug use: No    Sexual activity: Not Currently     Partners: Male   Other Topics Concern        Family History:     Family History   Problem Relation Age of Onset    Diabetes Mother     Coronary Artery Disease Mother     Cancer Father         melanoma    Stroke Sister     Hypertension Sister     Diabetes Sister     Diabetes Brother     Coronary Artery Disease Brother     Breast Cancer Paternal Grandmother         older, but not sure age.  Breast Cancer Paternal Aunt         and gyn cancer ?age   [place] No Known Problems Sister     No Known Problems Brother     Kidney Disease Sister     Heart Failure Sister         Medications: Allergies   Allergen Reactions    Ampicillin Itching        Current Facility-Administered Medications   Medication Dose Route Frequency    dilTIAZem (CARDIZEM) 100 mg in 0.9% sodium chloride (MBP/ADV) 100 mL infusion  5 mg/hr IntraVENous TITRATE    celecoxib (CELEBREX) capsule 200 mg  200 mg Oral BID    diphenoxylate-atropine (LOMOTIL) tablet 1 Tab  1 Tab Oral QID PRN    levothyroxine (SYNTHROID) tablet 75 mcg  75 mcg Oral 6am    simvastatin (ZOCOR) tablet 20 mg  20 mg Oral QHS    pantoprazole (PROTONIX) tablet 40 mg  40 mg Oral ACB    apixaban (ELIQUIS) tablet 5 mg  5 mg Oral Q12H    hydroCHLOROthiazide (HYDRODIURIL) tablet 25 mg  25 mg Oral DAILY    0.9% sodium chloride infusion  50 mL/hr IntraVENous CONTINUOUS    influenza vaccine 2019-20 (6 mos+)(PF) (FLUARIX/FLULAVAL/FLUZONE QUAD) injection 0.5 mL  0.5 mL IntraMUSCular PRIOR TO DISCHARGE         Physical Exam:     Visit Vitals  /44   Pulse 80   Temp 98.7 °F (37.1 °C)   Resp 23   Ht 5' (1.524 m)   Wt 158 lb 4.6 oz (71.8 kg)   SpO2 94%   Breastfeeding?  No   BMI 30.91 kg/m²     BP Readings from Last 3 Encounters:   11/01/19 114/44   08/29/19 118/74   08/27/19 140/70     Pulse Readings from Last 3 Encounters:   11/01/19 80   08/27/19 77   08/20/19 69     Wt Readings from Last 3 Encounters:   10/31/19 158 lb 4.6 oz (71.8 kg)   09/17/19 161 lb (73 kg)   09/03/19 161 lb (73 kg)       General:  alert, cooperative, no distress, appears stated age  Neck:  supple  Lungs:  clear to auscultation bilaterally  Heart:  Regular rate and rhythm  Abdomen:  abdomen is soft without significant tenderness, masses, organomegaly or guarding  Extremities:  Atraumatic, no edema  Skin: Warm and dry. Neuro: alert, oriented x3, affect appropriate, no focal neurological deficits, moves all extremities well, no involuntary movements  Psych: non focal     Data Review:     Recent Labs     11/01/19  0352 10/31/19  1753   WBC 6.6 12.7   HGB 10.6* 12.4   HCT 32.8* 37.9    271     Recent Labs     11/01/19  0352 10/31/19  1753    136   K 4.0 4.3    100   CO2 26 24   GLU 97 164*   BUN 9 10   CREA 0.76 0.99   CA 7.0* 8.1*   MG 1.4* 0.8*   ALB  --  3.1*   SGOT  --  9*   ALT  --  7*       EKG Results     Procedure 720 Value Units Date/Time    EKG, 12 LEAD, SUBSEQUENT [[id number]]     Order Status:  Sent     EKG, 12 LEAD, INITIAL [[id number]] Collected:  10/31/19 1740    Order Status:  Completed Updated:  11/01/19 0829     Ventricular Rate 171 BPM      QRS Duration 62 ms      Q-T Interval 258 ms      QTC Calculation (Bezet) 435 ms      Calculated R Axis 65 degrees      Calculated T Axis 55 degrees      Diagnosis --     Atrial fibrillation with rapid ventricular response with premature   ventricular or aberrantly conducted complexes  ST depression, consider subendocardial injury  Abnormal ECG  When compared with ECG of 07-FEB-2019 23:41,  Atrial fibrillation has replaced Sinus rhythm  Vent.  rate has increased  BPM  ST now depressed in Inferior leads  ST now depressed in Anterolateral leads  Nonspecific T wave abnormality now evident in Lateral leads  Confirmed by Becca Packer ([id number]) on 11/1/2019 8:29:41 AM              All Cardiac Markers in the last 24 hours:    Lab Results   Component Value Date/Time    TROIQ <0.02 10/31/2019 05:53 PM       Last Lipid:    Lab Results   Component Value Date/Time    Cholesterol, total 143 10/10/2018 02:17 PM    HDL Cholesterol 47 10/10/2018 02:17 PM    LDL, calculated 67.6 10/10/2018 02:17 PM    Triglyceride 142 10/10/2018 02:17 PM    CHOL/HDL Ratio 3.0 10/10/2018 02:17 PM       Signed By: Vikram Nichols PA-C     November 1, 2019

## 2019-11-01 NOTE — ED NOTES
Report given to [address] [place] [address] - ED to INPATIENT REPORT:    SBAR report made available to receiving floor on this patient being transferred to [facility] ICU [id number])  for routine progression of care       Admitting diagnosis Atrial fibrillation with RVR ([address] [I48.91]  Acute hypotension [I95.9]  Hypomagnesemia [E83.42]    Information from the following report(s) SBAR, Kardex, ED Summary and MAR was made available to receiving floor. Lines:   Peripheral IV 10/31/19 Right Antecubital (Active)   Site Assessment Clean, dry, & intact 10/31/2019  5:51 PM   Phlebitis Assessment 0 10/31/2019  5:51 PM   Infiltration Assessment 0 10/31/2019  5:51 PM   Dressing Status Clean, dry, & intact 10/31/2019  5:51 PM   Dressing Type Transparent 10/31/2019  5:51 PM   Hub Color/Line Status Flushed 10/31/2019  5:51 PM       Peripheral IV 10/31/19 Right Hand (Active)   Site Assessment Clean, dry, & intact 10/31/2019  7:25 PM   Phlebitis Assessment 0 10/31/2019  7:25 PM   Infiltration Assessment 0 10/31/2019  7:25 PM   Dressing Status Clean, dry, & intact 10/31/2019  7:25 PM   Hub Color/Line Status Green 10/31/2019  7:25 PM        Medication list confirmed with patient    Opportunity for questions and clarification was provided.       Patient is oriented to time, place, person and situation   Patient is  continent and ambulatory with assist     Valuables transported with patient     Patient transported with:   Monitor  Registered Nurse  Tech    MAP (Monitor): 70 =Monitored (most recent)  Vitals w/ MEWS Score (last day)     Date/Time MEWS Score Pulse Resp Temp BP Level of Consciousness SpO2    10/31/19 20:42:58  4  (!) 138  15  98.9 °F (37.2 °C)  111/69  Alert  97 %    10/31/19 2015  --  (!) 129  26  --  90/64  --  96 %    10/31/19 2000  --  (!) 136  26  --  99/55  --  96 %    10/31/19 1945  --  (!) 129  24  --  107/61  --  95 %    10/31/19 1930  --  (!) 126  24  --  105/56  --  96 %    10/31/19 1915  --  (!) 137  24  --  109/63  --  97 % 10/31/19 1900  --  (!) 137  24  --  (!) 89/44  --  96 %    10/31/19 1845  --  (!) 140  23  --  108/86  --  97 %    10/31/19 1830  --  (!) 133  25  --  92/64  --  97 %    10/31/19 1815  --  (!) 133  26  --  116/53  --  97 %    10/31/19 1755  --  (!) 160  27  --  --  --  98 %    10/31/19 1745  --  (!) 171  25  --  (!) 132/109  --  98 %    10/31/19 1736  --  (!) 175  27  --  122/62  --  97 %    10/31/19 1730  --  (!) 176  28  --  (!) 137/111  --  98 %    10/31/19 1725  --  (!) 168  27  --  --  --  98 %    10/31/19 1724  --  --  --  99.5 °F (37.5 °C)  123/71  Alert  98 %              Septic Patients:     Lactic Acid  Lab Results   Component Value Date    LACPOC 2.59 ([facility]) 10/31/2019    (Most recent on top)  Repeat drawn: NO Time: read notes in chart     ALL LACTIC ACIDS GREATER THAN 2 MUST BE REPEATED POC WITHIN 4 HOURS OR PRIOR TO ADMISSION               Total Fluid Bolus initiated and documented on MAR: YES    All ordered antibiotics initiated within first 3 hours of TIME ZERO?   NO

## 2019-11-01 NOTE — PROGRESS NOTES
0700 Bedside and Verbal shift change report given to Nathan Tuttle   (oncoming nurse) by Nata Correa  (offgoing nurse). Report included the following information SBAR, Kardex, ED Summary, Intake/Output, MAR, Recent Results and Med Rec Status. Pt calm in bed with no distress noted. Vital signs on monitor were within normal ranges with HR at 81 BPM in NSR. Pt. In good spirits and denies any pain at this time. Call bell within reach, bed in low position and wheels locked. Will continue to monitor. 1200 Reassessment done. Will continue to monitor the pt.     1530 I have reviewed discharge instructions with the patient and caregiver. The patient and caregiver verbalized understanding. Pt was discharged from the facility in a stable condition.

## 2019-11-01 NOTE — PROGRESS NOTES
Problem: Falls - Risk of  Goal: *Absence of Falls  Description  Document Jf Davis Fall Risk and appropriate interventions in the flowsheet.   Outcome: Progressing Towards Goal  Note:   Fall Risk Interventions:  Mobility Interventions: Bed/chair exit alarm, Strengthening exercises (ROM-active/passive)         Medication Interventions: Bed/chair exit alarm, Patient to call before getting OOB    Elimination Interventions: Bed/chair exit alarm, Call light in reach, Patient to call for help with toileting needs, Toileting schedule/hourly rounds              Problem: Patient Education: Go to Patient Education Activity  Goal: Patient/Family Education  Outcome: Progressing Towards Goal     Problem: Patient Education: Go to Patient Education Activity  Goal: Patient/Family Education  Outcome: Progressing Towards Goal     Problem: Afib Pathway: Day 1  Goal: Off Pathway (Use only if patient is Off Pathway)  Outcome: Progressing Towards Goal  Goal: Activity/Safety  Outcome: Progressing Towards Goal  Goal: Consults, if ordered  Outcome: Progressing Towards Goal  Goal: Diagnostic Test/Procedures  Outcome: Progressing Towards Goal  Goal: Nutrition/Diet  Outcome: Progressing Towards Goal  Goal: Discharge Planning  Outcome: Progressing Towards Goal  Goal: Medications  Outcome: Progressing Towards Goal  Goal: Respiratory  Outcome: Progressing Towards Goal  Goal: Treatments/Interventions/Procedures  Outcome: Progressing Towards Goal  Goal: Psychosocial  Outcome: Progressing Towards Goal  Goal: *Optimal pain control at patient's stated goal  Outcome: Progressing Towards Goal  Goal: *Hemodynamically stable  Outcome: Progressing Towards Goal  Goal: *Stable cardiac rhythm  Outcome: Progressing Towards Goal  Goal: *Lungs clear or at baseline  Outcome: Progressing Towards Goal  Goal: *Labs within defined limits  Outcome: Progressing Towards Goal  Goal: *Describes available resources and support systems  Outcome: Progressing Towards Goal Problem: Pain  Goal: *Control of Pain  Outcome: Progressing Towards Goal     Problem: Patient Education: Go to Patient Education Activity  Goal: Patient/Family Education  Outcome: Progressing Towards Goal     Problem: Impaired Skin Integrity/Pressure Injury Treatment  Goal: *Improvement of Existing Pressure Injury  Outcome: Progressing Towards Goal  Goal: *Prevention of pressure injury  Description  Document Sae Scale and appropriate interventions in the flowsheet. Outcome: Progressing Towards Goal  Note:   Pressure Injury Interventions:       Moisture Interventions: Apply protective barrier, creams and emollients, Absorbent underpads, Assess need for specialty bed, Check for incontinence Q2 hours and as needed, Internal/External urinary devices, Maintain skin hydration (lotion/cream), Minimize layers, Moisture barrier, Offer toileting Q_hr    Activity Interventions: Pressure redistribution bed/mattress(bed type)    Mobility Interventions: HOB 30 degrees or less, Pressure redistribution bed/mattress (bed type), Turn and reposition approx. every two hours(pillow and wedges)    Nutrition Interventions: Document food/fluid/supplement intake, Discuss nutritional consult with provider, Offer support with meals,snacks and hydration    Friction and Shear Interventions: Apply protective barrier, creams and emollients, Foam dressings/transparent film/skin sealants, Minimize layers, Transferring/repositioning devices                Problem: Patient Education: Go to Patient Education Activity  Goal: Patient/Family Education  Outcome: Progressing Towards Goal     Problem: Pressure Injury - Risk of  Goal: *Prevention of pressure injury  Description  Document Sae Scale and appropriate interventions in the flowsheet.   Outcome: Progressing Towards Goal  Note:   Pressure Injury Interventions:       Moisture Interventions: Apply protective barrier, creams and emollients, Absorbent underpads, Assess need for specialty bed, Check for incontinence Q2 hours and as needed, Internal/External urinary devices, Maintain skin hydration (lotion/cream), Minimize layers, Moisture barrier, Offer toileting Q_hr    Activity Interventions: Pressure redistribution bed/mattress(bed type)    Mobility Interventions: HOB 30 degrees or less, Pressure redistribution bed/mattress (bed type), Turn and reposition approx.  every two hours(pillow and wedges)    Nutrition Interventions: Document food/fluid/supplement intake, Discuss nutritional consult with provider, Offer support with meals,snacks and hydration    Friction and Shear Interventions: Apply protective barrier, creams and emollients, Foam dressings/transparent film/skin sealants, Minimize layers, Transferring/repositioning devices                Problem: Patient Education: Go to Patient Education Activity  Goal: Patient/Family Education  Outcome: Progressing Towards Goal

## 2019-11-01 NOTE — PROGRESS NOTES
Physical Exam   Skin: There is pallor. Ed transfer report was given to me, Damon Steven RN  ( ICU night shift nurse), by Baljit Boyle RN ( transferring ED nurse). Report included the following information:  SBAR, kardex, consultations, hemodynamic monitoring, intake/output, MAR, lab results, VS trends, cardiac rhythm noted Atrial Fibrillation. Skin, neuro, respiratory status, order verification, and critical IV gtt rate verification has been dually noted and verified at the bedside with:  Joellen Vernon, RN                                      260 ICU Nurse's Note:    2105: Received care of Pt via stretcher transfer in stable condition. Pt is awake, alert x 4. Pt is able to appropriately communicate and convey her needs and participate in plan of care. Pt independently moves all extremities with some noted generalized weakness. She requires assist with all ADLs and repositioning. She spoke to her son and Bonnie Darnell, via telephone. I also spoke to the pt's son, per her request and he was updated and also given access code. He looks forward to speaking with the Physician and Case Management to discuss plan of care options upon discharge. Pt is resting quietly in bed at this time. Bed is locked and in lowest position, side rails up x 3, gripper socks on, call bell and bedside table are within reach, exit alarm activated. Interventions are ongoing, will continue to monitor. Neurological: as noted in assessment   Cardiovascular:  Atrial Fibrillation, 's on the monitor. Pulmonary: breath sounds clear, diminished, saturations maintained at 98% on room air. GI/: Abdomen is obese, semi-soft with hypoactive bowel sounds. Last BM noted 10/31/2019. Purewick external catheter is intact with cloudy, sedimented, lamar colored urine output. IVF/Critical gtts: IV maintenance fluid  Skin: as noted above, LIZZETTE score: 3, GABBY score: 13    2215: POC accucheck noted 149.  Dr. Moose Miller was notified and updated regarding earlier POC Lactic Acid drawn at [address] that was resulted in ED @ 2.59, of which he was aware and no new orders indicated, except to repeat Lactic at 0400 with other prescribed/scheduled AM labs. 2300: Pt tolerated regularly scheduled PO meds at this time. HR remains in the 140's. Pt remains asymptomatic, denies CP, SOB or dyspnea. 0000: VS at baseline, pt is afebrile and resting quietly in bed after speaking with Dr. Lilliam Bernard at the bedside. She tolerated her scheduled IV Digoxin at this time, Apical heart rate noted 141. Urine specimen collected and sent to lab per orders. Interventions are ongoing, will continue to monitor. 0130: Dr. Lilliam Bernard had recently re-prescribed titrating Cardizem gtt to help with S/S RVR. This was held at this time due to current HR trending in the mid 80's with BP trends 105/52 (64) per titrating protocol. Dr. Lilliam Bernard is physically rounding on the 2600 ICU at this time, and he was updated regarding this pt's current VS trends. Will continue to monitor accordingly. Pt is resting quietly at this time without incident. 0400: Scheduled AM labs drawn at the bedside. Pt also tolerated scheduled dose of IV Digoxin at this time, apical heart rate noted 80. Pt is in NSR, denies CP or dyspnea. Pt's son, Abdul Fothergill, called [facility] Nurs[facility]' station and was transferred to the pt's room so that he could speak to his Mother. The pt was very elated to have had the opportunity to speak to him. She is resting quietly at this time after assist with repositioning.   0600: Pt tolerated scheduled po Levothyroxine. VSS, she remains in NSR with HR in the 80's. She was assisted with repositioning and use of bedpan for BM, no result. Pt was moderately tender during lakeshia care and changing of purewick, stating \"I hate getting these UTI's all the time\". Pt denies further complaint, and is tolerating po cranberry juice without incident.   Bed is locked and in lowest position, side rails up x 3, gripper socks on, call bell and bedside table are within reach, exit alarm activated. Interventions are ongoing, will continue to monitor. 0730: Bedside change of shift given to:  Keli Biswas RN

## 2019-11-01 NOTE — DISCHARGE SUMMARY
2 [facility]  Hospitalist Division    Discharge Summary    Patient: Jc Flores MRN: [id number]  CSN: [id number]    YOB: 1940  Age: [de-identified] y.o.   Sex: female    DOA: 10/31/2019 LOS:  LOS: 0 days   Discharge Date:      Admission Diagnoses: Atrial fibrillation with RVR ([facility] [I48.91]  Acute hypotension [I95.9]  Hypomagnesemia [E83.42]    Discharge Diagnoses:    Problem List as of 11/1/2019 Date Reviewed: 10/31/2019          Codes Class Noted - Resolved    Hypomagnesemia ICD-10-CM: E83.42  ICD-9-CM: 275.2  10/31/2019 - Present        Acute hypotension ICD-10-CM: I95.9  ICD-9-CM: 458.9  10/31/2019 - Present        * (Principal) Atrial fibrillation with RVR ([facility] ICD-10-CM: I48.91  ICD-9-CM: 427.31  3/4/2018 - Present        Atrial fibrillation with rapid ventricular response ([facility] ICD-10-CM: I48.91  ICD-9-CM: 427.31  3/3/2018 - Present        Hypertension ICD-10-CM: I10  ICD-9-CM: 401.9  3/3/2018 - Present        Type 2 diabetes mellitus with nephropathy ([facility] ICD-10-CM: E11.21  ICD-9-CM: 250.40, 583.81  1/23/2018 - Present        Hip fracture ([facility] ICD-10-CM: S72.009A  ICD-9-CM: 820.8  10/9/2017 - Present        Meningioma ([facility] ICD-10-CM: D32.9  ICD-9-CM: 225.2  7/25/2017 - Present        Hypothyroidism due to acquired atrophy of thyroid ICD-10-CM: E03.4  ICD-9-CM: 244.8, 246.8  6/13/2017 - Present        Episcleritis of right eye ICD-10-CM: H15.101  ICD-9-CM: 379.00  5/17/2017 - Present        Pancreatitis ICD-10-CM: K85.90  ICD-9-CM: 577.0  11/7/2016 - Present        Acute pancreatitis ICD-10-CM: K85.90  ICD-9-CM: 577.0  11/7/2016 - Present        Diverticulitis ICD-10-CM: K57.92  ICD-9-CM: 562.11  11/7/2016 - Present        Hypothyroid ICD-10-CM: E03.9  ICD-9-CM: 244.9  2/24/2014 - Present        HTN (hypertension) ICD-10-CM: I10  ICD-9-CM: 401.9  2/24/2014 - Present        Obesity ICD-10-CM: E66.9  ICD-9-CM: 278.00  2/24/2014 - Present        DM (diabetes mellitus) [facility]) ICD-10-CM: E11.9  ICD-9-CM: 250.00  2/24/2014 - Present        Family hx-breast malignancy ICD-10-CM: Z80.3  ICD-9-CM: V16.3  2/24/2014 - Present        RESOLVED: Atrial fibrillation ([facility] ICD-10-CM: I48.91  ICD-9-CM: 427.31  3/3/2018 - 3/3/2018              Discharge Condition: Stable    Discharge To: Home    Consults: Cardiology and Pulmonary/Critical Care    HPI: Per H&P, Surya Michelle is a [de-identified] y.o. female who presents to [facility] ER with complaints of Malaise, Dysuria, and Weakness. Patient states that she has had as reduced appetite, general malaise, increasing somnolence, weakness, dyspepsia, and dysuria. Patient reports that she has had a UTI since early or mid-September and has taken many antibiotics for it; however, Patient reports that the UTI comes back very rapidly. Patient states that she was supposed to see a Urologist, but had to cancel due to illness and will see him on 11/13/2019. Patient attributes her malaise to this possible infection and reports that she became dizzy today and had to walk down to the EMS and almost couldn't make it due to weakness. Patient admits to a \"burning\" sensation intermittently upon urination for the last approximately 2 months and recently finished a three-times-a-day antibiotic for this that she cannot name and has also been consuming Cranberry juice. Patient states an abdominal pain much like dyspepsia brought her the ER today. Patient reports a known history of Atrial Fibrillation, but states that she's not sure if she can feel when she goes into RVR.     Notably, Urologist is Dr. Mk Guan.     Patient was noted to have A. Fib RVR in [facility] ER with a rate of 140-160s. Patient was started on an IV Diltiazem gtt, but her pressure fell to 89/44 mm Hg. Patient was started on Digoxin at this point. Patient is placed on Observation in ICU for management of A. Fib RVR and Acute, Drug-related Hypotension. [facility] Course: Cardiology and PCCM consulted.  Losartan held 2/2 low BP. Echo done, results below. Bactrim started as patient had failed cipro and macrobid and is allergic to PCNs. Cultures pending. On day of discharge, patient is rate controlled and back in sinus rhythm. Rx sent to requested pharmacy. VS and labs stable for discharge. F/u recs below. Physical Exam:  General appearance: alert, cooperative, no distress, appears stated age  Head: Normocephalic, without obvious abnormality, atraumatic  Lungs: clear to auscultation bilaterally  Heart: regular rate and rhythm, S1, S2 normal, no murmur, click, rub or gallop  Abdomen: soft, non-tender. Bowel sounds normal  Extremities: no cyanosis or edema  Skin: Skin color, texture normal.  Neurologic: no focal deficits, motor/sensory intact  PSY: Mood and affect normal, appropriately behaved    Significant Diagnostic Studies:     BMP:   Lab Results   Component Value Date/Time     11/01/2019 03:52 AM    K 4.0 11/01/2019 03:52 AM     11/01/2019 03:52 AM    CO2 26 11/01/2019 03:52 AM    AGAP 7 11/01/2019 03:52 AM    GLU 97 11/01/2019 03:52 AM    BUN 9 11/01/2019 03:52 AM    CREA 0.76 11/01/2019 03:52 AM    GFRAA >60 11/01/2019 03:52 AM    GFRNA >60 11/01/2019 03:52 AM     CBC:   Lab Results   Component Value Date/Time    WBC 6.6 11/01/2019 03:52 AM    HGB 10.6 (L) 11/01/2019 03:52 AM    HCT 32.8 (L) 11/01/2019 03:52 AM     11/01/2019 03:52 AM        Echo 11/1/19  Interpretation Summary     Result status: Final result   · Aortic Valve: Aortic valve leaflet calcification present. Aortic valve mean gradient is 31.4 mmHg. Aortic valve area is 0.9 cm2. Moderate aortic valve stenosis is present. · Pulmonary arterial systolic pressure is 75.1 mmHg. Moderate pulmonary hypertension. · Left Ventricle: Normal cavity size and systolic function (ejection fraction normal). Mild concentric hypertrophy. Estimated left ventricular ejection fraction is 61 - 65%. Visually measured ejection fraction.  No regional wall motion abnormality noted. Mild (grade 1) left ventricular diastolic dysfunction. · Left Atrium: Mildly dilated left atrium. · Mitral Valve: Mitral valve non-specific thickening. Moderate mitral annular calcification. Mild mitral valve stenosis is present. Mild mitral valve regurgitation is present. · Tricuspid Valve: Non-specific thickening of the tricuspid valve. · Pulmonary Artery: Moderate pulmonary hypertension. Comparison Study Information     Prior Study     There is a prior study available for comparison that was performed on 2/5/2019. As compared to the previous study, there are significant changes. AV mean gradient increased. Echo Findings     Left Ventricle Normal cavity size and systolic function (ejection fraction normal). Mild concentric hypertrophy. The estimated ejection fraction is 61 - 65%. Visually measured ejection fraction. No regional wall motion abnormality noted. There is mild (grade 1) left ventricular diastolic dysfunction. Elevated left ventricular diastolic pressure. Left Atrium Mildly dilated left atrium. Right Ventricle Normal cavity size and global systolic function. Aortic Valve There is leaflet calcification. Aortic valve peak gradient is 47.5 mmHg. Aortic valve mean gradient is 31.4 mmHg. Aortic valve area is 0.9 cm2. Aortic valve peak velocity is 345 cm/s. There is moderate aortic stenosis. Trace aortic valve regurgitation. Mitral Valve Mitral valve non-specific thickening. Moderate mitral annular calcification. Mild stenosis present. Mild regurgitation. Tricuspid Valve Non-specific thickening. Pulmonary arterial systolic pressure is 40.9 mmHg. Moderate pulmonary hypertension. Pulmonic Valve Pulmonic valve not well visualized. No stenosis. Trace regurgitation. IVC/Hepatic Veins Normal central venous pressure (0-5 mmHg); IVC diameter is less than 21 mm and collapses more than 50% with respiration. Pericardium No evidence of pericardial effusion.          Raj 3d Haylie W Mammo Bi Screening Incl Cad    Result Date: 10/23/2019  EXAM: [facility] 3D HAYLIE W MAMMO BI SCREENING INCL CAD INDICATION: [de-identified]years-old female for routine screening mammography. COMPARISON: 10/17/2018, 2/24/2016, and 2/20/2015 TECHNIQUE: Images were obtained with low dose 3-D breast tomosynthesis and C-view reconstruction of 2-D images. The 2-D images were viewed with computer aided detection as an adjunct. _______________ FINDINGS: There are scattered areas of fibroglandular density. Benign-appearing well-circumscribed nodule with a few associated calcifications in the lateral aspect of the anterior left breast appears unchanged. No suspicious findings are present. _______________     IMPRESSION: No mammographic evidence of malignancy. Recommend annual screening mammography. A result letter will be sent to the patient. The patient will be notified by the [employer] reminder system for scheduling of her annual screening mammogram. BI-RADS Assessment Category 2: Benign        Discharge Medications:       Current Discharge Medication List      START taking these medications    Details   trimethoprim-sulfamethoxazole (BACTRIM DS, SEPTRA DS) 160-800 mg per tablet Take 1 Tab by mouth every twelve (12) hours for 7 days. Qty: 14 Tab, Refills: 0      phenazopyridine (PYRIDIUM) 100 mg tablet Take 1 Tab by mouth three (3) times daily (after meals) for 5 days. Qty: 15 Tab, Refills: 0         CONTINUE these medications which have CHANGED    Details   metoprolol tartrate (LOPRESSOR) 25 mg tablet Take 0.5 Tabs by mouth every twelve (12) hours.   Qty: 30 Tab, Refills: 0         CONTINUE these medications which have NOT CHANGED    Details   triamcinolone acetonide (KENALOG) 0.1 % topical cream APPLY THIN LAYER EXTERNALLY TO THE AFFECTED AREA TWICE DAILY  Qty: 15 g, Refills: 0    Associated Diagnoses: Eczema, unspecified type      simvastatin (ZOCOR) 20 mg tablet TAKE 1 TABLET BY MOUTH EVERY NIGHT  Qty: 90 Tab, Refills: 0 Associated Diagnoses: Type 2 diabetes mellitus with nephropathy (HCC)      levothyroxine (SYNTHROID) 75 mcg tablet TAKE 1 TABLET BY MOUTH DAILY BEFORE BREAKFAST  Qty: 90 Tab, Refills: 0    Associated Diagnoses: Hypothyroidism due to acquired atrophy of thyroid      metFORMIN (GLUCOPHAGE) 1,000 mg tablet TAKE 1 TABLET BY MOUTH TWICE DAILY. Qty: 180 Tab, Refills: 0      betamethasone dipropionate (DIPROSONE) 0.05 % topical cream betamethasone dipropionate 0.05 % topical cream      glucose blood VI test strips (FREESTYLE LITE STRIPS) strip use to check BS once daily      Blood-Glucose Meter (FREESTYLE LITE METER) monitoring kit use to check BS once daily      famotidine (PEPCID) 20 mg tablet famotidine 20 mg tablet      glipiZIDE (GLUCOTROL) 5 mg tablet glipizide 5 mg tablet   TK 1 T PO BID B MEALS      hydroCHLOROthiazide (HYDRODIURIL) 25 mg tablet hydrochlorothiazide 25 mg tablet      lancets (FREESTYLE LANCETS) 28 gauge misc use to check BS once daily      celecoxib (CELEBREX) 200 mg capsule Take  by mouth two (2) times a day. apixaban (ELIQUIS) 5 mg tablet TAKE 1 TABLET BY MOUTH TWICE DAILY. Qty: 60 Tab, Refills: 6      ondansetron hcl (ZOFRAN) 4 mg tablet Take 1 Tab by mouth every eight (8) hours as needed for Nausea. Qty: 30 Tab, Refills: 1      pantoprazole (PROTONIX) 40 mg tablet Take 1 Tab by mouth daily. Qty: 90 Tab, Refills: 3      diphenoxylate-atropine (LOMOTIL) 2.5-0.025 mg per tablet Take 1 Tab by mouth four (4) times daily as needed for Diarrhea. Max Daily Amount: 4 Tabs. Qty: 30 Tab, Refills: 1    Associated Diagnoses: Diverticulitis; Watery diarrhea      cholecalciferol (VITAMIN D3) 1,000 unit tablet Take 2 Tabs by mouth daily.   Qty: 60 Tab, Refills: 0         STOP taking these medications       nitrofurantoin, macrocrystal-monohydrate, (MACROBID) 100 mg capsule Comments:   Reason for Stopping:         augmented betamethasone dipropionate (DIPROLENE-AF) 0.05 % topical cream Comments: Reason for Stopping:         captopril-hydroCHLOROthiazide (CAPOZIDE) 50-15 mg per tablet Comments:   Reason for Stopping:         clotrimazole (LOTRIMIN) 1 % topical cream Comments:   Reason for Stopping:         dicyclomine (BENTYL) 20 mg tablet Comments:   Reason for Stopping:         loperamide (IMODIUM) 2 mg capsule Comments:   Reason for Stopping:         metroNIDAZOLE (FLAGYL) 500 mg tablet Comments:   Reason for Stopping:         losartan (COZAAR) 100 mg tablet Comments:   Reason for Stopping:         furosemide (LASIX) 40 mg tablet Comments:   Reason for Stopping:         losartan (COZAAR) 25 mg tablet Comments:   Reason for Stopping:                 Activity: Activity as tolerated    Diet: Cardiac Diet    Wound Care: None needed    Follow-up: PCP in 1 week, cardiology in 2 weeks, urology on 11/13 as previously scheduled    Discharge time: >35 minutes    KLEBER [address]  Office:  [phone]  Pager: [phone]      11/1/2019, 1:19 PM

## 2019-11-01 NOTE — DISCHARGE INSTRUCTIONS
Patient Education        Deciding Between Electrical Cardioversion and Rate Control Medicines for Atrial Fibrillation  How can you decide between electrical cardioversion and rate control medicines for atrial fibrillation? What is atrial fibrillation? Atrial fibrillation (say \"AY-tree-graciela muu-dlsy-WUC-shun\") is a kind of uneven heartbeat. It can make you feel lightheaded and dizzy. You may feel weak. It also can make you more likely to have a stroke. Electrical cardioversion can return your heart to a normal rhythm. First you'll get medicines to make you sleepy and control pain. Then your doctor will use patches to send an electric current to your heart. This resets the rhythm of your heart. Not everyone with atrial fibrillation needs this treatment. For some people, taking medicines may be better. Most people can live with an uneven heartbeat. It just has to be kept under control so the heart does not beat too fast.  Use this information to help you and your doctor decide which treatment to choose for atrial fibrillation. What are kilpatrick points about this decision? · Electrical cardioversion can return your heart to a normal rhythm. But the problem can come back. The longer you have had atrial fibrillation, the more likely it is to come back after this treatment. · Cardioversion may not work as well when an uneven heartbeat is caused by another heart disease, such as heart failure. · If your symptoms bother you a lot, you may want to try cardioversion. But even if it works, you may still need to take blood thinners to prevent a stroke. · If you don't have symptoms, or if they don't bother you much, you can try medicines to slow your heart rate. And you can take blood thinners to prevent a stroke. · Cardioversion does have risks, such as stroke. Discuss the risks with your doctor. Make sure you understand them. · You may have more than one heart problem.  Cardioversion doesn't work as well if you have more than one heart problem. Why might you choose electrical cardioversion? · It restores the normal heart rhythm for most people. · The idea of having an electric shock does not bother you. · Your symptoms bother you a lot. · You have had atrial fibrillation just one time. · You do not have other heart problems. · You may not have to take as many medicines. Or you may not need to take them as long. Why might you choose rate-control medicines? · These medicines keep many people from having symptoms. · You prefer to take medicines rather than have an electric shock. · Your symptoms don't bother you much. · If these medicines don't work, you can still try electrical cardioversion. Your decision  Thinking about the facts and your feelings can help you make a decision that is right for you. Be sure you understand the benefits and risks of your options. And think about what else you need to do before you make the decision. Where can you learn more? Go to http://aravind-farzana.info/. Enter T845 in the search box to learn more about \"Deciding Between Electrical Cardioversion and Rate Control Medicines for Atrial Fibrillation. \"  Current as of: April 9, 2019  Content Version: 12.2  © 3708-0032 [employer]. Care instructions adapted under license by [employer] (which disclaims liability or warranty for this information). If you have questions about a medical condition or this instruction, always ask your healthcare professional. [place] [address] any warranty or liability for your use of this information. Patient Education        Atrial Fibrillation: Care Instructions  Your Care Instructions    Atrial fibrillation is an irregular and often fast heartbeat. Treating this condition is important for several reasons. It can cause blood clots, which can travel from your heart to your brain and cause a stroke.  If you have a fast heartbeat, you may feel lightheaded, dizzy, and weak. An irregular heartbeat can also increase your risk for heart failure. Atrial fibrillation is often the result of another heart condition, such as high blood pressure or coronary artery disease. Making changes to improve your heart condition will help you stay healthy and active. Follow-up care is a key part of your treatment and safety. Be sure to make and go to all appointments, and call your doctor if you are having problems. It's also a good idea to know your test results and keep a list of the medicines you take. How can you care for yourself at home? Medicines    · Take your medicines exactly as prescribed. Call your doctor if you think you are having a problem with your medicine. You will get more details on the specific medicines your doctor prescribes.     · If your doctor has given you a blood thinner to prevent a stroke, be sure you get instructions about how to take your medicine safely. Blood thinners can cause serious bleeding problems.     · Do not take any vitamins, over-the-counter drugs, or herbal products without talking to your doctor first.    Lifestyle changes    · Do not smoke. Smoking can increase your chance of a stroke and heart attack. If you need help quitting, talk to your doctor about stop-smoking programs and medicines. These can increase your chances of quitting for good.     · Eat a heart-healthy diet.     · Stay at a healthy weight. Lose weight if you need to.     · Limit alcohol to 2 drinks a day for men and 1 drink a day for women. Too much alcohol can cause health problems.     · Avoid colds and flu. Get a pneumococcal vaccine shot. If you have had one before, ask your doctor whether you need another dose. Get a flu shot every year. If you must be around people with colds or flu, wash your hands often. Activity    · If your doctor recommends it, get more exercise. Walking is a good choice. Bit by bit, increase the amount you walk every day.  Try for at least 30 minutes on most days of the week. You also may want to swim, bike, or do other activities. Your doctor may suggest that you join a cardiac rehabilitation program so that you can have help increasing your physical activity safely.     · Start light exercise if your doctor says it is okay. Even a small amount will help you get stronger, have more energy, and manage stress. Walking is an easy way to get exercise. Start out by walking a little more than you did in the hospital. Gradually increase the amount you walk.     · When you exercise, watch for signs that your heart is working too hard. You are pushing too hard if you cannot talk while you are exercising. If you become short of breath or dizzy or have chest pain, sit down and rest immediately.     · Check your pulse regularly. Place two fingers on the artery at the palm side of your wrist, in line with your thumb. If your heartbeat seems uneven or fast, talk to your doctor. When should you call for help? Call 911 anytime you think you may need emergency care. For example, call if:    · You have symptoms of a heart attack. These may include:  ? Chest pain or pressure, or a strange feeling in the chest.  ? Sweating. ? Shortness of breath. ? Nausea or vomiting. ? Pain, pressure, or a strange feeling in the back, neck, jaw, or upper belly or in one or both shoulders or arms. ? Lightheadedness or sudden weakness. ? A fast or irregular heartbeat. After you call 911, the  may tell you to chew 1 adult-strength or 2 to 4 low-dose aspirin. Wait for an ambulance. Do not try to drive yourself.     · You have symptoms of a stroke. These may include:  ? Sudden numbness, tingling, weakness, or loss of movement in your face, arm, or leg, especially on only one side of your body. ? Sudden vision changes. ? Sudden trouble speaking. ? Sudden confusion or trouble understanding simple statements. ? Sudden problems with walking or balance. ?  A sudden, severe headache that is different from past headaches.     · You passed out (lost consciousness).    Call your doctor now or seek immediate medical care if:    · You have new or increased shortness of breath.     · You feel dizzy or lightheaded, or you feel like you may faint.     · Your heart rate becomes irregular.     · You can feel your heart flutter in your chest or skip heartbeats. Tell your doctor if these symptoms are new or worse.    Watch closely for changes in your health, and be sure to contact your doctor if you have any problems. Where can you learn more? Go to http://aravind-farzana.info/. Enter U020 in the search box to learn more about \"Atrial Fibrillation: Care Instructions. \"  Current as of: April 9, 2019  Content Version: 12.2  © 5184-0205 [employer]. Care instructions adapted under license by [employer] (which disclaims liability or warranty for this information). If you have questions about a medical condition or this instruction, always ask your healthcare professional. [address] any warranty or liability for your use of this information. [employer] Activation    Thank you for requesting access to [employer]. Please follow the instructions below to securely access and download your online medical record. [employer] allows you to send messages to your doctor, view your test results, renew your prescriptions, schedule appointments, and more. How Do I Sign Up? 1. In your internet browser, go to [email or web address]  2. Click on the First Time User? Click Here link in the Sign In box. You will be redirect to the New Member Sign Up page. 3. Enter your [employer] Access Code exactly as it appears below. You will not need to use this code after youve completed the sign-up process. If you do not sign up before the expiration date, you must request a new code.     Lom[employer] Access Code: [email or web address]  Expires: 12/15/2019 5:46 PM (This is the date your [facility] access code will )    4. Enter the last four digits of your Social Security Number (xxxx) and Date of Birth (mm/dd/yyyy) as indicated and click Submit. You will be taken to the next sign-up page. 5. Create a [facility] ID. This will be your [facility] login ID and cannot be changed, so think of one that is secure and easy to remember. 6. Create a [facility] password. You can change your password at any time. 7. Enter your Password Reset Question and Answer. This can be used at a later time if you forget your password. 8. Enter your e-mail address. You will receive e-mail notification when new information is available in [address] 9. Click Sign Up. You can now view and download portions of your medical record. 10. Click the Download Summary menu link to download a portable copy of your medical information. Additional Information    If you have questions, please visit the Frequently Asked Questions section of the [facility] website at [email or web address] Remember, [facility] is NOT to be used for urgent needs. For medical emergencies, dial 911. Patient armband removed and shredded    If you experience chest pain call 911. Do not drive yourself.

## 2019-11-01 NOTE — PROGRESS NOTES
Problem: Mobility Impaired (Adult and Pediatric)  Goal: *Acute Goals and Plan of Care (Insert Text)  Outcome: Resolved/Met  PHYSICAL THERAPY EVALUATION AND DISCHARGE    Patient: Juana Adan (12 y.o. female)  Date: 11/1/2019  Primary Diagnosis: Atrial fibrillation with RVR (HCC) [I48.91]  Acute hypotension [I95.9]  Hypomagnesemia [E83.42]  Precautions: Fall, Skin  PLOF: Independent  ASSESSMENT :  Min A with supine to sit. Good sitting balance. Mod I for sit to stand. Amb 200ft with ww mod I. Appropriate safety awareness. Returned to seated EOB. Sit to supine with supervision. Declines chair. Denies mobility concerns for discharge to home. Reports discharging at 3pm. BP post amb 148/51. Education provided on bed mobility, transfers, ADLs, balance, amb, safety, exercise, role of PT, plan of care, cognition, skin integrity, vitals as indicated. Educated on need for RN assistance with mobility; verbalized understanding. Call bell in reach. Skilled physical therapy is not indicated at this time. PLAN :  Discharge Recommendations: Home Health  Further Equipment Recommendations for Discharge: N/A     SUBJECTIVE:   Patient stated I don't need you. I'm leaving at 3.    OBJECTIVE DATA SUMMARY:     Past Medical History:   Diagnosis Date    Aortic stenosis     Patient denies on 10/31/2019. Arthritis     Atrial flutter ([address]     Bronchitis     Resolved after getting rid of her pet bird in 2000. Diabetes ([address]     Diverticulitis     Diverticulitis     GERD (gastroesophageal reflux disease)     History of recurrent UTIs     From 9/2019 to 11/2019    Hypercholesterolemia     Hypertension     Hypothyroid     Menopause     Pancreatitis     Peripheral neuropathy     Patient denies on 10/31/2019.      Past Surgical History:   Procedure Laterality Date    COLONOSCOPY N/A 2/2/2017    COLONOSCOPY  w/bx polyp performed by Robi Paul MD at [address] [address]     Barriers to Learning/Limitations: None  Compensate with: Visual Cues, Verbal Cues, Tactile Cues and Kinesthetic Cues  Home Situation:   Home Situation  Home Environment: Private residence  # Steps to Enter: 6  One/Two Story Residence: Two story  Interior Rails: Right  Lift Chair Available: No  Living Alone: No  Support Systems: Family member(s), Friends \ neighbors, Child([place])  Patient Expects to be Discharged to[de-identified] Private residence  Current DME Used/Available at Home: Cane, straight  Critical Behavior:  Neurologic State: Alert  Orientation Level: Oriented X4  Cognition: Follows commands     Psychosocial  Patient Behaviors: Cooperative    Strength:    Manual Muscle Testing (LE)         R     L    Hip Flexion:   5/5 5/5  Knee EXT:   5/5 5/5  Knee FLEX:   5/5 5/5  Ankle DF:   5/5 5/5  _________________________________________________   Range Of Motion:  BLE AROM WFL  Functional Mobility:  Bed Mobility:  Supine to Sit: Minimum assistance  Sit to Supine: Supervision  Transfers:  Sit to Stand: Modified independent  Stand to Sit: Modified independent  Balance:   Sitting: Impaired  Sitting - Static: Good (unsupported)  Sitting - Dynamic: Good (unsupported)  Standing: Impaired  Standing - Static: Good  Standing - Dynamic : Good  Ambulation/Gait Training:  Distance (ft): 200 Feet (ft)  Assistive Device: Walker, rolling  Ambulation - Level of Assistance: Modified independent    Pain:  Pain level pre-treatment: 0/10   Pain level post-treatment: 0/10    Activity Tolerance:   Good    After treatment:   ?         Patient left in no apparent distress sitting up in chair  ? Patient left in no apparent distress in bed  ? Call bell left within reach  ? Nursing notified  ? Caregiver present  ? Bed alarm activated  ? SCDs applied    COMMUNICATION/EDUCATION:   ?         Role of physical therapy in the acute care setting.   ?         Fall prevention education was provided and the patient/caregiver indicated understanding. ? Patient/family have participated as able in goal setting and plan of care. ?         Patient/family agree to work toward stated goals and plan of care. ?         Patient understands intent and goals of therapy, but is neutral about his/her participation. ? Patient is unable to participate in goal setting/plan of care: ongoing with therapy staff.     Thank you for this referral.  Catarino Leonard, PT   Time Calculation: 18 mins      Eval Complexity: History: MEDIUM  Complexity : 1-2 comorbidities / personal factors will impact the outcome/ POC Exam:MEDIUM Complexity : 3 Standardized tests and measures addressing body structure, function, activity limitation and / or participation in recreation  Presentation: MEDIUM Complexity : Evolving with changing characteristics  Clinical Decision Making:Medium Complexity clinical judgement; ROM, MMT, functional mobility  Overall Complexity:MEDIUM

## 2019-11-02 LAB
BACTERIA SPEC CULT: NORMAL
SERVICE CMNT-IMP: NORMAL

## 2019-11-03 ENCOUNTER — HOME CARE VISIT (OUTPATIENT)
Dept: SCHEDULING | Facility: HOME HEALTH | Age: 79
End: 2019-11-03
Payer: MEDICARE

## 2019-11-03 LAB
BACTERIA SPEC CULT: ABNORMAL
BACTERIA SPEC CULT: ABNORMAL
SERVICE CMNT-IMP: ABNORMAL

## 2019-11-03 PROCEDURE — 3331090002 HH PPS REVENUE DEBIT

## 2019-11-03 PROCEDURE — 3331090001 HH PPS REVENUE CREDIT

## 2019-11-03 PROCEDURE — G0299 HHS/HOSPICE OF RN EA 15 MIN: HCPCS

## 2019-11-03 PROCEDURE — 400013 HH SOC

## 2019-11-04 ENCOUNTER — HOME CARE VISIT (OUTPATIENT)
Dept: HOME HEALTH SERVICES | Facility: HOME HEALTH | Age: 79
End: 2019-11-04

## 2019-11-04 ENCOUNTER — HOME CARE VISIT (OUTPATIENT)
Dept: SCHEDULING | Facility: HOME HEALTH | Age: 79
End: 2019-11-04
Payer: MEDICARE

## 2019-11-04 VITALS
TEMPERATURE: 98 F | OXYGEN SATURATION: 99 % | SYSTOLIC BLOOD PRESSURE: 140 MMHG | HEART RATE: 80 BPM | RESPIRATION RATE: 14 BRPM | DIASTOLIC BLOOD PRESSURE: 70 MMHG

## 2019-11-04 PROCEDURE — 3331090002 HH PPS REVENUE DEBIT

## 2019-11-04 PROCEDURE — 3331090001 HH PPS REVENUE CREDIT

## 2019-11-04 PROCEDURE — G0151 HHCP-SERV OF PT,EA 15 MIN: HCPCS

## 2019-11-05 ENCOUNTER — TELEPHONE (OUTPATIENT)
Dept: FAMILY MEDICINE CLINIC | Age: 79
End: 2019-11-05

## 2019-11-05 ENCOUNTER — PATIENT OUTREACH (OUTPATIENT)
Dept: FAMILY MEDICINE CLINIC | Age: 79
End: 2019-11-05

## 2019-11-05 VITALS
SYSTOLIC BLOOD PRESSURE: 138 MMHG | OXYGEN SATURATION: 96 % | HEART RATE: 91 BPM | TEMPERATURE: 98.3 F | DIASTOLIC BLOOD PRESSURE: 84 MMHG

## 2019-11-05 PROCEDURE — 3331090001 HH PPS REVENUE CREDIT

## 2019-11-05 PROCEDURE — 3331090002 HH PPS REVENUE DEBIT

## 2019-11-05 NOTE — PROGRESS NOTES
Hospital Discharge Follow-Up      Date/Time:  2019 12:46 PM    Patient was admitted to [facility]/[address] on 10/31/19 and discharged on 19 for UTI & Atrial fibrillation with RVR. . The physician discharge summary was available at the time of outreach. Patient was contacted within 2 business days of discharge. Top Challenges reviewed with the provider   Patient is having a lot of recurrent UTI symptoms and was only given 3 days of antibiotics. Method of communication with provider :chart routing    Inpatient RRAT score: High Risk            25       Total Score        3 Has Seen PCP in Last 6 Months (Yes=3, No=0)    22 Charlson Comorbidity Score (Age + Comorbid Conditions)        Criteria that do not apply:    . Living with Significant Other. Assisted Living. LTAC. SNF. or   Rehab    Patient Length of Stay (>5 days = 3)    IP Visits Last 12 Months (1-3=4, 4=9, >4=11)    Pt. Coverage (Medicare=5 , Medicaid, or Self-Pay=4)        Was this a readmission? no   Patient stated reason for the readmission: n/a    Nurse Navigator (NN) contacted the patient by telephone to perform post hospital discharge assessment. Verified name and  with patient as identifiers. Provided introduction to self, and explanation of the Nurse Navigator role. Patient is complaining of having recurrent urine infection. States urine has foul smell, is cloudy and very thick . She also states it has \"black flecks\" in it. She is taking Pyridium TID as prescribed and she was only given 3 days worth of antibiotics which she has finished. She states the [facility] is coming by today to  a urine specimen to take to the lab for her. She states she is drinking plenty of fluids and denies fever or chills. Reviewed discharge instructions and red flags with patient who verbalized understanding. Patient given an opportunity to ask questions and does not have any further questions or concerns at this time.  The patient agrees to contact the PCP office for questions related to their healthcare. NN provided contact information for future reference. Disease Specific:   N/A    Summary of patient's top problems:  1. UTI  2. Atrial fibrillation with RVR  3. Diabetes    Home Health orders at discharge: PT, OT, [address]: [facility]  Date of initial visit: 11/4/19    Durable Medical Equipment ordered/company: none  Durable Medical Equipment received: n/a    Barriers to care? Communication with PCP - pt states she has been trying to call for 2 days and phones were not working properly, Patient also has limited transportation - doesn't drive and only has one family member available to transport her. Advance Care Planning:   Does patient have an Advance Directive:  reviewed and current     Medication(s):   New Medications at Discharge: Bactrim and Pyridium   Changed Medications at Discharge: Metoprolol  Discontinued Medications at Discharge: Macrobid, Diprolene, Capozide, Lotrimin, Bentyl, Imodium, Flagyl, Cozaar, Lasix    Medication reconciliation was not performed - was complted with [facility] nurse yesterday per patient. There were no barriers to obtaining medications identified at this time. Referral to Pharm D needed: no     Current Outpatient Medications   Medication Sig    losartan (COZAAR) 50 mg tablet Take 50 mg by mouth daily.  metoprolol tartrate (LOPRESSOR) 25 mg tablet Take 0.5 Tabs by mouth every twelve (12) hours.  trimethoprim-sulfamethoxazole (BACTRIM DS, SEPTRA DS) 160-800 mg per tablet Take 1 Tab by mouth every twelve (12) hours for 7 days.  phenazopyridine (PYRIDIUM) 100 mg tablet Take 1 Tab by mouth three (3) times daily (after meals) for 5 days.     triamcinolone acetonide (KENALOG) 0.1 % topical cream APPLY THIN LAYER EXTERNALLY TO THE AFFECTED AREA TWICE DAILY    simvastatin (ZOCOR) 20 mg tablet TAKE 1 TABLET BY MOUTH EVERY NIGHT    levothyroxine (SYNTHROID) 75 mcg tablet TAKE 1 TABLET BY MOUTH DAILY BEFORE BREAKFAST    metFORMIN (GLUCOPHAGE) 1,000 mg tablet TAKE 1 TABLET BY MOUTH TWICE DAILY.  betamethasone dipropionate (DIPROSONE) 0.05 % topical cream betamethasone dipropionate 0.05 % topical cream    glucose blood VI test strips (FREESTYLE LITE STRIPS) strip use to check BS once daily    Blood-Glucose Meter (FREESTYLE LITE METER) monitoring kit use to check BS once daily    famotidine (PEPCID) 20 mg tablet famotidine 20 mg tablet    glipiZIDE (GLUCOTROL) 5 mg tablet glipizide 5 mg tablet TK 1 T PO BID  MEALS    hydroCHLOROthiazide (HYDRODIURIL) 25 mg tablet hydrochlorothiazide 25 mg tablet    lancets (FREESTYLE LANCETS) 28 gauge misc use to check BS once daily    celecoxib (CELEBREX) 200 mg capsule Take  by mouth two (2) times a day.  apixaban (ELIQUIS) 5 mg tablet TAKE 1 TABLET BY MOUTH TWICE DAILY.  ondansetron hcl (ZOFRAN) 4 mg tablet Take 1 Tab by mouth every eight (8) hours as needed for Nausea.  pantoprazole (PROTONIX) 40 mg tablet Take 1 Tab by mouth daily.  diphenoxylate-atropine (LOMOTIL) 2.5-0.025 mg per tablet Take 1 Tab by mouth four (4) times daily as needed for Diarrhea. Max Daily Amount: 4 Tabs.  cholecalciferol (VITAMIN D3) 1,000 unit tablet Take 2 Tabs by mouth daily. No current facility-administered medications for this visit. There are no discontinued medications.     [facility] follow up appointment(s):   Future Appointments   Date Time Provider Zuleika Ruvalcaba   11/6/2019 To Be Determined Sebastian Major OTR/TRISH [facility]   11/7/2019 To Be Determined Myke Rivas LPN [facility]   11/8/2019 To Be Determined Sindhu Miller PTA [facility]   11/11/2019 To Be Determined Sindhu Miller PTA [facility]   11/12/2019 To Be Determined Myke Rivas LPN [facility]   11/13/2019 To Be Determined Janae Alaniz [address]   11/13/2019  3:45 PM Meagan Hoff MD [facility] 11/14/2019 To Be Determined Sunita Matias, LPN [facility]   11/18/2019 To Be Determined Kassandra Patel, PTA [facility]   11/18/2019  2:30 PM Marita Faulkner DO [facility]   11/20/2019 To Be Determined Adams Hem, PT [address] [facility]   11/21/2019 To Be Determined Sunita Lamb, LPN [address] [facility]   11/28/2019 To Be Determined Sunita Lamb, LPN [address] [facility]   12/5/2019 To Be Determined Sunita Velazquezk, LPN [address] [facility]   12/12/2019 To Be Determined Sunita Matias, LPN [address] [facility]   12/19/2019 To Be Determined Sunita Matias, LPN [address]   12/28/2019 To Be Determined Vishal Boone RN [address] [place] [address]   2/20/2020  1:00 PM Tanner Parker MD [address] [place] [address]   11/3/2020  1:00 PM [facility]      Non-[facility] follow up appointment(s): Urologist on 11/13  [facility]:  [facility] [address] all medications as ordered      Patient instructed to take all medication as instructed. She is currently not taking the Glipizide as states sugar gets to low when she takes it. She will discuss with Dr. Dina Nielson when she sees him on the 18th. I have forwarded note to Dr. Dina Nielson regarding her symptoms and probably need for continued medication. William Hernandez.  Rodolfo, BSN, RN

## 2019-11-05 NOTE — TELEPHONE ENCOUNTER
Pt. Is requesting an order for [facility] to collect urine specimen due to recurring UTI? Pt. Provided phone number [phone].  Please assist.

## 2019-11-06 ENCOUNTER — PATIENT OUTREACH (OUTPATIENT)
Dept: FAMILY MEDICINE CLINIC | Age: 79
End: 2019-11-06

## 2019-11-06 ENCOUNTER — HOME CARE VISIT (OUTPATIENT)
Dept: SCHEDULING | Facility: HOME HEALTH | Age: 79
End: 2019-11-06
Payer: MEDICARE

## 2019-11-06 VITALS
TEMPERATURE: 98.2 F | SYSTOLIC BLOOD PRESSURE: 160 MMHG | DIASTOLIC BLOOD PRESSURE: 90 MMHG | OXYGEN SATURATION: 97 % | HEART RATE: 79 BPM

## 2019-11-06 DIAGNOSIS — R30.0 DYSURIA: Primary | ICD-10-CM

## 2019-11-06 PROCEDURE — G0152 HHCP-SERV OF OT,EA 15 MIN: HCPCS

## 2019-11-06 PROCEDURE — 3331090001 HH PPS REVENUE CREDIT

## 2019-11-06 PROCEDURE — 3331090002 HH PPS REVENUE DEBIT

## 2019-11-06 NOTE — PROGRESS NOTES
Hospital Discharge Follow-Up      Date/Time:  2019 1:15 PM    Patient was admitted to [facility] on 10/31/19 and discharged on 19 for UTI & Atrial fibrillation with RVR. Nurse Navigator (LOLA) contacted the patient by telephone to perform post hospital discharge assessment. Verified name and  with patient as identifiers. Provided introduction to self, and explanation of the Nurse Navigator role. Patient states that she is feeling much better today. Patient denied chest pain, shortness of breath, fever, chills, nausea,vomiting, diarrhea, abdominal pain,back pain, vaginal itching, blood in urine and burning sensation. Patient states that urine is still \" a bit cloudy but better . \"   Patient states that theres no more black flecks in her urine. Patient states that she was initially given 3 days worth of antibiotic and she didn't know that the hospital sent her a new Rx. Patient states that her son picked up her antibiotic for 7 days  from her pharmacy yesterday. Patient states that she started her taking her antibiotic yesterday. Hospital  AVS and Red flags were reviewed with Pt. And Pt. verbalzied understanding. Pt. Thanked us for follow up calls. Importance of attending Urology and PCP appointment were discussed with Pt. And Pt stated \" I will not miss any appointment this time. \"    Patient given transportation resources. Spent time listening to patient verbalizing her feelings about wanting to feel better for her son. Patient stated \" My son told me that if there is something happened to me that he wants to go with me too. \"     Patient denied Pt. Son  Verbalizing SI/HI. Patient states that she feels safe at home. Patient's states that she is aware to call 911 if Patient's son has SI/HI.     No questions, concerns, needs and assistance at this time as per Pt.       Patient reminded that there is a physician on call 24 hours a day / 7 days a week (M-F 5pm to 8am and from Friday 5pm until Monday 8a for the weekend) should the patient have questions or concerns. Patient reminded to call 911 if situation is emergent ( such as chest pain, shortness of breath, unstoppable bleeding, feeling of passing out,  worsening of symptoms)or patient feels the situation is emergent. Pt verbalizes understanding. Reviewed discharge instructions and red flags with patient who verbalized understanding. Patient given an opportunity to ask questions and does not have any further questions or concerns at this time. The patient agrees to contact the PCP office for questions related to their healthcare. NN provided contact information for future reference.     Goals      Attends follow-up appointments as directed. Patient will attend follow up appointment with PCP and Urology.  Prevent complications post hospitalization. Patient will attend follow up appointment with PCP and Urology. Patient will complete her antibiotic. Patient will call NN and/or PCP office for any questions, concerns and/or needs.  Takes all medications as ordered      Patient instructed to take all medication as instructed. She is currently not taking the Glipizide as states sugar gets to low when she takes it. She will discuss with Dr. Earnest Mcgee when she sees him on the 18th.  Understands red flags post discharge. Patient will go to the nearest emergency room for chest pain, shortness of breath, returning of symptoms that brought her to the emergency room and/or worsening of symptoms. Patient will contact PCP office for any questions or concerns related to healthcare.

## 2019-11-06 NOTE — TELEPHONE ENCOUNTER
Pt. Stated she does not need order for home health to  her urine. She stated the hospital sent 7 days of antibiotics, so she wants to see if it will clear up.  Please assist.

## 2019-11-06 NOTE — TELEPHONE ENCOUNTER
This needs to be done as a lab since it will be collected from outside and sent to the lab not in house. Please order as a lab thank you.

## 2019-11-07 LAB
BACTERIA SPEC CULT: NORMAL
SERVICE CMNT-IMP: NORMAL

## 2019-11-07 PROCEDURE — 3331090002 HH PPS REVENUE DEBIT

## 2019-11-07 PROCEDURE — 3331090001 HH PPS REVENUE CREDIT

## 2019-11-08 ENCOUNTER — HOME CARE VISIT (OUTPATIENT)
Dept: SCHEDULING | Facility: HOME HEALTH | Age: 79
End: 2019-11-08
Payer: MEDICARE

## 2019-11-08 VITALS
OXYGEN SATURATION: 94 % | TEMPERATURE: 97.5 F | DIASTOLIC BLOOD PRESSURE: 60 MMHG | HEART RATE: 86 BPM | SYSTOLIC BLOOD PRESSURE: 123 MMHG

## 2019-11-08 PROCEDURE — 3331090002 HH PPS REVENUE DEBIT

## 2019-11-08 PROCEDURE — 3331090001 HH PPS REVENUE CREDIT

## 2019-11-08 PROCEDURE — G0157 HHC PT ASSISTANT EA 15: HCPCS

## 2019-11-09 ENCOUNTER — HOME CARE VISIT (OUTPATIENT)
Dept: SCHEDULING | Facility: HOME HEALTH | Age: 79
End: 2019-11-09
Payer: MEDICARE

## 2019-11-09 VITALS
SYSTOLIC BLOOD PRESSURE: 146 MMHG | OXYGEN SATURATION: 98 % | HEART RATE: 72 BPM | RESPIRATION RATE: 20 BRPM | DIASTOLIC BLOOD PRESSURE: 84 MMHG | TEMPERATURE: 98.3 F

## 2019-11-09 PROCEDURE — 3331090002 HH PPS REVENUE DEBIT

## 2019-11-09 PROCEDURE — G0496 LPN CARE TRAIN/EDU IN HH: HCPCS

## 2019-11-09 PROCEDURE — 3331090001 HH PPS REVENUE CREDIT

## 2019-11-10 PROCEDURE — 3331090002 HH PPS REVENUE DEBIT

## 2019-11-10 PROCEDURE — 3331090001 HH PPS REVENUE CREDIT

## 2019-11-11 PROCEDURE — 3331090001 HH PPS REVENUE CREDIT

## 2019-11-11 PROCEDURE — 3331090002 HH PPS REVENUE DEBIT

## 2019-11-12 ENCOUNTER — HOME CARE VISIT (OUTPATIENT)
Dept: SCHEDULING | Facility: HOME HEALTH | Age: 79
End: 2019-11-12
Payer: MEDICARE

## 2019-11-12 VITALS
SYSTOLIC BLOOD PRESSURE: 146 MMHG | HEART RATE: 76 BPM | OXYGEN SATURATION: 98 % | DIASTOLIC BLOOD PRESSURE: 80 MMHG | RESPIRATION RATE: 20 BRPM | TEMPERATURE: 98.5 F

## 2019-11-12 VITALS
DIASTOLIC BLOOD PRESSURE: 78 MMHG | HEART RATE: 78 BPM | TEMPERATURE: 98.4 F | SYSTOLIC BLOOD PRESSURE: 132 MMHG | OXYGEN SATURATION: 97 %

## 2019-11-12 PROCEDURE — 3331090002 HH PPS REVENUE DEBIT

## 2019-11-12 PROCEDURE — G0496 LPN CARE TRAIN/EDU IN HH: HCPCS

## 2019-11-12 PROCEDURE — G0157 HHC PT ASSISTANT EA 15: HCPCS

## 2019-11-12 PROCEDURE — 3331090001 HH PPS REVENUE CREDIT

## 2019-11-13 ENCOUNTER — HOME CARE VISIT (OUTPATIENT)
Dept: HOME HEALTH SERVICES | Facility: HOME HEALTH | Age: 79
End: 2019-11-13
Payer: MEDICARE

## 2019-11-13 PROCEDURE — 3331090001 HH PPS REVENUE CREDIT

## 2019-11-13 PROCEDURE — 3331090002 HH PPS REVENUE DEBIT

## 2019-11-14 ENCOUNTER — HOME CARE VISIT (OUTPATIENT)
Dept: SCHEDULING | Facility: HOME HEALTH | Age: 79
End: 2019-11-14
Payer: MEDICARE

## 2019-11-14 VITALS
OXYGEN SATURATION: 98 % | DIASTOLIC BLOOD PRESSURE: 70 MMHG | HEART RATE: 73 BPM | SYSTOLIC BLOOD PRESSURE: 137 MMHG | TEMPERATURE: 98 F

## 2019-11-14 VITALS
SYSTOLIC BLOOD PRESSURE: 142 MMHG | TEMPERATURE: 97.6 F | OXYGEN SATURATION: 99 % | DIASTOLIC BLOOD PRESSURE: 76 MMHG | HEART RATE: 76 BPM | RESPIRATION RATE: 20 BRPM

## 2019-11-14 PROCEDURE — G0157 HHC PT ASSISTANT EA 15: HCPCS

## 2019-11-14 PROCEDURE — 3331090001 HH PPS REVENUE CREDIT

## 2019-11-14 PROCEDURE — 3331090002 HH PPS REVENUE DEBIT

## 2019-11-14 PROCEDURE — G0496 LPN CARE TRAIN/EDU IN HH: HCPCS

## 2019-11-15 ENCOUNTER — TELEPHONE (OUTPATIENT)
Dept: FAMILY MEDICINE CLINIC | Age: 79
End: 2019-11-15

## 2019-11-15 PROCEDURE — 3331090002 HH PPS REVENUE DEBIT

## 2019-11-15 PROCEDURE — 3331090001 HH PPS REVENUE CREDIT

## 2019-11-15 NOTE — TELEPHONE ENCOUNTER
Patient wants to switch from Dr. Braden Deshpande to Dr. Demond Hatch, is this ok per both providers? Please advise, thank you.

## 2019-11-15 NOTE — TELEPHONE ENCOUNTER
Received verbal from Dr. Braden Deshpande that it is ok to switch to Dr. Demond Hatch. Encounter closed.

## 2019-11-16 PROCEDURE — 3331090001 HH PPS REVENUE CREDIT

## 2019-11-16 PROCEDURE — 3331090002 HH PPS REVENUE DEBIT

## 2019-11-17 PROCEDURE — 3331090002 HH PPS REVENUE DEBIT

## 2019-11-17 PROCEDURE — 3331090001 HH PPS REVENUE CREDIT

## 2019-11-18 ENCOUNTER — OFFICE VISIT (OUTPATIENT)
Dept: FAMILY MEDICINE CLINIC | Age: 79
End: 2019-11-18

## 2019-11-18 ENCOUNTER — HOSPITAL ENCOUNTER (OUTPATIENT)
Dept: LAB | Age: 79
Discharge: HOME | End: 2019-11-18
Payer: MEDICARE

## 2019-11-18 VITALS
OXYGEN SATURATION: 97 % | HEART RATE: 68 BPM | DIASTOLIC BLOOD PRESSURE: 80 MMHG | WEIGHT: 154.2 LBS | SYSTOLIC BLOOD PRESSURE: 161 MMHG | HEIGHT: 60 IN | RESPIRATION RATE: 20 BRPM | BODY MASS INDEX: 30.27 KG/M2 | TEMPERATURE: 97.3 F

## 2019-11-18 DIAGNOSIS — R31.0 GROSS HEMATURIA: Primary | ICD-10-CM

## 2019-11-18 DIAGNOSIS — N39.0 RECURRENT UTI: ICD-10-CM

## 2019-11-18 DIAGNOSIS — I48.11 LONGSTANDING PERSISTENT ATRIAL FIBRILLATION (HCC): ICD-10-CM

## 2019-11-18 DIAGNOSIS — R31.0 GROSS HEMATURIA: ICD-10-CM

## 2019-11-18 LAB
ANION GAP SERPL CALC-SCNC: 9 MMOL/L (ref 3–18)
APPEARANCE UR: ABNORMAL
BACTERIA URNS QL MICRO: ABNORMAL /HPF
BASOPHILS # BLD: 0 K/UL (ref 0–0.1)
BASOPHILS NFR BLD: 0 % (ref 0–2)
BILIRUB UR QL: NEGATIVE
BUN SERPL-MCNC: 12 MG/DL (ref 7–18)
BUN/CREAT SERPL: 14 (ref 12–20)
CALCIUM SERPL-MCNC: 8.9 MG/DL (ref 8.5–10.1)
CHLORIDE SERPL-SCNC: 107 MMOL/L (ref 100–111)
CO2 SERPL-SCNC: 23 MMOL/L (ref 21–32)
COLOR UR: ABNORMAL
CREAT SERPL-MCNC: 0.86 MG/DL (ref 0.6–1.3)
DIFFERENTIAL METHOD BLD: ABNORMAL
EOSINOPHIL # BLD: 0.1 K/UL (ref 0–0.4)
EOSINOPHIL NFR BLD: 1 % (ref 0–5)
EPITH CASTS URNS QL MICRO: ABNORMAL /LPF (ref 0–5)
ERYTHROCYTE [DISTWIDTH] IN BLOOD BY AUTOMATED COUNT: 14.1 % (ref 11.6–14.5)
GLUCOSE SERPL-MCNC: 110 MG/DL (ref 74–99)
GLUCOSE UR STRIP.AUTO-MCNC: NEGATIVE MG/DL
HCT VFR BLD AUTO: 37.7 % (ref 35–45)
HGB BLD-MCNC: 11.7 G/DL (ref 12–16)
HGB UR QL STRIP: ABNORMAL
KETONES UR QL STRIP.AUTO: ABNORMAL MG/DL
LEUKOCYTE ESTERASE UR QL STRIP.AUTO: ABNORMAL
LYMPHOCYTES # BLD: 2.5 K/UL (ref 0.9–3.6)
LYMPHOCYTES NFR BLD: 30 % (ref 21–52)
MCH RBC QN AUTO: 28.5 PG (ref 24–34)
MCHC RBC AUTO-ENTMCNC: 31 G/DL (ref 31–37)
MCV RBC AUTO: 92 FL (ref 74–97)
MONOCYTES # BLD: 0.5 K/UL (ref 0.05–1.2)
MONOCYTES NFR BLD: 6 % (ref 3–10)
NEUTS SEG # BLD: 5.3 K/UL (ref 1.8–8)
NEUTS SEG NFR BLD: 63 % (ref 40–73)
NITRITE UR QL STRIP.AUTO: NEGATIVE
PH UR STRIP: 5 [PH] (ref 5–8)
PLATELET # BLD AUTO: 285 K/UL (ref 135–420)
PMV BLD AUTO: 10 FL (ref 9.2–11.8)
POTASSIUM SERPL-SCNC: 4.6 MMOL/L (ref 3.5–5.5)
PROT UR STRIP-MCNC: 100 MG/DL
RBC # BLD AUTO: 4.1 M/UL (ref 4.2–5.3)
RBC #/AREA URNS HPF: ABNORMAL /HPF (ref 0–5)
SODIUM SERPL-SCNC: 139 MMOL/L (ref 136–145)
SP GR UR REFRACTOMETRY: 1.02 (ref 1–1.03)
UROBILINOGEN UR QL STRIP.AUTO: 1 EU/DL (ref 0.2–1)
WBC # BLD AUTO: 8.5 K/UL (ref 4.6–13.2)
WBC URNS QL MICRO: ABNORMAL /HPF (ref 0–4)

## 2019-11-18 PROCEDURE — 3331090002 HH PPS REVENUE DEBIT

## 2019-11-18 PROCEDURE — 81001 URINALYSIS AUTO W/SCOPE: CPT

## 2019-11-18 PROCEDURE — 87086 URINE CULTURE/COLONY COUNT: CPT

## 2019-11-18 PROCEDURE — 85025 COMPLETE CBC W/AUTO DIFF WBC: CPT

## 2019-11-18 PROCEDURE — 36415 COLL VENOUS BLD VENIPUNCTURE: CPT

## 2019-11-18 PROCEDURE — 80048 BASIC METABOLIC PNL TOTAL CA: CPT

## 2019-11-18 PROCEDURE — 3331090001 HH PPS REVENUE CREDIT

## 2019-11-18 NOTE — PROGRESS NOTES
Room #    Chief Complaint:  Blood in urine    HPI:    Diya Nicole is a [de-identified] y.o. female who presents today for c/o blood in urine    1. Have you been to the ER, urgent care clinic since your last visit? Hospitalized since your last visit? NO    2. Have you seen or consulted any other health care providers outside of the [address] [place] [address] since your last visit? Include any pap smears or colon screening. NO  When :  Reason:    Health Maintenance reviewed Yes    Health Maintenance Due   Topic Date Due    EYE EXAM RETINAL OR DILATED  07/04/1950    GLAUCOMA SCREENING Q2Y  07/04/2004    MICROALBUMIN Q1  10/10/2019    LIPID PANEL Q1  10/10/2019

## 2019-11-19 ENCOUNTER — HOME CARE VISIT (OUTPATIENT)
Dept: SCHEDULING | Facility: HOME HEALTH | Age: 79
End: 2019-11-19
Payer: MEDICARE

## 2019-11-19 VITALS
DIASTOLIC BLOOD PRESSURE: 78 MMHG | OXYGEN SATURATION: 98 % | SYSTOLIC BLOOD PRESSURE: 132 MMHG | HEART RATE: 74 BPM | TEMPERATURE: 98.4 F

## 2019-11-19 PROCEDURE — G0157 HHC PT ASSISTANT EA 15: HCPCS

## 2019-11-19 PROCEDURE — 3331090002 HH PPS REVENUE DEBIT

## 2019-11-19 PROCEDURE — 3331090001 HH PPS REVENUE CREDIT

## 2019-11-19 NOTE — PROGRESS NOTES
Impression / Plan     Diagnoses and all orders for this visit:    1. Gross hematuria  -     URINALYSIS W/ RFLX MICROSCOPIC; Future  -     CULTURE, URINE; Future  -     REFERRAL TO UROLOGY  -     CT UROGRAM W WO CONT; Future  -     METABOLIC PANEL, BASIC; Future  -     CBC WITH AUTOMATED DIFF; Future    2. Recurrent UTI    3. Longstanding persistent atrial fibrillation    Plan: Gross Hematuria, On Eliquis, Recurrent UTI, History of Bladder Calculus, will move forward with CT Urogram, BMP, CBC, and consult with Urology for Cystoscopy, further evaluation. Follow-up and Dispositions    · Return if symptoms worsen or fail to improve. HPI   Britton Saint is a [de-identified] y. o.female presenting for evaluation of Gross Hematuria and Recurrent UTI. Discharge from [facility] 10/31/2019, given Bactrim. Denies dysuria, however, continues to have frequency and urgency. On Eliquis for AF. Missed appointment with Urology, Follow Up pending 12/11/2019. Denies DUB, Postmenopausal Bleeding. No melena or hematochezia. Renal US 9/6/2019 shows Bladder Calculus. Review of Systems   Constitutional: Negative. HENT: Negative. Eyes: Negative. Respiratory: Negative. Cardiovascular: Negative. Gastrointestinal: Negative. Genitourinary: Positive for frequency, hematuria and urgency. Negative for dysuria and flank pain. Musculoskeletal: Negative for back pain. Skin: Negative. Neurological: Negative. Endo/Heme/Allergies: Negative. Psychiatric/Behavioral: Negative. Medications     Outpatient Medications Prior to Visit   Medication Sig Dispense Refill    cranberry 500 mg capsule Take 500 mg by mouth daily.  metoprolol tartrate (LOPRESSOR) 25 mg tablet Take 0.5 Tabs by mouth every twelve (12) hours.  30 Tab 0    triamcinolone acetonide (KENALOG) 0.1 % topical cream APPLY THIN LAYER EXTERNALLY TO THE AFFECTED AREA TWICE DAILY 15 g 0    simvastatin (ZOCOR) 20 mg tablet TAKE 1 TABLET BY MOUTH EVERY NIGHT 90 Tab 0    levothyroxine (SYNTHROID) 75 mcg tablet TAKE 1 TABLET BY MOUTH DAILY BEFORE BREAKFAST 90 Tab 0    metFORMIN (GLUCOPHAGE) 1,000 mg tablet TAKE 1 TABLET BY MOUTH TWICE DAILY. 180 Tab 0    betamethasone dipropionate (DIPROSONE) 0.05 % topical cream betamethasone dipropionate 0.05 % topical cream      glucose blood VI test strips (FREESTYLE LITE STRIPS) strip use to check BS once daily      famotidine (PEPCID) 20 mg tablet famotidine 20 mg tablet      hydroCHLOROthiazide (HYDRODIURIL) 25 mg tablet hydrochlorothiazide 25 mg tablet      apixaban (ELIQUIS) 5 mg tablet TAKE 1 TABLET BY MOUTH TWICE DAILY. 60 Tab 6    ondansetron hcl (ZOFRAN) 4 mg tablet Take 1 Tab by mouth every eight (8) hours as needed for Nausea. 30 Tab 1    pantoprazole (PROTONIX) 40 mg tablet Take 1 Tab by mouth daily. 90 Tab 3    diphenoxylate-atropine (LOMOTIL) 2.5-0.025 mg per tablet Take 1 Tab by mouth four (4) times daily as needed for Diarrhea. Max Daily Amount: 4 Tabs. 30 Tab 1    cholecalciferol (VITAMIN D3) 1,000 unit tablet Take 2 Tabs by mouth daily. 60 Tab 0     No facility-administered medications prior to visit.          Allergies     Allergies   Allergen Reactions    Ampicillin Itching       Problem List     Patient Active Problem List    Diagnosis Date Noted    Hypomagnesemia 10/31/2019    Acute hypotension 10/31/2019    Atrial fibrillation with RVR ([address] 03/04/2018    Atrial fibrillation with rapid ventricular response ([address] 03/03/2018    Hypertension 03/03/2018    Type 2 diabetes mellitus with nephropathy ([address] 01/23/2018    Hip fracture ([address] 10/09/2017    Meningioma ([address] 07/25/2017    Hypothyroidism due to acquired atrophy of thyroid 06/13/2017    Episcleritis of right eye 05/17/2017    Pancreatitis 11/07/2016    Acute pancreatitis 11/07/2016    Diverticulitis 11/07/2016    Hypothyroid 02/24/2014    HTN (hypertension) 02/24/2014    Obesity 02/24/2014    DM (diabetes mellitus) ([address] 02/24/2014  Family hx-breast malignancy 02/24/2014        Medical / Surgical / Family History     Past Medical History:   Diagnosis Date    Aortic stenosis     Patient denies on 10/31/2019.  Arthritis     Atrial flutter ([facility]     Bronchitis     Resolved after getting rid of her pet bird in 2000.  Diabetes ([facility]     Diverticulitis     Diverticulitis     GERD (gastroesophageal reflux disease)     History of recurrent UTIs     From 9/2019 to 11/2019    Hypercholesterolemia     Hypertension     Hypothyroid     Menopause     Pancreatitis     Peripheral neuropathy     Patient denies on 10/31/2019. Past Surgical History:   Procedure Laterality Date    COLONOSCOPY N/A 2/2/2017    COLONOSCOPY  w/bx polyp performed by Alicia Jaime MD at [address] [facility] [address] Left 2017     Family History   Problem Relation Age of Onset    Diabetes Mother     Coronary Artery Disease Mother     Cancer Father         melanoma    Stroke Sister     Hypertension Sister     Diabetes Sister     Diabetes Brother     Coronary Artery Disease Brother     Breast Cancer Paternal Grandmother         older, but not sure age.  Breast Cancer Paternal Aunt         and gyn cancer ?age   Mya Horn No Known Problems Sister     No Known Problems Brother     Kidney Disease Sister     Heart Failure Sister      Social History     Social History     Socioeconomic History    Marital status: SINGLE     Spouse name: Not on file    Number of children: Not on file    Years of education: Not on file    Highest education level: Not on file   Occupational History    Not on file   Social Needs    Financial resource strain: Not on file    Food insecurity:     Worry: Not on file     Inability: Not on file    Transportation needs:     Medical: Not on file     Non-medical: Not on file   Tobacco Use    Smoking status: Never Smoker    Smokeless tobacco: Never Used   Substance and Sexual Activity    Alcohol use: No    Drug use:  No  Sexual activity: Not Currently     Partners: Male   Lifestyle    Physical activity:     Days per week: Not on file     Minutes per session: Not on file    Stress: Not on file   Relationships    Social connections:     Talks on phone: Not on file     Gets together: Not on file     Attends [employer] service: Not on file     Active member of club or organization: Not on file     Attends meetings of clubs or organizations: Not on file     Relationship status: Not on file    Intimate partner violence:     Fear of current or ex partner: Not on file     Emotionally abused: Not on file     Physically abused: Not on file     Forced sexual activity: Not on file   Other Topics Concern     Service Not Asked    Blood Transfusions Not Asked    Caffeine Concern Not Asked    Occupational Exposure Not Asked   Dellar Maidens Hazards Not Asked    Sleep Concern Not Asked    Stress Concern Not Asked    Weight Concern Not Asked    Special Diet Not Asked    Back Care Not Asked    Exercise Not Asked    Bike Helmet Not Asked   [address] [facility] Not Asked    Self-Exams Not Asked   Social History Narrative    Not on file     ROS   Review of Systems    10 Element ROS negative unless specifically stated in History of Present Illness.      Health Maintenance     Health Maintenance   Topic Date Due    EYE EXAM RETINAL OR DILATED  07/04/1949    GLAUCOMA SCREENING Q2Y  07/04/2004    MICROALBUMIN Q1  10/10/2019    LIPID PANEL Q1  10/10/2019    Shingrix Vaccine Age 50> (1 of 2) 06/05/2020 (Originally 7/4/1989)    MEDICARE YEARLY EXAM  01/18/2020    HEMOGLOBIN A1C Q6M  05/01/2020    FOOT EXAM Q1  07/24/2020    DTaP/Tdap/Td series (2 - Td) 05/13/2027    Bone Densitometry (Dexa) Screening  Completed    Influenza Age 5 to Adult  Completed    Pneumococcal 65+ years  Completed     Physical Exam   /80 (BP 1 Location: Right arm, BP Patient Position: Sitting)   Pulse 68   Temp 97.3 °F (36.3 °C) (Oral)   Resp 20   Ht 5' (1.524 m)   Wt 154 lb 3.2 oz (69.9 kg)   SpO2 97%   BMI 30.12 kg/m²     Physical Exam   Constitutional: She is oriented to person, place, and time. She appears well-developed and well-nourished. No distress. HENT:   Head: Normocephalic and atraumatic. Eyes: Pupils are equal, round, and reactive to light. Conjunctivae and EOM are normal.   Cardiovascular: Normal rate, regular rhythm, normal heart sounds and intact distal pulses. No murmur heard. Pulmonary/Chest: Effort normal and breath sounds normal. No respiratory distress. She has no wheezes. Abdominal: Soft. Bowel sounds are normal. She exhibits no distension and no mass. There is no tenderness. Neurological: She is alert and oriented to person, place, and time. No cranial nerve deficit. Coordination normal.   Skin: Skin is warm and dry. No rash noted. No erythema. Psychiatric: She has a normal mood and affect.  Her behavior is normal.     Ortho Exam    Elisa Sauer DO

## 2019-11-20 ENCOUNTER — HOME CARE VISIT (OUTPATIENT)
Dept: HOME HEALTH SERVICES | Facility: HOME HEALTH | Age: 79
End: 2019-11-20
Payer: MEDICARE

## 2019-11-20 ENCOUNTER — HOME CARE VISIT (OUTPATIENT)
Dept: SCHEDULING | Facility: HOME HEALTH | Age: 79
End: 2019-11-20
Payer: MEDICARE

## 2019-11-20 LAB
BACTERIA SPEC CULT: NORMAL
SERVICE CMNT-IMP: NORMAL

## 2019-11-20 PROCEDURE — 3331090001 HH PPS REVENUE CREDIT

## 2019-11-20 PROCEDURE — G0496 LPN CARE TRAIN/EDU IN HH: HCPCS

## 2019-11-20 PROCEDURE — 3331090002 HH PPS REVENUE DEBIT

## 2019-11-20 PROCEDURE — G0151 HHCP-SERV OF PT,EA 15 MIN: HCPCS

## 2019-11-21 VITALS
HEART RATE: 76 BPM | OXYGEN SATURATION: 97 % | SYSTOLIC BLOOD PRESSURE: 138 MMHG | DIASTOLIC BLOOD PRESSURE: 78 MMHG | TEMPERATURE: 98.9 F | RESPIRATION RATE: 20 BRPM

## 2019-11-21 PROCEDURE — 3331090001 HH PPS REVENUE CREDIT

## 2019-11-21 PROCEDURE — 3331090002 HH PPS REVENUE DEBIT

## 2019-11-22 ENCOUNTER — HOSPITAL ENCOUNTER (OUTPATIENT)
Dept: CT IMAGING | Age: 79
Discharge: HOME | End: 2019-11-22
Attending: INTERNAL MEDICINE
Payer: MEDICARE

## 2019-11-22 DIAGNOSIS — R31.0 GROSS HEMATURIA: ICD-10-CM

## 2019-11-22 LAB — CREAT UR-MCNC: 0.7 MG/DL (ref 0.6–1.3)

## 2019-11-22 PROCEDURE — 3331090001 HH PPS REVENUE CREDIT

## 2019-11-22 PROCEDURE — 74011636320 HC RX REV CODE- 636/320: Performed by: INTERNAL MEDICINE

## 2019-11-22 PROCEDURE — 3331090002 HH PPS REVENUE DEBIT

## 2019-11-22 PROCEDURE — 82565 ASSAY OF CREATININE: CPT

## 2019-11-22 PROCEDURE — 74178 CT ABD&PLV WO CNTR FLWD CNTR: CPT

## 2019-11-22 RX ADMIN — IOPAMIDOL 124 ML: 612 INJECTION, SOLUTION INTRAVENOUS at 14:31

## 2019-11-23 PROCEDURE — 3331090002 HH PPS REVENUE DEBIT

## 2019-11-23 PROCEDURE — 3331090001 HH PPS REVENUE CREDIT

## 2019-11-24 PROCEDURE — 3331090001 HH PPS REVENUE CREDIT

## 2019-11-24 PROCEDURE — 3331090002 HH PPS REVENUE DEBIT

## 2019-11-25 PROCEDURE — 3331090001 HH PPS REVENUE CREDIT

## 2019-11-25 PROCEDURE — 3331090002 HH PPS REVENUE DEBIT

## 2019-11-26 ENCOUNTER — TELEPHONE (OUTPATIENT)
Dept: FAMILY MEDICINE CLINIC | Age: 79
End: 2019-11-26

## 2019-11-26 DIAGNOSIS — K57.90 DIVERTICULOSIS: ICD-10-CM

## 2019-11-26 DIAGNOSIS — L98.8 FISTULA: Primary | ICD-10-CM

## 2019-11-26 PROCEDURE — 3331090002 HH PPS REVENUE DEBIT

## 2019-11-26 PROCEDURE — 3331090001 HH PPS REVENUE CREDIT

## 2019-11-26 NOTE — TELEPHONE ENCOUNTER
Spoke to patient, patient was advised that she could be seen by a doctor or go to the ED but she refused and stated that she wanted antibiotics she also stated that she is not being helped with her issue and that she has this too long. She just wants the antibiotics that she was given in the ED previously (this seemed to help her she said\") to tide her over until her appointment with Urology on 12/12. Consulted with Dr. Melissa Adame and patient has been schedule for an appointment on 11/26/19.

## 2019-11-26 NOTE — TELEPHONE ENCOUNTER
Patient is still experiencing a lot of blood while peeing, patient cannot get into her urologist until 12/12/19. Patient is unsure what to do at this point. Please advise, thank you.

## 2019-11-27 ENCOUNTER — TELEPHONE (OUTPATIENT)
Dept: SURGERY | Age: 79
End: 2019-11-27

## 2019-11-27 ENCOUNTER — OFFICE VISIT (OUTPATIENT)
Dept: SURGERY | Age: 79
End: 2019-11-27

## 2019-11-27 ENCOUNTER — OFFICE VISIT (OUTPATIENT)
Dept: FAMILY MEDICINE CLINIC | Age: 79
End: 2019-11-27

## 2019-11-27 VITALS
DIASTOLIC BLOOD PRESSURE: 82 MMHG | HEIGHT: 60 IN | WEIGHT: 154 LBS | TEMPERATURE: 97.5 F | SYSTOLIC BLOOD PRESSURE: 172 MMHG | BODY MASS INDEX: 30.23 KG/M2 | OXYGEN SATURATION: 99 % | HEART RATE: 70 BPM

## 2019-11-27 VITALS
BODY MASS INDEX: 30.35 KG/M2 | WEIGHT: 154.6 LBS | HEART RATE: 73 BPM | RESPIRATION RATE: 20 BRPM | DIASTOLIC BLOOD PRESSURE: 78 MMHG | HEIGHT: 60 IN | SYSTOLIC BLOOD PRESSURE: 173 MMHG | OXYGEN SATURATION: 96 % | TEMPERATURE: 97.6 F

## 2019-11-27 DIAGNOSIS — R10.84 GENERALIZED ABDOMINAL PAIN: ICD-10-CM

## 2019-11-27 DIAGNOSIS — K57.90 DIVERTICULOSIS: ICD-10-CM

## 2019-11-27 DIAGNOSIS — N39.0 RECURRENT UTI: ICD-10-CM

## 2019-11-27 DIAGNOSIS — R39.89 PNEUMATURIA: ICD-10-CM

## 2019-11-27 DIAGNOSIS — L98.8 FISTULA: Primary | ICD-10-CM

## 2019-11-27 DIAGNOSIS — N32.1 COLO-VESICAL FISTULA: Primary | ICD-10-CM

## 2019-11-27 DIAGNOSIS — K57.92 DIVERTICULITIS: ICD-10-CM

## 2019-11-27 DIAGNOSIS — K63.2 COLONIC FISTULA: ICD-10-CM

## 2019-11-27 PROCEDURE — 3331090001 HH PPS REVENUE CREDIT

## 2019-11-27 PROCEDURE — 3331090002 HH PPS REVENUE DEBIT

## 2019-11-27 NOTE — PROGRESS NOTES
Room #  4  Chief Complaint:  Vaginal Bleed    HPI:    Kelsey Ness is a [de-identified] y.o. female who presents today for c/o Vaginal Bleed    1. Have you been to the ER, urgent care clinic since your last visit? Hospitalized since your last visit? NO    2. Have you seen or consulted any other health care providers outside of the [address] [place] [address] since your last visit? Include any pap smears or colon screening. NO  When :  Reason:    Health Maintenance reviewed Yes    Health Maintenance Due   Topic Date Due    EYE EXAM RETINAL OR DILATED  07/04/1949    GLAUCOMA SCREENING Q2Y  07/04/2004    MICROALBUMIN Q1  10/10/2019    LIPID PANEL Q1  10/10/2019

## 2019-11-27 NOTE — TELEPHONE ENCOUNTER
Discussed condition and exacerbating conditions/situations (e.g., dry/arid environments, overhead fans, air conditioners, side effect of medications). Patient has an appointment with Dr. Riri Hernández on Tuesday stating she has very bad diarrhea and has been taking her medication prescribed to her for it but is not helping at all. She stated she just cant stop using the restroom and wants to know what to do. It has been happening the past few days. Please advise, thank you.

## 2019-11-27 NOTE — PATIENT INSTRUCTIONS
If you have any questions or concerns about today's appointment, the verbal and/or written instructions you were given for follow up care, please call our office at [phone].     [facility] - [address]    [phone] office  [phone]    Please call Joseph Mackenzie at [phone] for assistance with scheduling appointment with Gastroenterologist/Urologist.

## 2019-11-27 NOTE — PROGRESS NOTES
Arabella Loza is a [de-identified] y.o. female (: 1940) presenting to address:    Chief Complaint   Patient presents with    New Patient     Fistula on colon       Medication list and allergies have been reviewed with Arabella Loza and updated as of today's date. I have gone over all Medical, Surgical and Social History with Hernesto Agrawal and updated/added the information accordingly.

## 2019-11-28 ENCOUNTER — HOME CARE VISIT (OUTPATIENT)
Dept: SCHEDULING | Facility: HOME HEALTH | Age: 79
End: 2019-11-28
Payer: MEDICARE

## 2019-11-28 VITALS
RESPIRATION RATE: 20 BRPM | SYSTOLIC BLOOD PRESSURE: 140 MMHG | TEMPERATURE: 98.1 F | DIASTOLIC BLOOD PRESSURE: 90 MMHG | HEART RATE: 76 BPM | OXYGEN SATURATION: 98 %

## 2019-11-28 PROCEDURE — 3331090002 HH PPS REVENUE DEBIT

## 2019-11-28 PROCEDURE — 3331090001 HH PPS REVENUE CREDIT

## 2019-11-28 PROCEDURE — G0496 LPN CARE TRAIN/EDU IN HH: HCPCS

## 2019-11-29 PROCEDURE — 3331090001 HH PPS REVENUE CREDIT

## 2019-11-29 PROCEDURE — 3331090002 HH PPS REVENUE DEBIT

## 2019-11-29 NOTE — PROGRESS NOTES
General Surgery Consult    [facility]  Admit date: (Not on file)    MRN: [id number]     : 1940     Age: [de-identified] y.o. Attending Physician: Simona Christianson MD, [facility]      History of Present Illness:       Alexander Curtis is a [de-identified] y.o. female who is presenting with a very difficult clinical situation. Today she was next-door at the PCP office and they asked me if I can see her urgently in the clinic. The patient has a long list of medical problems and she has been having chronic abdominal pain for years. She has stated that she had recurrent abdominal pain and UTI. Even when I reviewed her chart there is CT scan dating back to more than 5 years that showed diverticulitis with thickening of the sigmoid colon. She has been seen multiple times by urology and the GI team and she stated that she had a cystoscopy as well as a colonoscopy 2 years ago at the [facility]. She said that the colonoscopy was normal .  However she continued to have significant abdominal pain with UTI. In the last year or so she has noticed air in her urine. She stated that she feels like she \"I farts when I pee\". 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 She is going to see the urology team next week. The patient has stated that she lives with her son and she is concerned about the cost of that major clinical condition. She was visibly stressed throughout the whole interview. She denies any previous abdominal surgeries.     Patient Active Problem List    Diagnosis Date Noted    Hypomagnesemia 10/31/2019    Acute hypotension 10/31/2019    Atrial fibrillation with RVR ([address] 2018    Atrial fibrillation with rapid ventricular response ([address] 2018    Hypertension 2018    Type 2 diabetes mellitus with nephropathy ([address] 2018    Hip fracture ([address] 10/09/2017    Meningioma ([address] 2017    Hypothyroidism due to acquired atrophy of thyroid 06/13/2017    Episcleritis of right eye 05/17/2017    Pancreatitis 11/07/2016    Acute pancreatitis 11/07/2016    Diverticulitis 11/07/2016    Hypothyroid 02/24/2014    HTN (hypertension) 02/24/2014    Obesity 02/24/2014    DM (diabetes mellitus) ([facility] 02/24/2014    Family hx-breast malignancy 02/24/2014     Past Medical History:   Diagnosis Date    Aortic stenosis     Patient denies on 10/31/2019.  Arthritis     Atrial flutter ([facility]     Bronchitis     Resolved after getting rid of her pet bird in 2000.  Diabetes ([facility]     Diverticulitis     Diverticulitis     GERD (gastroesophageal reflux disease)     History of recurrent UTIs     From 9/2019 to 11/2019    Hypercholesterolemia     Hypertension     Hypothyroid     Menopause     Pancreatitis     Peripheral neuropathy     Patient denies on 10/31/2019. Past Surgical History:   Procedure Laterality Date    COLONOSCOPY N/A 2/2/2017    COLONOSCOPY  w/bx polyp performed by Helen Patrick MD at [address] [facility] Left 2017      Social History     Tobacco Use    Smoking status: Never Smoker    Smokeless tobacco: Never Used   Substance Use Topics    Alcohol use: No      Social History     Tobacco Use   Smoking Status Never Smoker   Smokeless Tobacco Never Used     Family History   Problem Relation Age of Onset    Diabetes Mother     Coronary Artery Disease Mother     Cancer Father         melanoma    Stroke Sister     Hypertension Sister     Diabetes Sister     Diabetes Brother     Coronary Artery Disease Brother     Breast Cancer Paternal Grandmother         older, but not sure age.  Breast Cancer Paternal Aunt         and gyn cancer ?age   [address] No Known Problems Sister     No Known Problems Brother     Kidney Disease Sister     Heart Failure Sister       Current Outpatient Medications   Medication Sig    cranberry 500 mg capsule Take 500 mg by mouth daily.     metoprolol tartrate (LOPRESSOR) 25 mg tablet Take 0.5 Tabs by mouth every twelve (12) hours.  triamcinolone acetonide (KENALOG) 0.1 % topical cream APPLY THIN LAYER EXTERNALLY TO THE AFFECTED AREA TWICE DAILY    simvastatin (ZOCOR) 20 mg tablet TAKE 1 TABLET BY MOUTH EVERY NIGHT    levothyroxine (SYNTHROID) 75 mcg tablet TAKE 1 TABLET BY MOUTH DAILY BEFORE BREAKFAST    metFORMIN (GLUCOPHAGE) 1,000 mg tablet TAKE 1 TABLET BY MOUTH TWICE DAILY.  betamethasone dipropionate (DIPROSONE) 0.05 % topical cream betamethasone dipropionate 0.05 % topical cream    glucose blood VI test strips (FREESTYLE LITE STRIPS) strip use to check BS once daily    famotidine (PEPCID) 20 mg tablet famotidine 20 mg tablet    hydroCHLOROthiazide (HYDRODIURIL) 25 mg tablet hydrochlorothiazide 25 mg tablet    apixaban (ELIQUIS) 5 mg tablet TAKE 1 TABLET BY MOUTH TWICE DAILY.  ondansetron hcl (ZOFRAN) 4 mg tablet Take 1 Tab by mouth every eight (8) hours as needed for Nausea.  pantoprazole (PROTONIX) 40 mg tablet Take 1 Tab by mouth daily.  diphenoxylate-atropine (LOMOTIL) 2.5-0.025 mg per tablet Take 1 Tab by mouth four (4) times daily as needed for Diarrhea. Max Daily Amount: 4 Tabs.  cholecalciferol (VITAMIN D3) 1,000 unit tablet Take 2 Tabs by mouth daily. No current facility-administered medications for this visit.        Allergies   Allergen Reactions    Ampicillin Itching        Review of Systems:  Constitutional: positive for fatigue  Eyes: negative  Ears, Nose, Mouth, Throat, and Face: negative  Respiratory: negative  Cardiovascular: negative  Gastrointestinal: positive for abdominal pain  Genitourinary:positive for dysuria  Integument/Breast: negative  Hematologic/Lymphatic: negative  Musculoskeletal:negative  Neurological: negative  Behavioral/Psychiatric: negative  Endocrine: negative  Allergic/Immunologic: negative    Objective:     Visit Vitals  /82 (BP 1 Location: Left arm, BP Patient Position: Sitting)   Pulse 70 Temp 97.5 °F (36.4 °C) (Oral)   Ht 5' (1.524 m)   Wt 69.9 kg (154 lb)   SpO2 99%   BMI 30.08 kg/m²       Physical Exam:      General:  in no apparent distress, alert, oriented times 3, normal vitals, anicteric and cooperative   Eyes:  conjunctivae and sclerae normal, pupils equal, round, reactive to light   Throat & Neck: no erythema or exudates noted and neck supple and symmetrical; no palpable masses   Lungs:   clear to auscultation bilaterally   Heart:  Regular rate and rhythm   Abdomen:   rounded and obese, soft, nontender, nondistended, no masses or organomegaly. No evidence of any abdominal wall hernias. Extremities: extremities normal, atraumatic, no cyanosis or edema   Skin: Normal.       Imaging and Lab Review:     CBC:   Lab Results   Component Value Date/Time    WBC 8.5 11/18/2019 02:27 PM    RBC 4.10 (L) 11/18/2019 02:27 PM    HGB 11.7 (L) 11/18/2019 02:27 PM    HCT 37.7 11/18/2019 02:27 PM    PLATELET 952 55/55/1930 02:27 PM     BMP:   Lab Results   Component Value Date/Time    Glucose 110 (H) 11/18/2019 02:27 PM    Sodium 139 11/18/2019 02:27 PM    Potassium 4.6 11/18/2019 02:27 PM    Chloride 107 11/18/2019 02:27 PM    CO2 23 11/18/2019 02:27 PM    BUN 12 11/18/2019 02:27 PM    Creatinine 0.86 11/18/2019 02:27 PM    Calcium 8.9 11/18/2019 02:27 PM     CMP:  Lab Results   Component Value Date/Time    Glucose 110 (H) 11/18/2019 02:27 PM    Sodium 139 11/18/2019 02:27 PM    Potassium 4.6 11/18/2019 02:27 PM    Chloride 107 11/18/2019 02:27 PM    CO2 23 11/18/2019 02:27 PM    BUN 12 11/18/2019 02:27 PM    Creatinine 0.86 11/18/2019 02:27 PM    Calcium 8.9 11/18/2019 02:27 PM    Anion gap 9 11/18/2019 02:27 PM    BUN/Creatinine ratio 14 11/18/2019 02:27 PM    Alk.  phosphatase 68 10/31/2019 05:53 PM    Protein, total 7.0 10/31/2019 05:53 PM    Albumin 3.1 (L) 10/31/2019 05:53 PM    Globulin 3.9 10/31/2019 05:53 PM    A-G Ratio 0.8 10/31/2019 05:53 PM       No results found for this or any previous visit (from the past 24 hour(s)). images and reports reviewed    Assessment:   Jennifer Hernandez is a [de-identified] y.o. female is presenting with very difficult surgical disease. The patient has multiple medical problems and comorbidities and she is been having recurrent diverticulitis with chronic abdominal pain and recurrent UTI . 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 She is having hematuria and pneumaturia as well. I had a long discussion with the patient that she will need a major surgery including the possibility of having a colostomy. I explained to her that her surgery could include urology as well because the of the need for resection of the colovesical fistula itself. I explained to her that we will attempt to do a colonic resection with anastomosis however there is a high chance she will end up with a colostomy because of the chronic infection and inflammation and the risk of leak . I explained to her that this decision will be made intraoperatively . The patient is going to see the urology team next week. Though she had a colonoscopy 2 years ago by Dr. Jessica Santana I still recommended that she go follow-up with him for the possible need for another colonoscopy to make sure there is no other pathology before we embark into the surgery.      Plan:     Follow-up visit with the urology team to discuss them recently diagnosed colovesical fistula  Follow-up with the GI team to decide if there is a reason to repeat the colonoscopy  Will discuss the case with the urology team and with the patient I can because of the need of major surgical resection including a sigmoid colectomy with possible colostomy versus primary anastomosis as well as takedown of colovesical fistula  Follow-up with the PCP for evaluation and treatment of her medical condition and to make sure that there is no major contraindication for the surgery given her multiple medical problems.     Please call me if you have any questions (cell phone: [phone])     Signed By: Ankush Ambrose MD     November 29, 2019

## 2019-11-30 PROCEDURE — 3331090001 HH PPS REVENUE CREDIT

## 2019-11-30 PROCEDURE — 3331090002 HH PPS REVENUE DEBIT

## 2019-12-01 PROCEDURE — 3331090002 HH PPS REVENUE DEBIT

## 2019-12-01 PROCEDURE — 3331090001 HH PPS REVENUE CREDIT

## 2019-12-02 PROCEDURE — 3331090002 HH PPS REVENUE DEBIT

## 2019-12-02 PROCEDURE — 3331090001 HH PPS REVENUE CREDIT

## 2019-12-02 NOTE — PROGRESS NOTES
Impression / Plan     Diagnoses and all orders for this visit:    1. Fistula    2. Diverticulosis    3. Recurrent UTI    Plan: Try to contact On-call Urology today, NOT available, will schedule with GS and Dr. Nunes Getting  agrees to see. 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 Follow-up and Dispositions    · Return if symptoms worsen or fail to improve. CONOR Anders is a [de-identified] y. o.female presenting for Follow Up from 11/18/2019, DX-Gross Hematuria. 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 UCX Negative. Suspect causing symptoms of recurrent UTI. No FEVER or chills. Positive dysuria, urgency, No frequency. Denies CVA. No nausea or vomiting. Mrs. Agrawal with problems securing appointment with Urology. Review of Systems   Constitutional: Negative. HENT: Negative. Eyes: Negative. Respiratory: Negative. Cardiovascular: Negative. Gastrointestinal: Positive for nausea. Genitourinary: Positive for frequency, hematuria and urgency. Negative for flank pain. Musculoskeletal: Positive for back pain. Skin: Negative. Neurological: Negative. Endo/Heme/Allergies: Negative. Psychiatric/Behavioral: Negative. Medications     Outpatient Medications Prior to Visit   Medication Sig Dispense Refill    cranberry 500 mg capsule Take 500 mg by mouth daily.  metoprolol tartrate (LOPRESSOR) 25 mg tablet Take 0.5 Tabs by mouth every twelve (12) hours. 30 Tab 0    triamcinolone acetonide (KENALOG) 0.1 % topical cream APPLY THIN LAYER EXTERNALLY TO THE AFFECTED AREA TWICE DAILY 15 g 0    simvastatin (ZOCOR) 20 mg tablet TAKE 1 TABLET BY MOUTH EVERY NIGHT 90 Tab 0    levothyroxine (SYNTHROID) 75 mcg tablet TAKE 1 TABLET BY MOUTH DAILY BEFORE BREAKFAST 90 Tab 0    metFORMIN (GLUCOPHAGE) 1,000 mg tablet TAKE 1 TABLET BY MOUTH TWICE DAILY.  180 Tab 0    betamethasone dipropionate (DIPROSONE) 0.05 % topical cream betamethasone dipropionate 0.05 % topical cream      glucose blood VI test strips (FREESTYLE LITE STRIPS) strip use to check BS once daily      famotidine (PEPCID) 20 mg tablet famotidine 20 mg tablet      hydroCHLOROthiazide (HYDRODIURIL) 25 mg tablet hydrochlorothiazide 25 mg tablet      apixaban (ELIQUIS) 5 mg tablet TAKE 1 TABLET BY MOUTH TWICE DAILY. 60 Tab 6    ondansetron hcl (ZOFRAN) 4 mg tablet Take 1 Tab by mouth every eight (8) hours as needed for Nausea. 30 Tab 1    pantoprazole (PROTONIX) 40 mg tablet Take 1 Tab by mouth daily. 90 Tab 3    diphenoxylate-atropine (LOMOTIL) 2.5-0.025 mg per tablet Take 1 Tab by mouth four (4) times daily as needed for Diarrhea. Max Daily Amount: 4 Tabs. 30 Tab 1    cholecalciferol (VITAMIN D3) 1,000 unit tablet Take 2 Tabs by mouth daily. 60 Tab 0     No facility-administered medications prior to visit. Allergies     Allergies   Allergen Reactions    Ampicillin Itching     Problem List     Patient Active Problem List    Diagnosis Date Noted    Hypomagnesemia 10/31/2019    Acute hypotension 10/31/2019    Atrial fibrillation with RVR ([address] 03/04/2018    Atrial fibrillation with rapid ventricular response ([address] 03/03/2018    Hypertension 03/03/2018    Type 2 diabetes mellitus with nephropathy ([address] 01/23/2018    Hip fracture ([address] 10/09/2017    Meningioma ([address] 07/25/2017    Hypothyroidism due to acquired atrophy of thyroid 06/13/2017    Episcleritis of right eye 05/17/2017    Pancreatitis 11/07/2016    Acute pancreatitis 11/07/2016    Diverticulitis 11/07/2016    Hypothyroid 02/24/2014    HTN (hypertension) 02/24/2014    Obesity 02/24/2014    DM (diabetes mellitus) ([address] 02/24/2014    Family hx-breast malignancy 02/24/2014     Medical / Surgical / Family History     Past Medical History:   Diagnosis Date    Aortic stenosis     Patient denies on 10/31/2019.     Arthritis     Atrial flutter ([address]     Bronchitis     Resolved after getting rid of her pet bird in 2000.  Diabetes ([address]     Diverticulitis     Diverticulitis     GERD (gastroesophageal reflux disease)     History of recurrent UTIs     From 9/2019 to 11/2019    Hypercholesterolemia     Hypertension     Hypothyroid     Menopause     Pancreatitis     Peripheral neuropathy     Patient denies on 10/31/2019. Past Surgical History:   Procedure Laterality Date    COLONOSCOPY N/A 2/2/2017    COLONOSCOPY  w/bx polyp performed by Alessandro Alfaro MD at [address] Left 2017     Family History   Problem Relation Age of Onset    Diabetes Mother     Coronary Artery Disease Mother     Cancer Father         melanoma    Stroke Sister     Hypertension Sister     Diabetes Sister     Diabetes Brother     Coronary Artery Disease Brother     Breast Cancer Paternal Grandmother         older, but not sure age.     Breast Cancer Paternal Aunt         and gyn cancer ?age   [place] No Known Problems Sister     No Known Problems Brother     Kidney Disease Sister     Heart Failure Sister      Social History     Social History     Socioeconomic History    Marital status: SINGLE     Spouse name: Not on file    Number of children: Not on file    Years of education: Not on file    Highest education level: Not on file   Occupational History    Not on file   Social Needs    Financial resource strain: Not on file    Food insecurity:     Worry: Not on file     Inability: Not on file    Transportation needs:     Medical: Not on file     Non-medical: Not on file   Tobacco Use    Smoking status: Never Smoker    Smokeless tobacco: Never Used   Substance and Sexual Activity    Alcohol use: No    Drug use: No    Sexual activity: Not Currently     Partners: Male   Lifestyle    Physical activity:     Days per week: Not on file     Minutes per session: Not on file    Stress: Not on file   Relationships    Social connections: Talks on phone: Not on file     Gets together: Not on file     Attends [employer] service: Not on file     Active member of club or organization: Not on file     Attends meetings of clubs or organizations: Not on file     Relationship status: Not on file    Intimate partner violence:     Fear of current or ex partner: Not on file     Emotionally abused: Not on file     Physically abused: Not on file     Forced sexual activity: Not on file   Other Topics Concern     Service Not Asked    Blood Transfusions Not Asked    Caffeine Concern Not Asked    Occupational Exposure Not Asked   No Fent Hazards Not Asked    Sleep Concern Not Asked    Stress Concern Not Asked    Weight Concern Not Asked    Special Diet Not Asked    Back Care Not Asked    Exercise Not Asked    Bike Helmet Not Asked   [address] Not Asked    Self-Exams Not Asked   Social History Narrative    Not on file     ROS   Review of Systems    10 Element ROS negative unless specifically stated in History of Present Illness. Health Maintenance     Health Maintenance   Topic Date Due    EYE EXAM RETINAL OR DILATED  07/04/1950    GLAUCOMA SCREENING Q2Y  07/04/2004    MICROALBUMIN Q1  10/10/2019    LIPID PANEL Q1  10/10/2019    Shingrix Vaccine Age 50> (1 of 2) 06/05/2020 (Originally 7/4/1989)    MEDICARE YEARLY EXAM  01/18/2020    HEMOGLOBIN A1C Q6M  05/01/2020    FOOT EXAM Q1  07/24/2020    DTaP/Tdap/Td series (2 - Td) 05/13/2027    Bone Densitometry (Dexa) Screening  Completed    Influenza Age 5 to Adult  Completed    Pneumococcal 65+ years  Completed     Physical Exam   /78 (BP 1 Location: Right arm, BP Patient Position: Sitting)   Pulse 73   Temp 97.6 °F (36.4 °C) (Oral)   Resp 20   Ht 5' (1.524 m)   Wt 154 lb 9.6 oz (70.1 kg)   SpO2 96%   BMI 30.19 kg/m²     Physical Exam  Constitutional:       Appearance: Normal appearance. She is normal weight. She is ill-appearing.    HENT:      Head: Normocephalic and atraumatic. Nose: Nose normal.      Mouth/Throat:      Mouth: Mucous membranes are moist.      Pharynx: Oropharynx is clear. No oropharyngeal exudate. Eyes:      Extraocular Movements: Extraocular movements intact. Conjunctiva/sclera: Conjunctivae normal.      Pupils: Pupils are equal, round, and reactive to light. Cardiovascular:      Rate and Rhythm: Normal rate and regular rhythm. Pulses: Normal pulses. Heart sounds: Normal heart sounds. No murmur. Pulmonary:      Effort: Pulmonary effort is normal. No respiratory distress. Breath sounds: Normal breath sounds. No wheezing. Abdominal:      General: Bowel sounds are normal. There is no distension. Palpations: Abdomen is soft. Tenderness: There is tenderness. Comments: Suprapubic to LLQ Abdominal Discomfort   Neurological:      General: No focal deficit present. Mental Status: She is alert and oriented to person, place, and time. Mental status is at baseline. Psychiatric:         Mood and Affect: Mood normal.         Behavior: Behavior normal.         Thought Content:  Thought content normal.         Judgment: Judgment normal.       Ortho Exam    Ethelene Sever, DO

## 2019-12-03 PROCEDURE — 3331090001 HH PPS REVENUE CREDIT

## 2019-12-03 PROCEDURE — 3331090002 HH PPS REVENUE DEBIT

## 2019-12-04 PROCEDURE — 3331090002 HH PPS REVENUE DEBIT

## 2019-12-04 PROCEDURE — 3331090001 HH PPS REVENUE CREDIT

## 2019-12-05 ENCOUNTER — HOME CARE VISIT (OUTPATIENT)
Dept: SCHEDULING | Facility: HOME HEALTH | Age: 79
End: 2019-12-05
Payer: MEDICARE

## 2019-12-05 VITALS
TEMPERATURE: 98.2 F | RESPIRATION RATE: 20 BRPM | HEART RATE: 80 BPM | DIASTOLIC BLOOD PRESSURE: 84 MMHG | SYSTOLIC BLOOD PRESSURE: 160 MMHG | OXYGEN SATURATION: 97 %

## 2019-12-05 PROCEDURE — 3331090001 HH PPS REVENUE CREDIT

## 2019-12-05 PROCEDURE — G0496 LPN CARE TRAIN/EDU IN HH: HCPCS

## 2019-12-05 PROCEDURE — 3331090002 HH PPS REVENUE DEBIT

## 2019-12-06 PROCEDURE — 3331090001 HH PPS REVENUE CREDIT

## 2019-12-06 PROCEDURE — 3331090002 HH PPS REVENUE DEBIT

## 2019-12-07 PROCEDURE — 3331090001 HH PPS REVENUE CREDIT

## 2019-12-07 PROCEDURE — 3331090002 HH PPS REVENUE DEBIT

## 2019-12-08 PROCEDURE — 3331090001 HH PPS REVENUE CREDIT

## 2019-12-08 PROCEDURE — 3331090002 HH PPS REVENUE DEBIT

## 2019-12-09 PROCEDURE — 3331090001 HH PPS REVENUE CREDIT

## 2019-12-09 PROCEDURE — 3331090002 HH PPS REVENUE DEBIT

## 2019-12-10 PROCEDURE — 3331090002 HH PPS REVENUE DEBIT

## 2019-12-10 PROCEDURE — 3331090001 HH PPS REVENUE CREDIT

## 2019-12-11 ENCOUNTER — HOME CARE VISIT (OUTPATIENT)
Dept: HOME HEALTH SERVICES | Facility: HOME HEALTH | Age: 79
End: 2019-12-11
Payer: MEDICARE

## 2019-12-11 ENCOUNTER — TELEPHONE (OUTPATIENT)
Dept: FAMILY MEDICINE CLINIC | Age: 79
End: 2019-12-11

## 2019-12-11 DIAGNOSIS — L98.8 FISTULA: Primary | ICD-10-CM

## 2019-12-11 DIAGNOSIS — K57.90 DIVERTICULOSIS: ICD-10-CM

## 2019-12-11 PROCEDURE — 3331090001 HH PPS REVENUE CREDIT

## 2019-12-11 PROCEDURE — 3331090002 HH PPS REVENUE DEBIT

## 2019-12-11 NOTE — TELEPHONE ENCOUNTER
Is asking the pt. Is being referred to Dr. Rey De Los Santos or Dr. Marilin Robin. Because the notes stated someone spoke with Dr. Rey De Los Santos and the documents have both doctors name on it.  Please assist.

## 2019-12-12 ENCOUNTER — HOME CARE VISIT (OUTPATIENT)
Dept: SCHEDULING | Facility: HOME HEALTH | Age: 79
End: 2019-12-12
Payer: MEDICARE

## 2019-12-12 VITALS
RESPIRATION RATE: 20 BRPM | DIASTOLIC BLOOD PRESSURE: 80 MMHG | TEMPERATURE: 98.2 F | HEART RATE: 92 BPM | OXYGEN SATURATION: 96 % | SYSTOLIC BLOOD PRESSURE: 136 MMHG

## 2019-12-12 PROCEDURE — 3331090002 HH PPS REVENUE DEBIT

## 2019-12-12 PROCEDURE — 3331090001 HH PPS REVENUE CREDIT

## 2019-12-12 PROCEDURE — G0496 LPN CARE TRAIN/EDU IN HH: HCPCS

## 2019-12-12 NOTE — TELEPHONE ENCOUNTER
Patient needs a closer surgeon, the one she was referred to is in [place] and the bus does not go that far. Please advise, thank you.

## 2019-12-13 PROCEDURE — 3331090001 HH PPS REVENUE CREDIT

## 2019-12-13 PROCEDURE — 3331090002 HH PPS REVENUE DEBIT

## 2019-12-13 NOTE — TELEPHONE ENCOUNTER
Patient referred to:     Location Details  Department of [facility]  [address] [facility], [address]  t [phone]  f [phone]

## 2019-12-14 PROCEDURE — 3331090001 HH PPS REVENUE CREDIT

## 2019-12-14 PROCEDURE — 3331090002 HH PPS REVENUE DEBIT

## 2019-12-15 PROCEDURE — 3331090001 HH PPS REVENUE CREDIT

## 2019-12-15 PROCEDURE — 3331090002 HH PPS REVENUE DEBIT

## 2019-12-16 ENCOUNTER — TELEPHONE (OUTPATIENT)
Dept: FAMILY MEDICINE CLINIC | Age: 79
End: 2019-12-16

## 2019-12-16 PROCEDURE — 3331090001 HH PPS REVENUE CREDIT

## 2019-12-16 PROCEDURE — 3331090002 HH PPS REVENUE DEBIT

## 2019-12-17 PROCEDURE — 3331090002 HH PPS REVENUE DEBIT

## 2019-12-17 PROCEDURE — 3331090001 HH PPS REVENUE CREDIT

## 2019-12-18 PROCEDURE — 3331090001 HH PPS REVENUE CREDIT

## 2019-12-18 PROCEDURE — 3331090002 HH PPS REVENUE DEBIT

## 2019-12-19 ENCOUNTER — HOME CARE VISIT (OUTPATIENT)
Dept: SCHEDULING | Facility: HOME HEALTH | Age: 79
End: 2019-12-19
Payer: MEDICARE

## 2019-12-19 VITALS
RESPIRATION RATE: 18 BRPM | OXYGEN SATURATION: 99 % | HEART RATE: 84 BPM | SYSTOLIC BLOOD PRESSURE: 138 MMHG | DIASTOLIC BLOOD PRESSURE: 80 MMHG | TEMPERATURE: 98.9 F

## 2019-12-19 PROCEDURE — 3331090002 HH PPS REVENUE DEBIT

## 2019-12-19 PROCEDURE — 3331090001 HH PPS REVENUE CREDIT

## 2019-12-19 PROCEDURE — G0496 LPN CARE TRAIN/EDU IN HH: HCPCS

## 2019-12-20 PROCEDURE — 3331090002 HH PPS REVENUE DEBIT

## 2019-12-20 PROCEDURE — 3331090001 HH PPS REVENUE CREDIT

## 2019-12-21 PROCEDURE — 3331090001 HH PPS REVENUE CREDIT

## 2019-12-21 PROCEDURE — 3331090002 HH PPS REVENUE DEBIT

## 2019-12-22 PROCEDURE — 3331090002 HH PPS REVENUE DEBIT

## 2019-12-22 PROCEDURE — 3331090001 HH PPS REVENUE CREDIT

## 2019-12-23 PROCEDURE — 3331090001 HH PPS REVENUE CREDIT

## 2019-12-23 PROCEDURE — 3331090002 HH PPS REVENUE DEBIT

## 2019-12-24 PROCEDURE — 3331090002 HH PPS REVENUE DEBIT

## 2019-12-24 PROCEDURE — 3331090001 HH PPS REVENUE CREDIT

## 2019-12-25 PROCEDURE — 3331090001 HH PPS REVENUE CREDIT

## 2019-12-25 PROCEDURE — 3331090002 HH PPS REVENUE DEBIT

## 2019-12-26 PROCEDURE — 3331090002 HH PPS REVENUE DEBIT

## 2019-12-26 PROCEDURE — 3331090001 HH PPS REVENUE CREDIT

## 2019-12-27 PROCEDURE — 3331090002 HH PPS REVENUE DEBIT

## 2019-12-27 PROCEDURE — 3331090001 HH PPS REVENUE CREDIT

## 2019-12-28 ENCOUNTER — HOME CARE VISIT (OUTPATIENT)
Dept: HOME HEALTH SERVICES | Facility: HOME HEALTH | Age: 79
End: 2019-12-28
Payer: MEDICARE

## 2019-12-28 PROCEDURE — G0299 HHS/HOSPICE OF RN EA 15 MIN: HCPCS

## 2019-12-28 PROCEDURE — 3331090001 HH PPS REVENUE CREDIT

## 2019-12-28 PROCEDURE — 3331090002 HH PPS REVENUE DEBIT

## 2019-12-29 PROCEDURE — 3331090001 HH PPS REVENUE CREDIT

## 2019-12-29 PROCEDURE — 3331090002 HH PPS REVENUE DEBIT

## 2019-12-30 VITALS
OXYGEN SATURATION: 99 % | SYSTOLIC BLOOD PRESSURE: 132 MMHG | DIASTOLIC BLOOD PRESSURE: 72 MMHG | HEART RATE: 88 BPM | TEMPERATURE: 98.6 F | RESPIRATION RATE: 14 BRPM

## 2020-01-15 DIAGNOSIS — E03.4 HYPOTHYROIDISM DUE TO ACQUIRED ATROPHY OF THYROID: ICD-10-CM

## 2020-01-15 DIAGNOSIS — E11.21 TYPE 2 DIABETES MELLITUS WITH NEPHROPATHY (HCC): ICD-10-CM

## 2020-01-16 RX ORDER — LEVOTHYROXINE SODIUM 75 UG/1
TABLET ORAL
Qty: 90 TAB | Refills: 0 | Status: SHIPPED | OUTPATIENT
Start: 2020-01-16 | End: 2020-05-04

## 2020-01-16 RX ORDER — METFORMIN HYDROCHLORIDE 1000 MG/1
TABLET ORAL
Qty: 180 TAB | Refills: 0 | Status: SHIPPED | OUTPATIENT
Start: 2020-01-16 | End: 2020-04-16

## 2020-01-16 RX ORDER — SIMVASTATIN 20 MG/1
TABLET, FILM COATED ORAL
Qty: 90 TAB | Refills: 0 | Status: SHIPPED | OUTPATIENT
Start: 2020-01-16 | End: 2020-04-29

## 2020-02-12 ENCOUNTER — TELEPHONE (OUTPATIENT)
Dept: FAMILY MEDICINE CLINIC | Age: 80
End: 2020-02-12

## 2020-02-12 NOTE — TELEPHONE ENCOUNTER
Patient called in and was concerned that's he would not have enough medication to last her until she sees Dr. Jonas Haji on March 16th and then on top of that she stated she is going to be having an operation soon in regards to her still peeing blood and finding out that there is a tear in her bladder. She stated she knows it is too son right now to get a refill for her Simvastatin and levothyroxine but she wants to make sure that refills are put to her pharmacy for when she is needing them. Please advise.

## 2020-02-13 RX ORDER — DIPHENHYDRAMINE HCL 25 MG
25 CAPSULE ORAL
Qty: 90 CAP | Refills: 0 | Status: CANCELLED | OUTPATIENT
Start: 2020-02-13

## 2020-02-13 RX ORDER — HYDROCHLOROTHIAZIDE 25 MG/1
TABLET ORAL
Qty: 30 TAB | Refills: 1 | Status: CANCELLED | OUTPATIENT
Start: 2020-02-13

## 2020-02-13 RX ORDER — FUROSEMIDE 40 MG/1
40 TABLET ORAL DAILY
Qty: 90 TAB | Refills: 1 | Status: CANCELLED | OUTPATIENT
Start: 2020-02-13

## 2020-02-13 RX ORDER — FAMOTIDINE 20 MG/1
TABLET, FILM COATED ORAL
Qty: 30 TAB | Refills: 2 | Status: CANCELLED | OUTPATIENT
Start: 2020-02-13

## 2020-02-14 RX ORDER — METOPROLOL TARTRATE 25 MG/1
12.5 TABLET, FILM COATED ORAL EVERY 12 HOURS
Qty: 30 TAB | Refills: 6 | Status: SHIPPED | OUTPATIENT
Start: 2020-02-14 | End: 2020-08-12 | Stop reason: SDUPTHER

## 2020-02-14 NOTE — TELEPHONE ENCOUNTER
Requested Prescriptions     Pending Prescriptions Disp Refills    metoprolol tartrate (LOPRESSOR) 25 mg tablet 30 Tab 0     Sig: Take 0.5 Tabs by mouth every twelve (12) hours.

## 2020-02-24 ENCOUNTER — OFFICE VISIT (OUTPATIENT)
Dept: CARDIOLOGY CLINIC | Age: 80
End: 2020-02-24

## 2020-02-24 VITALS
DIASTOLIC BLOOD PRESSURE: 71 MMHG | WEIGHT: 141 LBS | SYSTOLIC BLOOD PRESSURE: 131 MMHG | OXYGEN SATURATION: 99 % | HEART RATE: 81 BPM | BODY MASS INDEX: 27.54 KG/M2

## 2020-02-24 DIAGNOSIS — I10 ESSENTIAL HYPERTENSION WITH GOAL BLOOD PRESSURE LESS THAN 140/90: ICD-10-CM

## 2020-02-24 DIAGNOSIS — Z01.818 PRE-OP EVALUATION: Primary | ICD-10-CM

## 2020-02-24 DIAGNOSIS — E78.00 PURE HYPERCHOLESTEROLEMIA: ICD-10-CM

## 2020-02-24 DIAGNOSIS — I35.0 AORTIC VALVE STENOSIS, ETIOLOGY OF CARDIAC VALVE DISEASE UNSPECIFIED: ICD-10-CM

## 2020-02-24 NOTE — PROGRESS NOTES
1. Have you been to the ER, urgent care clinic since your last visit? Hospitalized since your last visit? No    2. Have you seen or consulted any other health care providers outside of the [address] [place] [address] since your last visit? Include any pap smears or colon screening.  No

## 2020-02-24 NOTE — PROGRESS NOTES
Ms. Ilia Kahn is a [de-identified] y.o. female with a history of atrial flutter, diabetes, hypertension, dyslipidemia and hypothyroidism. Ms. Ilia Kahn is here today for a follow up appointment. She denies any symptoms to suggest angina or heart failure. She denies any palpitations, presyncope or syncope. She is taking all her medications regularly. No PND or LE edema  She has been diagnosed with colocolonic and colovesical fistula likely from inflammatory bowel disease or diverticulitis. She is seeing surgical [facility]. She has recurrent UTI. She has undergone echo since last time. Her main concern is recurrent UTI and hematuria on and off    Past Medical History:   Diagnosis Date    Aortic stenosis     Patient denies on 10/31/2019.  Arthritis     Atrial flutter ([address]     Bladder stone     Bronchitis     Resolved after getting rid of her pet bird in 2000.  Diabetes ([address]     Diverticulitis     Diverticulitis     GERD (gastroesophageal reflux disease)     Gross hematuria     History of recurrent UTIs     From 9/2019 to 11/2019    Hypercholesterolemia     Hypertension     Hypothyroid     Menopause     Pancreatitis     Peripheral neuropathy     Patient denies on 10/31/2019.  Vesicocolic fistula        Current Outpatient Medications   Medication Sig Dispense Refill    metoprolol tartrate (LOPRESSOR) 25 mg tablet Take 0.5 Tabs by mouth every twelve (12) hours. 30 Tab 6    levothyroxine (SYNTHROID) 75 mcg tablet TAKE 1 TABLET BY MOUTH ONCE DAILY BEFORE BREAKFAST 90 Tab 0    simvastatin (ZOCOR) 20 mg tablet TAKE 1 TABLET BY MOUTH EVERY NIGHT 90 Tab 0    metFORMIN (GLUCOPHAGE) 1,000 mg tablet TAKE 1 TABLET BY MOUTH TWICE DAILY 180 Tab 0    furosemide (LASIX) 40 mg tablet Take  by mouth daily.  celecoxib (CELEBREX) 200 mg capsule Take  by mouth two (2) times a day.  citalopram (CELEXA) 10 mg tablet Take  by mouth daily.  LOSARTAN PO Take  by mouth.       GLIPIZIDE PO Take by mouth.  cranberry 500 mg capsule Take 500 mg by mouth daily.  triamcinolone acetonide (KENALOG) 0.1 % topical cream APPLY THIN LAYER EXTERNALLY TO THE AFFECTED AREA TWICE DAILY 15 g 0    betamethasone dipropionate (DIPROSONE) 0.05 % topical cream betamethasone dipropionate 0.05 % topical cream      glucose blood VI test strips (FREESTYLE LITE STRIPS) strip use to check BS once daily      apixaban (ELIQUIS) 5 mg tablet TAKE 1 TABLET BY MOUTH TWICE DAILY. 60 Tab 6    ondansetron hcl (ZOFRAN) 4 mg tablet Take 1 Tab by mouth every eight (8) hours as needed for Nausea. 30 Tab 1    pantoprazole (PROTONIX) 40 mg tablet Take 1 Tab by mouth daily. 90 Tab 3    diphenoxylate-atropine (LOMOTIL) 2.5-0.025 mg per tablet Take 1 Tab by mouth four (4) times daily as needed for Diarrhea. Max Daily Amount: 4 Tabs. 30 Tab 1    cholecalciferol (VITAMIN D3) 1,000 unit tablet Take 2 Tabs by mouth daily. 60 Tab 0    diphenhydrAMINE (BENADRYL) 25 mg capsule Take 25 mg by mouth every six (6) hours as needed.  famotidine (PEPCID) 20 mg tablet famotidine 20 mg tablet      hydroCHLOROthiazide (HYDRODIURIL) 25 mg tablet hydrochlorothiazide 25 mg tablet       Allergies   Allergen Reactions    Ampicillin Itching     Past Medical History:   Diagnosis Date    Aortic stenosis     Patient denies on 10/31/2019.  Arthritis     Atrial flutter ([address]     Bladder stone     Bronchitis     Resolved after getting rid of her pet bird in 2000.  Diabetes ([address]     Diverticulitis     Diverticulitis     GERD (gastroesophageal reflux disease)     Gross hematuria     History of recurrent UTIs     From 9/2019 to 11/2019    Hypercholesterolemia     Hypertension     Hypothyroid     Menopause     Pancreatitis     Peripheral neuropathy     Patient denies on 10/31/2019.     Vesicocolic fistula      Past Surgical History:   Procedure Laterality Date    COLONOSCOPY N/A 2/2/2017    COLONOSCOPY  w/bx polyp performed by Etienne Nicolas Darline Elizabeth MD at [address] Left 2017     Family History   Problem Relation Age of Onset    Diabetes Mother     Coronary Artery Disease Mother     Cancer Father         melanoma    Stroke Sister     Hypertension Sister     Diabetes Sister     Diabetes Brother     Coronary Artery Disease Brother     Breast Cancer Paternal Grandmother         older, but not sure age.  Breast Cancer Paternal Aunt         and gyn cancer ?age   [place] No Known Problems Sister     No Known Problems Brother     Kidney Disease Sister     Heart Failure Sister      Social History     Tobacco Use   Smoking Status Never Smoker   Smokeless Tobacco Never Used        Objective:     Visit Vitals  /71   Pulse 81   Wt 141 lb (64 kg)   SpO2 99%   BMI 27.54 kg/m²     General:  alert, cooperative, no distress   Chest Wall: inspection normal - no chest wall deformities or tenderness, respiratory effort normal   Lung: clear to auscultation bilaterally, no rales   Heart:  normal rate, regular rhythm, normal S1, S2, Mild 2/6 MADDI on aortic area. Abdomen: soft, non-tender. Bowel sounds normal. No masses,  no organomegaly   Extremities: edema trace bilateral LE's Skin: no rashes   Neuro: alert, oriented, normal speech, no focal findings or movement disorder noted       Echo 03/2018  Left ventricle: Systolic function was normal by visual assessment. Ejection   fraction was estimated to be 60 %. No obvious wall motion abnormalities identified in the views obtained. Features   were consistent with a pseudonormal left ventricular filling pattern, with concomitant abnormal relaxation and   increased filling pressure (grade 2 diastolic dysfunction). Right ventricle: The ventricle was mildly dilated. Systolic function was  normal.  Left atrium: The atrium was mildly dilated. Aortic valve: The valve was trileaflet. Leaflets exhibited mild to moderate calcification and mildly reduced cuspal  separation.  Transaortic velocity was increased due to valvular stenosis. There was mild to moderate stenosis. There was  trivial regurgitation. Valve mean gradient was 21 mmHg. Tricuspid valve: There was mild regurgitation. ECHO (11/19)  Left Ventricle Normal cavity size and systolic function (ejection fraction normal). Mild concentric hypertrophy. The estimated ejection fraction is 61 - 65%. Visually measured ejection fraction. No regional wall motion abnormality noted. There is mild (grade 1) left ventricular diastolic dysfunction. Elevated left ventricular diastolic pressure. Left Atrium Mildly dilated left atrium. Right Ventricle Normal cavity size and global systolic function. Aortic Valve There is leaflet calcification. Aortic valve peak gradient is 47.5 mmHg. Aortic valve mean gradient is 31.4 mmHg. Aortic valve area is 0.9 cm2. Aortic valve peak velocity is 345 cm/s. There is moderate aortic stenosis. Trace aortic valve regurgitation. Mitral Valve Mitral valve non-specific thickening. Moderate mitral annular calcification. Mild stenosis present. Mild regurgitation. Tricuspid Valve Non-specific thickening. Pulmonary arterial systolic pressure is 55.8 mmHg. Moderate pulmonary hypertension. Pulmonic Valve Pulmonic valve not well visualized. No stenosis. Trace regurgitation. IVC/Hepatic Veins Normal central venous pressure (0-5 mmHg); IVC diameter is less than 21 mm and collapses more than 50% with respiration. Pericardium No evidence of pericardial effusion. Assessment/Plan:     Atrial flutter: In sinus on exam today, rate is controlled. HR today 81  Also on Eliquis. No bleeding side effects. Continue metoprolol    Hypertension: /71 mm Hg. continue same    Dyslipidemia: continue with simvastatin, last LDL 67. Aortic stenosis: Mild-moderate by echo in 2018. Last echo with mean gradient of 31 in November 2019, moderate range. currently does not report any symptoms to suggest angina or heart failure.   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 She also has hematuria  She also has been diagnosed with possible bladder mass. She will require surgery  Currently patient does not report any unstable coronary symptoms or decompensated heart failure. There is no evidence of fluid overload on exam.  Today I had a very lengthy discussion with the patient regarding her cardiovascular risk outcome during surgery. Also I believe without surgery, she have a high chances of recurrent UTI and sepsis and adverse outcome  This is a difficult situation. Patient will be at least at intermediate-high risk for cardiac complication because of mild to moderate aortic stenosis. However I do not believe any further cardiac work-up is necessary at this time. She understands the potential risk. He has follow-up appointment with surgical team  Eliquis should be stopped at least 3 days prior to surgery. Rest of the cardiac medication should be continued as tolerated perioperatively    follow up in 6 months or sooner as symptoms dictate.

## 2020-02-29 ENCOUNTER — APPOINTMENT (OUTPATIENT)
Dept: GENERAL RADIOLOGY | Age: 80
DRG: 309 | End: 2020-02-29
Attending: EMERGENCY MEDICINE
Payer: MEDICARE

## 2020-02-29 ENCOUNTER — HOSPITAL ENCOUNTER (EMERGENCY)
Age: 80
Discharge: HOME | DRG: 309 | End: 2020-02-29
Attending: EMERGENCY MEDICINE
Payer: MEDICARE

## 2020-02-29 ENCOUNTER — APPOINTMENT (OUTPATIENT)
Dept: CT IMAGING | Age: 80
DRG: 309 | End: 2020-02-29
Attending: EMERGENCY MEDICINE
Payer: MEDICARE

## 2020-02-29 ENCOUNTER — HOSPITAL ENCOUNTER (INPATIENT)
Age: 80
LOS: 5 days | Discharge: HOME | DRG: 309 | End: 2020-03-05
Attending: EMERGENCY MEDICINE | Admitting: INTERNAL MEDICINE
Payer: MEDICARE

## 2020-02-29 VITALS
RESPIRATION RATE: 16 BRPM | OXYGEN SATURATION: 100 % | SYSTOLIC BLOOD PRESSURE: 130 MMHG | TEMPERATURE: 98.6 F | HEIGHT: 60 IN | BODY MASS INDEX: 27.68 KG/M2 | WEIGHT: 141 LBS | HEART RATE: 69 BPM | DIASTOLIC BLOOD PRESSURE: 74 MMHG

## 2020-02-29 DIAGNOSIS — E83.42 HYPOMAGNESEMIA: ICD-10-CM

## 2020-02-29 DIAGNOSIS — N39.0 ACUTE UTI (URINARY TRACT INFECTION): ICD-10-CM

## 2020-02-29 DIAGNOSIS — R10.84 ABDOMINAL PAIN, GENERALIZED: ICD-10-CM

## 2020-02-29 DIAGNOSIS — I48.91 ATRIAL FIBRILLATION WITH RVR (HCC): Primary | ICD-10-CM

## 2020-02-29 DIAGNOSIS — L98.8 FISTULA: ICD-10-CM

## 2020-02-29 DIAGNOSIS — N32.1 COLO-VESICAL FISTULA: ICD-10-CM

## 2020-02-29 DIAGNOSIS — K57.92 ACUTE DIVERTICULITIS: Primary | ICD-10-CM

## 2020-02-29 LAB
ALBUMIN SERPL-MCNC: 3 G/DL (ref 3.4–5)
ALBUMIN SERPL-MCNC: 3 G/DL (ref 3.4–5)
ALBUMIN/GLOB SERPL: 0.7 {RATIO} (ref 0.8–1.7)
ALBUMIN/GLOB SERPL: 0.7 {RATIO} (ref 0.8–1.7)
ALP SERPL-CCNC: 61 U/L (ref 45–117)
ALP SERPL-CCNC: 63 U/L (ref 45–117)
ALT SERPL-CCNC: 9 U/L (ref 13–56)
ALT SERPL-CCNC: 9 U/L (ref 13–56)
ANION GAP SERPL CALC-SCNC: 8 MMOL/L (ref 3–18)
ANION GAP SERPL CALC-SCNC: 9 MMOL/L (ref 3–18)
APPEARANCE UR: ABNORMAL
AST SERPL-CCNC: 11 U/L (ref 10–38)
AST SERPL-CCNC: 11 U/L (ref 10–38)
BACTERIA URNS QL MICRO: ABNORMAL /HPF
BASOPHILS # BLD: 0 K/UL (ref 0–0.1)
BASOPHILS # BLD: 0 K/UL (ref 0–0.1)
BASOPHILS NFR BLD: 0 % (ref 0–2)
BASOPHILS NFR BLD: 0 % (ref 0–2)
BILIRUB DIRECT SERPL-MCNC: 0.2 MG/DL (ref 0–0.2)
BILIRUB SERPL-MCNC: 0.4 MG/DL (ref 0.2–1)
BILIRUB SERPL-MCNC: 0.4 MG/DL (ref 0.2–1)
BILIRUB UR QL: ABNORMAL
BUN SERPL-MCNC: 14 MG/DL (ref 7–18)
BUN SERPL-MCNC: 15 MG/DL (ref 7–18)
BUN/CREAT SERPL: 14 (ref 12–20)
BUN/CREAT SERPL: 18 (ref 12–20)
CALCIUM SERPL-MCNC: 8.4 MG/DL (ref 8.5–10.1)
CALCIUM SERPL-MCNC: 8.6 MG/DL (ref 8.5–10.1)
CHLORIDE SERPL-SCNC: 103 MMOL/L (ref 100–111)
CHLORIDE SERPL-SCNC: 103 MMOL/L (ref 100–111)
CO2 SERPL-SCNC: 24 MMOL/L (ref 21–32)
CO2 SERPL-SCNC: 26 MMOL/L (ref 21–32)
COLOR UR: ABNORMAL
CREAT SERPL-MCNC: 0.78 MG/DL (ref 0.6–1.3)
CREAT SERPL-MCNC: 1.07 MG/DL (ref 0.6–1.3)
DIFFERENTIAL METHOD BLD: ABNORMAL
DIFFERENTIAL METHOD BLD: NORMAL
EOSINOPHIL # BLD: 0 K/UL (ref 0–0.4)
EOSINOPHIL # BLD: 0 K/UL (ref 0–0.4)
EOSINOPHIL NFR BLD: 1 % (ref 0–5)
EOSINOPHIL NFR BLD: 1 % (ref 0–5)
ERYTHROCYTE [DISTWIDTH] IN BLOOD BY AUTOMATED COUNT: 13 % (ref 11.6–14.5)
ERYTHROCYTE [DISTWIDTH] IN BLOOD BY AUTOMATED COUNT: 13 % (ref 11.6–14.5)
EST. AVERAGE GLUCOSE BLD GHB EST-MCNC: 134 MG/DL
GLOBULIN SER CALC-MCNC: 4.3 G/DL (ref 2–4)
GLOBULIN SER CALC-MCNC: 4.4 G/DL (ref 2–4)
GLUCOSE BLD STRIP.AUTO-MCNC: 108 MG/DL (ref 70–110)
GLUCOSE SERPL-MCNC: 150 MG/DL (ref 74–99)
GLUCOSE SERPL-MCNC: 230 MG/DL (ref 74–99)
GLUCOSE UR STRIP.AUTO-MCNC: NEGATIVE MG/DL
HBA1C MFR BLD: 6.3 % (ref 4.2–5.6)
HCT VFR BLD AUTO: 37.1 % (ref 35–45)
HCT VFR BLD AUTO: 37.9 % (ref 35–45)
HGB BLD-MCNC: 12 G/DL (ref 12–16)
HGB BLD-MCNC: 12.1 G/DL (ref 12–16)
HGB UR QL STRIP: ABNORMAL
KETONES UR QL STRIP.AUTO: NEGATIVE MG/DL
LACTATE SERPL-SCNC: 1.8 MMOL/L (ref 0.4–2)
LEUKOCYTE ESTERASE UR QL STRIP.AUTO: ABNORMAL
LIPASE SERPL-CCNC: 163 U/L (ref 73–393)
LYMPHOCYTES # BLD: 1.6 K/UL (ref 0.9–3.6)
LYMPHOCYTES # BLD: 1.7 K/UL (ref 0.9–3.6)
LYMPHOCYTES NFR BLD: 18 % (ref 21–52)
LYMPHOCYTES NFR BLD: 26 % (ref 21–52)
MAGNESIUM SERPL-MCNC: 0.7 MG/DL (ref 1.6–2.6)
MCH RBC QN AUTO: 27.9 PG (ref 24–34)
MCH RBC QN AUTO: 28.1 PG (ref 24–34)
MCHC RBC AUTO-ENTMCNC: 31.9 G/DL (ref 31–37)
MCHC RBC AUTO-ENTMCNC: 32.3 G/DL (ref 31–37)
MCV RBC AUTO: 86.9 FL (ref 74–97)
MCV RBC AUTO: 87.3 FL (ref 74–97)
MONOCYTES # BLD: 0.5 K/UL (ref 0.05–1.2)
MONOCYTES # BLD: 0.9 K/UL (ref 0.05–1.2)
MONOCYTES NFR BLD: 10 % (ref 3–10)
MONOCYTES NFR BLD: 8 % (ref 3–10)
NEUTS SEG # BLD: 4.2 K/UL (ref 1.8–8)
NEUTS SEG # BLD: 6.3 K/UL (ref 1.8–8)
NEUTS SEG NFR BLD: 65 % (ref 40–73)
NEUTS SEG NFR BLD: 71 % (ref 40–73)
NITRITE UR QL STRIP.AUTO: POSITIVE
PH UR STRIP: 5.5 [PH] (ref 5–8)
PLATELET # BLD AUTO: 231 K/UL (ref 135–420)
PLATELET # BLD AUTO: 234 K/UL (ref 135–420)
PMV BLD AUTO: 9.5 FL (ref 9.2–11.8)
PMV BLD AUTO: 9.8 FL (ref 9.2–11.8)
POTASSIUM SERPL-SCNC: 4 MMOL/L (ref 3.5–5.5)
POTASSIUM SERPL-SCNC: 4.5 MMOL/L (ref 3.5–5.5)
PROT SERPL-MCNC: 7.3 G/DL (ref 6.4–8.2)
PROT SERPL-MCNC: 7.4 G/DL (ref 6.4–8.2)
PROT UR STRIP-MCNC: 100 MG/DL
RBC # BLD AUTO: 4.27 M/UL (ref 4.2–5.3)
RBC # BLD AUTO: 4.34 M/UL (ref 4.2–5.3)
RBC #/AREA URNS HPF: ABNORMAL /HPF (ref 0–5)
SODIUM SERPL-SCNC: 135 MMOL/L (ref 136–145)
SODIUM SERPL-SCNC: 138 MMOL/L (ref 136–145)
SP GR UR REFRACTOMETRY: 1.02 (ref 1–1.03)
TROPONIN I SERPL-MCNC: <0.02 NG/ML (ref 0–0.04)
UROBILINOGEN UR QL STRIP.AUTO: 0.2 EU/DL (ref 0.2–1)
WBC # BLD AUTO: 6.4 K/UL (ref 4.6–13.2)
WBC # BLD AUTO: 8.8 K/UL (ref 4.6–13.2)
WBC URNS QL MICRO: ABNORMAL /HPF (ref 0–4)

## 2020-02-29 PROCEDURE — 96375 TX/PRO/DX INJ NEW DRUG ADDON: CPT

## 2020-02-29 PROCEDURE — 94762 N-INVAS EAR/PLS OXIMTRY CONT: CPT

## 2020-02-29 PROCEDURE — 99285 EMERGENCY DEPT VISIT HI MDM: CPT

## 2020-02-29 PROCEDURE — 83690 ASSAY OF LIPASE: CPT

## 2020-02-29 PROCEDURE — 74176 CT ABD & PELVIS W/O CONTRAST: CPT

## 2020-02-29 PROCEDURE — 74011000250 HC RX REV CODE- 250: Performed by: EMERGENCY MEDICINE

## 2020-02-29 PROCEDURE — 82962 GLUCOSE BLOOD TEST: CPT

## 2020-02-29 PROCEDURE — 74022 RADEX COMPL AQT ABD SERIES: CPT

## 2020-02-29 PROCEDURE — 74011000258 HC RX REV CODE- 258: Performed by: EMERGENCY MEDICINE

## 2020-02-29 PROCEDURE — 74011250637 HC RX REV CODE- 250/637: Performed by: EMERGENCY MEDICINE

## 2020-02-29 PROCEDURE — 80076 HEPATIC FUNCTION PANEL: CPT

## 2020-02-29 PROCEDURE — 81001 URINALYSIS AUTO W/SCOPE: CPT

## 2020-02-29 PROCEDURE — 96376 TX/PRO/DX INJ SAME DRUG ADON: CPT

## 2020-02-29 PROCEDURE — 83605 ASSAY OF LACTIC ACID: CPT

## 2020-02-29 PROCEDURE — 99284 EMERGENCY DEPT VISIT MOD MDM: CPT

## 2020-02-29 PROCEDURE — 87040 BLOOD CULTURE FOR BACTERIA: CPT

## 2020-02-29 PROCEDURE — 83735 ASSAY OF MAGNESIUM: CPT

## 2020-02-29 PROCEDURE — 74011250636 HC RX REV CODE- 250/636: Performed by: EMERGENCY MEDICINE

## 2020-02-29 PROCEDURE — 93005 ELECTROCARDIOGRAM TRACING: CPT

## 2020-02-29 PROCEDURE — 71045 X-RAY EXAM CHEST 1 VIEW: CPT

## 2020-02-29 PROCEDURE — 74011250636 HC RX REV CODE- 250/636: Performed by: PHYSICIAN ASSISTANT

## 2020-02-29 PROCEDURE — 65660000001 HC RM ICU INTERMED STEPDOWN

## 2020-02-29 PROCEDURE — 80053 COMPREHEN METABOLIC PANEL: CPT

## 2020-02-29 PROCEDURE — 96365 THER/PROPH/DIAG IV INF INIT: CPT

## 2020-02-29 PROCEDURE — 74011250637 HC RX REV CODE- 250/637: Performed by: PHYSICIAN ASSISTANT

## 2020-02-29 PROCEDURE — 85025 COMPLETE CBC W/AUTO DIFF WBC: CPT

## 2020-02-29 PROCEDURE — 84484 ASSAY OF TROPONIN QUANT: CPT

## 2020-02-29 PROCEDURE — 83036 HEMOGLOBIN GLYCOSYLATED A1C: CPT

## 2020-02-29 RX ORDER — LEVOTHYROXINE SODIUM 75 UG/1
75 TABLET ORAL
Status: DISCONTINUED | OUTPATIENT
Start: 2020-03-01 | End: 2020-03-05 | Stop reason: HOSPADM

## 2020-02-29 RX ORDER — SODIUM CHLORIDE 0.9 % (FLUSH) 0.9 %
5-40 SYRINGE (ML) INJECTION AS NEEDED
Status: DISCONTINUED | OUTPATIENT
Start: 2020-02-29 | End: 2020-03-05 | Stop reason: HOSPADM

## 2020-02-29 RX ORDER — MAGNESIUM SULFATE HEPTAHYDRATE 40 MG/ML
2 INJECTION, SOLUTION INTRAVENOUS ONCE
Status: COMPLETED | OUTPATIENT
Start: 2020-02-29 | End: 2020-02-29

## 2020-02-29 RX ORDER — SODIUM CHLORIDE 0.9 % (FLUSH) 0.9 %
5-10 SYRINGE (ML) INJECTION AS NEEDED
Status: DISCONTINUED | OUTPATIENT
Start: 2020-02-29 | End: 2020-02-29

## 2020-02-29 RX ORDER — CIPROFLOXACIN 2 MG/ML
400 INJECTION, SOLUTION INTRAVENOUS EVERY 12 HOURS
Status: DISCONTINUED | OUTPATIENT
Start: 2020-02-29 | End: 2020-03-05 | Stop reason: HOSPADM

## 2020-02-29 RX ORDER — CITALOPRAM 10 MG/1
10 TABLET ORAL DAILY
Status: DISCONTINUED | OUTPATIENT
Start: 2020-03-01 | End: 2020-03-01

## 2020-02-29 RX ORDER — ONDANSETRON 2 MG/ML
4 INJECTION INTRAMUSCULAR; INTRAVENOUS
Status: COMPLETED | OUTPATIENT
Start: 2020-02-29 | End: 2020-02-29

## 2020-02-29 RX ORDER — HYDROCODONE BITARTRATE AND ACETAMINOPHEN 5; 325 MG/1; MG/1
1 TABLET ORAL
Status: DISCONTINUED | OUTPATIENT
Start: 2020-02-29 | End: 2020-03-05 | Stop reason: HOSPADM

## 2020-02-29 RX ORDER — INSULIN LISPRO 100 [IU]/ML
INJECTION, SOLUTION INTRAVENOUS; SUBCUTANEOUS
Status: DISCONTINUED | OUTPATIENT
Start: 2020-02-29 | End: 2020-03-03

## 2020-02-29 RX ORDER — ONDANSETRON 2 MG/ML
4 INJECTION INTRAMUSCULAR; INTRAVENOUS
Status: DISCONTINUED | OUTPATIENT
Start: 2020-02-29 | End: 2020-03-05 | Stop reason: HOSPADM

## 2020-02-29 RX ORDER — DILTIAZEM HYDROCHLORIDE 5 MG/ML
20 INJECTION INTRAVENOUS
Status: COMPLETED | OUTPATIENT
Start: 2020-02-29 | End: 2020-02-29

## 2020-02-29 RX ORDER — CIPROFLOXACIN 500 MG/1
500 TABLET ORAL 2 TIMES DAILY
Qty: 20 TAB | Refills: 0 | Status: ON HOLD | OUTPATIENT
Start: 2020-02-29 | End: 2020-03-05 | Stop reason: SDUPTHER

## 2020-02-29 RX ORDER — MORPHINE SULFATE 4 MG/ML
4 INJECTION, SOLUTION INTRAMUSCULAR; INTRAVENOUS
Status: COMPLETED | OUTPATIENT
Start: 2020-02-29 | End: 2020-02-29

## 2020-02-29 RX ORDER — ASPIRIN 325 MG
325 TABLET ORAL
Status: DISCONTINUED | OUTPATIENT
Start: 2020-02-29 | End: 2020-02-29

## 2020-02-29 RX ORDER — METRONIDAZOLE 500 MG/1
500 TABLET ORAL 4 TIMES DAILY
Qty: 40 TAB | Refills: 0 | Status: ON HOLD | OUTPATIENT
Start: 2020-02-29 | End: 2020-03-05 | Stop reason: SDUPTHER

## 2020-02-29 RX ORDER — ACETAMINOPHEN 325 MG/1
650 TABLET ORAL
Status: DISCONTINUED | OUTPATIENT
Start: 2020-02-29 | End: 2020-03-05 | Stop reason: HOSPADM

## 2020-02-29 RX ORDER — METRONIDAZOLE 500 MG/100ML
500 INJECTION, SOLUTION INTRAVENOUS EVERY 8 HOURS
Status: DISCONTINUED | OUTPATIENT
Start: 2020-02-29 | End: 2020-03-05 | Stop reason: HOSPADM

## 2020-02-29 RX ORDER — CEFTRIAXONE 1 G/1
1 INJECTION, POWDER, FOR SOLUTION INTRAMUSCULAR; INTRAVENOUS
Status: DISCONTINUED | OUTPATIENT
Start: 2020-02-29 | End: 2020-02-29

## 2020-02-29 RX ORDER — MAGNESIUM SULFATE 100 %
4 CRYSTALS MISCELLANEOUS AS NEEDED
Status: DISCONTINUED | OUTPATIENT
Start: 2020-02-29 | End: 2020-03-05 | Stop reason: HOSPADM

## 2020-02-29 RX ORDER — METOPROLOL SUCCINATE 25 MG/1
12.5 TABLET, EXTENDED RELEASE ORAL
Status: COMPLETED | OUTPATIENT
Start: 2020-02-29 | End: 2020-02-29

## 2020-02-29 RX ORDER — SODIUM CHLORIDE 0.9 % (FLUSH) 0.9 %
5-40 SYRINGE (ML) INJECTION EVERY 8 HOURS
Status: DISCONTINUED | OUTPATIENT
Start: 2020-02-29 | End: 2020-03-05 | Stop reason: HOSPADM

## 2020-02-29 RX ORDER — METOPROLOL TARTRATE 25 MG/1
12.5 TABLET, FILM COATED ORAL EVERY 12 HOURS
Status: DISCONTINUED | OUTPATIENT
Start: 2020-03-01 | End: 2020-03-01

## 2020-02-29 RX ORDER — FUROSEMIDE 40 MG/1
40 TABLET ORAL DAILY
Status: DISCONTINUED | OUTPATIENT
Start: 2020-03-01 | End: 2020-03-01

## 2020-02-29 RX ADMIN — SODIUM CHLORIDE 250 ML: 900 INJECTION, SOLUTION INTRAVENOUS at 15:27

## 2020-02-29 RX ADMIN — ONDANSETRON 4 MG: 2 INJECTION INTRAMUSCULAR; INTRAVENOUS at 07:52

## 2020-02-29 RX ADMIN — CEFTRIAXONE 1 G: 1 INJECTION, POWDER, FOR SOLUTION INTRAMUSCULAR; INTRAVENOUS at 07:52

## 2020-02-29 RX ADMIN — MAGNESIUM SULFATE IN WATER 2 G: 40 INJECTION, SOLUTION INTRAVENOUS at 18:00

## 2020-02-29 RX ADMIN — METRONIDAZOLE 500 MG: 500 INJECTION, SOLUTION INTRAVENOUS at 21:15

## 2020-02-29 RX ADMIN — CIPROFLOXACIN 400 MG: 2 INJECTION, SOLUTION INTRAVENOUS at 22:00

## 2020-02-29 RX ADMIN — METOPROLOL SUCCINATE 12.5 MG: 25 TABLET, EXTENDED RELEASE ORAL at 15:21

## 2020-02-29 RX ADMIN — HYDROCODONE BITARTRATE AND ACETAMINOPHEN 1 TABLET: 5; 325 TABLET ORAL at 21:15

## 2020-02-29 RX ADMIN — Medication 10 ML: at 21:17

## 2020-02-29 RX ADMIN — APIXABAN 5 MG: 5 TABLET, FILM COATED ORAL at 21:17

## 2020-02-29 RX ADMIN — MORPHINE SULFATE 4 MG: 4 INJECTION, SOLUTION INTRAMUSCULAR; INTRAVENOUS at 07:52

## 2020-02-29 RX ADMIN — DILTIAZEM HYDROCHLORIDE 20 MG: 5 INJECTION INTRAVENOUS at 14:58

## 2020-02-29 NOTE — ED TRIAGE NOTES
Arrived via EMS w/ complaint of chest pain. Per EMS pt HR on scene 180-200. Pt in afib RVR. Pt states hx and missing medications today.

## 2020-02-29 NOTE — H&P
Internal Medicine History and Physical          Subjective     HPI: Kendra Anders is a [de-identified] y.o. female with a PMHx of Afib, diverticulitis, DM, colovescicular fistula who presented to the ED with c/o lower abd pain ongoing \"since November\" with blood and mucus in urine. Review of prior records reveals recent urology visit on 1/20/20 - \"Posterior bladder wall mass 3-4 cm by cystoscopy 1/8/2020 -- suspect this is an inflammatory process as instead of a primary bladder malignancy. May be related to pelvic inflammatory process (diverticular disease)\". She was then seen by Dr. Loi Wooten on 1/15/20 - he believes this is all chronic diverticulitis with fistula and he recommended surgery which she has agreed to and is planned for 3/31/20. In the ED she developed Afib RVR and was started on a cardizem gtt. Cardiology was consulted. CT shows diverticulitis/cystitis. WBC 8.8. Afebrile. Magnesium was 0.7 and replaced in the ED. Patient will be admitted for further evaluation and treatment. PMHx:  Past Medical History:   Diagnosis Date    Aortic stenosis     Patient denies on 10/31/2019.  Arthritis     Atrial flutter ([address]     Bladder stone     Bronchitis     Resolved after getting rid of her pet bird in 2000. 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     Vesicocolic fistula        PSurgHx:  Past Surgical History:   Procedure Laterality Date    COLONOSCOPY N/A 2/2/2017    COLONOSCOPY  w/bx polyp performed by Dereje Prado MD at [address] Left 2017       SocialHx:  Social History     Socioeconomic History    Marital status: SINGLE     Spouse name: Not on file    Number of children: Not on file    Years of education: Not on file    Highest education level: Not on file   Occupational History    Not on file   Social Needs    Financial resource strain: Not on file    Food insecurity:     Worry: Not on file     Inability: Not on file    Transportation needs:     Medical: Not on file     Non-medical: Not on file   Tobacco Use    Smoking status: Never Smoker    Smokeless tobacco: Never Used   Substance and Sexual Activity    Alcohol use: No    Drug use: No    Sexual activity: Not Currently     Partners: Male   Lifestyle    Physical activity:     Days per week: Not on file     Minutes per session: Not on file    Stress: Not on file   Relationships    Social connections:     Talks on phone: Not on file     Gets together: Not on file     Attends [employer]ical service: Not on file     Active member of club or organization: Not on file     Attends meetings of clubs or organizations: Not on file     Relationship status: Not on file    Intimate partner violence:     Fear of current or ex partner: Not on file     Emotionally abused: Not on file     Physically abused: Not on file     Forced sexual activity: Not on file   Other Topics Concern     Service Not Asked    Blood Transfusions Not Asked    Caffeine Concern Not Asked    Occupational Exposure Not Asked   Lonita Ely Hazards Not Asked    Sleep Concern Not Asked    Stress Concern Not Asked    Weight Concern Not Asked    Special Diet Not Asked    Back Care Not Asked    Exercise Not Asked    Bike Helmet Not Asked   [address] Not Asked    Self-Exams Not Asked   Social History Narrative    Not on file       FamilyHx:  Family History   Problem Relation Age of Onset    Diabetes Mother     Coronary Artery Disease Mother     Cancer Father         melanoma    Stroke Sister     Hypertension Sister     Diabetes Sister     Diabetes Brother     Coronary Artery Disease Brother     Breast Cancer Paternal Grandmother         older, but not sure age.  Breast Cancer Paternal Aunt         and gyn cancer ?age   [address] No Known Problems Sister     No Known Problems Brother     Kidney Disease Sister     Heart Failure Sister        Home Medications:  Prior to Admission Medications   Prescriptions Last Dose Informant Patient Reported? Taking? GLIPIZIDE PO   Yes No   Sig: Take  by mouth. LOSARTAN PO   Yes No   Sig: Take  by mouth. apixaban (ELIQUIS) 5 mg tablet   No No   Sig: TAKE 1 TABLET BY MOUTH TWICE DAILY. betamethasone dipropionate (DIPROSONE) 0.05 % topical cream   Yes No   Sig: betamethasone dipropionate 0.05 % topical cream   celecoxib (CELEBREX) 200 mg capsule   Yes No   Sig: Take  by mouth two (2) times a day. cholecalciferol (VITAMIN D3) 1,000 unit tablet   No No   Sig: Take 2 Tabs by mouth daily. ciprofloxacin HCl (CIPRO) 500 mg tablet   No No   Sig: Take 1 Tab by mouth two (2) times a day for 10 days. citalopram (CELEXA) 10 mg tablet   Yes No   Sig: Take  by mouth daily. cranberry 500 mg capsule   Yes No   Sig: Take 500 mg by mouth daily. diphenhydrAMINE (BENADRYL) 25 mg capsule   Yes No   Sig: Take 25 mg by mouth every six (6) hours as needed. diphenoxylate-atropine (LOMOTIL) 2.5-0.025 mg per tablet   No No   Sig: Take 1 Tab by mouth four (4) times daily as needed for Diarrhea. Max Daily Amount: 4 Tabs. famotidine (PEPCID) 20 mg tablet   Yes No   Sig: famotidine 20 mg tablet   furosemide (LASIX) 40 mg tablet   Yes No   Sig: Take  by mouth daily.    glucose blood VI test strips (FREESTYLE LITE STRIPS) strip   Yes No   Sig: use to check BS once daily   hydroCHLOROthiazide (HYDRODIURIL) 25 mg tablet   Yes No   Sig: hydrochlorothiazide 25 mg tablet   levothyroxine (SYNTHROID) 75 mcg tablet   No No   Sig: TAKE 1 TABLET BY MOUTH ONCE DAILY BEFORE BREAKFAST   metFORMIN (GLUCOPHAGE) 1,000 mg tablet   No No   Sig: TAKE 1 TABLET BY MOUTH TWICE DAILY   metoprolol tartrate (LOPRESSOR) 25 mg tablet   No No   Sig: Take 0.5 Tabs by mouth every twelve (12) hours. metroNIDAZOLE (FLAGYL) 500 mg tablet   No No   Sig: Take 1 Tab by mouth four (4) times daily for 10 days. ondansetron hcl (ZOFRAN) 4 mg tablet   No No   Sig: Take 1 Tab by mouth every eight (8) hours as needed for Nausea. pantoprazole (PROTONIX) 40 mg tablet   No No   Sig: Take 1 Tab by mouth daily. simvastatin (ZOCOR) 20 mg tablet   No No   Sig: TAKE 1 TABLET BY MOUTH EVERY NIGHT   triamcinolone acetonide (KENALOG) 0.1 % topical cream   No No   Sig: APPLY THIN LAYER EXTERNALLY TO THE AFFECTED AREA TWICE DAILY      Facility-Administered Medications: None       Allergies:   Allergies   Allergen Reactions    Ampicillin Itching        Review of Systems:  CONST: Fever, weight loss, fatigue or chills  HEENT: Recent changes in vision, vertigo, epistaxis, dysphagia and hoarseness  CV: Chest pain, palpitations, HTN, edema and varicosities  RESP: Cough, shortness of breath, wheezing, hemoptysis, snoring and reactive airway disease  GI: Nausea, vomiting, abdominal pain, change in bowel habits, hematochezia, melena, and GERD   : Hematuria, dysuria, frequency, urgency, nocturia and stress urinary incontinence   MS: Weakness, joint pain and arthritis  ENDO: Diabetes, thyroid disease, polyuria, polydipsia, polyphagia, poor wound healing, heat intolerance, cold intolerance  LYMPH/HEME: Anemia, bruising and history of blood transfusions  INTEG: Dermatitis, abnormal moles  NEURO: Dizziness, headache, fainting, seizures and stroke  PSYCH: Anxiety and depression      Objective      Visit Vitals  /53   Pulse (!) 135   Temp 97.8 °F (36.6 °C)   Resp 23   Ht 5' (1.524 m)   Wt 64 kg (141 lb)   SpO2 96%   BMI 27.54 kg/m²       Physical Exam:  General Appearance: NAD, conversant  HENT: normocephalic/atraumatic, moist mucus membranes  Lungs: CTA with normal respiratory effort  Cardiovascular: IRIR, tachy, no m/r/g, capillary refill < 2 sec, B/L DP/PT pulses +3/4  Abdomen: soft, LLQ TTP, normal bowel sounds  Extremities: no cyanosis, no peripheral edema  Neuro: moves all extremities, no focal deficits  Psych: appropriate affect, alert and oriented to person, place and time    Laboratory Studies:  BMP:   Lab Results   Component Value Date/Time     (L) 02/29/2020 02:56 PM    K 4.0 02/29/2020 02:56 PM     02/29/2020 02:56 PM    CO2 24 02/29/2020 02:56 PM    AGAP 8 02/29/2020 02:56 PM     (H) 02/29/2020 02:56 PM    BUN 15 02/29/2020 02:56 PM    CREA 1.07 02/29/2020 02:56 PM    GFRAA 60 (L) 02/29/2020 02:56 PM    GFRNA 49 (L) 02/29/2020 02:56 PM     CBC:   Lab Results   Component Value Date/Time    WBC 8.8 02/29/2020 02:56 PM    HGB 12.1 02/29/2020 02:56 PM    HCT 37.9 02/29/2020 02:56 PM     02/29/2020 02:56 PM       Imaging Reviewed:  Xr Abd Acute W 1 V Chest    Result Date: 2/29/2020  EXAM: ACUTE ABDOMEN SERIES CLINICAL INDICATION/HISTORY: Abdominal pain -Additional: Lower abdominal pain, known history of colovesical fistula COMPARISON: CT urogram from 11/22/2019 TECHNIQUE: Supine and upright views of abdomen and PA view of the chest _______________ FINDINGS: CHEST:   > Heart and Mediastinum: Unremarkable.   > Lungs and pleural spaces: Unremarkable.   > Bones and soft tissues: Unremarkable. ABDOMEN:   > Bowel gas pattern: No dilatation or pathologic air-fluid levels. Poorly visualized sigmoid segment in the pelvis, with known colovesical fistula. No free intraperitoneal air.   > Soft tissues: Small amount of gas is present in the region of the bladder, in keeping with known colovesical fistula.   > Bones: No acute or destructive abnormality. Partial visualized left femoral ORIF hardware. _______________     IMPRESSION: 1. No acute plain film abnormality. Ct Abd Pelv Wo Cont    Result Date: 2/29/2020  EXAM: CT of the abdomen and pelvis INDICATION: Lower abdominal pain   > History of vesicocolic fistula bladder mass or bladder calculus by EMR COMPARISON: None.  TECHNIQUE: Axial CT imaging of the abdomen and pelvis was performed intravenous contrast. Multiplanar reformats were generated. One or more dose reduction techniques were used on this CT: automated exposure control, adjustment of the mAs and/or kVp according to patient size, and iterative reconstruction techniques. The specific techniques used on this CT exam have been documented in the patient's electronic medical record. Digital Imaging and Communications in Medicine (DICOM) format image data are available to nonaffiliated external healthcare facilities or entities on a secure, media free, reciprocally searchable basis with patient authorization for at least a 12-month period after this study. _______________ FINDINGS: LOWER CHEST: Unremarkable. LIVER, BILIARY: Homogeneous hepatic attenuation, grossly unremarkable on noncontrast imaging. No biliary dilation. Cholelithiasis without CT evidence of cholecystitis. PANCREAS: Unremarkable SPLEEN: Unremarkable ADRENALS: Normal. KIDNEYS, URETERS: No hydronephrosis, perinephric fluid or induration. Left interpolar 4 cm cyst. No hydroureter. GASTROINTESTINAL TRACT, PELVIC ORGANS:   > Diverticulosis with Thick-walled sigmoid colon with pericolonic stranding and demonstration of a known vascular colic fistula with gas and stool material within the bladder lumen. Suboptimally evaluated bladder with diffuse mural thickening and pericystic stranding. No findings of upstream dilatation. > Left posterior uterine 2.4 x 3.2 cm ovoid cyst, favoring ovarian cyst. Similar appearing right-sided smaller adnexal 1.8 cm cyst. LYMPH NODES: Multiple subcentimeter periaortic lymph nodes are present. VASCULATURE: Mild aortic atherosclerosis OSSEOUS: No acute or aggressive osseous abnormalities identified. Prior left femoral ORIF fixation.  OTHER: No pneumoperitoneum _______________     IMPRESSION: 1.  CT findings of colovesical fistula with transmural thickening of both the colon and poorly defined bladder with pericolonic and pericystic stranding. Gas and Stool type material is present within the bladder lumen (coronal image 31), secondary to the known fistula. The overall appearance is that of colitis/diverticulitis and cystitis. Underlying bladder or colonic neoplasm is not excluded. 2. No findings of perforation. 3. Cholelithiasis. Left renal cyst and bilateral ovarian cysts. Recommend comparison to prior outside CT report from 11/22/2019. Xr Chest Port    Result Date: 2/29/2020  EXAM: XR CHEST PORT CLINICAL INDICATION/HISTORY: afib -Additional: None COMPARISON: 2/29/2020, 11/1/2019 TECHNIQUE: Frontal view of the chest _______________ FINDINGS: HEART AND MEDIASTINUM: Midline cardiac silhouette, normal in size. Unremarkable hilar vascular structures. LUNGS AND PLEURAL SPACES: No focal consolidation, parenchymal opacity. No pneumothorax or pleural effusion. BONY THORAX AND SOFT TISSUES: No acute or destructive osseous abnormality. _______________     IMPRESSION: 1. Stable exam. No acute cardiopulmonary process.        EKG:   Atrial fibrillation with RVR      Assessment/Plan     Principal Problem:    Atrial fibrillation with RVR (HCC) (3/4/2018)    Active Problems:    Diverticulitis (11/7/2016)      Hypomagnesemia (10/31/2019)      HTN (hypertension) (2/24/2014)      DM (diabetes mellitus) ([facility] (2/24/2014)      Afib RVR  - cardizem gtt, resume BB in AM  - still 150s in ED, BP okay  - patient is asymptomatic  - appreciate cardiology  - cont eliquis    Diverticulitis  - afebrile w/o leukocytosis  - likely more chronic than acute  - upcoming plans for left partial colectomy March 31st with Dr. Betina Sidhu  - will cover with cipro/flagyl    HypoMg  - replaced in ED  - replacement protocol in SDU  - monitor     HTN  - resume BB  - monitor BP    DM  - ssi  - monitor achs      - Cont acceptable home medications for chronic conditions   - DVT protocol    I have personally reviewed all pertinent labs, films and EKGs that have officially resulted. I reviewed available electronic documentation outlining the initial presentation as well as the emergency room physician's encounter.     Julia Rowe PA-C  [facility]  Hospitalist Division  Office:  [phone]  Pager: [phone]

## 2020-02-29 NOTE — ED PROVIDER NOTES
[facility]  EMERGENCY DEPARTMENT HISTORY AND PHYSICAL EXAM       Date: 2/29/2020   Patient Name: Saba Maria   YOB: 1940  Medical Record Number: [id number]    HISTORY OF PRESENTING ILLNESS:     Saba Maria is a [de-identified] y.o. female presenting with the noted PMH to the ED c/o chest pain. Patient states her chest pain started half hour ago. She states she missed her medications today. She describes the pain is been constant. With no increasing or decreasing factors. She states a history of A. fib in the past.  She states has been admitted for in the past.  Does not know if she had a stress test before. But she does see cardiologist Dr. Nick Rosario. She denies any fevers or chills. She does report she had abdominal pain and dysuria for which she was seen here earlier. Rest of 10 systems reviewed and negative. Primary Care Provider: Arron Justin MD   Specialist:    Past Medical History:   Past Medical History:   Diagnosis Date    Aortic stenosis     Patient denies on 10/31/2019.  Arthritis     Atrial flutter ([address]     Bladder stone     Bronchitis     Resolved after getting rid of her pet bird in 2000. 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  Vesicocolic fistula         Past Surgical History:   Past Surgical History:   Procedure Laterality Date    COLONOSCOPY N/A 2/2/2017    COLONOSCOPY  w/bx polyp performed by Franklin Grissom MD at [address] [place] [address] 2017        Social History:   Social History     Tobacco Use    Smoking status: Never Smoker    Smokeless tobacco: Never Used   Substance Use Topics    Alcohol use: No    Drug use:  No Allergies: Allergies   Allergen Reactions    Ampicillin Itching        REVIEW OF SYSTEMS:  Review of Systems      PHYSICAL EXAM:  Vitals:    02/29/20 1458 02/29/20 1500 02/29/20 1515 02/29/20 1521   BP: 152/83 111/53 (!) 119/91 111/53   Pulse: (!) 169 (!) 141 (!) 139 (!) 135   Resp:  25 23    Temp:       SpO2:  96% 96%    Weight:       Height:           Physical Exam   Vital signs reviewed. Alert oriented x 3 in NAD. HEENT: normocephalic atraumatic. Eyes are PERRLA EOMI. Conjunctiva normal.    External ears and nose normal.    Neck: normal external exam. No midline neck or back TTP. Lungs are clear to ascultation bilaterally. normal effort  Heart is tachycardic and irregular rate and rhythm with no murmurs. Abdomen soft and nontender. No rebound rigidity or guarding. Extremities: Moves all 4 extremities and no distress. Full range of motion. 2+ pulses and BCR in all 4 extremities. Neuro: Normal gait. 5 out of 5 strength in all 4 extremities. No facial droop. Skin examination: intact. no rashes. No petechia or purpura.       Medications   aspirin tablet 325 mg (has no administration in time range)   dilTIAZem (CARDIZEM) 100 mg in 0.9% sodium chloride (MBP/ADV) 100 mL infusion (12.5 mg/hr IntraVENous Transfusion Rate Change 2/29/20 1516)   magnesium sulfate 2 g/50 ml IVPB (premix or compounded) (has no administration in time range)   sodium chloride (NS) flush 5-10 mL (has no administration in time range)   cefTRIAXone (ROCEPHIN) 1 g in 0.9% sodium chloride (MBP/ADV) 50 mL MBP (has no administration in time range)   metroNIDAZOLE (FLAGYL) 500 mg in 100 mL IV syringe (has no administration in time range)   dilTIAZem (CARDIZEM) injection 20 mg (20 mg IntraVENous Given 2/29/20 1458)   sodium chloride 0.9 % bolus infusion 250 mL (250 mL IntraVENous New Bag 2/29/20 1527)   metoprolol succinate (TOPROL-XL) XL tablet 12.5 mg (12.5 mg Oral Given 2/29/20 1521)       RESULTS:    Labs -   Labs Reviewed   CBC WITH AUTOMATED DIFF - Abnormal; Notable for the following components:       Result Value    LYMPHOCYTES 18 (*)     All other components within normal limits   METABOLIC PANEL, COMPREHENSIVE - Abnormal; Notable for the following components:    Sodium 135 (*)     Glucose 230 (*)     GFR est AA 60 (*)     GFR est non-AA 49 (*)     Calcium 8.4 (*)     ALT (SGPT) 9 (*)     Albumin 3.0 (*)     Globulin 4.3 (*)     A-G Ratio 0.7 (*)     All other components within normal limits   MAGNESIUM - Abnormal; Notable for the following components:    Magnesium 0.7 (*)     All other components within normal limits   TROPONIN I       Radiologic Studies -  Xr Abd Acute W 1 V Chest    Result Date: 2/29/2020  EXAM: ACUTE ABDOMEN SERIES CLINICAL INDICATION/HISTORY: Abdominal pain -Additional: Lower abdominal pain, known history of colovesical fistula COMPARISON: CT urogram from 11/22/2019 TECHNIQUE: Supine and upright views of abdomen and PA view of the chest _______________ FINDINGS: CHEST:   > Heart and Mediastinum: Unremarkable.   > Lungs and pleural spaces: Unremarkable.   > Bones and soft tissues: Unremarkable. ABDOMEN:   > Bowel gas pattern: No dilatation or pathologic air-fluid levels. Poorly visualized sigmoid segment in the pelvis, with known colovesical fistula. No free intraperitoneal air.   > Soft tissues: Small amount of gas is present in the region of the bladder, in keeping with known colovesical fistula.   > Bones: No acute or destructive abnormality. Partial visualized left femoral ORIF hardware. _______________     IMPRESSION: 1. No acute plain film abnormality. Ct Abd Pelv Wo Cont    Result Date: 2/29/2020  EXAM: CT of the abdomen and pelvis INDICATION: Lower abdominal pain   > History of vesicocolic fistula bladder mass or bladder calculus by EMR COMPARISON: None. TECHNIQUE: Axial CT imaging of the abdomen and pelvis was performed intravenous contrast. Multiplanar reformats were generated.  One or more dose reduction techniques were used on this CT: automated exposure control, adjustment of the mAs and/or kVp according to patient size, and iterative reconstruction techniques. The specific techniques used on this CT exam have been documented in the patient's electronic medical record. Digital Imaging and Communications in Medicine (DICOM) format image data are available to nonaffiliated external healthcare facilities or entities on a secure, media free, reciprocally searchable basis with patient authorization for at least a 12-month period after this study. _______________ FINDINGS: LOWER CHEST: Unremarkable. LIVER, BILIARY: Homogeneous hepatic attenuation, grossly unremarkable on noncontrast imaging. No biliary dilation. Cholelithiasis without CT evidence of cholecystitis. PANCREAS: Unremarkable SPLEEN: Unremarkable ADRENALS: Normal. KIDNEYS, URETERS: No hydronephrosis, perinephric fluid or induration. Left interpolar 4 cm cyst. No hydroureter. GASTROINTESTINAL TRACT, PELVIC ORGANS:   > Diverticulosis with Thick-walled sigmoid colon with pericolonic stranding and demonstration of a known vascular colic fistula with gas and stool material within the bladder lumen. Suboptimally evaluated bladder with diffuse mural thickening and pericystic stranding. No findings of upstream dilatation. > Left posterior uterine 2.4 x 3.2 cm ovoid cyst, favoring ovarian cyst. Similar appearing right-sided smaller adnexal 1.8 cm cyst. LYMPH NODES: Multiple subcentimeter periaortic lymph nodes are present. VASCULATURE: Mild aortic atherosclerosis OSSEOUS: No acute or aggressive osseous abnormalities identified. Prior left femoral ORIF fixation. OTHER: No pneumoperitoneum _______________     IMPRESSION: 1.  CT findings of colovesical fistula with transmural thickening of both the colon and poorly defined bladder with pericolonic and pericystic stranding.  Gas and Stool type material is present within the bladder lumen (coronal image 31), secondary to the known fistula. The overall appearance is that of colitis/diverticulitis and cystitis. Underlying bladder or colonic neoplasm is not excluded. 2. No findings of perforation. 3. Cholelithiasis. Left renal cyst and bilateral ovarian cysts. Recommend comparison to prior outside CT report from 11/22/2019. Xr Chest Port    Result Date: 2/29/2020  EXAM: XR CHEST PORT CLINICAL INDICATION/HISTORY: afib -Additional: None COMPARISON: 2/29/2020, 11/1/2019 TECHNIQUE: Frontal view of the chest _______________ FINDINGS: HEART AND MEDIASTINUM: Midline cardiac silhouette, normal in size. Unremarkable hilar vascular structures. LUNGS AND PLEURAL SPACES: No focal consolidation, parenchymal opacity. No pneumothorax or pleural effusion. BONY THORAX AND SOFT TISSUES: No acute or destructive osseous abnormality. _______________     IMPRESSION: 1. Stable exam. No acute cardiopulmonary process. EKG interpretation:   Done at 1434 read myself as A. fib with RVR at 188 beats per minute. Corresponding rate related changes but no ST elevation. MEDICAL DECISION MAKING    Patient with A. fib with RVR. Patient currently on Cardizem drip. Patient did not take any medication this morning. Suspect is the cause for the RVR. Cardiology already saw patient at bedside. Will order sepsis protocol. Abdomen soft and nonsurgical.  Patient denies any abdominal pain. Patient was diagnosed with diverticulitis earlier. No pneumonia or pneumothorax. No ACS or STEMI. We will go ahead and order sepsis order set however patient be admitted for A. fib with RVR. [id number]. Patient reassessed. Updated results. Agrees with admission. Currently on Cardizem drip. Concerns for additional IV fluids could place patient in CHF with her A. fib with RVR. Patient already received IV fluids. Patient reassessed after fluid given. Sepsis reassessment performed. Updated results. Agrees with admission.       Diagnosis Clinical Impression:   1. Atrial fibrillation with RVR ([address]    2. Hypomagnesemia         Admitted in stable and improved condition. This chart was completed using Dragon, a dictation transcription service. Errors may have resulted from using this device.

## 2020-02-29 NOTE — ED TRIAGE NOTES
Pt states she has had a perforated bladder since November, she has seen urology for same. She also has \"issues\" with her colon but is unsure what they are.   She states she was supposed to be scoped in February but it was postponed until next month and pt came in to see if we could get her seen sooner and help with pain

## 2020-02-29 NOTE — ED PROVIDER NOTES
[facility] EMERGENCY DEPT      6:48 AM    Date: 2/29/2020  Patient Name: Brittany Villavicencio    History of Presenting Illness     Chief Complaint   Patient presents with    Urinary Pain       [de-identified] y.o. female with noted past medical history who presents to the emergency department with chronic lower abdominal pain with urination. 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 The patient states that she had a couple months of the same suprapubic abdominal pain with dysuria as well. She has been seen by Dr. Shayna Allison,  general surgery, for repair of the noted fistulous. Prior notes  show that Dr. Shayna Allison believes that the patient's recurrent UTIs most likely secondary to the colovesicular fistula. The patient today presents with mildly worsening suprapubic abdominal pain and the continued dysuria. No other complaints. Except for noted hematuria over the last few days. No fever or chills. No radiation of the pain. No nausea vomiting or diarrhea. Patient denies any other associated signs or symptoms. Patient denies any other complaints. Nursing notes regarding the HPI and triage nursing notes were reviewed. Prior medical records were reviewed. Current Outpatient Medications   Medication Sig Dispense Refill    metoprolol tartrate (LOPRESSOR) 25 mg tablet Take 0.5 Tabs by mouth every twelve (12) hours. 30 Tab 6    levothyroxine (SYNTHROID) 75 mcg tablet TAKE 1 TABLET BY MOUTH ONCE DAILY BEFORE BREAKFAST 90 Tab 0    simvastatin (ZOCOR) 20 mg tablet TAKE 1 TABLET BY MOUTH EVERY NIGHT 90 Tab 0    metFORMIN (GLUCOPHAGE) 1,000 mg tablet TAKE 1 TABLET BY MOUTH TWICE DAILY 180 Tab 0    furosemide (LASIX) 40 mg tablet Take  by mouth daily.  celecoxib (CELEBREX) 200 mg capsule Take  by mouth two (2) times a day.  diphenhydrAMINE (BENADRYL) 25 mg capsule Take 25 mg by mouth every six (6) hours as needed.  citalopram (CELEXA) 10 mg tablet Take  by mouth daily.  LOSARTAN PO Take  by mouth.  GLIPIZIDE PO Take  by mouth.  cranberry 500 mg capsule Take 500 mg by mouth daily.  triamcinolone acetonide (KENALOG) 0.1 % topical cream APPLY THIN LAYER EXTERNALLY TO THE AFFECTED AREA TWICE DAILY 15 g 0    betamethasone dipropionate (DIPROSONE) 0.05 % topical cream betamethasone dipropionate 0.05 % topical cream      glucose blood VI test strips (FREESTYLE LITE STRIPS) strip use to check BS once daily      famotidine (PEPCID) 20 mg tablet famotidine 20 mg tablet      hydroCHLOROthiazide (HYDRODIURIL) 25 mg tablet hydrochlorothiazide 25 mg tablet      apixaban (ELIQUIS) 5 mg tablet TAKE 1 TABLET BY MOUTH TWICE DAILY. 60 Tab 6    ondansetron hcl (ZOFRAN) 4 mg tablet Take 1 Tab by mouth every eight (8) hours as needed for Nausea. 30 Tab 1    pantoprazole (PROTONIX) 40 mg tablet Take 1 Tab by mouth daily. 90 Tab 3    diphenoxylate-atropine (LOMOTIL) 2.5-0.025 mg per tablet Take 1 Tab by mouth four (4) times daily as needed for Diarrhea. Max Daily Amount: 4 Tabs. 30 Tab 1    cholecalciferol (VITAMIN D3) 1,000 unit tablet Take 2 Tabs by mouth daily. 61 Tab 0       Past History     Past Medical History:  Past Medical History:   Diagnosis Date    Aortic stenosis     Patient denies on 10/31/2019.  Arthritis     Atrial flutter ([address]     Bladder stone     Bronchitis     Resolved after getting rid of her pet bird in 2000. 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     Vesicocolic fistula        Past Surgical History:  Past Surgical History:   Procedure Laterality Date    COLONOSCOPY N/A 2/2/2017 COLONOSCOPY  w/bx polyp performed by Jason Bravo MD at [address] 2017       Family History:  Family History   Problem Relation Age of Onset    Diabetes Mother     Coronary Artery Disease Mother     Cancer Father         melanoma    Stroke Sister     Hypertension Sister     Diabetes Sister     Diabetes Brother     Coronary Artery Disease Brother     Breast Cancer Paternal Grandmother         older, but not sure age.  Breast Cancer Paternal [de-identified]         and gyn cancer ?age   [facility] No Known Problems Sister     No Known Problems Brother     Kidney Disease Sister     Heart Failure Sister        Social History:  Social History     Tobacco Use    Smoking status: Never Smoker    Smokeless tobacco: Never Used   Substance Use Topics    Alcohol use: No    Drug use: No       Allergies: Allergies   Allergen Reactions    Ampicillin Itching       Patient's primary care provider (as noted in EPIC):  Laurita STANLEY DO    Review of Systems   Constitutional: Negative for diaphoresis. HENT: Negative for congestion. Eyes: Negative for discharge. Respiratory: Negative for shortness of breath and stridor. Cardiovascular: Negative for chest pain and palpitations. Gastrointestinal: Positive for abdominal pain. Negative for abdominal distention, diarrhea, nausea and vomiting. Endocrine: Negative for heat intolerance. Genitourinary: Positive for dysuria and hematuria. Negative for flank pain. Musculoskeletal: Negative for back pain. Neurological: Negative for weakness. Psychiatric/Behavioral: Negative for hallucinations. All other systems reviewed and are negative.       Visit Vitals  /71   Pulse 70   Temp 97.6 °F (36.4 °C)   Resp 18   Ht 5' (1.524 m)   Wt 64 kg (141 lb)   SpO2 100%   BMI 27.54 kg/m²       Patient Vitals for the past 12 hrs:   Temp Pulse Resp BP SpO2   02/29/20 0801 97.6 °F (36.4 °C) 70 18 136/71 100 %   02/29/20 0638 98.3 °F (36.8 °C) 71 16 142/62 99 %       PHYSICAL EXAM:    CONSTITUTIONAL:  Alert, in no apparent distress;  well developed;  well nourished. HEAD:  Normocephalic, atraumatic. EYES:  EOMI. Non-icteric sclera. Normal conjunctiva. ENTM:  Nose:  no rhinorrhea. Throat:  no erythema or exudate, mucous membranes moist.  NECK:  No JVD. Supple  RESPIRATORY:  Chest clear, equal breath sounds, good air movement. CARDIOVASCULAR:  Regular rate and rhythm. No murmurs, rubs, or gallops. GI:  Normal bowel sounds, abdomen soft with mild suprapubic tenderness to palpation. No rebound or guarding. BACK:  Non-tender. UPPER EXT:  Normal inspection. LOWER EXT:  No edema, no calf tenderness. Distal pulses intact. NEURO:  Moves all four extremities, and grossly normal motor exam.  SKIN:  No rashes;  Normal for age. PSYCH:  Alert and normal affect. DIFFERENTIAL DIAGNOSES/ MEDICAL DECISION MAKING:  Urinary tract infection, appendicitis, diverticulitis, constipation related pain, ovarian cyst pain, ovarian torsion, pelvic inflammatory disease, abdominal wall pain, referred upper abdominal pain, genitourinary carcinoma, versus combination of the above and/or numerous other processes/ etiologies.     Diagnostic Study Results     Abnormal lab results from this emergency department encounter:  Labs Reviewed   METABOLIC PANEL, BASIC - Abnormal; Notable for the following components:       Result Value    Glucose 150 (*)     All other components within normal limits   HEPATIC FUNCTION PANEL - Abnormal; Notable for the following components:    Albumin 3.0 (*)     Globulin 4.4 (*)     A-G Ratio 0.7 (*)     ALT (SGPT) 9 (*)     All other components within normal limits   URINALYSIS W/ RFLX MICROSCOPIC - Abnormal; Notable for the following components:    Protein 100 (*)     Bilirubin SMALL (*)     Blood MODERATE (*)     Nitrites POSITIVE (*)     Leukocyte Esterase LARGE (*)     All other components within normal limits   URINE MICROSCOPIC ONLY - Abnormal; Notable for the following components:    Bacteria 4+ (*)     All other components within normal limits   CBC WITH AUTOMATED DIFF   LIPASE       Lab values for this patient within approximately the last 12 hours:  Recent Results (from the past 12 hour(s))   URINALYSIS W/ RFLX MICROSCOPIC    Collection Time: 02/29/20  7:00 AM   Result Value Ref Range    Color BROWN      Appearance TURBID      Specific gravity 1.025 1.005 - 1.030      pH (UA) 5.5 5.0 - 8.0      Protein 100 (A) NEG mg/dL    Glucose NEGATIVE  NEG mg/dL    Ketone NEGATIVE  NEG mg/dL    Bilirubin SMALL (A) NEG      Blood MODERATE (A) NEG      Urobilinogen 0.2 0.2 - 1.0 EU/dL    Nitrites POSITIVE (A) NEG      Leukocyte Esterase LARGE (A) NEG     URINE MICROSCOPIC ONLY    Collection Time: 02/29/20  7:00 AM   Result Value Ref Range    WBC TOO NUMEROUS TO COUNT 0 - 4 /hpf    RBC TOO NUMEROUS TO COUNT 0 - 5 /hpf    Bacteria 4+ (A) NEG /hpf   CBC WITH AUTOMATED DIFF    Collection Time: 02/29/20  8:03 AM   Result Value Ref Range    WBC 6.4 4.6 - 13.2 K/uL    RBC 4.27 4.20 - 5.30 M/uL    HGB 12.0 12.0 - 16.0 g/dL    HCT 37.1 35.0 - 45.0 %    MCV 86.9 74.0 - 97.0 FL    MCH 28.1 24.0 - 34.0 PG    MCHC 32.3 31.0 - 37.0 g/dL    RDW 13.0 11.6 - 14.5 %    PLATELET 501 072 - 248 K/uL    MPV 9.8 9.2 - 11.8 FL    NEUTROPHILS 65 40 - 73 %    LYMPHOCYTES 26 21 - 52 %    MONOCYTES 8 3 - 10 %    EOSINOPHILS 1 0 - 5 %    BASOPHILS 0 0 - 2 %    ABS. NEUTROPHILS 4.2 1.8 - 8.0 K/UL    ABS. LYMPHOCYTES 1.7 0.9 - 3.6 K/UL    ABS. MONOCYTES 0.5 0.05 - 1.2 K/UL    ABS. EOSINOPHILS 0.0 0.0 - 0.4 K/UL    ABS.  BASOPHILS 0.0 0.0 - 0.1 K/UL    DF AUTOMATED     METABOLIC PANEL, BASIC    Collection Time: 02/29/20  8:03 AM   Result Value Ref Range    Sodium 138 136 - 145 mmol/L    Potassium 4.5 3.5 - 5.5 mmol/L    Chloride 103 100 - 111 mmol/L    CO2 26 21 - 32 mmol/L    Anion gap 9 3.0 - 18 mmol/L    Glucose 150 (H) 74 - 99 mg/dL    BUN 14 7.0 - 18 MG/DL    Creatinine 0.78 0.6 - 1.3 MG/DL    BUN/Creatinine ratio 18 12 - 20      GFR est AA >60 >60 ml/min/1.73m2    GFR est non-AA >60 >60 ml/min/1.73m2    Calcium 8.6 8.5 - 10.1 MG/DL   LIPASE    Collection Time: 02/29/20  8:03 AM   Result Value Ref Range    Lipase 163 73 - 393 U/L   HEPATIC FUNCTION PANEL    Collection Time: 02/29/20  8:03 AM   Result Value Ref Range    Protein, total 7.4 6.4 - 8.2 g/dL    Albumin 3.0 (L) 3.4 - 5.0 g/dL    Globulin 4.4 (H) 2.0 - 4.0 g/dL    A-G Ratio 0.7 (L) 0.8 - 1.7      Bilirubin, total 0.4 0.2 - 1.0 MG/DL    Bilirubin, direct 0.2 0.0 - 0.2 MG/DL    Alk. phosphatase 63 45 - 117 U/L    AST (SGOT) 11 10 - 38 U/L    ALT (SGPT) 9 (L) 13 - 56 U/L       Radiologist and cardiologist interpretations if available at time of this note:  Xr Abd Acute W 1 V Chest    Result Date: 2/29/2020  EXAM: ACUTE ABDOMEN SERIES CLINICAL INDICATION/HISTORY: Abdominal pain -Additional: Lower abdominal pain, known history of colovesical fistula COMPARISON: CT urogram from 11/22/2019 TECHNIQUE: Supine and upright views of abdomen and PA view of the chest _______________ FINDINGS: CHEST:   > Heart and Mediastinum: Unremarkable.   > Lungs and pleural spaces: Unremarkable.   > Bones and soft tissues: Unremarkable. ABDOMEN:   > Bowel gas pattern: No dilatation or pathologic air-fluid levels. Poorly visualized sigmoid segment in the pelvis, with known colovesical fistula. No free intraperitoneal air.   > Soft tissues: Small amount of gas is present in the region of the bladder, in keeping with known colovesical fistula.   > Bones: No acute or destructive abnormality. Partial visualized left femoral ORIF hardware. _______________     IMPRESSION: 1. No acute plain film abnormality. Ct Abd Pelv Wo Cont    Result Date: 2/29/2020  EXAM: CT of the abdomen and pelvis INDICATION: Lower abdominal pain   > History of vesicocolic fistula bladder mass or bladder calculus by EMR COMPARISON: None.  TECHNIQUE: Axial CT imaging of the abdomen and pelvis was performed intravenous contrast. Multiplanar reformats were generated. One or more dose reduction techniques were used on this CT: automated exposure control, adjustment of the mAs and/or kVp according to patient size, and iterative reconstruction techniques. The specific techniques used on this CT exam have been documented in the patient's electronic medical record. Digital Imaging and Communications in Medicine (DICOM) format image data are available to nonaffiliated external healthcare facilities or entities on a secure, media free, reciprocally searchable basis with patient authorization for at least a 12-month period after this study. _______________ FINDINGS: LOWER CHEST: Unremarkable. LIVER, BILIARY: Homogeneous hepatic attenuation, grossly unremarkable on noncontrast imaging. No biliary dilation. Cholelithiasis without CT evidence of cholecystitis. PANCREAS: Unremarkable SPLEEN: Unremarkable ADRENALS: Normal. KIDNEYS, URETERS: No hydronephrosis, perinephric fluid or induration. Left interpolar 4 cm cyst. No hydroureter. GASTROINTESTINAL TRACT, PELVIC ORGANS:   > Diverticulosis with Thick-walled sigmoid colon with pericolonic stranding and demonstration of a known vascular colic fistula with gas and stool material within the bladder lumen. Suboptimally evaluated bladder with diffuse mural thickening and pericystic stranding. No findings of upstream dilatation. > Left posterior uterine 2.4 x 3.2 cm ovoid cyst, favoring ovarian cyst. Similar appearing right-sided smaller adnexal 1.8 cm cyst. LYMPH NODES: Multiple subcentimeter periaortic lymph nodes are present. VASCULATURE: Mild aortic atherosclerosis OSSEOUS: No acute or aggressive osseous abnormalities identified. Prior left femoral ORIF fixation.  OTHER: No pneumoperitoneum _______________     IMPRESSION: 1.  CT findings of colovesical fistula with transmural thickening of both the colon and poorly defined bladder with pericolonic and pericystic stranding. Gas and Stool type material is present within the bladder lumen (coronal image 31), secondary to the known fistula. The overall appearance is that of colitis/diverticulitis and cystitis. Underlying bladder or colonic neoplasm is not excluded. 2. No findings of perforation. 3. Cholelithiasis. Left renal cyst and bilateral ovarian cysts. Recommend comparison to prior outside CT report from 11/22/2019. Medication(s) ordered for patient during this emergency visit encounter:  Medications   morphine injection 4 mg (4 mg IntraVENous Given 2/29/20 0752)   ondansetron (ZOFRAN) injection 4 mg (4 mg IntraVENous Given 2/29/20 0752)   cefTRIAXone (ROCEPHIN) 1 g in 0.9% sodium chloride (MBP/ADV) 50 mL MBP (0 g IntraVENous IV Completed 2/29/20 0811)       Medical Decision Making     I am the first provider for this patient. I reviewed the vital signs, available nursing notes, past medical history, past surgical history, family history and social history. Vital Signs:  Reviewed the patient's vital signs. ED COURSE AND MEDICAL DECISION MAKING:    The patient's pain improved in the ED with the noted medications. On reassessment of the patient, the patient continues to have no surgical abdomen with no rebound nor guarding. The patient does not appear septic by presentation, vital signs and laboratory results. The patient continues to appear non-toxic in the emergency department on reevaluations. 9:08 AM  Given the colonic vesicular fistula, it is difficult to assess assess whether the patient is actually having contamination or urinary tract infection. However given her superior tenderness, will treat for possible UTI with the same medications Cipro, that is used to treat the diverticulitis. Diverticulitis was treated with Cipro and Flagyl. Rocephin 1 g IV already started in the ER for treatment of UTI as well.     IMPRESSION AND MEDICAL DECISION MAKING:  Based upon the patient's presentation with noted HPI and PE, along with the work   up done in the emergency department, I believe that the patient is having abdominal pain of uncertain etiology. However, I do believe that the patient is stable and can be discharged home with further outpatient evaluation of the abdominal pain by her primary doctor. 1. Abdominal pain. SPECIFIC PATIENT INSTRUCTIONS FROM THE PHYSICIAN WHO TREATED YOU IN THE ER TODAY:  1. Return if any concerns or worsening of condition(s)  2. Ciprofloxacin and flagyl as prescribed till finished  3. Follow up with your primary doctor in the next 2-4 days for reevaluation. 4.  Follow-up with Dr. Neena Valerio in the next few days. Patient is improved, resting quietly and comfortably. The patient will be discharged home. The patient was reassured that these symptoms do not appear to represent a serious or life threatening condition at this time. Warning signs of worsening condition were discussed and understood by the patient. Based on patient's age, coexisting illness, exam, and the results of this ED evaluation, the decision to treat as an outpatient was made. Based on the information available at time of discharge, acute pathology requiring immediate intervention was deemed relative unlikely. While it is impossible to completely exclude the possibility of underlying serious disease or worsening of condition, I feel the relative likelihood is extremely low. I discussed this uncertainty with the patient, who understood ED evaluation and treatment and felt comfortable with the outpatient treatment plan. All questions regarding care, test results, and follow up were answered. The patient is stable and appropriate to discharge. They understand that they should return to the emergency department for any new or worsening symptoms. I stressed the importance of follow up for repeat assessment and possibly further evaluation/treatment.     Dictation disclaimer:  Please note that this dictation was completed with [employer], the computer voice recognition software. Quite often unanticipated grammatical, syntax, homophones, and other interpretive errors are inadvertently transcribed by the computer software. Please disregard these errors. Please excuse any errors that have escaped final proofreading. Coding Diagnoses     Clinical Impression:   1. Acute diverticulitis    2. Fistula    3. Abdominal pain, generalized    4. Acute UTI (urinary tract infection)        Disposition     Disposition:    Discharge. JUAN A Thompson Board Certified Emergency Physician    Provider Attestation:  If a scribe was utilized in generation of this patient record, I personally performed the services described in the documentation, reviewed the documentation, as recorded by the scribe in my presence, and it accurately records the patient's history of presenting illness, review of systems, patient physical examination, and procedures performed by me as the attending physician. JUAN A Thompson Board Certified Emergency Physician  2/29/2020.  6:52 AM

## 2020-02-29 NOTE — CONSULTS
[facility] - Consult Note    Consultation request for SVT  Date of  Admission: 2/29/2020  2:42 PM   Primary Care Physician:  Lesvia Guzmán,      Assessment:     -A.fib/flutter with RVR with h/o paroxysmal A.fib/flutter on eliquis, metoprolol 12.5 BID as outpatient, missed metoprolol dose earlier today since she was in ER for suprapubic pain/dysuria  -h/o moderate aortic stenosis with mean gradient 31 mmHg 11/2019, EF 65%  -Chest pain likely from a.fib w/RVR  -h/o pharm nuc 2/2019, inferior fixed defect, likely artifact  -h/o moderate pulmonary HTN by echo 11/2019 at 51 mmHg  -h/o hematuria with 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 Roxanna Reyna, seen 2/24/20     Plan:     Plan rate control for known h/o A.fib/flutter w/RVR, missed this mornings AM metoprolol due to being in ER for hematuria, suprapubic pain, and dysuria. Stress test last year without ischemia. If able to rate control, will leave dispo up to ER physician. If admitted, I will follow-up tomorrow. History of Present Illness: This is a [de-identified] y.o. female seen for A.fib/RVR. Patient complains of:    Chest pain starting about 30 minutes prior to presenting to ER with SVT at 150-170 bpm.  Constant pain, no syncope. Seen by Dr. Roxanna Reyna 2/24/20, doing well at that time, c/o hematuria. Patient was just seen in ER earlier today with lower abdominal pain, hematuria, dysuria and discharged. Missed her usual metoprolol.     I evaluated patient as I was reading EKG's and noted critical heart rate and patient recently seen by Dr. oRxanna Reyna, primary cardiologist.    Review of Symptoms:  Except as stated above include:  Constitutional:  Negative  Ears, nose, throat:  Negative  Respiratory:  negative  Cardiovascular:  Chest pain  Gastrointestinal: negative  Genitourinary:  Recent hematuria  Musculoskeletal:  Negative  Neurological:  Negative  Dermatological:  Negative  Hematological: Negative  Endocrinological: Negative  Allergy: Negative  Psychological:  Negative       Past Medical History:     Past Medical History:   Diagnosis Date    Aortic stenosis     Patient denies on 10/31/2019.  Arthritis     Atrial flutter ([address]     Bladder stone     Bronchitis     Resolved after getting rid of her pet bird in 2000. 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  Vesicocolic fistula          Social History:     Social History     Socioeconomic History    Marital status: SINGLE     Spouse name: Not on file    Number of children: Not on file    Years of education: Not on file    Highest education level: Not on file   Tobacco Use    Smoking status: Never Smoker    Smokeless tobacco: Never Used   Substance and Sexual Activity    Alcohol use: No    Drug use: No    Sexual activity: Not Currently     Partners: Male   Other Topics Concern        Family History:     Family History   Problem Relation Age of Onset    Diabetes Mother     Coronary Artery Disease Mother     Cancer Father         melanoma    Stroke Sister     Hypertension Sister     Diabetes Sister     Diabetes Brother     Coronary Artery Disease Brother     Breast Cancer Paternal Grandmother         older, but not sure age.  Breast Cancer Paternal Aunt         and gyn cancer ?age   [address] [facility] No Known Problems Sister     No Known Problems Brother     Kidney Disease Sister     Heart Failure Sister         Medications:      Allergies   Allergen Reactions    Ampicillin Itching        Current Facility-Administered Medications Medication Dose Route Frequency    aspirin tablet 325 mg  325 mg Oral NOW    dilTIAZem (CARDIZEM) injection 20 mg  20 mg IntraVENous NOW     Current Outpatient Medications   Medication Sig    ciprofloxacin HCl (CIPRO) 500 mg tablet Take 1 Tab by mouth two (2) times a day for 10 days.  metroNIDAZOLE (FLAGYL) 500 mg tablet Take 1 Tab by mouth four (4) times daily for 10 days.  metoprolol tartrate (LOPRESSOR) 25 mg tablet Take 0.5 Tabs by mouth every twelve (12) hours.  levothyroxine (SYNTHROID) 75 mcg tablet TAKE 1 TABLET BY MOUTH ONCE DAILY BEFORE BREAKFAST    simvastatin (ZOCOR) 20 mg tablet TAKE 1 TABLET BY MOUTH EVERY NIGHT    metFORMIN (GLUCOPHAGE) 1,000 mg tablet TAKE 1 TABLET BY MOUTH TWICE DAILY    furosemide (LASIX) 40 mg tablet Take  by mouth daily.  celecoxib (CELEBREX) 200 mg capsule Take  by mouth two (2) times a day.  diphenhydrAMINE (BENADRYL) 25 mg capsule Take 25 mg by mouth every six (6) hours as needed.  citalopram (CELEXA) 10 mg tablet Take  by mouth daily.  LOSARTAN PO Take  by mouth.  GLIPIZIDE PO Take  by mouth.  cranberry 500 mg capsule Take 500 mg by mouth daily.  triamcinolone acetonide (KENALOG) 0.1 % topical cream APPLY THIN LAYER EXTERNALLY TO THE AFFECTED AREA TWICE DAILY    betamethasone dipropionate (DIPROSONE) 0.05 % topical cream betamethasone dipropionate 0.05 % topical cream    glucose blood VI test strips (FREESTYLE LITE STRIPS) strip use to check BS once daily    famotidine (PEPCID) 20 mg tablet famotidine 20 mg tablet    hydroCHLOROthiazide (HYDRODIURIL) 25 mg tablet hydrochlorothiazide 25 mg tablet    apixaban (ELIQUIS) 5 mg tablet TAKE 1 TABLET BY MOUTH TWICE DAILY.  ondansetron hcl (ZOFRAN) 4 mg tablet Take 1 Tab by mouth every eight (8) hours as needed for Nausea.  pantoprazole (PROTONIX) 40 mg tablet Take 1 Tab by mouth daily.     diphenoxylate-atropine (LOMOTIL) 2.5-0.025 mg per tablet Take 1 Tab by mouth four (4) times daily as needed for Diarrhea. Max Daily Amount: 4 Tabs.  cholecalciferol (VITAMIN D3) 1,000 unit tablet Take 2 Tabs by mouth daily. Physical Exam:     Visit Vitals  /83   Pulse (!) 190   Temp 97.8 °F (36.6 °C)   Resp 24   Ht 5' (1.524 m)   Wt 64 kg (141 lb)   SpO2 98%   BMI 27.54 kg/m²     BP Readings from Last 3 Encounters:   02/29/20 152/83   02/29/20 130/74   02/24/20 131/71     Pulse Readings from Last 3 Encounters:   02/29/20 (!) 190   02/29/20 69   02/24/20 81     Wt Readings from Last 3 Encounters:   02/29/20 64 kg (141 lb)   02/29/20 64 kg (141 lb)   02/24/20 64 kg (141 lb)       General:  alert, cooperative, no distress, appears stated age  Neck:  nontender  Lungs:  clear to auscultation bilaterally  Heart:  tachy, S1, S2 normal, MADDI  Abdomen:  abdomen is soft without significant tenderness, masses, organomegaly or guarding  Extremities:  extremities normal, atraumatic, no cyanosis or edema  Skin: Warm and dry.  no hyperpigmentation, vitiligo, or suspicious lesions  Neuro: alert, oriented x3, affect appropriate, no focal neurological deficits, moves all extremities well, no involuntary movements, reflexes at knee and ankle intact  Psych: non focal     Data Review:     Recent Labs     02/29/20  0803   WBC 6.4   HGB 12.0   HCT 37.1        Recent Labs     02/29/20  0803      K 4.5      CO2 26   *   BUN 14   CREA 0.78   CA 8.6   ALB 3.0*   SGOT 11   ALT 9*       Results for orders placed or performed during the hospital encounter of 10/31/19   EKG, 12 LEAD, INITIAL   Result Value Ref Range    Ventricular Rate 171 BPM    QRS Duration 62 ms    Q-T Interval 258 ms    QTC Calculation (Bezet) 435 ms    Calculated R Axis 65 degrees    Calculated T Axis 55 degrees    Diagnosis       Atrial fibrillation with rapid ventricular response with premature   ventricular or aberrantly conducted complexes  ST depression, consider subendocardial injury  Abnormal ECG  When compared with ECG of 07-FEB-2019 23:41,  Atrial fibrillation has replaced Sinus rhythm  Vent.  rate has increased  BPM  ST now depressed in Inferior leads  ST now depressed in Anterolateral leads  Nonspecific T wave abnormality now evident in Lateral leads  Confirmed by Devendra Campbell ([id number]) on 11/1/2019 8:29:41 AM         All Cardiac Markers in the last 24 hours:  No results found for: CPK, CK, CKMMB, CKMB, RCK3, CKMBT, CKNDX, CKND1, LUIS A, TROPT, TROIQ, SHELLEY, TROPT, TNIPOC, BNP, BNPP    Last Lipid:    Lab Results   Component Value Date/Time    Cholesterol, total 143 10/10/2018 02:17 PM    HDL Cholesterol 47 10/10/2018 02:17 PM    LDL, calculated 67.6 10/10/2018 02:17 PM    Triglyceride 142 10/10/2018 02:17 PM    CHOL/HDL Ratio 3.0 10/10/2018 02:17 PM       Signed By: Alvarez Ballesteros MD     February 29, 2020

## 2020-02-29 NOTE — PROGRESS NOTES
Spoke with primary RN [place] regarding 3HR bundle, per primary RN obtaining access. Will continue to monitor. Thank you,    KERI Velásquez, DEMETRI   [facility]  Call Back # [phone]

## 2020-02-29 NOTE — DISCHARGE INSTRUCTIONS
SPECIFIC PATIENT INSTRUCTIONS FROM THE PHYSICIAN WHO TREATED YOU IN THE ER TODAY:  1. Return if any concerns or worsening of condition(s)  2. Ciprofloxacin and flagyl as prescribed till finished  3. Follow up with your primary doctor in the next 2-4 days for reevaluation. 4.  Follow-up with Dr. Tennille Coleman in the next few days. Patient Education     Patient Education        Diverticulitis: Care Instructions  Your Care Instructions    Diverticulitis occurs when pouches form in the wall of the colon and become inflamed or infected. It can be very painful. Doctors aren't sure what causes diverticulitis. There is no proof that foods such as nuts, seeds, or berries cause it or make it worse. A low-fiber diet may cause the colon to work harder to push stool forward. Pouches may form because of this extra work. It may be hard to think about healthy eating while you're in pain. But as you recover, you might think about how you can use healthy eating for overall better health. Healthy eating may help you avoid future attacks. Follow-up care is a key part of your treatment and safety. Be sure to make and go to all appointments, and call your doctor if you are having problems. It's also a good idea to know your test results and keep a list of the medicines you take. How can you care for yourself at home? · Drink plenty of fluids, enough so that your urine is light yellow or clear like water. If you have kidney, heart, or liver disease and have to limit fluids, talk with your doctor before you increase the amount of fluids you drink. · Stick to liquids or a bland diet (plain rice, bananas, dry toast or crackers, applesauce) until you are feeling better. Then you can return to regular foods and gradually increase the amount of fiber in your diet. · Use a heating pad set on low on your belly to relieve mild cramps and pain. · Get extra rest until you are feeling better. · Be safe with medicines.  Read and follow all instructions on the label. ? If the doctor gave you a prescription medicine for pain, take it as prescribed. ? If you are not taking a prescription pain medicine, ask your doctor if you can take an over-the-counter medicine. · If your doctor prescribed antibiotics, take them as directed. Do not stop taking them just because you feel better. You need to take the full course of antibiotics. To prevent future attacks of diverticulitis  · Avoid constipation:  ? Include fruits, vegetables, beans, and whole grains in your diet each day. These foods are high in fiber. ? Drink plenty of fluids, enough so that your urine is light yellow or clear like water. If you have kidney, heart, or liver disease and have to limit fluids, talk with your doctor before you increase the amount of fluids you drink. ? Get some exercise every day. Build up slowly to 30 to 60 minutes a day on 5 or more days of the week. ? Take a fiber supplement, such as Citrucel or Metamucil, every day if needed. Read and follow all instructions on the label. ? Schedule time each day for a bowel movement. Having a daily routine may help. Take your time and do not strain when having a bowel movement. When should you call for help? Call your doctor now or seek immediate medical care if:    · You have a fever.     · You are vomiting.     · You have new or worse belly pain.     · You cannot pass stools or gas.    Watch closely for changes in your health, and be sure to contact your doctor if you have any problems. Where can you learn more? Go to http://aravind-farzana.info/. Enter H901 in the search box to learn more about \"Diverticulitis: Care Instructions. \"  Current as of: November 7, 2018  Content Version: 12.2  © 1346-7911 Submittable. Care instructions adapted under license by [employer] (which disclaims liability or warranty for this information).  If you have questions about a medical condition or this instruction, always ask your healthcare professional. [place] [address] any warranty or liability for your use of this information. Urinary Tract Infection in Women: Care Instructions  Your Care Instructions    A urinary tract infection, or UTI, is a general term for an infection anywhere between the kidneys and the urethra (where urine comes out). Most UTIs are bladder infections. They often cause pain or burning when you urinate. UTIs are caused by bacteria and can be cured with antibiotics. Be sure to complete your treatment so that the infection goes away. Follow-up care is a key part of your treatment and safety. Be sure to make and go to all appointments, and call your doctor if you are having problems. It's also a good idea to know your test results and keep a list of the medicines you take. How can you care for yourself at home? · Take your antibiotics as directed. Do not stop taking them just because you feel better. You need to take the full course of antibiotics. · Drink extra water and other fluids for the next day or two. This may help wash out the bacteria that are causing the infection. (If you have kidney, heart, or liver disease and have to limit fluids, talk with your doctor before you increase your fluid intake.)  · Avoid drinks that are carbonated or have caffeine. They can irritate the bladder. · Urinate often. Try to empty your bladder each time. · To relieve pain, take a hot bath or lay a heating pad set on low over your lower belly or genital area. Never go to sleep with a heating pad in place. To prevent UTIs  · Drink plenty of water each day. This helps you urinate often, which clears bacteria from your system. (If you have kidney, heart, or liver disease and have to limit fluids, talk with your doctor before you increase your fluid intake.)  · Urinate when you need to. · Urinate right after you have sex. · Change sanitary pads often.   · Avoid douches, bubble baths, feminine hygiene sprays, and other feminine hygiene products that have deodorants. · After going to the bathroom, wipe from front to back. When should you call for help? Call your doctor now or seek immediate medical care if:    · Symptoms such as fever, chills, nausea, or vomiting get worse or appear for the first time.     · You have new pain in your back just below your rib cage. This is called flank pain.     · There is new blood or pus in your urine.     · You have any problems with your antibiotic medicine.    Watch closely for changes in your health, and be sure to contact your doctor if:    · You are not getting better after taking an antibiotic for 2 days.     · Your symptoms go away but then come back. Where can you learn more? Go to http://aravind-farzana.info/. Enter U675 in the search box to learn more about \"Urinary Tract Infection in Women: Care Instructions. \"  Current as of: December 19, 2018  Content Version: 12.2  © 7889-3272 [employer]. Care instructions adapted under license by [employer] (which disclaims liability or warranty for this information). If you have questions about a medical condition or this instruction, always ask your healthcare professional. [place] [address] any warranty or liability for your use of this information. Patient Education        Abdominal Pain: Care Instructions  Your Care Instructions    Abdominal pain has many possible causes. Some aren't serious and get better on their own in a few days. Others need more testing and treatment. If your pain continues or gets worse, you need to be rechecked and may need more tests to find out what is wrong. You may need surgery to correct the problem. Don't ignore new symptoms, such as fever, nausea and vomiting, urination problems, pain that gets worse, and dizziness. These may be signs of a more serious problem.   Your doctor may have recommended a follow-up visit in the next 8 to 12 hours. If you are not getting better, you may need more tests or treatment. The doctor has checked you carefully, but problems can develop later. If you notice any problems or new symptoms, get medical treatment right away. Follow-up care is a key part of your treatment and safety. Be sure to make and go to all appointments, and call your doctor if you are having problems. It's also a good idea to know your test results and keep a list of the medicines you take. How can you care for yourself at home? · Rest until you feel better. · To prevent dehydration, drink plenty of fluids, enough so that your urine is light yellow or clear like water. Choose water and other caffeine-free clear liquids until you feel better. If you have kidney, heart, or liver disease and have to limit fluids, talk with your doctor before you increase the amount of fluids you drink. · If your stomach is upset, eat mild foods, such as rice, dry toast or crackers, bananas, and applesauce. Try eating several small meals instead of two or three large ones. · Wait until 48 hours after all symptoms have gone away before you have spicy foods, alcohol, and drinks that contain caffeine. · Do not eat foods that are high in fat. · Avoid anti-inflammatory medicines such as aspirin, ibuprofen (Advil, Motrin), and naproxen (Aleve). These can cause stomach upset. Talk to your doctor if you take daily aspirin for another health problem. When should you call for help? Call 911 anytime you think you may need emergency care.  For example, call if:    · You passed out (lost consciousness).     · You pass maroon or very bloody stools.     · You vomit blood or what looks like coffee grounds.     · You have new, severe belly pain.    Call your doctor now or seek immediate medical care if:    · Your pain gets worse, especially if it becomes focused in one area of your belly.     · You have a new or higher fever.     · Your stools are black and look like tar, or they have streaks of blood.     · You have unexpected vaginal bleeding.     · You have symptoms of a urinary tract infection. These may include:  ? Pain when you urinate. ? Urinating more often than usual.  ? Blood in your urine.     · You are dizzy or lightheaded, or you feel like you may faint.    Watch closely for changes in your health, and be sure to contact your doctor if:    · You are not getting better after 1 day (24 hours). Where can you learn more? Go to http://aravind-farzana.info/. Enter T174 in the search box to learn more about \"Abdominal Pain: Care Instructions. \"  Current as of: June 26, 2019  Content Version: 12.2  © 3033-0087 [employer]. Care instructions adapted under license by [employer] (which disclaims liability or warranty for this information). If you have questions about a medical condition or this instruction, always ask your healthcare professional. [place] [address] any warranty or liability for your use of this information. HowGood Activation    Thank you for requesting access to HowGood. Please follow the instructions below to securely access and download your online medical record. HowGood allows you to send messages to your doctor, view your test results, renew your prescriptions, schedule appointments, and more. How Do I Sign Up? In your internet browser, go to [email or web address]. Click on the First Time User? Click Here link in the Sign In box. You will see the New Member Sign Up page. Enter your HowGood Access Code exactly as it appears below. You will not need to use this code after you´ve completed the sign-up process. If you do not sign up before the expiration date, you must request a new code.     HowGood Access Code: [email or web address]  Expires: 3/28/2019  2:27 PM (This is the date your HowGood access code will )    Enter the last four digits of your Social Security Number (xxxx) and Date of Birth (mm/dd/yyyy) as indicated and click Submit. You will be taken to the next sign-up page. Create a [employer] ID. This will be your [employer] login ID and cannot be changed, so think of one that is secure and easy to remember. Create a [employer] password. You can change your password at any time. Enter your Password Reset Question and Answer. This can be used at a later time if you forget your password. Enter your e-mail address. You will receive e-mail notification when new information is available in [address] Click Sign Up. You can now view and download portions of your medical record. Click the [employer] link to download a portable copy of your medical information. Additional Information    If you have questions, please visit the Frequently Asked Questions section of the [employer] website at [email or web address] Remember, [employer] is NOT to be used for urgent needs. For medical emergencies, dial 911.

## 2020-02-29 NOTE — ED NOTES
I have reviewed discharge instructions with the patient. The patient  verbalized understanding.    Patient able to dress self and patient was wheelchair out to car

## 2020-03-01 LAB
ANION GAP SERPL CALC-SCNC: 9 MMOL/L (ref 3–18)
ATRIAL RATE: 178 BPM
BUN SERPL-MCNC: 16 MG/DL (ref 7–18)
BUN/CREAT SERPL: 19 (ref 12–20)
CALCIUM SERPL-MCNC: 7.3 MG/DL (ref 8.5–10.1)
CALCULATED R AXIS, ECG10: 66 DEGREES
CALCULATED T AXIS, ECG11: 3 DEGREES
CHLORIDE SERPL-SCNC: 109 MMOL/L (ref 100–111)
CO2 SERPL-SCNC: 20 MMOL/L (ref 21–32)
CREAT SERPL-MCNC: 0.85 MG/DL (ref 0.6–1.3)
DIAGNOSIS, 93000: NORMAL
ERYTHROCYTE [DISTWIDTH] IN BLOOD BY AUTOMATED COUNT: 13.4 % (ref 11.6–14.5)
GLUCOSE BLD STRIP.AUTO-MCNC: 117 MG/DL (ref 70–110)
GLUCOSE BLD STRIP.AUTO-MCNC: 118 MG/DL (ref 70–110)
GLUCOSE BLD STRIP.AUTO-MCNC: 147 MG/DL (ref 70–110)
GLUCOSE SERPL-MCNC: 122 MG/DL (ref 74–99)
HCT VFR BLD AUTO: 34.6 % (ref 35–45)
HGB BLD-MCNC: 10.7 G/DL (ref 12–16)
MAGNESIUM SERPL-MCNC: 3.6 MG/DL (ref 1.6–2.6)
MCH RBC QN AUTO: 27.5 PG (ref 24–34)
MCHC RBC AUTO-ENTMCNC: 30.9 G/DL (ref 31–37)
MCV RBC AUTO: 88.9 FL (ref 74–97)
PLATELET # BLD AUTO: 302 K/UL (ref 135–420)
PMV BLD AUTO: 9.8 FL (ref 9.2–11.8)
POTASSIUM SERPL-SCNC: 4.7 MMOL/L (ref 3.5–5.5)
Q-T INTERVAL, ECG07: 264 MS
QRS DURATION, ECG06: 60 MS
QTC CALCULATION (BEZET), ECG08: 467 MS
RBC # BLD AUTO: 3.89 M/UL (ref 4.2–5.3)
SODIUM SERPL-SCNC: 138 MMOL/L (ref 136–145)
VENTRICULAR RATE, ECG03: 188 BPM
WBC # BLD AUTO: 7.8 K/UL (ref 4.6–13.2)

## 2020-03-01 PROCEDURE — 65610000006 HC RM INTENSIVE CARE

## 2020-03-01 PROCEDURE — 80048 BASIC METABOLIC PNL TOTAL CA: CPT

## 2020-03-01 PROCEDURE — 74011250636 HC RX REV CODE- 250/636: Performed by: PHYSICIAN ASSISTANT

## 2020-03-01 PROCEDURE — 82962 GLUCOSE BLOOD TEST: CPT

## 2020-03-01 PROCEDURE — 83735 ASSAY OF MAGNESIUM: CPT

## 2020-03-01 PROCEDURE — 74011250636 HC RX REV CODE- 250/636: Performed by: HOSPITALIST

## 2020-03-01 PROCEDURE — 74011250637 HC RX REV CODE- 250/637: Performed by: PHYSICIAN ASSISTANT

## 2020-03-01 PROCEDURE — 85027 COMPLETE CBC AUTOMATED: CPT

## 2020-03-01 PROCEDURE — 74011250637 HC RX REV CODE- 250/637: Performed by: INTERNAL MEDICINE

## 2020-03-01 PROCEDURE — 74011000250 HC RX REV CODE- 250: Performed by: HOSPITALIST

## 2020-03-01 PROCEDURE — 77030038269 HC DRN EXT URIN PURWCK BARD -A

## 2020-03-01 RX ORDER — NOREPINEPHRINE BIT/0.9 % NACL 8 MG/250ML
2 INFUSION BOTTLE (ML) INTRAVENOUS
Status: DISCONTINUED | OUTPATIENT
Start: 2020-03-01 | End: 2020-03-01

## 2020-03-01 RX ORDER — LANOLIN ALCOHOL/MO/W.PET/CERES
400 CREAM (GRAM) TOPICAL DAILY
Status: DISCONTINUED | OUTPATIENT
Start: 2020-03-01 | End: 2020-03-01

## 2020-03-01 RX ORDER — SODIUM CHLORIDE 9 MG/ML
999 INJECTION, SOLUTION INTRAVENOUS ONCE
Status: COMPLETED | OUTPATIENT
Start: 2020-03-01 | End: 2020-03-01

## 2020-03-01 RX ORDER — SODIUM CHLORIDE 9 MG/ML
75 INJECTION, SOLUTION INTRAVENOUS CONTINUOUS
Status: DISCONTINUED | OUTPATIENT
Start: 2020-03-01 | End: 2020-03-05 | Stop reason: HOSPADM

## 2020-03-01 RX ORDER — NOREPINEPHRINE BIT/0.9 % NACL 8 MG/250ML
.5-16 INFUSION BOTTLE (ML) INTRAVENOUS
Status: DISCONTINUED | OUTPATIENT
Start: 2020-03-01 | End: 2020-03-02 | Stop reason: HOSPADM

## 2020-03-01 RX ORDER — METOPROLOL TARTRATE 25 MG/1
25 TABLET, FILM COATED ORAL EVERY 12 HOURS
Status: DISCONTINUED | OUTPATIENT
Start: 2020-03-01 | End: 2020-03-05 | Stop reason: HOSPADM

## 2020-03-01 RX ADMIN — APIXABAN 5 MG: 5 TABLET, FILM COATED ORAL at 08:56

## 2020-03-01 RX ADMIN — CIPROFLOXACIN 400 MG: 2 INJECTION, SOLUTION INTRAVENOUS at 22:16

## 2020-03-01 RX ADMIN — SODIUM CHLORIDE 999 ML/HR: 900 INJECTION, SOLUTION INTRAVENOUS at 00:40

## 2020-03-01 RX ADMIN — NOREPINEPHRINE BITARTRATE 2 MCG/MIN: 1 INJECTION INTRAVENOUS at 03:00

## 2020-03-01 RX ADMIN — METRONIDAZOLE 500 MG: 500 INJECTION, SOLUTION INTRAVENOUS at 14:05

## 2020-03-01 RX ADMIN — METOPROLOL TARTRATE 25 MG: 25 TABLET ORAL at 21:11

## 2020-03-01 RX ADMIN — Medication 10 ML: at 22:17

## 2020-03-01 RX ADMIN — SODIUM CHLORIDE 75 ML/HR: 900 INJECTION, SOLUTION INTRAVENOUS at 22:30

## 2020-03-01 RX ADMIN — METRONIDAZOLE 500 MG: 500 INJECTION, SOLUTION INTRAVENOUS at 21:11

## 2020-03-01 RX ADMIN — FUROSEMIDE 40 MG: 40 TABLET ORAL at 08:55

## 2020-03-01 RX ADMIN — LEVOTHYROXINE SODIUM 75 MCG: 0.07 TABLET ORAL at 06:14

## 2020-03-01 RX ADMIN — SODIUM CHLORIDE 999 ML/HR: 900 INJECTION, SOLUTION INTRAVENOUS at 02:30

## 2020-03-01 RX ADMIN — Medication 400 MG: at 14:05

## 2020-03-01 RX ADMIN — APIXABAN 5 MG: 5 TABLET, FILM COATED ORAL at 21:10

## 2020-03-01 RX ADMIN — SODIUM CHLORIDE 75 ML/HR: 900 INJECTION, SOLUTION INTRAVENOUS at 02:00

## 2020-03-01 RX ADMIN — Medication 10 ML: at 14:11

## 2020-03-01 RX ADMIN — METRONIDAZOLE 500 MG: 500 INJECTION, SOLUTION INTRAVENOUS at 05:00

## 2020-03-01 RX ADMIN — CIPROFLOXACIN 400 MG: 2 INJECTION, SOLUTION INTRAVENOUS at 10:28

## 2020-03-01 NOTE — ED NOTES
TRANSFER - OUT REPORT:    Verbal report given to Sarwat Daley RN(name) on Alfonzo Turner  being transferred to [facility](unit) for routine progression of care       Report consisted of patients Situation, Background, Assessment and   Recommendations(SBAR). Information from the following report(s) SBAR was reviewed with the receiving nurse. Lines:   Peripheral IV 02/29/20 Anterior;Right Forearm (Active)       Peripheral IV 02/29/20 (Active)   Site Assessment Clean, dry, & intact 2/29/2020  4:20 PM   Phlebitis Assessment 0 2/29/2020  4:20 PM   Infiltration Assessment 0 2/29/2020  4:20 PM   Dressing Status Clean, dry, & intact 2/29/2020  4:20 PM   Dressing Type Transparent;4 X 4 2/29/2020  4:20 PM        Opportunity for questions and clarification was provided.       Patient transported with:   Registered Nurse

## 2020-03-01 NOTE — PROGRESS NOTES
[address] of pt from [facility], [facility]. Alert, oriented x4, skin warm and dry, conversant. Monitor reveals afib controlled rate. Levophed remains OFF, B/P WDL. 200  Dr. Santacruz Samples in, orders to retain pt in ICU.  1600  Appetite poor, IVF continues. Voiding, external catheter in place, urine with sediment. 1900  Bedside and Verbal shift change report given to DEMETRI Fisher (oncoming nurse) by Johnell Mohs, RN (offgoing nurse). Report included the following information SBAR, Kardex, Intake/Output, MAR, Accordion, Recent Results and Quality Measures.

## 2020-03-01 NOTE — PROGRESS NOTES
met with patient completed the initial Spiritual Assessment of the patient, and offered Pastoral Care, see flow sheets for interventions.  extended the assurance of prayer and spiritual care materials. Patient does not have any Jew/cultural needs that will affect patients preferences in health care. The patient was informed that chaplains will continue to follow and will provide pastoral care on an as needed/requested basis.       Shanae Titus, MPH, [address] [place] [phone]

## 2020-03-01 NOTE — PROGRESS NOTES
[facility] - Progress Note  Admit Date: 2/29/2020    Assessment:     -A.fib/flutter with RVR with h/o paroxysmal A.fib/flutter on eliquis, metoprolol 12.5 BID as outpatient, missed metoprolol dose day of admission.    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 Blair Bah, seen 2/24/20    Plan:     Converted SR, plan to increase oral lopressor, off diltiazem drip. Dr. Wilder Smith to see tomorrow. Patient wondering if her surgery can be done any sooner. Subjective:     No new complaints.      Objective:      Patient Vitals for the past 8 hrs:   Temp Pulse Resp BP SpO2   03/01/20 0730 -- 72 24 136/57 100 %   03/01/20 0721 -- -- -- -- 100 %   03/01/20 0715 -- 70 20 140/50 100 %   03/01/20 0700 -- 69 19 140/48 99 %   03/01/20 0645 -- 72 21 100/52 99 %   03/01/20 0630 -- 72 21 (!) 84/45 99 %   03/01/20 0615 -- 69 20 98/47 96 %   03/01/20 0600 -- 70 19 93/46 96 %   03/01/20 0545 -- 71 20 100/45 96 %   03/01/20 0530 -- 72 18 108/49 96 %   03/01/20 0515 -- 70 18 106/56 98 %   03/01/20 0500 -- 70 19 116/48 98 %   03/01/20 0445 -- 75 22 146/57 100 %   03/01/20 0430 -- 70 16 141/56 100 %   03/01/20 0415 -- 67 16 145/61 100 %   03/01/20 0400 98.7 °F (37.1 °C) 65 23 136/49 98 %   03/01/20 0345 -- (!) 58 19 128/52 98 %   03/01/20 0335 -- (!) 59 18 116/49 98 %   03/01/20 0330 -- (!) 59 17 119/49 99 %   03/01/20 0325 -- (!) 59 16 123/50 100 %   03/01/20 0320 -- (!) 59 18 94/67 100 %   03/01/20 0315 -- (!) 59 18 115/48 100 %   03/01/20 0310 -- (!) 57 18 110/51 98 % 03/01/20 0305 -- 66 18 95/51 98 %   03/01/20 0300 -- 65 18 101/51 98 %   03/01/20 0245 -- 68 18 (!) 85/44 97 %   03/01/20 0230 -- 68 18 (!) 81/38 97 %   03/01/20 0208 -- 68 18 (!) 84/40 96 %   03/01/20 0200 -- 68 18 (!) 86/46 97 %   03/01/20 0130 -- 66 22 -- 95 %   03/01/20 0100 -- 69 20 (!) 85/45 96 %         Patient Vitals for the past 96 hrs:   Weight   02/29/20 1441 64 kg (141 lb)                    Intake/Output Summary (Last 24 hours) at 3/1/2020 0849  Last data filed at 3/1/2020 0615  Gross per 24 hour   Intake 2642.04 ml   Output 2800 ml   Net -157.96 ml       Physical Exam:  General:  alert, cooperative, no distress, appears stated age  Neck:  nontender  Lungs:  clear to auscultation bilaterally  Heart:  regular rate and rhythm, S1, S2 normal, no murmur, click, rub or gallop  Abdomen:  abdomen is soft without significant tenderness, masses, organomegaly or guarding  Extremities:  extremities normal, atraumatic, no cyanosis or edema    Data Review:     Labs: Results:       Chemistry Recent Labs     03/01/20  0413 02/29/20  1456 02/29/20  0803   * 230* 150*    135* 138   K 4.7 4.0 4.5    103 103   CO2 20* 24 26   BUN 16 15 14   CREA 0.85 1.07 0.78   CA 7.3* 8.4* 8.6   MG  --  0.7*  --    AGAP 9 8 9   BUCR 19 14 18   AP  --  61 63   TP  --  7.3 7.4   ALB  --  3.0* 3.0*   GLOB  --  4.3* 4.4*   AGRAT  --  0.7* 0.7*      CBC w/Diff Recent Labs     03/01/20  0413 02/29/20  1456 02/29/20  0803   WBC 7.8 8.8 6.4   RBC 3.89* 4.34 4.27   HGB 10.7* 12.1 12.0   HCT 34.6* 37.9 37.1    234 231   GRANS  --  71 65   LYMPH  --  18* 26   EOS  --  1 1      Cardiac Enzymes Lab Results   Component Value Date/Time    TROIQ <0.02 02/29/2020 02:56 PM      Coagulation No results for input(s): PTP, INR, APTT, INREXT in the last 72 hours.     Lipid Panel Lab Results   Component Value Date/Time    Cholesterol, total 143 10/10/2018 02:17 PM    HDL Cholesterol 47 10/10/2018 02:17 PM    LDL, calculated 67.6 10/10/2018 02:17 PM    VLDL, calculated 28.4 10/10/2018 02:17 PM    Triglyceride 142 10/10/2018 02:17 PM    CHOL/HDL Ratio 3.0 10/10/2018 02:17 PM      BNP No results found for: BNP, BNPP, XBNPT   Liver Enzymes Recent Labs     02/29/20  1456   TP 7.3   ALB 3.0*   AP 61   SGOT 11      Digoxin    Thyroid Studies Lab Results   Component Value Date/Time    TSH 2.33 07/24/2019 02:37 PM          Signed By: Aimee Dinero MD     March 1, 2020

## 2020-03-01 NOTE — PROGRESS NOTES
Effective handoff at bedside with Pancho Musa RN   Discussed plan of care with pt   10:30  Levo turned off   B/p 131/54   Pt positioned in bed for comfort

## 2020-03-01 NOTE — PROGRESS NOTES
Internal Medicine Progress Note    Patient's Name: Johnny Oliveira Date: 2/29/2020  Length of Stay: 1      Assessment/Plan     Principal Problem:    Atrial fibrillation with RVR ([facility] (3/4/2018)    Active Problems:    Diverticulitis (11/7/2016)      Hypomagnesemia (10/31/2019)      HTN (hypertension) (2/24/2014)      DM (diabetes mellitus) ([facility] (2/24/2014)      Afib RVR  - cardizem gtt off since last night, resume BB when BP stable  - HR stable  - off pressors since 10 this am  - patient is asymptomatic  - appreciate cardiology  - cont eliquis      Diverticulitis  - afebrile w/o leukocytosis  - likely more chronic than acute  - upcoming plans for left partial colectomy March 31st with Dr. Ashlee Lala  - will cover with cipro/flagyl     HypoMg  - replaced  - monitor      HTN  - resume BB  - monitor BP     DM  - ssi  - monitor achs    - Cont acceptable home medications for chronic conditions   - DVT protocol    I have personally reviewed all pertinent labs and films that have officially resulted over the last 24 hours. I have personally checked for all pending labs that are awaiting final results. Anticipated discharge: tomorrow     CONOR Carrasco is a [de-identified] y.o. female with a PMHx of Afib, diverticulitis, DM, colovescicular fistula who presented to the ED with c/o lower abd pain ongoing \"since November\" with blood and mucus in urine. Review of prior records reveals recent urology visit on 1/20/20 - \"Posterior bladder wall mass 3-4 cm by cystoscopy 1/8/2020 -- suspect this is an inflammatory process as instead of a primary bladder malignancy.  May be related to pelvic inflammatory process (diverticular disease)\". She was then seen by Dr. Ashlee Lala on 1/15/20 - he believes this is all chronic diverticulitis with fistula and he recommended surgery which she has agreed to and is planned for 3/31/20.     In the ED she developed Afib RVR and was started on a cardizem gtt. Cardiology was consulted.  CT shows diverticulitis/cystitis. WBC 8.8. Afebrile. Magnesium was 0.7 and replaced in the ED. Patient will be admitted for further evaluation and treatment. Hospital Course     She was able to wean off cardizem overnight, but required Levophed briefly. Subjective     Pt s/e @ bedside. No major events overnight. Pt states abd pain is \"like nothing\". Denies CP or SOB. Denies nvd. Stable for transfer to floor    Objective     Visit Vitals  /57 (BP 1 Location: Right arm)   Pulse 72   Temp 98.7 °F (37.1 °C)   Resp 24   Ht 5' (1.524 m)   Wt 64 kg (141 lb)   SpO2 100%   Breastfeeding No   BMI 27.54 kg/m²       Physical Exam:  General Appearance: NAD, conversant  HENT: normocephalic/atraumatic, moist mucus membranes  Neck: No JVD, supple  Lungs: CTA with normal respiratory effort  CV: IRIR, no m/r/g  Abdomen: soft, non-tender, normal bowel sounds  Extremities: no cyanosis, no peripheral edema  Neuro: No focal deficits, motor/sensory intact  Skin: Normal color, intact    Intake and Output:  Current Shift:  No intake/output data recorded.   Last three shifts:  02/28 1901 - 03/01 0700  In: 2642 [I.V.:2642]  Out: 2800 [Urine:2800]    Lab/Data Reviewed:  BMP:   Lab Results   Component Value Date/Time     03/01/2020 04:13 AM    K 4.7 03/01/2020 04:13 AM     03/01/2020 04:13 AM    CO2 20 (L) 03/01/2020 04:13 AM    AGAP 9 03/01/2020 04:13 AM     (H) 03/01/2020 04:13 AM    BUN 16 03/01/2020 04:13 AM    CREA 0.85 03/01/2020 04:13 AM    GFRAA >60 03/01/2020 04:13 AM    GFRNA >60 03/01/2020 04:13 AM     CBC:   Lab Results   Component Value Date/Time    WBC 7.8 03/01/2020 04:13 AM    HGB 10.7 (L) 03/01/2020 04:13 AM    HCT 34.6 (L) 03/01/2020 04:13 AM     03/01/2020 04:13 AM       Imaging Reviewed:  Kathleen Wing Chest Port    Result Date: 2/29/2020  EXAM: XR CHEST PORT CLINICAL INDICATION/HISTORY: afib -Additional: None COMPARISON: 2/29/2020, 11/1/2019 TECHNIQUE: Frontal view of the chest _______________ FINDINGS: HEART AND MEDIASTINUM: Midline cardiac silhouette, normal in size. Unremarkable hilar vascular structures. LUNGS AND PLEURAL SPACES: No focal consolidation, parenchymal opacity. No pneumothorax or pleural effusion. BONY THORAX AND SOFT TISSUES: No acute or destructive osseous abnormality. _______________     IMPRESSION: 1. Stable exam. No acute cardiopulmonary process.        Medications Reviewed:  Current Facility-Administered Medications   Medication Dose Route Frequency    0.9% sodium chloride infusion  75 mL/hr IntraVENous CONTINUOUS    NOREPINephrine (LEVOPHED) 8 mg in 0.9% NS 250ml infusion  0.5-16 mcg/min IntraVENous TITRATE    metoprolol tartrate (LOPRESSOR) tablet 25 mg  25 mg Oral Q12H    dilTIAZem (CARDIZEM) 100 mg in 0.9% sodium chloride (MBP/ADV) 100 mL infusion  0-15 mg/hr IntraVENous TITRATE    apixaban (ELIQUIS) tablet 5 mg  5 mg Oral Q12H    citalopram (CELEXA) tablet 10 mg  10 mg Oral DAILY    levothyroxine (SYNTHROID) tablet 75 mcg  75 mcg Oral 6am    sodium chloride (NS) flush 5-40 mL  5-40 mL IntraVENous Q8H    sodium chloride (NS) flush 5-40 mL  5-40 mL IntraVENous PRN    metroNIDAZOLE (FLAGYL) IVPB premix 500 mg  500 mg IntraVENous Q8H    ciprofloxacin (CIPRO) 400 mg in D5W IVPB (premix)  400 mg IntraVENous Q12H    acetaminophen (TYLENOL) tablet 650 mg  650 mg Oral Q4H PRN    HYDROcodone-acetaminophen (NORCO) 5-325 mg per tablet 1 Tab  1 Tab Oral Q4H PRN    ondansetron (ZOFRAN) injection 4 mg  4 mg IntraVENous Q4H PRN    insulin lispro (HUMALOG) injection   SubCUTAneous AC&HS    glucose chewable tablet 16 g  4 Tab Oral PRN    glucagon (GLUCAGEN) injection 1 mg  1 mg IntraMUSCular PRN    dextrose 10 % infusion 125-250 mL  125-250 mL IntraVENous PRN         Julia Rowe PA-C  [address] [facility]  Hospitalist Division  Office:  [phone]  Pager: [phone]

## 2020-03-01 NOTE — PROGRESS NOTES
Problem: Falls - Risk of  Goal: *Absence of Falls  Description  Document Day Farley Fall Risk and appropriate interventions in the flowsheet. Outcome: Progressing Towards Goal  Note: Fall Risk Interventions:  Mobility Interventions: Bed/chair exit alarm         Medication Interventions: Patient to call before getting OOB, Evaluate medications/consider consulting pharmacy    Elimination Interventions: Call light in reach              Problem: Patient Education: Go to Patient Education Activity  Goal: Patient/Family Education  Outcome: Progressing Towards Goal     Problem: Pressure Injury - Risk of  Goal: *Prevention of pressure injury  Description  Document Sae Scale and appropriate interventions in the flowsheet. Outcome: Progressing Towards Goal  Note: Pressure Injury Interventions:  Sensory Interventions: Chair cushion, Float heels    Moisture Interventions: Moisture barrier, Check for incontinence Q2 hours and as needed, Maintain skin hydration (lotion/cream), Internal/External urinary devices    Activity Interventions: Pressure redistribution bed/mattress(bed type)    Mobility Interventions: Float heels, HOB 30 degrees or less, Pressure redistribution bed/mattress (bed type), Turn and reposition approx.  every two hours(pillow and wedges)    Nutrition Interventions: Discuss nutritional consult with provider                     Problem: Patient Education: Go to Patient Education Activity  Goal: Patient/Family Education  Outcome: Progressing Towards Goal

## 2020-03-01 NOTE — PROGRESS NOTES
Problem: Falls - Risk of  Goal: *Absence of Falls  Description  Document Mart Reas Fall Risk and appropriate interventions in the flowsheet. Outcome: Progressing Towards Goal  Note:   Fall Risk Interventions:  Mobility Interventions: Bed/chair exit alarm  Bed is locked and in lowest position, side rails up x 3, call bell in reach, gripper socks on bilaterally       Medication Interventions: Patient to call before getting OOB, Evaluate medications/consider consulting pharmacy    Elimination Interventions: Call light in reach              Problem: Patient Education: Go to Patient Education Activity  Goal: Patient/Family Education  Outcome: Progressing Towards Goal     Problem: Pressure Injury - Risk of  Goal: *Prevention of pressure injury  Description  Document Sae Scale and appropriate interventions in the flowsheet. Outcome: Progressing Towards Goal  Note:   Pressure Injury Interventions: frequent turning and repositioning, use of pillows and wedges, adequate hydration, use of mepilex foam and barrier cream for prevention  Sensory Interventions: Chair cushion, Float heels    Moisture Interventions: Moisture barrier, Check for incontinence Q2 hours and as needed, Maintain skin hydration (lotion/cream), Internal/External urinary devices    Activity Interventions: Pressure redistribution bed/mattress(bed type)    Mobility Interventions: Float heels, HOB 30 degrees or less, Pressure redistribution bed/mattress (bed type), Turn and reposition approx.  every two hours(pillow and wedges)    Nutrition Interventions: Discuss nutritional consult with provider                     Problem: Patient Education: Go to Patient Education Activity  Goal: Patient/Family Education  Outcome: Progressing Towards Goal     Problem: Pain  Goal: *Control of Pain  Outcome: Progressing Towards Goal     Problem: Patient Education: Go to Patient Education Activity  Goal: Patient/Family Education  Outcome: Progressing Towards Goal

## 2020-03-01 NOTE — PROGRESS NOTES
Problem: Discharge Planning  Goal: *Discharge to safe environment  Outcome: Progressing Towards Goal     Plan: home    Reason for Admission:   A-fib with RVR               RUR Score:   38      PCP:First and Last name:   Billie Cloud   Name of Practice:    Are you a current patient: Yes/No:  yes   Approximate date of last visit: 11/27/2019,  Has appointment 3/26/2020. Resources/supports as identified by patient/family:   Family, MCR/DREAD ins, PCP                Top Challenges facing patient (as identified by patient/family and CM): Finances/Medication cost?    MCR/DREAD ins                Transportation? I-ride/handi ride              Support system or lack thereof?  son                     Living arrangements? Son lives with pt. Self-care/ADLs/Cognition? Independent          Current Advanced Directive/Advance Care Plan:  Scanned in record                          Plan for utilizing home health: Will assess for needs. Transition of Care Plan:      Home with out-pt follow up. Chart reviewed. Met with pt., verified all demographics. States has MCR/DREAD ins. States her DREAD is NOT full DREAD, does not have transport benefit. NOK:  Robi Coe, son, whom lives with pt. Has rollator. States very independent with ADL's prior to admit. States neither her or her son have a vehicle, she will need lyft ride @ discharge. Will cont to follow for any needs. Arabella Poon RN,ext [phone]. Patient has designated her son to participate in his/her discharge plan and to receive any needed information.      Name: Robi Coe  Address:  Phone number: [phone]    Care Management Interventions  PCP Verified by CM: Yes(Dr. Susannah Ramires, last seen 11/27/2019)  Palliative Care Criteria Met (RRAT>21 & CHF Dx)?: No  Mode of Transport at Discharge: Self(Will need lyft ride home)  Transition of Care Consult (CM Consult): Discharge Planning  Discharge Durable Medical Equipment: No  Physical Therapy Consult: No  Occupational Therapy Consult: No  Speech Therapy Consult: No  Current Support Network:  Other(son lives with pt)  Confirm Follow Up Transport: Other (see comment)(I-ride)  Discharge Location  Discharge Placement: Home

## 2020-03-02 LAB
ANION GAP SERPL CALC-SCNC: 7 MMOL/L (ref 3–18)
BUN SERPL-MCNC: 10 MG/DL (ref 7–18)
BUN/CREAT SERPL: 14 (ref 12–20)
CALCIUM SERPL-MCNC: 7.3 MG/DL (ref 8.5–10.1)
CHLORIDE SERPL-SCNC: 112 MMOL/L (ref 100–111)
CO2 SERPL-SCNC: 25 MMOL/L (ref 21–32)
CREAT SERPL-MCNC: 0.69 MG/DL (ref 0.6–1.3)
ERYTHROCYTE [DISTWIDTH] IN BLOOD BY AUTOMATED COUNT: 13.3 % (ref 11.6–14.5)
GLUCOSE BLD STRIP.AUTO-MCNC: 109 MG/DL (ref 70–110)
GLUCOSE BLD STRIP.AUTO-MCNC: 123 MG/DL (ref 70–110)
GLUCOSE BLD STRIP.AUTO-MCNC: 153 MG/DL (ref 70–110)
GLUCOSE BLD STRIP.AUTO-MCNC: 178 MG/DL (ref 70–110)
GLUCOSE SERPL-MCNC: 118 MG/DL (ref 74–99)
HCT VFR BLD AUTO: 32.9 % (ref 35–45)
HGB BLD-MCNC: 10.4 G/DL (ref 12–16)
MAGNESIUM SERPL-MCNC: 1.1 MG/DL (ref 1.6–2.6)
MAGNESIUM SERPL-MCNC: 2.2 MG/DL (ref 1.6–2.6)
MCH RBC QN AUTO: 27.8 PG (ref 24–34)
MCHC RBC AUTO-ENTMCNC: 31.6 G/DL (ref 31–37)
MCV RBC AUTO: 88 FL (ref 74–97)
PLATELET # BLD AUTO: 209 K/UL (ref 135–420)
PMV BLD AUTO: 9.6 FL (ref 9.2–11.8)
POTASSIUM SERPL-SCNC: 3.8 MMOL/L (ref 3.5–5.5)
RBC # BLD AUTO: 3.74 M/UL (ref 4.2–5.3)
SODIUM SERPL-SCNC: 144 MMOL/L (ref 136–145)
WBC # BLD AUTO: 4.1 K/UL (ref 4.6–13.2)

## 2020-03-02 PROCEDURE — 83735 ASSAY OF MAGNESIUM: CPT

## 2020-03-02 PROCEDURE — 74011250636 HC RX REV CODE- 250/636: Performed by: PHYSICIAN ASSISTANT

## 2020-03-02 PROCEDURE — 80048 BASIC METABOLIC PNL TOTAL CA: CPT

## 2020-03-02 PROCEDURE — 74011250637 HC RX REV CODE- 250/637: Performed by: INTERNAL MEDICINE

## 2020-03-02 PROCEDURE — 82962 GLUCOSE BLOOD TEST: CPT

## 2020-03-02 PROCEDURE — 77030038269 HC DRN EXT URIN PURWCK BARD -A

## 2020-03-02 PROCEDURE — 74011250637 HC RX REV CODE- 250/637: Performed by: PHYSICIAN ASSISTANT

## 2020-03-02 PROCEDURE — 74011250636 HC RX REV CODE- 250/636: Performed by: HOSPITALIST

## 2020-03-02 PROCEDURE — 65660000000 HC RM CCU STEPDOWN

## 2020-03-02 PROCEDURE — 85027 COMPLETE CBC AUTOMATED: CPT

## 2020-03-02 PROCEDURE — 36415 COLL VENOUS BLD VENIPUNCTURE: CPT

## 2020-03-02 PROCEDURE — 74011636637 HC RX REV CODE- 636/637: Performed by: PHYSICIAN ASSISTANT

## 2020-03-02 RX ORDER — MAGNESIUM SULFATE HEPTAHYDRATE 40 MG/ML
2 INJECTION, SOLUTION INTRAVENOUS
Status: COMPLETED | OUTPATIENT
Start: 2020-03-02 | End: 2020-03-02

## 2020-03-02 RX ADMIN — MAGNESIUM SULFATE HEPTAHYDRATE 2 G: 40 INJECTION, SOLUTION INTRAVENOUS at 08:38

## 2020-03-02 RX ADMIN — METRONIDAZOLE 500 MG: 500 INJECTION, SOLUTION INTRAVENOUS at 21:45

## 2020-03-02 RX ADMIN — INSULIN LISPRO 2 UNITS: 100 INJECTION, SOLUTION INTRAVENOUS; SUBCUTANEOUS at 12:27

## 2020-03-02 RX ADMIN — Medication 10 ML: at 05:39

## 2020-03-02 RX ADMIN — INSULIN LISPRO 2 UNITS: 100 INJECTION, SOLUTION INTRAVENOUS; SUBCUTANEOUS at 17:19

## 2020-03-02 RX ADMIN — METOPROLOL TARTRATE 25 MG: 25 TABLET ORAL at 22:04

## 2020-03-02 RX ADMIN — METRONIDAZOLE 500 MG: 500 INJECTION, SOLUTION INTRAVENOUS at 05:38

## 2020-03-02 RX ADMIN — LEVOTHYROXINE SODIUM 75 MCG: 0.07 TABLET ORAL at 05:38

## 2020-03-02 RX ADMIN — METRONIDAZOLE 500 MG: 500 INJECTION, SOLUTION INTRAVENOUS at 12:28

## 2020-03-02 RX ADMIN — APIXABAN 5 MG: 5 TABLET, FILM COATED ORAL at 08:37

## 2020-03-02 RX ADMIN — CIPROFLOXACIN 400 MG: 2 INJECTION, SOLUTION INTRAVENOUS at 22:06

## 2020-03-02 RX ADMIN — MAGNESIUM SULFATE HEPTAHYDRATE 2 G: 40 INJECTION, SOLUTION INTRAVENOUS at 10:24

## 2020-03-02 RX ADMIN — Medication 10 ML: at 22:28

## 2020-03-02 RX ADMIN — Medication 10 ML: at 17:19

## 2020-03-02 RX ADMIN — METOPROLOL TARTRATE 25 MG: 25 TABLET ORAL at 08:37

## 2020-03-02 RX ADMIN — SODIUM CHLORIDE 75 ML/HR: 900 INJECTION, SOLUTION INTRAVENOUS at 17:29

## 2020-03-02 RX ADMIN — APIXABAN 5 MG: 5 TABLET, FILM COATED ORAL at 22:00

## 2020-03-02 RX ADMIN — CIPROFLOXACIN 400 MG: 2 INJECTION, SOLUTION INTRAVENOUS at 10:21

## 2020-03-02 NOTE — PROGRESS NOTES
Progress Note      Patient: Alfonzo Turner               Sex: female          DOA: 2/29/2020       YOB: 1940      Age:  [de-identified] y.o.        LOS:  LOS: 2 days             CHIEF COMPLAINT:  A fib with RVR, Diverticulitis with colo-vesicle fistula    Subjective:     Patient awake and interactive  No distress    Objective:      Visit Vitals  /52 (BP 1 Location: Right arm, BP Patient Position: At rest)   Pulse 66   Temp 98 °F (36.7 °C)   Resp 20   Ht 5' (1.524 m)   Wt 64 kg (141 lb)   SpO2 95%   Breastfeeding No   BMI 27.54 kg/m²       Physical Exam:  Gen:  Patient is in no distress. No complaint  HEENT and Neck:  PERRL, No cervical tenderness or masses  Lungs:  Clear bilaterally. No rales, no wheeze. Normal effort. Heart:  Regular Rate and Rhythm. No murmur or gallop. No JVD. No edema.   Abdomen:  Soft, non tender, no masses, no Hepatosplenomegally  Extremities:  No digital clubbing, no cyanosis  Neuro:  Alert and oriented, Normal affect, Cranial nerves intact, No sensory deficits        Lab/Data Reviewed:  BMP:   Lab Results   Component Value Date/Time     03/02/2020 04:50 AM    K 3.8 03/02/2020 04:50 AM     (H) 03/02/2020 04:50 AM    CO2 25 03/02/2020 04:50 AM    AGAP 7 03/02/2020 04:50 AM     (H) 03/02/2020 04:50 AM    BUN 10 03/02/2020 04:50 AM    CREA 0.69 03/02/2020 04:50 AM    GFRAA >60 03/02/2020 04:50 AM    GFRNA >60 03/02/2020 04:50 AM     CBC:   Lab Results   Component Value Date/Time    WBC 4.1 (L) 03/02/2020 04:50 AM    HGB 10.4 (L) 03/02/2020 04:50 AM    HCT 32.9 (L) 03/02/2020 04:50 AM     03/02/2020 04:50 AM           Assessment/Plan     Principal Problem:    Atrial fibrillation with RVR ([facility] [address] (3/4/2018)   Now in NSR    Active Problems:    HTN (hypertension) (2/24/2014)      DM [facility]diabetes mellitus) ([facility] (2/24/2014)      Diverticulitis (11/7/2016)      Hypomagnesemia (10/31/2019)        Plan:  Follow HR and heart rhythm  BS control  BS control  Replace potassium  Mobilize. DVT prophylaxis.

## 2020-03-02 NOTE — PROGRESS NOTES
2000 Assumed care of pt after bedside report with pf lyinf in bed with HOB elevated. AAO x 4. JEFFERS x4/ Monitor denotes afib/flutter. Neuro intact. Lungs coarse diminished in bases. Pt on RA. Abd distended, obese, soft. Pur-wick in place and draining lamar hazy urine. SCD's in place as ordered. 2200 Accucheck result 147. No Lispro insulin given as per sliding scale coverage. 03/02/2020 0000 Resting at present without complaints. 0450 AM labs drawn and sent to lab. 0600 Pt on bedpan for BM.

## 2020-03-02 NOTE — DIABETES MGMT
[place] NUTRITIONAL ASSESSMENT GLYCEMIC CONTROL/ PLAN OF CARE     Mickey Agrawal           [de-identified] y.o.           2/29/2020                 1. Atrial fibrillation with RVR ([address]    2. Hypomagnesemia    T2DM   INTERVENTIONS/PLAN:   1. 9865-6793 calorie consistent CHO diet  2. No Added Sugar [place] Instant Breakfast once/day  3. Implement preferences  4. Monitor po intake, labs and weights. ASSESSMENT:   Nutritional Status:  Pt is 141% ideal weight with BMI (calculated): 27.5 kg/m2 (overweight classification however pt with significant unintentional weight loss as outlined below. Current po intake is sub optimal.       Nutrition Diagnoses: Altered nutrition related labs due to T2DM as evidenced by A1C of 6.3%/use of metformin PTA. Unintentional weight loss due to decreased appetite as evidenced by pt reporting 10 lb weight loss in past month (7% weight loss) and 39 lb weight loss past year 22% weight loss). Inadequate oral food and beverage intake due to poor appetite as evidenced by po intake at lunch < 100 calories. Diabetes Management:     Recent blood glucose:     3/2:  109, 178  3/1:  117, 116, 147  Within target range (non-ICU: <140; ICU<180): [x] Yes   []  No    Current Insulin regimen: corrective lispro, normal insulin sensitivity ACHS  Home medication/insulin regimen:  Metformin 1000 mg BID (once took glipizide but pt states she was taken off it when her po intake decreased and she experienced a BG of 40 mg/dL). HbA1c:  6.3% - ave BG ~ 129 mg/dL over past 3 months  Adequate glycemic control PTA:  [x] Yes  [] No     SUBJECTIVE/OBJECTIVE:   Information obtained from: chart review, pt  3/2:  Pt reports having decreased appetite for the past year with net weight loss of 39 lbs and 10 lbs of this weight loss in the past month. She denies having food allergies and does not have issues with chewing or swallowing. She denies nausea. She thinks use of antibiotics has affected her stomach/appetite.   Pt agrees to try po supplement. Diet: CHO consistent with yogurt at meals    Patient Vitals for the past 100 hrs:   % Diet Eaten   03/02/20 0900 10 %   03/01/20 1700 10 %   03/01/20 1230 20 %   03/01/20 0900 75 %     Medications: [x]                Reviewed   IVF  NS at 75 ml/hr  Most Recent POC Glucose:   Recent Labs     03/02/20  0450 03/01/20  0413 02/29/20  1456 02/29/20  0803   * 122* 230* 150*         Labs:   Lab Results   Component Value Date/Time    Hemoglobin A1c 6.3 (H) 02/29/2020 02:56 PM     Lab Results   Component Value Date/Time    Sodium 144 03/02/2020 04:50 AM    Potassium 3.8 03/02/2020 04:50 AM    Chloride 112 (H) 03/02/2020 04:50 AM    CO2 25 03/02/2020 04:50 AM    Anion gap 7 03/02/2020 04:50 AM    Glucose 118 (H) 03/02/2020 04:50 AM    BUN 10 03/02/2020 04:50 AM    Creatinine 0.69 03/02/2020 04:50 AM    Calcium 7.3 (L) 03/02/2020 04:50 AM    Magnesium 1.1 (L) 03/02/2020 04:50 AM    Phosphorus 3.4 11/01/2019 08:15 AM    Albumin 3.0 (L) 02/29/2020 02:56 PM     Anthropometrics: IBW : 45.4 kg (100 lb), % IBW (Calculated): 141 %, BMI (calculated): 27.5  Wt Readings from Last 1 Encounters:   02/29/20 64 kg (141 lb)      Last Weight Metrics:  Weight Loss Metrics 2/29/2020 2/29/2020 2/24/2020 1/8/2020 12/11/2019 11/27/2019 11/27/2019   Today's Wt 141 lb 141 lb 141 lb 152 lb 3.2 oz 154 lb 154 lb 154 lb 9.6 oz   BMI 27.54 kg/m2 27.54 kg/m2 27.54 kg/m2 29.72 kg/m2 30.08 kg/m2 30.08 kg/m2 30.19 kg/m2       Ht Readings from Last 1 Encounters:   02/29/20 5' (1.524 m)     Estimated Nutrition Needs:  0352 Kcals/day, Protein (g): 64 g Fluid (ml): 1400 ml  Based on:   [x]          Actual BW    []          ABW   []            Adjusted BW         Nutrition Interventions:  9520-2914 calorie CHO consistent diet  Try No Added [place] Instant Breakfast daily and check acceptance. Goal:   Blood glucose will be within target range of  mg/dL by 3/5/20. Pt will consume > 75% meals by 3/7/20.      Nutrition Monitoring and Evaluation      [x]     Monitor po intake on meal rounds  [x]     Continue inpatient monitoring and intervention  []     Other:      Nutrition Risk:  []   High     [x]  Moderate    []  Minimal/Uncompromised    Christian Lo RD, CDE   Office:  [address] [place] [address] Pager:  [phone]

## 2020-03-02 NOTE — PROGRESS NOTES
[facility] - Progress Note  Admit Date: 2/29/2020    Assessment:     -A.fib/flutter with RVR with h/o paroxysmal A.fib/flutter on eliquis, metoprolol 12.5 BID as outpatient, missed metoprolol dose day of admission.    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 Iraj Momin, seen 2/24/20    Plan:     I saw, evaluated, interviewed and examined the patient personally. I agree with the findings and plan of care as documented below with PA-C note  Patient admitted with atrial fibrillation flutter with a rapid ventricle response. Currently in sinus rhythm. Okay to transfer to telemetry floor  Continue Eliquis and beta-blocker. Iraj Momin MD    Subjective:     No new complaints.   Denies chest pain or shortness of breath    Objective:      Patient Vitals for the past 8 hrs:   Temp Pulse Resp BP SpO2   03/02/20 1110 98.3 °F (36.8 °C) 68 20 148/66 95 %   03/02/20 0900 -- 85 23 152/49 98 %   03/02/20 0800 -- 63 20 161/66 100 %   03/02/20 0730 98 °F (36.7 °C) 66 20 131/52 95 %   03/02/20 0700 -- 64 20 144/55 97 %   03/02/20 0600 -- 65 19 137/61 98 %         Patient Vitals for the past 96 hrs:   Weight   02/29/20 1441 141 lb (64 kg)                    Intake/Output Summary (Last 24 hours) at 3/2/2020 1334  Last data filed at 3/2/2020 1228  Gross per 24 hour   Intake 2960 ml   Output 2500 ml   Net 460 ml       Physical Exam:  General:  alert, cooperative, no distress, appears stated age  Neck:  nontender  Lungs:  clear to auscultation bilaterally  Heart:  regular rate and rhythm, S1, S2 normal, no murmur, click, rub or gallop  Abdomen:  abdomen is soft without significant tenderness, masses, organomegaly or guarding  Extremities:  extremities normal, atraumatic, no cyanosis or edema    Data Review:     Labs: Results:       Chemistry Recent Labs     03/02/20 0450 03/01/20 0413 02/29/20  1456 02/29/20  0803   * 122* 230* 150*    138 135* 138   K 3.8 4.7 4.0 4.5   * 109 103 103   CO2 25 20* 24 26   BUN 10 16 15 14   CREA 0.69 0.85 1.07 0.78   CA 7.3* 7.3* 8.4* 8.6   MG 1.1* 3.6* 0.7*  --    AGAP 7 9 8 9   BUCR 14 19 14 18   AP  --   --  61 63   TP  --   --  7.3 7.4   ALB  --   --  3.0* 3.0*   GLOB  --   --  4.3* 4.4*   AGRAT  --   --  0.7* 0.7*      CBC w/Diff Recent Labs     03/02/20  0450 03/01/20 0413 02/29/20  1456 02/29/20  0803   WBC 4.1* 7.8 8.8 6.4   RBC 3.74* 3.89* 4.34 4.27   HGB 10.4* 10.7* 12.1 12.0   HCT 32.9* 34.6* 37.9 37.1    302 234 231   GRANS  --   --  71 65   LYMPH  --   --  18* 26   EOS  --   --  1 1      Cardiac Enzymes No results found for: CPK, CK, CKMMB, CKMB, RCK3, CKMBT, CKNDX, CKND1, LUIS A, TROPT, TROIQ, SHELLEY, TROPT, TNIPOC, BNP, BNPP   Coagulation No results for input(s): PTP, INR, APTT, INREXT, INREXT in the last 72 hours.     Lipid Panel Lab Results   Component Value Date/Time    Cholesterol, total 143 10/10/2018 02:17 PM    HDL Cholesterol 47 10/10/2018 02:17 PM    LDL, calculated 67.6 10/10/2018 02:17 PM    VLDL, calculated 28.4 10/10/2018 02:17 PM    Triglyceride 142 10/10/2018 02:17 PM    CHOL/HDL Ratio 3.0 10/10/2018 02:17 PM      BNP No results found for: BNP, BNPP, XBNPT   Liver Enzymes Recent Labs     02/29/20  1456   TP 7.3   ALB 3.0*   AP 61   SGOT 11      Digoxin    Thyroid Studies Lab Results   Component Value Date/Time    TSH 2.33 07/24/2019 02:37 PM          Signed By: Oliver Quintero MD     March 2, 2020

## 2020-03-03 LAB
ANION GAP SERPL CALC-SCNC: 6 MMOL/L (ref 3–18)
BASOPHILS # BLD: 0 K/UL (ref 0–0.1)
BASOPHILS NFR BLD: 0 % (ref 0–2)
BUN SERPL-MCNC: 7 MG/DL (ref 7–18)
BUN/CREAT SERPL: 12 (ref 12–20)
CALCIUM SERPL-MCNC: 7.3 MG/DL (ref 8.5–10.1)
CHLORIDE SERPL-SCNC: 110 MMOL/L (ref 100–111)
CO2 SERPL-SCNC: 24 MMOL/L (ref 21–32)
CREAT SERPL-MCNC: 0.6 MG/DL (ref 0.6–1.3)
DIFFERENTIAL METHOD BLD: ABNORMAL
EOSINOPHIL # BLD: 0.2 K/UL (ref 0–0.4)
EOSINOPHIL NFR BLD: 4 % (ref 0–5)
ERYTHROCYTE [DISTWIDTH] IN BLOOD BY AUTOMATED COUNT: 13.4 % (ref 11.6–14.5)
GLUCOSE BLD STRIP.AUTO-MCNC: 109 MG/DL (ref 70–110)
GLUCOSE BLD STRIP.AUTO-MCNC: 124 MG/DL (ref 70–110)
GLUCOSE BLD STRIP.AUTO-MCNC: 144 MG/DL (ref 70–110)
GLUCOSE BLD STRIP.AUTO-MCNC: 173 MG/DL (ref 70–110)
GLUCOSE SERPL-MCNC: 117 MG/DL (ref 74–99)
HCT VFR BLD AUTO: 34.1 % (ref 35–45)
HGB BLD-MCNC: 10.7 G/DL (ref 12–16)
LYMPHOCYTES # BLD: 1.3 K/UL (ref 0.9–3.6)
LYMPHOCYTES NFR BLD: 27 % (ref 21–52)
MAGNESIUM SERPL-MCNC: 1.8 MG/DL (ref 1.6–2.6)
MCH RBC QN AUTO: 27.4 PG (ref 24–34)
MCHC RBC AUTO-ENTMCNC: 31.4 G/DL (ref 31–37)
MCV RBC AUTO: 87.2 FL (ref 74–97)
MONOCYTES # BLD: 0.4 K/UL (ref 0.05–1.2)
MONOCYTES NFR BLD: 8 % (ref 3–10)
NEUTS SEG # BLD: 2.9 K/UL (ref 1.8–8)
NEUTS SEG NFR BLD: 61 % (ref 40–73)
PLATELET # BLD AUTO: 243 K/UL (ref 135–420)
PMV BLD AUTO: 9.5 FL (ref 9.2–11.8)
POTASSIUM SERPL-SCNC: 4.1 MMOL/L (ref 3.5–5.5)
RBC # BLD AUTO: 3.91 M/UL (ref 4.2–5.3)
SODIUM SERPL-SCNC: 140 MMOL/L (ref 136–145)
WBC # BLD AUTO: 4.9 K/UL (ref 4.6–13.2)

## 2020-03-03 PROCEDURE — 85025 COMPLETE CBC W/AUTO DIFF WBC: CPT

## 2020-03-03 PROCEDURE — 74011250636 HC RX REV CODE- 250/636: Performed by: HOSPITALIST

## 2020-03-03 PROCEDURE — 65660000000 HC RM CCU STEPDOWN

## 2020-03-03 PROCEDURE — 77030038269 HC DRN EXT URIN PURWCK BARD -A

## 2020-03-03 PROCEDURE — 74011250637 HC RX REV CODE- 250/637: Performed by: PHYSICIAN ASSISTANT

## 2020-03-03 PROCEDURE — 74011250637 HC RX REV CODE- 250/637: Performed by: INTERNAL MEDICINE

## 2020-03-03 PROCEDURE — 80048 BASIC METABOLIC PNL TOTAL CA: CPT

## 2020-03-03 PROCEDURE — 0107U C DIFF TOX AG DETCJ IA STOOL: CPT

## 2020-03-03 PROCEDURE — 83735 ASSAY OF MAGNESIUM: CPT

## 2020-03-03 PROCEDURE — 82962 GLUCOSE BLOOD TEST: CPT

## 2020-03-03 PROCEDURE — 74011250636 HC RX REV CODE- 250/636: Performed by: PHYSICIAN ASSISTANT

## 2020-03-03 PROCEDURE — 74011250637 HC RX REV CODE- 250/637: Performed by: HOSPITALIST

## 2020-03-03 PROCEDURE — 74011636637 HC RX REV CODE- 636/637: Performed by: HOSPITALIST

## 2020-03-03 PROCEDURE — 36415 COLL VENOUS BLD VENIPUNCTURE: CPT

## 2020-03-03 RX ORDER — AMIODARONE HYDROCHLORIDE 200 MG/1
200 TABLET ORAL DAILY
Status: DISCONTINUED | OUTPATIENT
Start: 2020-03-03 | End: 2020-03-05 | Stop reason: HOSPADM

## 2020-03-03 RX ORDER — INSULIN LISPRO 100 [IU]/ML
INJECTION, SOLUTION INTRAVENOUS; SUBCUTANEOUS 4 TIMES DAILY
Status: DISCONTINUED | OUTPATIENT
Start: 2020-03-03 | End: 2020-03-05 | Stop reason: HOSPADM

## 2020-03-03 RX ADMIN — AMIODARONE HYDROCHLORIDE 200 MG: 200 TABLET ORAL at 17:53

## 2020-03-03 RX ADMIN — Medication 10 ML: at 21:42

## 2020-03-03 RX ADMIN — INSULIN LISPRO 2 UNITS: 100 INJECTION, SOLUTION INTRAVENOUS; SUBCUTANEOUS at 17:48

## 2020-03-03 RX ADMIN — APIXABAN 5 MG: 5 TABLET, FILM COATED ORAL at 21:48

## 2020-03-03 RX ADMIN — METRONIDAZOLE 500 MG: 500 INJECTION, SOLUTION INTRAVENOUS at 05:18

## 2020-03-03 RX ADMIN — METRONIDAZOLE 500 MG: 500 INJECTION, SOLUTION INTRAVENOUS at 21:48

## 2020-03-03 RX ADMIN — LEVOTHYROXINE SODIUM 75 MCG: 0.07 TABLET ORAL at 06:31

## 2020-03-03 RX ADMIN — METRONIDAZOLE 500 MG: 500 INJECTION, SOLUTION INTRAVENOUS at 13:17

## 2020-03-03 RX ADMIN — Medication 10 ML: at 13:17

## 2020-03-03 RX ADMIN — CIPROFLOXACIN 400 MG: 2 INJECTION, SOLUTION INTRAVENOUS at 21:49

## 2020-03-03 RX ADMIN — CIPROFLOXACIN 400 MG: 2 INJECTION, SOLUTION INTRAVENOUS at 09:55

## 2020-03-03 RX ADMIN — SODIUM CHLORIDE 75 ML/HR: 900 INJECTION, SOLUTION INTRAVENOUS at 08:09

## 2020-03-03 RX ADMIN — METOPROLOL TARTRATE 25 MG: 25 TABLET ORAL at 09:18

## 2020-03-03 RX ADMIN — APIXABAN 5 MG: 5 TABLET, FILM COATED ORAL at 09:18

## 2020-03-03 RX ADMIN — METOPROLOL TARTRATE 25 MG: 25 TABLET ORAL at 21:48

## 2020-03-03 NOTE — PROGRESS NOTES
1930 Bedside and Verbal shift change report given to [facility] (oncoming nurse) by Bruce Mock RN   (offgoing nurse).  Report included the following information SBAR, Kardex, Intake/Output, MAR, Recent Results and Cardiac Rhythm SR.

## 2020-03-03 NOTE — PROGRESS NOTES
Assume care of patient lying in bed. Alert and oriented X 4. Denies pain or discomfort at present. Bed locked in lowest position. Call light within reach and understand to use for assistance and needs. 2100 Patient resting quietly and watching TV.    2300 Napping at intervals while watching TV.    03/03/2020    0200 Patient had incontinent episode of stool on floor and bathroom. Cleaned with gown changed and new lakeshia wick applied after returning to bed.    0520 Resting quietly with eyes closed. 0710 Bedside and Verbal shift change report given to Jose Alston RN (oncoming nurse) by Ricky Metcalf RN (offgoing nurse). Report given with SBAR, Kardex, Intake/Output, MAR and Recent Results.

## 2020-03-03 NOTE — PROGRESS NOTES
Problem: Falls - Risk of  Goal: *Absence of Falls  Description  Document Kostas Pisano Fall Risk and appropriate interventions in the flowsheet. Outcome: Progressing Towards Goal  Note: Fall Risk Interventions:  Mobility Interventions: Patient to call before getting OOB, Strengthening exercises (ROM-active/passive), Utilize walker, cane, or other assistive device         Medication Interventions: Patient to call before getting OOB, Teach patient to arise slowly    Elimination Interventions: Patient to call for help with toileting needs, Call light in reach              Problem: Pressure Injury - Risk of  Goal: *Prevention of pressure injury  Description  Document Sae Scale and appropriate interventions in the flowsheet.   Outcome: Progressing Towards Goal  Note: Pressure Injury Interventions:  Sensory Interventions: Assess changes in LOC    Moisture Interventions: Absorbent underpads, Apply protective barrier, creams and emollients    Activity Interventions: Pressure redistribution bed/mattress(bed type)    Mobility Interventions: HOB 30 degrees or less    Nutrition Interventions: Document food/fluid/supplement intake    Friction and Shear Interventions: HOB 30 degrees or less, Foam dressings/transparent film/skin sealants                Problem: Afib Pathway: Day 1  Goal: Off Pathway (Use only if patient is Off Pathway)  Outcome: Progressing Towards Goal     Problem: Afib Pathway: Day 1  Goal: Off Pathway (Use only if patient is Off Pathway)  Outcome: Progressing Towards Goal     Problem: Afib Pathway: Day 1  Goal: Off Pathway (Use only if patient is Off Pathway)  Outcome: Progressing Towards Goal     Problem: Afib Pathway: Day 1  Goal: Off Pathway (Use only if patient is Off Pathway)  Outcome: Progressing Towards Goal     Problem: Arrhythmia Pathway (Adult)  Goal: *Absence of arrhythmia  Outcome: Progressing Towards Goal     Problem: Diabetes Maintenance:Admission  Goal: *Blood glucose 80 to 180 mg/dl  Outcome: Progressing Towards Goal     Problem: Diabetes Maintenance:Admission  Goal: *Adequate nutrition  Outcome: Progressing Towards Goal

## 2020-03-03 NOTE — PROGRESS NOTES
Problem: Falls - Risk of  Goal: *Absence of Falls  Description  Document Cheri Ledezma Fall Risk and appropriate interventions in the flowsheet.   Outcome: Progressing Towards Goal  Note: Fall Risk Interventions:  Mobility Interventions: Communicate number of staff needed for ambulation/transfer, Patient to call before getting OOB         Medication Interventions: Evaluate medications/consider consulting pharmacy, Patient to call before getting OOB    Elimination Interventions: Call light in reach, Patient to call for help with toileting needs              Problem: Patient Education: Go to Patient Education Activity  Goal: Patient/Family Education  Outcome: Progressing Towards Goal     Problem: Pain  Goal: *Control of Pain  Outcome: Progressing Towards Goal     Problem: Patient Education: Go to Patient Education Activity  Goal: Patient/Family Education  Outcome: Progressing Towards Goal

## 2020-03-03 NOTE — PROGRESS NOTES
Progress Note      Patient: Princess Rogers               Sex: female          DOA: 2/29/2020       YOB: 1940      Age:  [de-identified] y.o.        LOS:  LOS: 3 days             CHIEF COMPLAINT:  A fib with RVR, colo-vesicular fistula    Subjective:     No CP. No SOB  Patient complains of diarrhea    Objective:      Visit Vitals  /66 (BP 1 Location: Left arm, BP Patient Position: At rest)   Pulse 65   Temp 98 °F (36.7 °C)   Resp 18   Ht 5' (1.524 m)   Wt 70.6 kg (155 lb 9.6 oz)   SpO2 100%   Breastfeeding No   BMI 30.39 kg/m²       Physical Exam:  Gen:  No distress, no complaint  Lungs:  Clear bilaterally, no wheeze or rhonchi  Heart:  Regular rate and rhythm, no murmurs or gallops  Abdomen:  Soft, non-tender, normal bowel sounds        Lab/Data Reviewed: All lab results for the last 24 hours reviewed. Assessment/Plan     Principal Problem:    Atrial fibrillation with RVR ([address] (3/4/2018)    Active Problems:    HTN (hypertension) (2/24/2014)      DM (diabetes mellitus) ([address] (2/24/2014)      Diverticulitis (11/7/2016)      Hypomagnesemia (10/31/2019)        Plan:  Continue antibiotics  Stool for C diff  Discussed with Cardiology  Surgical consult.

## 2020-03-03 NOTE — PROGRESS NOTES
Patient received in bed awake. Patient A&Ox4, denies pain and discomfort. No distress noted. Frequently use items within reach. Bed locked in low position. Call bell within reach and Patient verbalized understanding of use for assistance and needs. 1045- Patient stated has had x4 loose stools since 05:00am till now. Patient requesting Imodium said that she takes 1 tab as needed for diverticulitis. Dr. Jose Aggarwal to [facility] made aware and asked this nurse appearance of stool; he was made aware not seen by this nurse said to check stool for C diff before ordering Imodium. Patient C- Diff validation form said to move to STOP; Charge nurse made aware.  However will send next stool per Dr. Jose Aggarwal request.

## 2020-03-03 NOTE — PROGRESS NOTES
Problem: Discharge Planning  Goal: *Discharge to safe environment  Outcome: Progressing Towards Goal     Plan: home

## 2020-03-03 NOTE — PROGRESS NOTES
[facility] - Progress Note  Admit Date: 2/29/2020      I saw, evaluated, interviewed and examined the patient personally. I agree with the findings and plan of care as documented below with PA  Agree with Eliquis, Lopressor. Agree with starting amiodarone. Discussed with patient  Patient had general surgery evaluation in the past here at [facility] and also got second opinion at [facility] AT [place].  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 I discussed this with Dr. Naman Mora and he is going to address this with surgical team.      Bear Funes MD       Assessment:     -A.fib/flutter with RVR with h/o paroxysmal A.fib/flutter on eliquis, metoprolol 12.5 BID as outpatient, missed metoprolol dose day of admission.    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 Bear Funes, seen 2/24/20    Plan:     - Patient has remained in sinus rhythm  - Continue with Eliquis and Lopressor  - Will start Amiodarone today to maintain sinus rhythm    Subjective:     No new complaints.      Objective:      Patient Vitals for the past 8 hrs:   Temp Pulse Resp BP SpO2   03/03/20 1138 98.2 °F (36.8 °C) 60 22 158/61 100 %   03/03/20 0917 97.9 °F (36.6 °C) 75 16 (!) 150/92 98 %   03/03/20 0735 97.7 °F (36.5 °C) 69 16 168/71 99 %         Patient Vitals for the past 96 hrs:   Weight   03/02/20 2317 155 lb 9.6 oz (70.6 kg)   02/29/20 1441 141 lb (64 kg)                    Intake/Output Summary (Last 24 hours) at 3/3/2020 1251  Last data filed at 3/3/2020 0530  Gross per 24 hour   Intake 870 ml   Output 975 ml   Net -105 ml       Physical Exam:  General:  alert, cooperative, no distress  Neck:  nontender, no JVD  Lungs:  clear to auscultation bilaterally  Heart:  regular rate and rhythm, S1, S2 normal, no murmur, click, rub or gallop  Abdomen:  abdomen is soft without significant tenderness, masses, organomegaly or guarding  Extremities:  extremities normal, atraumatic, no cyanosis or edema    Data Review:     Labs: Results:       Chemistry Recent Labs     03/03/20 0340 03/02/20  1708 03/02/20  0450 03/01/20  0413 02/29/20  1456   *  --  118* 122* 230*     --  144 138 135*   K 4.1  --  3.8 4.7 4.0     --  112* 109 103   CO2 24  --  25 20* 24   BUN 7  --  10 16 15   CREA 0.60  --  0.69 0.85 1.07   CA 7.3*  --  7.3* 7.3* 8.4*   MG 1.8 2.2 1.1* 3.6* 0.7*   AGAP 6  --  7 9 8   BUCR 12  --  14 19 14   AP  --   --   --   --  61   TP  --   --   --   --  7.3   ALB  --   --   --   --  3.0*   GLOB  --   --   --   --  4.3*   AGRAT  --   --   --   --  0.7*      CBC w/Diff Recent Labs     03/03/20  0340 03/02/20  0450 03/01/20  0413 02/29/20  1456   WBC 4.9 4.1* 7.8 8.8   RBC 3.91* 3.74* 3.89* 4.34   HGB 10.7* 10.4* 10.7* 12.1   HCT 34.1* 32.9* 34.6* 37.9    209 302 234   GRANS 61  --   --  71   LYMPH 27  --   --  18*   EOS 4  --   --  1      Cardiac Enzymes No results found for: CPK, CK, CKMMB, CKMB, RCK3, CKMBT, CKNDX, CKND1, LUIS A, TROPT, TROIQ, SHELLEY, TROPT, TNIPOC, BNP, BNPP   Coagulation No results for input(s): PTP, INR, APTT, INREXT in the last 72 hours.     Lipid Panel Lab Results   Component Value Date/Time    Cholesterol, total 143 10/10/2018 02:17 PM    HDL Cholesterol 47 10/10/2018 02:17 PM    LDL, calculated 67.6 10/10/2018 02:17 PM    VLDL, calculated 28.4 10/10/2018 02:17 PM    Triglyceride 142 10/10/2018 02:17 PM    CHOL/HDL Ratio 3.0 10/10/2018 02:17 PM      BNP No results found for: BNP, BNPP, XBNPT   Liver Enzymes Recent Labs     02/29/20  1456   TP 7.3   ALB 3.0*   AP 61   SGOT 11      Digoxin    Thyroid Studies Lab Results   Component Value Date/Time    TSH 2.33 07/24/2019 02:37 PM          Signed By: Gavi Mendes.  Alexandru Clayton     March 3, 2020

## 2020-03-04 PROBLEM — N32.1 COLO-VESICAL FISTULA: Status: ACTIVE | Noted: 2020-03-04

## 2020-03-04 PROBLEM — I27.20 PULMONARY HTN (HCC): Status: ACTIVE | Noted: 2020-03-04

## 2020-03-04 PROBLEM — I48.0 PAROXYSMAL ATRIAL FIBRILLATION (HCC): Status: ACTIVE | Noted: 2020-03-04

## 2020-03-04 LAB
GLUCOSE BLD STRIP.AUTO-MCNC: 102 MG/DL (ref 70–110)
GLUCOSE BLD STRIP.AUTO-MCNC: 111 MG/DL (ref 70–110)
GLUCOSE BLD STRIP.AUTO-MCNC: 160 MG/DL (ref 70–110)
GLUCOSE BLD STRIP.AUTO-MCNC: 194 MG/DL (ref 70–110)

## 2020-03-04 PROCEDURE — 74011250637 HC RX REV CODE- 250/637: Performed by: HOSPITALIST

## 2020-03-04 PROCEDURE — 82962 GLUCOSE BLOOD TEST: CPT

## 2020-03-04 PROCEDURE — 77030038269 HC DRN EXT URIN PURWCK BARD -A

## 2020-03-04 PROCEDURE — 65660000000 HC RM CCU STEPDOWN

## 2020-03-04 PROCEDURE — 74011250637 HC RX REV CODE- 250/637: Performed by: PHYSICIAN ASSISTANT

## 2020-03-04 PROCEDURE — 74011636637 HC RX REV CODE- 636/637: Performed by: HOSPITALIST

## 2020-03-04 PROCEDURE — 74011250636 HC RX REV CODE- 250/636: Performed by: HOSPITALIST

## 2020-03-04 RX ADMIN — INSULIN LISPRO 2 UNITS: 100 INJECTION, SOLUTION INTRAVENOUS; SUBCUTANEOUS at 12:57

## 2020-03-04 RX ADMIN — SODIUM CHLORIDE 75 ML/HR: 900 INJECTION, SOLUTION INTRAVENOUS at 20:41

## 2020-03-04 RX ADMIN — METRONIDAZOLE 500 MG: 500 INJECTION, SOLUTION INTRAVENOUS at 12:57

## 2020-03-04 RX ADMIN — Medication 10 ML: at 13:01

## 2020-03-04 RX ADMIN — METOPROLOL TARTRATE 25 MG: 25 TABLET ORAL at 21:35

## 2020-03-04 RX ADMIN — INSULIN LISPRO 2 UNITS: 100 INJECTION, SOLUTION INTRAVENOUS; SUBCUTANEOUS at 22:07

## 2020-03-04 RX ADMIN — METOPROLOL TARTRATE 25 MG: 25 TABLET ORAL at 08:01

## 2020-03-04 RX ADMIN — Medication 10 ML: at 21:40

## 2020-03-04 RX ADMIN — METRONIDAZOLE 500 MG: 500 INJECTION, SOLUTION INTRAVENOUS at 21:35

## 2020-03-04 RX ADMIN — APIXABAN 5 MG: 5 TABLET, FILM COATED ORAL at 09:47

## 2020-03-04 RX ADMIN — Medication 10 ML: at 05:12

## 2020-03-04 RX ADMIN — CIPROFLOXACIN 400 MG: 2 INJECTION, SOLUTION INTRAVENOUS at 21:36

## 2020-03-04 RX ADMIN — APIXABAN 5 MG: 5 TABLET, FILM COATED ORAL at 21:35

## 2020-03-04 RX ADMIN — METRONIDAZOLE 500 MG: 500 INJECTION, SOLUTION INTRAVENOUS at 04:48

## 2020-03-04 RX ADMIN — LEVOTHYROXINE SODIUM 75 MCG: 0.07 TABLET ORAL at 05:12

## 2020-03-04 RX ADMIN — SODIUM CHLORIDE 75 ML/HR: 900 INJECTION, SOLUTION INTRAVENOUS at 03:36

## 2020-03-04 RX ADMIN — AMIODARONE HYDROCHLORIDE 200 MG: 200 TABLET ORAL at 08:01

## 2020-03-04 RX ADMIN — CIPROFLOXACIN 400 MG: 2 INJECTION, SOLUTION INTRAVENOUS at 09:46

## 2020-03-04 NOTE — PROGRESS NOTES
[facility] - Progress Note  Admit Date: 2/29/2020      I saw, evaluated, interviewed and examined the patient personally. I agree with the findings and plan of care as documented below with PA-C note  Stable from cardiac standpoint. Remains in sinus rhythm. Continue beta-blocker, anticoagulation and amiodarone  Patient requesting second surgical opinion and Dr. Nas Elaine has arranged for that. Polina Yu MD       Assessment:      -A.fib/flutter with RVR with h/o paroxysmal A.fib/flutter on eliquis, metoprolol 12.5 BID as outpatient, missed metoprolol dose day of admission.    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 Polina Yu, seen 2/24/20     Plan:      - Patient has remained in sinus rhythm  - Continue with Amiodarone, Eliquis and Lopressor  - Patient requesting surgical consult regarding colovesicular fistula, defer to primary team     Subjective:     No new complaints.      Objective:      Patient Vitals for the past 8 hrs:   Temp Pulse Resp BP SpO2   03/04/20 0740 98.7 °F (37.1 °C) 70 17 (!) 196/92 98 %   03/04/20 0457 98.8 °F (37.1 °C) 70 17 171/83 98 %         Patient Vitals for the past 96 hrs:   Weight   03/02/20 2317 155 lb 9.6 oz (70.6 kg)   02/29/20 1441 141 lb (64 kg)                    Intake/Output Summary (Last 24 hours) at 3/4/2020 1008  Last data filed at 3/4/2020 0600  Gross per 24 hour   Intake 2232.5 ml   Output 1025 ml   Net 1207.5 ml       Physical Exam:  General:  alert, cooperative, no distress  Neck:  nontender, no JVD  Lungs:  clear to auscultation bilaterally  Heart:  regular rate and rhythm, S1, S2 normal, no murmur, click, rub or gallop  Abdomen:  abdomen is soft without significant tenderness, masses, organomegaly or guarding  Extremities:  extremities normal, atraumatic, no cyanosis or edema    Data Review:     Labs: Results:       Chemistry Recent Labs     03/03/20  0340 03/02/20  1708 03/02/20  0450   *  --  118*     --  144   K 4.1  --  3.8     --  112*   CO2 24  --  25   BUN 7  --  10   CREA 0.60  --  0.69   CA 7.3*  --  7.3*   MG 1.8 2.2 1.1*   AGAP 6  --  7   BUCR 12  --  14      CBC w/Diff Recent Labs     03/03/20  0340 03/02/20  0450   WBC 4.9 4.1*   RBC 3.91* 3.74*   HGB 10.7* 10.4*   HCT 34.1* 32.9*    209   GRANS 61  --    LYMPH 27  --    EOS 4  --       Cardiac Enzymes No results found for: CPK, CK, CKMMB, CKMB, RCK3, CKMBT, CKNDX, CKND1, LUIS A, TROPT, TROIQ, SHELLEY, TROPT, TNIPOC, BNP, BNPP   Coagulation No results for input(s): PTP, INR, APTT, INREXT in the last 72 hours. Lipid Panel Lab Results   Component Value Date/Time    Cholesterol, total 143 10/10/2018 02:17 PM    HDL Cholesterol 47 10/10/2018 02:17 PM    LDL, calculated 67.6 10/10/2018 02:17 PM    VLDL, calculated 28.4 10/10/2018 02:17 PM    Triglyceride 142 10/10/2018 02:17 PM    CHOL/HDL Ratio 3.0 10/10/2018 02:17 PM      BNP No results found for: BNP, BNPP, XBNPT   Liver Enzymes No results for input(s): TP, ALB, TBIL, AP, SGOT, GPT in the last 72 hours. No lab exists for component: DBIL   Digoxin    Thyroid Studies Lab Results   Component Value Date/Time    TSH 2.33 07/24/2019 02:37 PM          Signed By: Alexandra Campos.  Tammy Patient, PA-C     March 4, 2020

## 2020-03-04 NOTE — PROGRESS NOTES
EPAS Search    No previous assessments found for this member.     Elton Alejandro RN    Outcomes Manager  [phone]  [phone]

## 2020-03-04 NOTE — PROGRESS NOTES
Problem: Falls - Risk of  Goal: *Absence of Falls  Description  Document Ry Hopkins Fall Risk and appropriate interventions in the flowsheet.   Outcome: Progressing Towards Goal  Note: Fall Risk Interventions:  Mobility Interventions: Patient to call before getting OOB         Medication Interventions: Bed/chair exit alarm, Patient to call before getting OOB    Elimination Interventions: Call light in reach, Stay With Me (per policy)    History of Falls Interventions: Room close to nurse's station

## 2020-03-04 NOTE — PROGRESS NOTES
Bedside and Verbal shift change report given to DEMETRI Simpson (oncoming nurse) by Ben Fairchild (offgoing nurse). Report included the following information SBAR and Recent Results. 1300 patient request to talk to Dr. Joe Kent about a future surgery, and finding a surgeon , Physician stated he is aware.     1600 patient notified that a Dr Aravind Powers will be her surgeon  For her future planned procedure

## 2020-03-04 NOTE — ROUTINE PROCESS
Bedside and Verbal shift change report given to Rolando Romero, RN (oncoming nurse) by Oliva Lundborg RN, BSN (offgoing nurse). Report given with SBAR, Kardex, Intake/Output and MAR.

## 2020-03-04 NOTE — PROGRESS NOTES
Progress Note      Patient: Alison Venegas               Sex: female          DOA: 2/29/2020       YOB: 1940      Age:  [de-identified] y.o.        LOS:  LOS: 4 days             CHIEF COMPLAINT:  Atrial fibrillation, McClure-Vesicular fistula    Subjective:     Patient is awake and talking.     No pain   No distress  Still has diarrhea    Objective:      Visit Vitals  /82 (BP 1 Location: Left arm, BP Patient Position: At rest)   Pulse 66   Temp 99.9 °F (37.7 °C)   Resp 17   Ht 5' (1.524 m)   Wt 70.6 kg (155 lb 9.6 oz)   SpO2 97%   Breastfeeding No   BMI 30.39 kg/m²       Physical Exam:  Gen:  No distress, no complaint  Lungs:  Clear bilaterally, no wheeze or rhonchi  Heart:  Regular rate and rhythm, no murmurs or gallops  Abdomen:  Soft, non-tender, normal bowel sounds        Lab/Data Reviewed:    Labs reviewed      Assessment/Plan     Principal Problem:    Atrial fibrillation with RVR (HCC) (3/4/2018)    Active Problems:    Paroxysmal atrial fibrillation ([address] (3/4/2020)      Pulmonary HTN ([address] (3/4/2020)      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

## 2020-03-04 NOTE — PROGRESS NOTES
Assumed care from [facility]. Patient is awake and alert, denies any pain at this time. No distress noted. Will continue to monitor. 0000> Patient remains stable at this time. 0500> Morning care completed. Pure wick changed at this time. Tolerated well. Written shift change report given to DEMETRI Patterson for  [employer], RN (oncoming nurse) by [facility] er[facility] RN (offgoing nurse). Report included the following information SBAR, Kardex, Intake/Output, MAR and Recent Results. 0800> CNA reported BP = 196/92, Metoprolol given as scheduled.

## 2020-03-04 NOTE — PROGRESS NOTES
Problem: Falls - Risk of  Goal: *Absence of Falls  Description  Document Shane Almaraz Fall Risk and appropriate interventions in the flowsheet. Outcome: Progressing Towards Goal  Note: Fall Risk Interventions:  Mobility Interventions: Bed/chair exit alarm, Communicate number of staff needed for ambulation/transfer, PT Consult for mobility concerns, PT Consult for assist device competence, Patient to call before getting OOB         Medication Interventions: Bed/chair exit alarm, Evaluate medications/consider consulting pharmacy, Teach patient to arise slowly    Elimination Interventions: Call light in reach, Patient to call for help with toileting needs, Toilet paper/wipes in reach              Problem: Pressure Injury - Risk of  Goal: *Prevention of pressure injury  Description  Document Sae Scale and appropriate interventions in the flowsheet.   Outcome: Progressing Towards Goal  Note: Pressure Injury Interventions:  Sensory Interventions: Assess changes in LOC, Avoid rigorous massage over bony prominences, Float heels    Moisture Interventions: Absorbent underpads    Activity Interventions: Pressure redistribution bed/mattress(bed type), Increase time out of bed    Mobility Interventions: HOB 30 degrees or less    Nutrition Interventions: Document food/fluid/supplement intake    Friction and Shear Interventions: HOB 30 degrees or less                Problem: Pain  Goal: *Control of Pain  Outcome: Progressing Towards Goal     Problem: Afib Pathway: Day 1  Goal: Off Pathway (Use only if patient is Off Pathway)  Outcome: Progressing Towards Goal     Problem: Afib Pathway: Day 1  Goal: Activity/Safety  Outcome: Progressing Towards Goal     Problem: Afib Pathway: Day 1  Goal: Consults, if ordered  Outcome: Progressing Towards Goal

## 2020-03-04 NOTE — PROGRESS NOTES
Problem: Falls - Risk of  Goal: *Absence of Falls  Description  Document Dustin Ambrosio Fall Risk and appropriate interventions in the flowsheet.   3/4/2020 0359 by Linda Lloyd RN  Outcome: Progressing Towards Goal  Note: Fall Risk Interventions:  Mobility Interventions: Patient to call before getting OOB         Medication Interventions: Bed/chair exit alarm, Patient to call before getting OOB    Elimination Interventions: Call light in reach, Stay With Me (per policy)    History of Falls Interventions: Room close to nurse's station      3/4/2020 5892 by Linda Lloyd RN  Outcome: Progressing Towards Goal  Note: Fall Risk Interventions:  Mobility Interventions: Patient to call before getting OOB         Medication Interventions: Bed/chair exit alarm, Patient to call before getting OOB    Elimination Interventions: Call light in reach, Stay With Me (per policy)    History of Falls Interventions: Room close to nurse's station

## 2020-03-05 VITALS
WEIGHT: 155.6 LBS | RESPIRATION RATE: 19 BRPM | SYSTOLIC BLOOD PRESSURE: 167 MMHG | OXYGEN SATURATION: 97 % | BODY MASS INDEX: 30.55 KG/M2 | DIASTOLIC BLOOD PRESSURE: 82 MMHG | HEIGHT: 60 IN | TEMPERATURE: 98.7 F | HEART RATE: 66 BPM

## 2020-03-05 LAB
GLUCOSE BLD STRIP.AUTO-MCNC: 103 MG/DL (ref 70–110)
GLUCOSE BLD STRIP.AUTO-MCNC: 129 MG/DL (ref 70–110)
GLUCOSE BLD STRIP.AUTO-MCNC: 138 MG/DL (ref 70–110)

## 2020-03-05 PROCEDURE — 97110 THERAPEUTIC EXERCISES: CPT

## 2020-03-05 PROCEDURE — 97166 OT EVAL MOD COMPLEX 45 MIN: CPT

## 2020-03-05 PROCEDURE — 74011250636 HC RX REV CODE- 250/636: Performed by: HOSPITALIST

## 2020-03-05 PROCEDURE — 74011250637 HC RX REV CODE- 250/637: Performed by: HOSPITALIST

## 2020-03-05 PROCEDURE — 77030038269 HC DRN EXT URIN PURWCK BARD -A

## 2020-03-05 PROCEDURE — 74011250637 HC RX REV CODE- 250/637: Performed by: PHYSICIAN ASSISTANT

## 2020-03-05 PROCEDURE — 97116 GAIT TRAINING THERAPY: CPT

## 2020-03-05 PROCEDURE — 82962 GLUCOSE BLOOD TEST: CPT

## 2020-03-05 PROCEDURE — 97162 PT EVAL MOD COMPLEX 30 MIN: CPT

## 2020-03-05 RX ORDER — CIPROFLOXACIN 500 MG/1
500 TABLET ORAL 2 TIMES DAILY
Qty: 14 TAB | Refills: 0 | Status: SHIPPED | OUTPATIENT
Start: 2020-03-05 | End: 2020-03-12

## 2020-03-05 RX ORDER — METRONIDAZOLE 500 MG/1
500 TABLET ORAL 4 TIMES DAILY
Qty: 28 TAB | Refills: 0 | Status: SHIPPED | OUTPATIENT
Start: 2020-03-05 | End: 2020-03-12

## 2020-03-05 RX ADMIN — METRONIDAZOLE 500 MG: 500 INJECTION, SOLUTION INTRAVENOUS at 05:16

## 2020-03-05 RX ADMIN — AMIODARONE HYDROCHLORIDE 200 MG: 200 TABLET ORAL at 09:44

## 2020-03-05 RX ADMIN — Medication 10 ML: at 09:45

## 2020-03-05 RX ADMIN — CIPROFLOXACIN 400 MG: 2 INJECTION, SOLUTION INTRAVENOUS at 09:47

## 2020-03-05 RX ADMIN — LEVOTHYROXINE SODIUM 75 MCG: 0.07 TABLET ORAL at 05:16

## 2020-03-05 RX ADMIN — METRONIDAZOLE 500 MG: 500 INJECTION, SOLUTION INTRAVENOUS at 13:53

## 2020-03-05 RX ADMIN — METOPROLOL TARTRATE 25 MG: 25 TABLET ORAL at 09:44

## 2020-03-05 RX ADMIN — Medication 10 ML: at 13:55

## 2020-03-05 RX ADMIN — APIXABAN 5 MG: 5 TABLET, FILM COATED ORAL at 09:44

## 2020-03-05 NOTE — PROGRESS NOTES
[facility] - Progress Note  Admit Date: 2/29/2020      I saw, evaluated, interviewed and examined the patient personally. I agree with the findings and plan of care as documented below with PA-C note  Patient remained in sinus rhythm. Continue with Eliquis amiodarone Lopressor. No further cardiac work-up is planned at this time. We will be available as needed. Please call us back with any question. Roddy Hodges MD       Assessment:      -A.fib/flutter with RVR with h/o paroxysmal A.fib/flutter on eliquis, metoprolol 12.5 BID as outpatient, missed metoprolol dose day of admission.    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 Roddy Hodges, seen 2/24/20    Plan:      - Patient has remained in sinus rhythm  - Continue with Amiodarone, Eliquis and Lopressor  - She has seen surgery here this admission, plans are for her to continue with scheduled outpatient surgery  - No further workup needed from cardiac standpoint. Will sign off and be available as needed. Please call with any questions    Subjective:     No new complaints.      Objective:      Patient Vitals for the past 8 hrs:   Temp Pulse Resp BP SpO2   03/05/20 0727 98.7 °F (37.1 °C) 72 18 155/76 97 %   03/05/20 0358 98.3 °F (36.8 °C) 68 18 158/81 98 %         Patient Vitals for the past 96 hrs:   Weight   03/02/20 2317 155 lb 9.6 oz (70.6 kg)                    Intake/Output Summary (Last 24 hours) at 3/5/2020 1103  Last data filed at 3/4/2020 2000  Gross per 24 hour   Intake 1050 ml Output 200 ml   Net 850 ml       Physical Exam:  General:  alert, cooperative, no distress  Neck:  nontender, no JVD  Lungs:  clear to auscultation bilaterally  Heart:  regular rate and rhythm, S1, S2 normal, no murmur, click, rub or gallop  Abdomen:  abdomen is soft without significant tenderness, masses, organomegaly or guarding  Extremities:  extremities normal, atraumatic, no cyanosis or edema, no edema    Data Review:     Labs: Results:       Chemistry Recent Labs     03/03/20  0340 03/02/20  1708   *  --      --    K 4.1  --      --    CO2 24  --    BUN 7  --    CREA 0.60  --    CA 7.3*  --    MG 1.8 2.2   AGAP 6  --    BUCR 12  --       CBC w/Diff Recent Labs     03/03/20  0340   WBC 4.9   RBC 3.91*   HGB 10.7*   HCT 34.1*      GRANS 61   LYMPH 27   EOS 4      Cardiac Enzymes No results found for: CPK, CK, CKMMB, CKMB, RCK3, CKMBT, CKNDX, CKND1, LUIS A, TROPT, TROIQ, SHELLEY, TROPT, TNIPOC, BNP, BNPP   Coagulation No results for input(s): PTP, INR, APTT, INREXT in the last 72 hours. Lipid Panel Lab Results   Component Value Date/Time    Cholesterol, total 143 10/10/2018 02:17 PM    HDL Cholesterol 47 10/10/2018 02:17 PM    LDL, calculated 67.6 10/10/2018 02:17 PM    VLDL, calculated 28.4 10/10/2018 02:17 PM    Triglyceride 142 10/10/2018 02:17 PM    CHOL/HDL Ratio 3.0 10/10/2018 02:17 PM      BNP No results found for: BNP, BNPP, XBNPT   Liver Enzymes No results for input(s): TP, ALB, TBIL, AP, SGOT, GPT in the last 72 hours. No lab exists for component: DBIL   Digoxin    Thyroid Studies Lab Results   Component Value Date/Time    TSH 2.33 07/24/2019 02:37 PM          Signed By: Ann Kuhn.  Nidhi Godwin PA-C     March 5, 2020

## 2020-03-05 NOTE — PROGRESS NOTES
Problem: Falls - Risk of  Goal: *Absence of Falls  Description  Document Kostas Pisano Fall Risk and appropriate interventions in the flowsheet. Outcome: Progressing Towards Goal  Note: Fall Risk Interventions:  Mobility Interventions: Patient to call before getting OOB         Medication Interventions: Bed/chair exit alarm, Patient to call before getting OOB    Elimination Interventions: Call light in reach, Stay With Me (per policy)    History of Falls Interventions: Room close to nurse's station         Problem: Pressure Injury - Risk of  Goal: *Prevention of pressure injury  Description  Document Sae Scale and appropriate interventions in the flowsheet.   Outcome: Progressing Towards Goal  Note: Pressure Injury Interventions:  Sensory Interventions: Avoid rigorous massage over bony prominences    Moisture Interventions: Check for incontinence Q2 hours and as needed    Activity Interventions: Increase time out of bed    Mobility Interventions: HOB 30 degrees or less    Nutrition Interventions: Document food/fluid/supplement intake, Discuss nutritional consult with provider    Friction and Shear Interventions: Feet elevated on foot rest, HOB 30 degrees or less                Problem: Afib Pathway: Day 1  Goal: Off Pathway (Use only if patient is Off Pathway)  Outcome: Progressing Towards Goal     Problem: Afib Pathway: Day 1  Goal: Activity/Safety  Outcome: Progressing Towards Goal     Problem: Afib Pathway: Day 1  Goal: Respiratory  Outcome: Progressing Towards Goal

## 2020-03-05 NOTE — CONSULTS
General Surgery Consult    [facility]  Admit date: 2020    MRN: [id number]     : 1940     Age: [de-identified] y.o. Attending Physician: Gianfranco Floyd MD, [facility]      History of Present Illness:      Princess Rogers is a [de-identified] y.o. female who I have seen last year for a colovesical fistula and she was admitted to the medicine service and they consulted Dr. Zara Farooq for evaluation and I was asked to see her. When I saw the alexandronet in November of last year, I was asked by the PCP office to see her urgently in the clinic. The patient has a long list of medical problems and she has been having chronic abdominal pain for years. She has stated that she had recurrent abdominal pain and UTI. Even when I reviewed her chart there is CT scan dating back to more than 5 years that showed diverticulitis with thickening of the sigmoid colon. She has been seen multiple times by urology and the GI team and she stated that she had a cystoscopy as well as a colonoscopy 2 years ago at [facility]. She said that the colonoscopy was normal .  However she continued to have significant abdominal pain with UTI. In the last year or so she has noticed air in her urine. She stated that she feels like she \"I farts when I pee\". 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 She is going to see the urology team next week. The patient has stated that she lives with her son and she is concerned about the cost of that major clinical condition. She was visibly stressed throughout the whole interview. She denies any previous abdominal surgeries. It seems that the patient was afraid of what I told her and she said she has seen a surgeon at [facility] and they were planing to do her surgery and she prefer to go with them.      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    Hypomagnesemia 10/31/2019    Acute hypotension 10/31/2019    Atrial fibrillation with RVR ([address] 03/04/2018    Atrial fibrillation with rapid ventricular response ([address] 03/03/2018    Hypertension 03/03/2018    Type 2 diabetes mellitus with nephropathy ([address] 01/23/2018    Hip fracture ([address] 10/09/2017    Meningioma ([address] 07/25/2017    Hypothyroidism due to acquired atrophy of thyroid 06/13/2017    Episcleritis of right eye 05/17/2017    Pancreatitis 11/07/2016    Acute pancreatitis 11/07/2016    Diverticulitis 11/07/2016    Hypothyroid 02/24/2014    HTN (hypertension) 02/24/2014    Obesity 02/24/2014    DM (diabetes mellitus) ([address] 02/24/2014    Family hx-breast malignancy 02/24/2014     Past Medical History:   Diagnosis Date    Aortic stenosis     Patient denies on 10/31/2019.  Arthritis     Atrial flutter ([address]     Bladder stone     Bronchitis     Resolved after getting rid of her pet bird in 2000. 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     Vesicocolic fistula       Past Surgical History:   Procedure Laterality Date    COLONOSCOPY N/A 2/2/2017    COLONOSCOPY  w/bx polyp performed by Jeol Lopez MD at [address] [place] [address] Left 2017      Social History     Tobacco Use    Smoking status: Never Smoker    Smokeless tobacco: Never Used   Substance Use Topics    Alcohol use: No      Social History     Tobacco Use   Smoking Status Never Smoker   Smokeless Tobacco Never Used     Family History   Problem Relation Age of Onset    Diabetes Mother     Coronary Artery Disease Mother     Cancer Father         melanoma  Stroke Sister     Hypertension Sister     Diabetes Sister     Diabetes Brother     Coronary Artery Disease Brother     Breast Cancer Paternal Grandmother         older, but not sure age.     Breast Cancer Paternal Aunt         and gyn cancer ?age   Lisa Beronica No Known Problems Sister     No Known Problems Brother     Kidney Disease Sister     Heart Failure Sister       Current Facility-Administered Medications   Medication Dose Route Frequency    insulin lispro (HUMALOG) injection   SubCUTAneous QID    amiodarone (CORDARONE) tablet 200 mg  200 mg Oral DAILY    ELECTROLYTE REPLACEMENT PROTOCOL - Magnesium   1 Each Other PRN    0.9% sodium chloride infusion  75 mL/hr IntraVENous CONTINUOUS    metoprolol tartrate (LOPRESSOR) tablet 25 mg  25 mg Oral Q12H    apixaban (ELIQUIS) tablet 5 mg  5 mg Oral Q12H    levothyroxine (SYNTHROID) tablet 75 mcg  75 mcg Oral 6am    sodium chloride (NS) flush 5-40 mL  5-40 mL IntraVENous Q8H    sodium chloride (NS) flush 5-40 mL  5-40 mL IntraVENous PRN    metroNIDAZOLE (FLAGYL) IVPB premix 500 mg  500 mg IntraVENous Q8H    ciprofloxacin (CIPRO) 400 mg in D5W IVPB (premix)  400 mg IntraVENous Q12H    acetaminophen (TYLENOL) tablet 650 mg  650 mg Oral Q4H PRN    HYDROcodone-acetaminophen (NORCO) 5-325 mg per tablet 1 Tab  1 Tab Oral Q4H PRN    ondansetron (ZOFRAN) injection 4 mg  4 mg IntraVENous Q4H PRN    glucose chewable tablet 16 g  4 Tab Oral PRN    glucagon (GLUCAGEN) injection 1 mg  1 mg IntraMUSCular PRN    dextrose 10 % infusion 125-250 mL  125-250 mL IntraVENous PRN      Allergies   Allergen Reactions    Ampicillin Itching        Review of Systems:  Constitutional: negative  Eyes: negative  Ears, Nose, Mouth, Throat, and Face: negative  Respiratory: negative  Cardiovascular: negative  Gastrointestinal: positive for abdominal pain  Genitourinary:positive for frequency and pneumaturia and feces in urine  Integument/Breast: negative  Hematologic/Lymphatic: negative  Musculoskeletal:negative  Neurological: negative  Behavioral/Psychiatric: negative  Endocrine: negative  Allergic/Immunologic: negative    Objective:     Visit Vitals  /76 (BP 1 Location: Left arm, BP Patient Position: At rest)   Pulse 72   Temp 98.7 °F (37.1 °C)   Resp 18   Ht 5' (1.524 m)   Wt 70.6 kg (155 lb 9.6 oz)   SpO2 97%   Breastfeeding No   BMI 30.39 kg/m²       Physical Exam:      General:  in no apparent distress, alert and oriented times 3   Eyes:  conjunctivae and sclerae normal, pupils equal, round, reactive to light   Throat & Neck: no erythema or exudates noted and neck supple and symmetrical; no palpable masses   Lungs:   clear to auscultation bilaterally   Heart:  Regular rate and rhythm   Abdomen:      Extremities:  Rounded and obese, soft, nontender, nondistended, no masses or organomegaly. No evidence of any abdominal wall hernias. Skin: Normal.       Imaging and Lab Review:     CBC:   Lab Results   Component Value Date/Time    WBC 4.9 03/03/2020 03:40 AM    RBC 3.91 (L) 03/03/2020 03:40 AM    HGB 10.7 (L) 03/03/2020 03:40 AM    HCT 34.1 (L) 03/03/2020 03:40 AM    PLATELET 451 63/21/9210 03:40 AM     BMP:   Lab Results   Component Value Date/Time    Glucose 117 (H) 03/03/2020 03:40 AM    Sodium 140 03/03/2020 03:40 AM    Potassium 4.1 03/03/2020 03:40 AM    Chloride 110 03/03/2020 03:40 AM    CO2 24 03/03/2020 03:40 AM    BUN 7 03/03/2020 03:40 AM    Creatinine 0.60 03/03/2020 03:40 AM    Calcium 7.3 (L) 03/03/2020 03:40 AM     CMP:  Lab Results   Component Value Date/Time    Glucose 117 (H) 03/03/2020 03:40 AM    Sodium 140 03/03/2020 03:40 AM    Potassium 4.1 03/03/2020 03:40 AM    Chloride 110 03/03/2020 03:40 AM    CO2 24 03/03/2020 03:40 AM    BUN 7 03/03/2020 03:40 AM    Creatinine 0.60 03/03/2020 03:40 AM    Calcium 7.3 (L) 03/03/2020 03:40 AM    Anion gap 6 03/03/2020 03:40 AM    BUN/Creatinine ratio 12 03/03/2020 03:40 AM    Alk.  phosphatase 61 02/29/2020 02:56 PM    Protein, total 7.3 02/29/2020 02:56 PM    Albumin 3.0 (L) 02/29/2020 02:56 PM    Globulin 4.3 (H) 02/29/2020 02:56 PM    A-G Ratio 0.7 (L) 02/29/2020 02:56 PM       Recent Results (from the past 24 hour(s))   GLUCOSE, POC    Collection Time: 03/04/20 11:26 AM   Result Value Ref Range    Glucose (POC) 160 (H) 70 - 110 mg/dL   GLUCOSE, POC    Collection Time: 03/04/20  4:10 PM   Result Value Ref Range    Glucose (POC) 111 (H) 70 - 110 mg/dL   GLUCOSE, POC    Collection Time: 03/04/20  9:49 PM   Result Value Ref Range    Glucose (POC) 194 (H) 70 - 110 mg/dL   GLUCOSE, POC    Collection Time: 03/05/20  7:30 AM   Result Value Ref Range    Glucose (POC) 103 70 - 110 mg/dL       images and reports reviewed    Assessment:   Micheal Victoria is a [de-identified] y.o. female is presenting with a challenging clinical presentation. The patient has multiple medical problems and comorbidities and she is been having recurrent diverticulitis with chronic abdominal pain and recurrent UTI . Unfortunately she now have a colocolonic fistula as well as a colovesical fistula. She is having hematuria and pneumaturia as well. I had a long discussion with the patient when I saw in the clinic last year and now as well that she will need a major surgery including the possibility of having a colostomy. I explained to her that her surgery could include urology as well because the of the need for resection of the colovesical fistula itself. I explained to her that we will attempt to do a colonic resection with anastomosis however there is a high chance she will end up with a colostomy because of the chronic infection and inflammation and the risk of leak. The patient stated today that when she saw me in the office she was afraid of what I told her and said that she went for another opinion at [facility] and she is suppose to have the surgery there.  I explained to the patient that I respect her wishes but I insisted that the surgery is a major surgery and there is a high possibility of a colostomy and major complications.        Plan:     Follow up with [facility] where the patient wish to have her surgery      Please call me if you have any questions (cell phone: [phone])     Signed By: Julius Coe MD     March 5, 2020

## 2020-03-05 NOTE — DISCHARGE INSTRUCTIONS
Patient Education        Atrial Fibrillation: Care Instructions  Your Care Instructions    Atrial fibrillation is an irregular and often fast heartbeat. Treating this condition is important for several reasons. It can cause blood clots, which can travel from your heart to your brain and cause a stroke. If you have a fast heartbeat, you may feel lightheaded, dizzy, and weak. An irregular heartbeat can also increase your risk for heart failure. Atrial fibrillation is often the result of another heart condition, such as high blood pressure or coronary artery disease. Making changes to improve your heart condition will help you stay healthy and active. Follow-up care is a key part of your treatment and safety. Be sure to make and go to all appointments, and call your doctor if you are having problems. It's also a good idea to know your test results and keep a list of the medicines you take. How can you care for yourself at home? Medicines    · Take your medicines exactly as prescribed. Call your doctor if you think you are having a problem with your medicine. You will get more details on the specific medicines your doctor prescribes.     · If your doctor has given you a blood thinner to prevent a stroke, be sure you get instructions about how to take your medicine safely. Blood thinners can cause serious bleeding problems.     · Do not take any vitamins, over-the-counter drugs, or herbal products without talking to your doctor first.    Lifestyle changes    · Do not smoke. Smoking can increase your chance of a stroke and heart attack. If you need help quitting, talk to your doctor about stop-smoking programs and medicines. These can increase your chances of quitting for good.     · Eat a heart-healthy diet.     · Stay at a healthy weight. Lose weight if you need to.     · Limit alcohol to 2 drinks a day for men and 1 drink a day for women. Too much alcohol can cause health problems.     · Avoid colds and flu.  Get a pneumococcal vaccine shot. If you have had one before, ask your doctor whether you need another dose. Get a flu shot every year. If you must be around people with colds or flu, wash your hands often. Activity    · If your doctor recommends it, get more exercise. Walking is a good choice. Bit by bit, increase the amount you walk every day. Try for at least 30 minutes on most days of the week. You also may want to swim, bike, or do other activities. Your doctor may suggest that you join a cardiac rehabilitation program so that you can have help increasing your physical activity safely.     · Start light exercise if your doctor says it is okay. Even a small amount will help you get stronger, have more energy, and manage stress. Walking is an easy way to get exercise. Start out by walking a little more than you did in the hospital. Gradually increase the amount you walk.     · When you exercise, watch for signs that your heart is working too hard. You are pushing too hard if you cannot talk while you are exercising. If you become short of breath or dizzy or have chest pain, sit down and rest immediately.     · Check your pulse regularly. Place two fingers on the artery at the palm side of your wrist, in line with your thumb. If your heartbeat seems uneven or fast, talk to your doctor. When should you call for help? Call 911 anytime you think you may need emergency care. For example, call if:    · You have symptoms of a heart attack. These may include:  ? Chest pain or pressure, or a strange feeling in the chest.  ? Sweating. ? Shortness of breath. ? Nausea or vomiting. ? Pain, pressure, or a strange feeling in the back, neck, jaw, or upper belly or in one or both shoulders or arms. ? Lightheadedness or sudden weakness. ? A fast or irregular heartbeat. After you call 911, the  may tell you to chew 1 adult-strength or 2 to 4 low-dose aspirin. Wait for an ambulance.  Do not try to drive yourself.     · You have symptoms of a stroke. These may include:  ? Sudden numbness, tingling, weakness, or loss of movement in your face, arm, or leg, especially on only one side of your body. ? Sudden vision changes. ? Sudden trouble speaking. ? Sudden confusion or trouble understanding simple statements. ? Sudden problems with walking or balance. ? A sudden, severe headache that is different from past headaches.     · You passed out (lost consciousness).    Call your doctor now or seek immediate medical care if:    · You have new or increased shortness of breath.     · You feel dizzy or lightheaded, or you feel like you may faint.     · Your heart rate becomes irregular.     · You can feel your heart flutter in your chest or skip heartbeats. Tell your doctor if these symptoms are new or worse.    Watch closely for changes in your health, and be sure to contact your doctor if you have any problems. Where can you learn more? Go to http://aravind-farzana.info/. Enter U020 in the search box to learn more about \"Atrial Fibrillation: Care Instructions. \"  Current as of: April 9, 2019  Content Version: 12.2  © 6647-6754 [employer]. Care instructions adapted under license by [employer] (which disclaims liability or warranty for this information). If you have questions about a medical condition or this instruction, always ask your healthcare professional. [address] any warranty or liability for your use of this information. Patient Education        Diverticulitis: Care Instructions  Your Care Instructions    Diverticulitis occurs when pouches form in the wall of the colon and become inflamed or infected. It can be very painful. Doctors aren't sure what causes diverticulitis. There is no proof that foods such as nuts, seeds, or berries cause it or make it worse. A low-fiber diet may cause the colon to work harder to push stool forward.  Pouches may form because of this extra work. It may be hard to think about healthy eating while you're in pain. But as you recover, you might think about how you can use healthy eating for overall better health. Healthy eating may help you avoid future attacks. Follow-up care is a key part of your treatment and safety. Be sure to make and go to all appointments, and call your doctor if you are having problems. It's also a good idea to know your test results and keep a list of the medicines you take. How can you care for yourself at home? · Drink plenty of fluids, enough so that your urine is light yellow or clear like water. If you have kidney, heart, or liver disease and have to limit fluids, talk with your doctor before you increase the amount of fluids you drink. · Stick to liquids or a bland diet (plain rice, bananas, dry toast or crackers, applesauce) until you are feeling better. Then you can return to regular foods and gradually increase the amount of fiber in your diet. · Use a heating pad set on low on your belly to relieve mild cramps and pain. · Get extra rest until you are feeling better. · Be safe with medicines. Read and follow all instructions on the label. ? If the doctor gave you a prescription medicine for pain, take it as prescribed. ? If you are not taking a prescription pain medicine, ask your doctor if you can take an over-the-counter medicine. · If your doctor prescribed antibiotics, take them as directed. Do not stop taking them just because you feel better. You need to take the full course of antibiotics. To prevent future attacks of diverticulitis  · Avoid constipation:  ? Include fruits, vegetables, beans, and whole grains in your diet each day. These foods are high in fiber. ? Drink plenty of fluids, enough so that your urine is light yellow or clear like water.  If you have kidney, heart, or liver disease and have to limit fluids, talk with your doctor before you increase the amount of fluids you drink. ? Get some exercise every day. Build up slowly to 30 to 60 minutes a day on 5 or more days of the week. ? Take a fiber supplement, such as Citrucel or Metamucil, every day if needed. Read and follow all instructions on the label. ? Schedule time each day for a bowel movement. Having a daily routine may help. Take your time and do not strain when having a bowel movement. When should you call for help? Call your doctor now or seek immediate medical care if:    · You have a fever.     · You are vomiting.     · You have new or worse belly pain.     · You cannot pass stools or gas.    Watch closely for changes in your health, and be sure to contact your doctor if you have any problems. Where can you learn more? Go to http://aravind-farzana.info/. Enter H901 in the search box to learn more about \"Diverticulitis: Care Instructions. \"  Current as of: November 7, 2018  Content Version: 12.2  © 3610-0033 [employer]. Care instructions adapted under license by [employer] (which disclaims liability or warranty for this information). If you have questions about a medical condition or this instruction, always ask your healthcare professional. [place] [address] any warranty or liability for your use of this information. Patient Education        Learning About Diverticulosis and Diverticulitis  What are diverticulosis and diverticulitis? In diverticulosis and diverticulitis, pouches called diverticula form in the wall of the large intestine, or colon. · In diverticulosis, the pouches do not cause any pain or other symptoms. · In diverticulitis, the pouches get inflamed or infected and cause symptoms. Doctors aren't sure what causes these pouches in the colon. But they think that a low-fiber diet may play a role. Without fiber to add bulk to the stool, the colon has to work harder than normal to push the stool forward.  The pressure from this may cause pouches to form in weak spots along the colon. Some people with diverticulosis get diverticulitis. But experts don't know why this happens. What are the symptoms? · In diverticulosis, most people don't have symptoms. But pouches sometimes bleed. · In diverticulitis, symptoms may last from a few hours to a week or more. They include:  ? Belly pain. This is usually in the lower left side. It is sometimes worse when you move. This is the most common symptom. ? Fever and chills. ? Bloating and gas. ? Diarrhea or constipation. ? Nausea and sometimes vomiting.  ? Not feeling like eating. How can you prevent these problems? You may be able to lower your chance of getting diverticulitis. You can do this by taking steps to prevent constipation. · Eat fruits, vegetables, beans, and whole grains every day. These foods are high in fiber. · Drink plenty of fluids (enough so that your urine is light yellow or clear like water). If you have kidney, heart, or liver disease and have to limit fluids, talk with your doctor before you increase the amount of fluids you drink. · Get at least 30 minutes of exercise on most days of the week. Walking is a good choice. You also may want to do other activities, such as running, swimming, cycling, or playing tennis or team sports. · Take a fiber supplement, such as Citrucel or Metamucil, every day if needed. Read and follow all instructions on the label. · Schedule time each day for a bowel movement. Having a daily routine may help. Take your time and do not strain when having a bowel movement. Some people avoid nuts, seeds, berries, and popcorn. They believe that these foods might get trapped in the diverticula and cause pain. But there is no proof that these foods cause diverticulitis or make it worse. How are these problems treated? · The best way to treat diverticulosis is to avoid constipation.  (See the tips above.)  · Treatment for diverticulitis includes antibiotics and often a change in your diet. You may need only liquids at first. Your doctor may suggest pain medicines for pain or belly cramps. In some cases, surgery may be needed. Follow-up care is a key part of your treatment and safety. Be sure to make and go to all appointments, and call your doctor if you are having problems. It's also a good idea to know your test results and keep a list of the medicines you take. Where can you learn more? Go to http://aravind-farzana.info/. Enter W675 in the search box to learn more about \"Learning About Diverticulosis and Diverticulitis. \"  Current as of: November 7, 2018  Content Version: 12.2  © 3430-5397 [employer]. Care instructions adapted under license by [employer] (which disclaims liability or warranty for this information). If you have questions about a medical condition or this instruction, always ask your healthcare professional. [place] [address] any warranty or liability for your use of this information. Patient armband removed and shredded  DISCHARGE SUMMARY from Nurse    PATIENT INSTRUCTIONS:    After general anesthesia or intravenous sedation, for 24 hours or while taking prescription Narcotics:  · Limit your activities  · Do not drive and operate hazardous machinery  · Do not make important personal or business decisions  · Do  not drink alcoholic beverages  · If you have not urinated within 8 hours after discharge, please contact your surgeon on call.     Report the following to your surgeon:  · Excessive pain, swelling, redness or odor of or around the surgical area  · Temperature over 100.5  · Nausea and vomiting lasting longer than 4 hours or if unable to take medications  · Any signs of decreased circulation or nerve impairment to extremity: change in color, persistent  numbness, tingling, coldness or increase pain  · Any questions    What to do at Home:  Recommended activity: Activity as tolerated,     If you experience any of the following symptoms worsening symptoms, chest pain, shortness of breath, please follow up with PCP or call 911. *  Please give a list of your current medications to your Primary Care Provider. *  Please update this list whenever your medications are discontinued, doses are      changed, or new medications (including over-the-counter products) are added. *  Please carry medication information at all times in case of emergency situations. These are general instructions for a healthy lifestyle:    No smoking/ No tobacco products/ Avoid exposure to second hand smoke  Surgeon General's Warning:  Quitting smoking now greatly reduces serious risk to your health. Obesity, smoking, and sedentary lifestyle greatly increases your risk for illness    A healthy diet, regular physical exercise & weight monitoring are important for maintaining a healthy lifestyle    You may be retaining fluid if you have a history of heart failure or if you experience any of the following symptoms:  Weight gain of 3 pounds or more overnight or 5 pounds in a week, increased swelling in our hands or feet or shortness of breath while lying flat in bed. Please call your doctor as soon as you notice any of these symptoms; do not wait until your next office visit. The discharge information has been reviewed with the patient and caregiver. The patient and caregiver verbalized understanding. Discharge medications reviewed with the patient and caregiver and appropriate educational materials and side effects teaching were provided.   ___________________________________________________________________________________________________________________________________

## 2020-03-05 NOTE — PROGRESS NOTES
Discharge/Transition Planning   Problem: Discharge Planning  Goal: *Discharge to safe environment  Outcome: Resolved/Met    Home and oupt follow up. Pt states needs ride home. Notified RN Sky Patel that to have CM help with Lyft, pt needs to be ready in wheelchair by 515 p otherwise they will have to call supervisor for cab    Care Management Interventions  PCP Verified by CM: Yes(Dr. Eyal Beebe, last seen 11/27/2019)  Palliative Care Criteria Met (RRAT>21 & CHF Dx)?: No  Mode of Transport at Discharge: Self(Will need lyft ride home)  Transition of Care Consult (CM Consult): Discharge Planning  Discharge Durable Medical Equipment: No  Physical Therapy Consult: No  Occupational Therapy Consult: No  Speech Therapy Consult: No  Current Support Network:  Other(son lives with pt)  Confirm Follow Up Transport: Other (see comment)(I-ride)  Discharge Location  Discharge Placement: Home

## 2020-03-05 NOTE — PROGRESS NOTES
Assumed care from [address]. Patient is awake and alert, complaining of being wet. Incontinent care completed. Tolerated well. Will continue to monitor. 0000> Patient remains stable. 0400> No distress noted. 0710> Bedside and Verbal shift change report given to DEMETRI Danielle (oncoming nurse) by Davie Junior RN (offgoing nurse). Report included the following information SBAR, Kardex, Intake/Output, MAR, Recent Results and Cardiac Rhythm AFIB.

## 2020-03-05 NOTE — PROGRESS NOTES
Problem: Mobility Impaired (Adult and Pediatric)  Goal: *Acute Goals and Plan of Care (Insert Text)  Description  Physical Therapy Goals  Initiated 3/5/2020 and to be accomplished within 7 day(s)  1. Patient will move from supine to sit and sit to supine  in bed with modified independence. 2.  Patient will transfer from bed to chair and chair to bed with modified independence using the least restrictive device. 3.  Patient will perform sit to stand with modified independence. 4.  Patient will ambulate with modified independence for 200 feet with the least restrictive device. 5.  Patient will ascend/descend 10 stairs with handrail(s) with modified independence. Outcome: Progressing Towards Goal   PHYSICAL THERAPY EVALUATION    Patient: Dakota Palmer (1 y.o. female)  Date: 3/5/2020  Primary Diagnosis: Atrial fibrillation with RVR ([address] [I48.91]  Acute diverticulitis [K57.92]  PLOF: Independent in house, mod I with rollator outside house  ASSESSMENT :  Pt was supine in bed at the start of the session and willing to participate in therapy. Pt required max assist for supine to sit at EOB. Supervision for sit to stand with ww. Ambulated 10 feet and completed toilet transfer with supervision. Pt demonstrated good standing balance for self cleaning. Pt ambulated 120 feet with ww and supervision. Step ups x6 with ww and supervision. Supervision for stand to sit and min assist for sit to supine in bed. Pt remained supine in bed at the end of the session. Educated on need for RN assistance with mobility; verbalized understanding. Call bell in reach. Patient will benefit from skilled intervention to address the above impairments.   Patient's rehabilitation potential is considered to be Good  Factors which may influence rehabilitation potential include:   []         None noted  []         Mental ability/status  [x]         Medical condition  []         Home/family situation and support systems  [] Safety awareness  []         Pain tolerance/management  []         Other:      PLAN :  Recommendations and Planned Interventions:   [x]           Bed Mobility Training             [x]    Neuromuscular Re-Education  [x]           Transfer Training                   []    Orthotic/Prosthetic Training  [x]           Gait Training                          []    Modalities  [x]           Therapeutic Exercises           []    Edema Management/Control  [x]           Therapeutic Activities            [x]    Family Training/Education  [x]           Patient Education  []           Other (comment):    Frequency/Duration: Patient will be followed by physical therapy 3-5 times a week to address goals. Discharge Recommendations: Home Health  Further Equipment Recommendations for Discharge: rolling walker     SUBJECTIVE:   Patient stated Db Santos is the carpet so dirty in the hallway.     OBJECTIVE DATA SUMMARY:     Past Medical History:   Diagnosis Date    Aortic stenosis     Patient denies on 10/31/2019. Arthritis     Atrial flutter ([address]     Bladder stone     Bronchitis     Resolved after getting rid of her pet bird in 2000. 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     Vesicocolic fistula      Past Surgical History:   Procedure Laterality Date    COLONOSCOPY N/A 2/2/2017    COLONOSCOPY  w/bx polyp performed by Cheyenne Moss MD at [address] 2017     Barriers to Learning/Limitations: None  Compensate with: Visual Cues, Verbal Cues, Tactile Cues and Kinesthetic Cues    Home Situation:  Home Situation  Home Environment: Private residence  # Steps to Enter: 6  One/Two Story Residence: Other (Comment)(3 story [facility])  # of [employer] Systems: (3 flights )  Interior Rails: Both  Lift Chair Available: No  Living Alone: No  Support Systems: Child([place])  Patient Expects to be Discharged to[de-identified] Private residence  Current DME Used/Available at Home: Walker, rollator    Critical Behavior:  Neurologic State: Alert  Orientation Level: Oriented to person  Cognition: Follows commands  Safety/Judgement: Awareness of environment  Psychosocial  Patient Behaviors: Calm; Cooperative    Strength:    Manual Muscle Testing (LE)         R     L    Hip Flexion:   3+/5  3+/5  Knee EXT:   3+/5  3+/5  Knee FLEX:   3+/5  3+/5  Ankle DF:   3+/5  3+/5  _________________________________________________   Range Of Motion:  RLE and LLE   WFL Bilaterally   Functional Mobility:  Bed Mobility:  Supine to Sit: Maximum assistance  Sit to Supine: Minimum assistance  Transfers:  Sit to Stand: Supervision  Stand to Sit: Supervision  Balance:   Sitting: Impaired  Sitting - Static: Good (unsupported)  Sitting - Dynamic: Fair (occasional)  Standing: Impaired  Standing - Static: Good  Standing - Dynamic : Good  Ambulation/Gait Training:  Distance (ft): (10, 120)   Assistive Device: Walker, rolling  Ambulation - Level of Assistance: Supervision  Stairs: (step ups)   Level of Assistance: Supervision  Assistive Device: rolling walker  Rail Use: none  Number of Stairs: 6  Neuro Re-education:  Sitting balance at EOB, standing balance for self cleaning  Pain:  Pain level pre-treatment: 0/10   Pain level post-treatment: 0/10     Activity Tolerance:   Good    After treatment:   []         Patient left in no apparent distress sitting up in chair  [x]         Patient left in no apparent distress in bed  [x]         Call bell left within reach  [x]         Nursing notified  []         Caregiver present  []         Bed alarm activated  []         SCDs applied    COMMUNICATION/EDUCATION:   [x]         Role of physical therapy and plan of care in the acute care setting.   [x] Fall prevention education was provided and the patient/caregiver indicated understanding. [x]         Patient/family have participated as able in goal setting and plan of care. []         Patient/family agree to work toward stated goals and plan of care. []         Patient understands intent and goals of therapy, but is neutral about his/her participation. []         Patient is unable to participate in goal setting/plan of care: ongoing with therapy staff.     Thank you for this referral.  Janusz Billings, SPT    Time Calculation: 29 mins    Eval Complexity: History: MEDIUM  Complexity : 1-2 comorbidities / personal factors will impact the outcome/ POC Exam:MEDIUM Complexity : 3 Standardized tests and measures addressing body structure, function, activity limitation and / or participation in recreation  Presentation: MEDIUM Complexity : Evolving with changing characteristics  Clinical Decision Making:Medium Complexity    Clinical judgement; ROM, MMT, functional mobility Overall Complexity:MEDIUM

## 2020-03-05 NOTE — PROGRESS NOTES
conducted a Follow up consultation and Spiritual Assessment for Luiza Brice, who is a [de-identified] y.o.,female. The  provided the following Interventions:  Continued the relationship of care and support. Listened empathically. Offered prayer and assurance of continued prayer on patients behalf. Chart reviewed. The following outcomes were achieved:  Patient expressed gratitude for pastoral care visit. Assessment:  There are no further spiritual or Amish issues which require Spiritual Care Services interventions at this time. Plan:  Chaplains will continue to follow and will provide pastoral care on an as needed/requested basis.  recommends bedside caregivers page  on duty if patient shows signs of acute spiritual or emotional distress.      [address] [facility]   Staff [address] [facility] [place]   [phone]

## 2020-03-05 NOTE — PROGRESS NOTES
Problem: Self Care Deficits Care Plan (Adult)  Goal: *Acute Goals and Plan of Care (Insert Text)  Description  Occupational Therapy Goals  Initiated 3/5/2020 within 7 day(s). 1.  Patient will perform bathing with modified independence. 2.  Patient will perform lower body dressing with modified independence using adaptive equipment. 3.  Patient will perform toileting with modified independence. 4.  Patient will perform toilet transfers with modified independence. 5.  Patient will perform grooming tasks with Mod I while standing at this sink with Good balance x 8 to 10 minutes. 6.  Patient will participate in upper extremity therapeutic exercise/activities with modified independence for 10 minutes. 7.  Patient will utilize energy conservation techniques during functional activities with verbal cues. Prior Level of Function:(I) w/ ADLs and functional mobility   Outcome: Progressing Towards Goal   OCCUPATIONAL THERAPY EVALUATION    Patient: Princess Rogers (33 y.o. female)  Date: 3/5/2020  Primary Diagnosis: Atrial fibrillation with RVR (HCC) [I48.91]  Acute diverticulitis [K57.92]        Precautions: fall risk  PLOF: see above    ASSESSMENT :  Nursing/Loida cleared for pt to participate in OT evaluation and tx session. Based on the objective data described below, the patient presents with decreased strength BUE MMT 4/5, BUE AROM WFL, fair functional activity tolerance , Balance: sitting static good, sitting dynamic fair, standing static good, standing dynamic fair+ and fair safety awareness, which is inhibiting ability to perform ADLs and functional transfers independently. Based upon clinical judgement, patient requires assist with toilet transfer: Supv, UB bathing & LB bathing with SBA, UB dress with Supv, LB dress with Min A (pt used AE at [facility]), toileting with Supv, grooming with Supv and self feeding with (I). Patient educated on role of OT and POC; patient verbalized understanding.  Pt. Instructed on safety awareness with importance to utilize call bell to request assist with functional transfers to prevent falls, patient verbalized understanding and provided accurate demonstration. Dry erase communication board updated for accuracy w/ carryover for toilet transfers: supv, nursing/Loida notified. Patient will benefit from skilled intervention to address the above impairments. Patient's rehabilitation potential is considered to be Excellent  Factors which may influence rehabilitation potential include:   [x]             None noted  []             Mental ability/status  []             Medical condition  []             Home/family situation and support systems  []             Safety awareness  []             Pain tolerance/management  []             Other:      PLAN :  Recommendations and Planned Interventions:   [x]               Self Care Training                  [x]      Therapeutic Activities  [x]               Functional Mobility Training   []      Cognitive Retraining  [x]               Therapeutic Exercises           [x]      Endurance Activities  [x]               Balance Training                    []      Neuromuscular Re-Education  []               Visual/Perceptual Training     [x]      Home Safety Training  [x]               Patient Education                   [x]      Family Training/Education  []               Other (comment):    Frequency/Duration: Patient will be followed by occupational therapy 1-2 times per day/4-7 days per week to address goals. Discharge Recommendations: Home Health  Further Equipment Recommendations for Discharge: shower chair     SUBJECTIVE:   Patient stated I think I do need to go pee.     OBJECTIVE DATA SUMMARY:     Past Medical History:   Diagnosis Date    Aortic stenosis     Patient denies on 10/31/2019. Arthritis     Atrial flutter ([address]     Bladder stone     Bronchitis     Resolved after getting rid of her pet bird in 2000.     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 Vesicocolic fistula      Past Surgical History:   Procedure Laterality Date    COLONOSCOPY N/A 2/2/2017    COLONOSCOPY  w/bx polyp performed by Joanna Sarmiento MD at [address] [address]     Barriers to Learning/Limitations: None  Compensate with: visual, verbal, tactile, kinesthetic cues/model    Home Situation:   Home Situation  Home Environment: Private residence  # Steps to Enter: 0  One/Two Story Residence: Other (Comment)(3 Story [facility])  # of Interior Steps: 12(3 flights)  Interior Rails: Both  Lift Chair Available: No  Living Alone: No  Support Systems: Child([place])  Patient Expects to be Discharged to[de-identified] Private residence  Current DME Used/Available at Home: [employer]ut[employer] Jordan, rollator  Tub or Shower Type: Tub/Shower combination  [x]  Right hand dominant   []  Left hand dominant    Cognitive/Behavioral Status:  Neurologic State: Alert  Orientation Level: Oriented X4  Cognition: Follows commands  Safety/Judgement: Fall prevention    Skin: appears intact    Edema: none noted    Vision/Perceptual:  glasses    Coordination: BUE  Coordination: Within functional limits(BUEs)  Fine Motor Skills-Upper: Left Intact; Right Intact    Gross Motor Skills-Upper: Left Intact; Right Intact    Balance:  Sitting: Impaired  Sitting - Static: Good (unsupported)  Sitting - Dynamic: Fair (occasional)  Standing: Impaired  Standing - Static: Good  Standing - Dynamic : Fair(+)    Strength: BUE  Strength: Generally decreased, functional(BUEs 4/5)    Tone & Sensation: BUE  Tone: Normal(BUEs)  Sensation: Intact(BUEs)    Range of Motion: BUE  AROM: Within functional limits(BUEs)    Functional Mobility and Transfers for ADLs:  Bed Mobility:     Supine to Sit: Supervision  Sit to Supine: Supervision  Scooting: Supervision  Transfers:  Sit to Stand: Supervision  Stand to Sit: Supervision     Toilet Transfer : Supervision      Bathroom Mobility: Supervision/set up    ADL Assessment:   Feeding: Independent    Oral Facial Hygiene/Grooming: Supervision    Bathing: Stand-by assistance    Upper Body Dressing: Supervision    Lower Body Dressing: Minimum assistance    Toileting: Supervision    Cognitive Retraining  Safety/Judgement: Fall prevention    Pain:  Pain level pre-treatment: 0/10   Pain level post-treatment: 0/10     Activity Tolerance:   fair  Please refer to the flowsheet for vital signs taken during this treatment. After treatment:   [] Patient left in no apparent distress sitting up in chair  [x] Patient left in no apparent distress in bed  [x] Call bell left within reach  [x] Nursing notified  [] Caregiver present  [] Bed alarm activated    COMMUNICATION/EDUCATION:   [x] Role of Occupational Therapy in the acute care setting  [x] Home safety education was provided and the patient/caregiver indicated understanding. [x] Patient/family have participated as able in goal setting and plan of care. [x] Patient/family agree to work toward stated goals and plan of care. [] Patient understands intent and goals of therapy, but is neutral about his/her participation. [] Patient is unable to participate in goal setting and plan of care. Thank you for this referral.  Vandana Andersen  Time Calculation: 20 mins    Eval Complexity: History: MEDIUM Complexity : Expanded review of history including physical, cognitive and psychosocial  history ; Examination: MEDIUM Complexity : 3-5 performance deficits relating to physical, cognitive , or psychosocial skils that result in activity limitations and / or participation restrictions;    Decision Making:MEDIUM Complexity : Patient may present with comorbidities that affect occupational performnce.  Miniml to moderate modification of tasks or assistance (eg, physical or verbal ) with assesment(s) is necessary to enable patient to complete evaluation

## 2020-03-05 NOTE — PROGRESS NOTES
Problem: Self Care Deficits Care Plan (Adult)  Goal: *Acute Goals and Plan of Care (Insert Text)  Description  Occupational Therapy Goals  Initiated 3/5/2020 within 7 day(s). 1.  Patient will perform bathing with modified independence. 2.  Patient will perform lower body dressing with modified independence using adaptive equipment. 3.  Patient will perform toileting with modified independence. 4.  Patient will perform toilet transfers with modified independence. 5.  Patient will perform grooming tasks with Mod I while standing at this sink with Good balance x 8 to 10 minutes. 6.  Patient will participate in upper extremity therapeutic exercise/activities with modified independence for 10 minutes. 7.  Patient will utilize energy conservation techniques during functional activities with verbal cues. Prior Level of Function:(I) w/ ADLs and functional mobility   Outcome: Progressing Towards Goal     Problem: Patient Education: Go to Patient Education Activity  Goal: Patient/Family Education  Outcome: Progressing Towards Goal   OCCUPATIONAL THERAPY TREATMENT    Patient: Venita Ortega (63 y.o. female)  Date: 3/5/2020  Diagnosis: Atrial fibrillation with RVR ([address] [I48.91]  Acute diverticulitis [K57.92]   Atrial fibrillation with RVR ([address]       Precautions:    PLOF:  (I) with ADLs    Chart, occupational therapy assessment, plan of care, and goals were reviewed. ASSESSMENT:  Pt presented supine in bed upon therapist arrival and pt agreeable for tx at this time. Pt performed UE exercises supine in bed with HOB elevated 2/2 pt declining OOB activity due to fatigue. Pt required S/U for self feeding task with call bell and necessities left within reach.    Progression toward goals:  [x]          Improving appropriately and progressing toward goals  []          Improving slowly and progressing toward goals  []          Not making progress toward goals and plan of care will be adjusted     PLAN:  Patient continues to benefit from skilled intervention to address the above impairments. Continue treatment per established plan of care. Discharge Recommendations:  [facility]  Further Equipment Recommendations for Discharge:  Shower chair     SUBJECTIVE:   Patient stated  I like strengthening my body. \"    OBJECTIVE DATA SUMMARY:   Cognitive/Behavioral Status:  Neurologic State: Alert  Orientation Level: Oriented X4  Cognition: Follows commands        Cognitive Retraining  Safety/Judgement: Fall prevention      UE Therapeutic Exercises:   Patient participated in UE THERE EX with yellow t-band 4 x 15 reaching in mult planes and flexi bar (yellow) x 2 sets x 15 reps each for wrist flex/ext and shoulder adduction/abduction in order to increase strength for improved self care performance, pt tolerated well. Pain:  Pt denies pain at this time. Pain Intervention(s): Medication (see MAR); Rest,  Repositioning  Response to intervention: Nurse notified, See doc flow    Activity Tolerance:    Fair  Please refer to the flowsheet for vital signs taken during this treatment. After treatment:   []  Patient left in no apparent distress sitting up in chair  [x]  Patient left in no apparent distress in bed  [x]  Call bell left within reach  [x]  Nursing notified  []  Caregiver present  []  Bed alarm activated    COMMUNICATION/EDUCATION:   [] Role of Occupational Therapy in the acute care setting  [] Home safety education was provided and the patient/caregiver indicated understanding. [x] Patient/family have participated as able in working towards goals and plan of care. [] Patient/family agree to work toward stated goals and plan of care. [] Patient understands intent and goals of therapy, but is neutral about his/her participation. [] Patient is unable to participate in goal setting and plan of care.       Thank you for this referral.  JUDY Adan  Time Calculation: 23 mins

## 2020-03-06 ENCOUNTER — PATIENT OUTREACH (OUTPATIENT)
Dept: CASE MANAGEMENT | Age: 80
End: 2020-03-06

## 2020-03-06 ENCOUNTER — TELEPHONE (OUTPATIENT)
Dept: FAMILY MEDICINE CLINIC | Age: 80
End: 2020-03-06

## 2020-03-06 LAB
BACTERIA SPEC CULT: NORMAL
BACTERIA SPEC CULT: NORMAL
SERVICE CMNT-IMP: NORMAL
SERVICE CMNT-IMP: NORMAL

## 2020-03-06 NOTE — ROUTINE PROCESS
0730-report received, call bell within reach,no distress noted, voiced no complaints at this time. 0900-am due medications given. 1230-no change in condition, no distress noted. 1400-resting quietly in bed. 1700-discharge instruction given, verbalized understanding. 1720-discharged home with son via wheel chair.

## 2020-03-06 NOTE — PROGRESS NOTES
Hospital Discharge Follow-Up      Date/Time:  3/6/2020 1:44 PM    Patient was admitted to [facility] on 20 and discharged on 3/5/20 for Atrial fibrillation, [place]-Vesicular fistula. The physician discharge summary was not available at the time of outreach. Patient was contacted within 1 business day of discharge. Top Challenges reviewed with the provider   None noted at this time. Advance Care Planning:   Does patient have an Advance Directive:  reviewed and needs to be updated needs Pt. Signature. Method of communication with provider :none    Was this a readmission? no   Patient stated reason for the readmission: n/a    Care Transition Nurse (CTN) contacted the patient by telephone to perform post hospital discharge assessment. Verified name and  with patient as identifiers. Provided introduction to self, and explanation of the CTN role. Patient states that she is okay. Patient denied CP, SOB, difficulty breathing, fever, chills, nausea, vomiting and diarrhea. Patient rates her abdominal Pain 3/10. Patient states that her pain is within her acceptable level of 3. Patient reported that her son lives with her and she is independent with ADLs. Patient states that she uses walker for long distance and she uses HRT-go to her appt. Patient states that the hospital prescribed her two antibiotics and she was able to get all her medications prescribed from the hospital. No side effects to new medications verbalized by patient at this time. No problem of getting medications from pharmacy as per Pt. At this time. Patient states that she is getting ready to eat so she can take her medications. Unable to review medrec and ask further question as Patient kept short. Patient reminded that there is a physician on call 24 hours a day / 7 days a week (M-F 5pm to 8am and from Friday 5pm until Monday 8a for the weekend) should the patient have questions or concerns.   Patient reminded to call 911 if situation is emergent ( such as chest pain, shortness of breath, unstoppable bleeding, feeling of passing out,  worsening of symptoms)or patient feels the situation is emergent. Pt verbalizes understanding. Patient received hospital discharge instructions. CTN reviewed discharge instructions and red flags with patient who verbalized understanding. Patient given an opportunity to ask questions and does not have any further questions or concerns at this time. The patient agrees to contact the PCP office for questions related to their healthcare. CTN provided contact information for future reference. Disease Specific:   N/A    Patients top risk factors for readmission:  none noted at this time. Home Health orders at discharge: none noted at this time. Durable Medical Equipment ordered at discharge: none    Referral to Pharm D needed: no     Current Outpatient Medications   Medication Sig    ciprofloxacin HCl (CIPRO) 500 mg tablet Take 1 Tab by mouth two (2) times a day for 7 days.  metroNIDAZOLE (FLAGYL) 500 mg tablet Take 1 Tab by mouth four (4) times daily for 7 days.  metoprolol tartrate (LOPRESSOR) 25 mg tablet Take 0.5 Tabs by mouth every twelve (12) hours.  levothyroxine (SYNTHROID) 75 mcg tablet TAKE 1 TABLET BY MOUTH ONCE DAILY BEFORE BREAKFAST    simvastatin (ZOCOR) 20 mg tablet TAKE 1 TABLET BY MOUTH EVERY NIGHT    metFORMIN (GLUCOPHAGE) 1,000 mg tablet TAKE 1 TABLET BY MOUTH TWICE DAILY    furosemide (LASIX) 40 mg tablet Take  by mouth daily.  celecoxib (CELEBREX) 200 mg capsule Take  by mouth two (2) times a day.  diphenhydrAMINE (BENADRYL) 25 mg capsule Take 25 mg by mouth every six (6) hours as needed.  LOSARTAN PO Take  by mouth.  GLIPIZIDE PO Take  by mouth.  cranberry 500 mg capsule Take 500 mg by mouth daily.     triamcinolone acetonide (KENALOG) 0.1 % topical cream APPLY THIN LAYER EXTERNALLY TO THE AFFECTED AREA TWICE DAILY    betamethasone dipropionate (DIPROSONE) 0.05 % topical cream betamethasone dipropionate 0.05 % topical cream    glucose blood VI test strips (FREESTYLE LITE STRIPS) strip use to check BS once daily    famotidine (PEPCID) 20 mg tablet famotidine 20 mg tablet    hydroCHLOROthiazide (HYDRODIURIL) 25 mg tablet hydrochlorothiazide 25 mg tablet    apixaban (ELIQUIS) 5 mg tablet TAKE 1 TABLET BY MOUTH TWICE DAILY.  ondansetron hcl (ZOFRAN) 4 mg tablet Take 1 Tab by mouth every eight (8) hours as needed for Nausea.  pantoprazole (PROTONIX) 40 mg tablet Take 1 Tab by mouth daily.  diphenoxylate-atropine (LOMOTIL) 2.5-0.025 mg per tablet Take 1 Tab by mouth four (4) times daily as needed for Diarrhea. Max Daily Amount: 4 Tabs.  cholecalciferol (VITAMIN D3) 1,000 unit tablet Take 2 Tabs by mouth daily. No current facility-administered medications for this visit. There are no discontinued medications.     [facility] follow up appointment(s):   Future Appointments   Date Time Provider Zuleika Ruvalcaba   3/26/2020  1:30 PM [facility]   8/25/2020  1:00 PM Meda Dance, MD [address] [place] [address]   11/3/2020  1:00 PM [facility]      Non-[facility] follow up appointment(s):  Rosaura on 3/18/20    [facility]:  out of service area

## 2020-03-06 NOTE — TELEPHONE ENCOUNTER
Pt. Stated she was just discharged from the hospital and they told her that Dr. Matthew Angela wanted her to come in on 03/12/2020 instead of 03/26/2020? Dr. Matthew Angela does not have any availabilty.  Please assist.

## 2020-03-07 DIAGNOSIS — K57.92 DIVERTICULITIS: ICD-10-CM

## 2020-03-07 DIAGNOSIS — R19.7 WATERY DIARRHEA: ICD-10-CM

## 2020-03-10 ENCOUNTER — TELEPHONE (OUTPATIENT)
Dept: FAMILY MEDICINE CLINIC | Age: 80
End: 2020-03-10

## 2020-03-10 NOTE — TELEPHONE ENCOUNTER
Pt. Stated she has a rash and wants to know if she can take benadryl along with her other medications until she sees Dr. Dilan Briones on 03/12/2020. Please advise.

## 2020-03-10 NOTE — TELEPHONE ENCOUNTER
Pt called in and was requesting a refill on these two medications. Pt also stated that she has been sick for the past few days and has been throwing up eerything she has been eating. She said she has been eating soup and trying to drink water but is having a hard time keeping it down. Please advise on next step.

## 2020-03-11 ENCOUNTER — TELEPHONE (OUTPATIENT)
Dept: FAMILY MEDICINE CLINIC | Age: 80
End: 2020-03-11

## 2020-03-11 DIAGNOSIS — R63.0 DECREASED APPETITE: Primary | ICD-10-CM

## 2020-03-11 RX ORDER — BETAMETHASONE DIPROPIONATE 0.5 MG/G
CREAM TOPICAL
Qty: 15 G | Refills: 1 | Status: SHIPPED | OUTPATIENT
Start: 2020-03-11 | End: 2020-07-08 | Stop reason: SDUPTHER

## 2020-03-11 RX ORDER — MEGESTROL ACETATE 20 MG/1
20 TABLET ORAL DAILY
Qty: 30 TAB | Refills: 0 | Status: ON HOLD | OUTPATIENT
Start: 2020-03-11 | End: 2020-08-14

## 2020-03-11 RX ORDER — DIPHENOXYLATE HYDROCHLORIDE AND ATROPINE SULFATE 2.5; .025 MG/1; MG/1
TABLET ORAL
Qty: 30 TAB | Refills: 0 | Status: SHIPPED | OUTPATIENT
Start: 2020-03-11 | End: 2020-05-04 | Stop reason: SDUPTHER

## 2020-03-11 NOTE — TELEPHONE ENCOUNTER
Contacted patient and was advised that she has been sick for the past few days and has been throwing up everything she has been eating. Patient stated that she has not vomited today but can't get an appetite. Patient requesting medication to help increase her appetite. Advised patient that benadryl can be taken with medications for the rash   FYI - Patient stated that she also has a rash at her IV site, stomach and legs so she has stopped taking the antibiotic Flagyl. Appointment scheduled for tomorrow.  Awaiting arrival.

## 2020-03-11 NOTE — TELEPHONE ENCOUNTER
Contacted patient and was advised that she has been sick for the past few days and has been throwing up everything she has been eating. Patient stated that she has not vomited today but can't get an appetite. Patient requesting medication to help increase her appetite. Please advise. Thank you    BERTRANDI - Patient stated that she also has a rash at her IV site, stomach and legs so she has stopped taking the antibiotic Flagyl. Appointment scheduled for tomorrow.

## 2020-03-11 NOTE — TELEPHONE ENCOUNTER
Patient called regarding skin rash for 2 days it is getting worse , she used benadryl oral as well as cream it is slightly better ,she thinks it is due toslightly better ,she thinks it is due to antibiotics (cipro)  No shortness of breath no chest pain no fever   Need to be seen for evaluation   Patient mentioned  office told her she will be seen tomorrow.

## 2020-03-13 NOTE — DISCHARGE SUMMARY
[address]    Name:  Mauricio Yang  MR#:   [id number]  :  1940  ACCOUNT #:  [de-identified]  ADMIT DATE:  2020  DISCHARGE DATE:  2020    ADMITTING DIAGNOSES:  Diverticulitis in the setting of known atrial fibrillation, pulmonary hypertension, and colovesicular fistula. HISTORY OF PRESENT ILLNESS:  The patient is an 69-year-old female who presented to the emergency department for lower abdominal pain. This has been going off and on for many months. She was noted to have urinary changes to include blood, may be some mucus. In the emergency room, she was found to have diverticulitis as well as atrial fibrillation with rapid ventricular response. She was admitted for ongoing management. HOSPITAL COURSE:  Cardiology consultation was obtained and the patient was treated symptomatically. She improved with medical treatment. She was managed with anticoagulation in the form of Eliquis as well as beta-blocker therapy. She converted to sinus rhythm in the hospital.  The patient was also noted to have aortic stenosis. The patient has been considering surgery to repair her colovesicular fistula. She has been evaluated at [facility] in the past, but had opted to try [facility] as well. General Surgery consulted on the patient and ultimately she chose to pursue surgical treatment at [facility]. Her status continued to stabilize and improve and by 2020, she was considered to be ready for discharge home. She was discharged to home. DISCHARGE DIAGNOSES:  1. Atrial fibrillation with rapid ventricular response, converted back to normal sinus rhythm. 2.  Moderate aortic stenosis. 3.  Pulmonary hypertension. 4.  Diverticulitis, improved. 5.  Colovesicular fistula. 6.  Hypertension. CONDITION ON DISCHARGE:  Improved. ACTIVITY:  Ad vinny. DISCHARGE INSTRUCTIONS:  Followup is with General Surgery at [facility] as arranged.   Followup is also with her primary care provider in 1 week, the patient will arrange. DISCHARGE MEDICATIONS:  1.  Eliquis 5 mg by mouth two times daily. 2.  Celebrex 200 mg by mouth two times daily. 3.  Cholecalciferol 100 units/25 two tablets by mouth daily. 4.  Cipro 500 mg by mouth two times daily for 7 days. 5.  Pepcid 20 mg by mouth two times daily. 6.  Lasix 40 mg by mouth daily. 7.  Glipizide via home regimen. 8.  Hydrochlorothiazide 25 mg by mouth daily. 9.  Synthroid 75 mcg by mouth daily before breakfast.  10.  Losartan via home regimen. 11.  Metformin 1000 mg by mouth two times daily. 12.  Lopressor 12.5 mg by mouth two times daily. 13.  Flagyl 500 mg by mouth four times daily for 7 days. 14.  Zofran 4 mg by mouth every 8 hours as-needed for nausea. 15.  Protonix 40 mg by mouth daily. 16.  Zocor 20 mg by mouth nightly. DIET:  Diabetic. DISPOSITION:  Home.     Total Discharge time 35 minutes      Everton Frazier MD      [id number]  D:  03/12/2020 23:32  T:  03/13/2020 5:52  JOB #:  [id number]  CC:  Irene Loving MD

## 2020-03-15 ENCOUNTER — APPOINTMENT (OUTPATIENT)
Dept: GENERAL RADIOLOGY | Age: 80
End: 2020-03-15
Attending: EMERGENCY MEDICINE
Payer: MEDICARE

## 2020-03-15 ENCOUNTER — APPOINTMENT (OUTPATIENT)
Dept: VASCULAR SURGERY | Age: 80
End: 2020-03-15
Attending: EMERGENCY MEDICINE
Payer: MEDICARE

## 2020-03-15 ENCOUNTER — TELEPHONE (OUTPATIENT)
Dept: FAMILY MEDICINE CLINIC | Age: 80
End: 2020-03-15

## 2020-03-15 ENCOUNTER — HOSPITAL ENCOUNTER (EMERGENCY)
Age: 80
Discharge: HOME | End: 2020-03-15
Attending: EMERGENCY MEDICINE
Payer: MEDICARE

## 2020-03-15 VITALS
OXYGEN SATURATION: 99 % | HEART RATE: 76 BPM | WEIGHT: 142 LBS | BODY MASS INDEX: 27.88 KG/M2 | TEMPERATURE: 97.9 F | SYSTOLIC BLOOD PRESSURE: 145 MMHG | HEIGHT: 60 IN | RESPIRATION RATE: 14 BRPM | DIASTOLIC BLOOD PRESSURE: 75 MMHG

## 2020-03-15 DIAGNOSIS — M79.89 SYMPTOM OF LEG SWELLING: Primary | ICD-10-CM

## 2020-03-15 DIAGNOSIS — L03.116 LEFT LEG CELLULITIS: ICD-10-CM

## 2020-03-15 DIAGNOSIS — G89.29 CHRONIC ABDOMINAL PAIN: ICD-10-CM

## 2020-03-15 DIAGNOSIS — I48.0 PAROXYSMAL ATRIAL FIBRILLATION (HCC): ICD-10-CM

## 2020-03-15 DIAGNOSIS — R10.9 CHRONIC ABDOMINAL PAIN: ICD-10-CM

## 2020-03-15 DIAGNOSIS — N32.1 COLO-VESICAL FISTULA: ICD-10-CM

## 2020-03-15 LAB
ALBUMIN SERPL-MCNC: 2.4 G/DL (ref 3.4–5)
ALBUMIN/GLOB SERPL: 0.7 {RATIO} (ref 0.8–1.7)
ALP SERPL-CCNC: 40 U/L (ref 45–117)
ALT SERPL-CCNC: 8 U/L (ref 13–56)
ANION GAP SERPL CALC-SCNC: 6 MMOL/L (ref 3–18)
APTT PPP: 30.1 SEC (ref 23–36.4)
AST SERPL-CCNC: 21 U/L (ref 10–38)
BASOPHILS # BLD: 0 K/UL (ref 0–0.1)
BASOPHILS NFR BLD: 0 % (ref 0–2)
BILIRUB SERPL-MCNC: 0.3 MG/DL (ref 0.2–1)
BNP SERPL-MCNC: 1490 PG/ML (ref 0–1800)
BUN SERPL-MCNC: 9 MG/DL (ref 7–18)
BUN/CREAT SERPL: 15 (ref 12–20)
CALCIUM SERPL-MCNC: 7.2 MG/DL (ref 8.5–10.1)
CHLORIDE SERPL-SCNC: 110 MMOL/L (ref 100–111)
CO2 SERPL-SCNC: 25 MMOL/L (ref 21–32)
CREAT SERPL-MCNC: 0.62 MG/DL (ref 0.6–1.3)
DIFFERENTIAL METHOD BLD: ABNORMAL
EOSINOPHIL # BLD: 0.2 K/UL (ref 0–0.4)
EOSINOPHIL NFR BLD: 3 % (ref 0–5)
ERYTHROCYTE [DISTWIDTH] IN BLOOD BY AUTOMATED COUNT: 14.8 % (ref 11.6–14.5)
GLOBULIN SER CALC-MCNC: 3.3 G/DL (ref 2–4)
GLUCOSE SERPL-MCNC: 99 MG/DL (ref 74–99)
HCT VFR BLD AUTO: 35.4 % (ref 35–45)
HGB BLD-MCNC: 11.4 G/DL (ref 12–16)
INR PPP: 1.5 (ref 0.8–1.2)
LYMPHOCYTES # BLD: 2.3 K/UL (ref 0.9–3.6)
LYMPHOCYTES NFR BLD: 30 % (ref 21–52)
MCH RBC QN AUTO: 27.4 PG (ref 24–34)
MCHC RBC AUTO-ENTMCNC: 32.2 G/DL (ref 31–37)
MCV RBC AUTO: 85.1 FL (ref 74–97)
MONOCYTES # BLD: 0.7 K/UL (ref 0.05–1.2)
MONOCYTES NFR BLD: 9 % (ref 3–10)
NEUTS SEG # BLD: 4.6 K/UL (ref 1.8–8)
NEUTS SEG NFR BLD: 58 % (ref 40–73)
PLATELET # BLD AUTO: 373 K/UL (ref 135–420)
PMV BLD AUTO: 10.2 FL (ref 9.2–11.8)
POTASSIUM SERPL-SCNC: 4.4 MMOL/L (ref 3.5–5.5)
PROT SERPL-MCNC: 5.7 G/DL (ref 6.4–8.2)
PROTHROMBIN TIME: 17.6 SEC (ref 11.5–15.2)
RBC # BLD AUTO: 4.16 M/UL (ref 4.2–5.3)
SODIUM SERPL-SCNC: 141 MMOL/L (ref 136–145)
WBC # BLD AUTO: 7.8 K/UL (ref 4.6–13.2)

## 2020-03-15 PROCEDURE — 85025 COMPLETE CBC W/AUTO DIFF WBC: CPT

## 2020-03-15 PROCEDURE — 80053 COMPREHEN METABOLIC PANEL: CPT

## 2020-03-15 PROCEDURE — 93005 ELECTROCARDIOGRAM TRACING: CPT

## 2020-03-15 PROCEDURE — 85610 PROTHROMBIN TIME: CPT

## 2020-03-15 PROCEDURE — 99285 EMERGENCY DEPT VISIT HI MDM: CPT

## 2020-03-15 PROCEDURE — 83880 ASSAY OF NATRIURETIC PEPTIDE: CPT

## 2020-03-15 PROCEDURE — 74011250636 HC RX REV CODE- 250/636: Performed by: EMERGENCY MEDICINE

## 2020-03-15 PROCEDURE — 96374 THER/PROPH/DIAG INJ IV PUSH: CPT

## 2020-03-15 PROCEDURE — 74011250637 HC RX REV CODE- 250/637: Performed by: EMERGENCY MEDICINE

## 2020-03-15 PROCEDURE — 71045 X-RAY EXAM CHEST 1 VIEW: CPT

## 2020-03-15 PROCEDURE — 93971 EXTREMITY STUDY: CPT

## 2020-03-15 PROCEDURE — 85730 THROMBOPLASTIN TIME PARTIAL: CPT

## 2020-03-15 RX ORDER — DOXYCYCLINE 100 MG/1
100 CAPSULE ORAL
Status: COMPLETED | OUTPATIENT
Start: 2020-03-15 | End: 2020-03-15

## 2020-03-15 RX ORDER — CLINDAMYCIN HYDROCHLORIDE 300 MG/1
300 CAPSULE ORAL 4 TIMES DAILY
Qty: 28 CAP | Refills: 0 | OUTPATIENT
Start: 2020-03-15 | End: 2020-03-16

## 2020-03-15 RX ORDER — DOXYCYCLINE 100 MG/1
100 CAPSULE ORAL 2 TIMES DAILY
Qty: 14 CAP | Refills: 0 | OUTPATIENT
Start: 2020-03-15 | End: 2020-03-16

## 2020-03-15 RX ORDER — FUROSEMIDE 10 MG/ML
40 INJECTION INTRAMUSCULAR; INTRAVENOUS
Status: COMPLETED | OUTPATIENT
Start: 2020-03-15 | End: 2020-03-15

## 2020-03-15 RX ADMIN — NITROGLYCERIN 1 INCH: 20 OINTMENT TOPICAL at 10:58

## 2020-03-15 RX ADMIN — FUROSEMIDE 40 MG: 10 INJECTION, SOLUTION INTRAMUSCULAR; INTRAVENOUS at 11:01

## 2020-03-15 RX ADMIN — DOXYCYCLINE 100 MG: 100 CAPSULE ORAL at 11:03

## 2020-03-15 NOTE — ED TRIAGE NOTES
Arrived by EMS with complaining of swelling of legs for 4 days witn no relief with Lasix. Left leg is more swollen with redness  in lower leg. Alert and oriented. Bruising on both arms and rash from a antibiotic she was taking. Chronic abdomen pain from diverticulitis.

## 2020-03-15 NOTE — TELEPHONE ENCOUNTER
Patient called  complaining about bilateral leg swelling for 3 days ,it is painful and red in both legs more in the right side, it start at the ankle and then 5 inches above , no fever no chills no shortness of breath no chest pain, it has been getting worse. advised her to go to the nearest hospital or Urgent care ,she will go to [address] [place] [address]. and she verbalized understanding.

## 2020-03-15 NOTE — ED NOTES
I have reviewed discharge instruction and prescriptions with pt. Pt verbalized understanding and has no further questions at this time. Education taught and patient verbalized understanding of education. Teach back method used. 24G IV removed, catheter tip intact on removal.  Armband removed and shredded per patients request.    Patients pain 0/10. Belongings are with pt. Patient discharged with self and son to home. [facility] arranged for pt.

## 2020-03-15 NOTE — ED PROVIDER NOTES
Date: 3/15/2020  Patient Name: Dakota Palmer    History of Presenting Illness     Chief Complaint   Patient presents with    Leg Swelling       History Provided By: Patient    HPI/Chief Complaint: (Context):who presents with chief complaint of bilateral leg swelling 4 days  Left greater than right  Patient with taking Lasix at home  Patient states there is some redness on the left leg as well she called the nurse hotline and they stated she needs to go to the hospital she may have an infection. Patient denies any fever chills no upper respiratory symptoms no vomiting no diarrhea no abdominal pain no focal arm or leg weakness  Patient has history of diverticulitis and treatment for it and states she stopped the antibiotic recently because of rash  She was supposed to see her primary care physician yesterday but could not as she took the wrong bus to her doctors. Patient states she does have a surgery scheduled for the 18th as well.   And she supposed to see her surgeon for that  Patient has no upper respiratory symptoms no shortness of breath no chest pain  Patient does take Eliquis for her irregular heartbeat.  --------      Patient's prior chart review was done  Patient was in our hospital on February 29, 2020 patient was with admitting diagnosis of atrial fibrillation with RVR  , Diverticulitis, pulmonary hypertension, colovesicular fistula  Patient presented with abdominal pain  Moderate aortic stenosis is also a diagnosis  Patient was sent home Eliquis ciprofloxacin hydrochlorothiazide Synthroid Flagyl  Cardiology note is reviewed as well  ---  Patient's triage note is reviewed as well  Patient has EMILY level 3  Patient's chief complaint is neck swelling  Patient's vitals are stable in the emergency department blood pressure 1 [id number] is moderately elevated I will continue to monitor  Patient pain scale today is 0 currently  Allergies to ampicillin  Patient's medication include Eliquis  Patient's medical history includes diabetes hypertension GERD hypothyroidism pancreatitis recurrent UTIs and vesiculo-colic fistula  ---  I reviewed the note from general surgeon, Dr. Shayna Allison. Patient with history of recurrent UTI and chronic abdominal pain  Blood: No colonic fistula and colo-vesicular fistula with history of hematuria urine urea  Plan was to follow-up as he had discussed surgery with the patient to correct this pathology      PCP: Charu Lindsay MD    Current Outpatient Medications   Medication Sig Dispense Refill    diphenoxylate-atropine (LOMOTIL) 2.5-0.025 mg per tablet TAKE 1 TABLET BY MOUTH FOUR TIMES DAILY AS NEEDED FOR DIARRHEA. MAX DAILY AMOUNT: 4 TABLETS. 30 Tab 0    betamethasone dipropionate (DIPROSONE) 0.05 % topical cream betamethasone dipropionate 0.05 % topical cream 15 g 1    megestroL (MEGACE) 20 mg tablet Take 1 Tab by mouth daily. 30 Tab 0    metoprolol tartrate (LOPRESSOR) 25 mg tablet Take 0.5 Tabs by mouth every twelve (12) hours. 30 Tab 6    levothyroxine (SYNTHROID) 75 mcg tablet TAKE 1 TABLET BY MOUTH ONCE DAILY BEFORE BREAKFAST 90 Tab 0    simvastatin (ZOCOR) 20 mg tablet TAKE 1 TABLET BY MOUTH EVERY NIGHT 90 Tab 0    metFORMIN (GLUCOPHAGE) 1,000 mg tablet TAKE 1 TABLET BY MOUTH TWICE DAILY 180 Tab 0    furosemide (LASIX) 40 mg tablet Take  by mouth daily.  celecoxib (CELEBREX) 200 mg capsule Take  by mouth two (2) times a day.  diphenhydrAMINE (BENADRYL) 25 mg capsule Take 25 mg by mouth every six (6) hours as needed.  LOSARTAN PO Take  by mouth.  GLIPIZIDE PO Take  by mouth.  cranberry 500 mg capsule Take 500 mg by mouth daily.       triamcinolone acetonide (KENALOG) 0.1 % topical cream APPLY THIN LAYER EXTERNALLY TO THE AFFECTED AREA TWICE DAILY 15 g 0    glucose blood VI test strips (FREESTYLE LITE STRIPS) strip use to check BS once daily      famotidine (PEPCID) 20 mg tablet famotidine 20 mg tablet      hydroCHLOROthiazide (HYDRODIURIL) 25 mg tablet hydrochlorothiazide 25 mg tablet      apixaban (ELIQUIS) 5 mg tablet TAKE 1 TABLET BY MOUTH TWICE DAILY. 60 Tab 6    ondansetron hcl (ZOFRAN) 4 mg tablet Take 1 Tab by mouth every eight (8) hours as needed for Nausea. 30 Tab 1    pantoprazole (PROTONIX) 40 mg tablet Take 1 Tab by mouth daily. 90 Tab 3    cholecalciferol (VITAMIN D3) 1,000 unit tablet Take 2 Tabs by mouth daily. 61 Tab 0       Past History     Past Medical History:  Past Medical History:   Diagnosis Date    Aortic stenosis     Patient denies on 10/31/2019.  Arthritis     Atrial flutter ([address]     Bladder stone     Bronchitis     Resolved after getting rid of her pet bird in 2000.  Colovesical fistula 01/2020    Diabetes [facility] [address]     Diverticulitis     GERD (gastroesophageal reflux disease)     Gross hematuria     History of recurrent UTIs     From 9/2019 to 11/2019    Hypercholesterolemia     Hypertension     Hypothyroid     Menopause     Pancreatitis     Peripheral neuropathy     Patient denies on 10/31/2019.  Vesicocolic fistula        Past Surgical History:  Past Surgical History:   Procedure Laterality Date    COLONOSCOPY N/A 2/2/2017    COLONOSCOPY  w/bx polyp performed by Jason Bravo MD at [facility] [place] Left 2017       Family History:  Family History   Problem Relation Age of Onset    Diabetes Mother     Coronary Artery Disease Mother     Cancer Father         melanoma    Stroke Sister     Hypertension Sister     Diabetes Sister     Diabetes Brother     Coronary Artery Disease Brother     Breast Cancer Paternal Grandmother         older, but not sure age.     Breast Cancer Paternal [de-identified]         and gyn cancer ?age   [facility] No Known Problems Sister     No Known Problems Brother     Kidney Disease Sister     Heart Failure Sister        Social History:  Social History     Tobacco Use    Smoking status: Never Smoker    Smokeless tobacco: Never Used   Substance Use Topics    Alcohol use: No    Drug use: No       Allergies: Allergies   Allergen Reactions    Metronidazole Hives and Itching    Ampicillin Itching         Review of Systems   Review of Systems   Constitutional: Negative for activity change, fatigue and fever. HENT: Negative for congestion and rhinorrhea. Eyes: Negative for visual disturbance. Respiratory: Negative for shortness of breath. Cardiovascular: Positive for leg swelling. Negative for chest pain and palpitations. Gastrointestinal: Negative for abdominal pain, diarrhea, nausea and vomiting. Genitourinary: Negative for dysuria and hematuria. Musculoskeletal: Negative for back pain. Skin: Positive for rash. Neurological: Negative for dizziness, weakness and light-headedness. Psychiatric/Behavioral: Negative for agitation. All other systems reviewed and are negative. Physical Exam     Physical Exam  Vitals signs and nursing note reviewed. Constitutional:       General: She is not in acute distress. Appearance: She is well-developed. HENT:      Head: Normocephalic and atraumatic. Right Ear: External ear normal.      Left Ear: External ear normal.      Nose: Nose normal.   Eyes:      General: No scleral icterus. Conjunctiva/sclera: Conjunctivae normal.      Pupils: Pupils are equal, round, and reactive to light. Neck:      Musculoskeletal: Normal range of motion and neck supple. Thyroid: No thyromegaly. Vascular: No JVD. Trachea: No tracheal deviation. Cardiovascular:      Rate and Rhythm: Normal rate and regular rhythm. Heart sounds: No murmur. No friction rub. Pulmonary:      Effort: Pulmonary effort is normal.      Breath sounds: Normal breath sounds. No stridor. Chest:      Chest wall: No tenderness. Abdominal:      General: Bowel sounds are normal. There is no distension. Palpations: Abdomen is soft. Tenderness: There is abdominal tenderness. There is no guarding or rebound. Comments: Left lower quadrant tenderness but that is something that she is been dealing and there is no acute changes in that per patient  Patient has no new complaints regarding that today. Musculoskeletal: Normal range of motion. General: No tenderness. Lymphadenopathy:      Cervical: No cervical adenopathy. Skin:     General: Skin is warm and dry. Neurological:      Mental Status: She is alert. Cranial Nerves: No cranial nerve deficit. Coordination: Coordination normal.   Psychiatric:         Behavior: Behavior normal.         Thought Content: Thought content normal.         Judgment: Judgment normal.         Medical Decision Making   I am the first provider for this patient. I reviewed the vital signs, available nursing notes, past medical history, past surgical history, family history and social history. Provider Notes (Medical Decision Making): Patient presents with bilateral leg swelling for days, pain in the left leg  No history of DVT  On Eliquis  Patient does not have any chest pain or shortness of breath  Left lower quadrant abdominal pain that is chronic: Surgery and repair of her ureter as well. Patient has colon vesicular fistula  Patient has no acute complaints regarding that. I will check patient for heart failure for months  Patient supposed to have left lower extremity duplex as well will be done  Patient is not in atrial fibrillation. I will continue to manage the patient in the emergency department rule out any MI or any elevated BNP I will reevaluate patient  If no acute finding I will treat patient with antibiotic for the left leg redness consistent with cellulitis. Vital Signs-Reviewed the patient's vital signs. Pulse Oximetry Analysis -98, room air, normal    Cardiac Monitor:  Rate/Rhythm: 79, sinus rhythm    EKG: Interpreted by the EP.   Time of the EKG is 922, 77 heart rate normal intervals and axis no sign of acute ischemia or dysrhythmia       Vitals:    03/15/20 1015 03/15/20 1030 03/15/20 1045 03/15/20 1100   BP: 148/56 134/56 130/57 145/75   Pulse: 78 77 70 76   Resp:    14   Temp:       SpO2: 98% 95% 96% 99%   Weight:       Height:         Records Reviewed: Nursing Notes    ED Course:      I have reevaluated the patient at 12:12 PM  Patient is well-appearing no chest pain or shortness of breath no abdominal pain no calf pain  Patient does have the erythema in the left lower extremity  Duplex was done and is negative  Patient's chest x-ray without any acute finding  Patient feels much improved and there is no white count and there is no other sign of systemic infection I will start antibiotic for patient's left lower extremity  Patient also continue diuresis at home she states she has water pill at home that she can still take  Patient also states that she will follow-up tomorrow with her primary care physician have told her if she cannot make that appointment she is to come back to the emergency department anything changes or worsen  She also stated that she has a [facility] physician that she will be seeing as well on the 18th. Patient get the antibiotics and get it filled  I discussed admission with the patient's son as well who is at bedside. ---  Because of concern for cellulitis in left lower extremity I did start doxycycline for the patient I am also adding clindamycin as patient is a high risk individual and concerned that infection may get worse. Patient cannot take Levaquin as that was recently patient was taking and she stopped that because she considers her rash due to that medication. I want the patient to be reevaluated closing and have re-discussed this with the son and they will call the primary care physician. If any worsening of symptoms patient should come back to the emergency department and will need to be admitted if swelling is spreading.   I will also demarcated on the lower extremity where the redness is today so they can be reevaluated in 24 hours. Diagnostic Study Results     Orders Placed This Encounter    XR CHEST PORT     Standing Status:   Standing     Number of Occurrences:   1     Order Specific Question:   Reason for Exam     Answer:   peripheral edema    CBC WITH AUTOMATED DIFF     Standing Status:   Standing     Number of Occurrences:   1    PTT     Standing Status:   Standing     Number of Occurrences:   1    PROTHROMBIN TIME + INR     Standing Status:   Standing     Number of Occurrences:   1    METABOLIC PANEL, COMPREHENSIVE     Standing Status:   Standing     Number of Occurrences:   1    NT-PRO BNP     Standing Status:   Standing     Number of Occurrences:   1    CARDIAC MONITORING     Standing Status:   Standing     Number of Occurrences:   1     Order Specific Question:   Type: Answer:   Bedside    EKG, 12 LEAD, INITIAL     Standing Status:   Standing     Number of Occurrences:   1     Order Specific Question:   Reason for Exam:     Answer:   peripheral edema    DUPLEX LOWER EXT VENOUS LEFT     Standing Status:   Standing     Number of Occurrences:   1     Order Specific Question:   Transport     Answer:   Stretcher [5]    doxycycline (MONODOX) capsule 100 mg     Order Specific Question:   Antibiotic Indications     Answer:   Skin and Soft Tissue Infection    furosemide (LASIX) injection 40 mg    nitroglycerin (NITROBID) 2 % ointment 1 Inch       Labs -     Recent Results (from the past 12 hour(s))   EKG, 12 LEAD, INITIAL    Collection Time: 03/15/20  9:15 AM   Result Value Ref Range    Ventricular Rate 77 BPM    Atrial Rate 77 BPM    P-R Interval 144 ms    QRS Duration 68 ms    Q-T Interval 384 ms    QTC Calculation (Bezet) 434 ms    Calculated P Axis 21 degrees    Calculated R Axis 36 degrees    Calculated T Axis 40 degrees    Diagnosis       Normal sinus rhythm  Normal ECG  When compared with ECG of 29-FEB-2020 14:34,  Vent.  rate has decreased  BPM  ST no longer depressed in Anterior leads  T wave inversion no longer evident in Inferior leads  Nonspecific T wave abnormality no longer evident in Lateral leads     CBC WITH AUTOMATED DIFF    Collection Time: 03/15/20  9:35 AM   Result Value Ref Range    WBC 7.8 4.6 - 13.2 K/uL    RBC 4.16 (L) 4.20 - 5.30 M/uL    HGB 11.4 (L) 12.0 - 16.0 g/dL    HCT 35.4 35.0 - 45.0 %    MCV 85.1 74.0 - 97.0 FL    MCH 27.4 24.0 - 34.0 PG    MCHC 32.2 31.0 - 37.0 g/dL    RDW 14.8 (H) 11.6 - 14.5 %    PLATELET 752 818 - 344 K/uL    MPV 10.2 9.2 - 11.8 FL    NEUTROPHILS 58 40 - 73 %    LYMPHOCYTES 30 21 - 52 %    MONOCYTES 9 3 - 10 %    EOSINOPHILS 3 0 - 5 %    BASOPHILS 0 0 - 2 %    ABS. NEUTROPHILS 4.6 1.8 - 8.0 K/UL    ABS. LYMPHOCYTES 2.3 0.9 - 3.6 K/UL    ABS. MONOCYTES 0.7 0.05 - 1.2 K/UL    ABS. EOSINOPHILS 0.2 0.0 - 0.4 K/UL    ABS. BASOPHILS 0.0 0.0 - 0.1 K/UL    DF AUTOMATED     PTT    Collection Time: 03/15/20  9:35 AM   Result Value Ref Range    aPTT 30.1 23.0 - 36.4 SEC   PROTHROMBIN TIME + INR    Collection Time: 03/15/20  9:35 AM   Result Value Ref Range    Prothrombin time 17.6 (H) 11.5 - 15.2 sec    INR 1.5 (H) 0.8 - 1.2     METABOLIC PANEL, COMPREHENSIVE    Collection Time: 03/15/20 10:07 AM   Result Value Ref Range    Sodium 141 136 - 145 mmol/L    Potassium 4.4 3.5 - 5.5 mmol/L    Chloride 110 100 - 111 mmol/L    CO2 25 21 - 32 mmol/L    Anion gap 6 3.0 - 18 mmol/L    Glucose 99 74 - 99 mg/dL    BUN 9 7.0 - 18 MG/DL    Creatinine 0.62 0.6 - 1.3 MG/DL    BUN/Creatinine ratio 15 12 - 20      GFR est AA >60 >60 ml/min/1.73m2    GFR est non-AA >60 >60 ml/min/1.73m2    Calcium 7.2 (L) 8.5 - 10.1 MG/DL    Bilirubin, total 0.3 0.2 - 1.0 MG/DL    ALT (SGPT) 8 (L) 13 - 56 U/L    AST (SGOT) 21 10 - 38 U/L    Alk.  phosphatase 40 (L) 45 - 117 U/L    Protein, total 5.7 (L) 6.4 - 8.2 g/dL    Albumin 2.4 (L) 3.4 - 5.0 g/dL    Globulin 3.3 2.0 - 4.0 g/dL    A-G Ratio 0.7 (L) 0.8 - 1.7     NT-PRO BNP    Collection Time: 03/15/20 10:07 AM Result Value Ref Range    NT pro-BNP 1,490 0 - 1,800 PG/ML       Radiologic Studies -   XR CHEST PORT    (Results Pending)     CT Results  (Last 48 hours)    None        CXR Results  (Last 48 hours)    None        Chest x-ray reviewed by myself. 12:14 PM  Patient's chest x-ray reviewed by myself, there is no acute infiltrate, consolidation, effusion, cardiomegaly, mediastinal widening. No acute process appreciated in the chest x-ray    Discharge     Clinical Impression:   1. Symptom of leg swelling    2. Left leg cellulitis    3. Chronic abdominal pain    4. [employer]-vesical fistula    5.  Paroxysmal atrial fibrillation (HCC)      Disposition:  Home    It should be noted that I will be the provider of record for this patient  Harpreet Rincon MD      Follow-up Information    None         Current Discharge Medication List

## 2020-03-16 ENCOUNTER — HOSPITAL ENCOUNTER (EMERGENCY)
Age: 80
Discharge: HOME | End: 2020-03-16
Attending: EMERGENCY MEDICINE
Payer: MEDICARE

## 2020-03-16 VITALS
DIASTOLIC BLOOD PRESSURE: 75 MMHG | RESPIRATION RATE: 18 BRPM | TEMPERATURE: 99.2 F | SYSTOLIC BLOOD PRESSURE: 166 MMHG | HEART RATE: 102 BPM | OXYGEN SATURATION: 97 %

## 2020-03-16 DIAGNOSIS — N39.0 ACUTE UTI: Primary | ICD-10-CM

## 2020-03-16 DIAGNOSIS — R31.0 GROSS HEMATURIA: ICD-10-CM

## 2020-03-16 DIAGNOSIS — L03.116 CELLULITIS OF LEFT LEG: ICD-10-CM

## 2020-03-16 LAB
ALBUMIN SERPL-MCNC: 3 G/DL (ref 3.4–5)
ALBUMIN/GLOB SERPL: 0.7 {RATIO} (ref 0.8–1.7)
ALP SERPL-CCNC: 50 U/L (ref 45–117)
ALT SERPL-CCNC: 12 U/L (ref 13–56)
ANION GAP SERPL CALC-SCNC: 7 MMOL/L (ref 3–18)
APPEARANCE UR: ABNORMAL
AST SERPL-CCNC: 22 U/L (ref 10–38)
ATRIAL RATE: 77 BPM
BACTERIA URNS QL MICRO: ABNORMAL /HPF
BASOPHILS # BLD: 0 K/UL (ref 0–0.1)
BASOPHILS NFR BLD: 0 % (ref 0–2)
BILIRUB SERPL-MCNC: 0.3 MG/DL (ref 0.2–1)
BILIRUB UR QL: ABNORMAL
BUN SERPL-MCNC: 12 MG/DL (ref 7–18)
BUN/CREAT SERPL: 13 (ref 12–20)
CALCIUM SERPL-MCNC: 8.3 MG/DL (ref 8.5–10.1)
CALCULATED P AXIS, ECG09: 21 DEGREES
CALCULATED R AXIS, ECG10: 36 DEGREES
CALCULATED T AXIS, ECG11: 40 DEGREES
CHLORIDE SERPL-SCNC: 101 MMOL/L (ref 100–111)
CO2 SERPL-SCNC: 29 MMOL/L (ref 21–32)
COLOR UR: ABNORMAL
CREAT SERPL-MCNC: 0.91 MG/DL (ref 0.6–1.3)
DIAGNOSIS, 93000: NORMAL
DIFFERENTIAL METHOD BLD: ABNORMAL
EOSINOPHIL # BLD: 0.1 K/UL (ref 0–0.4)
EOSINOPHIL NFR BLD: 1 % (ref 0–5)
EPITH CASTS URNS QL MICRO: NEGATIVE /LPF (ref 0–5)
ERYTHROCYTE [DISTWIDTH] IN BLOOD BY AUTOMATED COUNT: 14.4 % (ref 11.6–14.5)
GLOBULIN SER CALC-MCNC: 4.2 G/DL (ref 2–4)
GLUCOSE SERPL-MCNC: 122 MG/DL (ref 74–99)
GLUCOSE UR STRIP.AUTO-MCNC: NEGATIVE MG/DL
HCT VFR BLD AUTO: 36.2 % (ref 35–45)
HGB BLD-MCNC: 11.8 G/DL (ref 12–16)
HGB UR QL STRIP: ABNORMAL
INR PPP: 1.4 (ref 0.8–1.2)
KETONES UR QL STRIP.AUTO: NEGATIVE MG/DL
LEUKOCYTE ESTERASE UR QL STRIP.AUTO: ABNORMAL
LYMPHOCYTES # BLD: 2.2 K/UL (ref 0.9–3.6)
LYMPHOCYTES NFR BLD: 23 % (ref 21–52)
MCH RBC QN AUTO: 27.8 PG (ref 24–34)
MCHC RBC AUTO-ENTMCNC: 32.6 G/DL (ref 31–37)
MCV RBC AUTO: 85.2 FL (ref 74–97)
MONOCYTES # BLD: 0.8 K/UL (ref 0.05–1.2)
MONOCYTES NFR BLD: 9 % (ref 3–10)
NEUTS SEG # BLD: 6.3 K/UL (ref 1.8–8)
NEUTS SEG NFR BLD: 67 % (ref 40–73)
NITRITE UR QL STRIP.AUTO: POSITIVE
P-R INTERVAL, ECG05: 144 MS
PH UR STRIP: 6 [PH] (ref 5–8)
PLATELET # BLD AUTO: 354 K/UL (ref 135–420)
PMV BLD AUTO: 9.6 FL (ref 9.2–11.8)
POTASSIUM SERPL-SCNC: 4.8 MMOL/L (ref 3.5–5.5)
PROT SERPL-MCNC: 7.2 G/DL (ref 6.4–8.2)
PROT UR STRIP-MCNC: >300 MG/DL
PROTHROMBIN TIME: 17.2 SEC (ref 11.5–15.2)
Q-T INTERVAL, ECG07: 384 MS
QRS DURATION, ECG06: 68 MS
QTC CALCULATION (BEZET), ECG08: 434 MS
RBC # BLD AUTO: 4.25 M/UL (ref 4.2–5.3)
RBC #/AREA URNS HPF: >100 /HPF (ref 0–5)
SODIUM SERPL-SCNC: 137 MMOL/L (ref 136–145)
SP GR UR REFRACTOMETRY: 1.02 (ref 1–1.03)
UROBILINOGEN UR QL STRIP.AUTO: 0.2 EU/DL (ref 0.2–1)
VENTRICULAR RATE, ECG03: 77 BPM
WBC # BLD AUTO: 9.3 K/UL (ref 4.6–13.2)
WBC URNS QL MICRO: ABNORMAL /HPF (ref 0–4)

## 2020-03-16 PROCEDURE — 74011000258 HC RX REV CODE- 258: Performed by: EMERGENCY MEDICINE

## 2020-03-16 PROCEDURE — 87086 URINE CULTURE/COLONY COUNT: CPT

## 2020-03-16 PROCEDURE — 80053 COMPREHEN METABOLIC PANEL: CPT

## 2020-03-16 PROCEDURE — 81001 URINALYSIS AUTO W/SCOPE: CPT

## 2020-03-16 PROCEDURE — 99284 EMERGENCY DEPT VISIT MOD MDM: CPT

## 2020-03-16 PROCEDURE — 87077 CULTURE AEROBIC IDENTIFY: CPT

## 2020-03-16 PROCEDURE — 96365 THER/PROPH/DIAG IV INF INIT: CPT

## 2020-03-16 PROCEDURE — 87186 SC STD MICRODIL/AGAR DIL: CPT

## 2020-03-16 PROCEDURE — 74011250636 HC RX REV CODE- 250/636: Performed by: EMERGENCY MEDICINE

## 2020-03-16 PROCEDURE — 85025 COMPLETE CBC W/AUTO DIFF WBC: CPT

## 2020-03-16 PROCEDURE — 85610 PROTHROMBIN TIME: CPT

## 2020-03-16 RX ORDER — CEPHALEXIN 500 MG/1
1000 CAPSULE ORAL 2 TIMES DAILY
Qty: 28 CAP | Refills: 0 | Status: SHIPPED | OUTPATIENT
Start: 2020-03-16 | End: 2020-03-23

## 2020-03-16 RX ADMIN — CEFTRIAXONE SODIUM 1 G: 1 INJECTION, POWDER, FOR SOLUTION INTRAMUSCULAR; INTRAVENOUS at 05:35

## 2020-03-16 NOTE — DISCHARGE INSTRUCTIONS
Patient Education        Blood in the Urine: Care Instructions  Your Care Instructions    Blood in the urine, or hematuria, may make the urine look red, brown, or pink. There may be blood every time you urinate or just from time to time. You cannot always see blood in the urine, but it will show up in a urine test.  Blood in the urine may be serious. It should always be checked by a doctor. Your doctor may recommend more tests, including an X-ray, a CT scan, or a cystoscopy (which lets a doctor look inside the urethra and bladder). Blood in the urine can be a sign of another problem. Common causes are bladder infections and kidney stones. An injury to your groin or your genital area can also cause bleeding in the urinary tract. Very hard exercise--such as running a marathon--can cause blood in the urine. Blood in the urine can also be a sign of kidney disease or cancer in the bladder or kidney. Many cases of blood in the urine are caused by a harmless condition that runs in families. This is called benign familial hematuria. It does not need any treatment. Sometimes your urine may look red or brown even though it does not contain blood. For example, not getting enough fluids (dehydration), taking certain medicines, or having a liver problem can change the color of your urine. Eating foods such as beets, rhubarb, or blackberries or foods with red food coloring can make your urine look red or pink. Follow-up care is a key part of your treatment and safety. Be sure to make and go to all appointments, and call your doctor if you are having problems. It's also a good idea to know your test results and keep a list of the medicines you take. When should you call for help? Call your doctor now or seek immediate medical care if:    · You have symptoms of a urinary infection. For example:  ? You have pus in your urine. ? You have pain in your back just below your rib cage. This is called flank pain. ?  You have a fever, chills, or body aches. ? It hurts to urinate. ? You have groin or belly pain.     · You have more blood in your urine.    Watch closely for changes in your health, and be sure to contact your doctor if:    · You have new urination problems.     · You do not get better as expected. Where can you learn more? Go to http://aravind-farzana.info/  Enter V469 in the search box to learn more about \"Blood in the Urine: Care Instructions. \"  Current as of: August 21, 2019Content Version: 12.4  © 8052-7770 [employer]. Care instructions adapted under license by [employer] (which disclaims liability or warranty for this information). If you have questions about a medical condition or this instruction, always ask your healthcare professional. [address] any warranty or liability for your use of this information. Patient Education        Urinary Tract Infection in Women: Care Instructions  Your Care Instructions    A urinary tract infection, or UTI, is a general term for an infection anywhere between the kidneys and the urethra (where urine comes out). Most UTIs are bladder infections. They often cause pain or burning when you urinate. UTIs are caused by bacteria and can be cured with antibiotics. Be sure to complete your treatment so that the infection goes away. Follow-up care is a key part of your treatment and safety. Be sure to make and go to all appointments, and call your doctor if you are having problems. It's also a good idea to know your test results and keep a list of the medicines you take. How can you care for yourself at home? · Take your antibiotics as directed. Do not stop taking them just because you feel better. You need to take the full course of antibiotics. · Drink extra water and other fluids for the next day or two. This may help wash out the bacteria that are causing the infection.  (If you have kidney, heart, or liver disease and have to limit fluids, talk with your doctor before you increase your fluid intake.)  · Avoid drinks that are carbonated or have caffeine. They can irritate the bladder. · Urinate often. Try to empty your bladder each time. · To relieve pain, take a hot bath or lay a heating pad set on low over your lower belly or genital area. Never go to sleep with a heating pad in place. To prevent UTIs  · Drink plenty of water each day. This helps you urinate often, which clears bacteria from your system. (If you have kidney, heart, or liver disease and have to limit fluids, talk with your doctor before you increase your fluid intake.)  · Urinate when you need to. · Urinate right after you have sex. · Change sanitary pads often. · Avoid douches, bubble baths, feminine hygiene sprays, and other feminine hygiene products that have deodorants. · After going to the bathroom, wipe from front to back. When should you call for help? Call your doctor now or seek immediate medical care if:    · Symptoms such as fever, chills, nausea, or vomiting get worse or appear for the first time.     · You have new pain in your back just below your rib cage. This is called flank pain.     · There is new blood or pus in your urine.     · You have any problems with your antibiotic medicine.    Watch closely for changes in your health, and be sure to contact your doctor if:    · You are not getting better after taking an antibiotic for 2 days.     · Your symptoms go away but then come back. Where can you learn more? Go to http://aravind-farzana.info/  Enter V265 in the search box to learn more about \"Urinary Tract Infection in Women: Care Instructions. \"  Current as of: August 21, 2019Content Version: 12.4  © 2863-2597 [employer]. Care instructions adapted under license by [employer] (which disclaims liability or warranty for this information).  If you have questions about a medical condition or this instruction, always ask your healthcare professional. [place] [address] any warranty or liability for your use of this information.

## 2020-03-16 NOTE — ED TRIAGE NOTES
Pt to ED with complaints of blood in urine that started at midnight. States she is due to have portion of intestines removed and bladder repaired due to fistula.

## 2020-03-16 NOTE — ED NOTES
I have reviewed discharge instructions with the patient. The patient verbalized understanding. Patient discharged from ED with assist of  stable in no distress.

## 2020-03-16 NOTE — ED PROVIDER NOTES
Micheal Victoria is a [de-identified] y.o. female with history of atrial fibrillation who noted blood in her urine 3 times tonight. Patient also had lower abdominal cramping as well. She does have some frequency and urgency. Patient denies any fever. She was just seen yesterday for swelling placed on antibiotics for suspected cellulitis. Patient denies any recent urethral instrumentation. She is on Eliquis for A. fib. No nausea or vomiting or fever. Symptoms are worse with urination. The history is provided by the patient and medical records. Past Medical History:   Diagnosis Date    Aortic stenosis     Patient denies on 10/31/2019.  Arthritis     Atrial flutter ([address]     Bladder stone     Bronchitis     Resolved after getting rid of her pet bird in 2000. 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  Vesicocolic fistula        Past Surgical History:   Procedure Laterality Date    COLONOSCOPY N/A 2/2/2017    COLONOSCOPY  w/bx polyp performed by Irma Omer MD at [facility] Left 2017         Family History:   Problem Relation Age of Onset    Diabetes Mother     Coronary Artery Disease Mother     Cancer Father         melanoma    Stroke Sister     Hypertension Sister     Diabetes Sister     Diabetes Brother     Coronary Artery Disease Brother     Breast Cancer Paternal Grandmother         older, but not sure age.     Breast Cancer Paternal Aunt         and gyn cancer ?age   [facility] No Known Problems Sister     No Known Problems Brother     Kidney Disease Sister     Heart Failure Sister        Social History     Socioeconomic History    Marital status: SINGLE     Spouse name: Not on file    Number of children: Not on file    Years of education: Not on file    Highest education level: Not on file   Occupational History    Not on file   Social Needs    Financial resource strain: Not on file    Food insecurity     Worry: Not on file     Inability: Not on file    Transportation needs     Medical: Not on file     Non-medical: Not on file   Tobacco Use    Smoking status: Never Smoker    Smokeless tobacco: Never Used   Substance and Sexual Activity    Alcohol use: No    Drug use: No    Sexual activity: Not Currently     Partners: Male   Lifestyle    Physical activity     Days per week: Not on file     Minutes per session: Not on file    Stress: Not on file   Relationships    Social connections     Talks on phone: Not on file     Gets together: Not on file     Attends Sabianist service: Not on file     Active member of club or organization: Not on file     Attends meetings of clubs or organizations: Not on file     Relationship status: Not on file    Intimate partner violence     Fear of current or ex partner: Not on file     Emotionally abused: Not on file     Physically abused: Not on file     Forced sexual activity: Not on file   Other Topics Concern     Service Not Asked    Blood Transfusions Not Asked    Caffeine Concern Not Asked    Occupational Exposure Not Asked   Abbi Emerson Hazards Not Asked    Sleep Concern Not Asked    Stress Concern Not Asked    Weight Concern Not Asked    Special Diet Not Asked    Back Care Not Asked    Exercise Not Asked    Bike Helmet Not Asked   [address] Not Asked    Self-Exams Not Asked   Social History Narrative    Not on file         ALLERGIES: Metronidazole and Ampicillin    Review of Systems   Constitutional: Negative for fever. HENT: Negative for sore throat. Eyes: Negative for visual disturbance. Respiratory: Negative for shortness of breath. Cardiovascular: Positive for leg swelling.    Gastrointestinal: Positive for abdominal pain. Genitourinary: Positive for dysuria, frequency, hematuria and pelvic pain. Negative for difficulty urinating. Musculoskeletal: Negative for gait problem. Skin: Positive for rash. Neurological: Negative for syncope. Hematological: Bruises/bleeds easily. Psychiatric/Behavioral: Positive for sleep disturbance. Vitals:    03/16/20 0414   BP: 166/75   Pulse: (!) 102   Resp: 18   Temp: 99.2 °F (37.3 °C)   SpO2: 97%            Physical Exam  Vitals signs and nursing note reviewed. Constitutional:       General: She is not in acute distress. Appearance: She is well-developed. She is not ill-appearing, toxic-appearing or diaphoretic. HENT:      Head: Normocephalic and atraumatic. Right Ear: External ear normal.      Left Ear: External ear normal.      Nose: Nose normal.      Mouth/Throat:      Pharynx: Uvula midline. Eyes:      General: No scleral icterus. Conjunctiva/sclera: Conjunctivae normal.   Neck:      Musculoskeletal: Neck supple. Cardiovascular:      Rate and Rhythm: Normal rate and regular rhythm. Heart sounds: Normal heart sounds. Pulmonary:      Effort: Pulmonary effort is normal.      Breath sounds: Normal breath sounds. Abdominal:      General: Abdomen is protuberant. Palpations: Abdomen is soft. Tenderness: There is abdominal tenderness in the suprapubic area. There is no right CVA tenderness, left CVA tenderness, guarding or rebound. Musculoskeletal:      Right lower leg: Edema present. Left lower leg: Edema present. Comments: There is slight erythema to the left lower leg. No crepitus or bullae. Normal distal pulses. There is very minimal erythema to the right ankle. Skin:     General: Skin is warm and dry. Capillary Refill: Capillary refill takes less than 2 seconds. Neurological:      Mental Status: She is alert and oriented to person, place, and time.       Gait: Gait normal.   Psychiatric: Behavior: Behavior normal.          MDM       Procedures    Vitals:  Patient Vitals for the past 12 hrs:   Temp Pulse Resp BP SpO2   03/16/20 0414 99.2 °F (37.3 °C) (!) 102 18 166/75 97 %         Medications ordered:   Medications   cefTRIAXone (ROCEPHIN) 1 g in 0.9% sodium chloride (MBP/ADV) 50 mL MBP (has no administration in time range)         Lab findings:  Recent Results (from the past 12 hour(s))   URINALYSIS W/ RFLX MICROSCOPIC    Collection Time: 03/16/20  4:08 AM   Result Value Ref Range    Color RED      Appearance OPAQUE      Specific gravity 1.025 1.003 - 1.030      pH (UA) 6.0 5.0 - 8.0      Protein >300 (A) NEG mg/dL    Glucose NEGATIVE  NEG mg/dL    Ketone NEGATIVE  NEG mg/dL    Bilirubin SMALL (A) NEG      Blood LARGE (A) NEG      Urobilinogen 0.2 0.2 - 1.0 EU/dL    Nitrites POSITIVE (A) NEG      Leukocyte Esterase LARGE (A) NEG     URINE MICROSCOPIC ONLY    Collection Time: 03/16/20  4:08 AM   Result Value Ref Range    WBC TOO NUMEROUS TO COUNT 0 - 4 /hpf    RBC >100 (H) 0 - 5 /hpf    Epithelial cells NEGATIVE  0 - 5 /lpf    Bacteria 2+ (A) NEG /hpf   CBC WITH AUTOMATED DIFF    Collection Time: 03/16/20  4:30 AM   Result Value Ref Range    WBC 9.3 4.6 - 13.2 K/uL    RBC 4.25 4.20 - 5.30 M/uL    HGB 11.8 (L) 12.0 - 16.0 g/dL    HCT 36.2 35.0 - 45.0 %    MCV 85.2 74.0 - 97.0 FL    MCH 27.8 24.0 - 34.0 PG    MCHC 32.6 31.0 - 37.0 g/dL    RDW 14.4 11.6 - 14.5 %    PLATELET 696 791 - 260 K/uL    MPV 9.6 9.2 - 11.8 FL    NEUTROPHILS 67 40 - 73 %    LYMPHOCYTES 23 21 - 52 %    MONOCYTES 9 3 - 10 %    EOSINOPHILS 1 0 - 5 %    BASOPHILS 0 0 - 2 %    ABS. NEUTROPHILS 6.3 1.8 - 8.0 K/UL    ABS. LYMPHOCYTES 2.2 0.9 - 3.6 K/UL    ABS. MONOCYTES 0.8 0.05 - 1.2 K/UL    ABS. EOSINOPHILS 0.1 0.0 - 0.4 K/UL    ABS.  BASOPHILS 0.0 0.0 - 0.1 K/UL    DF AUTOMATED     PROTHROMBIN TIME + INR    Collection Time: 03/16/20  4:30 AM   Result Value Ref Range    Prothrombin time 17.2 (H) 11.5 - 15.2 sec    INR 1.4 (H) 0.8 - 1.2     METABOLIC PANEL, COMPREHENSIVE    Collection Time: 03/16/20  4:30 AM   Result Value Ref Range    Sodium 137 136 - 145 mmol/L    Potassium 4.8 3.5 - 5.5 mmol/L    Chloride 101 100 - 111 mmol/L    CO2 29 21 - 32 mmol/L    Anion gap 7 3.0 - 18 mmol/L    Glucose 122 (H) 74 - 99 mg/dL    BUN 12 7.0 - 18 MG/DL    Creatinine 0.91 0.6 - 1.3 MG/DL    BUN/Creatinine ratio 13 12 - 20      GFR est AA >60 >60 ml/min/1.73m2    GFR est non-AA 59 (L) >60 ml/min/1.73m2    Calcium 8.3 (L) 8.5 - 10.1 MG/DL    Bilirubin, total 0.3 0.2 - 1.0 MG/DL    ALT (SGPT) 12 (L) 13 - 56 U/L    AST (SGOT) 22 10 - 38 U/L    Alk. phosphatase 50 45 - 117 U/L    Protein, total 7.2 6.4 - 8.2 g/dL    Albumin 3.0 (L) 3.4 - 5.0 g/dL    Globulin 4.2 (H) 2.0 - 4.0 g/dL    A-G Ratio 0.7 (L) 0.8 - 1.7         EKG interpretation by ED Physician:      X-Ray, CT or other radiology findings or impressions:  No orders to display       Progress notes, Consult notes or additional Procedure notes: We will treat for UTI. Do not feel patient requires imaging. We will switch her to Keflex from her other antibiotics as this should also cover her cellulitis as well. I have discussed with patient and/or family/sig other the results, interpretation of any imaging if performed, suspected diagnosis and treatment plan to include instructions regarding the diagnoses listed to which understanding was expressed with all questions answered      Reevaluation of patient:   stable    Disposition:  Diagnosis:   1. Acute UTI    2. Gross hematuria    3.  Cellulitis of left leg        Disposition: home      Follow-up Information     Follow up With Specialties Details Why Contact Info    Chris Oliver MD Family Practice, Internal Medicine Schedule an appointment as soon as possible for a visit  [id number] [place] [address]  [address] [place] [id number]  [phone]      [place] [address] DEPT Emergency Medicine  If symptoms worsen [address]  [phone] Patient's Medications   Start Taking    CEPHALEXIN (KEFLEX) 500 MG CAPSULE    Take 2 Caps by mouth two (2) times a day for 7 days. Continue Taking    APIXABAN (ELIQUIS) 5 MG TABLET    TAKE 1 TABLET BY MOUTH TWICE DAILY. BETAMETHASONE DIPROPIONATE (DIPROSONE) 0.05 % TOPICAL CREAM    betamethasone dipropionate 0.05 % topical cream    CELECOXIB (CELEBREX) 200 MG CAPSULE    Take  by mouth two (2) times a day. CHOLECALCIFEROL (VITAMIN D3) 1,000 UNIT TABLET    Take 2 Tabs by mouth daily. CRANBERRY 500 MG CAPSULE    Take 500 mg by mouth daily. DIPHENHYDRAMINE (BENADRYL) 25 MG CAPSULE    Take 25 mg by mouth every six (6) hours as needed. DIPHENOXYLATE-ATROPINE (LOMOTIL) 2.5-0.025 MG PER TABLET    TAKE 1 TABLET BY MOUTH FOUR TIMES DAILY AS NEEDED FOR DIARRHEA. MAX DAILY AMOUNT: 4 TABLETS. FAMOTIDINE (PEPCID) 20 MG TABLET    famotidine 20 mg tablet    FUROSEMIDE (LASIX) 40 MG TABLET    Take  by mouth daily. GLIPIZIDE PO    Take  by mouth. GLUCOSE BLOOD VI TEST STRIPS (FREESTYLE LITE STRIPS) STRIP    use to check BS once daily    HYDROCHLOROTHIAZIDE (HYDRODIURIL) 25 MG TABLET    hydrochlorothiazide 25 mg tablet    LEVOTHYROXINE (SYNTHROID) 75 MCG TABLET    TAKE 1 TABLET BY MOUTH ONCE DAILY BEFORE BREAKFAST    LOSARTAN PO    Take  by mouth. MEGESTROL (MEGACE) 20 MG TABLET    Take 1 Tab by mouth daily. METFORMIN (GLUCOPHAGE) 1,000 MG TABLET    TAKE 1 TABLET BY MOUTH TWICE DAILY    METOPROLOL TARTRATE (LOPRESSOR) 25 MG TABLET    Take 0.5 Tabs by mouth every twelve (12) hours. ONDANSETRON HCL (ZOFRAN) 4 MG TABLET    Take 1 Tab by mouth every eight (8) hours as needed for Nausea. PANTOPRAZOLE (PROTONIX) 40 MG TABLET    Take 1 Tab by mouth daily.     SIMVASTATIN (ZOCOR) 20 MG TABLET    TAKE 1 TABLET BY MOUTH EVERY NIGHT    TRIAMCINOLONE ACETONIDE (KENALOG) 0.1 % TOPICAL CREAM    APPLY THIN LAYER EXTERNALLY TO THE AFFECTED AREA TWICE DAILY   These Medications have changed    No medications on file   Stop Taking    CLINDAMYCIN (CLEOCIN) 300 MG CAPSULE    Take 1 Cap by mouth four (4) times daily for 7 days. DOXYCYCLINE (MONODOX) 100 MG CAPSULE    Take 1 Cap by mouth two (2) times a day for 7 days.      s

## 2020-03-16 NOTE — ED NOTES
Allergies verified, pt medicated per MAR, tolerated medication well, stable in no distress, currently prepared for discharge.

## 2020-03-16 NOTE — ED NOTES
Urine sample obtained via clean catch, specimen sent to lab as ordered. 22 gauge PIV established in left forearm, blood sample obtained and sent to lab as ordered. Pt placed on cardiac monitor, NSR noted at this time, will continue to monitor pt.

## 2020-03-17 ENCOUNTER — PATIENT OUTREACH (OUTPATIENT)
Dept: CASE MANAGEMENT | Age: 80
End: 2020-03-17

## 2020-03-17 NOTE — PROGRESS NOTES
COVID-19 Screening Initial Follow-up Note    Patient contacted regarding COVID-19 Screening    Care Transition Nurse/ Ambulatory Care Manager contacted the patient by telephone to perform post discharge assessment. Verified name and  with patient as identifiers. Provided introduction to self, and explanation of the CTN/ACM role, and reason for call due to risk factors for infection and/or exposure to COVID-19. Patient states that she is okay still having some pain when she urinates. Patient states that her bilateral leg redness is gone and swelling is Better also. Patient denied CP, SOB, fever, cough,  lightheadedness, dizziness and palpitation . Patient states that she has all her medications and she is taking her antibiotic. Patient declined medrec at this time. Patient states that her surgeon cancel her appt tomorrow and cancel her surgery d/t she is high risk. Patient states that she cancelled her PCP appt in march because she doesn't want to be seen and she prefer to be seen in April. CTN strongly advsied Patient to call the office if she change her mind and if needs sooner appt. Patient states that she will do so. Symptoms reviewed with patient who verbalized the following symptoms:   Fever no    Fatigue no   Pain or aching joints no  Cough no  Shortness of breath no    Confusion or unusual change in mental status no    Chills or shaking no    Sweating no    Fast heart rate no    Fast breathing no    Dizziness/lightheadedness no    Less urine output no    Cold, clammy, and pale skin no  Low body temperature no      Patient has following risk factors of: DM CTN/ACM reviewed discharge instructions, medical action plan and red flags such as increased shortness of breath, increasing fever and signs of decompensation with patient who verbalized understanding.  Discussed exposure protocols and quarantine with CDC Guidelines What to do if you are sick with coronavirus disease 2019 Patient who was given an opportunity for questions and concerns. The patient agrees to contact the [facility] exposure line, local health department and PCP office for questions related to their healthcare. CTN provided contact information for future reference.     Reviewed and educated patient on any new and changed medications related to discharge diagnosis     Plan for follow-up call in 5-7 days based on severity of symptoms and risk factors

## 2020-03-18 ENCOUNTER — TELEPHONE (OUTPATIENT)
Dept: CARDIOLOGY CLINIC | Age: 80
End: 2020-03-18

## 2020-03-19 LAB
BACTERIA SPEC CULT: ABNORMAL
SERVICE CMNT-IMP: ABNORMAL

## 2020-03-21 ENCOUNTER — HOSPITAL ENCOUNTER (EMERGENCY)
Age: 80
Discharge: HOME | End: 2020-03-21
Attending: EMERGENCY MEDICINE
Payer: MEDICARE

## 2020-03-21 VITALS
TEMPERATURE: 98 F | HEIGHT: 60 IN | HEART RATE: 81 BPM | SYSTOLIC BLOOD PRESSURE: 156 MMHG | WEIGHT: 140 LBS | RESPIRATION RATE: 18 BRPM | BODY MASS INDEX: 27.48 KG/M2 | OXYGEN SATURATION: 98 % | DIASTOLIC BLOOD PRESSURE: 78 MMHG

## 2020-03-21 DIAGNOSIS — R10.2 PELVIC PAIN: Primary | ICD-10-CM

## 2020-03-21 DIAGNOSIS — N32.1 COLO-VESICAL FISTULA: ICD-10-CM

## 2020-03-21 DIAGNOSIS — N39.0 RECURRENT UTI: ICD-10-CM

## 2020-03-21 PROCEDURE — 99284 EMERGENCY DEPT VISIT MOD MDM: CPT

## 2020-03-21 PROCEDURE — 74011250637 HC RX REV CODE- 250/637: Performed by: EMERGENCY MEDICINE

## 2020-03-21 RX ORDER — DICYCLOMINE HYDROCHLORIDE 10 MG/1
20 CAPSULE ORAL
Status: COMPLETED | OUTPATIENT
Start: 2020-03-21 | End: 2020-03-21

## 2020-03-21 RX ORDER — DICYCLOMINE HYDROCHLORIDE 10 MG/1
20 CAPSULE ORAL
Qty: 30 CAP | Refills: 0 | Status: SHIPPED | OUTPATIENT
Start: 2020-03-21 | End: 2020-04-08

## 2020-03-21 RX ADMIN — DICYCLOMINE HYDROCHLORIDE 20 MG: 10 CAPSULE ORAL at 02:16

## 2020-03-21 NOTE — ED PROVIDER NOTES
EMERGENCY DEPARTMENT HISTORY AND PHYSICAL EXAM    Date: 3/21/2020  Patient Name: Venita Ortega    History of Presenting Illness     Chief Complaint   Patient presents with    Urinary Pain         History Provided By: Patient    Additional History (Context):   2:56 AM  Venita Ortega is a [de-identified] y.o. female with PMHX of A flutter, HTN, DM, aortic stenosis who presents to the emergency department C/O pelvic cramping. She just completed a course of abx, Rocephin and Keflex for recurrent UTI symptoms. This just days after starting a course of Doxy and Clinda  She's been experience variable symptoms, mostly pelvic cramping along with pneumaturia, her most troubling symptoms, at times. Dr. Yomaira Canales, had scheduled her for repair, yet the restriction on elective surgery due to Covid has postponed her procedure and she has not been able to see him in f/u. Tonight, she is here only for symptomatic treatment of her cramping. Her chills and burning dysuria have resolved. Social History  No tob, alcohol or drugs    Family History      PCP: Milly Hudson MD    Current Outpatient Medications   Medication Sig Dispense Refill    dicyclomine (BentyL) 10 mg capsule Take 2 Caps by mouth three (3) times daily as needed for Abdominal Cramps. 30 Cap 0    apixaban (Eliquis) 5 mg tablet TAKE 1 TABLET BY MOUTH TWICE DAILY 60 Tab 6    cephALEXin (Keflex) 500 mg capsule Take 2 Caps by mouth two (2) times a day for 7 days. 28 Cap 0    diphenoxylate-atropine (LOMOTIL) 2.5-0.025 mg per tablet TAKE 1 TABLET BY MOUTH FOUR TIMES DAILY AS NEEDED FOR DIARRHEA. MAX DAILY AMOUNT: 4 TABLETS. 30 Tab 0    betamethasone dipropionate (DIPROSONE) 0.05 % topical cream betamethasone dipropionate 0.05 % topical cream 15 g 1    megestroL (MEGACE) 20 mg tablet Take 1 Tab by mouth daily. 30 Tab 0    metoprolol tartrate (LOPRESSOR) 25 mg tablet Take 0.5 Tabs by mouth every twelve (12) hours.  30 Tab 6    levothyroxine (SYNTHROID) 75 mcg tablet TAKE 1 TABLET BY MOUTH ONCE DAILY BEFORE BREAKFAST 90 Tab 0    simvastatin (ZOCOR) 20 mg tablet TAKE 1 TABLET BY MOUTH EVERY NIGHT 90 Tab 0    metFORMIN (GLUCOPHAGE) 1,000 mg tablet TAKE 1 TABLET BY MOUTH TWICE DAILY 180 Tab 0    furosemide (LASIX) 40 mg tablet Take  by mouth daily.  diphenhydrAMINE (BENADRYL) 25 mg capsule Take 25 mg by mouth every six (6) hours as needed.  LOSARTAN PO Take  by mouth.  GLIPIZIDE PO Take  by mouth.  cranberry 500 mg capsule Take 500 mg by mouth daily.  triamcinolone acetonide (KENALOG) 0.1 % topical cream APPLY THIN LAYER EXTERNALLY TO THE AFFECTED AREA TWICE DAILY 15 g 0    glucose blood VI test strips (FREESTYLE LITE STRIPS) strip use to check BS once daily      famotidine (PEPCID) 20 mg tablet famotidine 20 mg tablet      hydroCHLOROthiazide (HYDRODIURIL) 25 mg tablet hydrochlorothiazide 25 mg tablet      ondansetron hcl (ZOFRAN) 4 mg tablet Take 1 Tab by mouth every eight (8) hours as needed for Nausea. 30 Tab 1    pantoprazole (PROTONIX) 40 mg tablet Take 1 Tab by mouth daily. 90 Tab 3    cholecalciferol (VITAMIN D3) 1,000 unit tablet Take 2 Tabs by mouth daily. 61 Tab 0       Past History     Past Medical History:  Past Medical History:   Diagnosis Date    Aortic stenosis     Patient denies on 10/31/2019.  Arthritis     Atrial flutter ([address]     Bladder stone     Bronchitis     Resolved after getting rid of her pet bird in 2000. 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     Vesicocolic fistula        Past Surgical History:  Past Surgical History:   Procedure Laterality Date    COLONOSCOPY N/A 2/2/2017    COLONOSCOPY  w/bx polyp performed by Lawyer Yary MD at [address] Left 2017       Family History:  Family History   Problem Relation Age of Onset    Diabetes Mother     Coronary Artery Disease Mother     Cancer Father         melanoma    Stroke Sister     Hypertension Sister     Diabetes Sister     Diabetes Brother     Coronary Artery Disease Brother     Breast Cancer Paternal Grandmother         older, but not sure age.  Breast Cancer Paternal [de-identified]         and gyn cancer ?age   [facility] No Known Problems Sister     No Known Problems Brother     Kidney Disease Sister     Heart Failure Sister        Social History:  Social History     Tobacco Use    Smoking status: Never Smoker    Smokeless tobacco: Never Used   Substance Use Topics    Alcohol use: No    Drug use: No       Allergies: Allergies   Allergen Reactions    Metronidazole Hives and Itching    Ampicillin Itching         Review of Systems   Review of Systems   Constitutional: Negative for chills, diaphoresis, fever and unexpected weight change. HENT: Negative for congestion, drooling, ear pain, rhinorrhea, sore throat, tinnitus and trouble swallowing. Eyes: Negative for photophobia, pain, redness and visual disturbance. Respiratory: Negative for cough, choking, chest tightness, shortness of breath, wheezing and stridor. Cardiovascular: Negative for chest pain, palpitations and leg swelling. Gastrointestinal: Positive for abdominal pain. Negative for abdominal distention, anal bleeding, blood in stool, diarrhea, nausea and vomiting. Pelvic cramping   Endocrine: Negative for cold intolerance, heat intolerance, polydipsia and polyuria. Genitourinary: Positive for difficulty urinating. Negative for dysuria, flank pain, frequency, hematuria and urgency. Musculoskeletal: Negative for arthralgias, back pain and neck pain. Skin: Negative for color change, rash and wound. Allergic/Immunologic: Negative for immunocompromised state. Neurological: Negative for dizziness, seizures, syncope, speech difficulty, light-headedness and headaches. Hematological: Does not bruise/bleed easily. Psychiatric/Behavioral: Negative for agitation, behavioral problems, hallucinations, self-injury and suicidal ideas. The patient is not hyperactive. Physical Exam     Vitals:    03/21/20 0038 03/21/20 0045 03/21/20 0245   BP: 142/76  156/78   Pulse: 80  81   Resp: 17  18   Temp: 97.7 °F (36.5 °C)  98 °F (36.7 °C)   SpO2: 98% 98%    Weight: 63.5 kg (140 lb)     Height: 5' (1.524 m)       Physical Exam  Vitals signs and nursing note reviewed. Constitutional:       General: She is not in acute distress. Appearance: She is well-developed. She is not diaphoretic. HENT:      Head: Normocephalic and atraumatic. Mouth/Throat:      Pharynx: No oropharyngeal exudate. Eyes:      General: No scleral icterus. Conjunctiva/sclera: Conjunctivae normal.      Pupils: Pupils are equal, round, and reactive to light. Comments: No pallor   Neck:      Musculoskeletal: Normal range of motion and neck supple. Thyroid: No thyromegaly. Vascular: No JVD. Trachea: No tracheal deviation. Cardiovascular:      Rate and Rhythm: Normal rate and regular rhythm. Heart sounds: Normal heart sounds. Pulmonary:      Effort: Pulmonary effort is normal. No respiratory distress. Breath sounds: Normal breath sounds. No stridor. Abdominal:      General: Bowel sounds are normal. There is no distension. Palpations: Abdomen is soft. Tenderness: There is no abdominal tenderness. There is no guarding or rebound. Musculoskeletal: Normal range of motion. General: No tenderness. Comments: No soft tissue injuries   Lymphadenopathy:      Cervical: No cervical adenopathy. Skin:     General: Skin is warm and dry. Findings: No erythema or rash.    Neurological:      Mental Status: She is alert and oriented to person, place, and time. Cranial Nerves: No cranial nerve deficit. Coordination: Coordination normal.      Deep Tendon Reflexes: Reflexes are normal and symmetric. Reflexes normal.   Psychiatric:         Behavior: Behavior normal.         Thought Content: Thought content normal.         Judgment: Judgment normal.             Diagnostic Study Results     Labs -     deferred    Radiologic Studies -   None taken    No orders to display     CT Results  (Last 48 hours)    None        CXR Results  (Last 48 hours)    None          Medications given in the ED-  Medications   dicyclomine (BENTYL) capsule 20 mg (20 mg Oral Given 3/21/20 0216)         Medical Decision Making   I am the first provider for this patient. I reviewed the vital signs, available nursing notes, past medical history, past surgical history, family history and social history. Vital Signs-Reviewed the patient's vital signs. Pulse Oximetry Analysis - 98% on room air     Records Reviewed: NURSING NOTES AND PREVIOUS MEDICAL RECORDS    Provider Notes (Medical Decision Making): This is her third visit to the ED in six days, mostly she says because she cannot get in to see her doctors. She doesn't desire another workup, just meds to help with pains and cramping. She has a minimally tender abdomen, no peritoneal findings, four different antibiotics written in the last week, yet no diarrhea  Records reviewed, she will call to reschedule her f/u and coming surgery date    Procedures:  Procedures    ED Course:   2:56 AM: Initial assessment performed. The patients presenting problems have been discussed, and they are in agreement with the care plan formulated and outlined with them. I have encouraged them to ask questions as they arise throughout their visit. Diagnosis and Disposition       DISCHARGE NOTE:  2:46 AM  Mauri Agrawal's  results have been reviewed with her. She has been counseled regarding her diagnosis, treatment, and plan.   She verbally conveys understanding and agreement of the signs, symptoms, diagnosis, treatment and prognosis and additionally agrees to follow up as discussed. She also agrees with the care-plan and conveys that all of her questions have been answered. I have also provided discharge instructions for her that include: educational information regarding their diagnosis and treatment, and list of reasons why they would want to return to the ED prior to their follow-up appointment, should her condition change. She has been provided with education for proper emergency department utilization. CLINICAL IMPRESSION:    1. Pelvic pain    2. Recurrent UTI    3. [place]-vesical fistula        PLAN:  1. D/C Home  2. Discharge Medication List as of 3/21/2020  2:26 AM      START taking these medications    Details   dicyclomine (BentyL) 10 mg capsule Take 2 Caps by mouth three (3) times daily as needed for Abdominal Cramps., Normal, Disp-30 Cap, R-0         CONTINUE these medications which have NOT CHANGED    Details   apixaban (Eliquis) 5 mg tablet TAKE 1 TABLET BY MOUTH TWICE DAILY, Normal, Disp-60 Tab, R-6      cephALEXin (Keflex) 500 mg capsule Take 2 Caps by mouth two (2) times a day for 7 days. , Normal, Disp-28 Cap, R-0      diphenoxylate-atropine (LOMOTIL) 2.5-0.025 mg per tablet TAKE 1 TABLET BY MOUTH FOUR TIMES DAILY AS NEEDED FOR DIARRHEA. MAX DAILY AMOUNT: 4 TABLETS., Normal, Disp-30 Tab, R-0      betamethasone dipropionate (DIPROSONE) 0.05 % topical cream betamethasone dipropionate 0.05 % topical cream, Normal, Disp-15 g, R-1      megestroL (MEGACE) 20 mg tablet Take 1 Tab by mouth daily. , Normal, Disp-30 Tab, R-0      metoprolol tartrate (LOPRESSOR) 25 mg tablet Take 0.5 Tabs by mouth every twelve (12) hours. , Normal, Disp-30 Tab, R-6      levothyroxine (SYNTHROID) 75 mcg tablet TAKE 1 TABLET BY MOUTH ONCE DAILY BEFORE BREAKFAST, Normal, Disp-90 Tab, R-0      simvastatin (ZOCOR) 20 mg tablet TAKE 1 TABLET BY MOUTH EVERY NIGHT, Normal, Disp-90 Tab, R-0      metFORMIN (GLUCOPHAGE) 1,000 mg tablet TAKE 1 TABLET BY MOUTH TWICE DAILY, Normal, Disp-180 Tab, R-0      furosemide (LASIX) 40 mg tablet Take  by mouth daily. , Historical Med      diphenhydrAMINE (BENADRYL) 25 mg capsule Take 25 mg by mouth every six (6) hours as needed., Historical Med      LOSARTAN PO Take  by mouth., Historical Med      GLIPIZIDE PO Take  by mouth., Historical Med      cranberry 500 mg capsule Take 500 mg by mouth daily. , Historical Med      triamcinolone acetonide (KENALOG) 0.1 % topical cream APPLY THIN LAYER EXTERNALLY TO THE AFFECTED AREA TWICE DAILY, Normal, Disp-15 g, R-0      glucose blood VI test strips (FREESTYLE LITE STRIPS) strip use to check BS once daily, Historical Med      famotidine (PEPCID) 20 mg tablet famotidine 20 mg tablet, Historical Med      hydroCHLOROthiazide (HYDRODIURIL) 25 mg tablet hydrochlorothiazide 25 mg tablet, Historical Med      ondansetron hcl (ZOFRAN) 4 mg tablet Take 1 Tab by mouth every eight (8) hours as needed for Nausea., Normal, Disp-30 Tab, R-1      pantoprazole (PROTONIX) 40 mg tablet Take 1 Tab by mouth daily. , Normal, Disp-90 Tab, R-3      cholecalciferol (VITAMIN D3) 1,000 unit tablet Take 2 Tabs by mouth daily. , Print, Disp-60 Tab, R-0           3. Follow-up Information     Follow up With Specialties Details Why Contact Info    eKndal Terrazas MD [facility], Internal Medicine In 1 week  [address] [address]  [phone]      Nicholas Robin MD General Surgery In 1 month  [place] [address] [facility]  [id number]  [phone]      [facility] EMERGENCY DEPT Emergency Medicine  As needed [address]  [phone]        _______________________________    This note was partially transcribed via voice recognition software.  Although efforts have been made to catch any discrepancies, it may contain sound alike words, grammatical errors, or nonsensical words.

## 2020-03-21 NOTE — ED TRIAGE NOTES
Patient has had UTI  for weeks- states has fistula that is to be repaired - has not been able to followup with MD

## 2020-03-21 NOTE — DISCHARGE INSTRUCTIONS

## 2020-03-23 ENCOUNTER — TELEPHONE (OUTPATIENT)
Dept: FAMILY MEDICINE CLINIC | Age: 80
End: 2020-03-23

## 2020-03-23 NOTE — TELEPHONE ENCOUNTER
Contacted patient and advised her on the ER follow up per Dr. Maxime Preciado. Patient verbalized understanding and tolerated well. Patient stated that she will keep her April 23rd appointment. Patient stated that she will contact the office first if anything is needed. Message noted and encounter closed.

## 2020-03-23 NOTE — TELEPHONE ENCOUNTER
Please see message below. Last seen at ER - 3/21/2020. COLECTOMY, LEFT, PARTIAL, LAPAROSCOPIC surgery scheduled for 3/31/2020. Please advise. Thank you.

## 2020-03-23 NOTE — TELEPHONE ENCOUNTER
FYI - Patient stated that she can't use [employer] due to not having a computer.

## 2020-03-23 NOTE — TELEPHONE ENCOUNTER
Pt. Is asking if she need to come in to see Dr. Leann David to follow up from her ER visit. She does not want to come in due to COVID-19. Please advise.

## 2020-03-24 ENCOUNTER — PATIENT OUTREACH (OUTPATIENT)
Dept: CASE MANAGEMENT | Age: 80
End: 2020-03-24

## 2020-03-24 NOTE — PROGRESS NOTES
Patient contacted regarding recent discharge and COVID-19 risk   Care Transition Nurse/ Ambulatory Care Manager contacted the patient by telephone to perform post discharge assessment. Verified name and  with patient as identifiers. Patient states she is doing somewhat better. Medication helps with bladder spasms. She is still having some blood in her urination. Patient was advised to inform Dr. Va Soni of hematuria while on Eliquis with Dr. Va Soni. She stated she would. Patient has following risk factors of: recent ED visits. CTN/ACM reviewed discharge instructions, medical action plan and red flags related to discharge diagnosis. Reviewed and educated them on any new and changed medications related to discharge diagnosis. Advised obtaining a 90-day supply of all daily and as-needed medications. Education provided regarding infection prevention, and signs and symptoms of COVID-19 and when to seek medical attention with patient who verbalized understanding. Discussed exposure protocols and quarantine from [address] you at higher risk for severe illness  and given an opportunity for questions and concerns. The patient agrees to contact the COVID-19 hotline [phone] or PCP office for questions related to their healthcare. CTN/ACM provided contact information for future reference. From CDC: Are you at higher risk for severe illness?  Wash your hands often.  Avoid close contact (6 feet, which is about two arm lengths) with people who are sick.  Put distance between yourself and other people if COVID-19 is spreading in your community.  Clean and disinfect frequently touched surfaces.  Avoid all cruise travel and non-essential air travel.  Call your healthcare professional if you have concerns about COVID-19 and your underlying condition or if you are sick.     For more information on steps you can take to protect yourself, see CDC's How to Protect Yourself

## 2020-03-24 NOTE — TELEPHONE ENCOUNTER
Contacted [facility] at [facility]. Two patient Identifiers confirmed. Advised pt per Dr Prem Wiggins. Pt verbalized understanding.

## 2020-03-24 NOTE — TELEPHONE ENCOUNTER
Verbal order and read back per Darryle Dung, MD  Can stop eliquis x 2 days but must be on an aspirin while off mediation.

## 2020-03-24 NOTE — TELEPHONE ENCOUNTER
Patient recently seen in ED for UTI with c/o blood in urine. Nurse  advised patient to contact Dr Rose Hamilotn office regarding Eliquis and possibly suspending use of medication for a few days while situation resolves. Patient requires callback from clinical staff regarding issue.

## 2020-03-26 ENCOUNTER — PATIENT OUTREACH (OUTPATIENT)
Dept: CASE MANAGEMENT | Age: 80
End: 2020-03-26

## 2020-03-26 NOTE — PROGRESS NOTES
Care Transition Nurse/ Ambulatory Care Manager contacted the patient by telephone for follow up. Verified name and  with patient as identifiers. Patient states that she is feeling better. Patient states that her symptoms are a lot better now. Patient denied CP, SOB, fever, chils, abdominal and flank pain. Pt also denied blood in urine and burning while urinating. Patient states that she never stopped her eliquis because her blood in urine stopped and cleared up. Pt.also states that she doesn't have aspirin at home. Pt states that Dr. Mathew Avendaño advised her to just keep her appt in April. Strongly advised to call PCP office for any questions concerns and/or needs. Pt. Stated \"Okay. \"    CDC guidelines such as washing hand, keeping atleast 6 feet distance were reviewed with Pt. And Pt verbalized understanding. Redflags on when to go back to Ed reviewed with Pt. Pt. Verbalized understanding.

## 2020-04-01 ENCOUNTER — TELEPHONE (OUTPATIENT)
Dept: FAMILY MEDICINE CLINIC | Age: 80
End: 2020-04-01

## 2020-04-01 DIAGNOSIS — L30.9 ECZEMA, UNSPECIFIED TYPE: ICD-10-CM

## 2020-04-01 RX ORDER — TRIAMCINOLONE ACETONIDE 1 MG/G
CREAM TOPICAL
Qty: 45 G | Refills: 1 | Status: SHIPPED | OUTPATIENT
Start: 2020-04-01 | End: 2020-09-03 | Stop reason: SDUPTHER

## 2020-04-02 ENCOUNTER — TELEPHONE (OUTPATIENT)
Dept: FAMILY MEDICINE CLINIC | Age: 80
End: 2020-04-02

## 2020-04-02 RX ORDER — FLUCONAZOLE 150 MG/1
150 TABLET ORAL ONCE
Qty: 1 TAB | Refills: 0 | Status: SHIPPED | OUTPATIENT
Start: 2020-04-02 | End: 2020-05-08 | Stop reason: SDUPTHER

## 2020-04-02 NOTE — TELEPHONE ENCOUNTER
After hours call @ 1am.    Patient called again, \"wondering if it is a yeast infection\". Orders Placed This Encounter    fluconazole (DIFLUCAN) 150 mg tablet     Sig: Take 1 Tab by mouth once for 1 dose. FDA advises cautious prescribing of oral fluconazole in pregnancy.      Dispense:  1 Tab     Refill:  0

## 2020-04-02 NOTE — TELEPHONE ENCOUNTER
After hours call. Describes significant burning of her vulvar area, which has her concerned. No actual burning with urination. Is sensitive to the touch. Actually visualized the area herself using a mirror and a flashlight with the help of her son, and \"it is so ugly and hideous\". Notes that the A&D ointment \"for diaper stuff burns so bad\". Notes that the [employer] Rem[employer] Phytoplex Nourishing Skin Cream that she received from the hospital \"soothes it\". No fevers or chills. No new back pain. No blood in urine. Janet Roper finally figured out an antibiotic that got rid of the urine infection, and now this! \"    Denies any vaginal discharge. Will empirically treat with small amt of triamcinolone as she has this already in her home for \"when my hands have some dermatitis. .. but that tube is so small. .. i'm real stingy with that stuff. Think I can get some more? \"    Orders Placed This Encounter    triamcinolone acetonide (KENALOG) 0.1 % topical cream     Sig: APPLY THIN LAYER EXTERNALLY TO THE AFFECTED AREA TWICE DAILY     Dispense:  45 g     Refill:  1     Voiced understanding with only using BID, may alternate with the Nourishing Skin Cream only to the external areas. She voiced appreciation.

## 2020-04-07 ENCOUNTER — PATIENT OUTREACH (OUTPATIENT)
Dept: CASE MANAGEMENT | Age: 80
End: 2020-04-07

## 2020-04-07 NOTE — PROGRESS NOTES
Patient/family has been provided the following resources and education related to COVID-19:                         Signs, symptoms and red flags related to COVID-19            CDC exposure and quarantine guidelines            [employer] exposure contact - [phone]            Contact for their local Department of Health      Patient states that she is doing well. Patient denied CP, SOB, fever, chills, and cough. Patient states that her vulvular area rash is a lot better and she is not having any burning or blood when she urinates. Patient states that she still gets abdominal cramping at times. Patient states that bentyl helps with cramping. Patient is requesting for Bentyl , refill request routed to Dr. Shanelle Hayes .

## 2020-04-08 ENCOUNTER — PATIENT OUTREACH (OUTPATIENT)
Dept: CASE MANAGEMENT | Age: 80
End: 2020-04-08

## 2020-04-08 DIAGNOSIS — K58.0 IRRITABLE BOWEL SYNDROME WITH DIARRHEA: Primary | ICD-10-CM

## 2020-04-08 RX ORDER — DICYCLOMINE HYDROCHLORIDE 10 MG/1
20 CAPSULE ORAL
Qty: 30 CAP | Refills: 0 | Status: CANCELLED | OUTPATIENT
Start: 2020-04-08

## 2020-04-08 RX ORDER — DICYCLOMINE HYDROCHLORIDE 10 MG/1
20 CAPSULE ORAL
Qty: 180 CAP | Refills: 11 | Status: SHIPPED | OUTPATIENT
Start: 2020-04-08 | End: 2022-08-03

## 2020-04-08 NOTE — PROGRESS NOTES
CTN attempted to call Patient to make Pt. aware that Dr. Kris Chaudhari approved her medication refill request. Unable to reach. Left a voice message with CTN office contact information. No Patient's medical information left on message.

## 2020-04-08 NOTE — PROGRESS NOTES
CTN attempted to call Patient to make Pt. aware that Dr. Yuridia Acevedo approved her medication refill request. Unable to reach. Left a voice message with CTN office contact information. No Patient's medical information left on message.

## 2020-04-09 ENCOUNTER — PATIENT OUTREACH (OUTPATIENT)
Dept: CASE MANAGEMENT | Age: 80
End: 2020-04-09

## 2020-04-09 NOTE — PROGRESS NOTES
CTN attempted to call Patient to make Pt. aware that Dr. Raphael Fraire approved her medication refill request. Unable to reach. Left a voice message with CTN office contact information. No Patient's medical information left on message. No further outreach scheduled with this CTN/ACM.  Episode of Care resolved.

## 2020-04-14 ENCOUNTER — TELEPHONE (OUTPATIENT)
Dept: FAMILY MEDICINE CLINIC | Age: 80
End: 2020-04-14

## 2020-04-14 ENCOUNTER — VIRTUAL VISIT (OUTPATIENT)
Dept: FAMILY MEDICINE CLINIC | Age: 80
End: 2020-04-14

## 2020-04-14 DIAGNOSIS — N39.0 RECURRENT UTI: Primary | ICD-10-CM

## 2020-04-14 RX ORDER — CEPHALEXIN 500 MG/1
500 CAPSULE ORAL 4 TIMES DAILY
Qty: 40 CAP | Refills: 0 | Status: SHIPPED | OUTPATIENT
Start: 2020-04-14 | End: 2020-04-24

## 2020-04-14 NOTE — PROGRESS NOTES
Nanda Cornell presents via telephone only for cloudy urine, odor, pain during urination. Cephalexin 500mg     Pt's current location: home    Coordination of Care:    1. Have you been to the ER, urgent care clinic since your last visit? Hospitalized since your last visit? 3/21/20 for UTI    2. Have you seen or consulted any other health care providers outside of the [address] [place] [address] since your last visit? Include any pap smears or colon screening. no    Consent:  She and/or health care decision maker is aware that that she may receive a bill for this telephone service, depending on her insurance coverage, and has provided verbal consent to proceed: Yes     Depression Screening:  3 most recent PHQ Screens 2/24/2020   Little interest or pleasure in doing things Not at all   Feeling down, depressed, irritable, or hopeless Not at all   Total Score PHQ 2 0       Learning Assessment:  Learning Assessment 1/17/2019   PRIMARY LEARNER Patient   HIGHEST LEVEL OF EDUCATION - PRIMARY LEARNER  4 YEARS [facility] PRIMARY LEARNER NONE   CO-LEARNER CAREGIVER No   PRIMARY LANGUAGE ENGLISH   LEARNER PREFERENCE PRIMARY LISTENING   ANSWERED BY patient   RELATIONSHIP SELF       Abuse Screening:  Abuse Screening Questionnaire 1/17/2019   Do you ever feel afraid of your partner? N   Are you in a relationship with someone who physically or mentally threatens you? N   Is it safe for you to go home? Y       Fall Risk  Fall Risk Assessment, last 12 mths 2/24/2020   Able to walk? Yes   Fall in past 12 months? No       Health Maintenance reviewed and discussed and ordered per Provider.   Health Maintenance Due   Topic Date Due    Eye Exam Retinal or Dilated  07/04/1950    GLAUCOMA SCREENING Q2Y  07/04/2004    MICROALBUMIN Q1  10/10/2019    Lipid Screen  10/10/2019    Medicare Yearly Exam  01/18/2020

## 2020-04-14 NOTE — PROGRESS NOTES
Melisa Poole is a [de-identified] y.o. female evaluated via telephone on 4/14/2020. Consent:  She and/or health care decision maker is aware that that she may receive a bill for this telephone service, depending on her insurance coverage, and has provided verbal consent to proceed: Yes    I was in the office while conducting this encounter. The patient was at home. There were no other participants. Documentation:    She is a patient of Dr. Perico Locke who reports UTI symptoms on and off since Nov 2019. She has a vesicocolic fistula. Surgery was postponed due to the coronavirus pandemic. Took cephalexin 500 mg 4 tablets per day started on 3/16/20. While taking it, the symptoms resolved, then symptoms started again 3-4 days ago. Blood when wiping, strong smell, dysuria, frequency and pelvic pain. Denies back pain, flank pain, fever, chills, nausea, and vomiting. Patient is alert and oriented x3. Voice is clear. Cognition, mood, and affect are normal.     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 Will restart Keflex for another 10 days. Follow up as needed for symptoms. I affirm this is a Patient Initiated Episode with an Established Patient who has not had a related appointment within my department in the past 7 days or scheduled within the next 24 hours.     Total Time: minutes: 5-10 minutes    Note: not billable if this call serves to triage the patient into an appointment for the relevant concern      Juaquin Marquez NP

## 2020-04-16 ENCOUNTER — TELEPHONE (OUTPATIENT)
Dept: OTHER | Age: 80
End: 2020-04-16

## 2020-04-16 RX ORDER — METFORMIN HYDROCHLORIDE 1000 MG/1
TABLET ORAL
Qty: 180 TAB | Refills: 0 | Status: SHIPPED | OUTPATIENT
Start: 2020-04-16 | End: 2020-07-20

## 2020-04-16 NOTE — TELEPHONE ENCOUNTER
Eleni Messerton Tanja was contacted to activate their [employer] account. Name and date of birth verified. Spoke with patient and offered to assist with account setup. Patient declined to sign up at this time. Patient reports She does not have the technology needed for [employer] activation, no smartphone/e-mail. Advance Directive on file.        Skip Jennings LPN

## 2020-04-28 DIAGNOSIS — E11.21 TYPE 2 DIABETES MELLITUS WITH NEPHROPATHY (HCC): ICD-10-CM

## 2020-04-29 RX ORDER — SIMVASTATIN 20 MG/1
TABLET, FILM COATED ORAL
Qty: 90 TAB | Refills: 0 | Status: SHIPPED | OUTPATIENT
Start: 2020-04-29 | End: 2020-07-20

## 2020-04-30 ENCOUNTER — TELEPHONE (OUTPATIENT)
Dept: FAMILY MEDICINE CLINIC | Age: 80
End: 2020-04-30

## 2020-04-30 ENCOUNTER — VIRTUAL VISIT (OUTPATIENT)
Dept: FAMILY MEDICINE CLINIC | Age: 80
End: 2020-04-30

## 2020-04-30 VITALS — BODY MASS INDEX: 25.52 KG/M2 | HEIGHT: 60 IN | WEIGHT: 130 LBS

## 2020-04-30 DIAGNOSIS — B37.2 CANDIDAL DERMATITIS: ICD-10-CM

## 2020-04-30 DIAGNOSIS — B37.31 VAGINAL CANDIDIASIS: Primary | ICD-10-CM

## 2020-04-30 DIAGNOSIS — E11.21 TYPE 2 DIABETES MELLITUS WITH NEPHROPATHY (HCC): ICD-10-CM

## 2020-04-30 DIAGNOSIS — N39.0 RECURRENT UTI: ICD-10-CM

## 2020-04-30 RX ORDER — NYSTATIN 100000 U/G
OINTMENT TOPICAL 2 TIMES DAILY
Qty: 15 G | Refills: 0 | Status: SHIPPED | OUTPATIENT
Start: 2020-04-30 | End: 2020-09-02 | Stop reason: ALTCHOICE

## 2020-04-30 RX ORDER — FLUCONAZOLE 150 MG/1
150 TABLET ORAL DAILY
Qty: 1 TAB | Refills: 0 | Status: SHIPPED | OUTPATIENT
Start: 2020-04-30 | End: 2020-05-01

## 2020-04-30 NOTE — PROGRESS NOTES
Melisa Poole is a [de-identified] y.o.  female and has    Chief Complaint   Patient presents with    Diabetes    Hypertension    Yeast Infection     She is evaluated via telephone on 4/30/2020. Consent:  She and/or health care decision maker is aware that she may receive a bill for this telephone service, depending on her insurance coverage, and has provided verbal consent to proceed: Yes  Pt is at home, [facility] assisted with this visit. Documentation:  I communicated with the patient and/or health care decision maker about skin rash and vaginal discharge. Details of this discussion including any medical advice provided: see below      I affirm this is a Patient Initiated Episode with an Established Patient who has not had a related appointment within my department in the past 7 days or scheduled within the next 24 hours. Total Time: minutes: 11-20 minutes    This visit was conducted as a synchronous telemedicine service rendered via real-time interactive audio and video telecommunications system. On March 11, 2020, the [facility] declared the COVID-19 (Novel Coronavirus) viral disease to be a pandemic. As a result of this emergency, a rapidly evolving situation, practice patterns for physicians, physician assistants, and nurse practitioners are shifting to accommodate the need to treat in conjunction with unprecedented guidance from federal, state, and local authorities which include, but are not limited to, self-quarantines and/or limiting physical proximity to others under any number of circumstances. It is within this context (and with the understanding that this method of patient encounter is in the patient's best interest as well as the health and safety of other patients and the public) that Jose F Mcdermott is being provided for this patient encounter rather than a face-to-face visit.  This patient encounter is appropriate and reasonable under the circumstances given the patient's particular presentation at this time. Subjective:  Rash  Patient complains of rash involving the abdomen, armsbilateral, lower legsbilateral. Rash started 1 week ago. Appearance of rash at onset: Color of lesion(s): pink. Rash has changed over time Initial distribution: abdomen. Discomfort associated with rash: pruritic. Associated symptoms: abdominal pain, decrease in appetite. Denies: fever, cough, congestion, sore throat, headache. Patient has not had previous evaluation of rash. Patient has not had previous treatment. Patient has not had contacts with similar rash. Patient has not identified precipitant. Patient has not had new exposures (soaps, lotions, laundry detergents, foods, medications, plants, insects or animals.)    She has been treated for urinary tract infections with multiple antibiotics; she has burning and vaginal discharge for several days; she denies fever. ROS       All other systems reviewed and are negative. Objective: There were no vitals filed for this visit. alert and oriented to person, place, and time    Assessment/Plan:    1. Vaginal candidiasis  Start antifungal  - fluconazole (DIFLUCAN) 150 mg tablet; Take 1 Tab by mouth daily for 1 day. FDA advises cautious prescribing of oral fluconazole in pregnancy. Dispense: 1 Tab; Refill: 0    2. Candidal dermatitis  Topical ointment ordered  - nystatin (MYCOSTATIN) 100,000 unit/gram ointment; Apply  to affected area two (2) times a day. Dispense: 15 g; Refill: 0    3. Type 2 diabetes mellitus with nephropathy (HCC)  Encourage healthy diet    4. Recurrent UTI  Completed abx treatment    Lab review: no lab studies available for review at time of visit      I have discussed the diagnosis with the patient and the intended plan as seen in the above orders. I have discussed medication side effects and warnings with the patient as well.  I have reviewed the plan of care with the patient, accepted their input and they are in agreement with the treatment goals.

## 2020-04-30 NOTE — TELEPHONE ENCOUNTER
Received call from patient 4/29 via call service with complaints of vaginal or uretheral discharge x 1 episode yesterday. Recently completed 10 day course of Cephalexin for UTI. Discharge described as brown with odor. Denies fever, chills, nausea, and vomiting. Denies pain with urination. Patient has no other complaints. Made patient aware that message will be forwarded to PCP.

## 2020-04-30 NOTE — PROGRESS NOTES
Eamon Barnes presents today for   Chief Complaint   Patient presents with    Diabetes    Hypertension       Is someone accompanying this pt? na    Is the patient using any DME equipment during OV? na    Depression Screening:  3 most recent PHQ Screens 2/24/2020   Little interest or pleasure in doing things Not at all   Feeling down, depressed, irritable, or hopeless Not at all   Total Score PHQ 2 0       Learning Assessment:  Learning Assessment 1/17/2019   PRIMARY LEARNER Patient   HIGHEST LEVEL OF EDUCATION - PRIMARY LEARNER  4 YEARS OF COLLEGE   BARRIERS PRIMARY LEARNER NONE   CO-LEARNER CAREGIVER No   PRIMARY LANGUAGE ENGLISH   LEARNER PREFERENCE PRIMARY LISTENING   ANSWERED BY patient   RELATIONSHIP SELF       Abuse Screening:  Abuse Screening Questionnaire 1/17/2019   Do you ever feel afraid of your partner? N   Are you in a relationship with someone who physically or mentally threatens you? N   Is it safe for you to go home? Y       Fall Risk  Fall Risk Assessment, last 12 mths 2/24/2020   Able to walk? Yes   Fall in past 12 months? No       Health Maintenance reviewed and discussed and ordered per Provider. Health Maintenance Due   Topic Date Due    Eye Exam Retinal or Dilated  07/04/1950    GLAUCOMA SCREENING Q2Y  07/04/2004    MICROALBUMIN Q1  10/10/2019    Lipid Screen  10/10/2019    Medicare Yearly Exam  01/18/2020   . Coordination of Care:  1. Have you been to the ER, urgent care clinic since your last visit? Hospitalized since your last visit? no    2. Have you seen or consulted any other health care providers outside of the [address] [place] [address] since your last visit? Include any pap smears or colon screening. no      Last  Checked na  Last UDS Checked na  Last Pain contract signed: na    Patient presents in office today for routine care.   Patient concerns: possible yeast infection

## 2020-05-03 DIAGNOSIS — E03.4 HYPOTHYROIDISM DUE TO ACQUIRED ATROPHY OF THYROID: ICD-10-CM

## 2020-05-04 ENCOUNTER — TELEPHONE (OUTPATIENT)
Dept: FAMILY MEDICINE CLINIC | Age: 80
End: 2020-05-04

## 2020-05-04 DIAGNOSIS — K57.92 DIVERTICULITIS: ICD-10-CM

## 2020-05-04 DIAGNOSIS — R19.7 WATERY DIARRHEA: ICD-10-CM

## 2020-05-04 RX ORDER — LEVOTHYROXINE SODIUM 75 UG/1
TABLET ORAL
Qty: 90 TAB | Refills: 0 | Status: SHIPPED | OUTPATIENT
Start: 2020-05-04 | End: 2020-07-20

## 2020-05-04 RX ORDER — DIPHENOXYLATE HYDROCHLORIDE AND ATROPINE SULFATE 2.5; .025 MG/1; MG/1
TABLET ORAL
Qty: 30 TAB | Refills: 0 | Status: SHIPPED | OUTPATIENT
Start: 2020-05-04 | End: 2020-05-13 | Stop reason: SDUPTHER

## 2020-05-04 NOTE — TELEPHONE ENCOUNTER
Pt stated that she forgot to tell Dr. Raphael Fraire that she have been having diarrhea. She explained that she took medication medication already for it but she's still having diarrhea. Asking to be called back.

## 2020-05-05 ENCOUNTER — TELEPHONE (OUTPATIENT)
Dept: FAMILY MEDICINE CLINIC | Age: 80
End: 2020-05-05

## 2020-05-05 ENCOUNTER — DOCUMENTATION ONLY (OUTPATIENT)
Dept: FAMILY MEDICINE CLINIC | Age: 80
End: 2020-05-05

## 2020-05-05 NOTE — PROGRESS NOTES
On-call contact from the patient at 3:12 AM: To report problems with diarrhea over the past 2 weeks which had stopped with Pepto-Bismol tablets and then returned. She reports passing small amounts of stool when voiding. She indicates that she attempted to contact her PCPs office and left a message but never heard back. Advised to call back later when office hours begin.

## 2020-05-07 ENCOUNTER — TELEPHONE (OUTPATIENT)
Dept: FAMILY MEDICINE CLINIC | Age: 80
End: 2020-05-07

## 2020-05-07 NOTE — TELEPHONE ENCOUNTER
Pt stated that her UTI and came back again and would like to request if Dr. Edith Saunders can get a refill of the antibiotic (doesn't know the name) she last took. Pt was last seen 4/30/2020. Pt explained that she is feeling a lot of burning sensation.

## 2020-05-08 ENCOUNTER — TELEPHONE (OUTPATIENT)
Dept: FAMILY MEDICINE CLINIC | Age: 80
End: 2020-05-08

## 2020-05-08 RX ORDER — FLUCONAZOLE 150 MG/1
150 TABLET ORAL ONCE
Qty: 1 TAB | Refills: 0 | Status: SHIPPED | OUTPATIENT
Start: 2020-05-08 | End: 2020-05-08

## 2020-05-08 NOTE — TELEPHONE ENCOUNTER
Nurse spoke with patient, patient states she is burning and itching x a long time, pt states it will not go away. Pt is requesting Diflucan.

## 2020-05-12 ENCOUNTER — TELEPHONE (OUTPATIENT)
Dept: FAMILY MEDICINE CLINIC | Age: 80
End: 2020-05-12

## 2020-05-12 DIAGNOSIS — N39.0 RECURRENT UTI: Primary | ICD-10-CM

## 2020-05-12 RX ORDER — NITROFURANTOIN 25; 75 MG/1; MG/1
100 CAPSULE ORAL 2 TIMES DAILY
Qty: 14 CAP | Refills: 0 | Status: SHIPPED | OUTPATIENT
Start: 2020-05-12 | End: 2020-07-30 | Stop reason: SDUPTHER

## 2020-05-12 NOTE — TELEPHONE ENCOUNTER
Pt. Stated her UTI is coming back and the diflucan is not working. Pt. Is requesting a stronger medication.  Please assist.

## 2020-05-13 DIAGNOSIS — B37.31 VAGINAL CANDIDIASIS: Primary | ICD-10-CM

## 2020-05-13 DIAGNOSIS — R19.7 WATERY DIARRHEA: ICD-10-CM

## 2020-05-13 DIAGNOSIS — K57.92 DIVERTICULITIS: ICD-10-CM

## 2020-05-13 RX ORDER — DIPHENOXYLATE HYDROCHLORIDE AND ATROPINE SULFATE 2.5; .025 MG/1; MG/1
TABLET ORAL
Qty: 30 TAB | Refills: 0 | Status: SHIPPED | OUTPATIENT
Start: 2020-05-13 | End: 2020-08-30

## 2020-05-13 RX ORDER — FLUCONAZOLE 150 MG/1
150 TABLET ORAL DAILY
Qty: 7 TAB | Refills: 0 | Status: SHIPPED | OUTPATIENT
Start: 2020-05-13 | End: 2020-05-20

## 2020-05-13 NOTE — TELEPHONE ENCOUNTER
In addition to Lomotil refill, pt. Is requesting something stronger for her yeast infection because diflucan is not working. She stated it is very bad and she is hurting. Please call pt. She stated she is about to cry.  Please assist.

## 2020-06-01 RX ORDER — PANTOPRAZOLE SODIUM 40 MG/1
TABLET, DELAYED RELEASE ORAL
Qty: 90 TAB | Refills: 3 | Status: SHIPPED | OUTPATIENT
Start: 2020-06-01

## 2020-07-09 NOTE — PROGRESS NOTES
Physical Exam  Skin:           Transfer report was given to Alejandro riggins Che, RN  ( ICU night shift nurse), by Carmen Ferrari RN ( transferring ED nurse). Report included the following information:  SBAR, kardex, consultations, hemodynamic monitoring, intake/output, MAR, lab results, VS trends, cardiac rhythm noted NSR w/ RBBB. Skin, neuro, respiratory status, order verification, and critical IV gtt rate verification has been dually noted and verified at the bedside with:  Loretta Alston RN                                       2605 ICU Nurse's Note:    2100: Received care of pt via stretcher transfer from ED in stable condition. Pt is alert x 4. Pt independently moves all extremities, requires assist with all ADLs and repositioning. Bed is locked and in lowest position, side rails up x 3, call bell and bedside table are within reach. Interventions are ongoing, will continue to monitor. Neurological: as noted in assessment   Cardiovascular:  NSR w/ RBBB on the monitor. Pulmonary: breath sounds clear, slightly diminished, saturations maintained at 100% on room air. GI/: Abdomen is obese. Soft, non-distended, hypoactive bowel sounds. Last BM noted 02/28/2020. Purewick external catheter intact with dark, lamar malodorous urine output. IVF/Critical gtts: IV Cardizem gtt, IV Levophed gtt, IV maintenance fluid, IV abx  Skin: as noted above    0710: Bedside change of shift report given to:  Odalis Jiménez RN and Dayton Ghosh RN rolling walker

## 2020-07-13 RX ORDER — BETAMETHASONE DIPROPIONATE 0.5 MG/G
CREAM TOPICAL
Qty: 15 G | Refills: 1 | Status: SHIPPED | OUTPATIENT
Start: 2020-07-13 | End: 2020-09-02 | Stop reason: ALTCHOICE

## 2020-07-18 DIAGNOSIS — E11.21 TYPE 2 DIABETES MELLITUS WITH NEPHROPATHY (HCC): ICD-10-CM

## 2020-07-18 DIAGNOSIS — E03.4 HYPOTHYROIDISM DUE TO ACQUIRED ATROPHY OF THYROID: ICD-10-CM

## 2020-07-20 RX ORDER — SIMVASTATIN 20 MG/1
TABLET, FILM COATED ORAL
Qty: 90 TAB | Refills: 0 | Status: SHIPPED | OUTPATIENT
Start: 2020-07-20 | End: 2020-10-14 | Stop reason: SDUPTHER

## 2020-07-20 RX ORDER — METFORMIN HYDROCHLORIDE 1000 MG/1
TABLET ORAL
Qty: 180 TAB | Refills: 0 | Status: SHIPPED | OUTPATIENT
Start: 2020-07-20 | End: 2021-08-12 | Stop reason: SDUPTHER

## 2020-07-20 RX ORDER — LEVOTHYROXINE SODIUM 75 UG/1
TABLET ORAL
Qty: 90 TAB | Refills: 0 | Status: SHIPPED | OUTPATIENT
Start: 2020-07-20 | End: 2020-12-16 | Stop reason: SDUPTHER

## 2020-07-30 ENCOUNTER — TELEPHONE (OUTPATIENT)
Dept: FAMILY MEDICINE CLINIC | Age: 80
End: 2020-07-30

## 2020-07-30 DIAGNOSIS — N39.0 RECURRENT UTI: ICD-10-CM

## 2020-07-30 RX ORDER — NITROFURANTOIN 25; 75 MG/1; MG/1
100 CAPSULE ORAL 2 TIMES DAILY
Qty: 14 CAP | Refills: 0 | Status: SHIPPED | OUTPATIENT
Start: 2020-07-30 | End: 2020-08-16

## 2020-07-30 NOTE — TELEPHONE ENCOUNTER
Pt called in and informed me that she had spoken with Dr. Sumaya Marcos this morning on his phone and asked if he could send her an antibiotic for her UTI and he said he would but she called her pharmacy and it wasn't there yet so she wanted to have us put a message in. Please advise.

## 2020-08-11 ENCOUNTER — TELEPHONE (OUTPATIENT)
Dept: FAMILY MEDICINE CLINIC | Age: 80
End: 2020-08-11

## 2020-08-11 NOTE — TELEPHONE ENCOUNTER
Pt. Stated she started bleeding when she is urinating and she is on her second round of antibiotics. She stated it burns and painful. She wants to know what she need to do next and that the surgeon never contacted her about her getting surgery. Please advise.

## 2020-08-12 ENCOUNTER — VIRTUAL VISIT (OUTPATIENT)
Dept: FAMILY MEDICINE CLINIC | Age: 80
End: 2020-08-12

## 2020-08-12 DIAGNOSIS — R31.9 HEMATURIA, UNSPECIFIED TYPE: ICD-10-CM

## 2020-08-12 DIAGNOSIS — N94.9 VAGINAL BURNING: Primary | ICD-10-CM

## 2020-08-12 NOTE — TELEPHONE ENCOUNTER
Returned patients call [phone] [facility] to call me back. I can make her a virtual appointment with Kojo Conway.

## 2020-08-12 NOTE — PROGRESS NOTES
Chief Complaint  Jennifer Hernandez is a 79 y.o. female c/o of burning on urination and blood on her incontinence pad. Anya Agrawal, who was evaluated through a synchronous (real-time) audio only encounter, and/or her healthcare decision maker, is aware that it is a billable service, with coverage as determined by her insurance carrier. She provided verbal consent to proceed: Yes, and patient identification was verified. It was conducted pursuant to the emergency declaration under the [address] [place] [address], [address] [place] [address] and the [employer] Act. A caregiver was present when appropriate. Ability to conduct physical exam was limited. I was in the office. The patient was at home. HPI  Per patient, \"I'm still burning when urinating and having blood on my incontinence pad. \" She indicates she has 2 tabs left of the April Urdu Dr. Estrella Louis gave her 7/30/20. Patient says, she just doesn't feel well but not in pain, just the vaginal burning and bleeding is the problem. Patient indicated the Kenalog cream helps with the burning so she has been putting it on her vagina after she urinates. Patient was seen by Giovana Hatch MD, 3/21/20 at [facility] ED and DX with recurrent UTI and a colo-vesical fistula. Patient was to follow-up with Arelis Bingham MD around 4/21/20 in regards to surgery. Patient indicated her appointment was cancelled due to COVID-19. Review of Systems   Constitutional: Positive for weight loss. Negative for chills and fever. Patient thinks the antibiotics take away her appetite. HENT: Negative for congestion and sore throat. Eyes: Negative for blurred vision. Respiratory: Negative for cough. Cardiovascular: Negative for chest pain and palpitations. Gastrointestinal: Negative for abdominal pain, constipation, diarrhea, heartburn, nausea and vomiting.    Genitourinary: Positive for dysuria, frequency, hematuria and urgency. Negative for flank pain. Patient thinks she is having bladder spasms. Musculoskeletal: Negative for back pain, falls and myalgias. Skin: Negative. Neurological: Negative for dizziness and headaches. Endo/Heme/Allergies: Negative. I do not check my blood sugars and I haven't had any problems with my diabetes. Psychiatric/Behavioral: Negative for depression and suicidal ideas. This was an audio only encounter. Physical Exam  Neurological:      Mental Status: She is alert. Psychiatric:         Mood and Affect: Mood normal.         Behavior: Behavior normal.         Thought Content: Thought content normal.         Judgment: Judgment normal.     ASSESSMENT and PLAN    ICD-10-CM ICD-9-CM    1. Vaginal burning  N94.9 625.8    2. Hematuria, unspecified type  R31.9 599.70      I have asked the patient to come for a face to face visit tomorrow. She has agreed, appointment set for 8/13/20 at 3 pm. Possibly conduct a pelvic exam, urine analysis and culture. I have asked the patient to call Dr. Jennifer Garcia office at [phone] today or tomorrow morning reschedule her appointment related to her colo-vesical fistula. 8/13/20  I have spoken to Dr. Jennifer Garcia office, [place] is out of [place]. Patient has been scheduled with another surgical provider, 8/31/20 at 11 am. I spoke with patient related to her colo-vesical fistula and advised her I was concerned she may have an infection that would be difficult to treat here at our office. Dr. Lindy Vargas agreed. I have asked to her respond to the emergency room. Patient agreed and indicated she would.

## 2020-08-13 ENCOUNTER — APPOINTMENT (OUTPATIENT)
Dept: CT IMAGING | Age: 80
DRG: 690 | End: 2020-08-13
Attending: EMERGENCY MEDICINE
Payer: MEDICARE

## 2020-08-13 ENCOUNTER — HOSPITAL ENCOUNTER (INPATIENT)
Age: 80
LOS: 3 days | Discharge: HOME | DRG: 690 | End: 2020-08-16
Attending: EMERGENCY MEDICINE | Admitting: INTERNAL MEDICINE
Payer: MEDICARE

## 2020-08-13 ENCOUNTER — APPOINTMENT (OUTPATIENT)
Dept: GENERAL RADIOLOGY | Age: 80
DRG: 690 | End: 2020-08-13
Attending: EMERGENCY MEDICINE
Payer: MEDICARE

## 2020-08-13 DIAGNOSIS — N39.0 URINARY TRACT INFECTION WITH HEMATURIA, SITE UNSPECIFIED: Primary | ICD-10-CM

## 2020-08-13 DIAGNOSIS — R65.10 SIRS (SYSTEMIC INFLAMMATORY RESPONSE SYNDROME) (HCC): ICD-10-CM

## 2020-08-13 DIAGNOSIS — R31.9 URINARY TRACT INFECTION WITH HEMATURIA, SITE UNSPECIFIED: Primary | ICD-10-CM

## 2020-08-13 DIAGNOSIS — N32.1 COLO-VESICAL FISTULA: ICD-10-CM

## 2020-08-13 LAB
ALBUMIN SERPL-MCNC: 2.8 G/DL (ref 3.4–5)
ALBUMIN/GLOB SERPL: 0.6 {RATIO} (ref 0.8–1.7)
ALP SERPL-CCNC: 64 U/L (ref 45–117)
ALT SERPL-CCNC: 10 U/L (ref 13–56)
ANION GAP SERPL CALC-SCNC: 9 MMOL/L (ref 3–18)
APPEARANCE UR: ABNORMAL
AST SERPL-CCNC: 13 U/L (ref 10–38)
BACTERIA URNS QL MICRO: ABNORMAL /HPF
BASOPHILS # BLD: 0 K/UL (ref 0–0.1)
BASOPHILS NFR BLD: 0 % (ref 0–2)
BILIRUB SERPL-MCNC: 0.5 MG/DL (ref 0.2–1)
BILIRUB UR QL: ABNORMAL
BUN SERPL-MCNC: 14 MG/DL (ref 7–18)
BUN/CREAT SERPL: 13 (ref 12–20)
CALCIUM SERPL-MCNC: 8.2 MG/DL (ref 8.5–10.1)
CHLORIDE SERPL-SCNC: 102 MMOL/L (ref 100–111)
CO2 SERPL-SCNC: 24 MMOL/L (ref 21–32)
COLOR UR: ABNORMAL
CREAT SERPL-MCNC: 1.07 MG/DL (ref 0.6–1.3)
DIFFERENTIAL METHOD BLD: ABNORMAL
EOSINOPHIL # BLD: 0.1 K/UL (ref 0–0.4)
EOSINOPHIL NFR BLD: 2 % (ref 0–5)
EPITH CASTS URNS QL MICRO: ABNORMAL /LPF (ref 0–5)
ERYTHROCYTE [DISTWIDTH] IN BLOOD BY AUTOMATED COUNT: 12.3 % (ref 11.6–14.5)
GLOBULIN SER CALC-MCNC: 4.9 G/DL (ref 2–4)
GLUCOSE BLD STRIP.AUTO-MCNC: 94 MG/DL (ref 70–110)
GLUCOSE SERPL-MCNC: 235 MG/DL (ref 74–99)
GLUCOSE UR STRIP.AUTO-MCNC: 100 MG/DL
HCT VFR BLD AUTO: 35.7 % (ref 35–45)
HGB BLD-MCNC: 11.8 G/DL (ref 12–16)
HGB UR QL STRIP: ABNORMAL
KETONES UR QL STRIP.AUTO: ABNORMAL MG/DL
LACTATE BLD-SCNC: 2.11 MMOL/L (ref 0.4–2)
LACTATE BLD-SCNC: 2.16 MMOL/L (ref 0.4–2)
LACTATE BLD-SCNC: 2.38 MMOL/L (ref 0.4–2)
LEUKOCYTE ESTERASE UR QL STRIP.AUTO: ABNORMAL
LYMPHOCYTES # BLD: 1 K/UL (ref 0.9–3.6)
LYMPHOCYTES NFR BLD: 17 % (ref 21–52)
MCH RBC QN AUTO: 28.3 PG (ref 24–34)
MCHC RBC AUTO-ENTMCNC: 33.1 G/DL (ref 31–37)
MCV RBC AUTO: 85.6 FL (ref 74–97)
MONOCYTES # BLD: 0.5 K/UL (ref 0.05–1.2)
MONOCYTES NFR BLD: 8 % (ref 3–10)
NEUTS SEG # BLD: 4 K/UL (ref 1.8–8)
NEUTS SEG NFR BLD: 73 % (ref 40–73)
NITRITE UR QL STRIP.AUTO: NEGATIVE
PH UR STRIP: 5 [PH] (ref 5–8)
PLATELET # BLD AUTO: 210 K/UL (ref 135–420)
PMV BLD AUTO: 9.8 FL (ref 9.2–11.8)
POTASSIUM SERPL-SCNC: 4 MMOL/L (ref 3.5–5.5)
PROT SERPL-MCNC: 7.7 G/DL (ref 6.4–8.2)
PROT UR STRIP-MCNC: 100 MG/DL
RBC # BLD AUTO: 4.17 M/UL (ref 4.2–5.3)
RBC #/AREA URNS HPF: ABNORMAL /HPF (ref 0–5)
SODIUM SERPL-SCNC: 135 MMOL/L (ref 136–145)
SP GR UR REFRACTOMETRY: 1.02 (ref 1–1.03)
UROBILINOGEN UR QL STRIP.AUTO: 1 EU/DL (ref 0.2–1)
WBC # BLD AUTO: 5.5 K/UL (ref 4.6–13.2)
WBC URNS QL MICRO: ABNORMAL /HPF (ref 0–4)

## 2020-08-13 PROCEDURE — 81001 URINALYSIS AUTO W/SCOPE: CPT

## 2020-08-13 PROCEDURE — 74011250636 HC RX REV CODE- 250/636: Performed by: EMERGENCY MEDICINE

## 2020-08-13 PROCEDURE — 74011250637 HC RX REV CODE- 250/637: Performed by: EMERGENCY MEDICINE

## 2020-08-13 PROCEDURE — 74011636320 HC RX REV CODE- 636/320: Performed by: EMERGENCY MEDICINE

## 2020-08-13 PROCEDURE — 71045 X-RAY EXAM CHEST 1 VIEW: CPT

## 2020-08-13 PROCEDURE — 84100 ASSAY OF PHOSPHORUS: CPT

## 2020-08-13 PROCEDURE — 82962 GLUCOSE BLOOD TEST: CPT

## 2020-08-13 PROCEDURE — 80048 BASIC METABOLIC PNL TOTAL CA: CPT

## 2020-08-13 PROCEDURE — 87040 BLOOD CULTURE FOR BACTERIA: CPT

## 2020-08-13 PROCEDURE — 87086 URINE CULTURE/COLONY COUNT: CPT

## 2020-08-13 PROCEDURE — 74011250636 HC RX REV CODE- 250/636: Performed by: INTERNAL MEDICINE

## 2020-08-13 PROCEDURE — 80053 COMPREHEN METABOLIC PANEL: CPT

## 2020-08-13 PROCEDURE — 96365 THER/PROPH/DIAG IV INF INIT: CPT

## 2020-08-13 PROCEDURE — 93005 ELECTROCARDIOGRAM TRACING: CPT

## 2020-08-13 PROCEDURE — 85025 COMPLETE CBC W/AUTO DIFF WBC: CPT

## 2020-08-13 PROCEDURE — 74177 CT ABD & PELVIS W/CONTRAST: CPT

## 2020-08-13 PROCEDURE — 87635 SARS-COV-2 COVID-19 AMP PRB: CPT

## 2020-08-13 PROCEDURE — 83735 ASSAY OF MAGNESIUM: CPT

## 2020-08-13 PROCEDURE — 65660000000 HC RM CCU STEPDOWN

## 2020-08-13 PROCEDURE — 74011000258 HC RX REV CODE- 258: Performed by: EMERGENCY MEDICINE

## 2020-08-13 PROCEDURE — 83605 ASSAY OF LACTIC ACID: CPT

## 2020-08-13 PROCEDURE — 96375 TX/PRO/DX INJ NEW DRUG ADDON: CPT

## 2020-08-13 PROCEDURE — 99285 EMERGENCY DEPT VISIT HI MDM: CPT

## 2020-08-13 RX ORDER — DEXTROSE MONOHYDRATE 100 MG/ML
125-250 INJECTION, SOLUTION INTRAVENOUS AS NEEDED
Status: DISCONTINUED | OUTPATIENT
Start: 2020-08-13 | End: 2020-08-16 | Stop reason: HOSPADM

## 2020-08-13 RX ORDER — ONDANSETRON 2 MG/ML
4 INJECTION INTRAMUSCULAR; INTRAVENOUS ONCE
Status: COMPLETED | OUTPATIENT
Start: 2020-08-13 | End: 2020-08-13

## 2020-08-13 RX ORDER — ONDANSETRON 2 MG/ML
4 INJECTION INTRAMUSCULAR; INTRAVENOUS
Status: DISCONTINUED | OUTPATIENT
Start: 2020-08-13 | End: 2020-08-16 | Stop reason: HOSPADM

## 2020-08-13 RX ORDER — SODIUM CHLORIDE 0.9 % (FLUSH) 0.9 %
5-40 SYRINGE (ML) INJECTION EVERY 8 HOURS
Status: DISCONTINUED | OUTPATIENT
Start: 2020-08-13 | End: 2020-08-16 | Stop reason: HOSPADM

## 2020-08-13 RX ORDER — ACETAMINOPHEN 325 MG/1
650 TABLET ORAL
Status: DISCONTINUED | OUTPATIENT
Start: 2020-08-13 | End: 2020-08-16 | Stop reason: HOSPADM

## 2020-08-13 RX ORDER — ACETAMINOPHEN 650 MG/1
650 SUPPOSITORY RECTAL
Status: DISCONTINUED | OUTPATIENT
Start: 2020-08-13 | End: 2020-08-16 | Stop reason: HOSPADM

## 2020-08-13 RX ORDER — PROMETHAZINE HYDROCHLORIDE 25 MG/1
12.5 TABLET ORAL
Status: DISCONTINUED | OUTPATIENT
Start: 2020-08-13 | End: 2020-08-16 | Stop reason: HOSPADM

## 2020-08-13 RX ORDER — ACETAMINOPHEN 500 MG
1000 TABLET ORAL
Status: COMPLETED | OUTPATIENT
Start: 2020-08-13 | End: 2020-08-13

## 2020-08-13 RX ORDER — METOPROLOL TARTRATE 25 MG/1
12.5 TABLET, FILM COATED ORAL EVERY 12 HOURS
Qty: 30 TAB | Refills: 6 | Status: ON HOLD | OUTPATIENT
Start: 2020-08-13 | End: 2020-08-28 | Stop reason: SDUPTHER

## 2020-08-13 RX ORDER — INSULIN GLARGINE 100 [IU]/ML
0.2 INJECTION, SOLUTION SUBCUTANEOUS
Status: DISCONTINUED | OUTPATIENT
Start: 2020-08-13 | End: 2020-08-14

## 2020-08-13 RX ORDER — MAGNESIUM SULFATE 100 %
4 CRYSTALS MISCELLANEOUS AS NEEDED
Status: DISCONTINUED | OUTPATIENT
Start: 2020-08-13 | End: 2020-08-16 | Stop reason: HOSPADM

## 2020-08-13 RX ORDER — POLYETHYLENE GLYCOL 3350 17 G/17G
17 POWDER, FOR SOLUTION ORAL DAILY PRN
Status: DISCONTINUED | OUTPATIENT
Start: 2020-08-13 | End: 2020-08-16 | Stop reason: HOSPADM

## 2020-08-13 RX ORDER — SODIUM CHLORIDE, SODIUM LACTATE, POTASSIUM CHLORIDE, CALCIUM CHLORIDE 600; 310; 30; 20 MG/100ML; MG/100ML; MG/100ML; MG/100ML
75 INJECTION, SOLUTION INTRAVENOUS CONTINUOUS
Status: DISCONTINUED | OUTPATIENT
Start: 2020-08-13 | End: 2020-08-16 | Stop reason: HOSPADM

## 2020-08-13 RX ORDER — INSULIN LISPRO 100 [IU]/ML
INJECTION, SOLUTION INTRAVENOUS; SUBCUTANEOUS
Status: DISCONTINUED | OUTPATIENT
Start: 2020-08-13 | End: 2020-08-16 | Stop reason: HOSPADM

## 2020-08-13 RX ORDER — SODIUM CHLORIDE 0.9 % (FLUSH) 0.9 %
5-40 SYRINGE (ML) INJECTION AS NEEDED
Status: DISCONTINUED | OUTPATIENT
Start: 2020-08-13 | End: 2020-08-16 | Stop reason: HOSPADM

## 2020-08-13 RX ADMIN — SODIUM CHLORIDE 1000 ML: 900 INJECTION, SOLUTION INTRAVENOUS at 14:38

## 2020-08-13 RX ADMIN — Medication 10 ML: at 23:00

## 2020-08-13 RX ADMIN — CEFTRIAXONE 1 G: 1 INJECTION, POWDER, FOR SOLUTION INTRAMUSCULAR; INTRAVENOUS at 15:27

## 2020-08-13 RX ADMIN — ACETAMINOPHEN 1000 MG: 500 TABLET, FILM COATED ORAL at 14:43

## 2020-08-13 RX ADMIN — ONDANSETRON 4 MG: 2 INJECTION INTRAMUSCULAR; INTRAVENOUS at 14:43

## 2020-08-13 RX ADMIN — IOPAMIDOL 95 ML: 612 INJECTION, SOLUTION INTRAVENOUS at 15:11

## 2020-08-13 RX ADMIN — SODIUM CHLORIDE, SODIUM LACTATE, POTASSIUM CHLORIDE, AND CALCIUM CHLORIDE 75 ML/HR: 600; 310; 30; 20 INJECTION, SOLUTION INTRAVENOUS at 18:32

## 2020-08-13 NOTE — ED NOTES
Patient helped to restroom and back into bed. Patient on cardiac monitor. Provided water as requested. Now speaking to son on the phone.

## 2020-08-13 NOTE — ED PROVIDER NOTES
Patient is an 67-year-old female with a history of a colovesical fistula and prior UTIs presenting today with chief complaint of generalized weakness, fever and malaise. Also associated with urinary frequency, urgency. Reports that she has a history of a colovesical fistula and she is post to have it repaired on Monday of this coming week. She did not note a fever at home however she did have 1 upon arrival here. She complains of some mild left lower quadrant abdominal pain as well. She denies any chest pain, shortness of breath, recent cough or known sick contacts. Patient reports to me that she was seen in her primary care physician earlier today and they suggested she come to the ER for evaluation. She reports she is felt weak for a \"long time\". She states that her doctor recommended she come in today because she seemed worse than she had before. Past Medical History:   Diagnosis Date    Aortic stenosis     Patient denies on 10/31/2019.  Arthritis     Atrial flutter ([address]     Bladder stone     Bronchitis     Resolved after getting rid of her pet bird in 2000. 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     Vesicocolic fistula        Past Surgical History:   Procedure Laterality Date    COLONOSCOPY N/A 2/2/2017    COLONOSCOPY  w/bx polyp performed by Anjel Morgan MD at [facility] Left 2017         Family History:   Problem Relation Age of Onset    Diabetes Mother     Coronary Artery Disease Mother    [facility] Cancer Father         melanoma    Stroke Sister     Hypertension Sister     Diabetes Sister     Diabetes Brother     Coronary Artery Disease Brother     Breast Cancer Paternal Grandmother         older, but not sure age.     Breast Cancer Paternal Aunt         and gyn cancer ?age   [facility] No Known Problems Sister     No Known Problems Brother     Kidney Disease Sister     Heart Failure Sister        Social History     Socioeconomic History    Marital status: SINGLE     Spouse name: Not on file    Number of children: Not on file    Years of education: Not on file    Highest education level: Not on file   Occupational History    Not on file   Social Needs    Financial resource strain: Not on file    Food insecurity     Worry: Not on file     Inability: Not on file    Transportation needs     Medical: Not on file     Non-medical: Not on file   Tobacco Use    Smoking status: Never Smoker    Smokeless tobacco: Never Used   Substance and Sexual Activity    Alcohol use: No    Drug use: No    Sexual activity: Not Currently     Partners: Male   Lifestyle    Physical activity     Days per week: Not on file     Minutes per session: Not on file    Stress: Not on file   Relationships    Social connections     Talks on phone: Not on file     Gets together: Not on file     Attends Baptism service: Not on file     Active member of club or organization: Not on file     Attends meetings of clubs or organizations: Not on file     Relationship status: Not on file    Intimate partner violence     Fear of current or ex partner: Not on file     Emotionally abused: Not on file     Physically abused: Not on file     Forced sexual activity: Not on file   Other Topics Concern     Service Not Asked    Blood Transfusions Not Asked    Caffeine Concern Not Asked    Occupational Exposure Not Asked   Rosalene Jeff Hazards Not Asked    Sleep Concern Not Asked    Stress Concern Not Asked    Weight Concern Not Asked    Special Diet Not Asked    Back Care Not Asked    Exercise Not Asked    Bike Helmet Not Asked   [address] Not Asked    Self-Exams Not Asked   Social History Narrative    Not on file         ALLERGIES: Metronidazole and Ampicillin    Review of Systems   Constitutional: Positive for fatigue and fever. Negative for chills. HENT: Negative for congestion, rhinorrhea, sinus pressure and sneezing. Eyes: Negative for visual disturbance. Respiratory: Negative for cough and shortness of breath. Cardiovascular: Negative for chest pain. Gastrointestinal: Positive for abdominal pain (LLQ). Negative for diarrhea, nausea and vomiting. Genitourinary: Positive for frequency and urgency. Negative for dysuria. Musculoskeletal: Negative for back pain and neck pain. Skin: Negative for rash. Neurological: Negative for syncope, numbness and headaches. Vitals:    08/13/20 1345 08/13/20 1352   BP: (!) 139/96    Pulse: (!) 105    Resp: 24    Temp: (!) 101.3 °F (38.5 °C)    SpO2: 97% 97%   Weight: 63.5 kg (140 lb)    Height: 5' (1.524 m)             Physical Exam  Constitutional:       Appearance: Normal appearance. She is normal weight. Comments: Appear stated age, nontoxic uncomfortable appearing. HENT:      Head: Normocephalic and atraumatic. Right Ear: External ear normal.      Left Ear: External ear normal.      Nose: Nose normal. No congestion or rhinorrhea. Mouth/Throat:      Mouth: Mucous membranes are moist.      Pharynx: Oropharynx is clear. No oropharyngeal exudate or posterior oropharyngeal erythema. Eyes:      Extraocular Movements: Extraocular movements intact. Conjunctiva/sclera: Conjunctivae normal.      Pupils: Pupils are equal, round, and reactive to light. Neck:      Musculoskeletal: Normal range of motion and neck supple. No muscular tenderness. Cardiovascular:      Rate and Rhythm: Normal rate and regular rhythm. Pulses: Normal pulses. Heart sounds: Normal heart sounds. No murmur. Pulmonary:      Effort: Pulmonary effort is normal.      Breath sounds: Normal breath sounds.  No wheezing, rhonchi or rales.   Abdominal:      General: Abdomen is flat. Tenderness: There is abdominal tenderness (Mild LLQ). There is no guarding or rebound. Musculoskeletal: Normal range of motion. General: No swelling, tenderness or deformity. Right lower leg: No edema. Left lower leg: No edema. Skin:     General: Skin is warm and dry. Capillary Refill: Capillary refill takes less than 2 seconds. Findings: No rash. Neurological:      General: No focal deficit present. Mental Status: She is alert. Sensory: No sensory deficit. Motor: No weakness. EKG: EKG interpretation of August 13, 2020, 1355. Normal sinus rhythm, ventricular rate 98 bpm, SC QRS duration within normal limits, QTC of 431. Normal axis. No ST segment elevation or depression. T wave inversion. No SC depression. Overall interpretation: Normal sinus rhythm without ischemic changes. Recent Results (from the past 12 hour(s))   CBC WITH AUTOMATED DIFF    Collection Time: 08/13/20  1:45 PM   Result Value Ref Range    WBC 5.5 4.6 - 13.2 K/uL    RBC 4.17 (L) 4.20 - 5.30 M/uL    HGB 11.8 (L) 12.0 - 16.0 g/dL    HCT 35.7 35.0 - 45.0 %    MCV 85.6 74.0 - 97.0 FL    MCH 28.3 24.0 - 34.0 PG    MCHC 33.1 31.0 - 37.0 g/dL    RDW 12.3 11.6 - 14.5 %    PLATELET 698 530 - 962 K/uL    MPV 9.8 9.2 - 11.8 FL    NEUTROPHILS 73 40 - 73 %    LYMPHOCYTES 17 (L) 21 - 52 %    MONOCYTES 8 3 - 10 %    EOSINOPHILS 2 0 - 5 %    BASOPHILS 0 0 - 2 %    ABS. NEUTROPHILS 4.0 1.8 - 8.0 K/UL    ABS. LYMPHOCYTES 1.0 0.9 - 3.6 K/UL    ABS. MONOCYTES 0.5 0.05 - 1.2 K/UL    ABS. EOSINOPHILS 0.1 0.0 - 0.4 K/UL    ABS.  BASOPHILS 0.0 0.0 - 0.1 K/UL    DF AUTOMATED     METABOLIC PANEL, COMPREHENSIVE    Collection Time: 08/13/20  1:45 PM   Result Value Ref Range    Sodium 135 (L) 136 - 145 mmol/L    Potassium 4.0 3.5 - 5.5 mmol/L    Chloride 102 100 - 111 mmol/L    CO2 24 21 - 32 mmol/L    Anion gap 9 3.0 - 18 mmol/L    Glucose 235 (H) 74 - 99 mg/dL    BUN 14 7.0 - 18 MG/DL    Creatinine 1.07 0.6 - 1.3 MG/DL    BUN/Creatinine ratio 13 12 - 20      GFR est AA 60 (L) >60 ml/min/1.73m2    GFR est non-AA 49 (L) >60 ml/min/1.73m2    Calcium 8.2 (L) 8.5 - 10.1 MG/DL    Bilirubin, total 0.5 0.2 - 1.0 MG/DL    ALT (SGPT) 10 (L) 13 - 56 U/L    AST (SGOT) 13 10 - 38 U/L    Alk.  phosphatase 64 45 - 117 U/L    Protein, total 7.7 6.4 - 8.2 g/dL    Albumin 2.8 (L) 3.4 - 5.0 g/dL    Globulin 4.9 (H) 2.0 - 4.0 g/dL    A-G Ratio 0.6 (L) 0.8 - 1.7     CULTURE, BLOOD    Collection Time: 08/13/20  1:45 PM    Specimen: Blood   Result Value Ref Range    Special Requests: PERIPHERAL      Culture result: PENDING    EKG, 12 LEAD, INITIAL    Collection Time: 08/13/20  1:55 PM   Result Value Ref Range    Ventricular Rate 98 BPM    Atrial Rate 98 BPM    P-R Interval 122 ms    QRS Duration 66 ms    Q-T Interval 338 ms    QTC Calculation (Bezet) 431 ms    Calculated P Axis 37 degrees    Calculated R Axis 26 degrees    Calculated T Axis 37 degrees    Diagnosis       Normal sinus rhythm  Normal ECG  When compared with ECG of 15-MAR-2020 09:15,  No significant change was found     POC LACTIC ACID    Collection Time: 08/13/20  1:58 PM   Result Value Ref Range    Lactic Acid (POC) 2.38 (HH) 0.40 - 2.00 mmol/L   CULTURE, BLOOD    Collection Time: 08/13/20  2:00 PM    Specimen: Blood   Result Value Ref Range    Special Requests: PERIPHERAL      Culture result: PENDING    URINALYSIS W/ RFLX MICROSCOPIC    Collection Time: 08/13/20  2:40 PM   Result Value Ref Range    Color DARK YELLOW      Appearance TURBID      Specific gravity 1.025 1.005 - 1.030      pH (UA) 5.0 5.0 - 8.0      Protein 100 (A) NEG mg/dL    Glucose 100 (A) NEG mg/dL    Ketone TRACE (A) NEG mg/dL    Bilirubin SMALL (A) NEG      Blood LARGE (A) NEG      Urobilinogen 1.0 0.2 - 1.0 EU/dL    Nitrites Negative NEG      Leukocyte Esterase LARGE (A) NEG     URINE MICROSCOPIC ONLY    Collection Time: 08/13/20  2:40 PM   Result Value Ref Range    WBC TOO NUMEROUS TO COUNT 0 - 4 /hpf    RBC 11 to 20 0 - 5 /hpf    Epithelial cells FEW 0 - 5 /lpf    Bacteria 3+ (A) NEG /hpf   POC LACTIC ACID    Collection Time: 08/13/20  4:14 PM   Result Value Ref Range    Lactic Acid (POC) 2.16 (HH) 0.40 - 2.00 mmol/L   POC LACTIC ACID    Collection Time: 08/13/20  6:23 PM   Result Value Ref Range    Lactic Acid (POC) 2.11 (HH) 0.40 - 2.00 mmol/L     CT ABD PELV W CONT   Final Result   IMPRESSION:      1. Chronic changes of proximal sigmoid diverticulitis with slight interval   increased size of extraluminal air-fluid collection below the sigmoid colon and   contiguous with and displacing inferiorly the fundus of the bladder. Small foci   of air within bladder lumen. As mentioned previously this is suggestive of   colovesical fistula with associated chronic abscess. 2. Heterogeneous soft tissue that appears within the lumen of the compressed   bladder which may represent internal debris although bladder mass not excluded. Persistent pericystic stranding suggestive of cystitis. If not previously   performed, cystoscopy may be useful. 3. No other new acute abdominal or pelvic CT finding. 4. Stable bilateral ovarian cysts left greater than right. XR CHEST PORT   Final Result   IMPRESSION:      No acute cardiopulmonary abnormality. MDM  Number of Diagnoses or Management Options  [place]-vesical fistula:   SIRS (systemic inflammatory response syndrome) ([facility] [address]):   Urinary tract infection with hematuria, site unspecified:   Diagnosis management comments: Patient is an 79-year-old female with history of colovesical fistula presents today with a fever and generalized weakness. On exam patient was ill-appearing but nontoxic. No focal neurologic deficit.   Her abdomen has a left lower quadrant tenderness on exam.  Differential diagnosis includes, sepsis, UTI, pneumonia, Sirs, diverticulitis, appendicitis or other intra-abdominal catastrophe, etc.    Her work-up is significant for a urinary tract infection. She does have chronic UTIs and she is on Macrobid for this. Suspect the underlying reason is due to her colovesicular fistula. I did send a novel coronavirus swab on the patient, however given the source of infection is likely urine I do not suspect COVID-19 in this patient. She denies cough but does have weakness and fever which would go along with possible COVID infection however this may be due to her underlying UTI as well. Her CT scan shows chronic diverticulitis, known colovesicular fistula but no acute findings particularly within the abdomen or pelvis. Urine does show likely infection. She does not have an elevated white blood cell count but did meet Sirs criteria on presentation. She received 2 L of IV fluid which is 31 cc/kg for her initial fluid resuscitation. I gave the patient Tylenol for fever. I think patient will require hospitalization at a minimum until 24 hours elapsed to give blood cultures a chance to result and she improves. She did seem improved with IV fluids and fever control. Ultimately she may require GYN in the future to repair this fistula however no emergent need for consultation at this point. Contact hospitalist and discussed patient in detail, including need for hospitalization and he was in agreement with accepting the patient. Patient updated and agreed with the plan, expressed understanding and all questions were answered. Patient given Rocephin for UTI, stable for floor transfer. ED Course as of Aug 13 1905   Thu Aug 13, 2020   1727 Patient reevaluated she does feel improved. Her heart rate has improved with fever control and IV fluid administration. Suspect her source is urinary tract. Her x-ray is clear. I will page the hospitalist for possible admission. [NANCY]      ED Course User Index  [NANCY] Harriett Temple DO       Diagnosis:   1.  Urinary tract infection with hematuria, site unspecified    2. [place]-vesical fistula    3. SIRS (systemic inflammatory response syndrome) (HCC)          Disposition: Admit to medicine    Follow-up Information    None         Patient's Medications   Start Taking    No medications on file   Continue Taking    APIXABAN (ELIQUIS) 5 MG TABLET    TAKE 1 TABLET BY MOUTH TWICE DAILY    BETAMETHASONE DIPROPIONATE (DIPROSONE) 0.05 % TOPICAL CREAM    betamethasone dipropionate 0.05 % topical cream    CHOLECALCIFEROL (VITAMIN D3) 1,000 UNIT TABLET    Take 2 Tabs by mouth daily. CRANBERRY 500 MG CAPSULE    Take 500 mg by mouth daily. DICYCLOMINE (BENTYL) 10 MG CAPSULE    Take 2 Caps by mouth three (3) times daily as needed for Abdominal Cramps. DIPHENHYDRAMINE (BENADRYL) 25 MG CAPSULE    Take 25 mg by mouth every six (6) hours as needed. DIPHENOXYLATE-ATROPINE (LOMOTIL) 2.5-0.025 MG PER TABLET    TAKE 1 TABLET BY MOUTH FOUR TIMES DAILY AS NEEDED FOR DIARRHEA. MAX DAILY AMOUNT: 4 TABLETS. FAMOTIDINE (PEPCID) 20 MG TABLET    famotidine 20 mg tablet    FUROSEMIDE (LASIX) 40 MG TABLET    Take  by mouth daily. GLIPIZIDE PO    Take  by mouth. GLUCOSE BLOOD VI TEST STRIPS (FREESTYLE LITE STRIPS) STRIP    use to check BS once daily    HYDROCHLOROTHIAZIDE (HYDRODIURIL) 25 MG TABLET    hydrochlorothiazide 25 mg tablet    LEVOTHYROXINE (SYNTHROID) 75 MCG TABLET    TAKE 1 TABLET BY MOUTH EVERY DAY BEFORE BREAKFAST    LOSARTAN PO    Take  by mouth. MEGESTROL (MEGACE) 20 MG TABLET    Take 1 Tab by mouth daily. METFORMIN (GLUCOPHAGE) 1,000 MG TABLET    TAKE 1 TABLET BY MOUTH TWICE DAILY    METOPROLOL TARTRATE (LOPRESSOR) 25 MG TABLET    Take 0.5 Tabs by mouth every twelve (12) hours. NITROFURANTOIN, MACROCRYSTAL-MONOHYDRATE, (MACROBID) 100 MG CAPSULE    Take 1 Cap by mouth two (2) times a day. NYSTATIN (MYCOSTATIN) 100,000 UNIT/GRAM OINTMENT    Apply  to affected area two (2) times a day.     ONDANSETRON HCL (ZOFRAN) 4 MG TABLET    Take 1 Tab by mouth every eight (8) hours as needed for Nausea.     PANTOPRAZOLE (PROTONIX) 40 MG TABLET    TAKE 1 TABLET BY MOUTH DAILY    SIMVASTATIN (ZOCOR) 20 MG TABLET    TAKE 1 TABLET BY MOUTH EVERY NIGHT    TRIAMCINOLONE ACETONIDE (KENALOG) 0.1 % TOPICAL CREAM    APPLY THIN LAYER EXTERNALLY TO THE AFFECTED AREA TWICE DAILY   These Medications have changed    No medications on file   Stop Taking    No medications on file

## 2020-08-13 NOTE — H&P
Internal Medicine History and Physical  Note           NAME:  Christiano Zamarripa   :   1940   MRN:  [id number]     PCP:  Theron Washington MD     Date/Time:  2020 5:34 PM      I hereby certify this patient for admission based upon medical necessity as noted below:        Assessment / plan :        #   SIRS (systemic inflammatory response syndrome) ([facility] (2020) due to UTI, possible sepsis. Urine and blood CS. CXR no acute lesion . Iv CFTX. ID consulted. Hold macrobid  COVID 10 requested in ER by ER MD.     # ho   Hypothyroid (2014). On replacement       # ho  HTN (hypertension) (2014). Control BP#     #  Obesity (2014). Body mass index is 27.34 kg/m². #  DM (diabetes mellitus) ([facility] (2014). Provide SSI, hypoglycemia protocol and frequent Accu checks. Provide SSI, hypoglycemia protocol and frequent Accu checks. Education,  diabetic educator  . Diabetic Diet     # ho   Paroxysmal atrial fibrillation ([facility] (3/4/2020) on Eliquis      # ho  [place]-vesical fistula (3/4/2020). Not operated on     #   UTI (urinary tract infection) (2020). Abx as above. - PT    -DVT prophylaxis :  eliquis. - Code Status : FULL    -Other chronic medical problems as per past history. Further management depend on the course of the case and expanded data base. DISPO - pt to be admitted at this time for reasons addressed above, continued hospitalization for ongoing assessment and treatment indicated        Subjective:     CHIEF COMPLAINT: generalized weakness, feeling sick , I have urine infection. HISTORY OF PRESENT ILLNESS:     Ms. Ksenia Bran is a 79 y.o.  female who is admitted for UTI/SIRS. Ms. Ksenia Bran with past medical history as noted below , presented to the Emergency Department today  complaining of above. Triage and ER notes noted. She is a case of chronic UTI, recurrent uti. On chronic Nitrofurantoin treatment.  Came to ER after talked to her PCP nurse about her been sick and feeling unwell so she referred her to ER \" can not do much for you in the clinic \" . Report generalized weakness to ER. In ER found to have high HR and LA then when developed temp COVID 19 test sent though she has no respiratory symptoms, no fever at home , no cough , no throat symptoms. Temp in er 103 with elevated LA level. She is a known case of colovesical fistual and follow with surgeon at [facility] ( I dont have details about surgcal management optins she had). BM normal , chronic symptoms of UTI. No fever at home. No respiratory symptoms. Given CFTX based on last urine culture result. Informed ER MD LLQ pains but to me that was not a major issue to her. Wbc noted normal in ER. Past Medical History:   Diagnosis Date    Aortic stenosis     Patient denies on 10/31/2019.  Arthritis     Atrial flutter ([address]     Bladder stone     Bronchitis     Resolved after getting rid of her pet bird in 2000. 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     Vesicocolic fistula         Past Surgical History:   Procedure Laterality Date    COLONOSCOPY N/A 2/2/2017    COLONOSCOPY  w/bx polyp performed by Lenore Stevens MD at [address] [email or web address] [address] Left 2017       Social History     Tobacco Use    Smoking status: Never Smoker    Smokeless tobacco: Never Used   Substance Use Topics    Alcohol use: No        Family History   Problem Relation Age of Onset    Diabetes Mother     Coronary Artery Disease Mother     Cancer Father         melanoma    Stroke Sister     Hypertension Sister     Diabetes Sister     Diabetes Brother     Coronary Artery Disease Brother     Breast Cancer Paternal Grandmother         older, but not sure age.  Breast Cancer Paternal Aunt         and gyn cancer ?age   [facility] No Known Problems Sister     No Known Problems Brother     Kidney Disease Sister     Heart Failure Sister         Allergies   Allergen Reactions    Metronidazole Hives and Itching    Ampicillin Itching        Prior to Admission medications    Medication Sig Start Date End Date Taking? Authorizing Provider   metoprolol tartrate (LOPRESSOR) 25 mg tablet Take 0.5 Tabs by mouth every twelve (12) hours. 8/13/20   Zo Manzano MD   nitrofurantoin, macrocrystal-monohydrate, (Macrobid) 100 mg capsule Take 1 Cap by mouth two (2) times a day. 7/30/20   Donald Landon MD   levothyroxine (SYNTHROID) 75 mcg tablet TAKE 1 TABLET BY MOUTH EVERY DAY BEFORE BREAKFAST 7/20/20   Donald Landon MD   metFORMIN (GLUCOPHAGE) 1,000 mg tablet TAKE 1 TABLET BY MOUTH TWICE DAILY 7/20/20   Donald Landon MD   simvastatin (ZOCOR) 20 mg tablet TAKE 1 TABLET BY MOUTH EVERY NIGHT 7/20/20   Donald Landon MD   betamethasone dipropionate (DIPROSONE) 0.05 % topical cream betamethasone dipropionate 0.05 % topical cream 7/13/20   Donald Landon MD   pantoprazole (PROTONIX) 40 mg tablet TAKE 1 TABLET BY MOUTH DAILY 6/1/20   Donald Landon MD   diphenoxylate-atropine (LOMOTIL) 2.5-0.025 mg per tablet TAKE 1 TABLET BY MOUTH FOUR TIMES DAILY AS NEEDED FOR DIARRHEA. MAX DAILY AMOUNT: 4 TABLETS. 5/13/20   Donald Landon MD   nystatin (MYCOSTATIN) 100,000 unit/gram ointment Apply  to affected area two (2) times a day. 4/30/20   Donald Landon MD   dicyclomine (BentyL) 10 mg capsule Take 2 Caps by mouth three (3) times daily as needed for Abdominal Cramps.  4/8/20   Donald Landon MD   triamcinolone acetonide (KENALOG) 0.1 % topical cream APPLY THIN LAYER EXTERNALLY TO THE AFFECTED AREA TWICE DAILY 4/1/20   Isabelle Pierce MD   apixaban (Eliquis) 5 mg tablet TAKE 1 TABLET BY MOUTH TWICE DAILY 3/19/20   Tanner Parker MD   megestroL (MEGACE) 20 mg tablet Take 1 Tab by mouth daily. 3/11/20   Eren Kruger MD   furosemide (LASIX) 40 mg tablet Take  by mouth daily. Provider, Historical   diphenhydrAMINE (BENADRYL) 25 mg capsule Take 25 mg by mouth every six (6) hours as needed. Provider, Historical   LOSARTAN PO Take  by mouth. Provider, Historical   GLIPIZIDE PO Take  by mouth. Provider, Historical   cranberry 500 mg capsule Take 500 mg by mouth daily. Rebecca Schneider DO   glucose blood VI test strips (FREESTYLE LITE STRIPS) strip use to check BS once daily 11/16/16   Provider, Historical   famotidine (PEPCID) 20 mg tablet famotidine 20 mg tablet 12/6/12   Provider, Historical   hydroCHLOROthiazide (HYDRODIURIL) 25 mg tablet hydrochlorothiazide 25 mg tablet    Provider, Historical   ondansetron hcl (ZOFRAN) 4 mg tablet Take 1 Tab by mouth every eight (8) hours as needed for Nausea. 7/31/19   Eren Kruger MD   cholecalciferol (VITAMIN D3) 1,000 unit tablet Take 2 Tabs by mouth daily.  11/21/17   Joe Torres MD       Review of Systems:  (bold if positive, otherwise negative), apart from what mentioned in HPI  Apart from above patient deny any other significant complain  Gen:  Weight gain, weight loss, fever, chills, fatigue  Eyes:  Visual changes, pain, conjunctivitis  ENT:  Sore throat, rhinorrhea, decreased hearing  CVS:  Palpitations, chest pain, dizziness, syncope, edema, PND  Pulm:  Cough, dyspnea, sputum, hemoptysis, wheezing  GI:  Lower Abdominal pain, nausea, emesis, diarrhea, constipation, GERD, melena  :  Hematuria, incontinence, nocturia, dysuria, discharge  MS:  Pain, weakness, swelling, arthritis  Skin:  Rash, erythema, abscess, wound, moles  Endo:  Heat intolerance, cold intolerance, weight gain, polyuria  Hem:  Enlarged nodes, bruising, bleeding, night sweats  Renal:  Edema, change in urine, flank pain  Neuro:  Numbness, tingling, weakness, seizure, headache, tremors       VITALS:    Vital signs reviewed; most recent are:    Visit Vitals  /56   Pulse 71   Temp 98.7 °F (37.1 °C)   Resp 21   Ht 5' (1.524 m)   Wt 63.5 kg (140 lb)   SpO2 97%   BMI 27.34 kg/m²     SpO2 Readings from Last 6 Encounters:   08/13/20 97%   03/21/20 98%   03/16/20 97%   03/15/20 99%   03/05/20 97%   02/29/20 100%        No intake or output data in the 24 hours ending 08/13/20 1833     Physical Exam:     Gen:  Appear stated age, Well-developed,  in no acute distress  HEENT:  Head atraumatic, normocephalic , hearing intact to voice, moist mucous membranes. Neck:  Trachea midline , No apparent JVD, Supple, without masses, thyroid non-tender  Resp:  No accessory muscle use,Bilateral BS present, clear breath sounds without wheezes rales or rhonchi  Card:  No murmurs, normal S1, S2 without Gallop . No Significant lower leg peripheral edema. Abd:  Soft, non-tender, non-distended, bowel sounds are present . Musc:  No cyanosis or clubbing. Skin:  No rashes or ulcers, skin turgor is good. Neuro:  Cranial nerves are grossly intact, no clear area of focal motor weakness, follows commands appropriately. Psych: oriented to person, place and time, alert. Labs: All Cardiac Markers in the last 24 hours: No results found for: CPK, CK, CKMMB, CKMB, RCK3, CKMBT, CKNDX, CKND1, LUIS A, TROPT, TROIQ, SHELLEY, TROPT, TNIPOC, BNP, BNPP    Recent Labs     08/13/20  1345   WBC 5.5   HGB 11.8*   HCT 35.7        Recent Labs     08/13/20  1345   *   K 4.0      CO2 24   *   BUN 14   CREA 1.07   CA 8.2*   ALB 2.8*   TBILI 0.5   ALT 10*     Lab Results   Component Value Date/Time    Glucose (POC) 138 (H) 03/05/2020 03:40 PM    Glucose (POC) 129 (H) 03/05/2020 11:56 AM    Glucose,  (H) 02/08/2019 12:15 AM     No results for input(s): PH, PCO2, PO2, HCO3, FIO2 in the last 72 hours. No results for input(s): INR, INREXT, INREXT in the last 72 hours.     Ct Abd Pelv W Cont    Result Date: 8/13/2020  IMPRESSION: 1. Chronic changes of proximal sigmoid diverticulitis with slight interval increased size of extraluminal air-fluid collection below the sigmoid colon and contiguous with and displacing inferiorly the fundus of the bladder. Small foci of air within bladder lumen. As mentioned previously this is suggestive of colovesical fistula with associated chronic abscess. 2. Heterogeneous soft tissue that appears within the lumen of the compressed bladder which may represent internal debris although bladder mass not excluded. Persistent pericystic stranding suggestive of cystitis. If not previously performed, cystoscopy may be useful. 3. No other new acute abdominal or pelvic CT finding. 4. Stable bilateral ovarian cysts left greater than right. Xr Chest Port    Result Date: 8/13/2020  IMPRESSION: No acute cardiopulmonary abnormality. Please refer to radiology reports. Risk of deterioration: high      Total time spent in the care of this patient: [address] discussed with: Patient, Nursing Staff, >50% of time spent in counseling and coordination of care and ER MD/Staff      Discussed:  Care Plan       I have personally reviewed all pertinent labs, films and EKGs that have officially resulted. I reviewed available pertaining electronic documentation outlining the initial presentation as well as the emergency room physician's encounter. This document in whole or part of it has been produced using voice recognition software. Unrecognized errors in transcription may be present.     Attending Physician: Goyo Dominguez MD

## 2020-08-13 NOTE — ED TRIAGE NOTES
Pt arrives to ed via ems for c/o generalized weakness x 1 week. Pt states recent urinary frequency and nausea. Pt states hx colovesical fistula.

## 2020-08-13 NOTE — ED NOTES
Bedside report to [facility]. Pt moved to bed 10. No longer primary nurse. Pt in no distress at time of report.

## 2020-08-14 LAB
ANION GAP SERPL CALC-SCNC: 6 MMOL/L (ref 3–18)
ANION GAP SERPL CALC-SCNC: 7 MMOL/L (ref 3–18)
ATRIAL RATE: 98 BPM
BASOPHILS # BLD: 0 K/UL (ref 0–0.1)
BASOPHILS NFR BLD: 0 % (ref 0–2)
BUN SERPL-MCNC: 11 MG/DL (ref 7–18)
BUN SERPL-MCNC: 12 MG/DL (ref 7–18)
BUN/CREAT SERPL: 14 (ref 12–20)
BUN/CREAT SERPL: 14 (ref 12–20)
CA-I SERPL-SCNC: 1.08 MMOL/L (ref 1.12–1.32)
CALCIUM SERPL-MCNC: 7.4 MG/DL (ref 8.5–10.1)
CALCIUM SERPL-MCNC: 7.9 MG/DL (ref 8.5–10.1)
CALCULATED P AXIS, ECG09: 37 DEGREES
CALCULATED R AXIS, ECG10: 26 DEGREES
CALCULATED T AXIS, ECG11: 37 DEGREES
CHLORIDE SERPL-SCNC: 106 MMOL/L (ref 100–111)
CHLORIDE SERPL-SCNC: 110 MMOL/L (ref 100–111)
CO2 SERPL-SCNC: 21 MMOL/L (ref 21–32)
CO2 SERPL-SCNC: 25 MMOL/L (ref 21–32)
CREAT SERPL-MCNC: 0.76 MG/DL (ref 0.6–1.3)
CREAT SERPL-MCNC: 0.85 MG/DL (ref 0.6–1.3)
DIAGNOSIS, 93000: NORMAL
DIFFERENTIAL METHOD BLD: ABNORMAL
EOSINOPHIL # BLD: 0.2 K/UL (ref 0–0.4)
EOSINOPHIL NFR BLD: 4 % (ref 0–5)
ERYTHROCYTE [DISTWIDTH] IN BLOOD BY AUTOMATED COUNT: 12.4 % (ref 11.6–14.5)
GLUCOSE BLD STRIP.AUTO-MCNC: 136 MG/DL (ref 70–110)
GLUCOSE BLD STRIP.AUTO-MCNC: 174 MG/DL (ref 70–110)
GLUCOSE BLD STRIP.AUTO-MCNC: 93 MG/DL (ref 70–110)
GLUCOSE BLD STRIP.AUTO-MCNC: 93 MG/DL (ref 70–110)
GLUCOSE SERPL-MCNC: 134 MG/DL (ref 74–99)
GLUCOSE SERPL-MCNC: 136 MG/DL (ref 74–99)
HCT VFR BLD AUTO: 33.3 % (ref 35–45)
HGB BLD-MCNC: 10.7 G/DL (ref 12–16)
INR PPP: 1.2 (ref 0.8–1.2)
LACTATE SERPL-SCNC: 1 MMOL/L (ref 0.4–2)
LACTATE SERPL-SCNC: 2.7 MMOL/L (ref 0.4–2)
LDH SERPL L TO P-CCNC: 124 U/L (ref 81–234)
LYMPHOCYTES # BLD: 1.1 K/UL (ref 0.9–3.6)
LYMPHOCYTES NFR BLD: 23 % (ref 21–52)
MAGNESIUM SERPL-MCNC: 0.9 MG/DL (ref 1.6–2.6)
MAGNESIUM SERPL-MCNC: 3.5 MG/DL (ref 1.6–2.6)
MCH RBC QN AUTO: 27.9 PG (ref 24–34)
MCHC RBC AUTO-ENTMCNC: 32.1 G/DL (ref 31–37)
MCV RBC AUTO: 86.7 FL (ref 74–97)
MONOCYTES # BLD: 0.6 K/UL (ref 0.05–1.2)
MONOCYTES NFR BLD: 11 % (ref 3–10)
NEUTS SEG # BLD: 3.1 K/UL (ref 1.8–8)
NEUTS SEG NFR BLD: 62 % (ref 40–73)
P-R INTERVAL, ECG05: 122 MS
PHOSPHATE SERPL-MCNC: 2.7 MG/DL (ref 2.5–4.9)
PHOSPHATE SERPL-MCNC: 3 MG/DL (ref 2.5–4.9)
PLATELET # BLD AUTO: 184 K/UL (ref 135–420)
PMV BLD AUTO: 10.1 FL (ref 9.2–11.8)
POTASSIUM SERPL-SCNC: 3.7 MMOL/L (ref 3.5–5.5)
POTASSIUM SERPL-SCNC: 3.8 MMOL/L (ref 3.5–5.5)
PROTHROMBIN TIME: 15.1 SEC (ref 11.5–15.2)
Q-T INTERVAL, ECG07: 338 MS
QRS DURATION, ECG06: 66 MS
QTC CALCULATION (BEZET), ECG08: 431 MS
RBC # BLD AUTO: 3.84 M/UL (ref 4.2–5.3)
SARS-COV-2, COV2NT: NOT DETECTED
SODIUM SERPL-SCNC: 137 MMOL/L (ref 136–145)
SODIUM SERPL-SCNC: 138 MMOL/L (ref 136–145)
VENTRICULAR RATE, ECG03: 98 BPM
WBC # BLD AUTO: 4.9 K/UL (ref 4.6–13.2)

## 2020-08-14 PROCEDURE — 83605 ASSAY OF LACTIC ACID: CPT

## 2020-08-14 PROCEDURE — 74011250636 HC RX REV CODE- 250/636: Performed by: HOSPITALIST

## 2020-08-14 PROCEDURE — 82962 GLUCOSE BLOOD TEST: CPT

## 2020-08-14 PROCEDURE — 74011250637 HC RX REV CODE- 250/637: Performed by: INTERNAL MEDICINE

## 2020-08-14 PROCEDURE — 74011250636 HC RX REV CODE- 250/636

## 2020-08-14 PROCEDURE — 77030038269 HC DRN EXT URIN PURWCK BARD -A

## 2020-08-14 PROCEDURE — 82330 ASSAY OF CALCIUM: CPT

## 2020-08-14 PROCEDURE — 74011636637 HC RX REV CODE- 636/637: Performed by: INTERNAL MEDICINE

## 2020-08-14 PROCEDURE — 84100 ASSAY OF PHOSPHORUS: CPT

## 2020-08-14 PROCEDURE — 74011250636 HC RX REV CODE- 250/636: Performed by: EMERGENCY MEDICINE

## 2020-08-14 PROCEDURE — 65660000000 HC RM CCU STEPDOWN

## 2020-08-14 PROCEDURE — 85610 PROTHROMBIN TIME: CPT

## 2020-08-14 PROCEDURE — 80048 BASIC METABOLIC PNL TOTAL CA: CPT

## 2020-08-14 PROCEDURE — 83615 LACTATE (LD) (LDH) ENZYME: CPT

## 2020-08-14 PROCEDURE — 74011000258 HC RX REV CODE- 258: Performed by: EMERGENCY MEDICINE

## 2020-08-14 PROCEDURE — 83735 ASSAY OF MAGNESIUM: CPT

## 2020-08-14 RX ORDER — POTASSIUM CHLORIDE 20 MEQ/1
20 TABLET, EXTENDED RELEASE ORAL ONCE
Status: COMPLETED | OUTPATIENT
Start: 2020-08-14 | End: 2020-08-14

## 2020-08-14 RX ORDER — HYDROCHLOROTHIAZIDE 25 MG/1
25 TABLET ORAL DAILY
Status: DISCONTINUED | OUTPATIENT
Start: 2020-08-14 | End: 2020-08-14

## 2020-08-14 RX ORDER — LEVOTHYROXINE SODIUM 75 UG/1
75 TABLET ORAL
Status: DISCONTINUED | OUTPATIENT
Start: 2020-08-15 | End: 2020-08-16 | Stop reason: HOSPADM

## 2020-08-14 RX ORDER — ATORVASTATIN CALCIUM 20 MG/1
40 TABLET, FILM COATED ORAL
Status: DISCONTINUED | OUTPATIENT
Start: 2020-08-14 | End: 2020-08-16 | Stop reason: HOSPADM

## 2020-08-14 RX ORDER — FAMOTIDINE 20 MG/1
20 TABLET, FILM COATED ORAL 2 TIMES DAILY
Status: DISCONTINUED | OUTPATIENT
Start: 2020-08-14 | End: 2020-08-14

## 2020-08-14 RX ORDER — PANTOPRAZOLE SODIUM 40 MG/1
40 TABLET, DELAYED RELEASE ORAL
Status: DISCONTINUED | OUTPATIENT
Start: 2020-08-15 | End: 2020-08-16 | Stop reason: HOSPADM

## 2020-08-14 RX ORDER — MEGESTROL ACETATE 40 MG/1
20 TABLET ORAL DAILY
Status: DISCONTINUED | OUTPATIENT
Start: 2020-08-14 | End: 2020-08-14

## 2020-08-14 RX ORDER — MELATONIN
2000 DAILY
Status: DISCONTINUED | OUTPATIENT
Start: 2020-08-14 | End: 2020-08-16 | Stop reason: HOSPADM

## 2020-08-14 RX ORDER — METOPROLOL TARTRATE 25 MG/1
12.5 TABLET, FILM COATED ORAL EVERY 12 HOURS
Status: DISCONTINUED | OUTPATIENT
Start: 2020-08-14 | End: 2020-08-16 | Stop reason: HOSPADM

## 2020-08-14 RX ORDER — MAGNESIUM SULFATE HEPTAHYDRATE 40 MG/ML
INJECTION, SOLUTION INTRAVENOUS
Status: COMPLETED
Start: 2020-08-14 | End: 2020-08-14

## 2020-08-14 RX ORDER — MAGNESIUM SULFATE HEPTAHYDRATE 40 MG/ML
2 INJECTION, SOLUTION INTRAVENOUS
Status: COMPLETED | OUTPATIENT
Start: 2020-08-14 | End: 2020-08-14

## 2020-08-14 RX ORDER — FUROSEMIDE 40 MG/1
40 TABLET ORAL DAILY
Status: DISCONTINUED | OUTPATIENT
Start: 2020-08-14 | End: 2020-08-14

## 2020-08-14 RX ORDER — ONDANSETRON 4 MG/1
4 TABLET, ORALLY DISINTEGRATING ORAL
Status: DISCONTINUED | OUTPATIENT
Start: 2020-08-14 | End: 2020-08-16 | Stop reason: HOSPADM

## 2020-08-14 RX ADMIN — CEFTRIAXONE 1 G: 1 INJECTION, POWDER, FOR SOLUTION INTRAMUSCULAR; INTRAVENOUS at 16:30

## 2020-08-14 RX ADMIN — METOPROLOL TARTRATE 12.5 MG: 25 TABLET, FILM COATED ORAL at 20:15

## 2020-08-14 RX ADMIN — APIXABAN 2.5 MG: 2.5 TABLET, FILM COATED ORAL at 13:16

## 2020-08-14 RX ADMIN — MAGNESIUM SULFATE IN WATER 2 G: 40 INJECTION, SOLUTION INTRAVENOUS at 03:00

## 2020-08-14 RX ADMIN — MAGNESIUM SULFATE IN WATER 2 G: 40 INJECTION, SOLUTION INTRAVENOUS at 08:00

## 2020-08-14 RX ADMIN — MELATONIN 2 TABLET: at 13:17

## 2020-08-14 RX ADMIN — METOPROLOL TARTRATE 12.5 MG: 25 TABLET, FILM COATED ORAL at 13:16

## 2020-08-14 RX ADMIN — POTASSIUM CHLORIDE 20 MEQ: 1500 TABLET, EXTENDED RELEASE ORAL at 20:15

## 2020-08-14 RX ADMIN — ATORVASTATIN CALCIUM 40 MG: 20 TABLET, FILM COATED ORAL at 22:00

## 2020-08-14 RX ADMIN — INSULIN LISPRO 2 UNITS: 100 INJECTION, SOLUTION INTRAVENOUS; SUBCUTANEOUS at 17:12

## 2020-08-14 RX ADMIN — MAGNESIUM SULFATE IN WATER 2 G: 40 INJECTION, SOLUTION INTRAVENOUS at 04:30

## 2020-08-14 RX ADMIN — Medication 10 ML: at 13:17

## 2020-08-14 NOTE — PROGRESS NOTES
conducted an initial consultation and Spiritual Assessment for Kelly Bettencourt, who is a 79 y.o.,female. Patients Primary Language is: [place]. According to the patients EMR Taoism Affiliation is: Zoroastrian. The reason the Patient came to the hospital is:   Patient Active Problem List    Diagnosis Date Noted    SIRS (systemic inflammatory response syndrome) ([address] 08/13/2020    UTI (urinary tract infection) 08/13/2020    Paroxysmal atrial fibrillation ([address] 03/04/2020    Pulmonary HTN ([address] 03/04/2020    [place]-vesical fistula 03/04/2020    Hypomagnesemia 10/31/2019    Acute hypotension 10/31/2019    Atrial fibrillation with RVR ([address] 03/04/2018    Atrial fibrillation with rapid ventricular response ([address] 03/03/2018    Hypertension 03/03/2018    Type 2 diabetes mellitus with nephropathy ([address] 01/23/2018    Hip fracture ([address] 10/09/2017    Meningioma ([address] 07/25/2017    Hypothyroidism due to acquired atrophy of thyroid 06/13/2017    Episcleritis of right eye 05/17/2017    Pancreatitis 11/07/2016    Acute pancreatitis 11/07/2016    Diverticulitis 11/07/2016    Hypothyroid 02/24/2014    HTN (hypertension) 02/24/2014    Obesity 02/24/2014    DM (diabetes mellitus) ([address] 02/24/2014    Family hx-breast malignancy 02/24/2014        The  provided the following Interventions:  Initiated a relationship of care and support with patient trough the glass door to her room since the patient is in isolation due to droplet plus precautions. There fore a face to face visit did not and could not happen at this time. Patient seemed to understand and be ok with my reason for not coming in to visit. Patient was appreciative of the prayer for her. Provided information about Spiritual Care Services. Offered prayer and assurance of continued prayers on patients behalf.     Assessment:  Patient does not have any Protestant/cultural needs that will affect patients preferences in health care.  There are no further spiritual or Amish issues which require Spiritual Care Services interventions at this time. Plan:  Chaplains will continue to follow and will provide pastoral care on an as needed/requested basis    . Anneliese Oropeza   Spiritual Care   [phone]

## 2020-08-14 NOTE — PROGRESS NOTES
2000  Rec'd pt from ED. Pt is awake, moves all ext's. Denies pain currently. VSS. Pt oriented x 4.in uint to rule out covid. [address] frequetly for assistance viet up out of bed to void. Urine has strong foul oder/also (has coloretal fistula.)    0200  No significant change, Lactic acid remain 2+. Pt placed on electrolyte replacement protocol.

## 2020-08-14 NOTE — ED NOTES
Patient ahas been assigned a room in the 2700 unit, however, the nurse is accepting a patient from [address] at this time , thus is unable to take report.

## 2020-08-14 NOTE — PROGRESS NOTES
Internal Medicine Progress Note        NAME: Jc Flores   :  1940  MRM:  [id number]    Date/Time: 2020        ASSESSMENT/PLAN:    Principal Problem:    SIRS (systemic inflammatory response syndrome) ([address] (2020)    Active Problems:    Hypothyroid (2014)      HTN (hypertension) (2014)      Obesity (2014)      DM (diabetes mellitus) ([address] (2014)      Paroxysmal atrial fibrillation ([address] (3/4/2020)      [place]-vesical fistula (3/4/2020)      UTI (urinary tract infection) (2020)        #   SIRS (systemic inflammatory response syndrome) ([address] (2020) due to UTI, possible sepsis. Urine and blood CS. CXR no acute lesion . Iv CFTX. ID consulted. Hold macrobid  COVID 10 requested in ER by ER MD.      # ho   Hypothyroid (2014). On replacement        # ho  HTN (hypertension) (2014). Control BP       #  Obesity (2014). Body mass index is 27.34 kg/m².        #  DM (diabetes mellitus) ([address] (2014). Provide SSI, hypoglycemia protocol and frequent Accu checks. Provide SSI, hypoglycemia protocol and frequent Accu checks. Education,  diabetic educator  . Diabetic Diet      # ho   Paroxysmal atrial fibrillation ([facility] (3/4/2020) on Eliquis       # ho  [place]-vesical fistula (3/4/2020). Not operated on . To follow with her surgeon at [facility]        #   UTI (urinary tract infection) (2020). Abx as above.     - PT     -DVT prophylaxis :  eliquis.    - Code Status : FULL    IP CONSULT TO HOSPITALIST  IP CONSULT TO INFECTIOUS DISEASES    Lab Review:     Recent Labs     20  2359 20  1345   WBC 4.9 5.5   HGB 10.7* 11.8*   HCT 33.3* 35.7    210     Recent Labs     20  1000 20  2359 20  1345    137 135*   K 3.7 3.8 4.0    110 102   CO2 25 21 24   * 134* 235*   BUN 11 12 14   CREA 0.76 0.85 1.07   CA 7.9* 7.4* 8.2*   MG 3.5* 0.9*  --    PHOS 2.7 3.0  --    ALB  --   --  2.8*   TBILI  --   --  0.5   ALT  -- --  10*   INR 1.2  --   --      Lab Results   Component Value Date/Time    Glucose (POC) 136 (H) 08/14/2020 11:23 AM    Glucose (POC) 93 08/14/2020 07:29 AM    Glucose (POC) 94 08/13/2020 09:32 PM    Glucose (POC) 138 (H) 03/05/2020 03:40 PM    Glucose (POC) 129 (H) 03/05/2020 11:56 AM    Glucose,  (H) 02/08/2019 12:15 AM     No results for input(s): PH, PCO2, PO2, HCO3, FIO2 in the last 72 hours. Recent Labs     08/14/20  1000   INR 1.2       Lab Results   Component Value Date/Time    Specimen Description: SPUTUM 11/18/2009 04:05 AM    Specimen Description: STOOL 11/17/2009 07:00 PM    Specimen Description: STOOL 11/17/2009 07:00 PM     Lab Results   Component Value Date/Time    Culture result: NO GROWTH AFTER 17 HOURS 08/13/2020 02:00 PM    Culture result: NO GROWTH AFTER 17 HOURS 08/13/2020 01:45 PM    Culture result: >100,000 COLONIES/mL ESCHERICHIA COLI (A) 03/16/2020 04:08 AM    Culture result: (A) 03/16/2020 04:08 AM     >100,000 COLONIES/mL ESCHERICHIA COLI 2ND MORPHOTYPE    Culture result:  03/16/2020 04:08 AM     [id number]  COLONIES/mL  MIXED GRAM POSITIVE DIXON, PROBABLE SKIN/GENITAL CONTAMINATION. All Cardiac Markers in the last 24 hours: No results found for: CPK, CK, CKMMB, CKMB, RCK3, CKMBT, CKNDX, CKND1, LUIS A, TROPT, TROIQ, SHELLEY, TROPT, TNIPOC, BNP, BNPP        Intervals noted   Pt s/e @ bedside     Subjective:     Chief Complaint:      Feel better than yesterday  Today is her appointment day at [facility]     ROS:  (bold if positive,otherwise negative)    Fever/chills ,  Dysuria   Cough , Sputum , SOB/MORALES , Chest Pain     Diarrhea ,Nausea/Vomit , Abd Pain , Constipation    Tolerating Diet                Objective:     Vitals:  Last 24hrs VS reviewed since prior progress note.  Most recent are:    Visit Vitals  /49   Pulse 70   Temp 98.2 °F (36.8 °C)   Resp 22   Ht 5' (1.524 m)   Wt 60.3 kg (133 lb)   SpO2 96%   BMI 25.97 kg/m²     SpO2 Readings from Last 6 Encounters:   08/14/20 96%   03/21/20 98%   03/16/20 97%   03/15/20 99%   03/05/20 97%   02/29/20 100%            Intake/Output Summary (Last 24 hours) at 8/14/2020 1208  Last data filed at 8/14/2020 0800  Gross per 24 hour   Intake 1125 ml   Output 400 ml   Net 725 ml          Physical Exam:   Gen:  Appear stated age, Well-developed,  in no acute distress  HEENT:  Head atraumatic, normocephalic , hearing intact to voice, moist mucous membranes. Neck:   Trachea midline , No apparent JVD, Supple, without masses, thyroid non-tender  Resp:  No accessory muscle use,Bilateral BS present, clear breath sounds without wheezes rales or rhonchi  Card:  No murmurs, normal S1, S2 without Gallop . No Significant lower leg peripheral edema. Abd: lower abdomen tenderness. Soft  non-distended, bowel sounds are present . Musc:  No cyanosis or clubbing. Skin:  No rashes or ulcers, skin turgor is good. Neuro:  Cranial nerves are grossly intact, no clear area of focal motor weakness, follows commands appropriately. Psych: oriented to person, place and time, alert.     Medications Reviewed: (see below)    Lab Data Reviewed: (see below)    ______________________________________________________________________    Medications:     Current Facility-Administered Medications   Medication Dose Route Frequency    apixaban (ELIQUIS) tablet 2.5 mg  2.5 mg Oral BID    cholecalciferol (VITAMIN D3) (1000 Units /25 mcg) tablet 2 Tab  2,000 Units Oral DAILY    [START ON 8/15/2020] levothyroxine (SYNTHROID) tablet 75 mcg  75 mcg Oral ACB    metoprolol tartrate (LOPRESSOR) tablet 12.5 mg  12.5 mg Oral Q12H    ondansetron (ZOFRAN ODT) tablet 4 mg  4 mg Oral Q8H PRN    [START ON 8/15/2020] pantoprazole (PROTONIX) tablet 40 mg  40 mg Oral ACB    atorvastatin (LIPITOR) tablet 40 mg  40 mg Oral QHS    sodium chloride (NS) flush 5-40 mL  5-40 mL IntraVENous Q8H    sodium chloride (NS) flush 5-40 mL  5-40 mL IntraVENous PRN    acetaminophen (TYLENOL) tablet 650 mg 650 mg Oral Q6H PRN    Or    acetaminophen (TYLENOL) suppository 650 mg  650 mg Rectal Q6H PRN    polyethylene glycol (MIRALAX) packet 17 g  17 g Oral DAILY PRN    promethazine (PHENERGAN) tablet 12.5 mg  12.5 mg Oral Q6H PRN    Or    ondansetron (ZOFRAN) injection 4 mg  4 mg IntraVENous Q6H PRN    lactated Ringers infusion  75 mL/hr IntraVENous CONTINUOUS    insulin lispro (HUMALOG) injection   SubCUTAneous AC&HS    glucose chewable tablet 16 g  4 Tab Oral PRN    glucagon (GLUCAGEN) injection 1 mg  1 mg IntraMUSCular PRN    dextrose 10% infusion 125-250 mL  125-250 mL IntraVENous PRN    cefTRIAXone (ROCEPHIN) 1 g in 0.9% sodium chloride (MBP/ADV) 50 mL MBP  1 g IntraVENous Q24H          Total time spent with patient: 35 minutes                  Care Plan discussed with: Patient, Nursing Staff, Consultant/Specialist and >50% of time spent in counseling and coordination of care    Discussed:  Care Plan    Prophylaxis:  Eliquis    Disposition:  Home w/Family           This document in whole or part of it has been produced using voice recognition software. Unrecognized errors in transcription may be present.     Attending Physician: Christine Perry MD

## 2020-08-14 NOTE — CONSULTS
[facility]  (A Division of [address] [place] [address])      Consultation Note      Date of Admission: 8/13/2020       Date of Note:  8/14/2020      Referred by: Dr. Walsh Main    Reason for Referral: UTI Fever Colovesical fistula    Will plan to check back on Monday 8/17 if still here but will be available by phone this weekend. I can be reached at [phone] if needed. Current Antimicrobials:    Prior Antimicrobials:  Ceftriaxone 8/13 - 1        Assessment: Rec / Plan:   UTI  - likely due to colovesical fistula. Has chronic dysuria and bladder spasms  - UA TNTC WBC. Ur cx pending -> continue Ceftriaxone pending blood and urine cultures  -> de-escalate to po if susceptible organism is isolated     Fever  - due to above -   H/o colovesical fistula  - has appointment with surgeon in [facility] on Monday, hopes she can make it  - surgery scheduled for March was postponed by pandemic -> Facilitate appearance at surgical appointment on Monday if stable. DM    HTN    Hypothyroid    Aortic stenosis    atrial flutter    diverticulitis         Microbiology:      Lines / Catheters:        HPI:  79year-old [place] female ([de-identified] [place], [de-identified] [place]) with h/o DM, HTN, Hypothyroid, Aortic stenosis, atrial flutter, diverticulitis and colovesical fistula admitted to [facility]  8/13/2020 due to weakness, nausea. She has had a colovesical fistula and seemingly monthly episodes of UTI's since November 2019. She had been seeing a surgeon at [facility] and had plans for corrective surgery in March but this was postponed due to the Covid pandemic. She has chronic burning dysuria and intermittent bladder spasms which have not worsened lately but around one week PTA she started feeling weak and nauseated. There was no improvement with antibiotics prescribed by her PCP.   She had no vomiting but weakness worsened leading to presentation at the ED where she was found to have a temperature of 101.3 degrees and urinalysis showed too numerous to count WBC. There was no tachycardia or hypotension but she was admitted for IV antibiotics. Ceftriaxone was started 8/13 and blood and urine cultures are pending. Active Hospital Problems    Diagnosis Date Noted    SIRS (systemic inflammatory response syndrome) ([address] 08/13/2020    UTI (urinary tract infection) 08/13/2020    Paroxysmal atrial fibrillation ([address] 03/04/2020    [place]-vesical fistula 03/04/2020    Hypothyroid 02/24/2014    HTN (hypertension) 02/24/2014    Obesity 02/24/2014    DM (diabetes mellitus) ([address] 02/24/2014     Past Medical History:   Diagnosis Date    Aortic stenosis     Patient denies on 10/31/2019.  Arthritis     Atrial flutter ([address]     Bladder stone     Bronchitis     Resolved after getting rid of her pet bird in 2000. 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  Vesicocolic fistula      Past Surgical History:   Procedure Laterality Date    COLONOSCOPY N/A 2/2/2017    COLONOSCOPY  w/bx polyp performed by Andrey Egan MD at [address] Left 2017     Family History   Problem Relation Age of Onset    Diabetes Mother     Coronary Artery Disease Mother     Cancer Father         melanoma    Stroke Sister     Hypertension Sister     Diabetes Sister     Diabetes Brother     Coronary Artery Disease Brother     Breast Cancer Paternal Grandmother         older, but not sure age.     Breast Cancer Paternal Aunt         and gyn cancer ?age   [facility] No Known Problems Sister     No Known Problems Brother     Kidney Disease Sister     Heart Failure Sister      Social History     Socioeconomic History  Marital status: SINGLE     Spouse name: Not on file    Number of children: Not on file    Years of education: Not on file    Highest education level: Not on file   Occupational History    Not on file   Social Needs    Financial resource strain: Not on file    Food insecurity     Worry: Not on file     Inability: Not on file    Transportation needs     Medical: Not on file     Non-medical: Not on file   Tobacco Use    Smoking status: Never Smoker    Smokeless tobacco: Never Used   Substance and Sexual Activity    Alcohol use: No    Drug use: No    Sexual activity: Not Currently     Partners: Male   Lifestyle    Physical activity     Days per week: Not on file     Minutes per session: Not on file    Stress: Not on file   Relationships    Social connections     Talks on phone: Not on file     Gets together: Not on file     Attends [employer] service: Not on file     Active member of club or organization: Not on file     Attends meetings of clubs or organizations: Not on file     Relationship status: Not on file    Intimate partner violence     Fear of current or ex partner: Not on file     Emotionally abused: Not on file     Physically abused: Not on file     Forced sexual activity: Not on file   Other Topics Concern     Service Not Asked    Blood Transfusions Not Asked    Caffeine Concern Not Asked    Occupational Exposure Not Asked   No Fent Hazards Not Asked    Sleep Concern Not Asked    Stress Concern Not Asked    Weight Concern Not Asked    Special Diet Not Asked    Back Care Not Asked    Exercise Not Asked    Bike Helmet Not Asked    Seat Belt Not Asked    Self-Exams Not Asked   Social History Narrative    Not on file       Allergies:  Metronidazole and Ampicillin     Medications:  Current Facility-Administered Medications   Medication Dose Route Frequency    apixaban (ELIQUIS) tablet 2.5 mg  2.5 mg Oral BID    cholecalciferol (VITAMIN D3) (1000 Units /25 mcg) tablet 2 Tab  2,000 Units Oral DAILY    [START ON 8/15/2020] levothyroxine (SYNTHROID) tablet 75 mcg  75 mcg Oral ACB    metoprolol tartrate (LOPRESSOR) tablet 12.5 mg  12.5 mg Oral Q12H    ondansetron (ZOFRAN ODT) tablet 4 mg  4 mg Oral Q8H PRN    [START ON 8/15/2020] pantoprazole (PROTONIX) tablet 40 mg  40 mg Oral ACB    atorvastatin (LIPITOR) tablet 40 mg  40 mg Oral QHS    sodium chloride (NS) flush 5-40 mL  5-40 mL IntraVENous Q8H    sodium chloride (NS) flush 5-40 mL  5-40 mL IntraVENous PRN    acetaminophen (TYLENOL) tablet 650 mg  650 mg Oral Q6H PRN    Or    acetaminophen (TYLENOL) suppository 650 mg  650 mg Rectal Q6H PRN    polyethylene glycol (MIRALAX) packet 17 g  17 g Oral DAILY PRN    promethazine (PHENERGAN) tablet 12.5 mg  12.5 mg Oral Q6H PRN    Or    ondansetron (ZOFRAN) injection 4 mg  4 mg IntraVENous Q6H PRN    lactated Ringers infusion  75 mL/hr IntraVENous CONTINUOUS    insulin lispro (HUMALOG) injection   SubCUTAneous AC&HS    glucose chewable tablet 16 g  4 Tab Oral PRN    glucagon (GLUCAGEN) injection 1 mg  1 mg IntraMUSCular PRN    dextrose 10% infusion 125-250 mL  125-250 mL IntraVENous PRN    cefTRIAXone (ROCEPHIN) 1 g in 0.9% sodium chloride (MBP/ADV) 50 mL MBP  1 g IntraVENous Q24H        ROS:  A comprehensive review of systems was negative except for that written in the History of Present Illness. Physical Exam:    HPI:  Temp (24hrs), Av.1 °F (36.7 °C), Min:97.7 °F (36.5 °C), Max:98.7 °F (37.1 °C)    Visit Vitals  /58   Pulse 73   Temp 98.2 °F (36.8 °C)   Resp 24   Ht 5' (1.524 m)   Wt 60.3 kg (133 lb)   SpO2 97%   BMI 25.97 kg/m²       General: Well developed, well nourished 79 y.o. WHITE OR  female in no acute distress.   ENT: ENT exam normal, no neck nodes or sinus tenderness  Head: normocephalic, without obvious abnormality  Mouth:  mucous membranes moist, pharynx normal without lesions  Neck: supple, symmetrical, trachea midline   Cardio:  regular rate and rhythm, S1, S2 normal, no murmur, click, rub or gallop  Chest: inspection normal - no chest wall deformities or tenderness, respiratory effort normal  Lungs: clear to auscultation, no wheezes or rales and unlabored breathing  Abdomen: distended, slightly tense in lower abdomen, non-tender, no rebound, no organomegaly  Extremities:  no redness or tenderness in the calves or thighs, no edema  Neuro: Grossly normal       Lab results:    Chemistry  Recent Labs     08/14/20  1000 08/13/20  2359 08/13/20  1345   * 134* 235*    137 135*   K 3.7 3.8 4.0    110 102   CO2 25 21 24   BUN 11 12 14   CREA 0.76 0.85 1.07   CA 7.9* 7.4* 8.2*   AGAP 7 6 9   BUCR 14 14 13   AP  --   --  64   TP  --   --  7.7   ALB  --   --  2.8*   GLOB  --   --  4.9*   AGRAT  --   --  0.6*       CBC w/ Diff  Recent Labs     08/13/20  2359 08/13/20  1345   WBC 4.9 5.5   RBC 3.84* 4.17*   HGB 10.7* 11.8*   HCT 33.3* 35.7    210   GRANS 62 73   LYMPH 23 17*   EOS 4 2       Microbiology  All Micro Results     Procedure Component Value Units Date/Time    CULTURE, URINE [[id number]] Collected:  08/13/20 1745    Order Status:  Completed Specimen:  Urine from Clean catch Updated:  08/14/20 1505    CULTURE, BLOOD [[id number]] Collected:  08/13/20 1345    Order Status:  Completed Specimen:  Blood Updated:  08/14/20 0918     Special Requests: PERIPHERAL        Culture result: NO GROWTH AFTER 17 HOURS       CULTURE, BLOOD 2nd DRAW (required for [facility]) [[id number]] Collected:  08/13/20 1400    Order Status:  Completed Specimen:  Blood Updated:  08/14/20 0918     Special Requests: PERIPHERAL        Culture result: NO GROWTH AFTER 16 HOURS                Matthew Steele MD, [facility]  Infectious Diseases  [phone]   8/14/2020   4:14 PM

## 2020-08-14 NOTE — PROGRESS NOTES
Problem: Discharge Planning  Goal: *Discharge to safe environment  Outcome: Progressing Towards Goal     Plan: home    Reason for Admission:   Sirs / r/o Covid                  RUR Score: 26                 PCP:First and Last name:  Dr. Jean-Pierre Cross   Name of Practice:    Are you a current patient: Yes/No: Yes   Approximate date of last visit:  8/12/2020   Can you participate in a virtual visit if needed:     Do you (patient/family) have any concerns for transition/discharge? Not @ this time              Plan for utilizing home health: Will assess for needs. Current Advanced Directive/Advance Care Plan:  Scanned in record. Transition of Care Plan:  Home with out-pt follow up. Chart reviewed. Called pt in room, as r/o/covid, verified all demographics. States has MCR/DREAD ins. States her DREAD does not have transport benefit. Does not have money for cab, will cab voucher @ discharge. NOK:  Kusum Casanova, son, whom lives with pt. Uses cane. States very independent with ADL's prior to admit. Will cont to follow for any needs. Arabella PoonRN,ext [phone]. Patient has designated her son to participate in his/her discharge plan and to receive any needed information. Name: Kusum Casanova  Address:  Phone number: [phone]    Care Management Interventions  PCP Verified by CM: Yes(Dr. Sumaya Marcos)  Last Visit to PCP: 08/12/20  Palliative Care Criteria Met (RRAT>21 & CHF Dx)?: No  Mode of Transport at Discharge: Self(Cab)  Transition of Care Consult (CM Consult): Discharge Planning  Discharge Durable Medical Equipment: No  Physical Therapy Consult: No  Occupational Therapy Consult: No  Speech Therapy Consult: No  Current Support Network:  Other(her son lives with her)  Confirm Follow Up Transport: Self  Discharge Location  Discharge Placement: Home

## 2020-08-14 NOTE — ED NOTES
TRANSFER  Taking patient up to 21  for admission. Gave report to her primary nurse Brigido Moraes. 2115  Took patient too room 2708 where I was met by primary nurse Mari. Patient was able to ambulate to the bed from ER stretcher. Skin was checked with Mari. Patient has a stage I at her sacral area. The area is pink and blanchable. Area is moist and she is incontinent at times due to frequent UTI's. She also has a pink area at the top of her back that is blanchable. There is no bony prominence there, looks like she has been scratching that area and has made it pink.

## 2020-08-15 LAB
ANION GAP SERPL CALC-SCNC: 7 MMOL/L (ref 3–18)
BACTERIA SPEC CULT: NORMAL
BUN SERPL-MCNC: 11 MG/DL (ref 7–18)
BUN/CREAT SERPL: 14 (ref 12–20)
CA-I SERPL-SCNC: 1.2 MMOL/L (ref 1.12–1.32)
CALCIUM SERPL-MCNC: 8.5 MG/DL (ref 8.5–10.1)
CC UR VC: NORMAL
CHLORIDE SERPL-SCNC: 108 MMOL/L (ref 100–111)
CO2 SERPL-SCNC: 23 MMOL/L (ref 21–32)
CREAT SERPL-MCNC: 0.76 MG/DL (ref 0.6–1.3)
GLUCOSE BLD STRIP.AUTO-MCNC: 113 MG/DL (ref 70–110)
GLUCOSE BLD STRIP.AUTO-MCNC: 174 MG/DL (ref 70–110)
GLUCOSE BLD STRIP.AUTO-MCNC: 79 MG/DL (ref 70–110)
GLUCOSE BLD STRIP.AUTO-MCNC: 90 MG/DL (ref 70–110)
GLUCOSE SERPL-MCNC: 91 MG/DL (ref 74–99)
MAGNESIUM SERPL-MCNC: 2.1 MG/DL (ref 1.6–2.6)
PHOSPHATE SERPL-MCNC: 2.9 MG/DL (ref 2.5–4.9)
POTASSIUM SERPL-SCNC: 5.2 MMOL/L (ref 3.5–5.5)
SERVICE CMNT-IMP: NORMAL
SODIUM SERPL-SCNC: 138 MMOL/L (ref 136–145)

## 2020-08-15 PROCEDURE — 74011250636 HC RX REV CODE- 250/636: Performed by: INTERNAL MEDICINE

## 2020-08-15 PROCEDURE — 83735 ASSAY OF MAGNESIUM: CPT

## 2020-08-15 PROCEDURE — 77030038269 HC DRN EXT URIN PURWCK BARD -A

## 2020-08-15 PROCEDURE — 74011250636 HC RX REV CODE- 250/636: Performed by: EMERGENCY MEDICINE

## 2020-08-15 PROCEDURE — 74011250637 HC RX REV CODE- 250/637: Performed by: INTERNAL MEDICINE

## 2020-08-15 PROCEDURE — 82962 GLUCOSE BLOOD TEST: CPT

## 2020-08-15 PROCEDURE — 80048 BASIC METABOLIC PNL TOTAL CA: CPT

## 2020-08-15 PROCEDURE — 84100 ASSAY OF PHOSPHORUS: CPT

## 2020-08-15 PROCEDURE — 74011000258 HC RX REV CODE- 258: Performed by: EMERGENCY MEDICINE

## 2020-08-15 PROCEDURE — 65660000000 HC RM CCU STEPDOWN

## 2020-08-15 PROCEDURE — 82330 ASSAY OF CALCIUM: CPT

## 2020-08-15 PROCEDURE — 74011636637 HC RX REV CODE- 636/637: Performed by: INTERNAL MEDICINE

## 2020-08-15 PROCEDURE — 74011000258 HC RX REV CODE- 258: Performed by: INTERNAL MEDICINE

## 2020-08-15 RX ADMIN — Medication 10 ML: at 17:22

## 2020-08-15 RX ADMIN — ATORVASTATIN CALCIUM 40 MG: 20 TABLET, FILM COATED ORAL at 22:03

## 2020-08-15 RX ADMIN — PANTOPRAZOLE SODIUM 40 MG: 40 TABLET, DELAYED RELEASE ORAL at 11:04

## 2020-08-15 RX ADMIN — INSULIN LISPRO 2 UNITS: 100 INJECTION, SOLUTION INTRAVENOUS; SUBCUTANEOUS at 17:21

## 2020-08-15 RX ADMIN — Medication 10 ML: at 22:04

## 2020-08-15 RX ADMIN — METOPROLOL TARTRATE 12.5 MG: 25 TABLET, FILM COATED ORAL at 11:04

## 2020-08-15 RX ADMIN — SODIUM CHLORIDE, SODIUM LACTATE, POTASSIUM CHLORIDE, AND CALCIUM CHLORIDE 75 ML/HR: 600; 310; 30; 20 INJECTION, SOLUTION INTRAVENOUS at 05:15

## 2020-08-15 RX ADMIN — APIXABAN 2.5 MG: 2.5 TABLET, FILM COATED ORAL at 17:21

## 2020-08-15 RX ADMIN — Medication 10 ML: at 00:05

## 2020-08-15 RX ADMIN — LEVOTHYROXINE SODIUM 75 MCG: 0.07 TABLET ORAL at 06:11

## 2020-08-15 RX ADMIN — METOPROLOL TARTRATE 12.5 MG: 25 TABLET, FILM COATED ORAL at 22:03

## 2020-08-15 RX ADMIN — CEFTRIAXONE 1 G: 1 INJECTION, POWDER, FOR SOLUTION INTRAMUSCULAR; INTRAVENOUS at 17:20

## 2020-08-15 RX ADMIN — MELATONIN 2 TABLET: at 11:03

## 2020-08-15 RX ADMIN — APIXABAN 2.5 MG: 2.5 TABLET, FILM COATED ORAL at 11:04

## 2020-08-15 RX ADMIN — CALCIUM GLUCONATE 2 G: 94 INJECTION, SOLUTION INTRAVENOUS at 00:01

## 2020-08-15 NOTE — PROGRESS NOTES
rec'd report from [facility]  Patient awake alert and verbal. Denies pain , Focused on D/C home This afternoon she fell asleep and woke up disoriented she standing at bedside and voiding on floor. She became tearful and went into bathroom. Stated she was confused. She then returned back to baseline alert and oriented x4. Patient appears to have sundowning behavior. Urine culture was only 30,000 and BC no growth x2 days . sats 100% room air and BS stable no s/s hypo/hyperglycemia  Bedside and Verbal shift change report given to Anita (oncoming nurse) by Stephane Escoto  (offgoing nurse). Report included the following information SBAR, Kardex, Intake/Output, Recent Results, Cardiac Rhythm SR and Quality Measures.

## 2020-08-15 NOTE — PROGRESS NOTES
1930 Bedside and Verbal shift change report given to [facility] (oncoming nurse) by Brittney Strauss RN   (offgoing nurse).  Report included the following information SBAR, Kardex, Intake/Output, MAR, Recent Results and Cardiac Rhythm SR.

## 2020-08-15 NOTE — PROGRESS NOTES
TRANSFER - IN REPORT:    Verbal report received from Odalis Ramos RN(name) on Madhavi Agrawal  being received from 2700(unit) for routine progression of care      Report consisted of patients Situation, Background, Assessment and   Recommendations(SBAR). Information from the following report(s) SBAR, Kardex, Intake/Output, MAR and Recent Results was reviewed with the receiving nurse. Opportunity for questions and clarification was provided. Assessment completed upon patients arrival to unit and care assumed. Dual skin assessment with Odalis Ramos RN. noted with stage 2 on left lower buttock. 0230 No complaints voiced. 7118 Patient remains awake, unable to sleep during the night. [address] changed. No complaints voiced.     0706 Bedside and Verbal shift change report given to DEMETRI Cota (oncoming nurse) by Abundio Sanders RN (offgoing nurse). Report given with SBAR, Kardex, Intake/Output, MAR and Recent Results.

## 2020-08-15 NOTE — PROGRESS NOTES
Problem: Falls - Risk of  Goal: *Absence of Falls  Description: Document Ronel Fraire Fall Risk and appropriate interventions in the flowsheet. Outcome: Progressing Towards Goal  Note: Fall Risk Interventions:  Mobility Interventions: Patient to call before getting OOB         Medication Interventions: Patient to call before getting OOB    Elimination Interventions: Call light in reach, Toileting schedule/hourly rounds              Problem: Patient Education: Go to Patient Education Activity  Goal: Patient/Family Education  Outcome: Progressing Towards Goal     Problem: Pressure Injury - Risk of  Goal: *Prevention of pressure injury  Description: Document Sae Scale and appropriate interventions in the flowsheet.   Outcome: Progressing Towards Goal  Note: Pressure Injury Interventions:       Moisture Interventions: Absorbent underpads, Apply protective barrier, creams and emollients    Activity Interventions: Pressure redistribution bed/mattress(bed type)    Mobility Interventions: HOB 30 degrees or less, Pressure redistribution bed/mattress (bed type)    Nutrition Interventions: Document food/fluid/supplement intake    Friction and Shear Interventions: Foam dressings/transparent film/skin sealants                Problem: Patient Education: Go to Patient Education Activity  Goal: Patient/Family Education  Outcome: Progressing Towards Goal     Problem: Discharge Planning  Goal: *Discharge to safe environment  Outcome: Progressing Towards Goal

## 2020-08-15 NOTE — PROGRESS NOTES
Internal Medicine Progress Note        NAME: Anthony Valentin   :  1940  MRM:  [id number]    Date/Time: 8/15/2020        ASSESSMENT/PLAN: urine culture still pening. She is feeling much better      #   SIRS (systemic inflammatory response syndrome) ([facility] (2020) due to UTI, possible sepsis. Urine and blood CS P  CXR no acute lesion . Iv CFTX. ID consulted. Hold macrobid  COVID 19 test NEGATIVE     # ho   Hypothyroid (2014). On replacement        # ho  HTN (hypertension) (2014). Control BP       #  Obesity (2014). Body mass index is 27.34 kg/m².        #  DM (diabetes mellitus) ([facility] (2014). Provide SSI, hypoglycemia protocol and frequent Accu checks. Provide SSI, hypoglycemia protocol and frequent Accu checks. Education,  diabetic educator  . Diabetic Diet      # ho   Paroxysmal atrial fibrillation ([facility] (3/4/2020) on Eliquis       # ho  [place]-vesical fistula (3/4/2020). Not operated on . To follow with her surgeon at [facility] .     #   UTI (urinary tract infection) (2020). Abx as above.     - PT     -DVT prophylaxis :  eliquis.    - Code Status : FULL    IP CONSULT TO HOSPITALIST  IP CONSULT TO INFECTIOUS DISEASES    Lab Review:     Recent Labs     20  2359 20  1345   WBC 4.9 5.5   HGB 10.7* 11.8*   HCT 33.3* 35.7    210     Recent Labs     08/15/20  0400 20  1000 20  2359 20  1345    138 137 135*   K 5.2 3.7 3.8 4.0    106 110 102   CO2 23 25 21 24   GLU 91 136* 134* 235*   BUN 11 11 12 14   CREA 0.76 0.76 0.85 1.07   CA 8.5 7.9* 7.4* 8.2*   MG 2.1 3.5* 0.9*  --    PHOS 2.9 2.7 3.0  --    ALB  --   --   --  2.8*   TBILI  --   --   --  0.5   ALT  --   --   --  10*   INR  --  1.2  --   --      Lab Results   Component Value Date/Time    Glucose (POC) 79 08/15/2020 07:20 AM    Glucose (POC) 93 2020 10:38 PM    Glucose (POC) 174 (H) 2020 04:42 PM    Glucose (POC) 136 (H) 2020 11:23 AM    Glucose (POC) 93 08/14/2020 07:29 AM    Glucose,  (H) 02/08/2019 12:15 AM     No results for input(s): PH, PCO2, PO2, HCO3, FIO2 in the last 72 hours. Recent Labs     08/14/20  1000   INR 1.2       Lab Results   Component Value Date/Time    Specimen Description: SPUTUM 11/18/2009 04:05 AM    Specimen Description: STOOL 11/17/2009 07:00 PM    Specimen Description: STOOL 11/17/2009 07:00 PM     Lab Results   Component Value Date/Time    Culture result: NO GROWTH 2 DAYS 08/13/2020 02:00 PM    Culture result: NO GROWTH 2 DAYS 08/13/2020 01:45 PM    Culture result: >100,000 COLONIES/mL ESCHERICHIA COLI (A) 03/16/2020 04:08 AM    Culture result: (A) 03/16/2020 04:08 AM     >100,000 COLONIES/mL ESCHERICHIA COLI 2ND MORPHOTYPE    Culture result:  03/16/2020 04:08 AM     [id number]  COLONIES/mL  MIXED GRAM POSITIVE DIXON, PROBABLE SKIN/GENITAL CONTAMINATION. All Cardiac Markers in the last 24 hours: No results found for: CPK, CK, CKMMB, CKMB, RCK3, CKMBT, CKNDX, CKND1, LUIS A, TROPT, TROIQ, SHELLEY, TROPT, TNIPOC, BNP, BNPP        Intervals noted   Pt s/e @ bedside     Subjective:     Chief Complaint:      Feel much better , I do not want that feeling again. I am back almost to normal.   No diarrhea. ROS:  (bold if positive,otherwise negative)    Fever/chills ,  Dysuria   Cough , Sputum , SOB/MORALES , Chest Pain     Diarrhea ,Nausea/Vomit , Abd Pain , Constipation    Tolerating Diet                Objective:     Vitals:  Last 24hrs VS reviewed since prior progress note.  Most recent are:    Visit Vitals  /75 (BP 1 Location: Right arm, BP Patient Position: At rest)   Pulse 67   Temp 98.5 °F (36.9 °C)   Resp 18   Ht 5' (1.524 m)   Wt 60.3 kg (133 lb)   SpO2 99%   BMI 25.97 kg/m²     SpO2 Readings from Last 6 Encounters:   08/15/20 99%   03/21/20 98%   03/16/20 97%   03/15/20 99%   03/05/20 97%   02/29/20 100%            Intake/Output Summary (Last 24 hours) at 8/15/2020 0935  Last data filed at 8/15/2020 0851  Gross per 24 hour Intake 2488.75 ml   Output 900 ml   Net 1588.75 ml          Physical Exam:   Gen:  Appear stated age, Well-developed,  in no acute distress  HEENT:  Head atraumatic, normocephalic , hearing intact to voice, moist mucous membranes. Neck:   Trachea midline , No apparent JVD, Supple, without masses, thyroid non-tender  Resp:  No accessory muscle use,Bilateral BS present, clear breath sounds without wheezes rales or rhonchi  Card:  No murmurs, normal S1, S2 without Gallop . No Significant lower leg peripheral edema. Abd: lower abdomen tenderness improved significantly . Soft  non-distended, bowel sounds are present . Musc:  No cyanosis or clubbing. Skin:  No rashes or ulcers, skin turgor is good. Neuro:  Cranial nerves are grossly intact, no clear area of focal motor weakness, follows commands appropriately. Psych: oriented to person, place and time, alert.     Medications Reviewed: (see below)    Lab Data Reviewed: (see below)    ______________________________________________________________________    Medications:     Current Facility-Administered Medications   Medication Dose Route Frequency    apixaban (ELIQUIS) tablet 2.5 mg  2.5 mg Oral BID    cholecalciferol (VITAMIN D3) (1000 Units /25 mcg) tablet 2 Tab  2,000 Units Oral DAILY    levothyroxine (SYNTHROID) tablet 75 mcg  75 mcg Oral ACB    metoprolol tartrate (LOPRESSOR) tablet 12.5 mg  12.5 mg Oral Q12H    ondansetron (ZOFRAN ODT) tablet 4 mg  4 mg Oral Q8H PRN    pantoprazole (PROTONIX) tablet 40 mg  40 mg Oral ACB    atorvastatin (LIPITOR) tablet 40 mg  40 mg Oral QHS    sodium chloride (NS) flush 5-40 mL  5-40 mL IntraVENous Q8H    sodium chloride (NS) flush 5-40 mL  5-40 mL IntraVENous PRN    acetaminophen (TYLENOL) tablet 650 mg  650 mg Oral Q6H PRN    Or    acetaminophen (TYLENOL) suppository 650 mg  650 mg Rectal Q6H PRN    polyethylene glycol (MIRALAX) packet 17 g  17 g Oral DAILY PRN    promethazine (PHENERGAN) tablet 12.5 mg 12.5 mg Oral Q6H PRN    Or    ondansetron (ZOFRAN) injection 4 mg  4 mg IntraVENous Q6H PRN    lactated Ringers infusion  75 mL/hr IntraVENous CONTINUOUS    insulin lispro (HUMALOG) injection   SubCUTAneous AC&HS    glucose chewable tablet 16 g  4 Tab Oral PRN    glucagon (GLUCAGEN) injection 1 mg  1 mg IntraMUSCular PRN    dextrose 10% infusion 125-250 mL  125-250 mL IntraVENous PRN    cefTRIAXone (ROCEPHIN) 1 g in 0.9% sodium chloride (MBP/ADV) 50 mL MBP  1 g IntraVENous Q24H          Total time spent with patient: 35 minutes                  Care Plan discussed with: Patient, Nursing Staff, Consultant/Specialist and >50% of time spent in counseling and coordination of care    Discussed:  Care Plan    Prophylaxis:  Eliquis    Disposition:  Home w/Family           This document in whole or part of it has been produced using voice recognition software. Unrecognized errors in transcription may be present.     Attending Physician: Juan Christensen MD

## 2020-08-16 VITALS
RESPIRATION RATE: 18 BRPM | HEIGHT: 60 IN | HEART RATE: 66 BPM | OXYGEN SATURATION: 93 % | WEIGHT: 133 LBS | SYSTOLIC BLOOD PRESSURE: 149 MMHG | BODY MASS INDEX: 26.11 KG/M2 | TEMPERATURE: 97.9 F | DIASTOLIC BLOOD PRESSURE: 60 MMHG

## 2020-08-16 LAB
ANION GAP SERPL CALC-SCNC: 10 MMOL/L (ref 3–18)
BUN SERPL-MCNC: 6 MG/DL (ref 7–18)
BUN/CREAT SERPL: 9 (ref 12–20)
CA-I SERPL-SCNC: 1.08 MMOL/L (ref 1.12–1.32)
CALCIUM SERPL-MCNC: 6.1 MG/DL (ref 8.5–10.1)
CHLORIDE SERPL-SCNC: 109 MMOL/L (ref 100–111)
CO2 SERPL-SCNC: 18 MMOL/L (ref 21–32)
CREAT SERPL-MCNC: 0.68 MG/DL (ref 0.6–1.3)
GLUCOSE BLD STRIP.AUTO-MCNC: 112 MG/DL (ref 70–110)
GLUCOSE BLD STRIP.AUTO-MCNC: 93 MG/DL (ref 70–110)
GLUCOSE SERPL-MCNC: 59 MG/DL (ref 74–99)
MAGNESIUM SERPL-MCNC: 1.3 MG/DL (ref 1.6–2.6)
PHOSPHATE SERPL-MCNC: 2.3 MG/DL (ref 2.5–4.9)
POTASSIUM SERPL-SCNC: 4.4 MMOL/L (ref 3.5–5.5)
SODIUM SERPL-SCNC: 137 MMOL/L (ref 136–145)

## 2020-08-16 PROCEDURE — 74011250636 HC RX REV CODE- 250/636: Performed by: INTERNAL MEDICINE

## 2020-08-16 PROCEDURE — 82962 GLUCOSE BLOOD TEST: CPT

## 2020-08-16 PROCEDURE — 84100 ASSAY OF PHOSPHORUS: CPT

## 2020-08-16 PROCEDURE — 83735 ASSAY OF MAGNESIUM: CPT

## 2020-08-16 PROCEDURE — 80048 BASIC METABOLIC PNL TOTAL CA: CPT

## 2020-08-16 PROCEDURE — 82330 ASSAY OF CALCIUM: CPT

## 2020-08-16 PROCEDURE — 36415 COLL VENOUS BLD VENIPUNCTURE: CPT

## 2020-08-16 PROCEDURE — 74011250637 HC RX REV CODE- 250/637: Performed by: INTERNAL MEDICINE

## 2020-08-16 RX ORDER — UREA 10 %
2 LOTION (ML) TOPICAL 2 TIMES DAILY
Qty: 120 TAB | Refills: 0 | Status: SHIPPED | OUTPATIENT
Start: 2020-08-16 | End: 2020-09-03 | Stop reason: SDUPTHER

## 2020-08-16 RX ORDER — LEVOFLOXACIN 250 MG/1
250 TABLET ORAL EVERY 24 HOURS
Status: DISCONTINUED | OUTPATIENT
Start: 2020-08-16 | End: 2020-08-16 | Stop reason: HOSPADM

## 2020-08-16 RX ORDER — UREA 10 %
2 LOTION (ML) TOPICAL 2 TIMES DAILY
Status: DISCONTINUED | OUTPATIENT
Start: 2020-08-16 | End: 2020-08-16 | Stop reason: HOSPADM

## 2020-08-16 RX ORDER — LEVOFLOXACIN 250 MG/1
250 TABLET ORAL EVERY 24 HOURS
Qty: 15 TAB | Refills: 0 | Status: SHIPPED | OUTPATIENT
Start: 2020-08-16 | End: 2020-09-02 | Stop reason: SDUPTHER

## 2020-08-16 RX ADMIN — APIXABAN 2.5 MG: 2.5 TABLET, FILM COATED ORAL at 09:34

## 2020-08-16 RX ADMIN — METOPROLOL TARTRATE 12.5 MG: 25 TABLET, FILM COATED ORAL at 09:34

## 2020-08-16 RX ADMIN — MELATONIN 2 TABLET: at 09:34

## 2020-08-16 RX ADMIN — Medication 2 TABLET: at 13:15

## 2020-08-16 RX ADMIN — LEVOFLOXACIN 250 MG: 250 TABLET, FILM COATED ORAL at 13:15

## 2020-08-16 RX ADMIN — PANTOPRAZOLE SODIUM 40 MG: 40 TABLET, DELAYED RELEASE ORAL at 09:34

## 2020-08-16 RX ADMIN — LEVOTHYROXINE SODIUM 75 MCG: 0.07 TABLET ORAL at 07:24

## 2020-08-16 RX ADMIN — SODIUM CHLORIDE, SODIUM LACTATE, POTASSIUM CHLORIDE, AND CALCIUM CHLORIDE 75 ML/HR: 600; 310; 30; 20 INJECTION, SOLUTION INTRAVENOUS at 07:51

## 2020-08-16 NOTE — PROGRESS NOTES
rec'd in bed this am . Report from [address] Awake and alert periods of conusion. Purewick intact. Denies chest pain , no SOB  assessed without distress   Plan D/C today. Patient express desire to go home feels ready   Case [facility] for patient   D/C instructions signed and explained to patient  [address] called and ride scheduled for now   Escorted out via w/c accompanied by staff. Patient to  [address] [facility].  Verbalized understanding

## 2020-08-16 NOTE — DISCHARGE SUMMARY
Discharge Summary      [facility] service    Patient ID:  Alexander Curtis, 79 y.o., female  : 1940    Admit Date: 2020  Discharge Date:  2020  Length of stay: 3 day(s)    PCP:  Renata Ma MD    Chief Complaint   Patient presents with    Fatigue    Urinary Frequency    Nausea       Discharge Diagnosis:     Hospital Problems  Date Reviewed: 2020          Codes Class Noted POA    * (Principal) SIRS (systemic inflammatory response syndrome) ([facility] ICD-10-CM: R65.10  ICD-9-CM: 995.90  2020 Yes        UTI (urinary tract infection) ICD-10-CM: N39.0  ICD-9-CM: 599.0  2020 Yes        Paroxysmal atrial fibrillation ([facility] ICD-10-CM: I48.0  ICD-9-CM: 427.31  3/4/2020 Yes        Smyer-vesical fistula ICD-10-CM: 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.1  ICD-9-CM: 596.1  3/4/2020 Yes        Hypothyroid ICD-10-CM: E03.9  ICD-9-CM: 244.9  2014 Yes        HTN (hypertension) ICD-10-CM: I10  ICD-9-CM: 401.9  2014 Yes        Obesity ICD-10-CM: E66.9  ICD-9-CM: 278.00  2014 Yes        DM (diabetes mellitus) ([facility] ICD-10-CM: E11.9  ICD-9-CM: 250.00  2014 Yes              Discharge instructions:    #  Discharge Diet:            Diet: Resume previous diet  # Discharge activity and restrictions: Activity as tolerated    # Follow-up appointments: Follow-up Information     Follow up With Specialties Details Why Contact Info    Janice Fuentes MD Infectious Disease, Internal Medicine  [phone] Ext [phone]  [address] [facility]  [address]  [phone]      Renata Ma MD Family Medicine, Internal Medicine Schedule an appointment as soon as possible for a visit  [address] [facility]  [address]  [phone]         keep your surgery clinic appointment this month              HPI on Admission (per admitting physician):  Ms. Elias Fountain is a 79 y.o.  female who is admitted for UTI/SIRS.   Ms. Elias Fountain with past medical history as noted below , presented to the Emergency Department today  complaining of above. Triage and ER notes noted. She is a case of chronic UTI, recurrent uti. On chronic Nitrofurantoin treatment. Came to ER after talked to her PCP nurse about her been sick and feeling unwell so she referred her to ER \" can not do much for you in the clinic \" . Report generalized weakness to ER. In ER found to have high HR and LA then when developed temp COVID 19 test sent though she has no respiratory symptoms, no fever at home , no cough , no throat symptoms. Temp in er 103 with elevated LA level. She is a known case of colovesical fistual and follow with surgeon at [facility] ( I dont have details about surgcal management optins she had). BM normal , chronic symptoms of UTI. No fever at home. No respiratory symptoms. Given CFTX based on last urine culture result. Informed ER MD LLQ pains but to me that was not a major issue to her. Wbc noted normal in ER. For further details and initial management please refer to H/P. Hospital Course:      Patient feel good and no symptoms of been unwell overall that brought her here present currently. Lucas Hopkins to go home. By Problems :     # aCUTE ON RECURRENT uti WITH    SIRS (systemic inflammatory response syndrome)    Urine culture with mixed aleida ,  blood CS negative to date. Dw ID ; we agree on dc patient on po LVQ until her surgery appointment on 8/31 then depend on her progress of management . Also patient advised to call ID clinic if has question and arrange follow up. Burgess Shine. CXR no acute lesion . In house treated with iv CFTX. ID consulted. COVID 19 test NEGATIVE     # ho   Hypothyroid (2/24/2014). On replacement        # ho  HTN (hypertension) (2/24/2014). Control BP       #  Obesity (2/24/2014).  Body mass index is 27.34 kg/m².        #  DM (diabetes mellitus) ([facility] (2/24/2014).  Provide SSI, hypoglycemia protocol and frequent Accu checks.   Provide SSI, hypoglycemia protocol and frequent Accu checks.  Education,  diabetic educator  . 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 To follow with her surgeon at [facility] .     Discharge condition:  improved and stable    Disposition:  Home    Procedures:   * No surgery found *      Consultants: ID  IP CONSULT TO HOSPITALIST  IP CONSULT TO INFECTIOUS DISEASES    Physical Exam on Discharge:  Visit Vitals  /60 (BP 1 Location: Right arm, BP Patient Position: At rest)   Pulse 66   Temp 97.9 °F (36.6 °C)   Resp 18   Ht 5' (1.524 m)   Wt 60.3 kg (133 lb)   SpO2 93%   BMI 25.97 kg/m²   Gen:  Appear stated age, Well-developed,  in no acute distress  HEENT:  Head atraumatic, normocephalic , hearing intact to voice, moist mucous membranes. Neck:   Trachea midline , No apparent JVD, Supple, without masses, thyroid non-tender  Resp:  No accessory muscle use,Bilateral BS present, clear breath sounds without wheezes rales or rhonchi  Card:  No murmurs, normal S1, S2 without Gallop . No Significant lower leg peripheral edema. Abd: NO  abdomen tenderness .   Soft  non-distended, bowel sounds are present . Musc:  No cyanosis or clubbing. Skin:  No rashes or ulcers, skin turgor is good. Neuro:  Cranial nerves are grossly intact, no clear area of focal motor weakness, follows commands appropriately. Psych: oriented to person, place and time, alert    Hospitalization and discharge instructions d/c in details with : patient , ID , Nursing/CM     Discharge medications :  Current Discharge Medication List      START taking these medications    Details   Lactobacillus Acidoph & Bulgar (FLORANEX) 1 million cell tab tablet Take 2 Tabs by mouth two (2) times a day for 30 days. Qty: 120 Tab, Refills: 0      levoFLOXacin (LEVAQUIN) 250 mg tablet Take 1 Tab by mouth every twenty-four (24) hours for 15 days.   Qty: 15 Tab, Refills: 0         CONTINUE these medications which have NOT CHANGED    Details metoprolol tartrate (LOPRESSOR) 25 mg tablet Take 0.5 Tabs by mouth every twelve (12) hours. Qty: 30 Tab, Refills: 6      levothyroxine (SYNTHROID) 75 mcg tablet TAKE 1 TABLET BY MOUTH EVERY DAY BEFORE BREAKFAST  Qty: 90 Tab, Refills: 0    Associated Diagnoses: Hypothyroidism due to acquired atrophy of thyroid      metFORMIN (GLUCOPHAGE) 1,000 mg tablet TAKE 1 TABLET BY MOUTH TWICE DAILY  Qty: 180 Tab, Refills: 0      simvastatin (ZOCOR) 20 mg tablet TAKE 1 TABLET BY MOUTH EVERY NIGHT  Qty: 90 Tab, Refills: 0    Associated Diagnoses: Type 2 diabetes mellitus with nephropathy (HCC)      betamethasone dipropionate (DIPROSONE) 0.05 % topical cream betamethasone dipropionate 0.05 % topical cream  Qty: 15 g, Refills: 1      pantoprazole (PROTONIX) 40 mg tablet TAKE 1 TABLET BY MOUTH DAILY  Qty: 90 Tab, Refills: 3      diphenoxylate-atropine (LOMOTIL) 2.5-0.025 mg per tablet TAKE 1 TABLET BY MOUTH FOUR TIMES DAILY AS NEEDED FOR DIARRHEA. MAX DAILY AMOUNT: 4 TABLETS. Qty: 30 Tab, Refills: 0    Associated Diagnoses: Diverticulitis; Watery diarrhea      nystatin (MYCOSTATIN) 100,000 unit/gram ointment Apply  to affected area two (2) times a day. Qty: 15 g, Refills: 0    Associated Diagnoses: Candidal dermatitis      dicyclomine (BentyL) 10 mg capsule Take 2 Caps by mouth three (3) times daily as needed for Abdominal Cramps. Qty: 180 Cap, Refills: 11    Associated Diagnoses: Irritable bowel syndrome with diarrhea      triamcinolone acetonide (KENALOG) 0.1 % topical cream APPLY THIN LAYER EXTERNALLY TO THE AFFECTED AREA TWICE DAILY  Qty: 45 g, Refills: 1    Associated Diagnoses: Eczema, unspecified type      apixaban (Eliquis) 5 mg tablet TAKE 1 TABLET BY MOUTH TWICE DAILY  Qty: 60 Tab, Refills: 6      diphenhydrAMINE (BENADRYL) 25 mg capsule Take 25 mg by mouth every six (6) hours as needed. cranberry 500 mg capsule Take 500 mg by mouth daily.       glucose blood VI test strips (FREESTYLE LITE STRIPS) strip use to check BS once daily      ondansetron hcl (ZOFRAN) 4 mg tablet Take 1 Tab by mouth every eight (8) hours as needed for Nausea. Qty: 30 Tab, Refills: 1      cholecalciferol (VITAMIN D3) 1,000 unit tablet Take 2 Tabs by mouth daily. Qty: 60 Tab, Refills: 0         STOP taking these medications       nitrofurantoin, macrocrystal-monohydrate, (Macrobid) 100 mg capsule Comments:   Reason for Stopping:                   Most Recent Labs:       Recent Labs     08/13/20  2359 08/13/20  1345   WBC 4.9 5.5   HGB 10.7* 11.8*   HCT 33.3* 35.7    210     Recent Labs     08/16/20  0520 08/15/20  0400 08/14/20  1000  08/13/20  1345    138 138   < > 135*   K 4.4 5.2 3.7   < > 4.0    108 106   < > 102   CO2 18* 23 25   < > 24   GLU 59* 91 136*   < > 235*   BUN 6* 11 11   < > 14   CREA 0.68 0.76 0.76   < > 1.07   CA 6.1* 8.5 7.9*   < > 8.2*   MG 1.3* 2.1 3.5*   < >  --    PHOS 2.3* 2.9 2.7   < >  --    ALB  --   --   --   --  2.8*   TBILI  --   --   --   --  0.5   ALT  --   --   --   --  10*   INR  --   --  1.2  --   --     < > = values in this interval not displayed. Lab Results   Component Value Date/Time    Glucose (POC) 112 (H) 08/16/2020 11:02 AM    Glucose (POC) 93 08/16/2020 07:19 AM    Glucose (POC) 113 (H) 08/15/2020 10:00 PM    Glucose (POC) 174 (H) 08/15/2020 04:17 PM    Glucose (POC) 90 08/15/2020 11:52 AM    Glucose,  (H) 02/08/2019 12:15 AM     No results for input(s): PH, PCO2, PO2, HCO3, FIO2 in the last 72 hours.   Recent Labs     08/14/20  1000   INR 1.2       Lab Results   Component Value Date/Time    Specimen Description: SPUTUM 11/18/2009 04:05 AM    Specimen Description: STOOL 11/17/2009 07:00 PM    Specimen Description: STOOL 11/17/2009 07:00 PM     Lab Results   Component Value Date/Time    Culture result: MIXED UROGENITAL DIXON ISOLATED 08/13/2020 05:45 PM    Culture result: NO GROWTH 3 DAYS 08/13/2020 02:00 PM    Culture result: NO GROWTH 3 DAYS 08/13/2020 01:45 PM       All Cardiac Markers in the last 24 hours: No results found for: CPK, CK, CKMMB, CKMB, RCK3, CKMBT, CKNDX, CKND1, LUIS A, TROPT, TROIQ, SHELLEY, TROPT, TNIPOC, BNP, BNPP    XR Results:  Results from Hospital Encounter encounter on 08/13/20   XR CHEST PORT    Narrative EXAM: XR CHEST PORT    CLINICAL INDICATION/HISTORY: Fever, Weakness  -Additional: None    COMPARISON: 3/15/2020    TECHNIQUE: Portable frontal view of the chest    _______________    FINDINGS:    SUPPORT DEVICES: None. HEART AND MEDIASTINUM: Cardiomediastinal silhouette within normal limits. LUNGS AND PLEURAL SPACES: No dense consolidation, large effusion or  pneumothorax.    _______________      Impression IMPRESSION:    No acute cardiopulmonary abnormality. CT Results:  Results from Hospital Encounter encounter on 08/13/20   CT ABD PELV W CONT    Narrative EXAM:CT abdomen pelvis with contrast    CLINICAL INDICATION/HISTORY: Previous colovesical fistula, urinary frequency,  fever, nausea    COMPARISON: 2/29/2020. TECHNIQUE: Standard helical CT performed through the abdomen and pelvis with IV  contrast.  Coronal and sagittal reformations were generated. One or more dose  reduction techniques were used on this CT: automated exposure control,  adjustment of the mAs and/or kVp according to patient's size, and iterative  reconstruction techniques. The specific techniques utilized on this CT exam have  been documented in the patient's electronic medical record. Digital imaging and  communications and medicine (DICOM) format image data are available to  nonaffiliated external healthcare facilities or entities on a secure, media  free, reciprocally searchable basis with patient authorization for at least a 12  month period after this study. _______________    FINDINGS:    INFERIOR THORAX: Normal.    LIVER/BILIARY: No suspicious liver lesion. No biliary dilation. Nondistended but  unremarkable.  Previously visualized small gallstones not visualized. SPLEEN: Normal.    PANCREAS: Normal.    ADRENALS: Normal.    KIDNEYS: Stable 3.9 cm left renal cyst and tiny left upper pole renal cyst with  no new renal mass calculus or obstruction. LYMPH NODES: Several stable subcentimeter likely benign retroperitoneal lymph  nodes, no new adenopathy. GI TRACT: Persistent diffuse distal colonic diverticulosis with chronic proximal  sigmoid wall thickening. There is no new bowel dilatation or wall thickening. There is slight interval increased size of persistent air and fluid and likely  stool collection extraluminal, contiguous with and inferior to this portion of  thickened sigmoid colon, contiguous with and inseparable from the fundus of the  bladder with associated bladder compression inferiorly. This now measures  maximally 5.3 cm transversely compared to 4.4 cm previously, and measures  maximally 4.3 cm in cephalocaudad dimension, compared to 3.8 cm previously. There are several small foci of air within the compressed bladder lumen. There  is also heterogeneous soft tissue inferior that appears within the bladder  lumen. There is persistent pericystic stranding. No free air. VASCULATURE: Mild atherosclerotic changes with no evidence of aortic aneurysm. PELVIC ORGANS: Uterus unremarkable. No significant change in 3.6 cm likely left  ovarian cyst with thin wall and additional stable small right adnexal cyst.    OTHER: No aggressive appearing osseous lesion. Pronounced multilevel  degenerative lumbar spondylosis. Previous ORIF left proximal femur.    _______________      Impression IMPRESSION:    1. Chronic changes of proximal sigmoid diverticulitis with slight interval  increased size of extraluminal air-fluid collection below the sigmoid colon and  contiguous with and displacing inferiorly the fundus of the bladder. Small foci  of air within bladder lumen.  As mentioned previously this is suggestive of  colovesical fistula with associated chronic abscess. 2. Heterogeneous soft tissue that appears within the lumen of the compressed  bladder which may represent internal debris although bladder mass not excluded. Persistent pericystic stranding suggestive of cystitis. If not previously  performed, cystoscopy may be useful. 3. No other new acute abdominal or pelvic CT finding. 4. Stable bilateral ovarian cysts left greater than right. MRI Results:  Results from [place] encounter on 09/03/19   MRI BRAIN WO CONT    Narrative EXAM: MRI BRAIN W/O CONTRAST    INDICATION: Meningoma    COMPARISON: No prior MR study, noncontrast CT head 7/11/2017    TECHNIQUE: Multiplanar multi sequence MR imaging of the brain was performed  utilizing axial T2, FLAIR, diffusion, coronal T2, and multiplanar T1 pre  contrast imaging . Additional dedicated susceptibility weighted imaging was  performed including MIP and filtered phase reconstruction images. No gadolinium was administered due to patient refusal.    Imaging performed on wide bore Discovery [id number] [place] suite 3T magnet at [facility].     _______________________    FINDINGS:     VENTRICLES/EXTRA-AXIAL SPACES: The ventricles and sulci are mildly enlarged  consistent with diffuse volume loss. Mild amount of T2/FLAIR hyperintense white  matter lesions are seen within the periventricular and subcortical white matter  , which are nonspecific, but likely represent chronic small vessel changes. There is a anterolateral left frontal extra-axial lesion which appears to be  dural based. This lesion shows increased T1 precontrast imaging  hypointense/intermediate T2 signal with prominent susceptibility artifact which  is inseparable from the inner table of the calvarium.  There is an additional  more rounded component along its inner aspect which is more T1 precontrast and  T2 isointense to cortex, such as axial image 25 and coronal image 36, which does  not have the same degree of susceptibility artifact. Review of prior CT shows  dense plaque-like calcifications corresponding to the component of  susceptibility artifact and low level calcification with increased attenuation  along its medial aspect of the 2017 CT. These findings are very suspicious for  partially calcified meningioma. The overall size, including the densely  calcified base is estimated 2.3 x 1.6 x 2.2 cm as measured on coronal image 9  and axial image 7. Corresponding measurements on prior CT are estimated 2.3 x  1.5 x 2.0 cm, although slightly different imaging planes. The medial more soft  tissue partially calcified component has estimated size of 11 mm in diameter,  which appears increased from previous 2017 study of estimated 9 mm. There is contact of the adjacent left frontal lobe parenchyma, without evidence  of mass effect or parenchymal edema. No convincing additional dural based lesion is seen, although the lack of  gadolinium administration does limit evaluation. BRAIN PARENCHYMA:  No evidence of intracranial mass effect, midline shift, or herniation. No abnormal restricted diffusion to suggest acute infarct. No susceptibility artifact to suggest intraparenchymal hemorrhage. VASCULATURE:  The major intracranial vascular flow voids are grossly normal.    ORBITS: The visualized orbits are unremarkable. PARANASAL SINUSES: Minimal mucoperiosteal thickening is present  Antra. No air-fluid levels are present. MASTOID AIR CELLS: Visualized mastoid air cells are clear. OSSEOUS STRUCTURES: Very mild degenerative changes are seen in the visualized  upper cervical spine. OTHER: None.      ________________________      Impression IMPRESSION:    1. No acute infarct, hemorrhage, mass effect, or herniation. 2.  Findings suggesting partially calcified anterolateral left frontal  meningioma with densely calcified base versus adjacent calvarial hyperostosis.   No significant mass effect or evidence of edema. This lesion, particularly its  more partially calcified soft tissue component, appears to slightly increased  since 7/11/2017 CT. 3. Grossly stable mild nonspecific white matter disease likely representing  chronic small vessel changes. Nuclear Medicine Results:  Results from [place] encounter on 11/11/09    [facility]    Narrative Ordering MD: Avani Egan MD  Interpreted By: Stefan Sanchez MD  ** FINAL **  ---------------------------------------------------------------------  INTERPRETATION:  GALLIUM SCAN, WHOLE BODY FOR INFECTION LOCALIZATION  INDICATION: Fever , sepsis  COMPARISON: None  DESCRIPTION: Following IV administration of 9.68 mCi 67Ga-citrate,   approximately 24 hour, 48 hour, and 72 hour delayed planar images of   the body from the head to mid thigh were obtained in anterior and   posterior projections. There is moderately intense radiopharmaceutical activity in the   transverse colon on the 24-hour images, which also persists on the   more delayed images including the 72 hour images, more prominent   than typically seen. Activity in the pelvis is also likely within   bowel. Expected biodistribution is demonstrated elsewhere. IMPRESSION:   1. Moderately intense, more than usual, radiotracer activity seen   in the transverse colon, persisting on delayed images, may represent   a nonspecific colitis. CT correlation may provide further   characterization as clinically warranted. US Results:  Results from [place] encounter on 09/09/19   US RETROPERITONEUM COMP    Narrative EXAM: Complete retroperitoneal ultrasound    INDICATION: Microscopic hematuria. COMPARISON: Correlation CT abdomen and pelvis 2/8/2019.    _______________    FINDINGS:    RIGHT KIDNEY: 10.0 cm in length. No hydronephrosis or renal mass. No visible  calculi. Mild diffuse increased renal parenchymal echogenicity.     LEFT KIDNEY: 10.9 cm in length. No hydronephrosis or renal mass. No visible  calculi. Mild diffuse increased renal parenchymal echogenicity. 3.9 cm  benign-appearing midpole cyst.    BLADDER: 2.6 cm echogenic focus adjacent to bladder wall with suggestion of some  posterior shadowing. Limited evaluation bladder lumen and wall due to  nondistended bladder, with bladder wall measuring 7 mm. . Ureteral jets not  visualized. . Pre-void volume estimated at 69 ml. Post-void volume estimated at  36 ml. OTHER: Mild to moderate left-sided pleural effusion. _______________      Impression IMPRESSION:    1. 2.6 cm likely bladder calculus. Limited evaluation of the bladder due to  nondistention, questionable bladder wall thickening exaggerated by nondistended  bladder, cannot exclude cystitis. 2. Benign-appearing left renal cyst. No solid renal mass. Mild bilateral renal  parenchymal echogenicity suggestive of chronic medical renal disease. 3. Mild to moderate left-sided pleural effusion. Further evaluation with PA and  lateral chest suggested. IR Results:  Results from Hospital Encounter encounter on 11/11/09   IR PICC LINE    Narrative Ordering MD: Erika Dimas MD  Interpreted By: Eliud Boyer 32  ** FINAL **  ---------------------------------------------------------------------  INTERPRETATION:     PICC inserted by nursing team.  Please see the patient's medical   record for description of the procedure. Please see the chest x-ray   report for tip location. IMPRESSION:     Administrative report. VAS/US Results:  No results found for this or any previous visit. Total discharge time 35 minutes of which more than 50 % spent on counseling and coordination of care. This document in whole or part of it has been produced using voice recognition software. Unrecognized errors in transcription may be present. Sharla Pedroza MD  Hospitalist  [place] [id number] medical group. Hospitalist Division.   August 16, 2020  12:06 PM

## 2020-08-16 NOTE — PROGRESS NOTES
Problem: Falls - Risk of  Goal: *Absence of Falls  Description: Document Mita Cowpens Fall Risk and appropriate interventions in the flowsheet. Outcome: Resolved/Met  Note: Fall Risk Interventions:  Mobility Interventions: Patient to call before getting OOB, PT Consult for mobility concerns, Utilize walker, cane, or other assistive device, Communicate number of staff needed for ambulation/transfer, Bed/chair exit alarm    Mentation Interventions: Toileting rounds    Medication Interventions: Patient to call before getting OOB, Teach patient to arise slowly    Elimination Interventions: Call light in reach, Toileting schedule/hourly rounds              Problem: Patient Education: Go to Patient Education Activity  Goal: Patient/Family Education  Outcome: Resolved/Met     Problem: Pressure Injury - Risk of  Goal: *Prevention of pressure injury  Description: Document Sae Scale and appropriate interventions in the flowsheet. Outcome: Resolved/Met  Note: Pressure Injury Interventions:       Moisture Interventions: Absorbent underpads, Moisture barrier    Activity Interventions: Pressure redistribution bed/mattress(bed type), PT/OT evaluation, Increase time out of bed    Mobility Interventions: HOB 30 degrees or less, Float heels, Pressure redistribution bed/mattress (bed type), PT/OT evaluation, Turn and reposition approx.  every two hours(pillow and wedges)    Nutrition Interventions: Document food/fluid/supplement intake    Friction and Shear Interventions: Apply protective barrier, creams and emollients, Foam dressings/transparent film/skin sealants, HOB 30 degrees or less, Lift sheet, Minimize layers                Problem: Patient Education: Go to Patient Education Activity  Goal: Patient/Family Education  Outcome: Resolved/Met     Problem: Discharge Planning  Goal: *Discharge to safe environment  Outcome: Resolved/Met

## 2020-08-16 NOTE — PROGRESS NOTES
Problem: Discharge Planning  Goal: *Discharge to safe environment  Outcome: resolved/met     Plan: home    Received call from dr Bubba Mahoney, plans to dc pt today. Pt needs cab ride home. her [facility] does not provide transport. notified nurse, lynn, pt needs cab voucher when ready. Pt dc'd no services ordered    Care Management Interventions  PCP Verified by CM: Yes(Dr. Thomas Adjutant)  Last Visit to PCP: 08/12/20  Palliative Care Criteria Met (RRAT>21 & CHF Dx)?: No  Mode of Transport at Discharge: Self(Cab)  Transition of Care Consult (CM Consult): Discharge Planning  Discharge Durable Medical Equipment: No  Physical Therapy Consult: No  Occupational Therapy Consult: No  Speech Therapy Consult: No  Current Support Network:  Other(her son lives with her)  Confirm Follow Up Transport: Self  Discharge Location  Discharge Placement: Home

## 2020-08-16 NOTE — PROGRESS NOTES
Problem: Falls - Risk of  Goal: *Absence of Falls  Description: Document [facility] Fall Risk and appropriate interventions in the flowsheet. Outcome: Progressing Towards Goal  Note: Fall Risk Interventions:  Mobility Interventions: Patient to call before getting OOB, PT Consult for mobility concerns, Utilize walker, cane, or other assistive device, Communicate number of staff needed for ambulation/transfer, Bed/chair exit alarm         Medication Interventions: Teach patient to arise slowly, Patient to call before getting OOB, Evaluate medications/consider consulting pharmacy, Bed/chair exit alarm    Elimination Interventions: Patient to call for help with toileting needs, Stay With Me (per policy), Call light in reach, Bed/chair exit alarm, Toilet paper/wipes in reach, Toileting schedule/hourly rounds              Problem: Patient Education: Go to Patient Education Activity  Goal: Patient/Family Education  Outcome: Progressing Towards Goal     Problem: Pressure Injury - Risk of  Goal: *Prevention of pressure injury  Description: Document Sae Scale and appropriate interventions in the flowsheet. Outcome: Progressing Towards Goal  Note: Pressure Injury Interventions:       Moisture Interventions: Absorbent underpads, Apply protective barrier, creams and emollients, Internal/External urinary devices, Moisture barrier, Minimize layers    Activity Interventions: Pressure redistribution bed/mattress(bed type), PT/OT evaluation, Increase time out of bed    Mobility Interventions: HOB 30 degrees or less, Float heels, Pressure redistribution bed/mattress (bed type), PT/OT evaluation, Turn and reposition approx.  every two hours(pillow and wedges)    Nutrition Interventions: Document food/fluid/supplement intake    Friction and Shear Interventions: Apply protective barrier, creams and emollients, Foam dressings/transparent film/skin sealants, HOB 30 degrees or less, Lift sheet, Minimize layers                Problem: Patient Education: Go to Patient Education Activity  Goal: Patient/Family Education  Outcome: Progressing Towards Goal

## 2020-08-16 NOTE — DISCHARGE INSTRUCTIONS
Patient Education        Learning About COVID-19 and Social Distancing  What is it? Social distancing means putting space between yourself and other people. The recommended distance is 6 feet, or about 2 meters. This also means staying away from any place where people may gather, such as hu[place] or other public gathering places. Why is it important? Social distancing is the best way to reduce the spread of COVID-19. This virus seems to spread from person to person through droplets from coughing and sneezing. So if you keep your distance from others, you're less likely to get it or spread it. And social distancing is important for everyone, not just those who are at high risk of infection, like older people. You might have the virus but not have symptoms. You could then give the infection to someone you come into contact with. How is it done? Putting 6 feet, or about 2 meters, between you and other people is the recommended distance. Also stay away from any place where people may gather, such as hu[place] or other public gathering places. So if possible:  · Work from home, and keep your kids at home. · Don't travel if you don't have to. And avoid public transportation, ride-shares, and taxis unless you have no choice. · Limit shopping to essentials, like food and medicines. · Wear a cloth face cover if you have to go to a public place like the grocery store or pharmacy. · Don't eat in restaurants. (You can still get takeout or food deliveries.)  · Avoid crowds and busy places. Follow stay-at-home orders or other directions for your area. Current as of: May 8, 2020               Content Version: 12.5  © 5713-0133 [employer]. Care instructions adapted under license by [employer] (which disclaims liability or warranty for this information).  If you have questions about a medical condition or this instruction, always ask your healthcare professional. Karyna Adkins any warranty or liability for your use of this information. HOSPITALIST DISCHARGE INSTRUCTIONS  NAME: Anthony Valentin   :  1940   MRN:  [id number]     Date/Time:  2020 12:09 PM    ADMIT DATE: 2020     DISCHARGE DATE: 2020     DISCHARGE DIAGNOSIS:  Acute on recurrent UTI     MEDICATIONS:  {Medication reconciliation information is now added to the patient's AVS automatically when it is printed. There is no need to use this SmartLink in discharge instructions. Highlight this text and delete it to clear this message}     · It is important that you take the medication exactly as they are prescribed. · Keep your medication in the bottles provided by the pharmacist and keep a list of the medication names, dosages, and times to be taken in your wallet. · Do not take other medications without consulting your doctor. Pain Management: per above medications    What to do at Home    #  Discharge Diet:            Diet: Resume previous diet  # Discharge activity and restrictions: Activity as tolerated     If you experience any of the following symptoms then please call your primary care physician or return to the emergency room if you cannot get hold of your doctor:  Fever, chills, nausea, vomiting, diarrhea, change in mentation, falling, bleeding, shortness of breath. Any new or worsening symptoms. Or call 911 for emergency. Follow Up:  Dr. Rehana Suero MD  you are to call and set up an appointment to see them in 7 days. Information obtained by :  I understand that if any problems occur once I am at home I am to contact my physician. I understand and acknowledge receipt of the instructions indicated above.                                                                                                                                            Physician's or R.N.'s Signature                                                                  Date/Time Patient or Representative Signature                                                          Date/Time

## 2020-08-16 NOTE — PROGRESS NOTES
20:00= Report received from previous nurse, Ramone Llanes RN.  20:15- Assessment completed- see flow sheet. Patient has no c/o pain/discomfort at this time. Continue to monitor. 8/16/2020 00:10-No change in condition. Patient remains awake at this time- watching tv.   04:00- No change in condition. Continue to monitor. Call light in reach. 06:30- B/P=178/81 Reported to Dr Serg Green. No new orders at this time. 07:45- Report given to oncoming nurse, Ramone Llanes RN.

## 2020-08-17 ENCOUNTER — PATIENT OUTREACH (OUTPATIENT)
Dept: CASE MANAGEMENT | Age: 80
End: 2020-08-17

## 2020-08-17 NOTE — PROGRESS NOTES
Patient was admitted to [address] [place] [address] on 2020 and discharged on 2020  For:  Sirs. Patient was contacted within 1 business days of discharge. Top Discharge Challenges to be reviewed by the provider   Additional needs identified to be addressed with provider  No    Discussed COVID-19 related testing which was available at this time. Test results were negative. Patient informed of results, if available? no   Test results were not pending at time of discharge. Method of communication with provider :  None        Advance Care Planning:   Does patient have an Advance Directive:   on file but it appears patient signature is needed      Inpatient Readmission Risk score: 95%. Was this a readmission? no   Patient stated reason for the admission:     Patients top risk factors for readmission: medical condition  Interventions to address risk factors:   - see goals please     Care Transition Nurse (CTN) contacted the patient by telephone to perform post hospital discharge assessment. Verified name and  with patient as identifiers. Provided introduction to self, and explanation of the CTN role. CTN reviewed discharge instructions, medical action plan and red flags with patient who verbalized understanding. Patient given an opportunity to ask questions and does not have any further questions or concerns at this time. The patient agrees to contact the PCP office for questions related to their healthcare. Medication's were discussed  with patient, who verbalizes understanding of administration of home medications. Patient stated that her son will  new medications today. Patient advises that she has all other necessary medications at  Home. Referral to Pharm D needed: Not at this time. Will defer to PCP for follow up as needed.       Home Health/Outpatient orders at discharge: [address]: n/a   Date of initial visit: [facility] Equipment ordered at discharge: none noted at this time. [address]:   Durable Medical Equipment received:     Covid Risk Education    Patient has following risk factors of: diabetes. Education provided regarding infection prevention, and signs and symptoms of COVID-19 and when to seek medical attention with patient who verbalized understanding. Discussed exposure protocols and quarantine From CDC: Are you at higher risk for severe illness?  and given an opportunity for questions and concerns. The patient agrees to contact the COVID-19 hotline [phone] or PCP office for questions related to COVID-19. For more information on steps you can take to protect yourself, see CDC's How to Protect Yourself     Patient/family/caregiver given information for [employer] and agrees to enroll yes  Patient's preferred e-mail: n/a    Patient's preferred phone number:    [phone]      Discussed follow-up appointments. Patient encouraged to call PCP office for follow up. CTN to forward request for SHYANN apointment as well. [address] follow up appointment(s):   Future Appointments   Date Time Provider Zuleika Newsomei   9/8/2020  2:30 PM Willam Cuadra  [facility]   11/3/2020  1:00 PM [facility] ALOK STEREO BX RM 1 [facility]     Non-[facility] follow up appointment(s):     Dr. Piedad Francis     Patient states that she has a follow up scheduled with her surgeon for 8-. Plan for follow-up call in 1-2 days/as needed  based on severity of symptoms and risk factors. CTN provided contact information for future needs. Goals Addressed                 This Visit's Progress     Attends follow-up appointments as directed. [employer] Date:  9/17/2020 8/17/2020   Patient will follow up with PCP and specialty appointments as recommended at time of discharge. Patient states that she has a follow up appointment scheduled with her surgeon for 8/17/2020.            Understands red flags post discharge. Target Date:  9/17/2002      Care Transitions Nurse (CTN) reviewed red flags with patient as follows:   Please call 911 for immediate assistance for symptoms such as:   - Chest Pain  - Severe shortness of breath   - Change in mental status   - Blue tint to lips or face   - New or worsening symptoms    Patient voiced understanding this information. Patient alerted to availability of after hours, weekend,and holiday pcp provider on call. Please call the office and speak with the answering service for assistance. Please call 911 for emergency assistance as needed. Patient voiced understanding this information.           Patient reports:   - She is feeling better   - \" I am fine\"

## 2020-08-18 ENCOUNTER — PATIENT OUTREACH (OUTPATIENT)
Dept: CASE MANAGEMENT | Age: 80
End: 2020-08-18

## 2020-08-18 NOTE — PROGRESS NOTES
Care Transitions Nurse ( CTN) attempted to contact patient via telephone call to follow up with medication reconciliation. Patient's family member was to  her medications from the pharmacy per prior discussion with patient. There was no response. A voicemail message was left requesting a non-emergency return telephone call. CTN  contact information provided. Per chart review, patient has a follow up appointment scheduled with PCP for   8-.

## 2020-08-19 ENCOUNTER — VIRTUAL VISIT (OUTPATIENT)
Dept: FAMILY MEDICINE CLINIC | Age: 80
End: 2020-08-19

## 2020-08-19 DIAGNOSIS — N32.1 COLOVESICAL FISTULA: Primary | ICD-10-CM

## 2020-08-19 DIAGNOSIS — Z71.89 ADVANCE CARE PLANNING: ICD-10-CM

## 2020-08-19 DIAGNOSIS — Z00.00 MEDICARE ANNUAL WELLNESS VISIT, SUBSEQUENT: ICD-10-CM

## 2020-08-19 DIAGNOSIS — E11.21 TYPE 2 DIABETES MELLITUS WITH NEPHROPATHY (HCC): ICD-10-CM

## 2020-08-19 DIAGNOSIS — E03.4 HYPOTHYROIDISM DUE TO ACQUIRED ATROPHY OF THYROID: ICD-10-CM

## 2020-08-19 DIAGNOSIS — D32.9 MENINGIOMA (HCC): ICD-10-CM

## 2020-08-19 DIAGNOSIS — I48.0 PAROXYSMAL ATRIAL FIBRILLATION (HCC): ICD-10-CM

## 2020-08-19 DIAGNOSIS — F32.1 EPISODE OF MODERATE MAJOR DEPRESSION (HCC): ICD-10-CM

## 2020-08-19 NOTE — PROGRESS NOTES
(AWV) The Medicare Annual Wellness Exam PROGRESS NOTE    This is a Medicare Annual Wellness Exam (AWV)     I have reviewed the patient's medical history in detail and updated the computerized patient record. Sterling Cannon is a 79 y.o.  female and presents for an annual wellness exam     Sterling Cannon, who was evaluated through a synchronous (real-time) audio only encounter, and/or her healthcare decision maker, is aware that it is a billable service, with coverage as determined by her insurance carrier. She provided verbal consent to proceed: Yes, and patient identification was verified. It was conducted pursuant to the emergency declaration under the [facility], [facility] and the [employer] Act. A caregiver was present when appropriate. Ability to conduct physical exam was limited. I was in the office. The patient was at home. Duration of phone call 21-30 minutes  ROS   Constitutional: Positive for weight loss. Negative for chills and fever. Patient thinks the antibiotics take away her appetite. HENT: Negative for congestion and sore throat. Eyes: Negative for blurred vision. Respiratory: Negative for cough. Cardiovascular: Negative for chest pain and palpitations. Gastrointestinal: Negative for abdominal pain, constipation, diarrhea, heartburn, nausea and vomiting. Genitourinary: Positive for dysuria, frequency, hematuria and urgency but improved. Negative for flank pain. Patient thinks she is having bladder spasms. Musculoskeletal: Negative for back pain, falls and myalgias. Skin: Negative. Neurological: Negative for dizziness and headaches. Endo/Heme/Allergies: Negative. I do not check my blood sugars and I haven't had any problems with my diabetes.     Psychiatric/Behavioral: Negative for depression and suicidal ideas.        All other systems reviewed and are negative. History     Past Medical History:   Diagnosis Date    Aortic stenosis     Patient denies on 10/31/2019.  Arthritis     Atrial flutter ([address]     Bladder stone     Bronchitis     Resolved after getting rid of her pet bird in 2000. 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  Vesicocolic fistula       Past Surgical History:   Procedure Laterality Date    COLONOSCOPY N/A 2/2/2017    COLONOSCOPY  w/bx polyp performed by Eula Rivera MD at [address] Left 2017     Current Outpatient Medications   Medication Sig Dispense Refill    Lactobacillus Acidoph & Bulgar ([place]) 1 million cell tab tablet Take 2 Tabs by mouth two (2) times a day for 30 days. 120 Tab 0    levoFLOXacin (LEVAQUIN) 250 mg tablet Take 1 Tab by mouth every twenty-four (24) hours for 15 days. 15 Tab 0    metoprolol tartrate (LOPRESSOR) 25 mg tablet Take 0.5 Tabs by mouth every twelve (12) hours. 30 Tab 6    levothyroxine (SYNTHROID) 75 mcg tablet TAKE 1 TABLET BY MOUTH EVERY DAY BEFORE BREAKFAST 90 Tab 0    metFORMIN (GLUCOPHAGE) 1,000 mg tablet TAKE 1 TABLET BY MOUTH TWICE DAILY 180 Tab 0    simvastatin (ZOCOR) 20 mg tablet TAKE 1 TABLET BY MOUTH EVERY NIGHT 90 Tab 0    betamethasone dipropionate (DIPROSONE) 0.05 % topical cream betamethasone dipropionate 0.05 % topical cream 15 g 1    pantoprazole (PROTONIX) 40 mg tablet TAKE 1 TABLET BY MOUTH DAILY 90 Tab 3    diphenoxylate-atropine (LOMOTIL) 2.5-0.025 mg per tablet TAKE 1 TABLET BY MOUTH FOUR TIMES DAILY AS NEEDED FOR DIARRHEA. MAX DAILY AMOUNT: 4 TABLETS.  30 Tab 0    nystatin (MYCOSTATIN) 100,000 unit/gram ointment Apply  to affected area two (2) times a day. 15 g 0    dicyclomine (BentyL) 10 mg capsule Take 2 Caps by mouth three (3) times daily as needed for Abdominal Cramps. 180 Cap 11    triamcinolone acetonide (KENALOG) 0.1 % topical cream APPLY THIN LAYER EXTERNALLY TO THE AFFECTED AREA TWICE DAILY 45 g 1    apixaban (Eliquis) 5 mg tablet TAKE 1 TABLET BY MOUTH TWICE DAILY 60 Tab 6    diphenhydrAMINE (BENADRYL) 25 mg capsule Take 25 mg by mouth every six (6) hours as needed.  cranberry 500 mg capsule Take 500 mg by mouth daily.  glucose blood VI test strips (FREESTYLE LITE STRIPS) strip use to check BS once daily      ondansetron hcl (ZOFRAN) 4 mg tablet Take 1 Tab by mouth every eight (8) hours as needed for Nausea. 30 Tab 1    cholecalciferol (VITAMIN D3) 1,000 unit tablet Take 2 Tabs by mouth daily. 60 Tab 0     Allergies   Allergen Reactions    Metronidazole Hives and Itching    Ampicillin Itching     Family History   Problem Relation Age of Onset    Diabetes Mother     Coronary Artery Disease Mother     Cancer Father         melanoma    Stroke Sister     Hypertension Sister     Diabetes Sister     Diabetes Brother     Coronary Artery Disease Brother     Breast Cancer Paternal Grandmother         older, but not sure age.     Breast Cancer Paternal [de-identified]         and gyn cancer ?age   [employer] [place] No Known Problems Sister     No Known Problems Brother     Kidney Disease Sister     Heart Failure Sister      Social History     Tobacco Use    Smoking status: Never Smoker    Smokeless tobacco: Never Used   Substance Use Topics    Alcohol use: No     Patient Active Problem List   Diagnosis Code    Hypothyroid E03.9    HTN (hypertension) I10    Obesity E66.9    DM (diabetes mellitus) ([facility] E11.9    Family hx-breast malignancy Z80.3    Pancreatitis K85.90    Acute pancreatitis K85.90    Diverticulitis K57.92    Episcleritis of right eye H15.101    Hypothyroidism due to acquired atrophy of thyroid E03.4    Meningioma (HCC) D32.9    Hip fracture ([facility] S72.009A    Type 2 diabetes mellitus with nephropathy (HCC) E11.21    Atrial fibrillation with rapid ventricular response (HCC) I48.91    Hypertension I10    Atrial fibrillation with RVR (HCC) I48.91    Hypomagnesemia E83.42    Acute hypotension I95.9    Paroxysmal atrial fibrillation (HCC) I48.0    Pulmonary HTN (HCC) I27.20    [place]-vesical fistula N32.1    SIRS (systemic inflammatory response syndrome) (HCC) R65.10    UTI (urinary tract infection) N39.0       Health Maintenance History  Immunizations reviewed, dtap up to date , pneumovax up to date, flu due, zoster due  Colonoscopy: up to date,   Eye exam: due  Mammo up to date  Dexascan due        Depression Risk Factor Screening:      History of depression    Alcohol Risk Factor Screening:     Women: On any occasion during the past 3 months, have you had more than 3 drinks containing alcohol? no   Do you average more than 7 drinks per week? no    Functional Ability and Level of Safety:     Hearing Loss    Hearing is good. Activities of Daily Living   Self-care. Requires assistance with: no ADLs    Fall Risk   History of fall(s) in the past 3 months (25 pts)  Score: 25    Abuse Screen   Patient is not abused    Examination   Physical Examination  There were no vitals filed for this visit. There is no height or weight on file to calculate BMI.      Evaluation of Cognitive Function:  Mood/affect:good mood  Appearance:phone call only  Family member/caregiver input: son reports pt has been sleeping more    alert, well appearing, and in no distress and oriented to person, place, and time    Patient Care Team:  Buffy Ramey MD as PCP - General (Family Medicine)  Buffy Ramey MD as PCP - [address] [place] [address] Provider  Ever Montes MD (General Surgery)  Jacey Ma MD (Orthopedic Surgery)  Fernanda Edward MD (Cardiology)  Jessica Fernandez MD (Surgery)  Brielle Hendrickson RN as Care Transitions Nurse    End-of-life planning  Advanced Directive in the case than an injury or illness causes the patient to be unable to make health care decisions    Health Care Directive or Living Will: yes    Advice/Referrals/Counselling/Plan:   Education and counseling provided:  End-of-Life planning (with patient's consent)  Medical nutrition therapy for individuals with diabetes or renal disease  Include in education list (weight loss, physical activity, smoking cessation, fall prevention, and nutrition)    ICD-10-CM ICD-9-CM    1. Colovesical fistula  N32.1 596.1    2. Episode of moderate major depression (HCC)  F32.1 296.22    3. Meningioma (HCC)  D32.9 225.2    4. Hypothyroidism due to acquired atrophy of thyroid  E03.4 244.8      246.8    5. Type 2 diabetes mellitus with nephropathy (HCC)  E11.21 250.40      583.81    6. Paroxysmal atrial fibrillation (HCC)  I48.0 427.31    7. Medicare annual wellness visit, subsequent  Z00.00 V70.0    8. Advance care planning  Z71.89 V65.49 ADVANCE CARE PLANNING FIRST 30 MINS   . Brief written plan, checklist    I have discussed the diagnosis with the patient and the intended plan as seen in the above orders. I have discussed medication side effects and warnings with the patient as well. I have reviewed the plan of care with the patient, accepted their input and they are in agreement with the treatment goals.                ____________________________________________________________    Problem Assessment    for treatment of   Chief Complaint   Patient presents with   [facility] Follow Up   8/13/2020 admitted to [facility] for SIRS associated with cystitis associated with colovesical fistula  8/16/2020 discharged to home  8/17/2020 contacted by nurse navigator for transition of care    SUBJECTIVE  Pt contacted for transition of care; she does not have computer at home and her son's phone is not effective for audio/video communication; she spoke on the phone to discuss her hospitalization; she is concerned that she will have recurrence of infection while waiting for surgeon to close the fistula. She reports that this makes her depressed. She was treated with IV antibiotics while inpatient and has been discharged to home with levofloxacin to take daily for 2 weeks until her follow-up visit with Dr. Jason Mcconnell in surgery. She reports that she feels tired after returning home from the hospital.      Cardiovascular Review:  The patient has diabetes, hypertension and obesity. Diet and Lifestyle: not attempting to follow a low fat, low cholesterol diet, not attempting to follow a low sodium diet, does not rigorously follow a diabetic diet, sedentary, nonsmoker  Home BP Monitoring: is not measured at home. Pertinent ROS: taking medications as instructed, no medication side effects noted, no TIA's, no chest pain on exertion, no dyspnea on exertion, no swelling of ankles. Thyroid Review:  Patient is seen for followup of hypothyroidism. Thyroid ROS: fatigue and weight loss. Phone call only    LABS   hgb a1c 6.3  hgb 10.8  TESTS  CT abd and pelvis  IMPRESSION:     1. Chronic changes of proximal sigmoid diverticulitis with slight interval  increased size of extraluminal air-fluid collection below the sigmoid colon and  contiguous with and displacing inferiorly the fundus of the bladder. Small foci  of air within bladder lumen. As mentioned previously this is suggestive of  colovesical fistula with associated chronic abscess.     2. Heterogeneous soft tissue that appears within the lumen of the compressed  bladder which may represent internal debris although bladder mass not excluded. Persistent pericystic stranding suggestive of cystitis. If not previously  performed, cystoscopy may be useful.     3. No other new acute abdominal or pelvic CT finding.     4. Stable bilateral ovarian cysts left greater than right. Assessment/Plan:    1.  Colovesical fistula  Continue prophylaxis antibiotic until definitive treatment by surgeon    2. Episode of moderate major depression ([facility]  Associated with current illness    3. Meningioma ([facility]  Referred to neurosurgery; f/u with process    4. Hypothyroidism due to acquired atrophy of thyroid  Therapeutic dose of medication    5. Type 2 diabetes mellitus with nephropathy (HCC)  Goal hgb a1c <7; due for reevaluation of diabetes; continue current medications    6. Paroxysmal atrial fibrillation (HCC)  F/u with cardiology    7. Medicare annual wellness visit, subsequent  Reviewed preventive recommendations    8.  Advance care planning  See ACP  - ADVANCE CARE PLANNING FIRST 30 MINS      Lab review: orders written for new lab studies as appropriate; see orders

## 2020-08-19 NOTE — PROGRESS NOTES
Deirdre Zuniga presents today for   Chief Complaint   Patient presents with   [facility] Follow Up       Is someone accompanying this pt? na    Is the patient using any DME equipment during OV? na    Depression Screening:  3 most recent PHQ Screens 8/19/2020   Little interest or pleasure in doing things Not at all   Feeling down, depressed, irritable, or hopeless Not at all   Total Score PHQ 2 0       Learning Assessment:  Learning Assessment 1/17/2019   PRIMARY LEARNER Patient   HIGHEST LEVEL OF EDUCATION - PRIMARY LEARNER  4 YEARS OF COLLEGE   BARRIERS PRIMARY LEARNER NONE   CO-LEARNER CAREGIVER No   PRIMARY LANGUAGE ENGLISH   LEARNER PREFERENCE PRIMARY LISTENING   ANSWERED BY patient   RELATIONSHIP SELF       Abuse Screening:  Abuse Screening Questionnaire 1/17/2019   Do you ever feel afraid of your partner? N   Are you in a relationship with someone who physically or mentally threatens you? N   Is it safe for you to go home? Y       Fall Risk  Fall Risk Assessment, last 12 mths 2/24/2020   Able to walk? Yes   Fall in past 12 months? No       Health Maintenance reviewed and discussed and ordered per Provider. Health Maintenance Due   Topic Date Due    Eye Exam Retinal or Dilated  07/04/1950    Shingrix Vaccine Age 50> (1 of 2) 07/04/1989    GLAUCOMA SCREENING Q2Y  07/04/2004    MICROALBUMIN Q1  10/10/2019    Lipid Screen  10/10/2019    Medicare Yearly Exam  01/18/2020    Foot Exam Q1  07/24/2020    Influenza Age 5 to Adult  08/01/2020    A1C test (Diabetic or Prediabetic)  08/29/2020   . Coordination of Care:  1. Have you been to the ER, urgent care clinic since your last visit? Hospitalized since your last visit? Yes, 8/13/20, [facility]    2. Have you seen or consulted any other health care providers outside of the [address] [place] [address] since your last visit? Include any pap smears or colon screening.  no      Last  Checked na  Last UDS Checked na  Last Pain contract signed: [facility] follow up

## 2020-08-19 NOTE — Clinical Note
Dr. Colleen Hammonds ordered a neurosurgery referral for pt due to meningioma; will you follow-up on this order? Thank you.

## 2020-08-20 ENCOUNTER — TELEPHONE (OUTPATIENT)
Dept: FAMILY MEDICINE CLINIC | Age: 80
End: 2020-08-20

## 2020-08-20 ENCOUNTER — HOSPITAL ENCOUNTER (EMERGENCY)
Age: 80
Discharge: HOME | End: 2020-08-20
Attending: EMERGENCY MEDICINE
Payer: MEDICARE

## 2020-08-20 VITALS
HEIGHT: 60 IN | WEIGHT: 137 LBS | DIASTOLIC BLOOD PRESSURE: 66 MMHG | BODY MASS INDEX: 26.9 KG/M2 | SYSTOLIC BLOOD PRESSURE: 142 MMHG | HEART RATE: 72 BPM | OXYGEN SATURATION: 100 % | TEMPERATURE: 99.5 F | RESPIRATION RATE: 16 BRPM

## 2020-08-20 DIAGNOSIS — N32.1 COLO-VESICAL FISTULA: Primary | ICD-10-CM

## 2020-08-20 DIAGNOSIS — N39.0 CHRONIC UTI (URINARY TRACT INFECTION): ICD-10-CM

## 2020-08-20 LAB
APPEARANCE UR: ABNORMAL
BACTERIA URNS QL MICRO: ABNORMAL /HPF
BILIRUB UR QL: NEGATIVE
COLOR UR: YELLOW
EPITH CASTS URNS QL MICRO: ABNORMAL /LPF (ref 0–5)
GLUCOSE UR STRIP.AUTO-MCNC: NEGATIVE MG/DL
HGB UR QL STRIP: ABNORMAL
KETONES UR QL STRIP.AUTO: NEGATIVE MG/DL
LEUKOCYTE ESTERASE UR QL STRIP.AUTO: ABNORMAL
MUCOUS THREADS URNS QL MICRO: ABNORMAL /LPF
NITRITE UR QL STRIP.AUTO: NEGATIVE
PH UR STRIP: 5.5 [PH] (ref 5–8)
PROT UR STRIP-MCNC: 100 MG/DL
RBC #/AREA URNS HPF: ABNORMAL /HPF (ref 0–5)
SP GR UR REFRACTOMETRY: 1.01 (ref 1–1.03)
UROBILINOGEN UR QL STRIP.AUTO: 0.2 EU/DL (ref 0.2–1)
WBC URNS QL MICRO: ABNORMAL /HPF (ref 0–4)

## 2020-08-20 PROCEDURE — 74011000258 HC RX REV CODE- 258: Performed by: EMERGENCY MEDICINE

## 2020-08-20 PROCEDURE — 99285 EMERGENCY DEPT VISIT HI MDM: CPT

## 2020-08-20 PROCEDURE — 74011250636 HC RX REV CODE- 250/636: Performed by: EMERGENCY MEDICINE

## 2020-08-20 PROCEDURE — 96365 THER/PROPH/DIAG IV INF INIT: CPT

## 2020-08-20 PROCEDURE — 81001 URINALYSIS AUTO W/SCOPE: CPT

## 2020-08-20 PROCEDURE — 87086 URINE CULTURE/COLONY COUNT: CPT

## 2020-08-20 RX ORDER — CEFTRIAXONE 1 G/1
1 INJECTION, POWDER, FOR SOLUTION INTRAMUSCULAR; INTRAVENOUS
Status: DISCONTINUED | OUTPATIENT
Start: 2020-08-20 | End: 2020-08-20 | Stop reason: CLARIF

## 2020-08-20 RX ADMIN — CEFTRIAXONE 1 G: 1 INJECTION, POWDER, FOR SOLUTION INTRAMUSCULAR; INTRAVENOUS at 10:30

## 2020-08-20 NOTE — ED PROVIDER NOTES
[facility] EMERGENCY DEPT      6:28 AM    Date: 8/20/2020  Patient Name: Anthony Valentin    History of Presenting Illness     Chief Complaint   Patient presents with    Blood in Urine       79 y.o. female with noted past medical history who presents to the emergency department with hematuria. 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 She states because that she has stool coming out when she urinates at times. She is been on antibiotics since November 2019. She recently was admitted on 8/13/2022 [facility] for urinary tract infection and seen by Dr. Kami farrar the infectious disease doctor she subsequently was discharged home on Levaquin. During her admission on 8/13/2020 she was tested for coronavirus and tested negative. She states that last night today she had episodes of hematuria. She has no other associated symptoms to include no dysuria urgency frequency and no fever or chills. No other vaginal discharge. The patient denies recent travel outside [place] and denies recent travel to areas large social gatherings. The patient denies any known history of Covid 19 exposure. Patient denies any other associated signs or symptoms. Patient denies any other complaints. Nursing notes regarding the HPI and triage nursing notes were reviewed. Prior medical records were reviewed. Current Facility-Administered Medications   Medication Dose Route Frequency Provider Last Rate Last Dose    cefTRIAXone (ROCEPHIN) injection 1 g  1 g IntraVENous NOW Tejas Singh MD         Current Outpatient Medications   Medication Sig Dispense Refill    Lactobacillus Acidoph & Bulgar (FLORANEX) 1 million cell tab tablet Take 2 Tabs by mouth two (2) times a day for 30 days. 120 Tab 0    levoFLOXacin (LEVAQUIN) 250 mg tablet Take 1 Tab by mouth every twenty-four (24) hours for 15 days.  15 Tab 0    metoprolol tartrate (LOPRESSOR) 25 mg tablet Take 0.5 Tabs by mouth every twelve (12) hours. 30 Tab 6    levothyroxine (SYNTHROID) 75 mcg tablet TAKE 1 TABLET BY MOUTH EVERY DAY BEFORE BREAKFAST 90 Tab 0    metFORMIN (GLUCOPHAGE) 1,000 mg tablet TAKE 1 TABLET BY MOUTH TWICE DAILY 180 Tab 0    simvastatin (ZOCOR) 20 mg tablet TAKE 1 TABLET BY MOUTH EVERY NIGHT 90 Tab 0    betamethasone dipropionate (DIPROSONE) 0.05 % topical cream betamethasone dipropionate 0.05 % topical cream 15 g 1    pantoprazole (PROTONIX) 40 mg tablet TAKE 1 TABLET BY MOUTH DAILY 90 Tab 3    diphenoxylate-atropine (LOMOTIL) 2.5-0.025 mg per tablet TAKE 1 TABLET BY MOUTH FOUR TIMES DAILY AS NEEDED FOR DIARRHEA. MAX DAILY AMOUNT: 4 TABLETS. 30 Tab 0    nystatin (MYCOSTATIN) 100,000 unit/gram ointment Apply  to affected area two (2) times a day. 15 g 0    dicyclomine (BentyL) 10 mg capsule Take 2 Caps by mouth three (3) times daily as needed for Abdominal Cramps. 180 Cap 11    triamcinolone acetonide (KENALOG) 0.1 % topical cream APPLY THIN LAYER EXTERNALLY TO THE AFFECTED AREA TWICE DAILY 45 g 1    apixaban (Eliquis) 5 mg tablet TAKE 1 TABLET BY MOUTH TWICE DAILY 60 Tab 6    diphenhydrAMINE (BENADRYL) 25 mg capsule Take 25 mg by mouth every six (6) hours as needed.  cranberry 500 mg capsule Take 500 mg by mouth daily.  glucose blood VI test strips (FREESTYLE LITE STRIPS) strip use to check BS once daily      ondansetron hcl (ZOFRAN) 4 mg tablet Take 1 Tab by mouth every eight (8) hours as needed for Nausea. 30 Tab 1    cholecalciferol (VITAMIN D3) 1,000 unit tablet Take 2 Tabs by mouth daily. 61 Tab 0       Past History     Past Medical History:  Past Medical History:   Diagnosis Date    Aortic stenosis     Patient denies on 10/31/2019.  Arthritis     Atrial flutter ([facility] [address]     Bladder stone     Bronchitis     Resolved after getting rid of her pet bird in 2000.    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  Vesicocolic fistula        Past Surgical History:  Past Surgical History:   Procedure Laterality Date    COLONOSCOPY N/A 2/2/2017    COLONOSCOPY  w/bx polyp performed by Chaitanya Nava MD at [address] Left 2017       Family History:  Family History   Problem Relation Age of Onset    Diabetes Mother     Coronary Artery Disease Mother     Cancer Father         melanoma    Stroke Sister     Hypertension Sister     Diabetes Sister     Diabetes Brother     Coronary Artery Disease Brother     Breast Cancer Paternal Grandmother         older, but not sure age.  Breast Cancer Paternal [de-identified]         and gyn cancer ?age   [facility] No Known Problems Sister     No Known Problems Brother     Kidney Disease Sister     Heart Failure Sister        Social History:  Social History     Tobacco Use    Smoking status: Never Smoker    Smokeless tobacco: Never Used   Substance Use Topics    Alcohol use: No    Drug use: No       Allergies: Allergies   Allergen Reactions    Metronidazole Hives and Itching    Ampicillin Itching       Patient's primary care provider (as noted in EPIC):  Davis Ricks MD    Review of Systems   Constitutional: Negative for diaphoresis. HENT: Negative for congestion. Eyes: Negative for discharge. Respiratory: Negative for stridor. Cardiovascular: Negative for palpitations. Gastrointestinal: Negative for diarrhea. Endocrine: Negative for heat intolerance. Genitourinary: Negative for flank pain. Musculoskeletal: Negative for back pain. Neurological: Negative for weakness. Psychiatric/Behavioral: Negative for hallucinations.    All other systems reviewed and are negative. Visit Vitals  /50 (BP 1 Location: Right arm, BP Patient Position: At rest)   Pulse 77   Temp 99.5 °F (37.5 °C)   Resp 16   Ht 5' (1.524 m)   Wt 62.1 kg (137 lb)   SpO2 99%   BMI 26.76 kg/m²       PHYSICAL EXAM:    CONSTITUTIONAL:  Alert, in no apparent distress;  well developed;  well nourished. HEAD:  Normocephalic, atraumatic. EYES:  EOMI. Non-icteric sclera. Normal conjunctiva. ENTM:  Nose:  no rhinorrhea. Throat:  no erythema or exudate, mucous membranes moist.  NECK:  No JVD. Supple  RESPIRATORY:  Chest clear, equal breath sounds, good air movement. CARDIOVASCULAR:  Regular rate and rhythm. No murmurs, rubs, or gallops. GI:  Normal bowel sounds, abdomen soft and non-tender. No rebound or guarding. BACK:  Non-tender. No CVAT. UPPER EXT:  Normal inspection. LOWER EXT:  No edema, no calf tenderness. Distal pulses intact. NEURO:  Moves all four extremities, and grossly normal motor exam.  SKIN:  No rashes;  Normal for age. PSYCH:  Alert and normal affect.     Diagnostic Study Results     Abnormal lab results from this emergency department encounter:  Labs Reviewed   URINALYSIS W/ RFLX MICROSCOPIC - Abnormal; Notable for the following components:       Result Value    Protein 100 (*)     Blood LARGE (*)     Leukocyte Esterase LARGE (*)     All other components within normal limits   URINE MICROSCOPIC ONLY - Abnormal; Notable for the following components:    Bacteria 3+ (*)     Mucus 1+ (*)     All other components within normal limits   CULTURE, URINE       Lab values for this patient within approximately the last 12 hours:  Recent Results (from the past 12 hour(s))   URINALYSIS W/ RFLX MICROSCOPIC    Collection Time: 08/20/20  7:16 AM   Result Value Ref Range    Color YELLOW      Appearance TURBID      Specific gravity 1.008 1.005 - 1.030      pH (UA) 5.5 5.0 - 8.0      Protein 100 (A) NEG mg/dL    Glucose Negative NEG mg/dL    Ketone Negative NEG mg/dL    Bilirubin Negative NEG      Blood LARGE (A) NEG      Urobilinogen 0.2 0.2 - 1.0 EU/dL    Nitrites Negative NEG      Leukocyte Esterase LARGE (A) NEG     URINE MICROSCOPIC ONLY    Collection Time: 08/20/20  7:16 AM   Result Value Ref Range    WBC TOO NUMEROUS TO COUNT 0 - 4 /hpf    RBC 11 to 20 0 - 5 /hpf    Epithelial cells FEW 0 - 5 /lpf    Bacteria 3+ (A) NEG /hpf    Mucus 1+ (A) NEG /lpf       Radiologist and cardiologist interpretations if available at time of this note:  No results found. Medication(s) ordered for patient during this emergency visit encounter:  Medications   cefTRIAXone (ROCEPHIN) injection 1 g (has no administration in time range)       Medical Decision Making     I am the first provider for this patient. I reviewed the vital signs, available nursing notes, past medical history, past surgical history, family history and social history. Vital Signs:  Reviewed the patient's vital signs. ED COURSE:      IMPRESSION AND MEDICAL DECISION MAKING:  Based upon the patient's presentation with noted HPI and PE, along with the work up done in the emergency department, I believe that the patient is having an urinary tract infection. Will treat with macrodantin/macrobid and pyridium. DIAGNOSIS:  1. Urinary tract infection. SPECIFIC PATIENT INSTRUCTIONS FROM THE PHYSICIAN WHO TREATED YOU IN THE ER TODAY:  1. Return if any concerns or worsening of condition(s)  2. Continue the previously prescribed Levaquin until finished. 3. FOLLOW UP APPOINTMENT:  Your primary doctor in 1-2 days. Patient is improved, resting quietly and comfortably. The patient will be discharged home. The patient was reassured that these symptoms do not appear to represent a serious or life threatening condition at this time. Warning signs of worsening condition were discussed and understood by the patient.      Based on patient's age, coexisting illness, exam, and the results of this ED evaluation, the decision to treat as an outpatient was made. Based on the information available at time of discharge, acute pathology requiring immediate intervention was deemed relative unlikely. While it is impossible to completely exclude the possibility of underlying serious disease or worsening of condition, I feel the relative likelihood is extremely low. I discussed this uncertainty with the patient, who understood ED evaluation and treatment and felt comfortable with the outpatient treatment plan. All questions regarding care, test results, and follow up were answered. The patient is stable and appropriate to discharge. They understand that they should return to the emergency department for any new or worsening symptoms. I stressed the importance of follow up for repeat assessment and possibly further evaluation/treatment. Dictation disclaimer:  Please note that this dictation was completed with [employer], the computer voice recognition software. Quite often unanticipated grammatical, syntax, homophones, and other interpretive errors are inadvertently transcribed by the computer software. Please disregard these errors. Please excuse any errors that have escaped final proofreading. Coding Diagnoses     Clinical Impression:   1. [place]-vesical fistula    2. Chronic UTI (urinary tract infection)        Disposition     Disposition: Discharge. Flkaita Harry M.D. SHAZIA Board Certified Emergency Physician    Provider Attestation:  If a scribe was utilized in generation of this patient record, I personally performed the services described in the documentation, reviewed the documentation, as recorded by the scribe in my presence, and it accurately records the patient's history of presenting illness, review of systems, patient physical examination, and procedures performed by me as the attending physician. JUAN A Thorpe Board Certified Emergency Physician  8/20/2020.  6:28 AM

## 2020-08-20 NOTE — TELEPHONE ENCOUNTER
[address] [facility] [phone] to follow up on referral.. Patient was seen 9/19 with Dr. George Sorensen

## 2020-08-20 NOTE — ED NOTES
Pt wheeled to [facility] and ermias called for pt.  Son said he would pay when taxi got there

## 2020-08-20 NOTE — DISCHARGE INSTRUCTIONS
SPECIFIC PATIENT INSTRUCTIONS FROM THE PHYSICIAN WHO TREATED YOU IN THE ER TODAY:  1. Return if any concerns or worsening of condition(s)  2. Continue the previously prescribed Levaquin until finished. 3. FOLLOW UP APPOINTMENT:  Your primary doctor in 1-2 days. Patient Education        Urinary Tract Infection in Pregnancy: Care Instructions  Your Care Instructions     A urinary tract infection, or UTI, is an infection of the bladder and other urinary structures. Most UTIs occur in the bladder. They often cause pain or burning when you urinate. UTI is the most common bacterial infection in pregnancy. If untreated, a UTI could lead to problems such as a kidney infection or  labor. Most UTIs can be cured with antibiotics. Your doctor will prescribe an antibiotic that is safe during pregnancy. Be sure to finish your medicine so that the infection does not spread to your kidneys. Follow-up care is a key part of your treatment and safety. Be sure to make and go to all appointments, and call your doctor if you are having problems. It's also a good idea to know your test results and keep a list of the medicines you take. How can you care for yourself at home? · Take your antibiotics as directed. Do not stop taking them just because you feel better. You need to take the full course of antibiotics. · Drink extra water and other fluids for the next day or two. This will help wash out the bacteria causing the infection. If you have kidney, heart, or liver disease and have to limit fluids, talk with your doctor before you increase the amount of fluids you drink. · Do not drink alcohol. · Urinate often. Try to empty your bladder each time. Preventing UTIs  · Drink plenty of fluids, enough so that your urine is light yellow or clear like water. This helps you urinate often, which clears bacteria from your system.  If you have kidney, heart, or liver disease and have to limit fluids, talk with your doctor before you increase the amount of fluids you drink. · Urinate when you first have the urge. · Urinate right after you have sex. This is the best way for women to avoid UTIs. · When going to the bathroom, wipe from front to back to keep bacteria from entering the vagina or urethra. When should you call for help? Call your doctor now or seek immediate medical care if:  · You have symptoms of a worse urinary tract infection. These may include:  ? Pain or burning when you urinate. ? A frequent need to urinate without being able to pass much urine. ? Pain in the flank, which is just below the rib cage and above the waist on either side of the back. ? Blood in your urine. ? A fever. Watch closely for changes in your health, and be sure to contact your doctor if:  · You do not get better as expected. Where can you learn more? Go to http://aravind-farzana.info/  Enter M982 in the search box to learn more about \"Urinary Tract Infection in Pregnancy: Care Instructions. \"  Current as of: February 11, 2020               Content Version: 12.5  © 5824-0710 [employer]. Care instructions adapted under license by [employer] (which disclaims liability or warranty for this information). If you have questions about a medical condition or this instruction, always ask your healthcare professional. [address] any warranty or liability for your use of this information. Patient Education        Female Urinary Tract: Anatomy Sketch    Current as of: November 8, 2019               Content Version: 12.5  © 0159-2224 [employer]. Care instructions adapted under license by [employer] (which disclaims liability or warranty for this information).  If you have questions about a medical condition or this instruction, always ask your healthcare professional. [address] any warranty or liability for your use of this information. MyChart Activation    Thank you for requesting access to Noveporter. Please follow the instructions below to securely access and download your online medical record. Noveporter allows you to send messages to your doctor, view your test results, renew your prescriptions, schedule appointments, and more. How Do I Sign Up? In your internet browser, go to [email or web address]. Click on the First Time User? Click Here link in the Sign In box. You will see the New Member Sign Up page. Enter your Noveporter Access Code exactly as it appears below. You will not need to use this code after you´ve completed the sign-up process. If you do not sign up before the expiration date, you must request a new code. Noveporter Access Code: [email or web address]  Expires: 3/28/2019  2:27 PM (This is the date your Noveporter access code will )    Enter the last four digits of your Social Security Number (xxxx) and Date of Birth (mm/dd/yyyy) as indicated and click Submit. You will be taken to the next sign-up page. Create a Noveporter ID. This will be your Noveporter login ID and cannot be changed, so think of one that is secure and easy to remember. Create a Noveporter password. You can change your password at any time. Enter your Password Reset Question and Answer. This can be used at a later time if you forget your password. Enter your e-mail address. You will receive e-mail notification when new information is available in [address] Click Sign Up. You can now view and download portions of your medical record. Click the [place] link to download a portable copy of your medical information. Additional Information    If you have questions, please visit the Frequently Asked Questions section of the Noveporter website at [email or web address] Remember, Noveporter is NOT to be used for urgent needs. For medical emergencies, dial 911.

## 2020-08-20 NOTE — ED TRIAGE NOTES
Patient states that she urinated this AM - blood in her urine- patient states that she has UTI and is at this time taking antibiotic.  Patient has Rx until August 31, at that time is seeing surgeon to discuss upcoming surgery for fistula  Bladder/colon

## 2020-08-20 NOTE — ED NOTES
I have reviewed discharge instructions with the patient. The patient verbalized understanding. Patient armband removed and given to patient to take home. Patient was informed of the privacy risks if armband lost or stolen    The patient Anabell Guardado is a 79 y.o. female who  has a past medical history of Aortic stenosis, Arthritis, Atrial flutter ([facility] [address] Bladder stone, Bronchitis, Colovesical fistula (2020), Diabetes ([address] Diverticulitis, GERD (gastroesophageal reflux disease), Gross hematuria, History of recurrent UTIs, Hypercholesterolemia, Hypertension, Hypothyroid, Menopause, Pancreatitis, Peripheral neuropathy, and Vesicocolic fistula. She also has no past medical history of Breast lump, Inverted nipple, or Nipple discharge. .  She   The patient's medications were reviewed and discussed prior to discharge. The patient was very interactive and did understand their medications. The following medications were discussed in details with the patient. The patient said they are using  [facility] as [facility]. [email or web address]    Sahra Rivera RN    Placester Activation    Thank you for requesting access to Placester. Please follow the instructions below to securely access and download your online medical record. Placester allows you to send messages to your doctor, view your test results, renew your prescriptions, schedule appointments, and more. How Do I Sign Up? 1. In your internet browser, go to [email or web address]  2. Click on the First Time User? Click Here link in the Sign In box. You will be redirect to the New Member Sign Up page. 3. Enter your Placester Access Code exactly as it appears below. You will not need to use this code after youve completed the sign-up process. If you do not sign up before the expiration date, you must request a new code. Placester Access Code: [unfilled] (This is the date your Placester access code will )    4.  Enter the last four digits of your Social Security Number (xxxx) and Date of Birth (mm/dd/yyyy) as indicated and click Submit. You will be taken to the next sign-up page. 5. Create a [employer] ID. This will be your [employer] login ID and cannot be changed, so think of one that is secure and easy to remember. 6. Create a [employer] password. You can change your password at any time. 7. Enter your Password Reset Question and Answer. This can be used at a later time if you forget your password. 8. Enter your e-mail address. You will receive e-mail notification when new information is available in [address] 9. Click Sign Up. You can now view and download portions of your medical record. 10. Click the Download Summary menu link to download a portable copy of your medical information. Additional Information    If you have questions, please visit the Frequently Asked Questions section of the [employer] website at [email or web address] Remember, [employer] is NOT to be used for urgent needs. For medical emergencies, dial 911.

## 2020-08-21 ENCOUNTER — DOCUMENTATION ONLY (OUTPATIENT)
Dept: FAMILY MEDICINE CLINIC | Age: 80
End: 2020-08-21

## 2020-08-21 ENCOUNTER — TELEPHONE (OUTPATIENT)
Dept: FAMILY MEDICINE CLINIC | Age: 80
End: 2020-08-21

## 2020-08-21 ENCOUNTER — PATIENT OUTREACH (OUTPATIENT)
Dept: CASE MANAGEMENT | Age: 80
End: 2020-08-21

## 2020-08-21 LAB
BACTERIA SPEC CULT: NORMAL
SERVICE CMNT-IMP: NORMAL

## 2020-08-21 NOTE — TELEPHONE ENCOUNTER
Called patient to give her # for [facility] [phone]. Patient stated that she has seen the Urologist already and that she has an appointment with Dr. Shanel Le on August 31 for an operation. Patient had an appointment in April but she stated that it was cancelled because of the virus.

## 2020-08-21 NOTE — PROGRESS NOTES
Dr. Melvena Dakins, I have received 4 calls from her as on call provider in the last two nights  for hematuria. The first time she said it was a large amount and I recommended ER. The subsequent episodes may have been smaller amounts of blood. She was not sure. She is already on antibiotic for UTI.  I advised her to follow up with you as soon as possible and to return to ER if worsening condition, lightheadedness, weakness, etc.

## 2020-08-21 NOTE — PROGRESS NOTES
Transitions of Care Follow Up     Per Chart Review patient was seen in the [facility] ED on 8/20/2020 for blood in her urine. CTN called patient for follow up and spoke with her vis telephone call. Patient stated that she had called the on call provider regarding blood in her urine and was seen at the ED. Patient states that she reviewed her discharge instructions and was told to return in she has blood in her urine or feels dizzy. Patient reports that she does not want to go back to the ED. Patient proceeded to speak with CTN about an  Issue  related to her recent hospital stay. CTN provided the phone number for the [facility] patient advocate. Patient ended the phone call so that she could call the patient advocate for follow up.

## 2020-08-24 NOTE — CDMP QUERY
Dr. Ulysses Chandra. Informing you that there is a query in [address] (eMD) for your review on Jenna Agrawal. The queries (\"toast: as we like to call it are now popping up in the right lower hand side of your screen. This is our new query platform that will make responding to queries as simple as a click of your mouse. Please review and provide your response and/or  feel free to call me with any questions you may have.  I would be happy to assist.    Thank you,    Robin Villavicencio [facility], [facility] CER[place]

## 2020-08-25 NOTE — PROGRESS NOTES
Physician Progress Note      Valeria MAHONEY  CSN #:                  [id number]  :                       1940  ADMIT DATE:       2020 1:41 PM  [address] DATE:        2020 2:17 PM  RESPONDING  PROVIDER #:        Mary Moss MD          QUERY TEXT:    Dr. Gudelia Leone,    Pt admitted with UTI and SIRS. Pt noted to have elevated temperature Heart rate, Respiratory rate and lactic acid on admission. If possible, please document in the progress notes and discharge summary if you are evaluating and /or treating any of the following: The medical record reflects the following:  Risk Factors: 80 yo female admitted with UTI, fatigue and urinary frequency    Clinical Indicators: On admission Per ED  physician Temp 101.3   RR 24   Lactic acid 2.38    => H&P notes SIRS (systemic inflammatory response syndrome) (HCC) (2020) due to UTI, possible sepsis    => Per Discharge Summary Levaquin by mouth prescribed for 15 days    Treatment: IV fluids, Levaquin IV      Thank your time,  Robbie Muonz RN, [facility]  [phone]  Options provided:  -- Sepsis, due to UTI present on admission  -- Sepsis due to UTI, now resolved. -- Sepsis, not present on admission,  -- No Sepsis, localized infection only  -- Sepsis was ruled out  -- Other - I will add my own diagnosis  -- Disagree - Not applicable / Not valid  -- Disagree - Clinically unable to determine / Unknown  -- Refer to Clinical Documentation Reviewer    PROVIDER RESPONSE TEXT:    This patient had localized infection only, patient is not septic.     Query created by: Roxi Petersen on 2020 10:58 AM      Electronically signed by:  Mary Moss MD 2020 12:52 PM

## 2020-08-27 ENCOUNTER — TELEPHONE (OUTPATIENT)
Dept: FAMILY MEDICINE CLINIC | Age: 80
End: 2020-08-27

## 2020-08-27 ENCOUNTER — HOSPITAL ENCOUNTER (EMERGENCY)
Age: 80
Discharge: HOME | End: 2020-08-27
Attending: EMERGENCY MEDICINE
Payer: MEDICARE

## 2020-08-27 VITALS
RESPIRATION RATE: 16 BRPM | TEMPERATURE: 98 F | DIASTOLIC BLOOD PRESSURE: 88 MMHG | BODY MASS INDEX: 26.76 KG/M2 | SYSTOLIC BLOOD PRESSURE: 145 MMHG | OXYGEN SATURATION: 99 % | HEART RATE: 85 BPM | WEIGHT: 137 LBS

## 2020-08-27 DIAGNOSIS — N39.0 CHRONIC UTI: Primary | ICD-10-CM

## 2020-08-27 LAB
ANION GAP SERPL CALC-SCNC: 8 MMOL/L (ref 3–18)
APPEARANCE UR: ABNORMAL
BACTERIA URNS QL MICRO: ABNORMAL /HPF
BASOPHILS # BLD: 0 K/UL (ref 0–0.1)
BASOPHILS NFR BLD: 0 % (ref 0–2)
BILIRUB UR QL: NEGATIVE
BUN SERPL-MCNC: 12 MG/DL (ref 7–18)
BUN/CREAT SERPL: 12 (ref 12–20)
CALCIUM SERPL-MCNC: 7 MG/DL (ref 8.5–10.1)
CHLORIDE SERPL-SCNC: 104 MMOL/L (ref 100–111)
CO2 SERPL-SCNC: 26 MMOL/L (ref 21–32)
COLOR UR: ABNORMAL
CREAT SERPL-MCNC: 1 MG/DL (ref 0.6–1.3)
DIFFERENTIAL METHOD BLD: ABNORMAL
EOSINOPHIL # BLD: 0 K/UL (ref 0–0.4)
EOSINOPHIL NFR BLD: 1 % (ref 0–5)
EPITH CASTS URNS QL MICRO: NEGATIVE /LPF (ref 0–5)
ERYTHROCYTE [DISTWIDTH] IN BLOOD BY AUTOMATED COUNT: 12.8 % (ref 11.6–14.5)
GLUCOSE SERPL-MCNC: 107 MG/DL (ref 74–99)
GLUCOSE UR STRIP.AUTO-MCNC: NEGATIVE MG/DL
HCT VFR BLD AUTO: 31.2 % (ref 35–45)
HGB BLD-MCNC: 10.1 G/DL (ref 12–16)
HGB UR QL STRIP: ABNORMAL
INR PPP: 1.5 (ref 0.8–1.2)
KETONES UR QL STRIP.AUTO: 15 MG/DL
LEUKOCYTE ESTERASE UR QL STRIP.AUTO: ABNORMAL
LYMPHOCYTES # BLD: 1.5 K/UL (ref 0.9–3.6)
LYMPHOCYTES NFR BLD: 21 % (ref 21–52)
MCH RBC QN AUTO: 27.7 PG (ref 24–34)
MCHC RBC AUTO-ENTMCNC: 32.4 G/DL (ref 31–37)
MCV RBC AUTO: 85.7 FL (ref 74–97)
MONOCYTES # BLD: 0.7 K/UL (ref 0.05–1.2)
MONOCYTES NFR BLD: 10 % (ref 3–10)
NEUTS SEG # BLD: 4.8 K/UL (ref 1.8–8)
NEUTS SEG NFR BLD: 68 % (ref 40–73)
NITRITE UR QL STRIP.AUTO: NEGATIVE
PH UR STRIP: 5.5 [PH] (ref 5–8)
PLATELET # BLD AUTO: 234 K/UL (ref 135–420)
PMV BLD AUTO: 9.1 FL (ref 9.2–11.8)
POTASSIUM SERPL-SCNC: 3.8 MMOL/L (ref 3.5–5.5)
PROT UR STRIP-MCNC: 300 MG/DL
PROTHROMBIN TIME: 17.8 SEC (ref 11.5–15.2)
RBC # BLD AUTO: 3.64 M/UL (ref 4.2–5.3)
RBC #/AREA URNS HPF: ABNORMAL /HPF (ref 0–5)
SODIUM SERPL-SCNC: 138 MMOL/L (ref 136–145)
SP GR UR REFRACTOMETRY: 1.02 (ref 1–1.03)
UROBILINOGEN UR QL STRIP.AUTO: 1 EU/DL (ref 0.2–1)
WBC # BLD AUTO: 7 K/UL (ref 4.6–13.2)
WBC URNS QL MICRO: ABNORMAL /HPF (ref 0–4)

## 2020-08-27 PROCEDURE — 81001 URINALYSIS AUTO W/SCOPE: CPT

## 2020-08-27 PROCEDURE — 99284 EMERGENCY DEPT VISIT MOD MDM: CPT

## 2020-08-27 PROCEDURE — 87077 CULTURE AEROBIC IDENTIFY: CPT

## 2020-08-27 PROCEDURE — 85025 COMPLETE CBC W/AUTO DIFF WBC: CPT

## 2020-08-27 PROCEDURE — 87086 URINE CULTURE/COLONY COUNT: CPT

## 2020-08-27 PROCEDURE — 80048 BASIC METABOLIC PNL TOTAL CA: CPT

## 2020-08-27 PROCEDURE — 87186 SC STD MICRODIL/AGAR DIL: CPT

## 2020-08-27 PROCEDURE — 85610 PROTHROMBIN TIME: CPT

## 2020-08-27 NOTE — ED PROVIDER NOTES
EMERGENCY DEPARTMENT HISTORY AND PHYSICAL EXAM    7:54 AM      Date: 8/27/2020  Patient Name: Diya Nicole    History of Presenting Illness     Chief Complaint   Patient presents with    Rectal Bleeding         History Provided By: Patient      Additional History (Context): Diya Nicole is a 79 y.o. female who presents with hematuria. Patient has a history of chronic diverticulitis and colovesicular fistula is due to see surgical consult next week states has been here every Thursday for the past 3 weeks because of increased hematuria. Has been treated with Levaquin for this repeat of old records urine cultures have been showing mild growth. Patient is also currently on Eliquis for A. fib. Is any nausea vomiting denies any rectal bleeding had some mild increased fullness and pelvic pain today. Social history she denies any alcohol tobacco recreational drug use    PCP: Sinan Ibrahim MD      Current Outpatient Medications   Medication Sig Dispense Refill    Lactobacillus Acidoph & Bulgar (FLORANEX) 1 million cell tab tablet Take 2 Tabs by mouth two (2) times a day for 30 days. 120 Tab 0    levoFLOXacin (LEVAQUIN) 250 mg tablet Take 1 Tab by mouth every twenty-four (24) hours for 15 days. 15 Tab 0    metoprolol tartrate (LOPRESSOR) 25 mg tablet Take 0.5 Tabs by mouth every twelve (12) hours. 30 Tab 6    levothyroxine (SYNTHROID) 75 mcg tablet TAKE 1 TABLET BY MOUTH EVERY DAY BEFORE BREAKFAST 90 Tab 0    metFORMIN (GLUCOPHAGE) 1,000 mg tablet TAKE 1 TABLET BY MOUTH TWICE DAILY 180 Tab 0    simvastatin (ZOCOR) 20 mg tablet TAKE 1 TABLET BY MOUTH EVERY NIGHT 90 Tab 0    betamethasone dipropionate (DIPROSONE) 0.05 % topical cream betamethasone dipropionate 0.05 % topical cream 15 g 1    pantoprazole (PROTONIX) 40 mg tablet TAKE 1 TABLET BY MOUTH DAILY 90 Tab 3    diphenoxylate-atropine (LOMOTIL) 2.5-0.025 mg per tablet TAKE 1 TABLET BY MOUTH FOUR TIMES DAILY AS NEEDED FOR DIARRHEA.  MAX DAILY AMOUNT: 4 TABLETS. 30 Tab 0    nystatin (MYCOSTATIN) 100,000 unit/gram ointment Apply  to affected area two (2) times a day. 15 g 0    dicyclomine (BentyL) 10 mg capsule Take 2 Caps by mouth three (3) times daily as needed for Abdominal Cramps. 180 Cap 11    triamcinolone acetonide (KENALOG) 0.1 % topical cream APPLY THIN LAYER EXTERNALLY TO THE AFFECTED AREA TWICE DAILY 45 g 1    apixaban (Eliquis) 5 mg tablet TAKE 1 TABLET BY MOUTH TWICE DAILY 60 Tab 6    diphenhydrAMINE (BENADRYL) 25 mg capsule Take 25 mg by mouth every six (6) hours as needed.  cranberry 500 mg capsule Take 500 mg by mouth daily.  glucose blood VI test strips (FREESTYLE LITE STRIPS) strip use to check BS once daily      ondansetron hcl (ZOFRAN) 4 mg tablet Take 1 Tab by mouth every eight (8) hours as needed for Nausea. 30 Tab 1    cholecalciferol (VITAMIN D3) 1,000 unit tablet Take 2 Tabs by mouth daily. 61 Tab 0       Past History     Past Medical History:  Past Medical History:   Diagnosis Date    Aortic stenosis     Patient denies on 10/31/2019.  Arthritis     Atrial flutter ([address]     Bladder stone     Bronchitis     Resolved after getting rid of her pet bird in 2000. 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     Vesicocolic fistula        Past Surgical History:  Past Surgical History:   Procedure Laterality Date    COLONOSCOPY N/A 2/2/2017    COLONOSCOPY  w/bx polyp performed by Agueda Orozco MD at [address] [facility] [address] Left 2017       Family History:  Family History   Problem Relation Age of Onset    Diabetes Mother     Coronary Artery Disease Mother     Cancer Father         melanoma    Stroke Sister  Hypertension Sister     Diabetes Sister     Diabetes Brother     Coronary Artery Disease Brother     Breast Cancer Paternal Grandmother         older, but not sure age.  Breast Cancer Paternal [de-identified]         and gyn cancer ?age   [facility] No Known Problems Sister     No Known Problems Brother     Kidney Disease Sister     Heart Failure Sister        Social History:  Social History     Tobacco Use    Smoking status: Never Smoker    Smokeless tobacco: Never Used   Substance Use Topics    Alcohol use: No    Drug use: No       Allergies: Allergies   Allergen Reactions    Metronidazole Hives and Itching    Ampicillin Itching         Review of Systems       Review of Systems   Constitutional: Positive for appetite change. Negative for chills, diaphoresis and fever. HENT: Negative for congestion. Eyes: Negative for visual disturbance. Respiratory: Negative for cough, chest tightness and shortness of breath. Cardiovascular: Negative for chest pain. Gastrointestinal: Positive for abdominal pain. Negative for diarrhea, nausea and vomiting. Genitourinary: Positive for hematuria and urgency. Musculoskeletal: Negative for back pain. Skin: Negative for rash. Neurological: Negative for dizziness, syncope and weakness. Hematological: Bruises/bleeds easily. All other systems reviewed and are negative. Physical Exam     Visit Vitals  /52 (BP 1 Location: Right arm, BP Patient Position: At rest)   Pulse 75   Temp 98 °F (36.7 °C)   Resp 16   Wt 62.1 kg (137 lb)   SpO2 100%   BMI 26.76 kg/m²       Physical Exam  Vitals signs and nursing note reviewed. Constitutional:       General: She is not in acute distress. Appearance: She is well-developed. HENT:      Head: Normocephalic and atraumatic. Eyes:      General: No scleral icterus. Conjunctiva/sclera: Conjunctivae normal.      Pupils: Pupils are equal, round, and reactive to light.    Neck:      Musculoskeletal: Normal range of motion and neck supple. Cardiovascular:      Rate and Rhythm: Normal rate. Rhythm irregular. Heart sounds: Murmur present. Pulmonary:      Effort: Pulmonary effort is normal. No respiratory distress. Breath sounds: Normal breath sounds. Abdominal:      General: Bowel sounds are normal. There is distension. Palpations: Abdomen is soft. Tenderness: There is no abdominal tenderness. Comments: Minimal distention minimal suprapubic tenderness   Lymphadenopathy:      Cervical: No cervical adenopathy. Skin:     General: Skin is warm and dry. Findings: No rash. Neurological:      Mental Status: She is alert and oriented to person, place, and time. Coordination: Coordination normal.   Psychiatric:         Behavior: Behavior normal.           Diagnostic Study Results     Labs -  Recent Results (from the past 12 hour(s))   CBC WITH AUTOMATED DIFF    Collection Time: 08/27/20  8:30 AM   Result Value Ref Range    WBC 7.0 4.6 - 13.2 K/uL    RBC 3.64 (L) 4.20 - 5.30 M/uL    HGB 10.1 (L) 12.0 - 16.0 g/dL    HCT 31.2 (L) 35.0 - 45.0 %    MCV 85.7 74.0 - 97.0 FL    MCH 27.7 24.0 - 34.0 PG    MCHC 32.4 31.0 - 37.0 g/dL    RDW 12.8 11.6 - 14.5 %    PLATELET 585 591 - 968 K/uL    MPV 9.1 (L) 9.2 - 11.8 FL    NEUTROPHILS 68 40 - 73 %    LYMPHOCYTES 21 21 - 52 %    MONOCYTES 10 3 - 10 %    EOSINOPHILS 1 0 - 5 %    BASOPHILS 0 0 - 2 %    ABS. NEUTROPHILS 4.8 1.8 - 8.0 K/UL    ABS. LYMPHOCYTES 1.5 0.9 - 3.6 K/UL    ABS. MONOCYTES 0.7 0.05 - 1.2 K/UL    ABS. EOSINOPHILS 0.0 0.0 - 0.4 K/UL    ABS.  BASOPHILS 0.0 0.0 - 0.1 K/UL    DF AUTOMATED     PROTHROMBIN TIME + INR    Collection Time: 08/27/20  8:30 AM   Result Value Ref Range    Prothrombin time 17.8 (H) 11.5 - 15.2 sec    INR 1.5 (H) 0.8 - 1.2     METABOLIC PANEL, BASIC    Collection Time: 08/27/20  8:30 AM   Result Value Ref Range    Sodium 138 136 - 145 mmol/L    Potassium 3.8 3.5 - 5.5 mmol/L    Chloride 104 100 - 111 mmol/L    CO2 26 21 - 32 mmol/L    Anion gap 8 3.0 - 18 mmol/L    Glucose 107 (H) 74 - 99 mg/dL    BUN 12 7.0 - 18 MG/DL    Creatinine 1.00 0.6 - 1.3 MG/DL    BUN/Creatinine ratio 12 12 - 20      GFR est AA >60 >60 ml/min/1.73m2    GFR est non-AA 53 (L) >60 ml/min/1.73m2    Calcium 7.0 (L) 8.5 - 10.1 MG/DL   URINALYSIS W/ RFLX MICROSCOPIC    Collection Time: 08/27/20  8:45 AM   Result Value Ref Range    Color DARK YELLOW      Appearance TURBID      Specific gravity 1.021 1.005 - 1.030      pH (UA) 5.5 5.0 - 8.0      Protein 300 (A) NEG mg/dL    Glucose Negative NEG mg/dL    Ketone 15 (A) NEG mg/dL    Bilirubin Negative NEG      Blood LARGE (A) NEG      Urobilinogen 1.0 0.2 - 1.0 EU/dL    Nitrites Negative NEG      Leukocyte Esterase LARGE (A) NEG     URINE MICROSCOPIC ONLY    Collection Time: 08/27/20  8:45 AM   Result Value Ref Range    WBC TOO NUMEROUS TO COUNT 0 - 4 /hpf    RBC 21 to 35 0 - 5 /hpf    Epithelial cells Negative 0 - 5 /lpf    Bacteria 4+ (A) NEG /hpf       Radiologic Studies -   No orders to display         Medical Decision Making   I am the first provider for this patient. I reviewed the vital signs, available nursing notes, past medical history, past surgical history, family history and social history. Vital Signs-Reviewed the patient's vital signs.       EKG:    Records Reviewed: Nursing Notes and Old Medical Records (Time of Review: 7:54 AM)    ED Course: Progress Notes, Reevaluation, and Consults:      Provider Notes (Medical Decision Making):   MDM  Number of Diagnoses or Management Options  Chronic UTI:   Diagnosis management comments: Patient currently anticoagulated for A. fib with chronic fistula chronic hematuria review of old urine cultures for this month have all been no growth patient has been treated with Levaquin will recheck labs INR further exam valuation bleeding    Rectal exam shows light brown stool with blood noted on the perineum did not send it for guaiac as it would be positive from the blood noted    10:32 AM  Patient is currently on Levaquin until Sunday instructed to continue taking Levaquin until Sunday she is to see general surgery on Monday for evaluation of possible repair of her fistula. The last 2 cultures have had no growth believe this is now chronic colonized with hematuria on Eliquis          Critical Care Time:       Diagnosis     Clinical Impression:   1. Chronic UTI        Disposition: Home    Follow-up Information     Follow up With Specialties Details Why [facility] EMERGENCY DEPT Emergency Medicine  As needed, If symptoms worsen [address]  [phone]    Deandra Norton MD Family Medicine, Internal Medicine   [address] [address]  [address] [address] [place] [address] [facility]      Sanchez Rose MD Surgery  Keep your appointment as scheduled on Monday [address] [address]             Patient's Medications   Start Taking    No medications on file   Continue Taking    APIXABAN (ELIQUIS) 5 MG TABLET    TAKE 1 TABLET BY MOUTH TWICE DAILY    BETAMETHASONE DIPROPIONATE (DIPROSONE) 0.05 % TOPICAL CREAM    betamethasone dipropionate 0.05 % topical cream    CHOLECALCIFEROL (VITAMIN D3) 1,000 UNIT TABLET    Take 2 Tabs by mouth daily. CRANBERRY 500 MG CAPSULE    Take 500 mg by mouth daily. DICYCLOMINE (BENTYL) 10 MG CAPSULE    Take 2 Caps by mouth three (3) times daily as needed for Abdominal Cramps. DIPHENHYDRAMINE (BENADRYL) 25 MG CAPSULE    Take 25 mg by mouth every six (6) hours as needed. DIPHENOXYLATE-ATROPINE (LOMOTIL) 2.5-0.025 MG PER TABLET    TAKE 1 TABLET BY MOUTH FOUR TIMES DAILY AS NEEDED FOR DIARRHEA. MAX DAILY AMOUNT: 4 TABLETS. GLUCOSE BLOOD VI TEST STRIPS (FREESTYLE LITE STRIPS) STRIP    use to check BS once daily    LACTOBACILLUS ACIDOPH & BULGAR (FLORANEX) 1 MILLION CELL TAB TABLET    Take 2 Tabs by mouth two (2) times a day for 30 days.     LEVOFLOXACIN (LEVAQUIN) 250 MG TABLET Take 1 Tab by mouth every twenty-four (24) hours for 15 days. LEVOTHYROXINE (SYNTHROID) 75 MCG TABLET    TAKE 1 TABLET BY MOUTH EVERY DAY BEFORE BREAKFAST    METFORMIN (GLUCOPHAGE) 1,000 MG TABLET    TAKE 1 TABLET BY MOUTH TWICE DAILY    METOPROLOL TARTRATE (LOPRESSOR) 25 MG TABLET    Take 0.5 Tabs by mouth every twelve (12) hours. NYSTATIN (MYCOSTATIN) 100,000 UNIT/GRAM OINTMENT    Apply  to affected area two (2) times a day. ONDANSETRON HCL (ZOFRAN) 4 MG TABLET    Take 1 Tab by mouth every eight (8) hours as needed for Nausea. PANTOPRAZOLE (PROTONIX) 40 MG TABLET    TAKE 1 TABLET BY MOUTH DAILY    SIMVASTATIN (ZOCOR) 20 MG TABLET    TAKE 1 TABLET BY MOUTH EVERY NIGHT    TRIAMCINOLONE ACETONIDE (KENALOG) 0.1 % TOPICAL CREAM    APPLY THIN LAYER EXTERNALLY TO THE AFFECTED AREA TWICE DAILY   These Medications have changed    No medications on file   Stop Taking    No medications on file     _______________________________    Please note that this dictation was completed with [employer], the [employer] voice recognition software. Quite often unanticipated grammatical, syntax, homophones, and other interpretive errors are inadvertently transcribed by the computer software. Please disregard these errors. Please excuse any errors that have escaped final proofreading.

## 2020-08-27 NOTE — ED NOTES
Patient states she is here for uti and rectal bleeding. Nurys Bautista had a uti every day\". Asked her if she was bleeding and she said \"yes a lot\". I asked if she was bleeding from her rectum and she said yes, I asked her if she knew why and she said no, \"I just have this uti and I am bleeding. She is aox4. Belly is soft and non tender. A little burning, freq and urgency with urination.

## 2020-08-27 NOTE — ED TRIAGE NOTES
Multiple ED visits. Blood in toilet when goes to bathroom.  Has appointment Monday with surgeon for pre op visit for colon surgery

## 2020-08-27 NOTE — ED NOTES
Patient has been provided discharge instructions. My Chart and [facility] has been discussed, and if any prescriptions were provided or called in, they have been reviewed with the patient, as well. Allowed time for questions and clarification.

## 2020-08-27 NOTE — TELEPHONE ENCOUNTER
patient called she is having   Bloody urine a lot of blood in the toilet and on the floor  ,she has H/O of UTI  And hematuria, but she said this is a lot of blood and she is feeling week and dizzy  advised to go to ER she is calling EMS

## 2020-08-28 ENCOUNTER — TELEPHONE (OUTPATIENT)
Dept: FAMILY MEDICINE CLINIC | Age: 80
End: 2020-08-28

## 2020-08-28 ENCOUNTER — APPOINTMENT (OUTPATIENT)
Dept: GENERAL RADIOLOGY | Age: 80
DRG: 309 | End: 2020-08-28
Attending: EMERGENCY MEDICINE
Payer: MEDICARE

## 2020-08-28 ENCOUNTER — PATIENT OUTREACH (OUTPATIENT)
Dept: CASE MANAGEMENT | Age: 80
End: 2020-08-28

## 2020-08-28 ENCOUNTER — HOSPITAL ENCOUNTER (INPATIENT)
Age: 80
LOS: 2 days | Discharge: HOME | DRG: 309 | End: 2020-08-30
Attending: EMERGENCY MEDICINE | Admitting: HOSPITALIST
Payer: MEDICARE

## 2020-08-28 DIAGNOSIS — I48.91 ATRIAL FIBRILLATION WITH RVR (HCC): Primary | ICD-10-CM

## 2020-08-28 LAB
ANION GAP SERPL CALC-SCNC: 9 MMOL/L (ref 3–18)
APPEARANCE UR: ABNORMAL
BACTERIA URNS QL MICRO: ABNORMAL /HPF
BASOPHILS # BLD: 0 K/UL (ref 0–0.1)
BASOPHILS NFR BLD: 0 % (ref 0–2)
BILIRUB UR QL: ABNORMAL
BNP SERPL-MCNC: 5211 PG/ML (ref 0–1800)
BUN SERPL-MCNC: 14 MG/DL (ref 7–18)
BUN/CREAT SERPL: 15 (ref 12–20)
CALCIUM SERPL-MCNC: 6.8 MG/DL (ref 8.5–10.1)
CHLORIDE SERPL-SCNC: 106 MMOL/L (ref 100–111)
CO2 SERPL-SCNC: 22 MMOL/L (ref 21–32)
COLOR UR: ABNORMAL
CREAT SERPL-MCNC: 0.96 MG/DL (ref 0.6–1.3)
DIFFERENTIAL METHOD BLD: ABNORMAL
EOSINOPHIL # BLD: 0 K/UL (ref 0–0.4)
EOSINOPHIL NFR BLD: 0 % (ref 0–5)
EPITH CASTS URNS QL MICRO: ABNORMAL /LPF (ref 0–5)
ERYTHROCYTE [DISTWIDTH] IN BLOOD BY AUTOMATED COUNT: 12.9 % (ref 11.6–14.5)
GLUCOSE BLD STRIP.AUTO-MCNC: 117 MG/DL (ref 70–110)
GLUCOSE SERPL-MCNC: 133 MG/DL (ref 74–99)
GLUCOSE UR STRIP.AUTO-MCNC: NEGATIVE MG/DL
HCT VFR BLD AUTO: 31.7 % (ref 35–45)
HGB BLD-MCNC: 10.4 G/DL (ref 12–16)
HGB UR QL STRIP: ABNORMAL
INR PPP: 2 (ref 0.8–1.2)
KETONES UR QL STRIP.AUTO: 15 MG/DL
LEUKOCYTE ESTERASE UR QL STRIP.AUTO: ABNORMAL
LYMPHOCYTES # BLD: 2.6 K/UL (ref 0.9–3.6)
LYMPHOCYTES NFR BLD: 23 % (ref 21–52)
MAGNESIUM SERPL-MCNC: 0.5 MG/DL (ref 1.6–2.6)
MCH RBC QN AUTO: 28.1 PG (ref 24–34)
MCHC RBC AUTO-ENTMCNC: 32.8 G/DL (ref 31–37)
MCV RBC AUTO: 85.7 FL (ref 74–97)
MONOCYTES # BLD: 1.2 K/UL (ref 0.05–1.2)
MONOCYTES NFR BLD: 11 % (ref 3–10)
MUCOUS THREADS URNS QL MICRO: ABNORMAL /LPF
NEUTS SEG # BLD: 7.1 K/UL (ref 1.8–8)
NEUTS SEG NFR BLD: 66 % (ref 40–73)
NITRITE UR QL STRIP.AUTO: NEGATIVE
PH UR STRIP: 6 [PH] (ref 5–8)
PLATELET # BLD AUTO: 255 K/UL (ref 135–420)
PMV BLD AUTO: 9.7 FL (ref 9.2–11.8)
POTASSIUM SERPL-SCNC: 3.9 MMOL/L (ref 3.5–5.5)
PROT UR STRIP-MCNC: >300 MG/DL
PROTHROMBIN TIME: 22.1 SEC (ref 11.5–15.2)
RBC # BLD AUTO: 3.7 M/UL (ref 4.2–5.3)
RBC #/AREA URNS HPF: ABNORMAL /HPF (ref 0–5)
SODIUM SERPL-SCNC: 137 MMOL/L (ref 136–145)
SP GR UR REFRACTOMETRY: 1.02 (ref 1–1.03)
TROPONIN I SERPL-MCNC: <0.02 NG/ML (ref 0–0.04)
TSH SERPL DL<=0.05 MIU/L-ACNC: 1.68 UIU/ML (ref 0.36–3.74)
UROBILINOGEN UR QL STRIP.AUTO: 0.2 EU/DL (ref 0.2–1)
WBC # BLD AUTO: 11 K/UL (ref 4.6–13.2)
WBC URNS QL MICRO: ABNORMAL /HPF (ref 0–4)

## 2020-08-28 PROCEDURE — 96368 THER/DIAG CONCURRENT INF: CPT

## 2020-08-28 PROCEDURE — 99285 EMERGENCY DEPT VISIT HI MDM: CPT

## 2020-08-28 PROCEDURE — 74011250636 HC RX REV CODE- 250/636: Performed by: EMERGENCY MEDICINE

## 2020-08-28 PROCEDURE — 96365 THER/PROPH/DIAG IV INF INIT: CPT

## 2020-08-28 PROCEDURE — 74011000258 HC RX REV CODE- 258: Performed by: HOSPITALIST

## 2020-08-28 PROCEDURE — 65660000001 HC RM ICU INTERMED STEPDOWN

## 2020-08-28 PROCEDURE — 81001 URINALYSIS AUTO W/SCOPE: CPT

## 2020-08-28 PROCEDURE — 83735 ASSAY OF MAGNESIUM: CPT

## 2020-08-28 PROCEDURE — 82962 GLUCOSE BLOOD TEST: CPT

## 2020-08-28 PROCEDURE — 96366 THER/PROPH/DIAG IV INF ADDON: CPT

## 2020-08-28 PROCEDURE — 83880 ASSAY OF NATRIURETIC PEPTIDE: CPT

## 2020-08-28 PROCEDURE — 71045 X-RAY EXAM CHEST 1 VIEW: CPT

## 2020-08-28 PROCEDURE — 85610 PROTHROMBIN TIME: CPT

## 2020-08-28 PROCEDURE — 85025 COMPLETE CBC W/AUTO DIFF WBC: CPT

## 2020-08-28 PROCEDURE — 96375 TX/PRO/DX INJ NEW DRUG ADDON: CPT

## 2020-08-28 PROCEDURE — 93005 ELECTROCARDIOGRAM TRACING: CPT

## 2020-08-28 PROCEDURE — 84443 ASSAY THYROID STIM HORMONE: CPT

## 2020-08-28 PROCEDURE — 74011250636 HC RX REV CODE- 250/636: Performed by: HOSPITALIST

## 2020-08-28 PROCEDURE — 74011000250 HC RX REV CODE- 250: Performed by: EMERGENCY MEDICINE

## 2020-08-28 PROCEDURE — 83036 HEMOGLOBIN GLYCOSYLATED A1C: CPT

## 2020-08-28 PROCEDURE — 84484 ASSAY OF TROPONIN QUANT: CPT

## 2020-08-28 PROCEDURE — 74011000258 HC RX REV CODE- 258: Performed by: EMERGENCY MEDICINE

## 2020-08-28 PROCEDURE — 80048 BASIC METABOLIC PNL TOTAL CA: CPT

## 2020-08-28 RX ORDER — DILTIAZEM HYDROCHLORIDE 5 MG/ML
INJECTION INTRAVENOUS
Status: DISCONTINUED
Start: 2020-08-28 | End: 2020-08-29

## 2020-08-28 RX ORDER — DILTIAZEM HYDROCHLORIDE 5 MG/ML
5 INJECTION INTRAVENOUS ONCE
Status: COMPLETED | OUTPATIENT
Start: 2020-08-28 | End: 2020-08-28

## 2020-08-28 RX ORDER — SODIUM CHLORIDE 0.9 % (FLUSH) 0.9 %
5-40 SYRINGE (ML) INJECTION EVERY 8 HOURS
Status: DISCONTINUED | OUTPATIENT
Start: 2020-08-28 | End: 2020-08-30 | Stop reason: HOSPADM

## 2020-08-28 RX ORDER — ONDANSETRON 2 MG/ML
4 INJECTION INTRAMUSCULAR; INTRAVENOUS
Status: DISCONTINUED | OUTPATIENT
Start: 2020-08-28 | End: 2020-08-30 | Stop reason: HOSPADM

## 2020-08-28 RX ORDER — MAGNESIUM SULFATE HEPTAHYDRATE 40 MG/ML
2 INJECTION, SOLUTION INTRAVENOUS
Status: COMPLETED | OUTPATIENT
Start: 2020-08-28 | End: 2020-08-28

## 2020-08-28 RX ORDER — POLYETHYLENE GLYCOL 3350 17 G/17G
17 POWDER, FOR SOLUTION ORAL DAILY PRN
Status: DISCONTINUED | OUTPATIENT
Start: 2020-08-28 | End: 2020-08-30 | Stop reason: HOSPADM

## 2020-08-28 RX ORDER — ACETAMINOPHEN 650 MG/1
650 SUPPOSITORY RECTAL
Status: DISCONTINUED | OUTPATIENT
Start: 2020-08-28 | End: 2020-08-30 | Stop reason: HOSPADM

## 2020-08-28 RX ORDER — SODIUM CHLORIDE 0.9 % (FLUSH) 0.9 %
5-40 SYRINGE (ML) INJECTION AS NEEDED
Status: DISCONTINUED | OUTPATIENT
Start: 2020-08-28 | End: 2020-08-30 | Stop reason: HOSPADM

## 2020-08-28 RX ORDER — INSULIN LISPRO 100 [IU]/ML
INJECTION, SOLUTION INTRAVENOUS; SUBCUTANEOUS
Status: DISCONTINUED | OUTPATIENT
Start: 2020-08-28 | End: 2020-08-30 | Stop reason: HOSPADM

## 2020-08-28 RX ORDER — MAGNESIUM SULFATE 100 %
4 CRYSTALS MISCELLANEOUS AS NEEDED
Status: DISCONTINUED | OUTPATIENT
Start: 2020-08-28 | End: 2020-08-30 | Stop reason: HOSPADM

## 2020-08-28 RX ORDER — ACETAMINOPHEN 325 MG/1
650 TABLET ORAL
Status: DISCONTINUED | OUTPATIENT
Start: 2020-08-28 | End: 2020-08-30 | Stop reason: HOSPADM

## 2020-08-28 RX ORDER — MAGNESIUM SULFATE HEPTAHYDRATE 40 MG/ML
2 INJECTION, SOLUTION INTRAVENOUS ONCE
Status: ACTIVE | OUTPATIENT
Start: 2020-08-29 | End: 2020-08-29

## 2020-08-28 RX ORDER — DEXTROSE 50 % IN WATER (D50W) INTRAVENOUS SYRINGE
25-50 AS NEEDED
Status: DISCONTINUED | OUTPATIENT
Start: 2020-08-28 | End: 2020-08-30 | Stop reason: HOSPADM

## 2020-08-28 RX ORDER — PROMETHAZINE HYDROCHLORIDE 25 MG/1
12.5 TABLET ORAL
Status: DISCONTINUED | OUTPATIENT
Start: 2020-08-28 | End: 2020-08-30 | Stop reason: HOSPADM

## 2020-08-28 RX ADMIN — SODIUM CHLORIDE 1000 ML: 900 INJECTION, SOLUTION INTRAVENOUS at 22:44

## 2020-08-28 RX ADMIN — SODIUM CHLORIDE 500 ML: 900 INJECTION, SOLUTION INTRAVENOUS at 17:21

## 2020-08-28 RX ADMIN — MAGNESIUM SULFATE HEPTAHYDRATE 2 G: 40 INJECTION, SOLUTION INTRAVENOUS at 19:04

## 2020-08-28 RX ADMIN — DILTIAZEM HYDROCHLORIDE 5 MG: 5 INJECTION, SOLUTION INTRAVENOUS at 17:17

## 2020-08-28 RX ADMIN — CALCIUM GLUCONATE 1 G: 98 INJECTION, SOLUTION INTRAVENOUS at 19:00

## 2020-08-28 RX ADMIN — SODIUM CHLORIDE 5 MG/HR: 900 INJECTION, SOLUTION INTRAVENOUS at 17:20

## 2020-08-28 RX ADMIN — CEFTRIAXONE 1 G: 1 INJECTION, POWDER, FOR SOLUTION INTRAMUSCULAR; INTRAVENOUS at 21:32

## 2020-08-28 NOTE — TELEPHONE ENCOUNTER
----- Message from Michael Delgado sent at 8/25/2020  9:31 AM EDT -----  Regarding: medication request  Pt is requesting is med she states she is having some gyn issues and wants to speak with Dr Kely Kirby   [phone]

## 2020-08-28 NOTE — ED PROVIDER NOTES
69-year-old female seen immediately upon arrival history of atrial fibrillation by report she had some palpitations some chest pain calling her to call EMS on arrival her heart rate was greater than 180 they attempted 2 doses of adenosine without relief she is clearly in A. fib on her cardiac monitor and in the monitor here in the emergency department by report from EMS her blood pressure was 140s over 70 patient is not any extremitas denies any shortness of breath syncope fatigue states she is on metoprolol but has not missed a dose also on Eliquis. She is in chronic A. Fib    This note dictated in dragon software. There may be grammatical and spelling errors that are missed during review    Review of systems: all other systems negative unless otherwise specified        Chest Pain    This is a new problem. The current episode started 1 to 2 hours ago. The problem has not changed since onset. The problem occurs constantly. The pain is associated with normal activity. The pain is present in the substernal region. The pain is at a severity of 2/10. The pain is mild. The quality of the pain is described as pressure-like, sharp, stabbing and tightness. The pain does not radiate. Associated symptoms include irregular heartbeat and palpitations. Pertinent negatives include no abdominal pain, no back pain, no cough, no diaphoresis, no dizziness, no exertional chest pressure, no fever, no hemoptysis, no malaise/fatigue, no nausea, no near-syncope, no numbness, no orthopnea, no PND, no shortness of breath, no vomiting and no weakness. She has tried nothing for the symptoms. Risk factors include cardiac disease. Her past medical history is significant for DM and HTN. Past Medical History:   Diagnosis Date    Aortic stenosis     Patient denies on 10/31/2019.  Arthritis     Atrial flutter ([facility] [address]     Bladder stone     Bronchitis     Resolved after getting rid of her pet bird in 2000.    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  Vesicocolic fistula        Past Surgical History:   Procedure Laterality Date    COLONOSCOPY N/A 2/2/2017    COLONOSCOPY  w/bx polyp performed by Yaneth Flores MD at [address] [facility] [address] Left 2017         Family History:   Problem Relation Age of Onset    Diabetes Mother     Coronary Artery Disease Mother     Cancer Father         melanoma    Stroke Sister     Hypertension Sister     Diabetes Sister     Diabetes Brother     Coronary Artery Disease Brother     Breast Cancer Paternal Grandmother         older, but not sure age.     Breast Cancer Paternal Aunt         and gyn cancer ?age   [facility] No Known Problems Sister     No Known Problems Brother     Kidney Disease Sister     Heart Failure Sister        Social History     Socioeconomic History    Marital status: SINGLE     Spouse name: Not on file    Number of children: Not on file    Years of education: Not on file    Highest education level: Not on file   Occupational History    Not on file   Social Needs    Financial resource strain: Not on file    Food insecurity     Worry: Not on file     Inability: Not on file   [employer] needs     Medical: Not on file     Non-medical: Not on file   Tobacco Use    Smoking status: Never Smoker    Smokeless tobacco: Never Used   Substance and Sexual Activity    Alcohol use: No    Drug use: No    Sexual activity: Not Currently     Partners: Male   Lifestyle    Physical activity     Days per week: Not on file     Minutes per session: Not on file    Stress: Not on file   Relationships    Social connections     Talks on phone: Not on file     Gets together: Not on file Attends Congregational service: Not on file     Active member of club or organization: Not on file     Attends meetings of clubs or organizations: Not on file     Relationship status: Not on file    Intimate partner violence     Fear of current or ex partner: Not on file     Emotionally abused: Not on file     Physically abused: Not on file     Forced sexual activity: Not on file   Other Topics Concern     Service Not Asked    Blood Transfusions Not Asked    Caffeine Concern Not Asked    Occupational Exposure Not Asked   [employer] Hazards Not Asked    Sleep Concern Not Asked    Stress Concern Not Asked    Weight Concern Not Asked    Special Diet Not Asked    Back Care Not Asked    Exercise Not Asked    Bike Helmet Not Asked   [address] Not Asked    Self-Exams Not Asked   Social History Narrative    Not on file         ALLERGIES: Metronidazole and Ampicillin    Review of Systems   Constitutional: Negative for diaphoresis, fever and malaise/fatigue. HENT: Negative for sore throat. Eyes: Negative for visual disturbance. Respiratory: Negative for cough, hemoptysis and shortness of breath. Cardiovascular: Positive for chest pain and palpitations. Negative for orthopnea, PND and near-syncope. Gastrointestinal: Negative for abdominal pain, diarrhea, nausea and vomiting. Genitourinary: Negative for dysuria. Musculoskeletal: Negative for back pain. Neurological: Negative for dizziness, weakness and numbness. Psychiatric/Behavioral: Negative for confusion. All other systems reviewed and are negative. Vitals:    08/28/20 1713   BP: 100/62   Pulse: (!) 180   Resp: 28   Temp: 98.7 °F (37.1 °C)   SpO2: 100%   Weight: 61.2 kg (134 lb 14.4 oz)   Height: 5' (1.524 m)            Physical Exam  Vitals signs and nursing note reviewed. Constitutional:       General: She is not in acute distress. Appearance: She is not toxic-appearing or diaphoretic. HENT:      Head: Normocephalic. Eyes:      Extraocular Movements: Extraocular movements intact. Neck:      Musculoskeletal: Normal range of motion. Cardiovascular:      Rate and Rhythm: Tachycardia present. Rhythm irregular. Pulmonary:      Effort: Pulmonary effort is normal.      Breath sounds: Normal breath sounds. Chest:      Chest wall: No tenderness. Abdominal:      Palpations: Abdomen is soft. Musculoskeletal:      Right lower leg: No edema. Left lower leg: No edema. Skin:     General: Skin is warm. Capillary Refill: Capillary refill takes less than 2 seconds. Neurological:      General: No focal deficit present. Mental Status: She is alert. MDM  Number of Diagnoses or Management Options  Atrial fibrillation with RVR [facility]):   Diagnosis management comments: .  Cardiac monitor reviewed from EMS and discussed with EMS this is clearly atrial fibrillation with rapid ventricular response fluid bolus diltiazem bolus 5 mg and drip to start at 5 mg check appropriate blood work EKG chest x-ray urinalysis    Old records reviewed patient last atrial fibrillation was in February of this year with a similar presentation with heart rates in the 180s medically managed and converted since then EKGs have been normal sinus rhythm      Magnesium low calcium low both repleted improved with diltiazem heart rate in the 1 teens to 120 blood pressure low 590 systolic cardiac monitor shows atrial fibrillation with rapid ventricular response Case discussed with cardiology who will evaluate his consultation, case discussed with hospitalist agrees admission. EKG shows atrial fibrillation with a rapid ventricular response at a rate of 147 with normal axis no obvious ischemic changes, this EKG was interpreted by me. Old records reviewed.   Critical Care Time:  The services I provided to this patient were to treat and/or prevent clinically significant deterioration that could result in the failure of one or more body systems and/or organ systems due to cardiac arrhytmia, hypotension,. Services included the following:  -reviewing nursing notes and old charts  -vital sign assessments  -direct patient care  -medication orders and management  -interpreting and reviewing diagnostic studies/labs  -re-evaluations  -documentation time    Aggregate critical care time was 37 minutes, which includes only time during which I was engaged in work directly related to the patient's care as described above, whether I was at bedside or elsewhere in the Emergency Department. It did not include time spent performing other reported procedures or the services of residents, students, nurses, or advance practice providers.        Andrew Reyes MD    7:55 PM    5:25 PM  Pat awake and alert - SBP 90 - will fluid bolus, lift legs - hold cardioversion at this time 0- will try medical management    Patient sleeping diltiazem drip at 10 mg an hour 2135 blood pressure systolic between  we will titrate to 50 mg an hour         Procedures

## 2020-08-28 NOTE — ED TRIAGE NOTES
Arrived via EMS. Per EMS pt was picked up from home with a complaint of chest pain. Enroute pt's HR was 180s-200s Afib with RVR. Pt was given 6 mg and 12 mg Adenosine with no significant changes on HR.

## 2020-08-28 NOTE — PROGRESS NOTES
Transition of Care follow Up     Care Transitions Nurse ( CTN) spoke with patient via telephone call. Patient verified name and date of birth as identifiers. [facility] ED follow up  Patient admitted to [facility] ED on 8/27/2020 for:  Chronic UTI, rectal bleeding   Patient dc to home      Patient reports:  - She is not having any bleeding today, when urinating   - she feels a little dizzy and some indigestion. She took Tums and indigestion improved. - She may go to [facility] if possible should she need immediate care before her appointment that is scheduled for Monday. - She will keep CTN updated. - She is taking Levaquin as directed. Support:   Neighbor to drive patient to appointment on Monday. CTN discussed calling 911 for symptoms such as:   - Feeling dizzy/lightheaded  - weakness   - Chest pain     Patient encouraged to follow up with ED as needed. CTN discussed with patient that she can call PCP office until 5:00 for follow up/advice. Patient is aware of and has utilized the after hours on call provider through PCP office. Patient provided CTN phone number for follow up as needed. Chart routed to PCP for review.

## 2020-08-29 ENCOUNTER — APPOINTMENT (OUTPATIENT)
Dept: NON INVASIVE DIAGNOSTICS | Age: 80
DRG: 309 | End: 2020-08-29
Attending: INTERNAL MEDICINE
Payer: MEDICARE

## 2020-08-29 LAB
ANION GAP SERPL CALC-SCNC: 9 MMOL/L (ref 3–18)
BNP SERPL-MCNC: 5070 PG/ML (ref 0–1800)
BUN SERPL-MCNC: 12 MG/DL (ref 7–18)
BUN/CREAT SERPL: 20 (ref 12–20)
CA-I SERPL-SCNC: 0.94 MMOL/L (ref 1.12–1.32)
CA-I SERPL-SCNC: 1.09 MMOL/L (ref 1.12–1.32)
CALCIUM SERPL-MCNC: 5.5 MG/DL (ref 8.5–10.1)
CHLORIDE SERPL-SCNC: 113 MMOL/L (ref 100–111)
CO2 SERPL-SCNC: 20 MMOL/L (ref 21–32)
CREAT SERPL-MCNC: 0.6 MG/DL (ref 0.6–1.3)
ERYTHROCYTE [DISTWIDTH] IN BLOOD BY AUTOMATED COUNT: 13.1 % (ref 11.6–14.5)
EST. AVERAGE GLUCOSE BLD GHB EST-MCNC: 120 MG/DL
GLUCOSE BLD STRIP.AUTO-MCNC: 130 MG/DL (ref 70–110)
GLUCOSE BLD STRIP.AUTO-MCNC: 142 MG/DL (ref 70–110)
GLUCOSE BLD STRIP.AUTO-MCNC: 153 MG/DL (ref 70–110)
GLUCOSE BLD STRIP.AUTO-MCNC: 155 MG/DL (ref 70–110)
GLUCOSE SERPL-MCNC: 86 MG/DL (ref 74–99)
HBA1C MFR BLD: 5.8 % (ref 4.2–5.6)
HCT VFR BLD AUTO: 27.6 % (ref 35–45)
HGB BLD-MCNC: 8.9 G/DL (ref 12–16)
INR PPP: 2 (ref 0.8–1.2)
MAGNESIUM SERPL-MCNC: 1 MG/DL (ref 1.6–2.6)
MAGNESIUM SERPL-MCNC: 1.7 MG/DL (ref 1.6–2.6)
MCH RBC QN AUTO: 27.6 PG (ref 24–34)
MCHC RBC AUTO-ENTMCNC: 32.2 G/DL (ref 31–37)
MCV RBC AUTO: 85.7 FL (ref 74–97)
PHOSPHATE SERPL-MCNC: 2.4 MG/DL (ref 2.5–4.9)
PLATELET # BLD AUTO: 219 K/UL (ref 135–420)
PMV BLD AUTO: 9.6 FL (ref 9.2–11.8)
POTASSIUM SERPL-SCNC: 3.5 MMOL/L (ref 3.5–5.5)
PROTHROMBIN TIME: 22 SEC (ref 11.5–15.2)
RBC # BLD AUTO: 3.22 M/UL (ref 4.2–5.3)
SODIUM SERPL-SCNC: 142 MMOL/L (ref 136–145)
WBC # BLD AUTO: 6.4 K/UL (ref 4.6–13.2)

## 2020-08-29 PROCEDURE — 83735 ASSAY OF MAGNESIUM: CPT

## 2020-08-29 PROCEDURE — 82330 ASSAY OF CALCIUM: CPT

## 2020-08-29 PROCEDURE — 74011000258 HC RX REV CODE- 258: Performed by: HOSPITALIST

## 2020-08-29 PROCEDURE — 80048 BASIC METABOLIC PNL TOTAL CA: CPT

## 2020-08-29 PROCEDURE — 85610 PROTHROMBIN TIME: CPT

## 2020-08-29 PROCEDURE — 74011250637 HC RX REV CODE- 250/637: Performed by: INTERNAL MEDICINE

## 2020-08-29 PROCEDURE — 36415 COLL VENOUS BLD VENIPUNCTURE: CPT

## 2020-08-29 PROCEDURE — 74011636637 HC RX REV CODE- 636/637: Performed by: HOSPITALIST

## 2020-08-29 PROCEDURE — 65660000000 HC RM CCU STEPDOWN

## 2020-08-29 PROCEDURE — 84100 ASSAY OF PHOSPHORUS: CPT

## 2020-08-29 PROCEDURE — 93306 TTE W/DOPPLER COMPLETE: CPT

## 2020-08-29 PROCEDURE — 74011250637 HC RX REV CODE- 250/637: Performed by: HOSPITALIST

## 2020-08-29 PROCEDURE — 85027 COMPLETE CBC AUTOMATED: CPT

## 2020-08-29 PROCEDURE — 82962 GLUCOSE BLOOD TEST: CPT

## 2020-08-29 PROCEDURE — 74011250636 HC RX REV CODE- 250/636: Performed by: HOSPITALIST

## 2020-08-29 PROCEDURE — 83880 ASSAY OF NATRIURETIC PEPTIDE: CPT

## 2020-08-29 RX ORDER — LEVOFLOXACIN 250 MG/1
250 TABLET ORAL EVERY 24 HOURS
Status: DISCONTINUED | OUTPATIENT
Start: 2020-08-29 | End: 2020-08-30 | Stop reason: HOSPADM

## 2020-08-29 RX ORDER — PANTOPRAZOLE SODIUM 40 MG/1
40 TABLET, DELAYED RELEASE ORAL
Status: DISCONTINUED | OUTPATIENT
Start: 2020-08-29 | End: 2020-08-30 | Stop reason: HOSPADM

## 2020-08-29 RX ORDER — AMIODARONE HYDROCHLORIDE 200 MG/1
200 TABLET ORAL DAILY
Status: DISCONTINUED | OUTPATIENT
Start: 2020-08-29 | End: 2020-08-30 | Stop reason: HOSPADM

## 2020-08-29 RX ORDER — MAGNESIUM SULFATE HEPTAHYDRATE 40 MG/ML
2 INJECTION, SOLUTION INTRAVENOUS ONCE
Status: COMPLETED | OUTPATIENT
Start: 2020-08-29 | End: 2020-08-29

## 2020-08-29 RX ORDER — LEVOTHYROXINE SODIUM 75 UG/1
75 TABLET ORAL
Status: DISCONTINUED | OUTPATIENT
Start: 2020-08-29 | End: 2020-08-30 | Stop reason: HOSPADM

## 2020-08-29 RX ORDER — METOPROLOL TARTRATE 25 MG/1
12.5 TABLET, FILM COATED ORAL EVERY 12 HOURS
Status: DISCONTINUED | OUTPATIENT
Start: 2020-08-29 | End: 2020-08-30 | Stop reason: HOSPADM

## 2020-08-29 RX ORDER — ATORVASTATIN CALCIUM 10 MG/1
10 TABLET, FILM COATED ORAL
Status: DISCONTINUED | OUTPATIENT
Start: 2020-08-29 | End: 2020-08-30 | Stop reason: HOSPADM

## 2020-08-29 RX ADMIN — ATORVASTATIN CALCIUM 10 MG: 10 TABLET, FILM COATED ORAL at 21:04

## 2020-08-29 RX ADMIN — PANTOPRAZOLE SODIUM 40 MG: 40 TABLET, DELAYED RELEASE ORAL at 08:05

## 2020-08-29 RX ADMIN — LEVOFLOXACIN 250 MG: 250 TABLET, FILM COATED ORAL at 16:49

## 2020-08-29 RX ADMIN — APIXABAN 5 MG: 5 TABLET, FILM COATED ORAL at 16:49

## 2020-08-29 RX ADMIN — LEVOTHYROXINE SODIUM 75 MCG: 0.07 TABLET ORAL at 05:34

## 2020-08-29 RX ADMIN — APIXABAN 5 MG: 5 TABLET, FILM COATED ORAL at 08:05

## 2020-08-29 RX ADMIN — Medication 10 ML: at 21:07

## 2020-08-29 RX ADMIN — AMIODARONE HYDROCHLORIDE 200 MG: 200 TABLET ORAL at 11:45

## 2020-08-29 RX ADMIN — Medication 10 ML: at 05:34

## 2020-08-29 RX ADMIN — INSULIN LISPRO 2 UNITS: 100 INJECTION, SOLUTION INTRAVENOUS; SUBCUTANEOUS at 16:48

## 2020-08-29 RX ADMIN — CALCIUM GLUCONATE 2 G: 94 INJECTION, SOLUTION INTRAVENOUS at 09:18

## 2020-08-29 RX ADMIN — MAGNESIUM SULFATE HEPTAHYDRATE 2 G: 40 INJECTION, SOLUTION INTRAVENOUS at 07:04

## 2020-08-29 RX ADMIN — METOPROLOL TARTRATE 12.5 MG: 25 TABLET, FILM COATED ORAL at 21:04

## 2020-08-29 RX ADMIN — INSULIN LISPRO 2 UNITS: 100 INJECTION, SOLUTION INTRAVENOUS; SUBCUTANEOUS at 21:05

## 2020-08-29 NOTE — ED NOTES
Verbal order to discontinue the cardizem at this time per Jazlyn Collazo MD.  No other orders at this time or addition of medication or fluids. BP and HR reviewed with provider. Drip stopped at this time. Primary RN Patti made aware.

## 2020-08-29 NOTE — PROGRESS NOTES
Problem: Afib Pathway: Day 1  Goal: Activity/Safety  Outcome: Progressing Towards Goal  Goal: Consults, if ordered  Outcome: Progressing Towards Goal  Goal: Diagnostic Test/Procedures  Outcome: Progressing Towards Goal  Goal: Medications  Outcome: Progressing Towards Goal     Problem: Pain  Goal: *Control of Pain  Outcome: Progressing Towards Goal     Problem: Patient Education: Go to Patient Education Activity  Goal: Patient/Family Education  Outcome: Progressing Towards Goal     Problem: Falls - Risk of  Goal: *Absence of Falls  Description: Document Khris Fall Risk and appropriate interventions in the flowsheet.   Outcome: Progressing Towards Goal  Note: Fall Risk Interventions:

## 2020-08-29 NOTE — PROGRESS NOTES
0715: Bedside and verbal report received from [facility]. Pt asleep at this time. No distress noted. VSS, care assumed, will monitor. 4226: Dr Jeronimo Crisostomo at the bedside, Pt okay to transfer to telemetry floor. 1020: U/s tech at the bedside for Echo.   1311: Pt's son given update via telephone with Pt's permission. Son, Abdul Fothergill given privacy code. 1406: 2C  1414: TRANSFER - OUT REPORT:    Verbal report given to Louisa(name) on Jenna Agrawal  being transferred to (unit) for routine progression of care       Report consisted of patients Situation, Background, Assessment and   Recommendations(SBAR). Information from the following report(s) SBAR, Kardex, Intake/Output, MAR, Recent Results and Cardiac Rhythm NSR was reviewed with the receiving nurse. Lines:   Peripheral IV 08/28/20 Left Wrist (Active)   Site Assessment Clean, dry, & intact 08/29/20 1300   Phlebitis Assessment 0 08/29/20 1300   Infiltration Assessment 0 08/29/20 1300   Dressing Status Clean, dry, & intact 08/29/20 0800   Dressing Type Transparent;Tape 08/29/20 0400   Hub Color/Line Status Green;Patent 08/29/20 0400   Action Taken Open ports on tubing capped 08/29/20 0400   Alcohol Cap Used Yes 08/29/20 0400        Opportunity for questions and clarification was provided.       Patient transported with:   Registered Nurse

## 2020-08-29 NOTE — PROGRESS NOTES
Bedside shift report received from [address] [place] [address]: sleeping; opens eyes with verbal commands; on room air, with regular, nonlabored breathing; no complaints voiced; shift assessment done; needs within reach    2200: po fluids given as requested    0030: sleeping; needs within reach    0300: assisted with bedpan use; no bowel movement noted    0500: lakeshia care done    0600: due meds given; Miralax given for c/o feeling constipated    0720: Bedside and Verbal shift change report given to Robel Villanueva RN (oncoming nurse) by Nadja Castrejon RN (offgoing nurse). Report included the following information SBAR, Kardex, Intake/Output, Recent Results and Cardiac Rhythm NSR.

## 2020-08-29 NOTE — CONSULTS
[facility] - Consult Note    Consultation request by Dali Hancock MD for advice/opinion related to evaluating Atrial fibrillation with RVR [facility]) [I48.91]    Date of  Admission: 8/28/2020  5:11 PM   Primary Care Physician:  Joyce Vásquez MD     Assessment:      -A.fib/flutter with RVR with h/o paroxysmal A.fib/flutter on eliquis, metoprolol  as outpatient  -h/o moderate aortic stenosis with mean gradient 31 mmHg 11/2019, EF 65%  -Chest pain likely from a.fib w/RVR. No evidence of MI. 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 Found to have atrial fibrillation with rapid ventricle response. Was started on IV Cardizem    -We will start amiodarone again to her milligrams daily. Discussed with patient that continue that medication as outpatient  -We will stop IV Cardizem as patient is back in sinus rhythm  -We will use oral metoprolol  -Continue Eliquis and rest of the cardiac medication  -Echo pending     History of Present Illness: This is a 79 y.o. female admitted for Atrial fibrillation with RVR ([facility] [address] [I48.91]. Patient complains of:    57-year-old female with known moderate left ear, atrial fibrillation, paroxysmal, hypertension. Came to the hospital with sudden onset of dizziness fluttering and some chest pressure. She was found to have atrial fibrillation with rapid ventricle response in the hospital.  She was started on IV Cardizem drip. Cardiology consultation obtained. This morning patient feels back to normal.  No chest pain. She has converted back to sinus rhythm.   She does not remember taking amiodarone as outpatient. She takes her metoprolol and Eliquis. Cardiac risk factors: none      Review of Symptoms:  Except as stated above include:  Constitutional:  negative  Respiratory:  negative  Cardiovascular:  negative  Gastrointestinal: negative  Genitourinary:  negative  Musculoskeletal:  Negative  Neurological:  Negative  Dermatological:  Negative  Endocrinological: Negative  Psychological:  Negative    A comprehensive review of systems was negative except for that written in the HPI. Past Medical History:     Past Medical History:   Diagnosis Date    Aortic stenosis     Patient denies on 10/31/2019.  Arthritis     Atrial flutter ([address]     Bladder stone     Bronchitis     Resolved after getting rid of her pet bird in 2000. 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  Vesicocolic fistula          Social History:     Social History     Socioeconomic History    Marital status: SINGLE     Spouse name: Not on file    Number of children: Not on file    Years of education: Not on file    Highest education level: Not on file   Tobacco Use    Smoking status: Never Smoker    Smokeless tobacco: Never Used   Substance and Sexual Activity    Alcohol use: No    Drug use: No    Sexual activity: Not Currently     Partners: Male   Other Topics Concern        Family History:     Family History   Problem Relation Age of Onset    Diabetes Mother     Coronary Artery Disease Mother     Cancer Father         melanoma    Stroke Sister     Hypertension Sister     Diabetes Sister     Diabetes Brother     Coronary Artery Disease Brother     Breast Cancer Paternal Grandmother         older, but not sure age.     Breast Cancer Paternal Aunt         and gyn cancer ?age   Natalie Farley No Known Problems Sister     No Known Problems Brother     Kidney Disease Sister     Heart Failure Sister         Medications:      Allergies   Allergen Reactions    Metronidazole Hives and Itching    Ampicillin Itching        Current Facility-Administered Medications   Medication Dose Route Frequency    apixaban (ELIQUIS) tablet 5 mg  5 mg Oral BID    levothyroxine (SYNTHROID) tablet 75 mcg  75 mcg Oral 6am    metoprolol tartrate (LOPRESSOR) tablet 12.5 mg  12.5 mg Oral Q12H    pantoprazole (PROTONIX) tablet 40 mg  40 mg Oral ACB    atorvastatin (LIPITOR) tablet 10 mg  10 mg Oral QHS    calcium gluconate 2 g in 0.9% sodium chloride 100 mL IVPB  2 g IntraVENous ONCE    amiodarone (CORDARONE) tablet 200 mg  200 mg Oral DAILY    magnesium sulfate 2 g/50 ml IVPB (premix or compounded)  2 g IntraVENous ONCE    insulin lispro (HUMALOG) injection   SubCUTAneous AC&HS    glucose chewable tablet 16 g  4 Tab Oral PRN    glucagon (GLUCAGEN) injection 1 mg  1 mg IntraMUSCular PRN    dextrose (D50W) injection syrg 12.5-25 g  25-50 mL IntraVENous PRN    sodium chloride (NS) flush 5-40 mL  5-40 mL IntraVENous Q8H    sodium chloride (NS) flush 5-40 mL  5-40 mL IntraVENous PRN    acetaminophen (TYLENOL) tablet 650 mg  650 mg Oral Q6H PRN    Or    acetaminophen (TYLENOL) suppository 650 mg  650 mg Rectal Q6H PRN    polyethylene glycol (MIRALAX) packet 17 g  17 g Oral DAILY PRN    promethazine (PHENERGAN) tablet 12.5 mg  12.5 mg Oral Q6H PRN    Or    ondansetron (ZOFRAN) injection 4 mg  4 mg IntraVENous Q6H PRN    cefTRIAXone (ROCEPHIN) 1 g in 0.9% sodium chloride (MBP/ADV) 50 mL MBP  1 g IntraVENous Q24H         Physical Exam:     Visit Vitals  /60   Pulse 94   Temp 98.3 °F (36.8 °C)   Resp 26   Ht 5' (1.524 m)   Wt 136 lb (61.7 kg)   SpO2 97%   Breastfeeding No   BMI 26.56 kg/m²     BP Readings from Last 3 Encounters:   08/29/20 128/60 08/27/20 145/88   08/20/20 142/66     Pulse Readings from Last 3 Encounters:   08/29/20 94   08/27/20 85   08/20/20 72     Wt Readings from Last 3 Encounters:   08/29/20 136 lb (61.7 kg)   08/27/20 137 lb (62.1 kg)   08/20/20 137 lb (62.1 kg)       General:  alert, cooperative, no distress, appears stated age  Neck:  no carotid bruit, no JVD  Lungs:  clear to auscultation bilaterally  Heart:  regular rate and rhythm, S1, S2 normal, Aortic systolic murmur  Abdomen:  abdomen is soft without significant tenderness, masses, organomegaly or guarding  Extremities:  extremities normal, atraumatic, no cyanosis or edema  Skin: Warm and dry.  no hyperpigmentation, vitiligo, or suspicious lesions  Neuro: alert, oriented x3, affect appropriate,   Psych: non focal     Data Review:     Recent Labs     08/29/20  0407 08/28/20  1740 08/27/20  0830   WBC 6.4 11.0 7.0   HGB 8.9* 10.4* 10.1*   HCT 27.6* 31.7* 31.2*    255 234     Recent Labs     08/29/20  0407 08/28/20  1740 08/27/20  0830    137 138   K 3.5 3.9 3.8   * 106 104   CO2 20* 22 26   GLU 86 133* 107*   BUN 12 14 12   CREA 0.60 0.96 1.00   CA 5.5* 6.8* 7.0*   MG 1.0* 0.5*  --    PHOS 2.4*  --   --    INR 2.0* 2.0* 1.5*       Results for orders placed or performed during the hospital encounter of 08/13/20   EKG, 12 LEAD, INITIAL   Result Value Ref Range    Ventricular Rate 98 BPM    Atrial Rate 98 BPM    P-R Interval 122 ms    QRS Duration 66 ms    Q-T Interval 338 ms    QTC Calculation (Bezet) 431 ms    Calculated P Axis 37 degrees    Calculated R Axis 26 degrees    Calculated T Axis 37 degrees    Diagnosis       Normal sinus rhythm  Normal ECG  When compared with ECG of 15-MAR-2020 09:15,  No significant change was found  Confirmed by [facility] ([id number]) on 8/14/2020 10:12:12 AM         All Cardiac Markers in the last 24 hours:    Lab Results   Component Value Date/Time    TROIQ <0.02 08/28/2020 05:40 PM       Last Lipid:    Lab Results Component Value Date/Time    Cholesterol, total 143 10/10/2018 02:17 PM    HDL Cholesterol 47 10/10/2018 02:17 PM    LDL, calculated 67.6 10/10/2018 02:17 PM    Triglyceride 142 10/10/2018 02:17 PM    CHOL/HDL Ratio 3.0 10/10/2018 02:17 PM       Signed By: Zenaida Basurto MD     August 29, 2020

## 2020-08-29 NOTE — PROGRESS NOTES
Problem: Discharge Planning  Goal: *Discharge to safe environment  Outcome: Progressing Towards Goal    Plan:  home, possible home health    Reason for Readmission:   A-fib with RVR           RUR Score/Risk Level:   40      PCP:First and Last name:  Dr. Bhumika Ness   Name of Practice:    Are you a current patient: Yes/No: Yes   Approximate date of last visit: 8/19/2020   Can you participate in a virtual visit with your PCP: yes    Is a Care Conference indicated:  Not @ this time      Did you attend your follow up appointment (s): If not, why not: Yes         Resources/supports as identified by patient/family:   Family, ins, PCP       Top Challenges facing patient (as identified by patient/family and CM): Finances/Medication cost?    MCR/DREAD ins   Transportation   Family, friend,cab     Support system or lack thereof? Family, ins, PCP    Living arrangements? Son lives with pt        Self-care/ADLs/Cognition? Independent. Current Advanced Directive/Advance Care Plan: scanned in record            Plan for utilizing home health: Will assess for needs. Transition of Care Plan:    Based on readmission, the patient's previous Plan of Care   has been evaluated and/or modified. The current Transition of Care Plan is:   Home with possible home health when medically stable. Chart reviewed. Met with pt., verified all demographics. States has MCR/DREAD ins. States her DREAD does not have transport benefit. Does not have money for cab, will cab voucher @ discharge. NOK:  Lenore Simms, son, whom lives with pt. Uses cane. States very independent with ADL's prior to admit. States has no clothes with her or key to house. States will have son meet her @ home @ discharge to let her in. Will need paper scrubs to wear home. Will cont to follow for any needs.   Arabella Poon RN,ext [phone].      Patient has designated her son to participate in his/her discharge plan and to receive any needed information.      Name: Elise Farr  Address:  Phone number: [phone]    Care Management Interventions  PCP Verified by CM: Yes(Dr. Halima Shipman)  Last Visit to PCP: 08/19/20  Palliative Care Criteria Met (RRAT>21 & CHF Dx)?: No  Mode of Transport at Discharge: Self(need cab voucher)  Transition of Care Consult (CM Consult): Discharge Planning  Discharge Durable Medical Equipment: No  Physical Therapy Consult: No  Occupational Therapy Consult: No  Speech Therapy Consult: No  Current Support Network:  Other(son lives with pt)  Confirm Follow Up Transport: Self  Discharge Location  Discharge Placement: Home(possible home health)

## 2020-08-29 NOTE — PROGRESS NOTES
Physical Exam  Skin:           Received pt via ED stretcher transfer in stable condition.  Dual skin assessment and bedside transfer report received from [facility]

## 2020-08-29 NOTE — PROGRESS NOTES
Problem: Patient Education: Go to Patient Education Activity  Goal: Patient/Family Education  Outcome: Progressing Towards Goal     Problem: Afib Pathway: Day 1  Goal: Off Pathway (Use only if patient is Off Pathway)  Outcome: Progressing Towards Goal  Goal: Activity/Safety  Outcome: Progressing Towards Goal  Goal: Consults, if ordered  Outcome: Progressing Towards Goal  Goal: Diagnostic Test/Procedures  Outcome: Progressing Towards Goal  Goal: Nutrition/Diet  Outcome: Progressing Towards Goal  Goal: Discharge Planning  Outcome: Progressing Towards Goal  Goal: Medications  Outcome: Progressing Towards Goal  Goal: Respiratory  Outcome: Progressing Towards Goal  Goal: Treatments/Interventions/Procedures  Outcome: Progressing Towards Goal  Goal: Psychosocial  Outcome: Progressing Towards Goal  Goal: *Optimal pain control at patient's stated goal  Outcome: Progressing Towards Goal  Goal: *Hemodynamically stable  Outcome: Progressing Towards Goal  Goal: *Stable cardiac rhythm  Outcome: Progressing Towards Goal  Goal: *Lungs clear or at baseline  Outcome: Progressing Towards Goal  Goal: *Labs within defined limits  Outcome: Progressing Towards Goal  Goal: *Describes available resources and support systems  Outcome: Progressing Towards Goal     Problem: Afib Pathway: Day 2  Goal: Off Pathway (Use only if patient is Off Pathway)  Outcome: Progressing Towards Goal  Goal: Activity/Safety  Outcome: Progressing Towards Goal  Goal: Consults, if ordered  Outcome: Progressing Towards Goal  Goal: Diagnostic Test/Procedures  Outcome: Progressing Towards Goal  Goal: Nutrition/Diet  Outcome: Progressing Towards Goal  Goal: Discharge Planning  Outcome: Progressing Towards Goal  Goal: Medications  Outcome: Progressing Towards Goal  Goal: Respiratory  Outcome: Progressing Towards Goal  Goal: Treatments/Interventions/Procedures  Outcome: Progressing Towards Goal  Goal: Psychosocial  Outcome: Progressing Towards Goal  Goal: *Hemodynamically stable  Outcome: Progressing Towards Goal  Goal: *Optimal pain control at patient's stated goal  Outcome: Progressing Towards Goal  Goal: *Stable cardiac rhythm  Outcome: Progressing Towards Goal  Goal: *Lungs clear or at baseline  Outcome: Progressing Towards Goal  Goal: *Describes available resources and support systems  Outcome: Progressing Towards Goal     Problem: Afib Pathway: Day 3  Goal: Off Pathway (Use only if patient is Off Pathway)  Outcome: Progressing Towards Goal  Goal: Activity/Safety  Outcome: Progressing Towards Goal  Goal: Diagnostic Test/Procedures  Outcome: Progressing Towards Goal  Goal: Nutrition/Diet  Outcome: Progressing Towards Goal  Goal: Discharge Planning  Outcome: Progressing Towards Goal  Goal: Medications  Outcome: Progressing Towards Goal  Goal: Respiratory  Outcome: Progressing Towards Goal  Goal: Treatments/Interventions/Procedures  Outcome: Progressing Towards Goal  Goal: Psychosocial  Outcome: Progressing Towards Goal

## 2020-08-29 NOTE — PROGRESS NOTES
Internal Medicine Progress Note        NAME: Gamal Flowers   :  1940  MRM:  [id number]    Date/Time: 2020        ASSESSMENT/PLAN:    Principal Problem:    Atrial fibrillation with RVR ([facility] (3/4/2018)    Active Problems:    Hypothyroid (2014)      Type 2 diabetes mellitus with nephropathy ([facility] (2018)      UTI (urinary tract infection) (2020)        # Atrial fib RVR. Converted to SR. Transfer to floor. Amiodarone po per her cardiologist. TTE P . Off cardizem ggt. Continue Lopressor. TSH ok      #   Hypomag   likely reason for afib, replete and trend     # UTI . 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 Dc CFTX and resume po LVQ that she discharged on and should be on till see her surgeon on Monday. # DM. Provide SSI, hypoglycemia protocol and frequent Accu checks. Provide SSI, hypoglycemia protocol and frequent Accu checks. Education,  diabetic educator  . Diabetic Diet     # coagulopathy. INR elevated on Eliquis. Monitor INR. # COLO-VESICAL FISTULA .  Appointment on coming Monday at 6 am       DISPO  -Pt to be admitted  at this time for reasons addressed above, continued hospitalization for ongoing assessment and treatment indicated      Anticipated Date of Discharge: 2 days  Anticipated Disposition (home, SNF) : home    IP CONSULT TO HOSPITALIST  IP CONSULT TO CARDIOLOGY    Lab Review:     Recent Labs     20  0407 20  1740 20  0830   WBC 6.4 11.0 7.0   HGB 8.9* 10.4* 10.1*   HCT 27.6* 31.7* 31.2*    255 234     Recent Labs     20  1216 20  0407 20  1740 20  0830   NA  --  142 137 138   K  --  3.5 3.9 3.8   CL  --  113* 106 104   CO2  --  20* 22 26   GLU  --  86 133* 107*   BUN  --  12 14 12   CREA  --  0.60 0.96 1.00   CA  --  5.5* 6.8* 7.0*   MG 1.7 1.0* 0.5*  --    PHOS  --  2.4*  --   --    INR  --  2.0* 2.0* 1.5*     Lab Results   Component Value Date/Time    Glucose (POC) 142 (H) 2020 11:27 AM    Glucose (POC) 130 (H) 08/29/2020 07:43 AM    Glucose (POC) 117 (H) 08/28/2020 09:37 PM    Glucose (POC) 112 (H) 08/16/2020 11:02 AM    Glucose (POC) 93 08/16/2020 07:19 AM    Glucose,  (H) 02/08/2019 12:15 AM     No results for input(s): PH, PCO2, PO2, HCO3, FIO2 in the last 72 hours. Recent Labs     08/29/20  0407 08/28/20  1740 08/27/20  0830   INR 2.0* 2.0* 1.5*       Lab Results   Component Value Date/Time    Specimen Description: SPUTUM 11/18/2009 04:05 AM    Specimen Description: STOOL 11/17/2009 07:00 PM    Specimen Description: STOOL 11/17/2009 07:00 PM     Lab Results   Component Value Date/Time    Culture result: GRAM NEGATIVE RODS (A) 08/27/2020 08:45 AM    Culture result: No growth (<1,000 CFU/ML) 08/20/2020 07:16 AM    Culture result: MIXED UROGENITAL DIXON ISOLATED 08/13/2020 05:45 PM       All Cardiac Markers in the last 24 hours:   Lab Results   Component Value Date/Time    TROIQ <0.02 08/28/2020 05:40 PM           Intervals noted   Pt s/e @ bedside     Subjective:     Chief Complaint:      Feel good   frrustrated with the need of frequent hospitalization     ROS:  (bold if positive,otherwise negative)    Fever/chills ,  Dysuria   Cough , Sputum , SOB/MORALES , Chest Pain     Diarrhea ,Nausea/Vomit , Abd Pain , Constipation    Tolerating Diet                Objective:     Vitals:  Last 24hrs VS reviewed since prior progress note.  Most recent are:    Visit Vitals  /53   Pulse 95   Temp 99.4 °F (37.4 °C)   Resp 28   Ht 5' (1.524 m)   Wt 61.7 kg (136 lb)   SpO2 96%   Breastfeeding No   BMI 26.56 kg/m²     SpO2 Readings from Last 6 Encounters:   08/29/20 96%   08/27/20 99%   08/20/20 100%   08/16/20 93%   03/21/20 98%   03/16/20 97%            Intake/Output Summary (Last 24 hours) at 8/29/2020 1427  Last data filed at 8/29/2020 1200  Gross per 24 hour   Intake 2552.92 ml   Output 650 ml   Net 1902.92 ml          Physical Exam:   Gen:  Appear stated age, Well-developed,  in no acute distress  HEENT:  Head atraumatic, normocephalic , hearing intact to voice, moist mucous membranes. Neck:   Trachea midline , No apparent JVD, Supple, without masses, thyroid non-tender  Resp:  No accessory muscle use,Bilateral BS present, clear breath sounds without wheezes rales or rhonchi  Card:  No murmurs, normal S1, S2 without Gallop . No Significant lower leg peripheral edema. Abd: no  abdomen tenderness .   Soft  non-distended, bowel sounds are present . Musc:  No cyanosis or clubbing. Skin:  No rashes or ulcers, skin turgor is good. Neuro:  Cranial nerves are grossly intact, no clear area of focal motor weakness, follows commands appropriately.   Psych: oriented to person, place and time, alert       Telemetry reviewed:   normal sinus rhythm    Medications Reviewed: (see below)    Lab Data Reviewed: (see below)    ______________________________________________________________________    Medications:     Current Facility-Administered Medications   Medication Dose Route Frequency    apixaban (ELIQUIS) tablet 5 mg  5 mg Oral BID    levothyroxine (SYNTHROID) tablet 75 mcg  75 mcg Oral 6am    metoprolol tartrate (LOPRESSOR) tablet 12.5 mg  12.5 mg Oral Q12H    pantoprazole (PROTONIX) tablet 40 mg  40 mg Oral ACB    atorvastatin (LIPITOR) tablet 10 mg  10 mg Oral QHS    amiodarone (CORDARONE) tablet 200 mg  200 mg Oral DAILY    levoFLOXacin (LEVAQUIN) tablet 250 mg  250 mg Oral Q24H    insulin lispro (HUMALOG) injection   SubCUTAneous AC&HS    glucose chewable tablet 16 g  4 Tab Oral PRN    glucagon (GLUCAGEN) injection 1 mg  1 mg IntraMUSCular PRN    dextrose (D50W) injection syrg 12.5-25 g  25-50 mL IntraVENous PRN    sodium chloride (NS) flush 5-40 mL  5-40 mL IntraVENous Q8H    sodium chloride (NS) flush 5-40 mL  5-40 mL IntraVENous PRN    acetaminophen (TYLENOL) tablet 650 mg  650 mg Oral Q6H PRN    Or    acetaminophen (TYLENOL) suppository 650 mg  650 mg Rectal Q6H PRN    polyethylene glycol (MIRALAX) packet 17 g  17 g Oral DAILY PRN    promethazine (PHENERGAN) tablet 12.5 mg  12.5 mg Oral Q6H PRN    Or    ondansetron (ZOFRAN) injection 4 mg  4 mg IntraVENous Q6H PRN        Total time spent with patient: 35 minutes                  Care Plan discussed with: Patient, Care Manager, Nursing Staff, Consultant/Specialist and >50% of time spent in counseling and coordination of care    Discussed:  Care Plan    DVT Prophylaxis  ELIQUIS    Disposition:  Home w/Family           This document in whole or part of it has been produced using voice recognition software. Unrecognized errors in transcription may be present.     Attending Physician: Rory Johnston MD

## 2020-08-29 NOTE — H&P
Medicine History and Physical    Patient: Manny Christianson   Age:  79 y.o. Assessment   Principal Problem:    Atrial fibrillation with RVR ([facility] (3/4/2018)    Active Problems:    Hypothyroid (2/24/2014)      Type 2 diabetes mellitus with nephropathy ([facility] (1/23/2018)      UTI (urinary tract infection) (8/13/2020)          Plan     Atrial fib RVR   - cardizem drip 15 right now   - oral metoprolol in am low dose   - monitor bp, soft currently, small boluses if needed can wean drip and try dig if needed   - TSH ok    Hypomag   - likely reason for afib, 0.5 on admission, will get 2 g in ed and 2 g at midnight. Recheck at 4 am    UTI   - rocephin    DM   - SSI      DISPO  -Pt to be admitted  at this time for reasons addressed above, continued hospitalization for ongoing assessment and treatment indicated     Anticipated Date of Discharge: 2 days  Anticipated Disposition (home, SNF) : home    Chief Complaint:   Chief Complaint   Patient presents with    Chest Pain         HPI:   Manny Christianson is a 79y.o. year old female who presents with  Chest pain that started this afternoon, mid chest, pressure. Took pulse and noted it was high. She has hx of afib on eliquis. In ed was in 180 adenosine did not work , started on cardizem. BP now soft but HR now 100-110. Mag found to be 0.5      Review of Systems - positive responses in bold type   Constitutional: Negative for fever, chills, diaphoresis and unexpected weight change. HENT: Negative for ear pain, congestion, sore throat, rhinorrhea, drooling, trouble swallowing, neck pain and tinnitus. Eyes: Negative for photophobia, pain, redness and visual disturbance. Respiratory: negative for shortness of breath, cough, choking, chest tightness, wheezing or stridor. Cardiovascular: Negative for chest pain, palpitations and leg swelling.    Gastrointestinal: Negative for nausea, vomiting, abdominal pain, diarrhea, constipation, blood in stool, abdominal distention and anal bleeding. Genitourinary: Negative for dysuria, urgency, frequency, hematuria, flank pain and difficulty urinating. Musculoskeletal: Negative for back pain and arthralgias. Skin: Negative for color change, rash and wound. Neurological: Negative for dizziness, seizures, syncope, speech difficulty, light-headedness or headaches. Hematological: Does not bruise/bleed easily. Psychiatric/Behavioral: Negative for suicidal ideas, hallucinations, behavioral problems, self-injury or agitation       Past Medical History:  Past Medical History:   Diagnosis Date    Aortic stenosis     Patient denies on 10/31/2019.  Arthritis     Atrial flutter ([address]     Bladder stone     Bronchitis     Resolved after getting rid of her pet bird in 2000.  Colovesical fistula 01/2020    Diabetes ([facility]     Diverticulitis     GERD (gastroesophageal reflux disease)     Gross hematuria     History of recurrent UTIs     From 9/2019 to 11/2019    Hypercholesterolemia     Hypertension     Hypothyroid     Menopause     Pancreatitis     Peripheral neuropathy     Patient denies on 10/31/2019.  Vesicocolic fistula        Past Surgical History:  Past Surgical History:   Procedure Laterality Date    COLONOSCOPY N/A 2/2/2017    COLONOSCOPY  w/bx polyp performed by Gayle Conteh MD at [facility] Left 2017       Family History:  Family History   Problem Relation Age of Onset    Diabetes Mother     Coronary Artery Disease Mother     Cancer Father         melanoma    Stroke Sister     Hypertension Sister     Diabetes Sister     Diabetes Brother     Coronary Artery Disease Brother     Breast Cancer Paternal Grandmother         older, but not sure age.     Breast Cancer Paternal Aunt         and gyn cancer ?age   [facility] No Known Problems Sister     No Known Problems Brother     Kidney Disease Sister     Heart Failure Sister        Social History:  Social History     Socioeconomic History  Marital status: SINGLE     Spouse name: Not on file    Number of children: Not on file    Years of education: Not on file    Highest education level: Not on file   Tobacco Use    Smoking status: Never Smoker    Smokeless tobacco: Never Used   Substance and Sexual Activity    Alcohol use: No    Drug use: No    Sexual activity: Not Currently     Partners: Male   Other Topics Concern       Home Medications:  Prior to Admission medications    Medication Sig Start Date End Date Taking? Authorizing Provider   Lactobacillus Acidoph & Bulgar [facility]) 1 million cell tab tablet Take 2 Tabs by mouth two (2) times a day for 30 days. 8/16/20 9/15/20  Shannan Luis MD   levoFLOXacin (LEVAQUIN) 250 mg tablet Take 1 Tab by mouth every twenty-four (24) hours for 15 days. 8/16/20 8/31/20  Shannan Luis MD   metoprolol tartrate (LOPRESSOR) 25 mg tablet Take 0.5 Tabs by mouth every twelve (12) hours. 8/13/20   Tonny Rinne, MD   levothyroxine (SYNTHROID) 75 mcg tablet TAKE 1 TABLET BY MOUTH EVERY DAY BEFORE BREAKFAST 7/20/20   Andres Kyle MD   metFORMIN (GLUCOPHAGE) 1,000 mg tablet TAKE 1 TABLET BY MOUTH TWICE DAILY 7/20/20   Andres Kyle MD   simvastatin (ZOCOR) 20 mg tablet TAKE 1 TABLET BY MOUTH EVERY NIGHT 7/20/20   Andres Kyle MD   betamethasone dipropionate (DIPROSONE) 0.05 % topical cream betamethasone dipropionate 0.05 % topical cream 7/13/20   Andres Kyle MD   pantoprazole (PROTONIX) 40 mg tablet TAKE 1 TABLET BY MOUTH DAILY 6/1/20   Andres Kyle MD   diphenoxylate-atropine (LOMOTIL) 2.5-0.025 mg per tablet TAKE 1 TABLET BY MOUTH FOUR TIMES DAILY AS NEEDED FOR DIARRHEA. MAX DAILY AMOUNT: 4 TABLETS. 5/13/20   Andres Kyle MD   nystatin (MYCOSTATIN) 100,000 unit/gram ointment Apply  to affected area two (2) times a day. 4/30/20   Andres Kyle MD   dicyclomine (BentyL) 10 mg capsule Take 2 Caps by mouth three (3) times daily as needed for Abdominal Cramps.  4/8/20   Andres Kyle MD triamcinolone acetonide (KENALOG) 0.1 % topical cream APPLY THIN LAYER EXTERNALLY TO THE AFFECTED AREA TWICE DAILY 4/1/20   Isabelle Pierce MD   apixaban (Eliquis) 5 mg tablet TAKE 1 TABLET BY MOUTH TWICE DAILY 3/19/20   Willam Cuadra MD   diphenhydrAMINE (BENADRYL) 25 mg capsule Take 25 mg by mouth every six (6) hours as needed. Provider, Historical   cranberry 500 mg capsule Take 500 mg by mouth daily. Tracy Oconnell DO   glucose blood VI test strips (FREESTYLE LITE STRIPS) strip use to check BS once daily 11/16/16   Provider, Historical   ondansetron hcl (ZOFRAN) 4 mg tablet Take 1 Tab by mouth every eight (8) hours as needed for Nausea. 7/31/19   Travis Aldrideg MD   cholecalciferol (VITAMIN D3) 1,000 unit tablet Take 2 Tabs by mouth daily. 11/21/17   Ivonne Lai MD       Allergies: Allergies   Allergen Reactions    Metronidazole Hives and Itching    Ampicillin Itching           Physical Exam:     Visit Vitals  BP 95/57   Pulse (!) 123   Temp 98.7 °F (37.1 °C)   Resp 21   Ht 5' (1.524 m)   Wt 61.2 kg (134 lb 14.4 oz)   SpO2 97%   BMI 26.35 kg/m²       Physical Exam:  General appearance: alert, cooperative, no distress, appears stated age  Head: Normocephalic, without obvious abnormality, atraumatic  Neck: supple, trachea midline  Lungs: clear to auscultation bilaterally  Heart: irreg irreg tachy  Abdomen: soft, non-tender. Bowel sounds normal. No masses,  no organomegaly  Extremities: 1+ b/l  Skin: Skin color, texture, turgor normal. No rashes or lesions  Neurologic: Grossly normal    Intake and Output:  Current Shift:  No intake/output data recorded. Last three shifts:  No intake/output data recorded.     Lab/Data Reviewed:  CMP:   Lab Results   Component Value Date/Time     08/28/2020 05:40 PM    K 3.9 08/28/2020 05:40 PM     08/28/2020 05:40 PM    CO2 22 08/28/2020 05:40 PM    AGAP 9 08/28/2020 05:40 PM     (H) 08/28/2020 05:40 PM    BUN 14 08/28/2020 05:40 PM    CREA 0.96 08/28/2020 05:40 PM    GFRAA >60 08/28/2020 05:40 PM    GFRNA 56 (L) 08/28/2020 05:40 PM    CA 6.8 (L) 08/28/2020 05:40 PM    MG 0.5 (LL) 08/28/2020 05:40 PM     CBC:   Lab Results   Component Value Date/Time    WBC 11.0 08/28/2020 05:40 PM    HGB 10.4 (L) 08/28/2020 05:40 PM    HCT 31.7 (L) 08/28/2020 05:40 PM     08/28/2020 05:40 PM     All Cardiac Markers in the last 24 hours:   Lab Results   Component Value Date/Time    TROIQ <0.02 08/28/2020 05:40 PM       Kyler Lynch MD    August 28, 2020

## 2020-08-30 VITALS
SYSTOLIC BLOOD PRESSURE: 139 MMHG | TEMPERATURE: 98.7 F | BODY MASS INDEX: 26.7 KG/M2 | RESPIRATION RATE: 20 BRPM | DIASTOLIC BLOOD PRESSURE: 77 MMHG | HEIGHT: 60 IN | WEIGHT: 136 LBS | HEART RATE: 89 BPM | OXYGEN SATURATION: 97 %

## 2020-08-30 DIAGNOSIS — I48.21 PERMANENT ATRIAL FIBRILLATION (HCC): Primary | ICD-10-CM

## 2020-08-30 LAB
ANION GAP SERPL CALC-SCNC: 6 MMOL/L (ref 3–18)
AV VTI RATIO: 0.3
BACTERIA SPEC CULT: ABNORMAL
BUN SERPL-MCNC: 14 MG/DL (ref 7–18)
BUN/CREAT SERPL: 21 (ref 12–20)
CA-I SERPL-SCNC: 1.08 MMOL/L (ref 1.12–1.32)
CALCIUM SERPL-MCNC: 7.2 MG/DL (ref 8.5–10.1)
CC UR VC: ABNORMAL
CHLORIDE SERPL-SCNC: 106 MMOL/L (ref 100–111)
CO2 SERPL-SCNC: 24 MMOL/L (ref 21–32)
CREAT SERPL-MCNC: 0.68 MG/DL (ref 0.6–1.3)
ECHO AO ROOT DIAM: 2.38 CM
ECHO AV AREA VTI: 1 CM2
ECHO AV AREA/BSA VTI: 0.6 CM2/M2
ECHO AV MEAN GRADIENT: 20.1 MMHG
ECHO AV MEAN VELOCITY: 2.14 M/S
ECHO AV PEAK GRADIENT: 35.2 MMHG
ECHO AV PEAK VELOCITY: 296.71 CM/S
ECHO AV VTI: 57.94 CM
ECHO EST RA PRESSURE: 8 MMHG
ECHO LA AREA 2C: 22.42 CM2
ECHO LA AREA 4C: 21.5 CM2
ECHO LA MAJOR AXIS: 2.93 CM
ECHO LA MINOR AXIS: 1.85 CM
ECHO LA TO AORTIC ROOT RATIO: 1.23
ECHO LA VOL 2C: 69.48 ML (ref 22–52)
ECHO LA VOL 4C: 63.5 ML (ref 22–52)
ECHO LA VOL BP: 77.27 ML (ref 22–52)
ECHO LA VOL/BSA BIPLANE: 48.77 ML/M2 (ref 16–28)
ECHO LA VOLUME INDEX A2C: 43.85 ML/M2 (ref 16–28)
ECHO LA VOLUME INDEX A4C: 40.08 ML/M2 (ref 16–28)
ECHO LV E' SEPTAL VELOCITY: 7.57 CM/S
ECHO LV EDV A2C: 58.7 ML
ECHO LV EDV A4C: 49.1 ML
ECHO LV EDV BP: 55 ML (ref 56–104)
ECHO LV EDV INDEX A4C: 31 ML/M2
ECHO LV EDV INDEX BP: 34.7 ML/M2
ECHO LV EDV NDEX A2C: 37 ML/M2
ECHO LV EDV TEICHHOLZ: 0.54 ML
ECHO LV EJECTION FRACTION A2C: 59 %
ECHO LV EJECTION FRACTION A4C: 64 %
ECHO LV EJECTION FRACTION BIPLANE: 61.6 % (ref 55–100)
ECHO LV ESV A2C: 24 ML
ECHO LV ESV A4C: 17.9 ML
ECHO LV ESV BP: 21.1 ML (ref 19–49)
ECHO LV ESV INDEX A2C: 15.1 ML/M2
ECHO LV ESV INDEX A4C: 11.3 ML/M2
ECHO LV ESV INDEX BP: 13.3 ML/M2
ECHO LV ESV TEICHHOLZ: 0.19 ML
ECHO LV INTERNAL DIMENSION DIASTOLIC: 4.28 CM (ref 3.9–5.3)
ECHO LV INTERNAL DIMENSION SYSTOLIC: 2.76 CM
ECHO LV ISOVOLUMETRIC RELAXATION TIME (IVRT): 57.1 MS
ECHO LV IVSD: 0.84 CM (ref 0.6–0.9)
ECHO LV MASS 2D: 111.5 G (ref 67–162)
ECHO LV MASS INDEX 2D: 70.4 G/M2 (ref 43–95)
ECHO LV POSTERIOR WALL DIASTOLIC: 0.84 CM (ref 0.6–0.9)
ECHO LVOT DIAM: 1.96 CM
ECHO LVOT PEAK GRADIENT: 3.16 MMHG
ECHO LVOT SV: 56.9 ML
ECHO LVOT VTI: 18.78 CM
ECHO MV A VELOCITY: 141.03 CM/S
ECHO MV E DECELERATION TIME (DT): 244.6 MS
ECHO MV E VELOCITY: 130.75 CM/S
ECHO MV E/A RATIO: 0.93
ECHO MV E/E' SEPTAL: 17.27
ECHO PULMONARY ARTERY SYSTOLIC PRESSURE (PASP): 44 MMHG
ECHO PVEIN A DURATION: 79.9 MS
ECHO PVEIN A VELOCITY: 25.97 CM/S
ECHO RA AREA 4C: 10 CM2
ECHO RIGHT VENTRICULAR SYSTOLIC PRESSURE (RVSP): 44 MMHG
ECHO RV TAPSE: 2.11 CM (ref 1.5–2)
ECHO TV REGURGITANT MAX VELOCITY: 300.26 CM/S
ECHO TV REGURGITANT PEAK GRADIENT: 36.1 MMHG
ERYTHROCYTE [DISTWIDTH] IN BLOOD BY AUTOMATED COUNT: 13.1 % (ref 11.6–14.5)
GLUCOSE BLD STRIP.AUTO-MCNC: 145 MG/DL (ref 70–110)
GLUCOSE BLD STRIP.AUTO-MCNC: 94 MG/DL (ref 70–110)
GLUCOSE SERPL-MCNC: 95 MG/DL (ref 74–99)
HCT VFR BLD AUTO: 29.4 % (ref 35–45)
HGB BLD-MCNC: 9.5 G/DL (ref 12–16)
INR PPP: 1.6 (ref 0.8–1.2)
LVFS 2D: 35.61 %
LVOT MG: 1.79 MMHG
LVOT MV: 0.63 CM/S
LVSV (MOD BI): 20.96 ML
LVSV (MOD SINGLE 4C): 19.34 ML
LVSV (MOD SINGLE): 21.5 ML
LVSV (TEICH): 33.28 ML
MAGNESIUM SERPL-MCNC: 1.6 MG/DL (ref 1.6–2.6)
MCH RBC QN AUTO: 27.5 PG (ref 24–34)
MCHC RBC AUTO-ENTMCNC: 32.3 G/DL (ref 31–37)
MCV RBC AUTO: 85 FL (ref 74–97)
MV DEC SLOPE: 5.35
PHOSPHATE SERPL-MCNC: 2.3 MG/DL (ref 2.5–4.9)
PLATELET # BLD AUTO: 246 K/UL (ref 135–420)
PMV BLD AUTO: 9.6 FL (ref 9.2–11.8)
POTASSIUM SERPL-SCNC: 4 MMOL/L (ref 3.5–5.5)
PROTHROMBIN TIME: 18.6 SEC (ref 11.5–15.2)
RBC # BLD AUTO: 3.46 M/UL (ref 4.2–5.3)
SERVICE CMNT-IMP: ABNORMAL
SODIUM SERPL-SCNC: 136 MMOL/L (ref 136–145)
WBC # BLD AUTO: 6.2 K/UL (ref 4.6–13.2)

## 2020-08-30 PROCEDURE — 74011250636 HC RX REV CODE- 250/636: Performed by: INTERNAL MEDICINE

## 2020-08-30 PROCEDURE — 82962 GLUCOSE BLOOD TEST: CPT

## 2020-08-30 PROCEDURE — 83735 ASSAY OF MAGNESIUM: CPT

## 2020-08-30 PROCEDURE — 80048 BASIC METABOLIC PNL TOTAL CA: CPT

## 2020-08-30 PROCEDURE — 84100 ASSAY OF PHOSPHORUS: CPT

## 2020-08-30 PROCEDURE — 85610 PROTHROMBIN TIME: CPT

## 2020-08-30 PROCEDURE — 74011250637 HC RX REV CODE- 250/637: Performed by: HOSPITALIST

## 2020-08-30 PROCEDURE — 36415 COLL VENOUS BLD VENIPUNCTURE: CPT

## 2020-08-30 PROCEDURE — 85027 COMPLETE CBC AUTOMATED: CPT

## 2020-08-30 PROCEDURE — 82330 ASSAY OF CALCIUM: CPT

## 2020-08-30 PROCEDURE — 74011000258 HC RX REV CODE- 258: Performed by: INTERNAL MEDICINE

## 2020-08-30 PROCEDURE — 74011250637 HC RX REV CODE- 250/637: Performed by: INTERNAL MEDICINE

## 2020-08-30 RX ORDER — AMIODARONE HYDROCHLORIDE 200 MG/1
200 TABLET ORAL DAILY
Qty: 30 TAB | Refills: 0 | Status: SHIPPED | OUTPATIENT
Start: 2020-08-31 | End: 2020-08-30 | Stop reason: SDUPTHER

## 2020-08-30 RX ORDER — AMIODARONE HYDROCHLORIDE 200 MG/1
200 TABLET ORAL DAILY
Qty: 30 TAB | Refills: 0 | Status: SHIPPED | OUTPATIENT
Start: 2020-08-31 | End: 2020-09-30

## 2020-08-30 RX ORDER — METOPROLOL TARTRATE 25 MG/1
12.5 TABLET, FILM COATED ORAL EVERY 12 HOURS
Qty: 90 TAB | Refills: 3 | Status: SHIPPED | OUTPATIENT
Start: 2020-08-30 | End: 2020-12-15

## 2020-08-30 RX ADMIN — CALCIUM GLUCONATE 2 G: 94 INJECTION, SOLUTION INTRAVENOUS at 12:02

## 2020-08-30 RX ADMIN — PANTOPRAZOLE SODIUM 40 MG: 40 TABLET, DELAYED RELEASE ORAL at 08:52

## 2020-08-30 RX ADMIN — Medication 10 ML: at 06:00

## 2020-08-30 RX ADMIN — POLYETHYLENE GLYCOL 3350 17 G: 17 POWDER, FOR SOLUTION ORAL at 06:00

## 2020-08-30 RX ADMIN — LEVOTHYROXINE SODIUM 75 MCG: 0.07 TABLET ORAL at 06:00

## 2020-08-30 RX ADMIN — APIXABAN 5 MG: 5 TABLET, FILM COATED ORAL at 08:52

## 2020-08-30 RX ADMIN — AMIODARONE HYDROCHLORIDE 200 MG: 200 TABLET ORAL at 08:52

## 2020-08-30 RX ADMIN — METOPROLOL TARTRATE 12.5 MG: 25 TABLET, FILM COATED ORAL at 08:51

## 2020-08-30 NOTE — PROGRESS NOTES
Problem: Patient Education: Go to Patient Education Activity  Goal: Patient/Family Education  8/29/2020 2059 by Brien Mera RN  Outcome: Progressing Towards Goal  8/29/2020 2059 by Brien Mera RN  Outcome: Progressing Towards Goal     Problem: Afib Pathway: Day 1  Goal: Off Pathway (Use only if patient is Off Pathway)  Outcome: Progressing Towards Goal  Goal: Activity/Safety  Outcome: Progressing Towards Goal  Goal: Consults, if ordered  Outcome: Progressing Towards Goal  Goal: Diagnostic Test/Procedures  Outcome: Progressing Towards Goal  Goal: Nutrition/Diet  Outcome: Progressing Towards Goal  Goal: Discharge Planning  Outcome: Progressing Towards Goal  Goal: Medications  Outcome: Progressing Towards Goal  Goal: Respiratory  Outcome: Progressing Towards Goal  Goal: Treatments/Interventions/Procedures  Outcome: Progressing Towards Goal  Goal: Psychosocial  Outcome: Progressing Towards Goal  Goal: *Optimal pain control at patient's stated goal  Outcome: Progressing Towards Goal  Goal: *Hemodynamically stable  Outcome: Progressing Towards Goal  Goal: *Stable cardiac rhythm  Outcome: Progressing Towards Goal  Goal: *Lungs clear or at baseline  Outcome: Progressing Towards Goal  Goal: *Labs within defined limits  Outcome: Progressing Towards Goal  Goal: *Describes available resources and support systems  Outcome: Progressing Towards Goal     Problem: Afib Pathway: Day 2  Goal: Off Pathway (Use only if patient is Off Pathway)  8/29/2020 2059 by Brien Mera RN  Outcome: Progressing Towards Goal  8/29/2020 2059 by Brien Mera RN  Outcome: Progressing Towards Goal  Goal: Activity/Safety  8/29/2020 2059 by Brien Mera RN  Outcome: Progressing Towards Goal  8/29/2020 2059 by Brien Mera RN  Outcome: Progressing Towards Goal  Goal: Consults, if ordered  8/29/2020 2059 by Brien Mera RN  Outcome: Progressing Towards Goal  8/29/2020 2059 by Brien Mera RN  Outcome: Progressing Towards Goal  Goal: Diagnostic Test/Procedures  8/29/2020 2059 by Nieves Cannon RN  Outcome: Progressing Towards Goal  8/29/2020 2059 by Nieves Cannon RN  Outcome: Progressing Towards Goal  Goal: Nutrition/Diet  8/29/2020 2059 by Nieves Cannon RN  Outcome: Progressing Towards Goal  8/29/2020 2059 by Nieves Cannon RN  Outcome: Progressing Towards Goal  Goal: Discharge Planning  8/29/2020 2059 by Nieves Cannon RN  Outcome: Progressing Towards Goal  8/29/2020 2059 by Nieves Cannon RN  Outcome: Progressing Towards Goal  Goal: Medications  8/29/2020 2059 by Nieves Cannon RN  Outcome: Progressing Towards Goal  8/29/2020 2059 by Nieves Cannon RN  Outcome: Progressing Towards Goal  Goal: Respiratory  8/29/2020 2059 by Nieves Cannon RN  Outcome: Progressing Towards Goal  8/29/2020 2059 by Nieves Cannon RN  Outcome: Progressing Towards Goal  Goal: Treatments/Interventions/Procedures  8/29/2020 2059 by Nieves Cannon RN  Outcome: Progressing Towards Goal  8/29/2020 2059 by Nieves Cannon RN  Outcome: Progressing Towards Goal  Goal: Psychosocial  8/29/2020 2059 by Nieves Cannon RN  Outcome: Progressing Towards Goal  8/29/2020 2059 by Nieves Cannon RN  Outcome: Progressing Towards Goal  Goal: *Hemodynamically stable  8/29/2020 2059 by Nieves Cannon RN  Outcome: Progressing Towards Goal  8/29/2020 2059 by Nieves Cannon RN  Outcome: Progressing Towards Goal  Goal: *Optimal pain control at patient's stated goal  8/29/2020 2059 by Nieves Cannon RN  Outcome: Progressing Towards Goal  8/29/2020 2059 by Nieves Cannon RN  Outcome: Progressing Towards Goal  Goal: *Stable cardiac rhythm  8/29/2020 2059 by Nieves Cannon RN  Outcome: Progressing Towards Goal  8/29/2020 2059 by Nieves Cannon RN  Outcome: Progressing Towards Goal  Goal: *Lungs clear or at baseline  8/29/2020 2059 by Nieves Cannon RN  Outcome: Progressing Towards Goal  8/29/2020 2059 by Nieves Cannon RN  Outcome: Progressing Towards Goal  Goal: *Describes available resources and support systems  8/29/2020 2059 by Phyllis Marquis RN  Outcome: Progressing Towards Goal  8/29/2020 2059 by Phyllis Marquis RN  Outcome: Progressing Towards Goal     Problem: Afib Pathway: Day 3  Goal: Off Pathway (Use only if patient is Off Pathway)  Outcome: Progressing Towards Goal  Goal: Activity/Safety  Outcome: Progressing Towards Goal  Goal: Diagnostic Test/Procedures  Outcome: Progressing Towards Goal  Goal: Nutrition/Diet  Outcome: Progressing Towards Goal  Goal: Discharge Planning  Outcome: Progressing Towards Goal  Goal: Medications  Outcome: Progressing Towards Goal  Goal: Respiratory  Outcome: Progressing Towards Goal  Goal: Treatments/Interventions/Procedures  Outcome: Progressing Towards Goal  Goal: Psychosocial  Outcome: Progressing Towards Goal     Problem: Afib: Discharge Outcomes (not in CAT)  Goal: *Hemodynamically stable  8/29/2020 2059 by Phyllis Marquis RN  Outcome: Progressing Towards Goal  8/29/2020 2059 by Phyllis Marquis RN  Outcome: Progressing Towards Goal  Goal: *Stable cardiac rhythm  8/29/2020 2059 by Phyllis Marquis RN  Outcome: Progressing Towards Goal  8/29/2020 2059 by Phyllis Marquis RN  Outcome: Progressing Towards Goal  Goal: *Lungs clear or at baseline  8/29/2020 2059 by Phyllis Marquis RN  Outcome: Progressing Towards Goal  8/29/2020 2059 by Phyllis Marquis RN  Outcome: Progressing Towards Goal  Goal: *Optimal pain control at patient's stated goal  8/29/2020 2059 by Phyllis Marquis RN  Outcome: Progressing Towards Goal  8/29/2020 2059 by Phyllis Marquis RN  Outcome: Progressing Towards Goal  Goal: *Identifies cardiac risk factors  8/29/2020 2059 by Phyllis Marquis RN  Outcome: Progressing Towards Goal  8/29/2020 2059 by Phyllis Marquis RN  Outcome: Progressing Towards Goal  Goal: *Verbalizes home exercise program, activity guidelines, cardiac precautions  8/29/2020 2059 by Phyllis Marquis RN  Outcome: Progressing Towards Goal  8/29/2020 2059 by Lauren Dao RN  Outcome: Progressing Towards Goal  Goal: *Verbalizes understanding and describes prescribed diet  8/29/2020 2059 by Lauren Dao RN  Outcome: Progressing Towards Goal  8/29/2020 2059 by Lauren Dao RN  Outcome: Progressing Towards Goal  Goal: *Verbalizes understanding and describes medication purposes and frequencies  8/29/2020 2059 by Lauren Dao RN  Outcome: Progressing Towards Goal  8/29/2020 2059 by Lauren Dao RN  Outcome: Progressing Towards Goal  Goal: *Anxiety reduced or absent  8/29/2020 2059 by Lauren Dao RN  Outcome: Progressing Towards Goal  8/29/2020 2059 by Lauren Dao RN  Outcome: Progressing Towards Goal  Goal: *Understands and describes signs and symptoms to report to providers(Stroke Metric)  8/29/2020 2059 by Lauren Dao RN  Outcome: Progressing Towards Goal  8/29/2020 2059 by Lauren Dao RN  Outcome: Progressing Towards Goal  Goal: *Describes follow-up/return visits to physicians  8/29/2020 2059 by Lauren Dao RN  Outcome: Progressing Towards Goal  8/29/2020 2059 by Lauren Dao RN  Outcome: Progressing Towards Goal  Goal: *Describes available resources and support systems  8/29/2020 2059 by Lauren Dao RN  Outcome: Progressing Towards Goal  8/29/2020 2059 by Lauren Dao RN  Outcome: Progressing Towards Goal  Goal: *Influenza immunization  Outcome: Progressing Towards Goal  Goal: *Pneumococcal immunization  Outcome: Progressing Towards Goal  Goal: *Describes smoking cessation resources  Outcome: Progressing Towards Goal     Problem: Pain  Goal: *Control of Pain  8/29/2020 2059 by Lauren Dao RN  Outcome: Progressing Towards Goal  8/29/2020 2059 by Lauren Dao RN  Outcome: Progressing Towards Goal     Problem: Patient Education: Go to Patient Education Activity  Goal: Patient/Family Education  8/29/2020 2059 by Lauren Dao RN  Outcome: Progressing Towards Goal  8/29/2020 2059 by Lanny Skinner RN  Outcome: Progressing Towards Goal     Problem: Falls - Risk of  Goal: *Absence of Falls  Description: Document Perez Josue Fall Risk and appropriate interventions in the flowsheet. 8/29/2020 2059 by Lanny Skinner RN  Outcome: Progressing Towards Goal  Note: Fall Risk Interventions:  Mobility Interventions: Bed/chair exit alarm, Patient to call before getting OOB, Strengthening exercises (ROM-active/passive)         Medication Interventions: Evaluate medications/consider consulting pharmacy, Patient to call before getting OOB    Elimination Interventions: Call light in reach, Bed/chair exit alarm, Toileting schedule/hourly rounds           8/29/2020 2059 by Lanny Skinner RN  Outcome: Progressing Towards Goal  Note: Fall Risk Interventions:  Mobility Interventions: Bed/chair exit alarm, Patient to call before getting OOB, Strengthening exercises (ROM-active/passive)         Medication Interventions: Evaluate medications/consider consulting pharmacy, Patient to call before getting OOB    Elimination Interventions: Call light in reach, Bed/chair exit alarm, Toileting schedule/hourly rounds              Problem: Patient Education: Go to Patient Education Activity  Goal: Patient/Family Education  8/29/2020 2059 by Lanny Skinner RN  Outcome: Progressing Towards Goal  8/29/2020 2059 by Lanny Skinner RN  Outcome: Progressing Towards Goal     Problem: Impaired Skin Integrity/Pressure Injury Treatment  Goal: *Improvement of Existing Pressure Injury  8/29/2020 2059 by Lanny Skinner RN  Outcome: Progressing Towards Goal  8/29/2020 2059 by Lanny Skinner RN  Outcome: Progressing Towards Goal  Goal: *Prevention of pressure injury  Description: Document Sae Scale and appropriate interventions in the flowsheet.   8/29/2020 2059 by Lanny Skinner RN  Outcome: Progressing Towards Goal  Note: Pressure Injury Interventions:       Moisture Interventions: Absorbent underpads    Activity Interventions: Pressure redistribution bed/mattress(bed type)    Mobility Interventions: HOB 30 degrees or less, Pressure redistribution bed/mattress (bed type)    Nutrition Interventions: Document food/fluid/supplement intake, Offer support with meals,snacks and hydration    Friction and Shear Interventions: HOB 30 degrees or less             8/29/2020 2059 by Piero Castro RN  Outcome: Progressing Towards Goal     Problem: Patient Education: Go to Patient Education Activity  Goal: Patient/Family Education  8/29/2020 2059 by Piero Castro RN  Outcome: Progressing Towards Goal  8/29/2020 2059 by Piero Castro RN  Outcome: Progressing Towards Goal     Problem: Pressure Injury - Risk of  Goal: *Prevention of pressure injury  Description: Document Sae Scale and appropriate interventions in the flowsheet.   8/29/2020 2059 by Piero Castro RN  Outcome: Progressing Towards Goal  Note: Pressure Injury Interventions:       Moisture Interventions: Absorbent underpads    Activity Interventions: Pressure redistribution bed/mattress(bed type)    Mobility Interventions: HOB 30 degrees or less, Pressure redistribution bed/mattress (bed type)    Nutrition Interventions: Document food/fluid/supplement intake, Offer support with meals,snacks and hydration    Friction and Shear Interventions: HOB 30 degrees or less             8/29/2020 2059 by Piero Castro RN  Outcome: Progressing Towards Goal     Problem: Patient Education: Go to Patient Education Activity  Goal: Patient/Family Education  8/29/2020 2059 by Piero Castro RN  Outcome: Progressing Towards Goal  8/29/2020 2059 by Piero Castro RN  Outcome: Progressing Towards Goal

## 2020-08-30 NOTE — PROGRESS NOTES
Problem: Discharge Planning  Goal: *Discharge to safe environment  Outcome: Resolved/Met     Home    Noted orders for discharge, no services ordered. Provided pt with [employer] ridkunal, $8.99. Arabella PoonRN,ext [id number]. Care Management Interventions  PCP Verified by CM: Yes(Dr. Janell Marie)  Last Visit to PCP: 08/19/20  Palliative Care Criteria Met (RRAT>21 & CHF Dx)?: No  Mode of Transport at Discharge: Other (see comment)(Lyft ride )  Transition of Care Consult (CM Consult): Discharge Planning  Discharge Durable Medical Equipment: No  Physical Therapy Consult: No  Occupational Therapy Consult: No  Speech Therapy Consult: No  Current Support Network:  Other(son lives with pt)  Confirm Follow Up Transport: Self  Discharge Location  Discharge Placement: Home

## 2020-08-30 NOTE — DISCHARGE SUMMARY
Discharge Summary      [facility] service    Patient ID:  Lillian Reese, 79 y.o., female  : 1940    Admit Date: 2020  Discharge Date:  2020  Length of stay: 2 day(s)    PCP:  Joyce Vásquez MD    Chief Complaint   Patient presents with    Chest Pain     Discharge Diagnosis:     Hospital Problems  Date Reviewed: 2020          Codes Class Noted POA    UTI (urinary tract infection) ICD-10-CM: N39.0  ICD-9-CM: 599.0  2020 Yes        * (Principal) Atrial fibrillation with RVR ([facility] [address] ICD-10-CM: I48.91  ICD-9-CM: 427.31  3/4/2018 Yes        Type 2 diabetes mellitus with nephropathy ([facility] ICD-10-CM: E11.21  ICD-9-CM: 250.40, 583.81  2018 Yes        Hypothyroid ICD-10-CM: E03.9  ICD-9-CM: 244.9  2014 Yes              Discharge instructions:    #  Discharge Diet:            Diet: Resume previous diet  # Discharge activity and restrictions: Activity as tolerated    # Follow-up appointments: Follow-up Information     Follow up With Specialties Details Why Contact Info    Joyce Vásquez MD Family Medicine, Internal Medicine In 1 week  [address] [address]  [phone]        In 2 weeks cardiology clinic         keep your surgery appointment tomorrow      Manny Morgan MD Infectious Disease, Internal Medicine Schedule an appointment as soon as possible for a visit [phone] Ext [phone]  [facility] [address]  [address] [facility]  [phone]              HPI on Admission (per admitting physician): Lillian Reese is a 79y.o. year old female who presents with  Chest pain that started this afternoon, mid chest, pressure. Took pulse and noted it was high. She has hx of afib on eliquis. In ed was in 180 adenosine did not work , started on cardizem. BP now soft but HR now 100-110. Mag found to be 0.5. For further details and initial management please refer to H/P. Hospital Course:      Patient feel good.  Continue to be in SR and cardiology signed off. She started on po amiodarone . Will replete Ca before discharge. Has appointment with her surgery clinic tomorrow for her fistula problem. By Problems :      #  Atrial fib RVR. Converted to SR. Started on Amiodarone po per her cardiologist. TTE  . Off cardizem ggt. Continue Lopressor. TSH ok      #   Hypomag   likely reason for afib, replete and trend     #  UTI . 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 Dc CFTX and resume po LVQ that she discharged on and should be on till see her surgeon on Monday.       #  DM. Provide SSI, hypoglycemia protocol and frequent Accu checks. Provide SSI, hypoglycemia protocol and frequent Accu checks. Education,  diabetic educator  . Diabetic Diet      #  Coagulopathy. INR elevated on Eliquis. Monitor INR.     #  COLO-VESICAL FISTULA . Appointment on coming Monday at 11 am      Discharge condition:  improved and stable    Disposition:  Home    Procedures:   * No surgery found *      Consultants:   IP CONSULT TO HOSPITALIST  IP CONSULT TO CARDIOLOGY    Physical Exam on Discharge:  Visit Vitals  /68   Pulse 83   Temp 98.9 °F (37.2 °C)   Resp 20   Ht 5' (1.524 m)   Wt 61.7 kg (136 lb)   SpO2 98%   Breastfeeding No   BMI 26.56 kg/m²   Gen:  Appear stated age, Well-developed,  in no acute distress  HEENT:  Head atraumatic, normocephalic , hearing intact to voice, moist mucous membranes. Neck:   Trachea midline , No apparent JVD, Supple, without masses, thyroid non-tender  Resp:  No accessory muscle use,Bilateral BS present, clear breath sounds without wheezes rales or rhonchi  Card:  No murmurs, normal S1, S2 without Gallop . No Significant lower leg peripheral edema. Abd: no  abdomen tenderness .   Soft  non-distended, bowel sounds are present . Musc:  No cyanosis or clubbing. Skin:  No rashes or ulcers, skin turgor is good. Neuro:  Cranial nerves are grossly intact, no clear area of focal motor weakness, follows commands appropriately.   Psych: oriented to person, place and time, alert. Hospitalization and discharge instructions d/c in details with : patient , cardiologist,  Nursing/CM     Discharge medications :  Current Discharge Medication List      START taking these medications    Details   amiodarone (CORDARONE) 200 mg tablet Take 1 Tab by mouth daily for 30 days. Qty: 30 Tab, Refills: 0         CONTINUE these medications which have NOT CHANGED    Details   metoprolol tartrate (LOPRESSOR) 25 mg tablet Take 0.5 Tabs by mouth every twelve (12) hours. Qty: 90 Tab, Refills: 3      Lactobacillus Acidoph & Bulgar (FLORANEX) 1 million cell tab tablet Take 2 Tabs by mouth two (2) times a day for 30 days. Qty: 120 Tab, Refills: 0      levoFLOXacin (LEVAQUIN) 250 mg tablet Take 1 Tab by mouth every twenty-four (24) hours for 15 days. Qty: 15 Tab, Refills: 0      levothyroxine (SYNTHROID) 75 mcg tablet TAKE 1 TABLET BY MOUTH EVERY DAY BEFORE BREAKFAST  Qty: 90 Tab, Refills: 0    Associated Diagnoses: Hypothyroidism due to acquired atrophy of thyroid      metFORMIN (GLUCOPHAGE) 1,000 mg tablet TAKE 1 TABLET BY MOUTH TWICE DAILY  Qty: 180 Tab, Refills: 0      simvastatin (ZOCOR) 20 mg tablet TAKE 1 TABLET BY MOUTH EVERY NIGHT  Qty: 90 Tab, Refills: 0    Associated Diagnoses: Type 2 diabetes mellitus with nephropathy (HCC)      betamethasone dipropionate (DIPROSONE) 0.05 % topical cream betamethasone dipropionate 0.05 % topical cream  Qty: 15 g, Refills: 1      pantoprazole (PROTONIX) 40 mg tablet TAKE 1 TABLET BY MOUTH DAILY  Qty: 90 Tab, Refills: 3      nystatin (MYCOSTATIN) 100,000 unit/gram ointment Apply  to affected area two (2) times a day. Qty: 15 g, Refills: 0    Associated Diagnoses: Candidal dermatitis      dicyclomine (BentyL) 10 mg capsule Take 2 Caps by mouth three (3) times daily as needed for Abdominal Cramps.   Qty: 180 Cap, Refills: 11    Associated Diagnoses: Irritable bowel syndrome with diarrhea      triamcinolone acetonide (KENALOG) 0.1 % topical cream APPLY THIN LAYER EXTERNALLY TO THE AFFECTED AREA TWICE DAILY  Qty: 45 g, Refills: 1    Associated Diagnoses: Eczema, unspecified type      apixaban (Eliquis) 5 mg tablet TAKE 1 TABLET BY MOUTH TWICE DAILY  Qty: 60 Tab, Refills: 6      cranberry 500 mg capsule Take 500 mg by mouth daily. glucose blood VI test strips (FREESTYLE LITE STRIPS) strip use to check BS once daily      ondansetron hcl (ZOFRAN) 4 mg tablet Take 1 Tab by mouth every eight (8) hours as needed for Nausea. Qty: 30 Tab, Refills: 1      cholecalciferol (VITAMIN D3) 1,000 unit tablet Take 2 Tabs by mouth daily. Qty: 60 Tab, Refills: 0         STOP taking these medications       diphenoxylate-atropine (LOMOTIL) 2.5-0.025 mg per tablet Comments:   Reason for Stopping:         diphenhydrAMINE (BENADRYL) 25 mg capsule Comments:   Reason for Stopping:                   Most Recent Labs:       Recent Labs     08/30/20  0345 08/29/20  0407 08/28/20  1740   WBC 6.2 6.4 11.0   HGB 9.5* 8.9* 10.4*   HCT 29.4* 27.6* 31.7*    219 255     Recent Labs     08/30/20  0345 08/29/20  1216 08/29/20  0407 08/28/20  1740     --  142 137   K 4.0  --  3.5 3.9     --  113* 106   CO2 24  --  20* 22   GLU 95  --  86 133*   BUN 14  --  12 14   CREA 0.68  --  0.60 0.96   CA 7.2*  --  5.5* 6.8*   MG 1.6 1.7 1.0* 0.5*   PHOS 2.3*  --  2.4*  --    INR 1.6*  --  2.0* 2.0*     Lab Results   Component Value Date/Time    Glucose (POC) 94 08/30/2020 07:02 AM    Glucose (POC) 155 (H) 08/29/2020 08:55 PM    Glucose (POC) 153 (H) 08/29/2020 04:19 PM    Glucose (POC) 142 (H) 08/29/2020 11:27 AM    Glucose (POC) 130 (H) 08/29/2020 07:43 AM    Glucose,  (H) 02/08/2019 12:15 AM     No results for input(s): PH, PCO2, PO2, HCO3, FIO2 in the last 72 hours.   Recent Labs     08/30/20  0345 08/29/20  0407 08/28/20  1740   INR 1.6* 2.0* 2.0*       Lab Results   Component Value Date/Time    Specimen Description: SPUTUM 11/18/2009 04:05 AM    Specimen Description: STOOL 11/17/2009 07:00 PM    Specimen Description: STOOL 11/17/2009 07:00 PM     Lab Results   Component Value Date/Time    Culture result: GRAM NEGATIVE RODS (A) 08/27/2020 08:45 AM    Culture result: No growth (<1,000 CFU/ML) 08/20/2020 07:16 AM    Culture result: MIXED UROGENITAL DIXON ISOLATED 08/13/2020 05:45 PM       All Cardiac Markers in the last 24 hours: No results found for: CPK, CK, CKMMB, CKMB, RCK3, CKMBT, CKNDX, CKND1, LUIS A, TROPT, TROIQ, SHELLEY, TROPT, TNIPOC, BNP, BNPP    XR Results:  Results from Hospital Encounter encounter on 08/28/20   XR CHEST PORT    Narrative EXAM: XR CHEST PORT    CLINICAL INDICATION/HISTORY: chest pain  -Additional: None    COMPARISON: 8/13/2020    TECHNIQUE: Portable frontal view of the chest    _______________    FINDINGS:    SUPPORT DEVICES: None. HEART AND MEDIASTINUM: Stable appearing cardiac size and mediastinal contours    LUNGS AND PLEURAL SPACES: No focal pneumonic opacity, pneumothorax, or pleural  effusion    BONY THORAX AND SOFT TISSUES: Degenerative changes across the shoulder girdles,  unchanged. _______________      Impression IMPRESSION:    No acute radiographic cardiopulmonary abnormality. CT Results:  Results from Hospital Encounter encounter on 08/13/20   CT ABD PELV W CONT    Narrative EXAM:CT abdomen pelvis with contrast    CLINICAL INDICATION/HISTORY: Previous colovesical fistula, urinary frequency,  fever, nausea    COMPARISON: 2/29/2020. TECHNIQUE: Standard helical CT performed through the abdomen and pelvis with IV  contrast.  Coronal and sagittal reformations were generated. One or more dose  reduction techniques were used on this CT: automated exposure control,  adjustment of the mAs and/or kVp according to patient's size, and iterative  reconstruction techniques. The specific techniques utilized on this CT exam have  been documented in the patient's electronic medical record.   Digital imaging and  communications and medicine (DICOM) format image data are available to  nonaffiliated external healthcare facilities or entities on a secure, media  free, reciprocally searchable basis with patient authorization for at least a 12  month period after this study. _______________    FINDINGS:    INFERIOR THORAX: Normal.    LIVER/BILIARY: No suspicious liver lesion. No biliary dilation. Nondistended but  unremarkable. Previously visualized small gallstones not visualized. SPLEEN: Normal.    PANCREAS: Normal.    ADRENALS: Normal.    KIDNEYS: Stable 3.9 cm left renal cyst and tiny left upper pole renal cyst with  no new renal mass calculus or obstruction. LYMPH NODES: Several stable subcentimeter likely benign retroperitoneal lymph  nodes, no new adenopathy. GI TRACT: Persistent diffuse distal colonic diverticulosis with chronic proximal  sigmoid wall thickening. There is no new bowel dilatation or wall thickening. There is slight interval increased size of persistent air and fluid and likely  stool collection extraluminal, contiguous with and inferior to this portion of  thickened sigmoid colon, contiguous with and inseparable from the fundus of the  bladder with associated bladder compression inferiorly. This now measures  maximally 5.3 cm transversely compared to 4.4 cm previously, and measures  maximally 4.3 cm in cephalocaudad dimension, compared to 3.8 cm previously. There are several small foci of air within the compressed bladder lumen. There  is also heterogeneous soft tissue inferior that appears within the bladder  lumen. There is persistent pericystic stranding. No free air. VASCULATURE: Mild atherosclerotic changes with no evidence of aortic aneurysm. PELVIC ORGANS: Uterus unremarkable. No significant change in 3.6 cm likely left  ovarian cyst with thin wall and additional stable small right adnexal cyst.    OTHER: No aggressive appearing osseous lesion. Pronounced multilevel  degenerative lumbar spondylosis.  Previous ORIF left proximal femur.    _______________      Impression IMPRESSION:    1. Chronic changes of proximal sigmoid diverticulitis with slight interval  increased size of extraluminal air-fluid collection below the sigmoid colon and  contiguous with and displacing inferiorly the fundus of the bladder. Small foci  of air within bladder lumen. As mentioned previously this is suggestive of  colovesical fistula with associated chronic abscess. 2. Heterogeneous soft tissue that appears within the lumen of the compressed  bladder which may represent internal debris although bladder mass not excluded. Persistent pericystic stranding suggestive of cystitis. If not previously  performed, cystoscopy may be useful. 3. No other new acute abdominal or pelvic CT finding. 4. Stable bilateral ovarian cysts left greater than right. MRI Results:  Results from [place] encounter on 09/03/19   MRI BRAIN WO CONT    Narrative EXAM: MRI BRAIN W/O CONTRAST    INDICATION: Meningoma    COMPARISON: No prior MR study, noncontrast CT head 7/11/2017    TECHNIQUE: Multiplanar multi sequence MR imaging of the brain was performed  utilizing axial T2, FLAIR, diffusion, coronal T2, and multiplanar T1 pre  contrast imaging . Additional dedicated susceptibility weighted imaging was  performed including MIP and filtered phase reconstruction images. No gadolinium was administered due to patient refusal.    Imaging performed on wide bore Discovery [id number] GEM suite 3T magnet at [facility] [place] [facility]\"".     _______________________    FINDINGS:     VENTRICLES/EXTRA-AXIAL SPACES: The ventricles and sulci are mildly enlarged  consistent with diffuse volume loss. Mild amount of T2/FLAIR hyperintense white  matter lesions are seen within the periventricular and subcortical white matter  , which are nonspecific, but likely represent chronic small vessel changes.     There is a anterolateral left frontal extra-axial lesion which appears to be  dural based. This lesion shows increased T1 precontrast imaging  hypointense/intermediate T2 signal with prominent susceptibility artifact which  is inseparable from the inner table of the calvarium. There is an additional  more rounded component along its inner aspect which is more T1 precontrast and  T2 isointense to cortex, such as axial image 25 and coronal image 36, which does  not have the same degree of susceptibility artifact. Review of prior CT shows  dense plaque-like calcifications corresponding to the component of  susceptibility artifact and low level calcification with increased attenuation  along its medial aspect of the 2017 CT. These findings are very suspicious for  partially calcified meningioma. The overall size, including the densely  calcified base is estimated 2.3 x 1.6 x 2.2 cm as measured on coronal image 9  and axial image 7. Corresponding measurements on prior CT are estimated 2.3 x  1.5 x 2.0 cm, although slightly different imaging planes. The medial more soft  tissue partially calcified component has estimated size of 11 mm in diameter,  which appears increased from previous 2017 study of estimated 9 mm. There is contact of the adjacent left frontal lobe parenchyma, without evidence  of mass effect or parenchymal edema. No convincing additional dural based lesion is seen, although the lack of  gadolinium administration does limit evaluation. BRAIN PARENCHYMA:  No evidence of intracranial mass effect, midline shift, or herniation. No abnormal restricted diffusion to suggest acute infarct. No susceptibility artifact to suggest intraparenchymal hemorrhage. VASCULATURE:  The major intracranial vascular flow voids are grossly normal.    ORBITS: The visualized orbits are unremarkable. PARANASAL SINUSES: Minimal mucoperiosteal thickening is present  Antra. No air-fluid levels are present. MASTOID AIR CELLS: Visualized mastoid air cells are clear.     OSSEOUS STRUCTURES: Very mild degenerative changes are seen in the visualized  upper cervical spine. OTHER: None.      ________________________      Impression IMPRESSION:    1. No acute infarct, hemorrhage, mass effect, or herniation. 2.  Findings suggesting partially calcified anterolateral left frontal  meningioma with densely calcified base versus adjacent calvarial hyperostosis. No significant mass effect or evidence of edema. This lesion, particularly its  more partially calcified soft tissue component, appears to slightly increased  since 7/11/2017 CT. 3. Grossly stable mild nonspecific white matter disease likely representing  chronic small vessel changes. Nuclear Medicine Results:  Results from [place] encounter on 11/11/09    [facility] [place]    Narrative Ordering MD: lBu Canada MD  Interpreted By: Ana Campos MD  ** FINAL **  ---------------------------------------------------------------------  INTERPRETATION:  GALLIUM SCAN, WHOLE BODY FOR INFECTION LOCALIZATION  INDICATION: Fever , sepsis  COMPARISON: None  DESCRIPTION: Following IV administration of 9.68 mCi 67Ga-citrate,   approximately 24 hour, 48 hour, and 72 hour delayed planar images of   the body from the head to mid thigh were obtained in anterior and   posterior projections. There is moderately intense radiopharmaceutical activity in the   transverse colon on the 24-hour images, which also persists on the   more delayed images including the 72 hour images, more prominent   than typically seen. Activity in the pelvis is also likely within   bowel. Expected biodistribution is demonstrated elsewhere. IMPRESSION:   1. Moderately intense, more than usual, radiotracer activity seen   in the transverse colon, persisting on delayed images, may represent   a nonspecific colitis. CT correlation may provide further   characterization as clinically warranted.           US Results:  Results from Hospital Encounter encounter on 09/09/19   US RETROPERITONEUM COMP    Narrative EXAM: Complete retroperitoneal ultrasound    INDICATION: Microscopic hematuria. COMPARISON: Correlation CT abdomen and pelvis 2/8/2019.    _______________    FINDINGS:    RIGHT KIDNEY: 10.0 cm in length. No hydronephrosis or renal mass. No visible  calculi. Mild diffuse increased renal parenchymal echogenicity. LEFT KIDNEY: 10.9 cm in length. No hydronephrosis or renal mass. No visible  calculi. Mild diffuse increased renal parenchymal echogenicity. 3.9 cm  benign-appearing midpole cyst.    BLADDER: 2.6 cm echogenic focus adjacent to bladder wall with suggestion of some  posterior shadowing. Limited evaluation bladder lumen and wall due to  nondistended bladder, with bladder wall measuring 7 mm. . Ureteral jets not  visualized. . Pre-void volume estimated at 69 ml. Post-void volume estimated at  36 ml. OTHER: Mild to moderate left-sided pleural effusion. _______________      Impression IMPRESSION:    1. 2.6 cm likely bladder calculus. Limited evaluation of the bladder due to  nondistention, questionable bladder wall thickening exaggerated by nondistended  bladder, cannot exclude cystitis. 2. Benign-appearing left renal cyst. No solid renal mass. Mild bilateral renal  parenchymal echogenicity suggestive of chronic medical renal disease. 3. Mild to moderate left-sided pleural effusion. Further evaluation with PA and  lateral chest suggested. IR Results:  Results from Hospital Encounter encounter on 11/11/09   IR PICC LINE    Narrative Ordering MD: Ash Godinez MD  Interpreted By: Eliud Boyer 32  ** FINAL **  ---------------------------------------------------------------------  INTERPRETATION:     PICC inserted by nursing team.  Please see the patient's medical   record for description of the procedure. Please see the chest x-ray   report for tip location. IMPRESSION:     Administrative report. VAS/US Results:  No results found for this or any previous visit. Total discharge time 35 minutes of which more than 50 % spent on counseling and coordination of care. This document in whole or part of it has been produced using voice recognition software. Unrecognized errors in transcription may be present. Sowmya Reno MD  Hospitalist  [facility]. medical group. Hospitalist Division.   August 30, 2020  10:25 AM

## 2020-08-30 NOTE — PROGRESS NOTES
[id number]-  Bedside and Verbal shift change report given to Ramirez Dacosta RN (oncoming nurse) by Jesus Cantrell RN (offgoing nurse). Report included the following information SBAR, Kardex, Intake/Output, MAR and Recent Results.

## 2020-08-30 NOTE — PROGRESS NOTES
[facility] - Consult Note    Consultation request by Heidi Bolanos MD for advice/opinion related to evaluating Atrial fibrillation with RVR [facility]) [I48.91]    Date of  Admission: 8/28/2020  5:11 PM   Primary Care Physician:  Brandon Seals MD    Subjective:  Denies any chest pain. Denies any palpitation, presyncope or syncope. Would like to go home     Assessment:      -A.fib/flutter with RVR with h/o paroxysmal A.fib/flutter on eliquis, metoprolol  as outpatient  -h/o moderate aortic stenosis with mean gradient 31 mmHg 11/2019, EF 65%  -Chest pain likely from a.fib w/RVR. No evidence of MI. 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 Found to have atrial fibrillation with rapid ventricle response. Was started on IV Cardizem and stopped. Now in sinus rhythm    -continue amiodarone. Discussed with patient that continue that medication as outpatient  -Continue use oral metoprolol  -Continue Eliquis and rest of the cardiac medication  -Echo with normal EF and known mild-moderate AS  No further cardiac work up planned at this time unless clinical status changes. Call us back if needed. Will be available as needed. Will need to follow up in cardiology clinic in 2-3 weeks after discharge. Thank you. History of Present Illness: This is a 79 y.o. female admitted for Atrial fibrillation with RVR ([facility] [address] [I48.91]. Patient complains of:    67-year-old female with known moderate left ear, atrial fibrillation, paroxysmal, hypertension. Came to the hospital with sudden onset of dizziness fluttering and some chest pressure.   She was found to have atrial fibrillation with rapid ventricle response in the hospital.  She was started on IV Cardizem drip. Cardiology consultation obtained. This morning patient feels back to normal.  No chest pain. She has converted back to sinus rhythm. She does not remember taking amiodarone as outpatient. She takes her metoprolol and Eliquis. Cardiac risk factors: none      Review of Symptoms:  Except as stated above include:  Constitutional:  negative  Respiratory:  negative  Cardiovascular:  negative  Gastrointestinal: negative  Genitourinary:  negative  Musculoskeletal:  Negative  Neurological:  Negative  Dermatological:  Negative  Endocrinological: Negative  Psychological:  Negative    A comprehensive review of systems was negative except for that written in the HPI. Past Medical History:     Past Medical History:   Diagnosis Date    Aortic stenosis     Patient denies on 10/31/2019.  Arthritis     Atrial flutter ([address]     Bladder stone     Bronchitis     Resolved after getting rid of her pet bird in 2000. 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     Vesicocolic fistula          Social History:     Social History     Socioeconomic History    Marital status: SINGLE     Spouse name: Not on file    Number of children: Not on file    Years of education: Not on file    Highest education level: Not on file   Tobacco Use    Smoking status: Never Smoker    Smokeless tobacco: Never Used   Substance and Sexual Activity    Alcohol use: No    Drug use: No    Sexual activity: Not Currently     Partners: Male   Other Topics Concern        Family History:     Family History   Problem Relation Age of Onset    Diabetes Mother     Coronary Artery Disease Mother     Cancer Father         melanoma    Stroke Sister     Hypertension Sister     Diabetes Sister     Diabetes Brother     Coronary Artery Disease Brother     Breast Cancer Paternal Grandmother         older, but not sure age.  Breast Cancer Paternal Aunt         and gyn cancer ?age   [facility] No Known Problems Sister     No Known Problems Brother     Kidney Disease Sister     Heart Failure Sister         Medications:      Allergies   Allergen Reactions    Metronidazole Hives and Itching    Ampicillin Itching        Current Facility-Administered Medications   Medication Dose Route Frequency    calcium gluconate 2 g in 0.9% sodium chloride 100 mL IVPB  2 g IntraVENous ONCE    apixaban (ELIQUIS) tablet 5 mg  5 mg Oral BID    levothyroxine (SYNTHROID) tablet 75 mcg  75 mcg Oral 6am    metoprolol tartrate (LOPRESSOR) tablet 12.5 mg  12.5 mg Oral Q12H    pantoprazole (PROTONIX) tablet 40 mg  40 mg Oral ACB    atorvastatin (LIPITOR) tablet 10 mg  10 mg Oral QHS    amiodarone (CORDARONE) tablet 200 mg  200 mg Oral DAILY    levoFLOXacin (LEVAQUIN) tablet 250 mg  250 mg Oral Q24H    insulin lispro (HUMALOG) injection   SubCUTAneous AC&HS    glucose chewable tablet 16 g  4 Tab Oral PRN    glucagon (GLUCAGEN) injection 1 mg  1 mg IntraMUSCular PRN    dextrose (D50W) injection syrg 12.5-25 g  25-50 mL IntraVENous PRN    sodium chloride (NS) flush 5-40 mL  5-40 mL IntraVENous Q8H    sodium chloride (NS) flush 5-40 mL  5-40 mL IntraVENous PRN    acetaminophen (TYLENOL) tablet 650 mg  650 mg Oral Q6H PRN    Or    acetaminophen (TYLENOL) suppository 650 mg  650 mg Rectal Q6H PRN    polyethylene glycol (MIRALAX) packet 17 g  17 g Oral DAILY PRN    promethazine (PHENERGAN) tablet 12.5 mg  12.5 mg Oral Q6H PRN    Or    ondansetron (ZOFRAN) injection 4 mg  4 mg IntraVENous Q6H PRN         Physical Exam:     Visit Vitals  /68   Pulse 83   Temp 98.9 °F (37.2 °C)   Resp 20   Ht 5' (1.524 m)   Wt 136 lb (61.7 kg)   SpO2 98%   Breastfeeding No   BMI 26.56 kg/m²     BP Readings from Last 3 Encounters:   08/30/20 140/68   08/27/20 145/88   08/20/20 142/66     Pulse Readings from Last 3 Encounters:   08/30/20 83   08/27/20 85   08/20/20 72     Wt Readings from Last 3 Encounters:   08/30/20 136 lb (61.7 kg)   08/27/20 137 lb (62.1 kg)   08/20/20 137 lb (62.1 kg)       General:  alert, cooperative, no distress, appears stated age  Neck:  no carotid bruit, no JVD  Lungs:  clear to auscultation bilaterally  Heart:  regular rate and rhythm, S1, S2 normal, Aortic systolic murmur  Abdomen:  abdomen is soft without significant tenderness,   Extremities: No lower extremity edema  Skin: Warm and dry.  no hyperpigmentation, vitiligo, or suspicious lesions     Data Review:     Recent Labs     08/30/20  0345 08/29/20  0407 08/28/20  1740   WBC 6.2 6.4 11.0   HGB 9.5* 8.9* 10.4*   HCT 29.4* 27.6* 31.7*    219 255     Recent Labs     08/30/20  0345 08/29/20  1216 08/29/20  0407 08/28/20  1740     --  142 137   K 4.0  --  3.5 3.9     --  113* 106   CO2 24  --  20* 22   GLU 95  --  86 133*   BUN 14  --  12 14   CREA 0.68  --  0.60 0.96   CA 7.2*  --  5.5* 6.8*   MG 1.6 1.7 1.0* 0.5*   PHOS 2.3*  --  2.4*  --    INR 1.6*  --  2.0* 2.0*       Results for orders placed or performed during the hospital encounter of 08/13/20   EKG, 12 LEAD, INITIAL   Result Value Ref Range    Ventricular Rate 98 BPM    Atrial Rate 98 BPM    P-R Interval 122 ms    QRS Duration 66 ms    Q-T Interval 338 ms    QTC Calculation (Bezet) 431 ms    Calculated P Axis 37 degrees    Calculated R Axis 26 degrees    Calculated T Axis 37 degrees    Diagnosis       Normal sinus rhythm  Normal ECG  When compared with ECG of 15-MAR-2020 09:15,  No significant change was found  Confirmed by Saji Tyler ([id number]) on 8/14/2020 10:12:12 AM         All Cardiac Markers in the last 24 hours:    No results found for: CPK, CK, CKMMB, CKMB, RCK3, CKMBT, CKNDX, CKND1, LUIS A, TROPT, TROIQ, SHELLEY, TROPT, TNIPOC, BNP, BNPP    Last Lipid:    Lab Results   Component Value Date/Time    Cholesterol, total 143 10/10/2018 02:17 PM    HDL Cholesterol 47 10/10/2018 02:17 PM    LDL, calculated 67.6 10/10/2018 02:17 PM    Triglyceride 142 10/10/2018 02:17 PM    CHOL/HDL Ratio 3.0 10/10/2018 02:17 PM       Signed By: Gavi Salcedo MD     August 30, 2020

## 2020-08-31 ENCOUNTER — PATIENT OUTREACH (OUTPATIENT)
Dept: CASE MANAGEMENT | Age: 80
End: 2020-08-31

## 2020-08-31 LAB
ATRIAL RATE: 359 BPM
CALCULATED R AXIS, ECG10: 39 DEGREES
CALCULATED T AXIS, ECG11: -13 DEGREES
DIAGNOSIS, 93000: NORMAL
Q-T INTERVAL, ECG07: 294 MS
QRS DURATION, ECG06: 58 MS
QTC CALCULATION (BEZET), ECG08: 460 MS
VENTRICULAR RATE, ECG03: 147 BPM

## 2020-08-31 NOTE — PROGRESS NOTES
Patient was admitted to [facility] on  2020   and discharged on 2020 for:     * (Principal) Atrial fibrillation with RVR ([address]      Please see discharge summary of  2020 for discharge diagnoses and additional information. Patient was contacted within 1 business days of discharge. Top Discharge Challenges to be reviewed by the provider   Additional needs identified to be addressed with provider yes    Patient reports vaginal bleeding. When asked for further information patient reports that   \" it is the thing to be fixed, the same old s---\". \"It has been going on. \"     Patient encouraged  to follow up with physicians/ED as needed. Discussed COVID-19 related testing which was not done at this time. Method of communication with provider : chart routing       Advance Care Planning:   Does patient have an Advance Directive:  ACP appears to need patient signature      Inpatient Readmission Risk score: 96%    Was this a readmission? yes   Patient stated reason for the admission:   Patient did not specify. Patients top risk factors for readmission: medical condition  Interventions to address risk factors:   See goals please     Care Transition Nurse (CTN) contacted the patient by telephone to perform post hospital discharge assessment. Verified name and  with patient as identifiers. Provided introduction to self, and explanation of the CTN role. CTN reviewed discharge instructions, medical action plan and red flags with patient who verbalized understanding. Patient given an opportunity to ask questions and does not have any further questions or concerns at this time. The patient agrees to contact the PCP office for questions related to their healthcare. Medication reconciliation was performed with patient, who verbalizes understanding of administration of home medications. Referral to Pharm D needed: no   Will defer to PCP.       Home Health/Outpatient orders at discharge:   None noted at this time. [facility] company:   Date of initial visit:     [address] ordered at discharge:  None noted     [address]:   Durable Medical Equipment received:       Covid Risk Education    Patient has following risk factors of: diabetes. Education provided regarding infection prevention, and signs and symptoms of COVID-19 and when to seek medical attention with patient who verbalized understanding. Discussed exposure protocols and quarantine From CDC: Are you at higher risk for severe illness?  and given an opportunity for questions and concerns. The patient agrees to contact the COVID-19 hotline [phone] or PCP office for questions related to COVID-19. For more information on steps you can take to protect yourself, see CDC's How to Protect Yourself     Patient/family/caregiver given information for [employer] and agrees to enroll no  Patient's preferred e-mail: declines  Patient's preferred phone number: declines    Discussed follow-up appointments. If no appointment was previously scheduled, appointment scheduling offered: yes     Patient prefers to wait to make PCP follow up appointment at this time. [facility] follow up appointment(s):   Future Appointments   Date Time Provider Zuleika Ruvalcaba   9/2/2020 11:30 AM Panchito Cabral MD [address] [place] [address]   [facility]2020  1[facility]00 [facility]     [facility] follow up appointment(s):   Patient attended follow up appointment with surgeon today. Plan for follow-up call in 7-10 days based on severity of symptoms and risk factors. CTN provided contact information for future needs. Goals Addressed    None       Chart routed to PCP for review.

## 2020-09-02 ENCOUNTER — OFFICE VISIT (OUTPATIENT)
Dept: CARDIOLOGY CLINIC | Age: 80
End: 2020-09-02

## 2020-09-02 ENCOUNTER — TELEPHONE (OUTPATIENT)
Dept: FAMILY MEDICINE CLINIC | Age: 80
End: 2020-09-02

## 2020-09-02 VITALS
BODY MASS INDEX: 26.9 KG/M2 | TEMPERATURE: 98 F | WEIGHT: 137 LBS | HEART RATE: 77 BPM | SYSTOLIC BLOOD PRESSURE: 143 MMHG | DIASTOLIC BLOOD PRESSURE: 77 MMHG | OXYGEN SATURATION: 99 % | HEIGHT: 60 IN

## 2020-09-02 DIAGNOSIS — I10 ESSENTIAL HYPERTENSION WITH GOAL BLOOD PRESSURE LESS THAN 140/90: ICD-10-CM

## 2020-09-02 DIAGNOSIS — I48.0 PAF (PAROXYSMAL ATRIAL FIBRILLATION) (HCC): ICD-10-CM

## 2020-09-02 DIAGNOSIS — Z01.818 PRE-OP EVALUATION: Primary | ICD-10-CM

## 2020-09-02 DIAGNOSIS — N32.1 COLOVESICAL FISTULA: Primary | ICD-10-CM

## 2020-09-02 DIAGNOSIS — I35.0 AORTIC VALVE STENOSIS, ETIOLOGY OF CARDIAC VALVE DISEASE UNSPECIFIED: ICD-10-CM

## 2020-09-02 DIAGNOSIS — E78.00 PURE HYPERCHOLESTEROLEMIA: ICD-10-CM

## 2020-09-02 RX ORDER — LEVOFLOXACIN 250 MG/1
250 TABLET ORAL EVERY 24 HOURS
Qty: 15 TAB | Refills: 0 | Status: SHIPPED | OUTPATIENT
Start: 2020-09-02 | End: 2021-06-03 | Stop reason: SDUPTHER

## 2020-09-02 RX ORDER — FUROSEMIDE 40 MG/1
40 TABLET ORAL DAILY
COMMUNITY
End: 2020-12-01 | Stop reason: DRUGHIGH

## 2020-09-02 RX ORDER — CITALOPRAM 10 MG/1
TABLET ORAL DAILY
COMMUNITY
End: 2020-10-14

## 2020-09-02 NOTE — PROGRESS NOTES
Ms. Ksenia Bran is a 79 y.o. female with a history of atrial flutter, diabetes, hypertension, dyslipidemia and hypothyroidism. Ms. Ksenia Bran is here today for a follow up appointment. She recently was in the hospital with atrial fibrillation with rapid ventricular response. She was started on amiodarone again. She was converted back to sinus rhythm. She tells me that she has seen surgical team yesterday and she has been told that she needs to have surgery done  She denies any symptoms to suggest angina or heart failure. She denies any palpitations, presyncope or syncope. She is taking all her medications regularly. He claims that she is taking amiodarone as well  She has been diagnosed with colocolonic and colovesical fistula likely from inflammatory bowel disease or diverticulitis. She is seeing surgical team at [facility]  She has recurrent UTI. Past Medical History:   Diagnosis Date    Aortic stenosis     Patient denies on 10/31/2019.  Arthritis     Atrial flutter ([address]     Bladder stone     Bronchitis     Resolved after getting rid of her pet bird in 2000. 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  Vesicocolic fistula        Current Outpatient Medications   Medication Sig Dispense Refill    levoFLOXacin (LEVAQUIN) 250 mg tablet Take 1 Tab by mouth every twenty-four (24) hours for 15 days. 15 Tab 0    furosemide (Lasix) 40 mg tablet Take  by mouth daily.  citalopram (CeleXA) 10 mg tablet Take  by mouth daily.  metoprolol tartrate (LOPRESSOR) 25 mg tablet Take 0.5 Tabs by mouth every twelve (12) hours.  90 Tab 3    Lactobacillus Acidoph & Bulgar (FLORANEX) 1 million cell tab tablet Take 2 Tabs by mouth two (2) times a day for 30 days. 120 Tab 0    levothyroxine (SYNTHROID) 75 mcg tablet TAKE 1 TABLET BY MOUTH EVERY DAY BEFORE BREAKFAST 90 Tab 0    metFORMIN (GLUCOPHAGE) 1,000 mg tablet TAKE 1 TABLET BY MOUTH TWICE DAILY 180 Tab 0    simvastatin (ZOCOR) 20 mg tablet TAKE 1 TABLET BY MOUTH EVERY NIGHT 90 Tab 0    pantoprazole (PROTONIX) 40 mg tablet TAKE 1 TABLET BY MOUTH DAILY 90 Tab 3    dicyclomine (BentyL) 10 mg capsule Take 2 Caps by mouth three (3) times daily as needed for Abdominal Cramps. 180 Cap 11    triamcinolone acetonide (KENALOG) 0.1 % topical cream APPLY THIN LAYER EXTERNALLY TO THE AFFECTED AREA TWICE DAILY 45 g 1    apixaban (Eliquis) 5 mg tablet TAKE 1 TABLET BY MOUTH TWICE DAILY 60 Tab 6    glucose blood VI test strips (FREESTYLE LITE STRIPS) strip use to check BS once daily      ondansetron hcl (ZOFRAN) 4 mg tablet Take 1 Tab by mouth every eight (8) hours as needed for Nausea. 30 Tab 1    cholecalciferol (VITAMIN D3) 1,000 unit tablet Take 2 Tabs by mouth daily. 60 Tab 0    amiodarone (CORDARONE) 200 mg tablet Take 1 Tab by mouth daily for 30 days. 30 Tab 0     Allergies   Allergen Reactions    Metronidazole Hives and Itching    Ampicillin Itching     Past Medical History:   Diagnosis Date    Aortic stenosis     Patient denies on 10/31/2019.  Arthritis     Atrial flutter ([address]     Bladder stone     Bronchitis     Resolved after getting rid of her pet bird in 2000. 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     Vesicocolic fistula      Past Surgical History:   Procedure Laterality Date    COLONOSCOPY N/A 2/2/2017    COLONOSCOPY  w/bx polyp performed by Sharon Hollingsworth MD [address] Left 2017     Family History   Problem Relation Age of Onset    Diabetes Mother     Coronary Artery Disease Mother     Cancer Father         melanoma    Stroke Sister     Hypertension Sister     Diabetes Sister     Diabetes Brother     Coronary Artery Disease Brother     Breast Cancer Paternal Grandmother         older, but not sure age.  Breast Cancer Paternal Aunt         and gyn cancer ?age   [facility] No Known Problems Sister     No Known Problems Brother     Kidney Disease Sister     Heart Failure Sister      Social History     Tobacco Use   Smoking Status Never Smoker   Smokeless Tobacco Never Used        Objective:     Visit Vitals  /77   Pulse 77   Temp 98 °F (36.7 °C) (Temporal)   Ht 5' (1.524 m)   Wt 137 lb (62.1 kg)   SpO2 99%   BMI 26.76 kg/m²     General:  alert, cooperative, no distress   Chest Wall: inspection normal - no chest wall deformities or tenderness, respiratory effort normal   Lung: clear to auscultation bilaterally, no rales   Heart:  normal rate, regular rhythm, normal S1, S2, Mild 2/6 MADDI on aortic area. Abdomen: soft, non-tender. Bowel sounds normal. No masses,  no organomegaly   Extremities: edema trace bilateral LE's Skin: no rashes   Neuro: alert, oriented, normal speech, no focal findings or movement disorder noted     ECHO (08/20)  Left Ventricle  Normal cavity size, wall thickness and systolic function (ejection fraction normal). The estimated EF is 55 - 60%. There is mild (grade 1) left ventricular diastolic dysfunction. Wall Scoring  The left ventricular wall motion is normal.             Left Atrium  Normal cavity size. Right Ventricle  Normal cavity size, wall thickness and global systolic function. Assessment of RV function:   TAPSE = 21 mm. TAPSV = 13 mm/s. Right Atrium  Normal cavity size. Aortic Valve  There is leaflet calcification. Aortic valve peak gradient is 35 mmHg.  Aortic valve mean gradient is 20 mmHg. Aortic valve area is 1 cm2. Aortic valve peak velocity is 297 cm/s. There is moderate aortic stenosis. Aortic Valve Velocity Ratio 0.32. Mean gradient 21mm Hg, likely underestimate. Trace aortic valve regurgitation. Mitral Valve  No stenosis. Mitral valve non-specific thickening and thickening. There is mild anterior leaflet thickening diffusely. There is mild, posterior leaflet thickening diffusely. Mild regurgitation. Tricuspid Valve  Normal valve structure and no stenosis. Mild regurgitation. Pulmonic Valve  Pulmonic valve not well visualized. No stenosis and no regurgitation. Aorta  Normal aortic root, ascending aortic, and aortic arch. Pulmonary Artery  Pulmonary arterial systolic pressure (PASP) is 44 mmHg. Pulmonary hypertension found to be mild. Assessment/Plan:     Atrial flutter: In sinus on exam today, rate is controlled. Also on Eliquis. No bleeding side effects. Continue metoprolol and amiodarone    Hypertension: BP stable continue same    Dyslipidemia: continue with simvastatin, last LDL 67. Aortic stenosis: Mild-moderate by echo in 2018. Last echo with mean gradient of 31 in November 2019, moderate range. Last echo in August 2020, mean aortic valve gradient of 21 mmHg. Currently does not report any symptoms to suggest angina or heart failure. 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 She also has hematuria  She also has been diagnosed with possible bladder mass. She will require surgery  Currently patient does not report any unstable coronary symptoms or decompensated heart failure. There is no evidence of significant fluid overload on exam.  Today I had a very lengthy discussion with the patient and neighbor regarding her cardiovascular risk outcome during surgery.   Also I believe without surgery, she have a high chances of recurrent UTI and sepsis and adverse outcome  This is a difficult situation. Patient will be at least at intermediate risk for cardiac complication because of mild to moderate aortic stenosis. However I do not believe any further cardiac work-up is necessary at this time. She understands the potential risk. He has follow-up appointment with surgical team  Eliquis should be stopped at least 3 days prior to surgery. Rest of the cardiac medication should be continued as tolerated perioperatively    follow up in 6 months or sooner as symptoms dictate.

## 2020-09-02 NOTE — PROGRESS NOTES
Alejandro Serrato presents today for   Chief Complaint   Patient presents with   [id number] [facility] preferred language for health care discussion is english/other. Is someone accompanying this pt? Yes female friend    Is the patient using any DME equipment during [address]? Yes rollator    Depression Screening:  3 most recent PHQ Screens 9/2/2020   Little interest or pleasure in doing things Not at all   Feeling down, depressed, irritable, or hopeless Not at all   Total Score PHQ 2 0       Learning Assessment:  Learning Assessment 1/17/2019   PRIMARY LEARNER Patient   HIGHEST LEVEL OF EDUCATION - PRIMARY LEARNER  4 YEARS OF COLLEGE   BARRIERS PRIMARY LEARNER NONE   CO-LEARNER CAREGIVER No   PRIMARY LANGUAGE ENGLISH   LEARNER PREFERENCE PRIMARY LISTENING   ANSWERED BY patient   RELATIONSHIP SELF       Abuse Screening:  Abuse Screening Questionnaire 1/17/2019   Do you ever feel afraid of your partner? N   Are you in a relationship with someone who physically or mentally threatens you? N   Is it safe for you to go home? Y       Fall Risk  Fall Risk Assessment, last 12 mths 9/2/2020   Able to walk? Yes   Fall in past 12 months? No   Fall with injury? -   Number of falls in past 12 months -   Fall Risk Score -       Pt currently taking Anticoagulant therapy? no    Coordination of Care:  1. Have you been to the ER, urgent care clinic since your last visit? Hospitalized since your last visit? yes    2. Have you seen or consulted any other health care providers outside of the [address] [place] [address] since your last visit? Include any pap smears or colon screening.  no

## 2020-09-02 NOTE — TELEPHONE ENCOUNTER
Pt called in and informed me that she has an appt today with Dr. Shannan Seaman but she was wanting to know if Dr. Lindy Vargas could please put a refill in of her levoFLOXacin (LEVAQUIN) 250 mg tablet as well as the Lactobacillus Acidoph & Bulgar ([place]) 1 million cell tab tablet today if possible so she can make it to her surgery on September 16th. Please advise.

## 2020-09-03 DIAGNOSIS — L30.9 ECZEMA, UNSPECIFIED TYPE: ICD-10-CM

## 2020-09-03 RX ORDER — UREA 10 %
2 LOTION (ML) TOPICAL 2 TIMES DAILY
Qty: 120 TAB | Refills: 0 | Status: SHIPPED | OUTPATIENT
Start: 2020-09-03 | End: 2020-10-03

## 2020-09-03 RX ORDER — TRIAMCINOLONE ACETONIDE 1 MG/G
CREAM TOPICAL
Qty: 45 G | Refills: 1 | Status: SHIPPED | OUTPATIENT
Start: 2020-09-03 | End: 2021-06-14 | Stop reason: SDUPTHER

## 2020-09-03 RX ORDER — ONDANSETRON 4 MG/1
4 TABLET, FILM COATED ORAL
Qty: 30 TAB | Refills: 1 | Status: SHIPPED | OUTPATIENT
Start: 2020-09-03 | End: 2021-09-23 | Stop reason: SDUPTHER

## 2020-09-28 ENCOUNTER — PATIENT OUTREACH (OUTPATIENT)
Dept: CASE MANAGEMENT | Age: 80
End: 2020-09-28

## 2020-09-28 NOTE — PROGRESS NOTES
.Date/Time:  9/28/2020 12:14 PM  Attempted to reach patient by telephone. Left HIPPA compliant message requesting a return call. Will attempt to reach patient again.

## 2020-10-05 ENCOUNTER — HOME HEALTH ADMISSION (OUTPATIENT)
Dept: HOME HEALTH SERVICES | Facility: HOME HEALTH | Age: 80
End: 2020-10-05
Payer: MEDICARE

## 2020-10-08 ENCOUNTER — HOME CARE VISIT (OUTPATIENT)
Dept: SCHEDULING | Facility: HOME HEALTH | Age: 80
End: 2020-10-08
Payer: MEDICARE

## 2020-10-08 ENCOUNTER — HOME CARE VISIT (OUTPATIENT)
Dept: HOME HEALTH SERVICES | Facility: HOME HEALTH | Age: 80
End: 2020-10-08

## 2020-10-08 VITALS
HEART RATE: 68 BPM | RESPIRATION RATE: 14 BRPM | OXYGEN SATURATION: 98 % | DIASTOLIC BLOOD PRESSURE: 70 MMHG | TEMPERATURE: 98.6 F | SYSTOLIC BLOOD PRESSURE: 120 MMHG

## 2020-10-08 VITALS
SYSTOLIC BLOOD PRESSURE: 124 MMHG | TEMPERATURE: 98.6 F | DIASTOLIC BLOOD PRESSURE: 82 MMHG | OXYGEN SATURATION: 98 % | HEART RATE: 68 BPM

## 2020-10-08 PROCEDURE — 3331090001 HH PPS REVENUE CREDIT

## 2020-10-08 PROCEDURE — 3331090002 HH PPS REVENUE DEBIT

## 2020-10-08 PROCEDURE — G0152 HHCP-SERV OF OT,EA 15 MIN: HCPCS

## 2020-10-08 PROCEDURE — 400013 HH SOC

## 2020-10-08 PROCEDURE — G0151 HHCP-SERV OF PT,EA 15 MIN: HCPCS

## 2020-10-08 PROCEDURE — G0299 HHS/HOSPICE OF RN EA 15 MIN: HCPCS

## 2020-10-09 ENCOUNTER — HOME CARE VISIT (OUTPATIENT)
Dept: SCHEDULING | Facility: HOME HEALTH | Age: 80
End: 2020-10-09
Payer: MEDICARE

## 2020-10-09 ENCOUNTER — HOME CARE VISIT (OUTPATIENT)
Dept: HOME HEALTH SERVICES | Facility: HOME HEALTH | Age: 80
End: 2020-10-09
Payer: MEDICARE

## 2020-10-09 VITALS
RESPIRATION RATE: 17 BRPM | DIASTOLIC BLOOD PRESSURE: 82 MMHG | TEMPERATURE: 98.6 F | OXYGEN SATURATION: 98 % | HEART RATE: 68 BPM | SYSTOLIC BLOOD PRESSURE: 124 MMHG

## 2020-10-09 PROCEDURE — G0157 HHC PT ASSISTANT EA 15: HCPCS

## 2020-10-09 PROCEDURE — 3331090001 HH PPS REVENUE CREDIT

## 2020-10-09 PROCEDURE — 3331090002 HH PPS REVENUE DEBIT

## 2020-10-10 VITALS
HEART RATE: 61 BPM | RESPIRATION RATE: 16 BRPM | OXYGEN SATURATION: 96 % | DIASTOLIC BLOOD PRESSURE: 80 MMHG | SYSTOLIC BLOOD PRESSURE: 118 MMHG | TEMPERATURE: 98.5 F

## 2020-10-10 PROCEDURE — 3331090002 HH PPS REVENUE DEBIT

## 2020-10-10 PROCEDURE — 3331090001 HH PPS REVENUE CREDIT

## 2020-10-11 PROCEDURE — A6260 WOUND CLEANSER ANY TYPE/SIZE: HCPCS

## 2020-10-11 PROCEDURE — A4414 OST SKNBAR W/O CONV<=4 SQ IN: HCPCS

## 2020-10-11 PROCEDURE — A6216 NON-STERILE GAUZE<=16 SQ IN: HCPCS

## 2020-10-11 PROCEDURE — 3331090002 HH PPS REVENUE DEBIT

## 2020-10-11 PROCEDURE — A5120 SKIN BARRIER, WIPE OR SWAB: HCPCS

## 2020-10-11 PROCEDURE — A5063 DRAIN OSTOMY POUCH W/FLANGE: HCPCS

## 2020-10-11 PROCEDURE — 3331090001 HH PPS REVENUE CREDIT

## 2020-10-12 ENCOUNTER — HOME CARE VISIT (OUTPATIENT)
Dept: SCHEDULING | Facility: HOME HEALTH | Age: 80
End: 2020-10-12
Payer: MEDICARE

## 2020-10-12 ENCOUNTER — TELEPHONE (OUTPATIENT)
Dept: CARDIOLOGY CLINIC | Age: 80
End: 2020-10-12

## 2020-10-12 VITALS
RESPIRATION RATE: 18 BRPM | TEMPERATURE: 97.8 F | HEART RATE: 66 BPM | SYSTOLIC BLOOD PRESSURE: 122 MMHG | DIASTOLIC BLOOD PRESSURE: 65 MMHG | OXYGEN SATURATION: 97 %

## 2020-10-12 VITALS
TEMPERATURE: 97.8 F | RESPIRATION RATE: 14 BRPM | HEART RATE: 66 BPM | SYSTOLIC BLOOD PRESSURE: 122 MMHG | DIASTOLIC BLOOD PRESSURE: 65 MMHG | OXYGEN SATURATION: 97 %

## 2020-10-12 PROCEDURE — 3331090001 HH PPS REVENUE CREDIT

## 2020-10-12 PROCEDURE — G0299 HHS/HOSPICE OF RN EA 15 MIN: HCPCS

## 2020-10-12 PROCEDURE — 3331090002 HH PPS REVENUE DEBIT

## 2020-10-12 PROCEDURE — G0157 HHC PT ASSISTANT EA 15: HCPCS

## 2020-10-13 ENCOUNTER — HOME CARE VISIT (OUTPATIENT)
Dept: SCHEDULING | Facility: HOME HEALTH | Age: 80
End: 2020-10-13
Payer: MEDICARE

## 2020-10-13 PROCEDURE — 3331090001 HH PPS REVENUE CREDIT

## 2020-10-13 PROCEDURE — G0158 HHC OT ASSISTANT EA 15: HCPCS

## 2020-10-13 PROCEDURE — 3331090002 HH PPS REVENUE DEBIT

## 2020-10-13 NOTE — TELEPHONE ENCOUNTER
Contacted pt at [facility] number. Two patient Identifiers confirmed. Contacted pt to discuss meds. Pt stated the issue was resolved. No other issues noted.

## 2020-10-14 ENCOUNTER — HOME CARE VISIT (OUTPATIENT)
Dept: HOME HEALTH SERVICES | Facility: HOME HEALTH | Age: 80
End: 2020-10-14
Payer: MEDICARE

## 2020-10-14 ENCOUNTER — VIRTUAL VISIT (OUTPATIENT)
Dept: FAMILY MEDICINE CLINIC | Age: 80
End: 2020-10-14
Payer: MEDICARE

## 2020-10-14 VITALS
OXYGEN SATURATION: 98 % | TEMPERATURE: 98.3 F | SYSTOLIC BLOOD PRESSURE: 128 MMHG | DIASTOLIC BLOOD PRESSURE: 66 MMHG | RESPIRATION RATE: 18 BRPM | HEART RATE: 78 BPM

## 2020-10-14 DIAGNOSIS — E11.21 TYPE 2 DIABETES MELLITUS WITH NEPHROPATHY (HCC): ICD-10-CM

## 2020-10-14 DIAGNOSIS — I10 ESSENTIAL HYPERTENSION: ICD-10-CM

## 2020-10-14 DIAGNOSIS — I48.21 PERMANENT ATRIAL FIBRILLATION (HCC): Primary | ICD-10-CM

## 2020-10-14 DIAGNOSIS — Z93.3 COLOSTOMY PRESENT (HCC): ICD-10-CM

## 2020-10-14 PROCEDURE — 3331090001 HH PPS REVENUE CREDIT

## 2020-10-14 PROCEDURE — 3331090002 HH PPS REVENUE DEBIT

## 2020-10-14 PROCEDURE — G0157 HHC PT ASSISTANT EA 15: HCPCS

## 2020-10-14 PROCEDURE — 99443 PR PHYS/QHP TELEPHONE EVALUATION 21-30 MIN: CPT | Performed by: FAMILY MEDICINE

## 2020-10-14 RX ORDER — BISMUTH SUBSALICYLATE 262 MG
1 TABLET,CHEWABLE ORAL DAILY
Qty: 90 TAB | Refills: 3 | Status: SHIPPED | OUTPATIENT
Start: 2020-10-14

## 2020-10-14 RX ORDER — HYDRALAZINE HYDROCHLORIDE 10 MG/1
10 TABLET, FILM COATED ORAL 2 TIMES DAILY
Qty: 180 TAB | Refills: 3 | Status: SHIPPED | OUTPATIENT
Start: 2020-10-14 | End: 2021-08-04 | Stop reason: SDUPTHER

## 2020-10-14 RX ORDER — SIMVASTATIN 20 MG/1
TABLET, FILM COATED ORAL
Qty: 90 TAB | Refills: 3 | Status: SHIPPED | OUTPATIENT
Start: 2020-10-14 | End: 2021-12-17

## 2020-10-14 RX ORDER — LOSARTAN POTASSIUM 100 MG/1
100 TABLET ORAL DAILY
Qty: 90 TAB | Refills: 3 | Status: SHIPPED | OUTPATIENT
Start: 2020-10-14 | End: 2021-09-21

## 2020-10-14 RX ORDER — AMIODARONE HYDROCHLORIDE 200 MG/1
200 TABLET ORAL DAILY
Qty: 90 TAB | Refills: 3 | Status: SHIPPED | OUTPATIENT
Start: 2020-10-14 | End: 2021-09-23 | Stop reason: SDUPTHER

## 2020-10-14 NOTE — PROGRESS NOTES
Keiry Rincon is a 79 y.o. female, evaluated via audio-only technology on 10/14/2020 for Medication Refill and Follow-up  . Assessment & Plan:   Diagnoses and all orders for this visit:    1. Permanent atrial fibrillation (HCC)  -     amiodarone (CORDARONE) 200 mg tablet; Take 1 Tab by mouth daily. 2. Colostomy present ([address]  -     multivitamin (ONE A DAY) tablet; Take 1 Tab by mouth daily. 3. Type 2 diabetes mellitus with nephropathy (HCC)  -     simvastatin (ZOCOR) 20 mg tablet; TAKE 1 TABLET BY MOUTH EVERY NIGHT    4. Essential hypertension  -     hydrALAZINE (APRESOLINE) 10 mg tablet; Take 1 Tab by mouth two (2) times a day. 1/2 tablet by mouth as needed for SBP >165  -     losartan (COZAAR) 100 mg tablet; Take 1 Tab by mouth daily. 12  Subjective:     She is following up after surgery. She had follow up with dr. Jazmyn Watt yesterday. 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 She has lower extremity edema. She reports that this happens after her hospitalization. She received IV fluids. She has been prescribed lasix by dr. Jazmyn Watt.            Prior to Admission medications    Medication Sig Start Date End Date Taking? Authorizing Provider   losartan (COZAAR) 100 mg tablet Take 100 mg by mouth daily. Yes Provider, Historical   hydrALAZINE (APRESOLINE) 10 mg tablet Take 10 mg by mouth two (2) times a day. 1/2 tablet by mouth as needed for SBP >165   Yes Provider, Historical   amiodarone (CORDARONE) 200 mg tablet Take 200 mg by mouth daily. Yes Provider, Historical   ondansetron hcl (ZOFRAN) 4 mg tablet Take 1 Tab by mouth every eight (8) hours as needed for Nausea. 9/3/20  Yes Marizol Armenta MD   triamcinolone acetonide (KENALOG) 0.1 % topical cream APPLY THIN LAYER EXTERNALLY TO THE AFFECTED AREA TWICE DAILY 9/3/20  Yes Fifi Ivey MD   furosemide (Lasix) 40 mg tablet Take 40 mg by mouth daily.    Yes Provider, Historical   citalopram (CeleXA) 10 mg tablet Take by mouth daily. Yes Provider, Historical   metoprolol tartrate (LOPRESSOR) 25 mg tablet Take 0.5 Tabs by mouth every twelve (12) hours. 8/30/20  Yes Vera Mendoza MD   levothyroxine (SYNTHROID) 75 mcg tablet TAKE 1 TABLET BY MOUTH EVERY DAY BEFORE BREAKFAST 7/20/20  Yes Matheus Menezes MD   metFORMIN (GLUCOPHAGE) 1,000 mg tablet TAKE 1 TABLET BY MOUTH TWICE DAILY 7/20/20  Yes Matheus Menezes MD   simvastatin (ZOCOR) 20 mg tablet TAKE 1 TABLET BY MOUTH EVERY NIGHT 7/20/20  Yes Matheus Menezes MD   pantoprazole (PROTONIX) 40 mg tablet TAKE 1 TABLET BY MOUTH DAILY 6/1/20  Yes Alirio Ivey MD   dicyclomine (BentyL) 10 mg capsule Take 2 Caps by mouth three (3) times daily as needed for Abdominal Cramps. 4/8/20  Yes Matheus Menezes MD   apixaban (Eliquis) 5 mg tablet TAKE 1 TABLET BY MOUTH TWICE DAILY 3/19/20  Yes Vera Mendoza MD   glucose blood VI test strips (FREESTYLE LITE STRIPS) strip use to check BS once daily 11/16/16  Yes Provider, Historical   cholecalciferol (VITAMIN D3) 1,000 unit tablet Take 2 Tabs by mouth daily. 11/21/17  Yes MD MICHELLE Aldana  Constitutional: Positive for weight loss. Negative for chills and fever.    HENT: Negative for congestion and sore throat.    Eyes: Negative for blurred vision. Respiratory: Negative for cough.    Cardiovascular: Negative for chest pain and palpitations. Gastrointestinal: Negative for abdominal pain, constipation, diarrhea, heartburn, nausea and vomiting. Genitourinary: improved dysuria, frequency, hematuria and urgency. Negative for flank pain.   Musculoskeletal: Negative for back pain, falls and myalgias. Skin: Negative.    Neurological: Negative for dizziness and headaches. Endo/Heme/Allergies: Negative.         I do not check my blood sugars and I haven't had any problems with my diabetes.    Psychiatric/Behavioral: Negative for depression and suicidal ideas.   No flowsheet data found.      Freida Agrawal, who was evaluated through a patient-initiated, synchronous (real-time) audio only encounter, and/or her healthcare decision maker, is aware that it is a billable service, with coverage as determined by her insurance carrier. She provided verbal consent to proceed: Yes. She has not had a related appointment within my department in the past 7 days or scheduled within the next 24 hours.       Total Time: minutes: 21-30 minutes    Bryson Sage MD

## 2020-10-14 NOTE — PROGRESS NOTES
Alfredo Pearson presents today for No chief complaint on file. follow-up  Medication refill     Is someone accompanying this pt? no    Is the patient using any DME equipment during [address]? no    Depression Screening:  3 most recent PHQ Screens 9/2/2020   Little interest or pleasure in doing things Not at all   Feeling down, depressed, irritable, or hopeless Not at all   Total Score PHQ 2 0       Learning Assessment:  Learning Assessment 1/17/2019   PRIMARY LEARNER Patient   HIGHEST LEVEL OF EDUCATION - PRIMARY LEARNER  4 YEARS OF COLLEGE   BARRIERS PRIMARY LEARNER NONE   CO-LEARNER CAREGIVER No   PRIMARY LANGUAGE ENGLISH   LEARNER PREFERENCE PRIMARY LISTENING   ANSWERED BY patient   RELATIONSHIP SELF       Abuse Screening:  Abuse Screening Questionnaire 1/17/2019   Do you ever feel afraid of your partner? N   Are you in a relationship with someone who physically or mentally threatens you? N   Is it safe for you to go home? Y       Fall Risk  Fall Risk Assessment, last 12 mths 9/2/2020   Able to walk? Yes   Fall in past 12 months? No   Fall with injury? -   Number of falls in past 12 months -   Fall Risk Score -       Health Maintenance reviewed and discussed and ordered per Provider. Health Maintenance Due   Topic Date Due    Eye Exam Retinal or Dilated  07/04/1950    Shingrix Vaccine Age 50> (1 of 2) 07/04/1989    GLAUCOMA SCREENING Q2Y  07/04/2004    MICROALBUMIN Q1  10/10/2019    Lipid Screen  10/10/2019    Foot Exam Q1  07/24/2020   . Coordination of Care:  1. Have you been to the ER, urgent care clinic since your last visit? Hospitalized since your last visit? no    2. Have you seen or consulted any other health care providers outside of the [address] [place] [address] since your last visit? Include any pap smears or colon screening.  no      Last  Checked n/a  Last UDS Checked n/a  Last Pain contract signed: n/a

## 2020-10-15 ENCOUNTER — HOME CARE VISIT (OUTPATIENT)
Dept: SCHEDULING | Facility: HOME HEALTH | Age: 80
End: 2020-10-15
Payer: MEDICARE

## 2020-10-15 VITALS
OXYGEN SATURATION: 99 % | DIASTOLIC BLOOD PRESSURE: 70 MMHG | TEMPERATURE: 98.8 F | SYSTOLIC BLOOD PRESSURE: 125 MMHG | RESPIRATION RATE: 18 BRPM | HEART RATE: 62 BPM

## 2020-10-15 VITALS
TEMPERATURE: 98.1 F | OXYGEN SATURATION: 98 % | SYSTOLIC BLOOD PRESSURE: 134 MMHG | HEART RATE: 65 BPM | RESPIRATION RATE: 19 BRPM | DIASTOLIC BLOOD PRESSURE: 76 MMHG

## 2020-10-15 VITALS
TEMPERATURE: 97.7 F | RESPIRATION RATE: 18 BRPM | DIASTOLIC BLOOD PRESSURE: 68 MMHG | HEART RATE: 71 BPM | OXYGEN SATURATION: 98 % | SYSTOLIC BLOOD PRESSURE: 116 MMHG

## 2020-10-15 PROCEDURE — 3331090001 HH PPS REVENUE CREDIT

## 2020-10-15 PROCEDURE — G0158 HHC OT ASSISTANT EA 15: HCPCS

## 2020-10-15 PROCEDURE — 3331090002 HH PPS REVENUE DEBIT

## 2020-10-15 PROCEDURE — G0299 HHS/HOSPICE OF RN EA 15 MIN: HCPCS

## 2020-10-16 PROCEDURE — 3331090001 HH PPS REVENUE CREDIT

## 2020-10-16 PROCEDURE — 3331090002 HH PPS REVENUE DEBIT

## 2020-10-17 PROCEDURE — 3331090001 HH PPS REVENUE CREDIT

## 2020-10-17 PROCEDURE — 3331090002 HH PPS REVENUE DEBIT

## 2020-10-18 PROCEDURE — 3331090001 HH PPS REVENUE CREDIT

## 2020-10-18 PROCEDURE — 3331090002 HH PPS REVENUE DEBIT

## 2020-10-19 ENCOUNTER — HOME CARE VISIT (OUTPATIENT)
Dept: SCHEDULING | Facility: HOME HEALTH | Age: 80
End: 2020-10-19
Payer: MEDICARE

## 2020-10-19 VITALS
OXYGEN SATURATION: 98 % | DIASTOLIC BLOOD PRESSURE: 84 MMHG | HEART RATE: 68 BPM | SYSTOLIC BLOOD PRESSURE: 170 MMHG | TEMPERATURE: 97.8 F | RESPIRATION RATE: 18 BRPM

## 2020-10-19 PROCEDURE — G0299 HHS/HOSPICE OF RN EA 15 MIN: HCPCS

## 2020-10-19 PROCEDURE — 3331090001 HH PPS REVENUE CREDIT

## 2020-10-19 PROCEDURE — G0158 HHC OT ASSISTANT EA 15: HCPCS

## 2020-10-19 PROCEDURE — 3331090002 HH PPS REVENUE DEBIT

## 2020-10-20 VITALS
TEMPERATURE: 96.7 F | RESPIRATION RATE: 16 BRPM | DIASTOLIC BLOOD PRESSURE: 75 MMHG | OXYGEN SATURATION: 99 % | HEART RATE: 67 BPM | SYSTOLIC BLOOD PRESSURE: 150 MMHG

## 2020-10-20 PROCEDURE — 3331090002 HH PPS REVENUE DEBIT

## 2020-10-20 PROCEDURE — 3331090001 HH PPS REVENUE CREDIT

## 2020-10-21 ENCOUNTER — HOME CARE VISIT (OUTPATIENT)
Dept: SCHEDULING | Facility: HOME HEALTH | Age: 80
End: 2020-10-21
Payer: MEDICARE

## 2020-10-21 PROCEDURE — G0157 HHC PT ASSISTANT EA 15: HCPCS

## 2020-10-21 PROCEDURE — 3331090001 HH PPS REVENUE CREDIT

## 2020-10-21 PROCEDURE — 3331090002 HH PPS REVENUE DEBIT

## 2020-10-22 ENCOUNTER — HOME CARE VISIT (OUTPATIENT)
Dept: SCHEDULING | Facility: HOME HEALTH | Age: 80
End: 2020-10-22
Payer: MEDICARE

## 2020-10-22 VITALS
RESPIRATION RATE: 20 BRPM | HEART RATE: 134 BPM | TEMPERATURE: 98.8 F | OXYGEN SATURATION: 97 % | SYSTOLIC BLOOD PRESSURE: 118 MMHG | DIASTOLIC BLOOD PRESSURE: 70 MMHG

## 2020-10-22 VITALS
TEMPERATURE: 98.2 F | SYSTOLIC BLOOD PRESSURE: 136 MMHG | OXYGEN SATURATION: 97 % | DIASTOLIC BLOOD PRESSURE: 64 MMHG | RESPIRATION RATE: 16 BRPM | HEART RATE: 73 BPM

## 2020-10-22 PROCEDURE — 3331090001 HH PPS REVENUE CREDIT

## 2020-10-22 PROCEDURE — 3331090002 HH PPS REVENUE DEBIT

## 2020-10-22 PROCEDURE — A4414 OST SKNBAR W/O CONV<=4 SQ IN: HCPCS

## 2020-10-22 PROCEDURE — A5120 SKIN BARRIER, WIPE OR SWAB: HCPCS

## 2020-10-22 PROCEDURE — G0158 HHC OT ASSISTANT EA 15: HCPCS

## 2020-10-22 PROCEDURE — A5063 DRAIN OSTOMY POUCH W/FLANGE: HCPCS

## 2020-10-22 PROCEDURE — G0299 HHS/HOSPICE OF RN EA 15 MIN: HCPCS

## 2020-10-22 PROCEDURE — A6216 NON-STERILE GAUZE<=16 SQ IN: HCPCS

## 2020-10-23 ENCOUNTER — HOME CARE VISIT (OUTPATIENT)
Dept: SCHEDULING | Facility: HOME HEALTH | Age: 80
End: 2020-10-23
Payer: MEDICARE

## 2020-10-23 PROCEDURE — 3331090001 HH PPS REVENUE CREDIT

## 2020-10-23 PROCEDURE — 3331090002 HH PPS REVENUE DEBIT

## 2020-10-23 PROCEDURE — G0157 HHC PT ASSISTANT EA 15: HCPCS

## 2020-10-24 VITALS
DIASTOLIC BLOOD PRESSURE: 70 MMHG | RESPIRATION RATE: 16 BRPM | SYSTOLIC BLOOD PRESSURE: 130 MMHG | HEART RATE: 69 BPM | OXYGEN SATURATION: 97 % | TEMPERATURE: 97.1 F

## 2020-10-24 VITALS
DIASTOLIC BLOOD PRESSURE: 74 MMHG | SYSTOLIC BLOOD PRESSURE: 134 MMHG | OXYGEN SATURATION: 97 % | HEART RATE: 69 BPM | TEMPERATURE: 98.8 F | RESPIRATION RATE: 18 BRPM

## 2020-10-24 PROCEDURE — 3331090002 HH PPS REVENUE DEBIT

## 2020-10-24 PROCEDURE — A4385 OST SKN BARRIER SLD EXT WEAR: HCPCS

## 2020-10-24 PROCEDURE — 3331090001 HH PPS REVENUE CREDIT

## 2020-10-24 PROCEDURE — A4394 OSTOMY POUCH LIQ DEODORANT: HCPCS

## 2020-10-25 PROCEDURE — 3331090002 HH PPS REVENUE DEBIT

## 2020-10-25 PROCEDURE — 3331090001 HH PPS REVENUE CREDIT

## 2020-10-26 ENCOUNTER — HOME CARE VISIT (OUTPATIENT)
Dept: SCHEDULING | Facility: HOME HEALTH | Age: 80
End: 2020-10-26
Payer: MEDICARE

## 2020-10-26 VITALS
HEART RATE: 74 BPM | OXYGEN SATURATION: 98 % | SYSTOLIC BLOOD PRESSURE: 130 MMHG | TEMPERATURE: 98.3 F | DIASTOLIC BLOOD PRESSURE: 64 MMHG | RESPIRATION RATE: 18 BRPM

## 2020-10-26 PROCEDURE — 3331090001 HH PPS REVENUE CREDIT

## 2020-10-26 PROCEDURE — G0299 HHS/HOSPICE OF RN EA 15 MIN: HCPCS

## 2020-10-26 PROCEDURE — G0157 HHC PT ASSISTANT EA 15: HCPCS

## 2020-10-26 PROCEDURE — 3331090002 HH PPS REVENUE DEBIT

## 2020-10-27 VITALS — OXYGEN SATURATION: 98 % | TEMPERATURE: 96.8 F | HEART RATE: 68 BPM

## 2020-10-27 PROCEDURE — 3331090001 HH PPS REVENUE CREDIT

## 2020-10-27 PROCEDURE — 3331090002 HH PPS REVENUE DEBIT

## 2020-10-28 ENCOUNTER — HOME CARE VISIT (OUTPATIENT)
Dept: SCHEDULING | Facility: HOME HEALTH | Age: 80
End: 2020-10-28
Payer: MEDICARE

## 2020-10-28 VITALS
DIASTOLIC BLOOD PRESSURE: 58 MMHG | SYSTOLIC BLOOD PRESSURE: 126 MMHG | HEART RATE: 75 BPM | OXYGEN SATURATION: 99 % | RESPIRATION RATE: 16 BRPM | TEMPERATURE: 98.4 F

## 2020-10-28 VITALS
SYSTOLIC BLOOD PRESSURE: 126 MMHG | HEART RATE: 75 BPM | DIASTOLIC BLOOD PRESSURE: 58 MMHG | OXYGEN SATURATION: 96 % | TEMPERATURE: 98.6 F

## 2020-10-28 PROCEDURE — G0151 HHCP-SERV OF PT,EA 15 MIN: HCPCS

## 2020-10-28 PROCEDURE — G0158 HHC OT ASSISTANT EA 15: HCPCS

## 2020-10-28 PROCEDURE — 3331090001 HH PPS REVENUE CREDIT

## 2020-10-28 PROCEDURE — 3331090002 HH PPS REVENUE DEBIT

## 2020-10-29 ENCOUNTER — HOME CARE VISIT (OUTPATIENT)
Dept: SCHEDULING | Facility: HOME HEALTH | Age: 80
End: 2020-10-29
Payer: MEDICARE

## 2020-10-29 VITALS
DIASTOLIC BLOOD PRESSURE: 70 MMHG | RESPIRATION RATE: 14 BRPM | OXYGEN SATURATION: 94 % | TEMPERATURE: 98.3 F | HEART RATE: 73 BPM | SYSTOLIC BLOOD PRESSURE: 124 MMHG

## 2020-10-29 PROCEDURE — G0299 HHS/HOSPICE OF RN EA 15 MIN: HCPCS

## 2020-10-29 PROCEDURE — A4414 OST SKNBAR W/O CONV<=4 SQ IN: HCPCS

## 2020-10-29 PROCEDURE — A5063 DRAIN OSTOMY POUCH W/FLANGE: HCPCS

## 2020-10-29 PROCEDURE — 3331090002 HH PPS REVENUE DEBIT

## 2020-10-29 PROCEDURE — 3331090001 HH PPS REVENUE CREDIT

## 2020-10-29 PROCEDURE — G0152 HHCP-SERV OF OT,EA 15 MIN: HCPCS

## 2020-10-30 VITALS
SYSTOLIC BLOOD PRESSURE: 158 MMHG | DIASTOLIC BLOOD PRESSURE: 77 MMHG | OXYGEN SATURATION: 95 % | TEMPERATURE: 98.7 F | RESPIRATION RATE: 17 BRPM | HEART RATE: 67 BPM

## 2020-10-30 PROCEDURE — 3331090001 HH PPS REVENUE CREDIT

## 2020-10-30 PROCEDURE — 3331090002 HH PPS REVENUE DEBIT

## 2020-10-31 PROCEDURE — 3331090001 HH PPS REVENUE CREDIT

## 2020-10-31 PROCEDURE — 3331090002 HH PPS REVENUE DEBIT

## 2020-11-01 PROCEDURE — 3331090001 HH PPS REVENUE CREDIT

## 2020-11-01 PROCEDURE — 3331090002 HH PPS REVENUE DEBIT

## 2020-11-02 ENCOUNTER — HOME CARE VISIT (OUTPATIENT)
Dept: SCHEDULING | Facility: HOME HEALTH | Age: 80
End: 2020-11-02
Payer: MEDICARE

## 2020-11-02 VITALS
TEMPERATURE: 98.1 F | HEART RATE: 77 BPM | OXYGEN SATURATION: 99 % | RESPIRATION RATE: 14 BRPM | SYSTOLIC BLOOD PRESSURE: 138 MMHG | DIASTOLIC BLOOD PRESSURE: 78 MMHG

## 2020-11-02 PROCEDURE — 3331090002 HH PPS REVENUE DEBIT

## 2020-11-02 PROCEDURE — 3331090001 HH PPS REVENUE CREDIT

## 2020-11-02 PROCEDURE — G0299 HHS/HOSPICE OF RN EA 15 MIN: HCPCS

## 2020-11-03 PROCEDURE — 3331090002 HH PPS REVENUE DEBIT

## 2020-11-03 PROCEDURE — 3331090001 HH PPS REVENUE CREDIT

## 2020-11-04 PROCEDURE — 3331090001 HH PPS REVENUE CREDIT

## 2020-11-04 PROCEDURE — 3331090002 HH PPS REVENUE DEBIT

## 2020-11-05 ENCOUNTER — HOME CARE VISIT (OUTPATIENT)
Dept: SCHEDULING | Facility: HOME HEALTH | Age: 80
End: 2020-11-05
Payer: MEDICARE

## 2020-11-05 VITALS
SYSTOLIC BLOOD PRESSURE: 136 MMHG | RESPIRATION RATE: 14 BRPM | DIASTOLIC BLOOD PRESSURE: 78 MMHG | HEART RATE: 78 BPM | TEMPERATURE: 97.8 F | OXYGEN SATURATION: 98 %

## 2020-11-05 PROCEDURE — 3331090001 HH PPS REVENUE CREDIT

## 2020-11-05 PROCEDURE — G0299 HHS/HOSPICE OF RN EA 15 MIN: HCPCS

## 2020-11-05 PROCEDURE — 3331090002 HH PPS REVENUE DEBIT

## 2020-11-06 PROCEDURE — 3331090002 HH PPS REVENUE DEBIT

## 2020-11-06 PROCEDURE — 3331090001 HH PPS REVENUE CREDIT

## 2020-11-06 PROCEDURE — 3331090003 HH PPS REVENUE ADJ

## 2020-11-07 PROCEDURE — 3331090001 HH PPS REVENUE CREDIT

## 2020-11-07 PROCEDURE — 3331090002 HH PPS REVENUE DEBIT

## 2020-11-08 PROCEDURE — 3331090002 HH PPS REVENUE DEBIT

## 2020-11-08 PROCEDURE — 3331090001 HH PPS REVENUE CREDIT

## 2020-11-09 ENCOUNTER — HOME CARE VISIT (OUTPATIENT)
Dept: SCHEDULING | Facility: HOME HEALTH | Age: 80
End: 2020-11-09
Payer: MEDICARE

## 2020-11-09 PROCEDURE — 3331090002 HH PPS REVENUE DEBIT

## 2020-11-09 PROCEDURE — 400013 HH SOC

## 2020-11-09 PROCEDURE — G0299 HHS/HOSPICE OF RN EA 15 MIN: HCPCS

## 2020-11-09 PROCEDURE — 3331090001 HH PPS REVENUE CREDIT

## 2020-11-10 VITALS
SYSTOLIC BLOOD PRESSURE: 132 MMHG | RESPIRATION RATE: 14 BRPM | TEMPERATURE: 98.1 F | OXYGEN SATURATION: 99 % | DIASTOLIC BLOOD PRESSURE: 68 MMHG | HEART RATE: 68 BPM

## 2020-11-10 PROCEDURE — A4385 OST SKN BARRIER SLD EXT WEAR: HCPCS

## 2020-11-10 PROCEDURE — 3331090001 HH PPS REVENUE CREDIT

## 2020-11-10 PROCEDURE — A5063 DRAIN OSTOMY POUCH W/FLANGE: HCPCS

## 2020-11-10 PROCEDURE — 3331090002 HH PPS REVENUE DEBIT

## 2020-11-10 PROCEDURE — A4414 OST SKNBAR W/O CONV<=4 SQ IN: HCPCS

## 2020-11-11 PROCEDURE — 3331090002 HH PPS REVENUE DEBIT

## 2020-11-11 PROCEDURE — 3331090001 HH PPS REVENUE CREDIT

## 2020-11-12 ENCOUNTER — HOME CARE VISIT (OUTPATIENT)
Dept: SCHEDULING | Facility: HOME HEALTH | Age: 80
End: 2020-11-12
Payer: MEDICARE

## 2020-11-12 PROCEDURE — 3331090002 HH PPS REVENUE DEBIT

## 2020-11-12 PROCEDURE — G0299 HHS/HOSPICE OF RN EA 15 MIN: HCPCS

## 2020-11-12 PROCEDURE — 3331090001 HH PPS REVENUE CREDIT

## 2020-11-13 PROCEDURE — 3331090001 HH PPS REVENUE CREDIT

## 2020-11-13 PROCEDURE — 3331090002 HH PPS REVENUE DEBIT

## 2020-11-14 VITALS
OXYGEN SATURATION: 96 % | RESPIRATION RATE: 14 BRPM | DIASTOLIC BLOOD PRESSURE: 65 MMHG | SYSTOLIC BLOOD PRESSURE: 142 MMHG | HEART RATE: 80 BPM | TEMPERATURE: 98.7 F

## 2020-11-14 PROCEDURE — 3331090002 HH PPS REVENUE DEBIT

## 2020-11-14 PROCEDURE — 3331090001 HH PPS REVENUE CREDIT

## 2020-12-01 ENCOUNTER — VIRTUAL VISIT (OUTPATIENT)
Dept: FAMILY MEDICINE CLINIC | Age: 80
End: 2020-12-01
Payer: MEDICARE

## 2020-12-01 DIAGNOSIS — R06.01 ORTHOPNEA: Primary | ICD-10-CM

## 2020-12-01 DIAGNOSIS — I48.21 PERMANENT ATRIAL FIBRILLATION (HCC): ICD-10-CM

## 2020-12-01 DIAGNOSIS — R35.0 URINARY FREQUENCY: ICD-10-CM

## 2020-12-01 PROCEDURE — 99442 PR PHYS/QHP TELEPHONE EVALUATION 11-20 MIN: CPT | Performed by: FAMILY MEDICINE

## 2020-12-01 RX ORDER — ALBUTEROL SULFATE 90 UG/1
2 AEROSOL, METERED RESPIRATORY (INHALATION)
Qty: 1 INHALER | Refills: 0 | Status: SHIPPED | OUTPATIENT
Start: 2020-12-01 | End: 2021-01-04 | Stop reason: SDUPTHER

## 2020-12-01 RX ORDER — FUROSEMIDE 20 MG/1
20 TABLET ORAL DAILY
Qty: 30 TAB | Refills: 1 | Status: SHIPPED | OUTPATIENT
Start: 2020-12-01 | End: 2021-02-01

## 2020-12-01 NOTE — PROGRESS NOTES
Corin Conley is a 79 y.o. female, evaluated via audio-only technology on 12/1/2020 for Medication Refill and Fall (Patient fell in home 2 weeks ago. No injury per patient )  . Assessment & Plan:   Diagnoses and all orders for this visit:    1. Orthopnea  -     albuterol (PROVENTIL HFA, VENTOLIN HFA, PROAIR HFA) 90 mcg/actuation inhaler; Take 2 Puffs by inhalation every six (6) hours as needed for Wheezing or Shortness of Breath. -     furosemide (LASIX) 20 mg tablet; Take 1 Tab by mouth daily. 2. Urinary frequency    3. Permanent atrial fibrillation (HCC)        12  Subjective:   Pt had a fall 2 weeks ago while walking in her home. She denies injury. She denies dizziness or loss of consciousness. She has shortness of breath which is worse when she is supine. She has swelling in her lower extremities. She denies chest pain. She is not active. She denies cough. She has frequent urination; she denies incontinence; she denies abdominal pain or dysuria. Prior to Admission medications    Medication Sig Start Date End Date Taking? Authorizing Provider   apixaban (Eliquis) 5 mg tablet TAKE 1 TABLET BY MOUTH TWICE DAILY 11/30/20  Yes Hiral Childress MD   hydrALAZINE (APRESOLINE) 10 mg tablet Take 1 Tab by mouth two (2) times a day. 1/2 tablet by mouth as needed for SBP >165 10/14/20  Yes Jamir Cota MD   losartan (COZAAR) 100 mg tablet Take 1 Tab by mouth daily. 10/14/20  Yes Jamir Cota MD   amiodarone (CORDARONE) 200 mg tablet Take 1 Tab by mouth daily. 10/14/20  Yes Jamir Cota MD   simvastatin (ZOCOR) 20 mg tablet TAKE 1 TABLET BY MOUTH EVERY NIGHT 10/14/20  Yes Archana Ivey MD   multivitamin (ONE A DAY) tablet Take 1 Tab by mouth daily. 10/14/20  Yes Jamir Cota MD   ondansetron hcl (ZOFRAN) 4 mg tablet Take 1 Tab by mouth every eight (8) hours as needed for Nausea.  9/3/20  Yes Archana Ivey MD   triamcinolone acetonide (KENALOG) 0.1 % topical cream APPLY THIN LAYER EXTERNALLY TO THE AFFECTED AREA TWICE DAILY 9/3/20  Yes Erika Myles MD   furosemide (Lasix) 40 mg tablet Take 40 mg by mouth daily. Yes Provider, Historical   metoprolol tartrate (LOPRESSOR) 25 mg tablet Take 0.5 Tabs by mouth every twelve (12) hours. 8/30/20  Yes Ani He MD   levothyroxine (SYNTHROID) 75 mcg tablet TAKE 1 TABLET BY MOUTH EVERY DAY BEFORE BREAKFAST 7/20/20  Yes Erika Myles MD   metFORMIN (GLUCOPHAGE) 1,000 mg tablet TAKE 1 TABLET BY MOUTH TWICE DAILY 7/20/20  Yes Erika Myles MD   pantoprazole (PROTONIX) 40 mg tablet TAKE 1 TABLET BY MOUTH DAILY 6/1/20  Yes Gerry Ivey MD   dicyclomine (BentyL) 10 mg capsule Take 2 Caps by mouth three (3) times daily as needed for Abdominal Cramps. 4/8/20  Yes Erika Myles MD   glucose blood VI test strips (FREESTYLE LITE STRIPS) strip use to check BS once daily 11/16/16  Yes Provider, Historical   cholecalciferol (VITAMIN D3) 1,000 unit tablet Take 2 Tabs by mouth daily. 11/21/17  Yes Gabrielle Chen MD         ROS    No flowsheet data found. Radha Agrawal, who was evaluated through a patient-initiated, synchronous (real-time) audio only encounter, and/or her healthcare decision maker, is aware that it is a billable service, with coverage as determined by her insurance carrier. She provided verbal consent to proceed: Yes. She has not had a related appointment within my department in the past 7 days or scheduled within the next 24 hours.       Total Time: minutes: 11-20 minutes    Pamela Goldberg MD

## 2020-12-01 NOTE — PROGRESS NOTES
Joshua Greene presents today for   Chief Complaint   Patient presents with    Medication Refill    Fall     Patient fell in home 2 weeks ago. No injury per patient        Is someone accompanying this pt? Yes, son, virtual appt. Is the patient using any DME equipment during OV? Yes    Depression Screening:  3 most recent PHQ Screens 12/1/2020   Little interest or pleasure in doing things Not at all   Feeling down, depressed, irritable, or hopeless Not at all   Total Score PHQ 2 0       Learning Assessment:  Learning Assessment 1/17/2019   PRIMARY LEARNER Patient   HIGHEST LEVEL OF EDUCATION - PRIMARY LEARNER  4 YEARS OF COLLEGE   BARRIERS PRIMARY LEARNER NONE   CO-LEARNER CAREGIVER No   PRIMARY LANGUAGE ENGLISH   LEARNER PREFERENCE PRIMARY LISTENING   ANSWERED BY patient   RELATIONSHIP SELF       Abuse Screening:  Abuse Screening Questionnaire 12/1/2020   Do you ever feel afraid of your partner? N   Are you in a relationship with someone who physically or mentally threatens you? N   Is it safe for you to go home? Y       Fall Risk  Fall Risk Assessment, last 12 mths 12/1/2020   Able to walk? Yes   Fall in past 12 months? Yes   Fall with injury? No   Number of falls in past 12 months 1   Fall Risk Score 1       Health Maintenance reviewed and discussed and ordered per Provider. Health Maintenance Due   Topic Date Due    Eye Exam Retinal or Dilated  07[birth year]04[birth year]1949    Shingrix Vaccine Age 50> (1 of 2) 07/04/1989    GLAUCOMA SCREENING Q2Y  07/04/2004    MICROALBUMIN Q1  10/10/2019    Lipid Screen  10/10/2019    Foot Exam Q1  07/24/2020   . Coordination of Care:  1. Have you been to the ER, urgent care clinic since your last visit? Hospitalized since your last visit? no    2. Have you seen or consulted any other health care providers outside of the [address] [place] [address] since your last visit? Include any pap smears or colon screening.  no      Last  Checked n/a  Last UDS Checked n/a  Last Pain contract signed: Haley Kwon

## 2020-12-03 ENCOUNTER — DOCUMENTATION ONLY (OUTPATIENT)
Dept: FAMILY MEDICINE CLINIC | Age: 80
End: 2020-12-03

## 2020-12-15 RX ORDER — METOPROLOL TARTRATE 25 MG/1
TABLET, FILM COATED ORAL
Qty: 30 TAB | Refills: 3 | Status: SHIPPED | OUTPATIENT
Start: 2020-12-15 | End: 2022-09-06 | Stop reason: SINTOL

## 2020-12-16 ENCOUNTER — TELEPHONE (OUTPATIENT)
Dept: CARDIOLOGY CLINIC | Age: 80
End: 2020-12-16

## 2020-12-16 DIAGNOSIS — E03.4 HYPOTHYROIDISM DUE TO ACQUIRED ATROPHY OF THYROID: ICD-10-CM

## 2020-12-16 NOTE — TELEPHONE ENCOUNTER
Pt called in and informed me she was supposed to have gotten a refill of her thyroid medication as well with her blood pressure medication. She needs it ASAP. Please advise.

## 2020-12-16 NOTE — TELEPHONE ENCOUNTER
Patient contacted the office and stated that she no longer takes Metoprolol 25 mg. Advise patient that the chart will be noted. Patient verbalized understanding and tolerated well.      FYI: on last office visit - 9/2/2020 - patient was advised to continue metoprolol

## 2020-12-18 RX ORDER — LEVOTHYROXINE SODIUM 75 UG/1
TABLET ORAL
Qty: 90 TAB | Refills: 3 | Status: SHIPPED | OUTPATIENT
Start: 2020-12-18 | End: 2021-11-02

## 2021-01-04 DIAGNOSIS — R06.01 ORTHOPNEA: ICD-10-CM

## 2021-01-04 RX ORDER — ALBUTEROL SULFATE 90 UG/1
2 AEROSOL, METERED RESPIRATORY (INHALATION)
Qty: 2 INHALER | Refills: 1 | Status: SHIPPED | OUTPATIENT
Start: 2021-01-04 | End: 2021-03-09 | Stop reason: SDUPTHER

## 2021-01-06 ENCOUNTER — HOSPITAL ENCOUNTER (OUTPATIENT)
Dept: MAMMOGRAPHY | Age: 81
Discharge: HOME | End: 2021-01-06
Attending: UROLOGY
Payer: MEDICARE

## 2021-01-06 DIAGNOSIS — Z12.31 ENCOUNTER FOR SCREENING MAMMOGRAM FOR BREAST CANCER: ICD-10-CM

## 2021-01-06 PROCEDURE — 77063 BREAST TOMOSYNTHESIS BI: CPT

## 2021-01-08 ENCOUNTER — TELEPHONE (OUTPATIENT)
Dept: FAMILY MEDICINE CLINIC | Age: 81
End: 2021-01-08

## 2021-01-08 NOTE — TELEPHONE ENCOUNTER
Pt. Would like to discuss mammogram results and wants to know why Dr. Perico Locke put in an order for a diagnostic mammogram. Please assist.

## 2021-02-01 DIAGNOSIS — R06.01 ORTHOPNEA: ICD-10-CM

## 2021-02-01 RX ORDER — FUROSEMIDE 20 MG/1
TABLET ORAL
Qty: 30 TAB | Refills: 1 | Status: SHIPPED | OUTPATIENT
Start: 2021-02-01 | End: 2021-04-05

## 2021-02-24 ENCOUNTER — PATIENT OUTREACH (OUTPATIENT)
Dept: CASE MANAGEMENT | Age: 81
End: 2021-02-24

## 2021-02-24 NOTE — PROGRESS NOTES
.Date/Time:  2/24/2021 2:27 PM    Method of communication with patient:phone    [id number] [facility] ( [facility]) made out reach to  patient by telephone to offer Care Coordination Management( CCM. Provided introduction to self, and explanation of the Ambulatory Care Manager's role. [facility] had to leave a voicemail message for return to [phone] anytime Monday thru Friday 07:30-4:30.

## 2021-03-02 ENCOUNTER — PATIENT OUTREACH (OUTPATIENT)
Dept: CASE MANAGEMENT | Age: 81
End: 2021-03-02

## 2021-03-02 NOTE — PROGRESS NOTES
.Date/Time:  3/2/2021 10:27 PM    Method of communication with patient:phone    [facility] ( [facility]) made out reach to  patient by telephone to offer Care Coordination Management( CCM. Provided introduction to self, and explanation of the Ambulatory Care Manager's role. [facility] had to leave a voicemail message for return to [phone] anytime Monday thru Friday 07:30-4:30.

## 2021-03-03 ENCOUNTER — HOSPITAL ENCOUNTER (OUTPATIENT)
Dept: ULTRASOUND IMAGING | Age: 81
Discharge: HOME | End: 2021-03-03
Attending: FAMILY MEDICINE
Payer: MEDICARE

## 2021-03-03 ENCOUNTER — HOSPITAL ENCOUNTER (OUTPATIENT)
Dept: MAMMOGRAPHY | Age: 81
Discharge: HOME | End: 2021-03-03
Attending: FAMILY MEDICINE
Payer: MEDICARE

## 2021-03-03 DIAGNOSIS — R92.8 ABNORMAL FINDING ON BREAST IMAGING: ICD-10-CM

## 2021-03-03 PROCEDURE — 77061 BREAST TOMOSYNTHESIS UNI: CPT

## 2021-03-04 ENCOUNTER — PATIENT OUTREACH (OUTPATIENT)
Dept: CASE MANAGEMENT | Age: 81
End: 2021-03-04

## 2021-03-04 NOTE — PROGRESS NOTES
.Date/Time:  3/4/2021 4:15 PM    Method of communication with patient:phone    [facility] ( [facility]) made 3rd out reach to  A by telephone to offer Complex Case Management  ( CCM). Provided introduction to self, and explanation of the Ambulatory Care . Contact at [phone] anytime Monday thru Friday 08:00-4:30.   Will suspend communication x 90 days

## 2021-03-09 ENCOUNTER — VIRTUAL VISIT (OUTPATIENT)
Dept: FAMILY MEDICINE CLINIC | Age: 81
End: 2021-03-09
Payer: MEDICARE

## 2021-03-09 DIAGNOSIS — F32.1 EPISODE OF MODERATE MAJOR DEPRESSION (HCC): ICD-10-CM

## 2021-03-09 DIAGNOSIS — E11.21 TYPE 2 DIABETES MELLITUS WITH NEPHROPATHY (HCC): ICD-10-CM

## 2021-03-09 DIAGNOSIS — D32.9 MENINGIOMA (HCC): ICD-10-CM

## 2021-03-09 DIAGNOSIS — I48.21 PERMANENT ATRIAL FIBRILLATION (HCC): ICD-10-CM

## 2021-03-09 DIAGNOSIS — I27.20 PULMONARY HTN (HCC): ICD-10-CM

## 2021-03-09 DIAGNOSIS — Z93.3 COLOSTOMY PRESENT (HCC): ICD-10-CM

## 2021-03-09 DIAGNOSIS — R06.01 ORTHOPNEA: ICD-10-CM

## 2021-03-09 PROCEDURE — 99443 PR PHYS/QHP TELEPHONE EVALUATION 21-30 MIN: CPT | Performed by: FAMILY MEDICINE

## 2021-03-09 RX ORDER — ALBUTEROL SULFATE 90 UG/1
2 AEROSOL, METERED RESPIRATORY (INHALATION)
Qty: 2 INHALER | Refills: 1 | Status: SHIPPED | OUTPATIENT
Start: 2021-03-09

## 2021-03-09 NOTE — PROGRESS NOTES
Elvis Jack is a 79 y.o. female, evaluated via audio-only technology on 3/9/2021 for Irregular Heart Beat  . Assessment & Plan:   Diagnoses and all orders for this visit:    1. Colostomy present ([address]    2. Pulmonary HTN ([address]    3. Episode of moderate major depression ([address]    4. Type 2 diabetes mellitus with nephropathy ([address]    5. Permanent atrial fibrillation ([address]    6. Meningioma ([address]    7. Orthopnea  -     albuterol (PROVENTIL HFA, VENTOLIN HFA, PROAIR HFA) 90 mcg/actuation inhaler; Take 2 Puffs by inhalation every six (6) hours as needed for Wheezing or Shortness of Breath. 12  Subjective:     She has shortness of breath which responds to albuterol at bedtime; she has had resolution of symptoms when she is supine. She has improved swelling in her lower extremities with using furosemide. she did not like urinating during the day so she is now taking the medication at 5 AM.  This allows her to void earlier in the morning and have better quality of life. She wears adult diapers. She denies chest pain. She is not active. She denies cough. She has frequent urination; she denies incontinence; she denies abdominal pain or dysuria. She is followed by cardiology for atrial fibrillation; she is taking amiodarone but denies side effects. Prior to Admission medications    Medication Sig Start Date End Date Taking? Authorizing Provider   furosemide (LASIX) 20 mg tablet TAKE 1 TABLET BY MOUTH DAILY 2/1/21   Florinda Ding MD   albuterol (PROVENTIL HFA, VENTOLIN HFA, PROAIR HFA) 90 mcg/actuation inhaler Take 2 Puffs by inhalation every six (6) hours as needed for Wheezing or Shortness of Breath.  1/4/21   Florinda Ding MD   levothyroxine (SYNTHROID) 75 mcg tablet TAKE 1 TABLET BY MOUTH EVERY DAY BEFORE BREAKFAST 12/18/20   Florinda Ding MD   metoprolol tartrate (LOPRESSOR) 25 mg tablet TAKE 1/2 TABLET BY MOUTH EVERY 12 HOURS 12/15/20   Clifton Ayon MD   apixaban (Eliquis) 5 mg tablet TAKE 1 TABLET BY MOUTH TWICE DAILY 11/30/20   Carter Zimmer MD   hydrALAZINE (APRESOLINE) 10 mg tablet Take 1 Tab by mouth two (2) times a day. 1/2 tablet by mouth as needed for SBP >165 10/14/20   Mago Crespo MD   losartan (COZAAR) 100 mg tablet Take 1 Tab by mouth daily. 10/14/20   Mago Crespo MD   amiodarone (CORDARONE) 200 mg tablet Take 1 Tab by mouth daily. 10/14/20   Mago Crespo MD   simvastatin (ZOCOR) 20 mg tablet TAKE 1 TABLET BY MOUTH EVERY NIGHT 10/14/20   Mago Crespo MD   multivitamin (ONE A DAY) tablet Take 1 Tab by mouth daily. 10/14/20   Mago Crespo MD   ondansetron hcl (ZOFRAN) 4 mg tablet Take 1 Tab by mouth every eight (8) hours as needed for Nausea. 9/3/20   Mago Crespo MD   triamcinolone acetonide (KENALOG) 0.1 % topical cream APPLY THIN LAYER EXTERNALLY TO THE AFFECTED AREA TWICE DAILY 9/3/20   Mago Crespo MD   metFORMIN (GLUCOPHAGE) 1,000 mg tablet TAKE 1 TABLET BY MOUTH TWICE DAILY 7/20/20   Mago Crespo MD   pantoprazole (PROTONIX) 40 mg tablet TAKE 1 TABLET BY MOUTH DAILY 6/1/20   Mago Crespo MD   dicyclomine (BentyL) 10 mg capsule Take 2 Caps by mouth three (3) times daily as needed for Abdominal Cramps. 4/8/20   Mago Crespo MD   glucose blood VI test strips (FREESTYLE LITE STRIPS) strip use to check BS once daily 11/16/16   Provider, Historical   cholecalciferol (VITAMIN D3) 1,000 unit tablet Take 2 Tabs by mouth daily. 11/21/17   MD MICHELLE Parrish  Constitutional: Positive for weight gain. Negative for chills and fever.    HENT: Negative for congestion and sore throat.    Eyes: Negative for blurred vision. Respiratory: Negative for cough.    Cardiovascular: Negative for chest pain and palpitations. Gastrointestinal: Negative for abdominal pain, constipation, diarrhea, heartburn, nausea and vomiting. Genitourinary: improved dysuria, frequency, hematuria and urgency.  Negative for flank pain.   Musculoskeletal: Negative for back pain, falls and myalgias. Skin: Negative.    Neurological: Negative for dizziness and headaches. Endo/Heme/Allergies: Negative.    Psychiatric/Behavioral: Negative for depression and suicidal ideas.   No flowsheet data found. Aziza Agrawal, who was evaluated through a patient-initiated, synchronous (real-time) audio only encounter, and/or her healthcare decision maker, is aware that it is a billable service, with coverage as determined by her insurance carrier. She provided verbal consent to proceed: Yes. She has not had a related appointment within my department in the past 7 days or scheduled within the next 24 hours.       Total Time: minutes: 21-30 minutes    Archana Jimenez MD

## 2021-03-09 NOTE — PROGRESS NOTES
Jenna Agrawal presents today for   Chief Complaint   Patient presents with   • Irregular Heart Beat       Is someone accompanying this pt? na    Is the patient using any DME equipment during OV? na    Depression Screening:  3 most recent PHQ Screens 12/1/2020   Little interest or pleasure in doing things Not at all   Feeling down, depressed, irritable, or hopeless Not at all   Total Score PHQ 2 0       Learning Assessment:  Learning Assessment 1/17/2019   PRIMARY LEARNER Patient   HIGHEST LEVEL OF EDUCATION - PRIMARY LEARNER  4 YEARS OF COLLEGE   BARRIERS PRIMARY LEARNER NONE   CO-LEARNER CAREGIVER No   PRIMARY LANGUAGE ENGLISH   LEARNER PREFERENCE PRIMARY LISTENING   ANSWERED BY patient   RELATIONSHIP SELF       Abuse Screening:  Abuse Screening Questionnaire 12/1/2020   Do you ever feel afraid of your partner? N   Are you in a relationship with someone who physically or mentally threatens you? N   Is it safe for you to go home? Y       Fall Risk  Fall Risk Assessment, last 12 mths 12/1/2020   Able to walk? Yes   Fall in past 12 months? Yes   Number of falls in past 12 months 1   Fall with injury? 0       Health Maintenance reviewed and discussed and ordered per Provider.    Health Maintenance Due   Topic Date Due   • Eye Exam Retinal or Dilated  Never done   • COVID-19 Vaccine (1 of 2) Never done   • Shingrix Vaccine Age 50> (1 of 2) Never done   • GLAUCOMA SCREENING Q2Y  Never done   • MICROALBUMIN Q1  10/10/2019   • Lipid Screen  10/10/2019   • Foot Exam Q1  07/24/2020   • A1C test (Diabetic or Prediabetic)  02/28/2021   .      Coordination of Care:  1. Have you been to the ER, urgent care clinic since your last visit? Hospitalized since your last visit? no    2. Have you seen or consulted any other health care providers outside of the [facility] since your last visit? Include any pap smears or colon screening. no      Last  Checked na  Last UDS Checked na  Last Pain contract signed:  na    Patients concerns today:  Medication issue amiodarone

## 2021-03-23 ENCOUNTER — PATIENT OUTREACH (OUTPATIENT)
Dept: CASE MANAGEMENT | Age: 81
End: 2021-03-23

## 2021-03-23 NOTE — PROGRESS NOTES
Complex Case Management      Date/Time:  3/23/2021 12:51 PM    Method of communication with patient:phone    [id number] ([facility]) contacted the patient by telephone to perform Ambulatory Care Coordination. Verified name and  (PHI) with patient as identifiers. Provided introduction to self, and explanation of the Ambulatory Care Manager's role. Patient declined CCM.

## 2021-04-05 DIAGNOSIS — R06.01 ORTHOPNEA: ICD-10-CM

## 2021-04-05 RX ORDER — FUROSEMIDE 20 MG/1
TABLET ORAL
Qty: 30 TAB | Refills: 1 | Status: SHIPPED | OUTPATIENT
Start: 2021-04-05 | End: 2021-06-04

## 2021-04-22 ENCOUNTER — DOCUMENTATION ONLY (OUTPATIENT)
Dept: INTERNAL MEDICINE CLINIC | Age: 81
End: 2021-04-22

## 2021-04-22 ENCOUNTER — IMMUNIZATION (OUTPATIENT)
Dept: INTERNAL MEDICINE CLINIC | Age: 81
End: 2021-04-22
Payer: MEDICARE

## 2021-04-22 DIAGNOSIS — Z23 ENCOUNTER FOR IMMUNIZATION: Primary | ICD-10-CM

## 2021-04-22 PROCEDURE — 91301 COVID-19, MRNA, LNP-S, PF, 100MCG/0.5ML DOSE(MODERNA): CPT | Performed by: INTERNAL MEDICINE

## 2021-04-22 PROCEDURE — 0011A COVID-19, MRNA, LNP-S, PF, 100MCG/0.5ML DOSE(MODERNA): CPT | Performed by: INTERNAL MEDICINE

## 2021-04-22 NOTE — PROGRESS NOTES
Salo Kohler is a 79 y.o. female who presents for   Chief Complaint   Patient presents with    Immunization/Injection     1st covid 23 moderna vaccine given   ,  She denies any symptoms , reactions or allergies that would exclude them from being immunized today. Risks and adverse reactions were discussed and the VIS was given to them. All questions were addressed. She was observed for 15 min post injection. There were no reactions observed.     Demetris Hein LPN

## 2021-05-20 ENCOUNTER — IMMUNIZATION (OUTPATIENT)
Dept: INTERNAL MEDICINE CLINIC | Age: 81
End: 2021-05-20
Payer: MEDICARE

## 2021-05-20 ENCOUNTER — DOCUMENTATION ONLY (OUTPATIENT)
Dept: INTERNAL MEDICINE CLINIC | Age: 81
End: 2021-05-20

## 2021-05-20 DIAGNOSIS — Z23 ENCOUNTER FOR IMMUNIZATION: Primary | ICD-10-CM

## 2021-05-20 PROCEDURE — 0012A COVID-19, MRNA, LNP-S, PF, 100MCG/0.5ML DOSE(MODERNA): CPT | Performed by: INTERNAL MEDICINE

## 2021-05-20 PROCEDURE — 91301 COVID-19, MRNA, LNP-S, PF, 100MCG/0.5ML DOSE(MODERNA): CPT | Performed by: INTERNAL MEDICINE

## 2021-05-20 NOTE — PROGRESS NOTES
Dennis Alonso is a 79 y.o. female who presents for   Chief Complaint   Patient presents with    Immunization/Injection     2nd covid 23 moderna vaccine given    ,  She denies any symptoms , reactions or allergies that would exclude them from being immunized today. Risks and adverse reactions were discussed and the VIS was given to them. All questions were addressed. She was observed for 15 min post injection. There were no reactions observed.     Carlie Gutiérrez LPN

## 2021-06-03 DIAGNOSIS — N30.01 ACUTE CYSTITIS WITH HEMATURIA: Primary | ICD-10-CM

## 2021-06-03 DIAGNOSIS — R06.01 ORTHOPNEA: ICD-10-CM

## 2021-06-03 RX ORDER — LEVOFLOXACIN 250 MG/1
250 TABLET ORAL EVERY 24 HOURS
Qty: 15 TABLET | Refills: 0 | Status: SHIPPED | OUTPATIENT
Start: 2021-06-03 | End: 2021-06-14 | Stop reason: ALTCHOICE

## 2021-06-04 RX ORDER — FUROSEMIDE 20 MG/1
TABLET ORAL
Qty: 30 TABLET | Refills: 1 | Status: SHIPPED | OUTPATIENT
Start: 2021-06-04 | End: 2021-06-28 | Stop reason: SDUPTHER

## 2021-06-14 ENCOUNTER — OFFICE VISIT (OUTPATIENT)
Dept: FAMILY MEDICINE CLINIC | Age: 81
End: 2021-06-14
Payer: MEDICARE

## 2021-06-14 VITALS
RESPIRATION RATE: 16 BRPM | SYSTOLIC BLOOD PRESSURE: 142 MMHG | WEIGHT: 178 LBS | OXYGEN SATURATION: 96 % | HEART RATE: 71 BPM | HEIGHT: 60 IN | DIASTOLIC BLOOD PRESSURE: 76 MMHG | BODY MASS INDEX: 34.95 KG/M2 | TEMPERATURE: 98 F

## 2021-06-14 DIAGNOSIS — E03.4 HYPOTHYROIDISM DUE TO ACQUIRED ATROPHY OF THYROID: ICD-10-CM

## 2021-06-14 DIAGNOSIS — G25.81 RESTLESS LEG: ICD-10-CM

## 2021-06-14 DIAGNOSIS — R60.0 LOWER EXTREMITY EDEMA: ICD-10-CM

## 2021-06-14 DIAGNOSIS — E11.21 TYPE 2 DIABETES MELLITUS WITH NEPHROPATHY (HCC): ICD-10-CM

## 2021-06-14 DIAGNOSIS — L30.9 ECZEMA, UNSPECIFIED TYPE: ICD-10-CM

## 2021-06-14 DIAGNOSIS — Z93.3 COLOSTOMY PRESENT (HCC): ICD-10-CM

## 2021-06-14 DIAGNOSIS — I10 ESSENTIAL HYPERTENSION: Primary | ICD-10-CM

## 2021-06-14 DIAGNOSIS — B37.31 VAGINAL CANDIDIASIS: ICD-10-CM

## 2021-06-14 DIAGNOSIS — L30.4 INTERTRIGO: ICD-10-CM

## 2021-06-14 PROCEDURE — 1090F PRES/ABSN URINE INCON ASSESS: CPT | Performed by: FAMILY MEDICINE

## 2021-06-14 PROCEDURE — G8754 DIAS BP LESS 90: HCPCS | Performed by: FAMILY MEDICINE

## 2021-06-14 PROCEDURE — G8417 CALC BMI ABV UP PARAM F/U: HCPCS | Performed by: FAMILY MEDICINE

## 2021-06-14 PROCEDURE — G8536 NO DOC ELDER MAL SCRN: HCPCS | Performed by: FAMILY MEDICINE

## 2021-06-14 PROCEDURE — 1101F PT FALLS ASSESS-DOCD LE1/YR: CPT | Performed by: FAMILY MEDICINE

## 2021-06-14 PROCEDURE — G8399 PT W/DXA RESULTS DOCUMENT: HCPCS | Performed by: FAMILY MEDICINE

## 2021-06-14 PROCEDURE — G8427 DOCREV CUR MEDS BY ELIG CLIN: HCPCS | Performed by: FAMILY MEDICINE

## 2021-06-14 PROCEDURE — 99215 OFFICE O/P EST HI 40 MIN: CPT | Performed by: FAMILY MEDICINE

## 2021-06-14 PROCEDURE — G8753 SYS BP > OR = 140: HCPCS | Performed by: FAMILY MEDICINE

## 2021-06-14 PROCEDURE — G8510 SCR DEP NEG, NO PLAN REQD: HCPCS | Performed by: FAMILY MEDICINE

## 2021-06-14 RX ORDER — ROPINIROLE 1 MG/1
1 TABLET, FILM COATED ORAL 3 TIMES DAILY
Qty: 90 TABLET | Refills: 1 | Status: SHIPPED | OUTPATIENT
Start: 2021-06-14 | End: 2021-06-16

## 2021-06-14 RX ORDER — FLUCONAZOLE 150 MG/1
150 TABLET ORAL DAILY
Qty: 1 TABLET | Refills: 0 | Status: SHIPPED | OUTPATIENT
Start: 2021-06-14 | End: 2021-06-15

## 2021-06-14 RX ORDER — TRIAMCINOLONE ACETONIDE 1 MG/G
CREAM TOPICAL
Qty: 45 G | Refills: 1 | Status: SHIPPED | OUTPATIENT
Start: 2021-06-14 | End: 2022-06-23

## 2021-06-14 NOTE — PROGRESS NOTES
Herminia Phillips is a 79 y.o.  female and presents with    Chief Complaint   Patient presents with    Foot Swelling     bilateral    Ankle swelling     bilateral       Subjective:  Edema  Patient complains of edema. The location of the edema is lower leg(s) bilateral, feet bilateral.  The edema has been moderate. Onset of symptoms was 9 months ago, unchanged since that time. The edema is present all day. The swelling has been aggravated by use of medication such as amiodorone, relieved by diuretics, and been associated with weight gain. Cardiac risk factors include diabetes mellitus, obesity, hypertension, post-menopausal.    She c/o vaginal itching and had bleeding. She has been applying zinc oxide cream.    Asthma Review:  The patient is being seen for follow up of asthma, not currently in exacerbation. Asthma symptoms occur: infrequently. Wheezing when present is described as mild, mild-moderate and easily relieved with rescue bronchodilator. Current limitations in activity from asthma: none. Number of days of school or work missed in the last month: N/A. Frequency of use of quick-relief meds: less than once a month. Regimen compliance: The patient reports adherence to this regimen. Patient does not smoke cigarettes. ROS   Constitutional: Positive for weight gain. Negative for chills and fever.    HENT: Negative for congestion and sore throat.    Eyes: Negative for blurred vision. Respiratory: Negative for cough.    Cardiovascular: Negative for chest pain and palpitations. Gastrointestinal: Negative for abdominal pain, constipation, diarrhea, heartburn, nausea and vomiting. Genitourinary: improved dysuria, frequency, hematuria and urgency. Negative for flank pain.   Musculoskeletal: Negative for back pain, falls and myalgias. Skin: Negative.    Neurological: Negative for dizziness and headaches. All other systems reviewed and are negative.       Objective:  Vitals:    06/14/21 1323 BP: (!) 150/85   Pulse: 71   Resp: 16   Temp: 98 °F (36.7 °C)   TempSrc: Temporal   SpO2: 96%   Weight: 178 lb (80.7 kg)   Height: 5' (1.524 m)   PainSc:   0 - No pain       alert, well appearing, and in no distress, oriented to person, place, and time and obese  Mental status - normal mood, behavior, speech, dress, motor activity, and thought processes  Chest - clear to auscultation, no wheezes, rales or rhonchi, symmetric air entry  Heart - normal rate and regular rhythm, systolic murmur 3/6 at 2nd right intercostal space   Abdomen- colostomy clean, dry and intact  Neurological - cranial nerves II through XII intact  Extremities - pedal edema 2 +  Skin - peeling    LABS     TESTS      Assessment/Plan:    1. Essential hypertension  Goal <130/80    2. Hypothyroidism due to acquired atrophy of thyroid  Continue levothyroxine    3. Lower extremity edema  Continue furosemide and use compression stockings    4. Type 2 diabetes mellitus with nephropathy (HCC)  Goal hgb a1c <7  - LIPID PANEL; Future  - MICROALBUMIN, UR, RAND W/ MICROALB/CREAT RATIO; Future  - HM DIABETES FOOT EXAM  - HEMOGLOBIN A1C WITH EAG; Future    5. Vaginal candidiasis  Start single dose of antifungal  - fluconazole (DIFLUCAN) 150 mg tablet; Take 1 Tablet by mouth daily for 1 day. FDA advises cautious prescribing of oral fluconazole in pregnancy. Dispense: 1 Tablet; Refill: 0    6. Intertrigo  Start zinc oxide  - zinc oxide-vitamin b5-vit e (Balmex Adult Care) 11.3 % crea cream; Apply 2 g to affected area as needed (rash). Dispense: 453 g; Refill: 1    7. Eczema, unspecified type    - triamcinolone acetonide (KENALOG) 0.1 % topical cream; APPLY THIN LAYER EXTERNALLY TO THE AFFECTED AREA TWICE DAILY  Dispense: 45 g; Refill: 1    8. Colostomy  Continue care  Lab review: orders written for new lab studies as appropriate; see orders    9.  Restless legs  Start ropinerole for symptom relief    I have discussed the diagnosis with the patient and the intended plan as seen in the above orders. The patient has received an after-visit summary and questions were answered concerning future plans. I have discussed medication side effects and warnings with the patient as well. I have reviewed the plan of care with the patient, accepted their input and they are in agreement with the treatment goals.

## 2021-06-14 NOTE — PROGRESS NOTES
David Quiet presents today for   Chief Complaint   Patient presents with    Foot Swelling     bilateral    Ankle swelling     bilateral       Is someone accompanying this pt? Yes her son    Is the patient using any DME equipment during [address]? Yes a wheelchair    Depression Screening:  3 most recent PHQ Screens 6/14/2021   Little interest or pleasure in doing things Not at all   Feeling down, depressed, irritable, or hopeless Not at all   Total Score PHQ 2 0       Learning Assessment:  Learning Assessment 1/17/2019   PRIMARY LEARNER Patient   HIGHEST LEVEL OF EDUCATION - PRIMARY LEARNER  4 YEARS OF COLLEGE   BARRIERS PRIMARY LEARNER NONE   CO-LEARNER CAREGIVER No   PRIMARY LANGUAGE ENGLISH   LEARNER PREFERENCE PRIMARY LISTENING   ANSWERED BY patient   RELATIONSHIP SELF       Abuse Screening:  Abuse Screening Questionnaire 12/1/2020   Do you ever feel afraid of your partner? N   Are you in a relationship with someone who physically or mentally threatens you? N   Is it safe for you to go home? Y       Fall Risk  Fall Risk Assessment, last 12 mths 6/14/2021   Able to walk? Yes   Fall in past 12 months? 0   Do you feel unsteady? 0   Are you worried about falling 0   Number of falls in past 12 months -   Fall with injury? -       Health Maintenance reviewed and discussed and ordered per Provider. Health Maintenance Due   Topic Date Due    Eye Exam Retinal or Dilated  Never done    Shingrix Vaccine Age 50> (1 of 2) Never done    MICROALBUMIN Q1  10/10/2019    Lipid Screen  10/10/2019    Foot Exam Q1  07/24/2020    A1C test (Diabetic or Prediabetic)  03/12/2021   . Coordination of Care:  1. Have you been to the ER, urgent care clinic since your last visit? Hospitalized since your last visit? Yes, 6/1/21, [facility], allergic reaction. 2. Have you seen or consulted any other health care providers outside of the [address] [place] [address] since your last visit?  Include any pap smears or colon screening. no      Last  Checked na  Last UDS Checked na  Last Pain contract signed: na    Patient presents in office today for routine care.   Patient concerns: swollen legs and ankles

## 2021-06-16 RX ORDER — ROPINIROLE 1 MG/1
TABLET, FILM COATED ORAL
Qty: 270 TABLET | Refills: 1 | Status: SHIPPED | OUTPATIENT
Start: 2021-06-16 | End: 2021-12-18

## 2021-06-28 DIAGNOSIS — R06.01 ORTHOPNEA: ICD-10-CM

## 2021-06-28 RX ORDER — FUROSEMIDE 20 MG/1
TABLET ORAL
Qty: 90 TABLET | Refills: 3 | Status: SHIPPED | OUTPATIENT
Start: 2021-06-28 | End: 2021-07-28 | Stop reason: SDUPTHER

## 2021-06-28 RX ORDER — POTASSIUM CHLORIDE 750 MG/1
10 TABLET, EXTENDED RELEASE ORAL DAILY
Qty: 90 TABLET | Refills: 3 | Status: SHIPPED | OUTPATIENT
Start: 2021-06-28 | End: 2022-06-15

## 2021-07-02 NOTE — TELEPHONE ENCOUNTER
Requested Prescriptions     Pending Prescriptions Disp Refills    apixaban (Eliquis) 5 mg tablet 60 Tablet 6     Sig: TAKE 1 TABLET BY MOUTH TWICE DAILY     Patient has appointment 8/3/21 for follow up

## 2021-07-28 DIAGNOSIS — R06.01 ORTHOPNEA: ICD-10-CM

## 2021-07-28 RX ORDER — FUROSEMIDE 20 MG/1
TABLET ORAL
Qty: 90 TABLET | Refills: 3 | Status: SHIPPED | OUTPATIENT
Start: 2021-07-28 | End: 2021-08-19 | Stop reason: SDUPTHER

## 2021-08-04 DIAGNOSIS — I10 ESSENTIAL HYPERTENSION: ICD-10-CM

## 2021-08-04 RX ORDER — HYDRALAZINE HYDROCHLORIDE 10 MG/1
TABLET, FILM COATED ORAL
Qty: 180 TABLET | Refills: 3 | Status: SHIPPED | OUTPATIENT
Start: 2021-08-04 | End: 2022-05-25

## 2021-08-06 DIAGNOSIS — N39.0 RECURRENT UTI: Primary | ICD-10-CM

## 2021-08-06 RX ORDER — SULFAMETHOXAZOLE AND TRIMETHOPRIM 800; 160 MG/1; MG/1
1 TABLET ORAL 2 TIMES DAILY
Qty: 20 TABLET | Refills: 0 | Status: SHIPPED | OUTPATIENT
Start: 2021-08-06 | End: 2021-10-15 | Stop reason: SDUPTHER

## 2021-08-12 ENCOUNTER — OFFICE VISIT (OUTPATIENT)
Dept: FAMILY MEDICINE CLINIC | Age: 81
End: 2021-08-12
Payer: MEDICARE

## 2021-08-12 ENCOUNTER — HOSPITAL ENCOUNTER (OUTPATIENT)
Dept: LAB | Age: 81
Discharge: HOME | End: 2021-08-12
Payer: MEDICARE

## 2021-08-12 VITALS
DIASTOLIC BLOOD PRESSURE: 62 MMHG | SYSTOLIC BLOOD PRESSURE: 144 MMHG | BODY MASS INDEX: 35.34 KG/M2 | HEIGHT: 60 IN | RESPIRATION RATE: 16 BRPM | WEIGHT: 180 LBS | OXYGEN SATURATION: 98 % | HEART RATE: 60 BPM | TEMPERATURE: 98.2 F

## 2021-08-12 DIAGNOSIS — Z93.3 COLOSTOMY PRESENT (HCC): ICD-10-CM

## 2021-08-12 DIAGNOSIS — E11.21 TYPE 2 DIABETES MELLITUS WITH NEPHROPATHY (HCC): ICD-10-CM

## 2021-08-12 DIAGNOSIS — E66.01 SEVERE OBESITY (BMI 35.0-35.9 WITH COMORBIDITY) (HCC): ICD-10-CM

## 2021-08-12 DIAGNOSIS — I10 ESSENTIAL HYPERTENSION: ICD-10-CM

## 2021-08-12 DIAGNOSIS — Z00.00 MEDICARE ANNUAL WELLNESS VISIT, INITIAL: Primary | ICD-10-CM

## 2021-08-12 DIAGNOSIS — Z71.89 ADVANCE CARE PLANNING: ICD-10-CM

## 2021-08-12 DIAGNOSIS — E03.4 HYPOTHYROIDISM DUE TO ACQUIRED ATROPHY OF THYROID: ICD-10-CM

## 2021-08-12 LAB
CHOLEST SERPL-MCNC: 169 MG/DL
CREAT UR-MCNC: 75 MG/DL (ref 30–125)
EST. AVERAGE GLUCOSE BLD GHB EST-MCNC: 126 MG/DL
HBA1C MFR BLD: 6 % (ref 4.2–5.6)
HDLC SERPL-MCNC: 78 MG/DL (ref 40–60)
HDLC SERPL: 2.2 {RATIO} (ref 0–5)
LDLC SERPL CALC-MCNC: 64 MG/DL (ref 0–100)
LIPID PROFILE,FLP: ABNORMAL
MICROALBUMIN UR-MCNC: 8.94 MG/DL (ref 0–3)
MICROALBUMIN/CREAT UR-RTO: 119 MG/G (ref 0–30)
TRIGL SERPL-MCNC: 135 MG/DL (ref ?–150)
VLDLC SERPL CALC-MCNC: 27 MG/DL

## 2021-08-12 PROCEDURE — G8432 DEP SCR NOT DOC, RNG: HCPCS | Performed by: FAMILY MEDICINE

## 2021-08-12 PROCEDURE — 36415 COLL VENOUS BLD VENIPUNCTURE: CPT

## 2021-08-12 PROCEDURE — G8536 NO DOC ELDER MAL SCRN: HCPCS | Performed by: FAMILY MEDICINE

## 2021-08-12 PROCEDURE — G8427 DOCREV CUR MEDS BY ELIG CLIN: HCPCS | Performed by: FAMILY MEDICINE

## 2021-08-12 PROCEDURE — G8753 SYS BP > OR = 140: HCPCS | Performed by: FAMILY MEDICINE

## 2021-08-12 PROCEDURE — G8754 DIAS BP LESS 90: HCPCS | Performed by: FAMILY MEDICINE

## 2021-08-12 PROCEDURE — G8399 PT W/DXA RESULTS DOCUMENT: HCPCS | Performed by: FAMILY MEDICINE

## 2021-08-12 PROCEDURE — 99214 OFFICE O/P EST MOD 30 MIN: CPT | Performed by: FAMILY MEDICINE

## 2021-08-12 PROCEDURE — 1101F PT FALLS ASSESS-DOCD LE1/YR: CPT | Performed by: FAMILY MEDICINE

## 2021-08-12 PROCEDURE — 99497 ADVNCD CARE PLAN 30 MIN: CPT | Performed by: FAMILY MEDICINE

## 2021-08-12 PROCEDURE — 82043 UR ALBUMIN QUANTITATIVE: CPT

## 2021-08-12 PROCEDURE — 80061 LIPID PANEL: CPT

## 2021-08-12 PROCEDURE — G8417 CALC BMI ABV UP PARAM F/U: HCPCS | Performed by: FAMILY MEDICINE

## 2021-08-12 PROCEDURE — 83036 HEMOGLOBIN GLYCOSYLATED A1C: CPT

## 2021-08-12 PROCEDURE — 1090F PRES/ABSN URINE INCON ASSESS: CPT | Performed by: FAMILY MEDICINE

## 2021-08-12 PROCEDURE — G0438 PPPS, INITIAL VISIT: HCPCS | Performed by: FAMILY MEDICINE

## 2021-08-12 RX ORDER — METFORMIN HYDROCHLORIDE 1000 MG/1
TABLET ORAL
Qty: 180 TABLET | Refills: 0 | Status: SHIPPED | OUTPATIENT
Start: 2021-08-12 | End: 2021-11-08 | Stop reason: SDUPTHER

## 2021-08-12 NOTE — PROGRESS NOTES
(AWV) The Initial Medicare Annual Wellness Exam PROGRESS NOTE    This is an Initial Medicare Annual Wellness Exam (AWV)  I have reviewed the patient's medical history in detail and updated the computerized patient record. Nica Perry is a 79 y.o.  female and presents for an annual wellness exam     ROS   Constitutional: Positive for weight gain. Negative for chills and fever.    HENT: Negative for congestion and sore throat.    Eyes: Negative for blurred vision. Respiratory: Negative for cough.    Cardiovascular: Negative for chest pain and palpitations. Gastrointestinal: Negative for abdominal pain, constipation, diarrhea, heartburn, nausea and vomiting. Genitourinary: improved dysuria, frequency, hematuria and urgency. Negative for flank pain.   Musculoskeletal: Negative for back pain, falls and myalgias. Skin: Negative.    Neurological: Negative for dizziness and headaches.      All other systems reviewed and are negative  History     Past Medical History:   Diagnosis Date    Aortic stenosis     Patient denies on 10/31/2019.  Arthritis     Atrial flutter ([address]     Bladder stone     Bronchitis     Resolved after getting rid of her pet bird in 2000. 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     Vesicocolic fistula       Past Surgical History:   Procedure Laterality Date    COLONOSCOPY N/A 2/2/2017    COLONOSCOPY  w/bx polyp performed by Akash Chino MD at [facility] Left 2017     Current Outpatient Medications   Medication Sig Dispense Refill    trimethoprim-sulfamethoxazole (BACTRIM DS, SEPTRA DS) 160-800 mg per tablet Take 1 Tablet by mouth two (2) times a day for 10 days. 20 Tablet 0    hydrALAZINE (APRESOLINE) 10 mg tablet TAKE 1 TABLET BY MOUTH TWICE DAILY AND 1/2 TABLET BY MOUTH AS NEEDED FOR SYSTOLIC BLOOD MDINAHXV>840 180 Tablet 3    furosemide (LASIX) 20 mg tablet TAKE 1 TABLET BY MOUTH DAILY 90 Tablet 3    apixaban (Eliquis) 5 mg tablet TAKE 1 TABLET BY MOUTH TWICE DAILY 60 Tablet 6    potassium chloride (KLOR-CON) 10 mEq tablet Take 1 Tablet by mouth daily. 90 Tablet 3    rOPINIRole (REQUIP) 1 mg tablet TAKE 1 TABLET BY MOUTH THREE TIMES DAILY 270 Tablet 1    zinc oxide-vitamin b5-vit e (Balmex Adult Care) 11.3 % crea cream Apply 2 g to affected area as needed (rash). 453 g 1    triamcinolone acetonide (KENALOG) 0.1 % topical cream APPLY THIN LAYER EXTERNALLY TO THE AFFECTED AREA TWICE DAILY 45 g 1    albuterol (PROVENTIL HFA, VENTOLIN HFA, PROAIR HFA) 90 mcg/actuation inhaler Take 2 Puffs by inhalation every six (6) hours as needed for Wheezing or Shortness of Breath. 2 Inhaler 1    levothyroxine (SYNTHROID) 75 mcg tablet TAKE 1 TABLET BY MOUTH EVERY DAY BEFORE BREAKFAST 90 Tab 3    metoprolol tartrate (LOPRESSOR) 25 mg tablet TAKE 1/2 TABLET BY MOUTH EVERY 12 HOURS 30 Tab 3    losartan (COZAAR) 100 mg tablet Take 1 Tab by mouth daily. 90 Tab 3    amiodarone (CORDARONE) 200 mg tablet Take 1 Tab by mouth daily. 90 Tab 3    simvastatin (ZOCOR) 20 mg tablet TAKE 1 TABLET BY MOUTH EVERY NIGHT 90 Tab 3    multivitamin (ONE A DAY) tablet Take 1 Tab by mouth daily. 90 Tab 3    ondansetron hcl (ZOFRAN) 4 mg tablet Take 1 Tab by mouth every eight (8) hours as needed for Nausea. 30 Tab 1    metFORMIN (GLUCOPHAGE) 1,000 mg tablet TAKE 1 TABLET BY MOUTH TWICE DAILY 180 Tab 0    pantoprazole (PROTONIX) 40 mg tablet TAKE 1 TABLET BY MOUTH DAILY 90 Tab 3    dicyclomine (BentyL) 10 mg capsule Take 2 Caps by mouth three (3) times daily as needed for Abdominal Cramps.  180 Cap 11    glucose blood VI test strips (FREESTYLE LITE STRIPS) strip use to check BS once daily      cholecalciferol (VITAMIN D3) 1,000 unit tablet Take 2 Tabs by mouth daily. 60 Tab 0     Allergies   Allergen Reactions    Metronidazole Hives and Itching    Ampicillin Itching     Family History   Problem Relation Age of Onset    Diabetes Mother     Coronary Artery Disease Mother     Cancer Father         melanoma    Stroke Sister     Hypertension Sister     Diabetes Sister     Diabetes Brother     Coronary Artery Disease Brother     Breast Cancer Paternal Grandmother         older, but not sure age.     Breast Cancer Paternal [de-identified]         and gyn cancer ?age   [facility] No Known Problems Sister     No Known Problems Brother     Kidney Disease Sister     Heart Failure Sister      Social History     Tobacco Use    Smoking status: Never Smoker    Smokeless tobacco: Never Used   Substance Use Topics    Alcohol use: No     Patient Active Problem List   Diagnosis Code    Hypothyroid E03.9    Obesity E66.9    DM (diabetes mellitus) ([facility] E11.9    Family hx-breast malignancy Z80.3    Pancreatitis K85.90    Acute pancreatitis K85.90    Diverticulitis K57.92    Episcleritis of right eye H15.101    Hypothyroidism due to acquired atrophy of thyroid E03.4    Meningioma (HCC) D32.9    Hip fracture ([facility]) S72.009A    Type 2 diabetes mellitus with nephropathy (HCC) E11.21    Atrial fibrillation with rapid ventricular response (HCC) I48.91    Hypertension I10    Atrial fibrillation with RVR ([facility]) I48.91    Hypomagnesemia E83.42    Acute hypotension I95.9    Paroxysmal atrial fibrillation ([facility]) I48.0    Pulmonary HTN ([facility]) I27.20    [place]-vesical fistula N32.1    SIRS (systemic inflammatory response syndrome) ([facility]) R65.10    UTI (urinary tract infection) N39.0       Health Maintenance History  Immunizations reviewed, dtap up to date , pneumovax up to date, flu up to date, zoster due  Colonoscopy: up to date,   Eye exam: due  Mammo up to date  Dexascan up to date  covid vaccine up to date Depression Risk Factor Screening:      Patient Health Questionnaire (PHQ-2)   Over the last 2 weeks, how often have you been bothered by any of the following problems? · Little interest or pleasure in doing things? · Several days. [1]  · Feeling down, depressed, or hopeless? · Not at all. [0]    Total Score: 1/6  PHQ-2 Assessment Scoring:   A score of 2 or more requires further screening with the PHQ-9    Alcohol Risk Factor Screening:     Women: On any occasion during the past 3 months, have you had more than 3 drinks containing alcohol?  no   Do you average more than 7 drinks per week?  no    Functional Ability and Level of Safety:     Hearing Loss    Hearing is good. Activities of Daily Living   Self-care. Requires assistance with: no ADLs    Fall Risk   Secondary diagnoses (15 pts)  Score: 15    Abuse Screen   Patient is not abused    Examination   Physical Examination  Vitals:    08/12/21 1351   BP: (!) 144/62   Pulse: 60   Resp: 16   Temp: 98.2 °F (36.8 °C)   TempSrc: Temporal   SpO2: 98%   Weight: 180 lb (81.6 kg)   Height: 5' (1.524 m)   PainSc:   0 - No pain      Body mass index is 35.15 kg/m².      Evaluation of Cognitive Function:  Mood/affect:good mood with appropriate affect  Appearance: well kempt  Family member/caregiver input: son worries about his mother's toilet habits    alert, well appearing, and in no distress, oriented to person, place, and time and obese    Patient Care Team:  Alisson Medina MD as PCP - General (Family Medicine)  Alisson Medina MD as PCP - [facility]  Jana Waddell MD (General Surgery)  Sergio Rodriguez MD (Orthopedic Surgery)  Christy Ramos MD (Cardiology)  Steve Sorensen MD (Surgery)    End-of-life planning  Advanced Directive in the case than an injury or illness causes the patient to be unable to make health care decisions    Health Care Directive or Living Will: yes    Advice/Referrals/Counselling/Plan:   Education and counseling provided:  End-of-Life planning (with patient's consent)  Diabetes screening test  covid  Include in education list (weight loss, physical activity, smoking cessation, fall prevention, and nutrition)    ICD-10-CM ICD-9-CM    1. Medicare annual wellness visit, initial  Z00.00 V70.0 [address]   2. Advance care planning  Z71.89 V65.49 ADVANCE CARE PLANNING FIRST 30 MINS   3. Colostomy present ([facility]  Z93.3 V44.3    4. Essential hypertension  I10 401.9    5. Hypothyroidism due to acquired atrophy of thyroid  E03.4 244.8      246.8    6. Type 2 diabetes mellitus with nephropathy ([facility])  E11.21 250.40 CANCELED: HEMOGLOBIN A1C WITH EAG     583.81    7. Severe obesity (BMI 35.0-35.9 with comorbidity) ([facility])  E66.01 278.01     Z68.35 V85.35    . Brief written plan, checklist    I have discussed the diagnosis with the patient and the intended plan as seen in the above orders. The patient has received an after-visit summary and questions were answered concerning future plans. I have discussed medication side effects and warnings with the patient as well. I have reviewed the plan of care with the patient, accepted their input and they are in agreement with the treatment goals. ____________________________________________________________    Problem Assessment    for treatment of   Chief Complaint   Patient presents with    Annual Wellness Visit         SUBJECTIVE    Well Adult Physical   Patient here for a comprehensive physical exam.The patient reports problems - thyroid disease with fatigue, intermittent blood in urine with history of colovesicular fistula s/p repair; diabetes  Do you take any herbs or supplements that were not prescribed by a doctor? no Are you taking calcium supplements? no Are you taking aspirin daily? no  She continues to have intermittent lower extremity edema and is taking furosemide. She is followed by Dr. Sana Bai in cardiology for mild aortic stenosis.   She has an ostomy in place s/p the fistula repair. She has frequent leaking and excess waste in her ostomy which causes her to run out of her supplies before they are replenished. She is needing 30 per month of her anastasia rings, wafers and pouches. Visit Vitals  BP (!) 144/62 (BP 1 Location: Left arm, BP Patient Position: Sitting, BP Cuff Size: Adult)   Pulse 60   Temp 98.2 °F (36.8 °C) (Temporal)   Resp 16   Ht 5' (1.524 m)   Wt 180 lb (81.6 kg)   SpO2 98%   BMI 35.15 kg/m²     General:  Alert, cooperative, no distress, appears stated age. Head:  Normocephalic, without obvious abnormality, atraumatic. Eyes:  Conjunctivae/corneas clear. PERRL, EOMs intact. Ears:  Normal TMs and external ear canals both ears. Nose: Nares normal. Septum midline. Mucosa normal. No drainage or sinus tenderness. Throat: Lips, mucosa, and tongue normal. Teeth and gums normal.   Neck: Supple, symmetrical, trachea midline, no adenopathy, thyroid: no enlargement/tenderness/nodules and no JVD. Back:   Symmetric, no curvature. ROM normal. No CVA tenderness. Lungs:   Clear to auscultation bilaterally. Chest wall:  No tenderness or deformity. Heart:  Regular rate and rhythm, S1, S2 normal, no murmur, click, rub or gallop. Breast Exam:  No tenderness, masses, or nipple abnormality. Abdomen:   Soft, non-tender. Bowel sounds normal. No masses,  No organomegaly. Genitalia:  deferred   Rectal:  deferred   Extremities: Extremities normal, atraumatic, no cyanosis or edema. Pulses: 2+ and symmetric all extremities. Skin: Skin color, texture, turgor normal. No rashes or lesions. Lymph nodes: Cervical, supraclavicular, and axillary nodes normal.   Neurologic: CNII-XII intact. Normal strength, sensation and reflexes throughout. LABS     TESTS      Assessment/Plan:    1. Medicare annual wellness visit, initial  Reviewed preventive recommendations  - [address] [address]    2.  Advance care planning  See ACP  - ADVANCE CARE PLANNING FIRST 30 MINS    3. Colostomy present ([facility]  Need exceeds current limit from her insurance and medical supplies. Recommend 90 pouches/90 wafers/90 anastasia seals per 90 days    4. Essential hypertension  Goal <130/80; not at goal encourage compliance with diet and medication    5. Hypothyroidism due to acquired atrophy of thyroid  Continue therapeutic dose; assess for appropriate level with fatigue    6. Type 2 diabetes mellitus with nephropathy (HCC)  Goal hgb a1c <7; well controlled with current treatment    7. Severe obesity (BMI 35.0-35.9 with comorbidity) ([facility]  I have reviewed/discussed the above normal BMI with the patient. I have recommended the following interventions: dietary management education, guidance, and counseling and encourage exercise .      Lab review: orders written for new lab studies as appropriate; see orders

## 2021-08-12 NOTE — PROGRESS NOTES
Jatin Strauss presents today for   Chief Complaint   Patient presents with    Annual Wellness Visit       Is someone accompanying this pt? Yes her son    Is the patient using any DME equipment during [facility]? Yes a walker    Depression Screening:  3 most recent PHQ Screens 8/12/2021   Little interest or pleasure in doing things Not at all   Feeling down, depressed, irritable, or hopeless Not at all   Total Score PHQ 2 0       Learning Assessment:  Learning Assessment 1/17/2019   PRIMARY LEARNER Patient   HIGHEST LEVEL OF EDUCATION - PRIMARY LEARNER  4 YEARS OF COLLEGE   BARRIERS PRIMARY LEARNER NONE   CO-LEARNER CAREGIVER No   PRIMARY LANGUAGE ENGLISH   LEARNER PREFERENCE PRIMARY LISTENING   ANSWERED BY patient   RELATIONSHIP SELF       Abuse Screening:  Abuse Screening Questionnaire 12/1/2020   Do you ever feel afraid of your partner? N   Are you in a relationship with someone who physically or mentally threatens you? N   Is it safe for you to go home? Y       Fall Risk  Fall Risk Assessment, last 12 mths 8/12/2021   Able to walk? Yes   Fall in past 12 months? 0   Do you feel unsteady? 0   Are you worried about falling 0   Number of falls in past 12 months -   Fall with injury? -       Health Maintenance reviewed and discussed and ordered per Provider. Health Maintenance Due   Topic Date Due    Eye Exam Retinal or Dilated  Never done    Shingrix Vaccine Age 50> (1 of 2) Never done    MICROALBUMIN Q1  10/10/2019    Lipid Screen  10/10/2019    A1C test (Diabetic or Prediabetic)  03/12/2021    Medicare Yearly Exam  08/20/2021   . Coordination of Care:  1. Have you been to the ER, urgent care clinic since your last visit? Hospitalized since your last visit? Yes, 7/20/21, [facility] ER, abd pain    2. Have you seen or consulted any other health care providers outside of the [address] [place] [address] since your last visit? Include any pap smears or colon screening.  no      Last  Checked na  Last UDS Checked na  Last Pain contract signed: yesys    Annual Medicare Wellness Exam

## 2021-08-19 ENCOUNTER — OFFICE VISIT (OUTPATIENT)
Dept: CARDIOLOGY CLINIC | Age: 81
End: 2021-08-19
Payer: MEDICARE

## 2021-08-19 VITALS
HEIGHT: 60 IN | WEIGHT: 178 LBS | DIASTOLIC BLOOD PRESSURE: 61 MMHG | HEART RATE: 64 BPM | SYSTOLIC BLOOD PRESSURE: 157 MMHG | BODY MASS INDEX: 34.95 KG/M2 | OXYGEN SATURATION: 96 %

## 2021-08-19 DIAGNOSIS — I35.0 AORTIC VALVE STENOSIS, ETIOLOGY OF CARDIAC VALVE DISEASE UNSPECIFIED: Primary | ICD-10-CM

## 2021-08-19 PROCEDURE — G8428 CUR MEDS NOT DOCUMENT: HCPCS | Performed by: INTERNAL MEDICINE

## 2021-08-19 PROCEDURE — 1090F PRES/ABSN URINE INCON ASSESS: CPT | Performed by: INTERNAL MEDICINE

## 2021-08-19 PROCEDURE — G8753 SYS BP > OR = 140: HCPCS | Performed by: INTERNAL MEDICINE

## 2021-08-19 PROCEDURE — 99214 OFFICE O/P EST MOD 30 MIN: CPT | Performed by: INTERNAL MEDICINE

## 2021-08-19 PROCEDURE — G8536 NO DOC ELDER MAL SCRN: HCPCS | Performed by: INTERNAL MEDICINE

## 2021-08-19 PROCEDURE — 1101F PT FALLS ASSESS-DOCD LE1/YR: CPT | Performed by: INTERNAL MEDICINE

## 2021-08-19 PROCEDURE — G8510 SCR DEP NEG, NO PLAN REQD: HCPCS | Performed by: INTERNAL MEDICINE

## 2021-08-19 PROCEDURE — G8399 PT W/DXA RESULTS DOCUMENT: HCPCS | Performed by: INTERNAL MEDICINE

## 2021-08-19 PROCEDURE — G8417 CALC BMI ABV UP PARAM F/U: HCPCS | Performed by: INTERNAL MEDICINE

## 2021-08-19 PROCEDURE — G8754 DIAS BP LESS 90: HCPCS | Performed by: INTERNAL MEDICINE

## 2021-08-19 RX ORDER — FUROSEMIDE 40 MG/1
40 TABLET ORAL DAILY
Qty: 90 TABLET | Refills: 3 | Status: SHIPPED | OUTPATIENT
Start: 2021-08-19

## 2021-08-19 NOTE — LETTER
8/19/2021    Patient: Zia Lincoln   YOB: 1940   Date of Visit: 8/19/2021     Berenice Trejo, [address]  [address] [facility]    Dear Berenice Trejo MD,      Thank you for referring Ms. Edda Mayorga to [facility] for evaluation. My notes for this consultation are attached. If you have questions, please do not hesitate to call me. I look forward to following your patient along with you.       Sincerely,    Bryan Watts MD

## 2021-08-19 NOTE — PROGRESS NOTES
Susie Martins presents today for   Chief Complaint   Patient presents with    Follow-up     overdue        Susie Martins preferred language for health care discussion is english/other. Is someone accompanying this pt? no    Is the patient using any DME equipment during [address]? no    Depression Screening:  3 most recent PHQ Screens 8/19/2021   Little interest or pleasure in doing things Not at all   Feeling down, depressed, irritable, or hopeless Not at all   Total Score PHQ 2 0       Learning Assessment:  Learning Assessment 1/17/2019   PRIMARY LEARNER Patient   HIGHEST LEVEL OF EDUCATION - PRIMARY LEARNER  4 YEARS OF COLLEGE   BARRIERS PRIMARY LEARNER NONE   CO-LEARNER CAREGIVER No   PRIMARY LANGUAGE ENGLISH   LEARNER PREFERENCE PRIMARY LISTENING   ANSWERED BY patient   RELATIONSHIP SELF       Abuse Screening:  Abuse Screening Questionnaire 12/1/2020   Do you ever feel afraid of your partner? N   Are you in a relationship with someone who physically or mentally threatens you? N   Is it safe for you to go home? Y       Fall Risk  Fall Risk Assessment, last 12 mths 8/19/2021   Able to walk? Yes   Fall in past 12 months? 0   Do you feel unsteady? 0   Are you worried about falling 0   Number of falls in past 12 months -   Fall with injury? -       Pt currently taking Anticoagulant therapy? Eliquis     Coordination of Care:  1. Have you been to the ER, urgent care clinic since your last visit? Hospitalized since your last visit? no    2. Have you seen or consulted any other health care providers outside of the [address] [place] [address] since your last visit? Include any pap smears or colon screening.  no

## 2021-08-19 NOTE — PATIENT INSTRUCTIONS
Testing   Echo  Please our office at [phone] to schedule testing October 5, 2021. Nuclear Stress Instructions-Nothing to eat or drink past midnight and no medicaitons the morning of cardiac testing. **call office 3-5 days after testing is completed for results**     New Medication/Medication Changes  Lasix increase to 40 mg daily    **please allow 24-48 hrs for medication to be escribed to pharmacy** If you need any refills on medications please contact your pharmacy so that the request can be escribed to the provider for review.     Labs  BMP lab in 2 weeks  No appointment required for lab work  [address] [place] [address]  Hours are Mon-Fri 7:00 am-5:00 pm

## 2021-08-19 NOTE — PROGRESS NOTES
Ms. Yuliana Abdul is a 79 y.o. female with a history of atrial flutter, diabetes, hypertension, dyslipidemia and hypothyroidism. Ms. Yuliana Abdul is here today for a follow up appointment. Patient has undergone abdominal surgery for fistula since last visit. She has colostomy. She does not appear to have any symptoms concerning for angina or heart failure. She denies any significant palpitation, presyncope or syncope. She denies any bleeding problems. Past Medical History:   Diagnosis Date    Aortic stenosis     Patient denies on 10/31/2019.  Arthritis     Atrial flutter ([address]     Bladder stone     Bronchitis     Resolved after getting rid of her pet bird in 2000. 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  Vesicocolic fistula        Current Outpatient Medications   Medication Sig Dispense Refill    metFORMIN (GLUCOPHAGE) 1,000 mg tablet TAKE 1 TABLET BY MOUTH TWICE DAILY 180 Tablet 0    hydrALAZINE (APRESOLINE) 10 mg tablet TAKE 1 TABLET BY MOUTH TWICE DAILY AND 1/2 TABLET BY MOUTH AS NEEDED FOR SYSTOLIC BLOOD [id number] 180 Tablet 3    furosemide (LASIX) 20 mg tablet TAKE 1 TABLET BY MOUTH DAILY 90 Tablet 3    apixaban (Eliquis) 5 mg tablet TAKE 1 TABLET BY MOUTH TWICE DAILY 60 Tablet 6    potassium chloride (KLOR-CON) 10 mEq tablet Take 1 Tablet by mouth daily. 90 Tablet 3    rOPINIRole (REQUIP) 1 mg tablet TAKE 1 TABLET BY MOUTH THREE TIMES DAILY 270 Tablet 1    zinc oxide-vitamin b5-vit e (Balmex Adult Care) 11.3 % crea cream Apply 2 g to affected area as needed (rash).  453 g 1    triamcinolone acetonide (KENALOG) 0.1 % topical cream APPLY THIN LAYER EXTERNALLY TO THE AFFECTED AREA TWICE DAILY 45 g 1    albuterol (PROVENTIL HFA, VENTOLIN HFA, PROAIR HFA) 90 mcg/actuation inhaler Take 2 Puffs by inhalation every six (6) hours as needed for Wheezing or Shortness of Breath. 2 Inhaler 1    levothyroxine (SYNTHROID) 75 mcg tablet TAKE 1 TABLET BY MOUTH EVERY DAY BEFORE BREAKFAST 90 Tab 3    metoprolol tartrate (LOPRESSOR) 25 mg tablet TAKE 1/2 TABLET BY MOUTH EVERY 12 HOURS 30 Tab 3    losartan (COZAAR) 100 mg tablet Take 1 Tab by mouth daily. 90 Tab 3    amiodarone (CORDARONE) 200 mg tablet Take 1 Tab by mouth daily. 90 Tab 3    simvastatin (ZOCOR) 20 mg tablet TAKE 1 TABLET BY MOUTH EVERY NIGHT 90 Tab 3    multivitamin (ONE A DAY) tablet Take 1 Tab by mouth daily. 90 Tab 3    ondansetron hcl (ZOFRAN) 4 mg tablet Take 1 Tab by mouth every eight (8) hours as needed for Nausea. 30 Tab 1    pantoprazole (PROTONIX) 40 mg tablet TAKE 1 TABLET BY MOUTH DAILY 90 Tab 3    dicyclomine (BentyL) 10 mg capsule Take 2 Caps by mouth three (3) times daily as needed for Abdominal Cramps. 180 Cap 11    glucose blood VI test strips (FREESTYLE LITE STRIPS) strip use to check BS once daily      cholecalciferol (VITAMIN D3) 1,000 unit tablet Take 2 Tabs by mouth daily. 60 Tab 0     Allergies   Allergen Reactions    Metronidazole Hives and Itching    Ampicillin Itching     Past Medical History:   Diagnosis Date    Aortic stenosis     Patient denies on 10/31/2019.  Arthritis     Atrial flutter ([address]     Bladder stone     Bronchitis     Resolved after getting rid of her pet bird in 2000. 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     Vesicocolic fistula      Past Surgical History:   Procedure Laterality Date    COLONOSCOPY N/A 2/2/2017    COLONOSCOPY  w/bx polyp performed by Mis Martinez MD at [address] Left 2017     Family History   Problem Relation Age of Onset    Diabetes Mother     Coronary Artery Disease Mother     Cancer Father         melanoma    Stroke Sister     Hypertension Sister     Diabetes Sister     Diabetes Brother     Coronary Artery Disease Brother     Breast Cancer Paternal Grandmother         older, but not sure age.  Breast Cancer Paternal Aunt         and gyn cancer ?age   [place] Saliva No Known Problems Sister     No Known Problems Brother     Kidney Disease Sister     Heart Failure Sister      Social History     Tobacco Use   Smoking Status Never Smoker   Smokeless Tobacco Never Used        Objective:     Visit Vitals  BP (!) 157/61 (BP 1 Location: Left upper arm, BP Patient Position: Sitting, BP Cuff Size: Large adult)   Pulse 64   Ht 5' (1.524 m)   Wt 80.7 kg (178 lb)   SpO2 96%   BMI 34.76 kg/m²     General:  alert, cooperative, no distress   Chest Wall: inspection normal - no chest wall deformities or tenderness, respiratory effort normal   Lung: clear to auscultation bilaterally, no rales   Heart:  normal rate, regular rhythm, normal S1, S2, Mild 3/6 MADDI on aortic area. Abdomen: soft, non-tender. Bowel sounds normal. No masses,  no organomegaly   Extremities: edema 1/2+bilateral LE's Skin: no rashes   Neuro: alert, oriented, normal speech, no focal findings or movement disorder noted     ECHO (08/20)  Left Ventricle  Normal cavity size, wall thickness and systolic function (ejection fraction normal). The estimated EF is 55 - 60%. There is mild (grade 1) left ventricular diastolic dysfunction. Wall Scoring  The left ventricular wall motion is normal.             Left Atrium  Normal cavity size. Right Ventricle  Normal cavity size, wall thickness and global systolic function. Assessment of RV function:   TAPSE = 21 mm. TAPSV = 13 mm/s. Right Atrium  Normal cavity size. Aortic Valve  There is leaflet calcification. Aortic valve peak gradient is 35 mmHg. Aortic valve mean gradient is 20 mmHg. Aortic valve area is 1 cm2. Aortic valve peak velocity is 297 cm/s. There is moderate aortic stenosis. Aortic Valve Velocity Ratio 0.32. Mean gradient 21mm Hg, likely underestimate. Trace aortic valve regurgitation. Mitral Valve  No stenosis. Mitral valve non-specific thickening and thickening. There is mild anterior leaflet thickening diffusely. There is mild, posterior leaflet thickening diffusely. Mild regurgitation. Tricuspid Valve  Normal valve structure and no stenosis. Mild regurgitation. Pulmonic Valve  Pulmonic valve not well visualized. No stenosis and no regurgitation. Aorta  Normal aortic root, ascending aortic, and aortic arch. Pulmonary Artery  Pulmonary arterial systolic pressure (PASP) is 44 mmHg. Pulmonary hypertension found to be mild. Assessment/Plan:     Atrial flutter:   Heart rate appears to be in regular rhythm on exam today. Currently on metoprolol twice daily. She is also on amiodarone  Also on Eliquis. No bleeding side effects. Hypertension: BP stable continue same. BP today 157/61. Repeat blood pressure was 138/60. Dyslipidemia: continue with simvastatin, last LDL 67. Aortic stenosis: Mild-moderate by echo in 2018. Last echo with mean gradient of 31 in November 2019, moderate range. Last echo in August 2020, mean aortic valve gradient of 21 mmHg. We will repeat echocardiogram as on exam murmur appears to be increased in intensity compared to last visit. follow up in 6 months or sooner as symptoms dictate.

## 2021-08-28 NOTE — ACP (ADVANCE CARE PLANNING)
Advance Care Planning     Advance Care Planning (ACP) Physician/NP/PA Conversation      Date of Conversation: 8/12/2021  Conducted with: Patient with Decision Making Capacity    Healthcare Decision Maker:     Primary Decision Maker: Sandy Cui - [phone]  Click here to complete [address] including selection of the Healthcare Decision Maker Relationship (ie \"Primary\")      Today we documented Decision Maker(s) consistent with Legal Next of Kin hierarchy. Care Preferences:    Hospitalization: \"If your health worsens and it becomes clear that your chance of recovery is unlikely, what would be your preference regarding hospitalization? \"  The patient would prefer hospitalization. Ventilation: \"If you were unable to breathe on your own and your chance of recovery was unlikely, what would be your preference about the use of a ventilator (breathing machine) if it was available to you? \"   The patient would desire the use of a ventilator. Resuscitation: \"In the event your heart stopped as a result of an underlying serious health condition, would you want attempts to be made to restart your heart, or would you prefer a natural death? \"   Yes, attempt to resuscitate.     Additional topics discussed: treatment goals, benefit/burden of treatment options, ventilation preferences, hospitalization preferences and resuscitation preferences    Conversation Outcomes / Follow-Up Plan:   ACP in process - information provided, considering goals and options  Reviewed DNR/DNI and patient elects Full Code (Attempt Resuscitation)     Length of Voluntary ACP Conversation in minutes:  16 minutes    Nataly Wong MD

## 2021-09-20 DIAGNOSIS — I10 ESSENTIAL HYPERTENSION: ICD-10-CM

## 2021-09-21 RX ORDER — LOSARTAN POTASSIUM 100 MG/1
TABLET ORAL
Qty: 90 TABLET | Refills: 3 | Status: SHIPPED | OUTPATIENT
Start: 2021-09-21 | End: 2022-09-22

## 2021-09-22 NOTE — PROGRESS NOTES
Problem: Mobility Impaired (Adult and Pediatric)  Goal: *Acute Goals and Plan of Care (Insert Text)  PHYSICAL THERAPY STG GOALS :  Initiated 10/13/2017 and to be accomplished within 2 Weeks (updated 10/26/17)    1. Patient will move from supine to sit and sit to supine  and roll side to side in bed with minimal assistance/contact guard assist with WB compliance. (Achieved)  2. Patient will transfer from bed to chair and chair to bed with minimal assistance/contact guard assist using platform walker with WB compliance. (Achieved)  3. Patient will perform sit to stand with minimal assistance/contact guard assist with Fair balance and safety awareness with WB compliance. (Achieved)  4. Patient will ambulate with minimal assistance/contact guard assist for 75 feet with platform walker on level surfaces with 2 turns with WB compliance. (Progressing; 16 ft using platform RW with CGA)  5. Patient will ascend/descend 14 stairs with right handrail(s) withminimal assistance to allow for safe home access/exit with WB compliance. (Attempted, pt currently unable)  6. Patient will improve standardized test score for [place] Standing Balance Scale 1+ with WB compliance. (Achieved)    PHYSICAL THERAPY STG GOALS :  Initiated 10/13/2017 and to be accomplished within 2 Weeks (updated 10/19/17)    1. Patient will move from supine to sit and sit to supine  and roll side to side in bed with minimal assistance/contact guard assist with WB compliance. (Achieved)  2. Patient will transfer from bed to chair and chair to bed with minimal assistance/contact guard assist using platform walker with WB compliance. (Progressing modA)  3. Patient will perform sit to stand with minimal assistance/contact guard assist with Fair balance and safety awareness with WB compliance. (Progressing modA)  4.   Patient will ambulate with minimal assistance/contact guard assist for 75 feet with platform walker on level surfaces with 2 turns with WB compliance. (not tested due to TTWB on LLE)  5. Patient will ascend/descend 14 stairs with right handrail(s) withminimal assistance to allow for safe home access/exit with WB compliance. (Not tested due to WB status)  6. Patient will improve standardized test score for [place] Standing Balance Scale 1+ with WB compliance. PHYSICAL THERAPY LTG GOALS :  Initiated 10/13/2017 and to be accomplished within 4 Weeks    1. Patient will move from supine to sit and sit to supine  and roll side to side in bed with modified independence with WB compliance. 2.  Patient will transfer from bed to chair and chair to bed with modified independence using LRAD with WB compliance. 3.  Patient will perform sit to stand with modified independence with Good balance and safety awareness with WB compliance. 4.  Patient will ambulate with modified independence for 150 feet with LRAD on level surfaces and be able to maneuver through narrow spaces and obstacles without loss of balance with WB compliance. 5.  Patient will ascend/descend 14 stairs with right handrail(s) with supervision/set-up to allow for safe home access/exit with WB compliance. 6.  Patient will improve standardized test score for [place] Standing Balance Scale 3+ to reduce fall risk with WB compliance.       Physical Therapist:   Nat Pitts, PT  on 10/13/2017              [address] [place] [address]   physical Therapy Daily Treatment note      Patient: Herman Nevarez (12 y.o. female)               Date: 10/27/2017    Physician: Queenie Reardon MD  Primary Diagnosis: left hip fracture  Hip fracture [facility])          Treatment Diagnosis  Treatment Diagnosis: muscle weakness  Treatment Diagnosis 2: increased need for assist w/ self care  Precautions: Fall, WBAT (Splint at L wrist;ROM activities @L wrist)  Vital Signs        Cognitive Status:     Pain     Bed Mobility Training  Bed Mobility Training  Rolling: Stand-by asssistance  Supine to Sit: Stand-by asssistance  Sit to Supine: Moderate assistance  Scooting: Stand-by asssistance  Interventions: Verbal cues  Balance  Sitting: Without support  Sitting - Static: Good (unsupported)  Sitting - Dynamic: Fair (occasional) (+)  Standing: Impaired; With support  Standing - Static: [address]  Standing - Dynamic : Fair  Transfer Training  Transfer Training  Sit to Stand: Contact guard assistance  Stand to Sit: Contact guard assistance  Stand Pivot Transfers: Contact guard assistance  Bed to Chair: Contact guard assistance  Sit to Stand: Contact guard assistance  Gait Training  Gait  Base of Support: Center of gravity altered  Speed/Polly: Slow  Step Length: Left shortened;Right shortened  Stance: Left decreased  Gait Abnormalities: Antalgic;Decreased step clearance  Ambulation - Level of Assistance: Contact guard assistance  Distance (ft): 12 Feet (ft)  Assistive Device: Gait belt;Walker, rolling (platform)     Gait Abnormalities: Antalgic;Decreased step clearance            With 0 turns. Therapeutic Exercise:    Nurse present with pt, assessing vitals. BP at 128/62 mmHg in supine at rest. She reports not having pain meds this morning as she previously had pain meds before breakfast and experienced N/V. Gait training performed as mentioned above with verbal cueing for improved floor clearance and step length. Gait training discontinued when pt c/o dizziness and nausea. Therapist retrieved diet ginger ale per pt's request. Pt then began to demonstrate chills with BUEs shaking. Nurse notified. BP reassessed at 151/65 mmHg in sitting after w/c <> toilet transfer, 172/78 mmHg after w/c to bed and sit to supine transfers, and 152/79 mmHg after about 5 minute rest break. Pt became very emotional with crying saying, Amarjit Sanford is this happening to me? Why don't I feel good? \" Therapist consoled and calmed pt, educated on progressions this week than allowed to rest.      Patient/Caregiver Education:   Pt /Caregiver Education on safety and fall prevention to reduce fall risk. ASSESSMENT:  Patient continues to benefit from Skilled PT services to improve gait deficits, restore function. Progression toward goals:  []      Improving appropriately and progressing toward goals  [x]      Improving slowly and progressing toward goals  []      Not making progress toward goals and plan of care will be adjusted     Treatment session: 54 minutes.   Therapist:   Evangelina Le, PT,          10/27/2017 IMPROVE-DD Assessment completed: Sep 22 2021  9:32PM

## 2021-09-23 DIAGNOSIS — I48.21 PERMANENT ATRIAL FIBRILLATION (HCC): ICD-10-CM

## 2021-09-23 RX ORDER — AMIODARONE HYDROCHLORIDE 200 MG/1
200 TABLET ORAL DAILY
Qty: 90 TABLET | Refills: 3 | Status: SHIPPED | OUTPATIENT
Start: 2021-09-23 | End: 2021-10-02

## 2021-09-23 RX ORDER — ONDANSETRON 4 MG/1
4 TABLET, FILM COATED ORAL
Qty: 30 TABLET | Refills: 1 | Status: SHIPPED | OUTPATIENT
Start: 2021-09-23

## 2021-10-02 DIAGNOSIS — I48.21 PERMANENT ATRIAL FIBRILLATION (HCC): ICD-10-CM

## 2021-10-02 RX ORDER — AMIODARONE HYDROCHLORIDE 200 MG/1
200 TABLET ORAL DAILY
Qty: 90 TABLET | Refills: 3 | Status: SHIPPED | OUTPATIENT
Start: 2021-10-02

## 2021-10-15 DIAGNOSIS — N39.0 RECURRENT UTI: ICD-10-CM

## 2021-10-15 RX ORDER — SULFAMETHOXAZOLE AND TRIMETHOPRIM 800; 160 MG/1; MG/1
1 TABLET ORAL 2 TIMES DAILY
Qty: 20 TABLET | Refills: 1 | Status: SHIPPED | OUTPATIENT
Start: 2021-10-15

## 2021-11-01 DIAGNOSIS — E03.4 HYPOTHYROIDISM DUE TO ACQUIRED ATROPHY OF THYROID: ICD-10-CM

## 2021-11-02 RX ORDER — LEVOTHYROXINE SODIUM 75 UG/1
TABLET ORAL
Qty: 90 TABLET | Refills: 3 | Status: SHIPPED | OUTPATIENT
Start: 2021-11-02

## 2021-11-08 RX ORDER — METFORMIN HYDROCHLORIDE 1000 MG/1
TABLET ORAL
Qty: 180 TABLET | Refills: 0 | Status: SHIPPED | OUTPATIENT
Start: 2021-11-08

## 2021-12-16 DIAGNOSIS — E11.21 TYPE 2 DIABETES MELLITUS WITH NEPHROPATHY (HCC): ICD-10-CM

## 2021-12-17 DIAGNOSIS — G25.81 RESTLESS LEG: ICD-10-CM

## 2021-12-17 RX ORDER — SIMVASTATIN 20 MG/1
TABLET, FILM COATED ORAL
Qty: 90 TABLET | Refills: 3 | Status: SHIPPED | OUTPATIENT
Start: 2021-12-17

## 2021-12-18 RX ORDER — ROPINIROLE 1 MG/1
TABLET, FILM COATED ORAL
Qty: 270 TABLET | Refills: 1 | Status: SHIPPED | OUTPATIENT
Start: 2021-12-18

## 2021-12-24 DIAGNOSIS — J06.9 VIRAL URI WITH COUGH: Primary | ICD-10-CM

## 2021-12-24 RX ORDER — CODEINE PHOSPHATE AND GUAIFENESIN 10; 100 MG/5ML; MG/5ML
5 SOLUTION ORAL
Qty: 118 ML | Refills: 0 | Status: SHIPPED | OUTPATIENT
Start: 2021-12-24 | End: 2022-04-22 | Stop reason: SDUPTHER

## 2022-01-11 ENCOUNTER — OFFICE VISIT (OUTPATIENT)
Dept: CARDIOLOGY CLINIC | Age: 82
End: 2022-01-11
Payer: MEDICARE

## 2022-01-11 VITALS
BODY MASS INDEX: 36.71 KG/M2 | HEART RATE: 66 BPM | OXYGEN SATURATION: 100 % | HEIGHT: 60 IN | WEIGHT: 187 LBS | DIASTOLIC BLOOD PRESSURE: 74 MMHG | SYSTOLIC BLOOD PRESSURE: 140 MMHG

## 2022-01-11 DIAGNOSIS — I35.0 NONRHEUMATIC AORTIC VALVE STENOSIS: Primary | ICD-10-CM

## 2022-01-11 DIAGNOSIS — E66.01 SEVERE OBESITY (BMI 35.0-35.9 WITH COMORBIDITY) (HCC): ICD-10-CM

## 2022-01-11 PROCEDURE — G8753 SYS BP > OR = 140: HCPCS | Performed by: INTERNAL MEDICINE

## 2022-01-11 PROCEDURE — G8417 CALC BMI ABV UP PARAM F/U: HCPCS | Performed by: INTERNAL MEDICINE

## 2022-01-11 PROCEDURE — 1101F PT FALLS ASSESS-DOCD LE1/YR: CPT | Performed by: INTERNAL MEDICINE

## 2022-01-11 PROCEDURE — G8510 SCR DEP NEG, NO PLAN REQD: HCPCS | Performed by: INTERNAL MEDICINE

## 2022-01-11 PROCEDURE — 99214 OFFICE O/P EST MOD 30 MIN: CPT | Performed by: INTERNAL MEDICINE

## 2022-01-11 PROCEDURE — G8399 PT W/DXA RESULTS DOCUMENT: HCPCS | Performed by: INTERNAL MEDICINE

## 2022-01-11 PROCEDURE — G8536 NO DOC ELDER MAL SCRN: HCPCS | Performed by: INTERNAL MEDICINE

## 2022-01-11 PROCEDURE — G8428 CUR MEDS NOT DOCUMENT: HCPCS | Performed by: INTERNAL MEDICINE

## 2022-01-11 PROCEDURE — G8754 DIAS BP LESS 90: HCPCS | Performed by: INTERNAL MEDICINE

## 2022-01-11 PROCEDURE — 1090F PRES/ABSN URINE INCON ASSESS: CPT | Performed by: INTERNAL MEDICINE

## 2022-01-11 NOTE — LETTER
1/11/2022    Patient: Clementine Fuchs   YOB: 1940   Date of Visit: 1/11/2022     Lenard Arias MD  [id number] [place] [address]  [address]    Dear Lenard Arias MD,      Thank you for referring Ms. Oscar Luna to [facility] for evaluation. My notes for this consultation are attached. If you have questions, please do not hesitate to call me. I look forward to following your patient along with you.       Sincerely,    Chaitanya Dias MD

## 2022-01-11 NOTE — PROGRESS NOTES
Ms. Sury Sloan is a 79 y.o. female with a history of atrial flutter, diabetes, hypertension, dyslipidemia and hypothyroidism. Ms. Sury Sloan is here today for a follow up appointment. Patient has undergone abdominal surgery for fistula since last visit. She has colostomy. Still has not undergone reversal according to patient  She does not appear to have any symptoms concerning for angina or heart failure. She denies any significant palpitation, presyncope or syncope. She denies any bleeding problems. She tells me that she takes only Lasix 20 mg daily instead of 40 mg because she has to go frequent urination    Past Medical History:   Diagnosis Date    Aortic stenosis     Patient denies on 10/31/2019.  Arthritis     Atrial flutter ([address]     Bladder stone     Bronchitis     Resolved after getting rid of her pet bird in 2000. 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  Vesicocolic fistula        Current Outpatient Medications   Medication Sig Dispense Refill    rOPINIRole (REQUIP) 1 mg tablet TAKE 1 TABLET BY MOUTH THREE TIMES DAILY 270 Tablet 1    simvastatin (ZOCOR) 20 mg tablet TAKE 1 TABLET BY MOUTH EVERY NIGHT 90 Tablet 3    metFORMIN (GLUCOPHAGE) 1,000 mg tablet TAKE 1 TABLET BY MOUTH TWICE DAILY 180 Tablet 0    levothyroxine (SYNTHROID) 75 mcg tablet TAKE 1 TABLET BY MOUTH EVERY DAY BEFORE BREAKFAST 90 Tablet 3    trimethoprim-sulfamethoxazole (BACTRIM DS, SEPTRA DS) 160-800 mg per tablet Take 1 Tablet by mouth two (2) times a day.  20 Tablet 1    amiodarone (CORDARONE) 200 mg tablet TAKE 1 TABLET BY MOUTH DAILY 90 Tablet 3    ondansetron hcl (ZOFRAN) 4 mg tablet Take 1 Tablet by mouth every eight (8) hours as needed for Nausea. 30 Tablet 1    losartan (COZAAR) 100 mg tablet TAKE 1 TABLET BY MOUTH DAILY 90 Tablet 3    furosemide (LASIX) 40 mg tablet Take 1 Tablet by mouth daily. 90 Tablet 3    hydrALAZINE (APRESOLINE) 10 mg tablet TAKE 1 TABLET BY MOUTH TWICE DAILY AND 1/2 TABLET BY MOUTH AS NEEDED FOR SYSTOLIC BLOOD [id number] 180 Tablet 3    apixaban (Eliquis) 5 mg tablet TAKE 1 TABLET BY MOUTH TWICE DAILY 60 Tablet 6    potassium chloride (KLOR-CON) 10 mEq tablet Take 1 Tablet by mouth daily. 90 Tablet 3    zinc oxide-vitamin b5-vit e (Balmex Adult Care) 11.3 % crea cream Apply 2 g to affected area as needed (rash). 453 g 1    triamcinolone acetonide (KENALOG) 0.1 % topical cream APPLY THIN LAYER EXTERNALLY TO THE AFFECTED AREA TWICE DAILY 45 g 1    albuterol (PROVENTIL HFA, VENTOLIN HFA, PROAIR HFA) 90 mcg/actuation inhaler Take 2 Puffs by inhalation every six (6) hours as needed for Wheezing or Shortness of Breath. 2 Inhaler 1    metoprolol tartrate (LOPRESSOR) 25 mg tablet TAKE 1/2 TABLET BY MOUTH EVERY 12 HOURS 30 Tab 3    multivitamin (ONE A DAY) tablet Take 1 Tab by mouth daily. 90 Tab 3    pantoprazole (PROTONIX) 40 mg tablet TAKE 1 TABLET BY MOUTH DAILY 90 Tab 3    dicyclomine (BentyL) 10 mg capsule Take 2 Caps by mouth three (3) times daily as needed for Abdominal Cramps. 180 Cap 11    glucose blood VI test strips (FREESTYLE LITE STRIPS) strip use to check BS once daily      cholecalciferol (VITAMIN D3) 1,000 unit tablet Take 2 Tabs by mouth daily. 60 Tab 0     Allergies   Allergen Reactions    Metronidazole Hives and Itching    Ampicillin Itching     Past Medical History:   Diagnosis Date    Aortic stenosis     Patient denies on 10/31/2019.  Arthritis     Atrial flutter ([address]     Bladder stone     Bronchitis     Resolved after getting rid of her pet bird in 2000.    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  Vesicocolic fistula      Past Surgical History:   Procedure Laterality Date    COLONOSCOPY N/A 2/2/2017    COLONOSCOPY  w/bx polyp performed by Sanjana Herrera MD at [address] Left 2017     Family History   Problem Relation Age of Onset    Diabetes Mother     Coronary Art Dis Mother     Cancer Father         melanoma    Stroke Sister     Hypertension Sister     Diabetes Sister     Diabetes Brother     Coronary Art Dis Brother     Breast Cancer Paternal Grandmother         older, but not sure age.  Breast Cancer Paternal Aunt         and gyn cancer ?age   Shell Larios No Known Problems Sister     No Known Problems Brother     Kidney Disease Sister     Heart Failure Sister      Social History     Tobacco Use   Smoking Status Never Smoker   Smokeless Tobacco Never Used        Objective:     Visit Vitals  BP (!) 140/74 (BP 1 Location: Left upper arm, BP Patient Position: Sitting, BP Cuff Size: Adult)   Pulse 66   Ht 5' (1.524 m)   Wt 84.8 kg (187 lb)   SpO2 100%   BMI 36.52 kg/m²     General:  alert, cooperative, no distress   Chest Wall: inspection normal - no chest wall deformities or tenderness, respiratory effort normal   Lung: clear to auscultation bilaterally, no rales   Heart:  normal rate, regular rhythm, normal S1, S2, Mild 3/6 MADDI on aortic area. Abdomen: soft, non-tender. Bowel sounds normal. No masses,  no organomegaly   Extremities: edema 1/2+bilateral LE's Skin: no rashes   Neuro: alert, oriented, normal speech, no focal findings or movement disorder noted     ECHO (08/20)  Left Ventricle  Normal cavity size, wall thickness and systolic function (ejection fraction normal). The estimated EF is 55 - 60%.  There is mild (grade 1) left ventricular diastolic dysfunction. Wall Scoring  The left ventricular wall motion is normal.             Left Atrium  Normal cavity size. Right Ventricle  Normal cavity size, wall thickness and global systolic function. Assessment of RV function:   TAPSE = 21 mm. TAPSV = 13 mm/s. Right Atrium  Normal cavity size. Aortic Valve  There is leaflet calcification. Aortic valve peak gradient is 35 mmHg. Aortic valve mean gradient is 20 mmHg. Aortic valve area is 1 cm2. Aortic valve peak velocity is 297 cm/s. There is moderate aortic stenosis. Aortic Valve Velocity Ratio 0.32. Mean gradient 21mm Hg, likely underestimate. Trace aortic valve regurgitation. Mitral Valve  No stenosis. Mitral valve non-specific thickening and thickening. There is mild anterior leaflet thickening diffusely. There is mild, posterior leaflet thickening diffusely. Mild regurgitation. Tricuspid Valve  Normal valve structure and no stenosis. Mild regurgitation. Pulmonic Valve  Pulmonic valve not well visualized. No stenosis and no regurgitation. Aorta  Normal aortic root, ascending aortic, and aortic arch. Pulmonary Artery  Pulmonary arterial systolic pressure (PASP) is 44 mmHg. Pulmonary hypertension found to be mild. Assessment/Plan:     Atrial flutter:   Heart rate appears to be in regular rhythm on exam today. Currently on metoprolol and amiodarone  Also on Eliquis. No bleeding side effects. Hypertension: BP stable continue same. Currently on metoprolol, losartan, hydralazine and Lasix    Dyslipidemia: continue with simvastatin, last LDL 67. Aortic stenosis: Mild-moderate by echo in 2018. Last echo with mean gradient of 31 in November 2019, moderate range. Last echo in August 2020, mean aortic valve gradient of 21 mmHg. We will repeat echocardiogram as on exam murmur appears to be increased in intensity compared to last visit.     follow up in 6 months or sooner as symptoms dictate.

## 2022-01-11 NOTE — PROGRESS NOTES
Carmela Trujillo presents today for   Chief Complaint   Patient presents with    Follow-up     4 months        Carmela Trujillo preferred language for health care discussion is english/other. Is someone accompanying this pt? no    Is the patient using any DME equipment during [address]? walker    Depression Screening:  3 most recent PHQ Screens 1/11/2022   Little interest or pleasure in doing things Not at all   Feeling down, depressed, irritable, or hopeless Not at all   Total Score PHQ 2 0       Learning Assessment:  Learning Assessment 1/17/2019   PRIMARY LEARNER Patient   HIGHEST LEVEL OF EDUCATION - PRIMARY LEARNER  4 YEARS OF COLLEGE   BARRIERS PRIMARY LEARNER NONE   CO-LEARNER CAREGIVER No   PRIMARY LANGUAGE ENGLISH   LEARNER PREFERENCE PRIMARY LISTENING   ANSWERED BY patient   RELATIONSHIP SELF       Abuse Screening:  Abuse Screening Questionnaire 12/1/2020   Do you ever feel afraid of your partner? N   Are you in a relationship with someone who physically or mentally threatens you? N   Is it safe for you to go home? Y       Fall Risk  Fall Risk Assessment, last 12 mths 1/11/2022   Able to walk? Yes   Fall in past 12 months? 0   Do you feel unsteady? 0   Are you worried about falling 0   Number of falls in past 12 months -   Fall with injury? -       Pt currently taking Anticoagulant therapy? Eliqus     Coordination of Care:  1. Have you been to the ER, urgent care clinic since your last visit? Hospitalized since your last visit? no    2. Have you seen or consulted any other health care providers outside of the [address] [place] [address] since your last visit? Include any pap smears or colon screening.  no

## 2022-01-11 NOTE — PATIENT INSTRUCTIONS
Verbal order and read back per Heron Awan MD  6 months   Echo   Increase Lasix to 40mg - take 1 tablet daily for 2 weeks then 20mg once daily thereafter.

## 2022-02-08 DIAGNOSIS — R10.32 LEFT LOWER QUADRANT ABDOMINAL PAIN: Primary | ICD-10-CM

## 2022-02-08 RX ORDER — ACETAMINOPHEN AND CODEINE PHOSPHATE 300; 30 MG/1; MG/1
1 TABLET ORAL
Qty: 20 TABLET | Refills: 0 | Status: SHIPPED | OUTPATIENT
Start: 2022-02-08 | End: 2022-02-15

## 2022-02-08 RX ORDER — ACETAMINOPHEN AND CODEINE PHOSPHATE 300; 30 MG/1; MG/1
1 TABLET ORAL
Qty: 20 TABLET | Refills: 0 | Status: SHIPPED | OUTPATIENT
Start: 2022-02-08 | End: 2022-02-08 | Stop reason: SDUPTHER

## 2022-02-08 NOTE — PROGRESS NOTES
She has lower abdominal pain X 5-6 days ago. The pain started on the right side where her ribs are. The pain comes on then goes away. She says that the pain radiates toward the middle. She denies fever or chills. She is having bowel movements with miralax. She is instructed to go to ER if pain gets worse or if fever develops.

## 2022-02-24 ENCOUNTER — TELEPHONE (OUTPATIENT)
Dept: CARDIOLOGY CLINIC | Age: 82
End: 2022-02-24

## 2022-03-04 ENCOUNTER — TELEPHONE (OUTPATIENT)
Dept: CARDIOLOGY CLINIC | Age: 82
End: 2022-03-04

## 2022-03-04 NOTE — TELEPHONE ENCOUNTER
----- Message from Alix Jackson MD sent at 3/1/2022  8:09 AM EST -----  Needs follow up for abnormal ECHO and moderate AS , may need [facility]    ----- Message -----  From: Delmi Mederos MD  Sent: 3/1/2022   7:59 AM EST  To: Alix Jackson MD

## 2022-03-07 NOTE — TELEPHONE ENCOUNTER
Contacted pt at [facility] number. Two patient Identifiers confirmed. Advised pt per Dr Yasmine Green. Pt verbalized understanding and scheduled for end of march 2022.

## 2022-03-18 PROBLEM — N39.0 UTI (URINARY TRACT INFECTION): Status: ACTIVE | Noted: 2020-08-13

## 2022-03-18 PROBLEM — D32.9 MENINGIOMA (HCC): Status: ACTIVE | Noted: 2017-07-25

## 2022-03-18 PROBLEM — I48.91 ATRIAL FIBRILLATION WITH RVR (HCC): Status: ACTIVE | Noted: 2018-03-04

## 2022-03-18 PROBLEM — E11.21 TYPE 2 DIABETES MELLITUS WITH NEPHROPATHY (HCC): Status: ACTIVE | Noted: 2018-01-23

## 2022-03-18 PROBLEM — E83.42 HYPOMAGNESEMIA: Status: ACTIVE | Noted: 2019-10-31

## 2022-03-19 PROBLEM — I48.91 ATRIAL FIBRILLATION WITH RAPID VENTRICULAR RESPONSE (HCC): Status: ACTIVE | Noted: 2018-03-03

## 2022-03-19 PROBLEM — S72.009A HIP FRACTURE (HCC): Status: ACTIVE | Noted: 2017-10-09

## 2022-03-19 PROBLEM — I10 HYPERTENSION: Status: ACTIVE | Noted: 2018-03-03

## 2022-03-19 PROBLEM — I27.20 PULMONARY HTN (HCC): Status: ACTIVE | Noted: 2020-03-04

## 2022-03-19 PROBLEM — H15.101 EPISCLERITIS OF RIGHT EYE: Status: ACTIVE | Noted: 2017-05-17

## 2022-03-19 PROBLEM — I48.0 PAROXYSMAL ATRIAL FIBRILLATION (HCC): Status: ACTIVE | Noted: 2020-03-04

## 2022-03-19 PROBLEM — R65.10 SIRS (SYSTEMIC INFLAMMATORY RESPONSE SYNDROME) (HCC): Status: ACTIVE | Noted: 2020-08-13

## 2022-03-19 PROBLEM — I95.9 ACUTE HYPOTENSION: Status: ACTIVE | Noted: 2019-10-31

## 2022-03-19 PROBLEM — N32.1 COLO-VESICAL FISTULA: Status: ACTIVE | Noted: 2020-03-04

## 2022-03-20 PROBLEM — E03.4 HYPOTHYROIDISM DUE TO ACQUIRED ATROPHY OF THYROID: Status: ACTIVE | Noted: 2017-06-13

## 2022-04-21 DIAGNOSIS — R05.9 COUGH: Primary | ICD-10-CM

## 2022-04-21 RX ORDER — BENZONATATE 200 MG/1
200 CAPSULE ORAL
Qty: 21 CAPSULE | Refills: 0 | Status: SHIPPED | OUTPATIENT
Start: 2022-04-21 | End: 2022-04-28

## 2022-04-22 DIAGNOSIS — J06.9 VIRAL URI WITH COUGH: ICD-10-CM

## 2022-04-22 RX ORDER — CODEINE PHOSPHATE AND GUAIFENESIN 10; 100 MG/5ML; MG/5ML
5 SOLUTION ORAL
Qty: 118 ML | Refills: 0 | Status: SHIPPED | OUTPATIENT
Start: 2022-04-22 | End: 2022-04-29

## 2022-04-22 NOTE — PROGRESS NOTES
Pt has had cough and congestion for 3 days. She has difficulty resting at night due to the cough; she denies fever or sputum. Cough suppressant prescribed.

## 2022-05-03 NOTE — TELEPHONE ENCOUNTER
Pt. Would like a prescription for nausea. She stated she is unable to eat anything. She would like to speak to nurse.  Please assist. What Is The Reason For Today's Visit?: History of Melanoma Year Excised?: 2016 [place] Depth?: .44 What Stage Is The Melanoma?: Stage 0

## 2022-05-06 ENCOUNTER — TELEPHONE (OUTPATIENT)
Dept: FAMILY MEDICINE CLINIC | Age: 82
End: 2022-05-06

## 2022-05-06 NOTE — LETTER
NOTIFICATION     5/9/2022 8:08 AM    Ms. Flavia Agrawal  [address] [facility] [address] [id number]      To Whom It May Concern:    Kate Valencia is currently under the care of [facility]. She requires attention from her son, Svitlana Lima. Because of her age, inability to walk and history of  falls, she needs someone with her on the weekends. Please adjust Nabil's schedule to allow him to have weekends off to care for his mother. If there are questions or concerns please have the patient contact our office.         Sincerely,      Khushbu Blanca MD

## 2022-05-06 NOTE — TELEPHONE ENCOUNTER
Patient called stating she is having issues with walking and falling, and states Dr. Jet Patricia is aware of her situation. Patient also states she finally has a aide coming to her home to assist her from Monday - Friday, but does not have any one to assist over the weekends. Patient states she needs someone with her on the weekends. She is requesting a \"Release\" letter for her son Eliel Mckee. The letter needs to state that because of her age and not able to wall, and falls, she needs someone on the weekends with her. Eliel Mckee will need to provide this letter to his Human Resources department because they will not change his schedule, unless the letter comes from Dr. Jet Patricia. Patient states this is very important.  Please assist.

## 2022-05-23 DIAGNOSIS — I10 ESSENTIAL HYPERTENSION: ICD-10-CM

## 2022-05-25 RX ORDER — HYDRALAZINE HYDROCHLORIDE 10 MG/1
TABLET, FILM COATED ORAL
Qty: 180 TABLET | Refills: 3 | Status: SHIPPED | OUTPATIENT
Start: 2022-05-25

## 2022-06-14 DIAGNOSIS — R06.01 ORTHOPNEA: ICD-10-CM

## 2022-06-15 RX ORDER — POTASSIUM CHLORIDE 750 MG/1
10 TABLET, EXTENDED RELEASE ORAL DAILY
Qty: 90 TABLET | Refills: 3 | Status: SHIPPED | OUTPATIENT
Start: 2022-06-15

## 2022-06-21 DIAGNOSIS — L30.9 ECZEMA, UNSPECIFIED TYPE: ICD-10-CM

## 2022-06-23 RX ORDER — TRIAMCINOLONE ACETONIDE 1 MG/G
CREAM TOPICAL
Qty: 45 G | Refills: 1 | Status: SHIPPED | OUTPATIENT
Start: 2022-06-23

## 2022-07-13 ENCOUNTER — DOCUMENTATION ONLY (OUTPATIENT)
Dept: FAMILY MEDICINE CLINIC | Age: 82
End: 2022-07-13

## 2022-07-22 ENCOUNTER — VIRTUAL VISIT (OUTPATIENT)
Dept: FAMILY MEDICINE CLINIC | Age: 82
End: 2022-07-22
Payer: MEDICARE

## 2022-07-22 DIAGNOSIS — I87.2 VENOUS STASIS DERMATITIS OF BOTH LOWER EXTREMITIES: Primary | ICD-10-CM

## 2022-07-22 PROCEDURE — G8399 PT W/DXA RESULTS DOCUMENT: HCPCS | Performed by: FAMILY MEDICINE

## 2022-07-22 PROCEDURE — 1101F PT FALLS ASSESS-DOCD LE1/YR: CPT | Performed by: FAMILY MEDICINE

## 2022-07-22 PROCEDURE — G8427 DOCREV CUR MEDS BY ELIG CLIN: HCPCS | Performed by: FAMILY MEDICINE

## 2022-07-22 PROCEDURE — 1123F ACP DISCUSS/DSCN MKR DOCD: CPT | Performed by: FAMILY MEDICINE

## 2022-07-22 PROCEDURE — G8432 DEP SCR NOT DOC, RNG: HCPCS | Performed by: FAMILY MEDICINE

## 2022-07-22 PROCEDURE — 99213 OFFICE O/P EST LOW 20 MIN: CPT | Performed by: FAMILY MEDICINE

## 2022-07-22 PROCEDURE — G8756 NO BP MEASURE DOC: HCPCS | Performed by: FAMILY MEDICINE

## 2022-07-22 PROCEDURE — 1090F PRES/ABSN URINE INCON ASSESS: CPT | Performed by: FAMILY MEDICINE

## 2022-07-22 RX ORDER — DOXYCYCLINE 100 MG/1
100 CAPSULE ORAL 2 TIMES DAILY
Qty: 20 CAPSULE | Refills: 0 | Status: SHIPPED | OUTPATIENT
Start: 2022-07-22 | End: 2022-08-01

## 2022-07-22 NOTE — PROGRESS NOTES
Brandi Hill presents today for   Chief Complaint   Patient presents with    Diabetes                Is someone accompanying this pt? na    Is the patient using any DME equipment during OV? na    Depression Screening:  3 most recent PHQ Screens 1/11/2022   Little interest or pleasure in doing things Not at all   Feeling down, depressed, irritable, or hopeless Not at all   Total Score PHQ 2 0       Learning Assessment:  Learning Assessment 1/17/2019   PRIMARY LEARNER Patient   HIGHEST LEVEL OF EDUCATION - PRIMARY LEARNER  4 YEARS [facility] PRIMARY LEARNER NONE   CO-LEARNER CAREGIVER No   PRIMARY LANGUAGE ENGLISH   LEARNER PREFERENCE PRIMARY LISTENING   ANSWERED BY patient   RELATIONSHIP SELF       Abuse Screening:  Abuse Screening Questionnaire 12/1/2020   Do you ever feel afraid of your partner? N   Are you in a relationship with someone who physically or mentally threatens you? N   Is it safe for you to go home? Y       Fall Risk  Fall Risk Assessment, last 12 mths 1/11/2022   Able to walk? Yes   Fall in past 12 months? 0   Do you feel unsteady? 0   Are you worried about falling 0   Number of falls in past 12 months -   Fall with injury? -       Health Maintenance reviewed and discussed and ordered per Provider. Health Maintenance Due   Topic Date Due    Eye Exam Retinal or Dilated  Never done    Shingrix Vaccine Age 50> (2 of 2) 07/03/2019    COVID-19 Vaccine (3 - Booster for Moderna series) 10/20/2021    A1C test (Diabetic or Prediabetic)  02/12/2022    Foot Exam Q1  06/14/2022    MICROALBUMIN Q1  08/12/2022    Medicare Yearly Exam  08/13/2022   . Coordination of Care:  1. Have you been to the ER, urgent care clinic since your last visit? Hospitalized since your last visit? no    2. Have you seen or consulted any other health care providers outside of the [address] [place] [address] since your last visit? Include any pap smears or colon screening.  no      Last  Checked na  Last UDS Checked na  Last Pain contract signed: yessy

## 2022-07-22 NOTE — PROGRESS NOTES
Susie Prado is a 79 y.o.  female and presents with    Chief Complaint   Patient presents with    Diabetes             Leg Swelling     Milderd Braulio Agrawal, who was evaluated through a synchronous (real-time) audio-video encounter, and/or her healthcare decision maker, is aware that it is a billable service, which includes applicable co-pays, with coverage as determined by her insurance carrier. She provided verbal consent to proceed and patient identification was verified. This visit was conducted pursuant to the emergency declaration under the [facility], [address] [place] [address] authority and the [employer] General Act. A caregiver was present when appropriate. Ability to conduct physical exam was limited. The patient was located at: Home: [address] [address]  The provider was located at: Facility (Appt Department): [address] [place] [address]  [address]  [address]  [phone]    --Delmi Edgar MD on 7/22/2022 at 1:55 PM                  Subjective:  Ms Magda Cooley presents c/o lower leg edema with lesions; breakdown of skin start a few days ago. She has been taking furosemide as prescribed. She denies fever or chills; she has redness around the lesions. She c/o pain associated with the lesions. ROS   Constitutional: Positive for weight gain. Negative for chills and fever. HENT: Negative for congestion and sore throat. Eyes: Negative for blurred vision. Respiratory: Negative for cough. Cardiovascular: Negative for chest pain and palpitations. Gastrointestinal: Negative for abdominal pain, constipation, diarrhea, heartburn, nausea and vomiting. Genitourinary: no dysuria, frequency, hematuria and urgency. Negative for flank pain. Musculoskeletal: Negative for back pain, falls and myalgias. Skin: Negative.     Neurological: Negative for dizziness and headaches    All other systems reviewed and are negative. Objective: There were no vitals filed for this visit. alert, well appearing, and in no distress, oriented to person, place, and time, and obese  Mental status - anxious  Chest - normal work of breathing  Extremities - pedal edema 2 +  Skin - erythematous with bilateral ulcerations    LABS     TESTS    Assessment/Plan:    1. Venous stasis dermatitis of both lower extremities    Start abx; refer to home wound care; encourage elevation  - doxycycline (VIBRAMYCIN) 100 mg capsule; Take 1 Capsule by mouth two (2) times a day for 10 days. Dispense: 20 Capsule; Refill: 0  - REFERRAL TO HOME HEALTH      Lab review: no lab studies available for review at time of visit      I have discussed the diagnosis with the patient and the intended plan as seen in the above orders. I have discussed medication side effects and warnings with the patient as well. I have reviewed the plan of care with the patient, accepted their input and they are in agreement with the treatment goals.

## 2022-07-24 ENCOUNTER — HOME HEALTH ADMISSION (OUTPATIENT)
Dept: HOME HEALTH SERVICES | Facility: HOME HEALTH | Age: 82
End: 2022-07-24
Payer: MEDICARE

## 2022-07-25 ENCOUNTER — HOME CARE VISIT (OUTPATIENT)
Dept: SCHEDULING | Facility: HOME HEALTH | Age: 82
End: 2022-07-25
Payer: MEDICARE

## 2022-07-25 ENCOUNTER — HOME CARE VISIT (OUTPATIENT)
Dept: HOME HEALTH SERVICES | Facility: HOME HEALTH | Age: 82
End: 2022-07-25
Payer: MEDICARE

## 2022-07-25 VITALS
RESPIRATION RATE: 16 BRPM | OXYGEN SATURATION: 98 % | TEMPERATURE: 98.3 F | DIASTOLIC BLOOD PRESSURE: 82 MMHG | HEART RATE: 74 BPM | SYSTOLIC BLOOD PRESSURE: 128 MMHG

## 2022-07-25 PROCEDURE — G0299 HHS/HOSPICE OF RN EA 15 MIN: HCPCS

## 2022-07-25 PROCEDURE — 400018 HH-NO PAY CLAIM PROCEDURE

## 2022-07-25 PROCEDURE — 3331090001 HH PPS REVENUE CREDIT

## 2022-07-25 PROCEDURE — 400013 HH SOC

## 2022-07-25 PROCEDURE — 3331090002 HH PPS REVENUE DEBIT

## 2022-07-25 NOTE — Clinical Note
Wound care recommendations:  RLE aquaphor and tubigrip. LLE adaptic, alginate w ag,kerlix and tubigrip. Please advise.   Thank you

## 2022-07-25 NOTE — HOME HEALTH
Skilled services/Home bound verification:  the need for home care and homebound status are decreased activity tolerance, disease process education, fall risk/balance issues, medication management, procedure teaching and wound care. Skilled Reason for admission/summary of clinical condition:  Wound care to Venous stasis dermatitis of both lower extremities     This patient is homebound for the following reasons Requires considerable and taxing effort to leave the home  and Requires the assistance of 1 or more persons to leave the home . Caregiver: patient has private aid [id number] [phone] and her son lives with her and is available when aid is not there. Medications reconciled and all medications are available in the home this visit. The following education was provided regarding medications: All patient medications were reviewed, including side effects, safety, time to administer, purposes, dosages, and frequencies. Medications  are effective at this time. High risk medication teaching regarding anticoagulants, hyperglycemic agents or opiod narcotics performed (specify) patient to follow eliquis regimen and to monitor for s/s of [EXCESSIVE BLEEDING, BRUSING, BLEEDING GUMS, BLACK TARY STOOLS, BLOODY STOOLS] and report to MD. Tylenol codeine risks for dependence, drowsiness and constipation. ,    Dr Chaparro Redd notified of any discrepancies/look a like medications/medication interactions NA    Home health supplies by type and quantity ordered/delivered this visit include: adaptic, alginate w ag, wound cleanser, 4x4s, tubigrip, kerlix    Patient education provided this visit to include: see interventions    Patient level of understanding of education provided: verbalized understanding    Sharps Education Provided: NA  Patient response to procedure performed:  denied pain    Home exercise program/Homework provided: activity as tolerated, trying to get physical activity 4-5 x weekly.  stopping activity if causing shortness of breath or chest pain, dizziness or weakness. Pt/Caregiver instructed on plan of care and are agreeable to plan of care at this time. Physician Uche notified of patient admission to home health and plan of care including anticipated frequency of 3w2,2w2,1w2 and treatments/interventions/modalities of Venous stasis dermatitis of both lower extremities       Discharge planning discussed with patient and caregiver. Discharge planning as follows:  Patient will be discharged once education is complete, and pt is medically stable. [place] Pt/Caregiver did verbalize understanding of discharge planning. Next MD appointment TBD with Dr Mark Mancini. Patient/caregiver encouraged/instructed to keep appointment as lack of follow through with physician appointment could result in discontinuation of home care services for non-compliance.

## 2022-07-26 PROCEDURE — 3331090001 HH PPS REVENUE CREDIT

## 2022-07-26 PROCEDURE — 3331090002 HH PPS REVENUE DEBIT

## 2022-07-27 ENCOUNTER — HOME CARE VISIT (OUTPATIENT)
Dept: HOME HEALTH SERVICES | Facility: HOME HEALTH | Age: 82
End: 2022-07-27
Payer: MEDICARE

## 2022-07-27 ENCOUNTER — HOME CARE VISIT (OUTPATIENT)
Dept: SCHEDULING | Facility: HOME HEALTH | Age: 82
End: 2022-07-27
Payer: MEDICARE

## 2022-07-27 VITALS
SYSTOLIC BLOOD PRESSURE: 146 MMHG | OXYGEN SATURATION: 96 % | DIASTOLIC BLOOD PRESSURE: 72 MMHG | HEART RATE: 68 BPM | RESPIRATION RATE: 16 BRPM | TEMPERATURE: 97.4 F

## 2022-07-27 PROCEDURE — 3331090001 HH PPS REVENUE CREDIT

## 2022-07-27 PROCEDURE — 3331090002 HH PPS REVENUE DEBIT

## 2022-07-27 PROCEDURE — G0300 HHS/HOSPICE OF LPN EA 15 MIN: HCPCS

## 2022-07-27 NOTE — HOME HEALTH
Skilled reason for visit: Wound Care, Atrial Fibrillation, Hypertension, Safety Precautions, Infection Prevention, Pain, and Medication Management    Caregiver involvement: Patients son resides in the home with her and assist her with meal preparation. Patient has a nurse whom sits with her daily and assist as well with preparing her meals, light housekeeping, bathing, toileting, dressing, and accompanies her to her medical appointments. Patients son will  her prescriptions from pharmacy. Medications reviewed and all medications ARE available in the home this visit. The following education was provided regarding medications: Amiodarone-Patient/Caregiver was educated on this medication and the purpose of it. Patient/Caregiver verbalized understanding. MD notified of any discrepancies/look a-like medications/medication interactions: NA    Medications are EFFECTIVE at this time. Home health supplies by type and quantity ordered/delivered this visit include: Supplies ordered by previous nurse    Patient education provided this visit: Patient was educated on items checked in interventions tab. Sharps education provided: Patient states she is no longer diabetic and does not check her blood sugars or take insulin. Patient level of understanding of education provided: Patient verbalized all understanding in interventions tab. Skilled Care Performed this visit: Wound Care, Medication Education and Vital Sign Check    Patient response to procedure performed:  Patient tolerated vital assessment well and no facial grimaces or complaints of pain or discomfort. Patient was sitting in her chair answering questions that this nurse was asking her.     Agency Progress toward goals:  Agency staff is progressing well with patient as patient verbalizes having a better understanding to what her medication does and how it treats her medical condition and how the edema can affect her overall health. Patient's Progress towards personal goals:  Progressing towards all goals in interventions tab. Home exercise program: Patient will be sure to elevate bilateral lower extremities to help decrease edema as well as weigh herself every morning upon waking to see if the fluid is coming off with the use of the diuretic, fluid balance, and elevation of extremities. Continued need for the following skills: Nursing will continue to follow patient until wound is healed and education is understood and able to be verbalized by patient/caregiver. Plan for next visit:  Continue to educate, complete dressing changes, assess vitals, and review medications    Patient and/or caregiver notified and agrees to changes in the Plan of Care NA      The following discharge planning was discussed with the pt/caregiver: when patient reaches goals and medication is managed, and disease processes are understood patient agrees and understand that discharge will take place.

## 2022-07-28 PROCEDURE — 3331090002 HH PPS REVENUE DEBIT

## 2022-07-28 PROCEDURE — 3331090001 HH PPS REVENUE CREDIT

## 2022-07-29 ENCOUNTER — HOME CARE VISIT (OUTPATIENT)
Dept: SCHEDULING | Facility: HOME HEALTH | Age: 82
End: 2022-07-29
Payer: MEDICARE

## 2022-07-29 ENCOUNTER — PATIENT OUTREACH (OUTPATIENT)
Dept: CASE MANAGEMENT | Age: 82
End: 2022-07-29

## 2022-07-29 PROCEDURE — G0299 HHS/HOSPICE OF RN EA 15 MIN: HCPCS

## 2022-07-29 PROCEDURE — 3331090002 HH PPS REVENUE DEBIT

## 2022-07-29 PROCEDURE — 3331090001 HH PPS REVENUE CREDIT

## 2022-07-29 NOTE — PROGRESS NOTES
Complex Case Management Denial       Date/Time:  2022 4:15 PM    Method of communication with patient:face to face, phone, mail, none    Ambulatory Care Manager [facility]) contacted the patient by telephone to perform Ambulatory Care Coordination. Verified name and  with patient as identifiers. Provided introduction to self, and explanation of the Ambulatory Care Manager's role. Reviewed most recent clinic visit w/ patient who verbalized understanding. Patient given an opportunity to ask questions. The patient Declines Care Coordination Services at this time. The patient agrees to contact the PCP office or the [address] [place] [address] for questions related to their healthcare. ACM provided contact information for future reference.

## 2022-07-30 PROCEDURE — 3331090002 HH PPS REVENUE DEBIT

## 2022-07-30 PROCEDURE — 3331090001 HH PPS REVENUE CREDIT

## 2022-07-31 PROCEDURE — 3331090001 HH PPS REVENUE CREDIT

## 2022-07-31 PROCEDURE — 3331090002 HH PPS REVENUE DEBIT

## 2022-08-01 PROCEDURE — 3331090002 HH PPS REVENUE DEBIT

## 2022-08-01 PROCEDURE — 3331090001 HH PPS REVENUE CREDIT

## 2022-08-02 ENCOUNTER — OFFICE VISIT (OUTPATIENT)
Dept: CARDIOLOGY CLINIC | Age: 82
End: 2022-08-02
Payer: MEDICARE

## 2022-08-02 VITALS
SYSTOLIC BLOOD PRESSURE: 124 MMHG | HEART RATE: 71 BPM | TEMPERATURE: 98.3 F | OXYGEN SATURATION: 98 % | RESPIRATION RATE: 16 BRPM | DIASTOLIC BLOOD PRESSURE: 86 MMHG

## 2022-08-02 VITALS
OXYGEN SATURATION: 97 % | BODY MASS INDEX: 40.23 KG/M2 | HEART RATE: 64 BPM | WEIGHT: 206 LBS | DIASTOLIC BLOOD PRESSURE: 70 MMHG | SYSTOLIC BLOOD PRESSURE: 110 MMHG | TEMPERATURE: 98 F

## 2022-08-02 DIAGNOSIS — I48.0 PAF (PAROXYSMAL ATRIAL FIBRILLATION) (HCC): Primary | ICD-10-CM

## 2022-08-02 PROCEDURE — G8399 PT W/DXA RESULTS DOCUMENT: HCPCS | Performed by: INTERNAL MEDICINE

## 2022-08-02 PROCEDURE — 99214 OFFICE O/P EST MOD 30 MIN: CPT | Performed by: INTERNAL MEDICINE

## 2022-08-02 PROCEDURE — 3331090002 HH PPS REVENUE DEBIT

## 2022-08-02 PROCEDURE — 3331090001 HH PPS REVENUE CREDIT

## 2022-08-02 PROCEDURE — G8752 SYS BP LESS 140: HCPCS | Performed by: INTERNAL MEDICINE

## 2022-08-02 PROCEDURE — 1090F PRES/ABSN URINE INCON ASSESS: CPT | Performed by: INTERNAL MEDICINE

## 2022-08-02 PROCEDURE — G8510 SCR DEP NEG, NO PLAN REQD: HCPCS | Performed by: INTERNAL MEDICINE

## 2022-08-02 PROCEDURE — 93000 ELECTROCARDIOGRAM COMPLETE: CPT | Performed by: INTERNAL MEDICINE

## 2022-08-02 PROCEDURE — G8754 DIAS BP LESS 90: HCPCS | Performed by: INTERNAL MEDICINE

## 2022-08-02 PROCEDURE — G8427 DOCREV CUR MEDS BY ELIG CLIN: HCPCS | Performed by: INTERNAL MEDICINE

## 2022-08-02 PROCEDURE — 1123F ACP DISCUSS/DSCN MKR DOCD: CPT | Performed by: INTERNAL MEDICINE

## 2022-08-02 PROCEDURE — G8417 CALC BMI ABV UP PARAM F/U: HCPCS | Performed by: INTERNAL MEDICINE

## 2022-08-02 PROCEDURE — 1101F PT FALLS ASSESS-DOCD LE1/YR: CPT | Performed by: INTERNAL MEDICINE

## 2022-08-02 PROCEDURE — G8536 NO DOC ELDER MAL SCRN: HCPCS | Performed by: INTERNAL MEDICINE

## 2022-08-02 RX ORDER — METOLAZONE 2.5 MG/1
2.5 TABLET ORAL
Qty: 30 TABLET | Refills: 2 | Status: SHIPPED | OUTPATIENT
Start: 2022-08-03 | End: 2022-08-03 | Stop reason: SDUPTHER

## 2022-08-02 NOTE — PATIENT INSTRUCTIONS
Testing   Echo  **call office 3-5 days after testing is completed for results**     New Medication/Medication Changes  Start Metolazone 2.5 mg every Monday, Wednesday and Friday    Take Amiodarone Monday, Wednesday and Friday       **please allow 24-48 hrs for medication to be escribed to pharmacy** If you need any refills on medications please contact your pharmacy so that the request can be escribed to the provider for review.

## 2022-08-02 NOTE — LETTER
8/2/2022    Patient: Melisa Poole   YOB: 1940   Date of Visit: 8/2/2022     Carmen Grant MD  [id number] [facility]  [address] [place] [address]  [place]    Dear Carmen Grant MD,      Thank you for referring Ms. Jerelene Ormond to [address] [facility] for evaluation. My notes for this consultation are attached. If you have questions, please do not hesitate to call me. I look forward to following your patient along with you.       Sincerely,    Wilfrido Hernandez MD

## 2022-08-02 NOTE — PROGRESS NOTES
Ms. Sergo Lorenzo is a 79 y.o. female with a history of atrial flutter, diabetes, hypertension, dyslipidemia and hypothyroidism. Ms. Sergo Lorenzo is here today for a follow up appointment. Patient continues to have significant lower extremity swelling. She takes Lasix 40 mg daily. She denies presyncope or syncope. She denies any chest pain or chest tightness. Past Medical History:   Diagnosis Date    Aortic stenosis     Patient denies on 10/31/2019. Arthritis     Atrial flutter ([address]     Bladder stone     Bronchitis     Resolved after getting rid of her pet bird in 2000. 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 Vesicocolic fistula        Current Outpatient Medications   Medication Sig Dispense Refill    hydrALAZINE (APRESOLINE) 10 mg tablet TAKE 1 TABLET BY MOUTH TWICE DAILY AND 1/2 TABLET BY MOUTH AS NEEDED FOR SYSTOLIC BLOOD [id number] 180 Tablet 3    apixaban (Eliquis) 5 mg tablet TAKE 1 TABLET BY MOUTH TWICE DAILY 60 Tablet 6    simvastatin (ZOCOR) 20 mg tablet TAKE 1 TABLET BY MOUTH EVERY NIGHT 90 Tablet 3    amiodarone (CORDARONE) 200 mg tablet TAKE 1 TABLET BY MOUTH DAILY 90 Tablet 3    furosemide (LASIX) 40 mg tablet Take 1 Tablet by mouth daily. 90 Tablet 3    metoprolol tartrate (LOPRESSOR) 25 mg tablet TAKE 1/2 TABLET BY MOUTH EVERY 12 HOURS 30 Tab 3    pantoprazole (PROTONIX) 40 mg tablet TAKE 1 TABLET BY MOUTH DAILY 90 Tab 3    CRANBERRY EXTRACT-VITAMIN C PO Take 25,200 mg by mouth daily. acetaminophen-codeine (TYLENOL #3) 300-30 mg per tablet Take 1 Tablet by mouth every six (6) hours as needed for Pain.      famotidine (PEPCID) 20 mg tablet Take 20 mg by mouth daily as needed for Heartburn. simethicone (MYLICON) 80 mg chewable tablet Take 160 mg by mouth every six (6) hours as needed for GI Pain. diphenhydrAMINE (BENADRYL) 25 mg tablet Take 25 mg by mouth every six (6) hours as needed for Itching. polyethylene glycol (MIRALAX) 17 gram packet Take 17 g by mouth in the morning. triamcinolone acetonide (KENALOG) 0.1 % topical cream APPLY THIN LAYER EXTERNALLY TO THE AFFECTED AREA TWICE DAILY 45 g 1    potassium chloride (KLOR-CON M10) 10 mEq tablet TAKE 1 TABLET BY MOUTH DAILY (Patient not taking: Reported on 8/2/2022) 90 Tablet 3    rOPINIRole (REQUIP) 1 mg tablet TAKE 1 TABLET BY MOUTH THREE TIMES DAILY 270 Tablet 1    metFORMIN (GLUCOPHAGE) 1,000 mg tablet TAKE 1 TABLET BY MOUTH TWICE DAILY 180 Tablet 0    levothyroxine (SYNTHROID) 75 mcg tablet TAKE 1 TABLET BY MOUTH EVERY DAY BEFORE BREAKFAST 90 Tablet 3    trimethoprim-sulfamethoxazole (BACTRIM DS, SEPTRA DS) 160-800 mg per tablet Take 1 Tablet by mouth two (2) times a day. 20 Tablet 1    ondansetron hcl (ZOFRAN) 4 mg tablet Take 1 Tablet by mouth every eight (8) hours as needed for Nausea. 30 Tablet 1    losartan (COZAAR) 100 mg tablet TAKE 1 TABLET BY MOUTH DAILY 90 Tablet 3    zinc oxide-vitamin b5-vit e (Balmex Adult Care) 11.3 % crea cream Apply 2 g to affected area as needed (rash). 453 g 1    albuterol (PROVENTIL HFA, VENTOLIN HFA, PROAIR HFA) 90 mcg/actuation inhaler Take 2 Puffs by inhalation every six (6) hours as needed for Wheezing or Shortness of Breath. 2 Inhaler 1    multivitamin (ONE A DAY) tablet Take 1 Tab by mouth daily. 90 Tab 3    dicyclomine (BentyL) 10 mg capsule Take 2 Caps by mouth three (3) times daily as needed for Abdominal Cramps. 180 Cap 11    glucose blood VI test strips (ASCENSIA AUTODISC VI, ONE TOUCH ULTRA TEST VI) strip use to check BS once daily      cholecalciferol (VITAMIN D3) 1,000 unit tablet Take 2 Tabs by mouth daily.  60 Tab 0     Allergies   Allergen Reactions    Metronidazole Hives and Itching    Ampicillin Itching     Past Medical History:   Diagnosis Date    Aortic stenosis     Patient denies on 10/31/2019. Arthritis     Atrial flutter ([address]     Bladder stone     Bronchitis     Resolved after getting rid of her pet bird in 2000. 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 Vesicocolic fistula      Past Surgical History:   Procedure Laterality Date    COLONOSCOPY N/A 2/2/2017    COLONOSCOPY  w/bx polyp performed by Aurelio Maguire MD at [address] 2017     Family History   Problem Relation Age of Onset    Diabetes Mother     Coronary Art Dis Mother     Cancer Father         melanoma    Stroke Sister     Hypertension Sister     Diabetes Sister     Diabetes Brother     Coronary Art Dis Brother     Breast Cancer Paternal Grandmother         older, but not sure age. Breast Cancer Paternal Aunt         and gyn cancer ?age    No Known Problems Sister     No Known Problems Brother     Kidney Disease Sister     Heart Failure Sister      Social History     Tobacco Use   Smoking Status Never   Smokeless Tobacco Never        Objective:     Visit Vitals  /70   Pulse 64   Temp 98 °F (36.7 °C)   Wt 93.4 kg (206 lb)   SpO2 97%   BMI 40.23 kg/m²     General:  alert, cooperative, no distress   Chest Wall: inspection normal - no chest wall deformities or tenderness, respiratory effort normal   Lung: clear to auscultation bilaterally, no rales   Heart:  normal rate, regular rhythm, normal S1, S2, Mild 3/6 MADDI on aortic area. Abdomen: soft, non-tender.  Bowel sounds normal. No masses,  no organomegaly   Extremities: edema 1/2+bilateral LE's Skin: no rashes   Neuro: alert, oriented, normal speech, no focal findings or movement disorder noted     ECHO (08/20)  Left Ventricle  Normal cavity size, wall thickness and systolic function (ejection fraction normal). The estimated EF is 55 - 60%. There is mild (grade 1) left ventricular diastolic dysfunction. Wall Scoring  The left ventricular wall motion is normal.             Left Atrium  Normal cavity size. Right Ventricle  Normal cavity size, wall thickness and global systolic function. Assessment of RV function:   TAPSE = 21 mm. TAPSV = 13 mm/s. Right Atrium  Normal cavity size. Aortic Valve  There is leaflet calcification. Aortic valve peak gradient is 35 mmHg. Aortic valve mean gradient is 20 mmHg. Aortic valve area is 1 cm2. Aortic valve peak velocity is 297 cm/s. There is moderate aortic stenosis. Aortic Valve Velocity Ratio 0.32. Mean gradient 21mm Hg, likely underestimate. Trace aortic valve regurgitation. Mitral Valve  No stenosis. Mitral valve non-specific thickening and thickening. There is mild anterior leaflet thickening diffusely. There is mild, posterior leaflet thickening diffusely. Mild regurgitation. Tricuspid Valve  Normal valve structure and no stenosis. Mild regurgitation. Pulmonic Valve  Pulmonic valve not well visualized. No stenosis and no regurgitation. Aorta  Normal aortic root, ascending aortic, and aortic arch. Pulmonary Artery  Pulmonary arterial systolic pressure (PASP) is 44 mmHg. Pulmonary hypertension found to be mild. Assessment/Plan:     Atrial flutter:   Heart rate appears to be in regular rhythm on exam today. Currently on metoprolol and amiodarone  Also on Eliquis. No bleeding side effects. EKG today confirms that she is in sinus rhythm. Will reduce amiodarone to only Monday Wednesday Friday with eventual goal to discontinue    Hypertension: BP stable continue same. Dyslipidemia: continue with simvastatin, last LDL 67.      Aortic stenosis: Mild-moderate by echo in 2018. Last echo with mean gradient of 31 in November 2019, moderate range. Last echo in August 2020, mean aortic valve gradient of 21 mmHg. ECHO in 02/22 with mean gradient 32 mm Hg. Today with clinical observation    Edema:  Currently taking Lasix 40 mg daily. Will provide patient metolazone 2.5 mg to be taken Monday Wednesday Friday in the morning. Salt restriction advised. Leg elevation advised. Fluid restriction also advised    follow up in 6 months or sooner as symptoms dictate.

## 2022-08-02 NOTE — HOME HEALTH
Skilled reason for visit: OASIS DISCHARGE    Caregiver involvement: patients cg is SON and is available as needed or assistance with IADL's, ADL's, meal prep, medication management, and taking patient to all doctors appointments. Medications reviewed and all medications are available in the home this visit. The following education was provided regarding medications: All patient medications were reviewed, including side effects, safety, time to administer, purposes, dosages, and frequencies. MD notified of any discrepancies/look a-like medications/medication interactions: NA  Medications are EFFECTIVE at this time. Home health supplies by type and quantity ordered/delivered this visit include: [employer] [address]    Patient education provided this visit: SEE INTERVENTIONS  Sharps education provided: NA    Patient level of understanding of education provided: VERBALIZED UNDERSTANDING    Skilled Care Performed this visit: WOUND CARE, VS, MEDS    Patient response to procedure performed:  DENIED PAIN    Agency Progress toward goals: ALL GOALS MET    Patient's Progress towards personal goals: ALL GOALS MET    Home exercise program: activity as tolerated, trying to get physical activity 4-5 x weekly. stopping activity if causing shortness of breath or chest pain, dizziness or weakness. Continued need for the following skills: NA    Plan for next visit: NA    Patient and/or caregiver notified and agrees to changes in the Plan of Care YES    The following discharge planning was discussed with the pt/caregiver: All goals have been met, pt is medically stable, education is complete and all needs have been met. Patient will be discharged today.

## 2022-08-02 NOTE — PROGRESS NOTES
Identified pt with two pt identifiers(name and ). Reviewed record in preparation for visit and have obtained necessary documentation. Debbi Jones presents today for   Chief Complaint   Patient presents with    Follow-up       Pt c/o , CHEST PAIN/ PRESSURE,  SWELLING. Sarah Agrawal preferred language for health care discussion is english/other. Personal Protective Equipment:   Personal Protective Equipment was used including: mask-surgical and hands-gloves. Patient was placed on no precaution(s). Patient was masked. Precautions:   Patient currently on None  Patient currently roomed with door closed. Is someone accompanying this pt? Yes, aide    Is the patient using any DME equipment during [address]? no    Depression Screening:  3 most recent PHQ Screens 2022   Little interest or pleasure in doing things Not at all   Feeling down, depressed, irritable, or hopeless Not at all   Total Score PHQ 2 0       Learning Assessment:  Learning Assessment 2019   PRIMARY LEARNER Patient   HIGHEST LEVEL OF EDUCATION - PRIMARY LEARNER  4 YEARS OF COLLEGE   BARRIERS PRIMARY LEARNER NONE   CO-LEARNER CAREGIVER No   PRIMARY LANGUAGE ENGLISH   LEARNER PREFERENCE PRIMARY LISTENING   ANSWERED BY patient   RELATIONSHIP SELF       Abuse Screening:  Abuse Screening Questionnaire 2020   Do you ever feel afraid of your partner? N   Are you in a relationship with someone who physically or mentally threatens you? N   Is it safe for you to go home? Y       Fall Risk  Fall Risk Assessment, last 12 mths 2022   Able to walk? Yes   Fall in past 12 months? 0   Do you feel unsteady? 0   Are you worried about falling 0   Number of falls in past 12 months -   Fall with injury? -       Pt currently taking Anticoagulant therapy? no  Pt currently taking Antiplatelet therapy? yes    Coordination of Care:  1. Have you been to the ER, urgent care clinic since your last visit? Hospitalized since your last visit? no    2. Have you seen or consulted any other health care providers outside of the [address] [place] [address] since your last visit? Include any pap smears or colon screening. no      Please see Red banners under Allergies and Med Rec to remove outside inquires. All correct information has been verified with patient and added to chart.      Medication's patient's would liked removed has been marked not taking to be removed per Verbal order and read back per Alice Jean Baptiste MD

## 2022-08-03 ENCOUNTER — TELEPHONE (OUTPATIENT)
Dept: CARDIOLOGY CLINIC | Age: 82
End: 2022-08-03

## 2022-08-03 ENCOUNTER — HOME CARE VISIT (OUTPATIENT)
Dept: HOME HEALTH SERVICES | Facility: HOME HEALTH | Age: 82
End: 2022-08-03
Payer: MEDICARE

## 2022-08-03 PROCEDURE — 3331090002 HH PPS REVENUE DEBIT

## 2022-08-03 PROCEDURE — 3331090001 HH PPS REVENUE CREDIT

## 2022-08-03 PROCEDURE — 3331090003 HH PPS REVENUE ADJ

## 2022-08-03 RX ORDER — METOPROLOL TARTRATE 25 MG/1
TABLET, FILM COATED ORAL
Qty: 30 TABLET | Refills: 3 | Status: CANCELLED | OUTPATIENT
Start: 2022-08-03

## 2022-08-03 RX ORDER — METOLAZONE 2.5 MG/1
2.5 TABLET ORAL
Qty: 30 TABLET | Refills: 2 | Status: SHIPPED | OUTPATIENT
Start: 2022-08-03 | End: 2022-08-18 | Stop reason: SDUPTHER

## 2022-08-03 NOTE — TELEPHONE ENCOUNTER
Contacted pt at home  Two patient Identifiers confirmed. Advised pt Rx was sent to Dr. Cornelio Rogers for authorization.

## 2022-08-03 NOTE — TELEPHONE ENCOUNTER
Patient needs prescriptions sent to [facility] on [address]     [phone]

## 2022-08-03 NOTE — TELEPHONE ENCOUNTER
PCP: Pilo Canales MD    Last appt: 8/2/2022  Future Appointments   Date Time Provider Zuleika Ruvalcaba   9/6/2022 11:00 AM [facility] ECHO 1 [facility] BS AMB   9/6/2022 11:45 AM Davie Escobar MD [facility] BS AMB       Requested Prescriptions     Pending Prescriptions Disp Refills    metOLazone (ZAROXOLYN) 2.5 mg tablet 30 Tablet 2     Sig: Take 1 Tablet by mouth BID Mon Wed & Fri.          Other Comments:  Rx needed to be sent to another pharmacy

## 2022-08-04 ENCOUNTER — HOME CARE VISIT (OUTPATIENT)
Dept: SCHEDULING | Facility: HOME HEALTH | Age: 82
End: 2022-08-04
Payer: MEDICARE

## 2022-08-04 ENCOUNTER — HOME CARE VISIT (OUTPATIENT)
Dept: HOME HEALTH SERVICES | Facility: HOME HEALTH | Age: 82
End: 2022-08-04
Payer: MEDICARE

## 2022-08-04 VITALS
RESPIRATION RATE: 16 BRPM | HEART RATE: 76 BPM | TEMPERATURE: 98.3 F | DIASTOLIC BLOOD PRESSURE: 70 MMHG | SYSTOLIC BLOOD PRESSURE: 132 MMHG | OXYGEN SATURATION: 98 %

## 2022-08-04 PROCEDURE — G0299 HHS/HOSPICE OF RN EA 15 MIN: HCPCS

## 2022-08-04 PROCEDURE — 400013 HH SOC

## 2022-08-05 NOTE — HOME HEALTH
Skilled services/Home bound verification:  the need for home care and homebound status are disease process education, fall risk/balance issues, medication management, procedure teaching and wound care. This patient is homebound for the following reasons Requires considerable and taxing effort to leave the home , Requires the assistance of 1 or more persons to leave the home  and Only leaves the home for medical reasons or [employer] services and are infrequent and of short duration for other reasons . Caregiver: patients cg is son and private aid and is available as needed or assistance with IADL's, ADL's, meal prep, medication management, and taking patient to all doctors appointments. Medications reconciled and all medications are available in the home this visit. The following education was provided regarding medications: All patient medications were reviewed, including side effects, safety, time to administer, purposes, dosages, and frequencies. Medications  are effective at this time. High risk medication teaching regarding anticoagulants, hyperglycemic agents or opiod narcotics performed (specify) Patient is aware of risks and used for Eliquis and Tylenol 3. Dr Kris Chaudhari notified of any discrepancies/look a like medications/medication interactions NA    Home health supplies by type and quantity ordered/delivered this visit include: alginate w ag, foam border, tape    Patient education provided this visit to include: see interventions. Patient level of understanding of education provided: verbalized understanding    Sharps Education Provided: NA  Patient response to procedure performed:  denied pain    Home exercise program/Homework provided: activity as tolerated, trying to get physical activity 4-5 x weekly. stopping activity if causing shortness of breath or chest pain, dizziness or weakness.       Pt/Caregiver instructed on plan of care and are agreeable to plan of care at this time. Physician Uche notified of patient admission to home health and plan of care including anticipated frequency of 1w1,3w1,2w2,1w2,2prn and treatments/interventions/modalities of wound care    Discharge planning discussed with patient and caregiver. Discharge planning as follows:  Patient will be discharged once education is complete, and pt is medically stable. Pt/Caregiver did verbalize understanding of discharge planning. Next MD appointment TBD with  [facility]. Patient/caregiver encouraged/instructed to keep appointment as lack of follow through with physician appointment could result in discontinuation of home care services for non-compliance.

## 2022-08-08 ENCOUNTER — HOME CARE VISIT (OUTPATIENT)
Dept: SCHEDULING | Facility: HOME HEALTH | Age: 82
End: 2022-08-08
Payer: MEDICARE

## 2022-08-08 VITALS
WEIGHT: 204.8 LBS | SYSTOLIC BLOOD PRESSURE: 118 MMHG | HEART RATE: 68 BPM | DIASTOLIC BLOOD PRESSURE: 64 MMHG | TEMPERATURE: 97.2 F | BODY MASS INDEX: 40 KG/M2 | OXYGEN SATURATION: 92 %

## 2022-08-08 PROCEDURE — G0300 HHS/HOSPICE OF LPN EA 15 MIN: HCPCS

## 2022-08-08 NOTE — HOME HEALTH
Skilled reason for visit: Wound Care, Atrial Fibrillation, Hypertension, Safety Precautions, Infection Prevention, Pain, and Medication Management    Caregiver involvement: Patients son resides in the home with her and assist her with meal preparation. Patient has a nurse whom sits with her daily and assist as well with preparing her meals, light housekeeping, bathing, toileting, dressing, and accompanies her to her medical appointments. Patients son will  her prescriptions from pharmacy. Medications reviewed and all medications ARE available in the home this visit. The following education was provided regarding medications: Lasix-Patient/Caregiver was educated on this medication and the purpose of it. Patient/Caregiver verbalized understanding. MD notified of any discrepancies/look a-like medications/medication interactions: NA    Medications are EFFECTIVE at this time. Home health supplies by type and quantity ordered/delivered this visit include: Supplies in home    Patient education provided this visit: Patient was educated on items checked in interventions tab. Sharps education provided: Patient states she is no longer diabetic and does not check her blood sugars or take insulin. Patient level of understanding of education provided: Patient verbalized all understanding in interventions tab. Skilled Care Performed this visit: Wound Care, Medication Education and Vital Sign Check    Patient response to procedure performed:  Patient tolerated vital assessment well and no facial grimaces or complaints of pain or discomfort. Patient was sitting in her chair answering questions that this nurse was asking her. Agency Progress toward goals:  Agency staff is progressing well with patient as patient verbalizes having a better understanding to what her medication does and how it treats her medical condition and how the edema can affect her overall health.     Patient's Progress towards personal goals:  Progressing towards all goals in interventions tab. Home exercise program: Patient will be sure to weigh and record weight every day. Continued need for the following skills: Nursing will continue to follow patient until wound is healed and education is understood and able to be verbalized by patient/caregiver. Plan for next visit:  Continue to educate, complete dressing changes, assess vitals, and review medications    Patient and/or caregiver notified and agrees to changes in the Plan of Care NA      The following discharge planning was discussed with the pt/caregiver: when patient reaches goals and medication is managed, and disease processes are understood patient agrees and understand that discharge will take place.

## 2022-08-10 ENCOUNTER — HOME CARE VISIT (OUTPATIENT)
Dept: SCHEDULING | Facility: HOME HEALTH | Age: 82
End: 2022-08-10
Payer: MEDICARE

## 2022-08-10 VITALS
RESPIRATION RATE: 18 BRPM | SYSTOLIC BLOOD PRESSURE: 108 MMHG | WEIGHT: 206 LBS | HEART RATE: 64 BPM | DIASTOLIC BLOOD PRESSURE: 70 MMHG | BODY MASS INDEX: 40.23 KG/M2 | OXYGEN SATURATION: 91 % | TEMPERATURE: 96.8 F

## 2022-08-10 PROCEDURE — G0300 HHS/HOSPICE OF LPN EA 15 MIN: HCPCS

## 2022-08-10 NOTE — HOME HEALTH
Skilled reason for visit: Wound Care, Atrial Fibrillation, Hypertension, Safety Precautions, Infection Prevention, Pain, and Medication Management    Caregiver involvement: Patients son resides in the home with her and assist her with meal preparation. Patient has a nurse whom sits with her daily and assist as well with preparing her meals, light housekeeping, bathing, toileting, dressing, and accompanies her to her medical appointments. Patients son will  her prescriptions from pharmacy. Medications reviewed and all medications ARE available in the home this visit. The following education was provided regarding medications:Metolazone -Patient/Caregiver was educated on this medication and the purpose of it. Patient/Caregiver verbalized understanding. MD notified of any discrepancies/look a-like medications/medication interactions: NA    Medications are EFFECTIVE at this time. Home health supplies by type and quantity ordered/delivered this visit include: Supplies in home    Patient education provided this visit: Patient was educated on items checked in interventions tab. Sharps education provided: Patient states she is no longer diabetic and does not check her blood sugars or take insulin. Patient level of understanding of education provided: Patient verbalized all understanding in interventions tab. Skilled Care Performed this visit: Wound Care, Medication Education and Vital Sign Check    Patient response to procedure performed:  Patient tolerated vital assessment well and no facial grimaces or complaints of pain or discomfort. Patient was sitting in her chair answering questions that this nurse was asking her. Agency Progress toward goals:  Agency staff is progressing well with patient as patient verbalizes having a better understanding to what her medication does and how it treats her medical condition and how the edema can affect her overall health.     Patient's Progress towards personal goals:  Progressing towards all goals in interventions tab. Home exercise program: Patient will reach out to PCP to see if he would be able to send in a prescription for a glucometer in order for her to check her blood sugar as needed. Continued need for the following skills: Nursing will continue to follow patient until wound is healed and education is understood and able to be verbalized by patient/caregiver. Plan for next visit:  Continue to educate, complete dressing changes, assess vitals, and review medications    Patient and/or caregiver notified and agrees to changes in the Plan of Care NA      The following discharge planning was discussed with the pt/caregiver: when patient reaches goals and medication is managed, and disease processes are understood patient agrees and understand that discharge will take place.

## 2022-08-11 DIAGNOSIS — E11.21 TYPE 2 DIABETES MELLITUS WITH NEPHROPATHY (HCC): Primary | ICD-10-CM

## 2022-08-11 RX ORDER — BLOOD SUGAR DIAGNOSTIC
STRIP MISCELLANEOUS
Qty: 100 STRIP | Refills: 6 | Status: SHIPPED | OUTPATIENT
Start: 2022-08-11

## 2022-08-11 RX ORDER — LANCETS
EACH MISCELLANEOUS
Qty: 100 EACH | Refills: 6 | Status: SHIPPED | OUTPATIENT
Start: 2022-08-11

## 2022-08-11 RX ORDER — BLOOD-GLUCOSE METER
1 EACH MISCELLANEOUS 2 TIMES DAILY
Qty: 1 EACH | Refills: 0 | Status: SHIPPED | OUTPATIENT
Start: 2022-08-11

## 2022-08-12 ENCOUNTER — DOCUMENTATION ONLY (OUTPATIENT)
Dept: FAMILY MEDICINE CLINIC | Age: 82
End: 2022-08-12

## 2022-08-12 ENCOUNTER — HOME CARE VISIT (OUTPATIENT)
Dept: SCHEDULING | Facility: HOME HEALTH | Age: 82
End: 2022-08-12
Payer: MEDICARE

## 2022-08-12 VITALS
RESPIRATION RATE: 18 BRPM | OXYGEN SATURATION: 91 % | HEART RATE: 69 BPM | TEMPERATURE: 98 F | SYSTOLIC BLOOD PRESSURE: 140 MMHG | DIASTOLIC BLOOD PRESSURE: 84 MMHG

## 2022-08-12 PROCEDURE — G0300 HHS/HOSPICE OF LPN EA 15 MIN: HCPCS

## 2022-08-12 NOTE — HOME HEALTH
Tiff Matute @ MD [address] [facility] office was called at [phone] and notified of patient complaint of chest pressure, decreased bilateral lower lung sounds, shortness of breath when laying back or upon exertion, runny nose and c/o not feeling well and slowly getting worse over the past week or more. Patient states she took a Pepcid earlier this morning as she was thinking it was indigestion and pain has not gotten any better. MD office states for patient to go to ER for evaluation. Patient refused dressing change as she wanted to get dressed to go to ER for evaluation, however dressings to legs were clean, dry, and intact. Patient was unable to sign as patient was with PCA preparing to go to ER for evaluation. Skilled reason for visit: Wound Care, Atrial Fibrillation, Hypertension, Safety Precautions, Infection Prevention, Pain, and Medication Management    Caregiver involvement: Patients son resides in the home with her and assist her with meal preparation. Patient has a nurse whom sits with her daily and assist as well with preparing her meals, light housekeeping, bathing, toileting, dressing, and accompanies her to her medical appointments. Patients son will  her prescriptions from pharmacy. Medications reviewed and all medications ARE available in the home this visit. The following education was provided regarding medications:  Pepcid-Patient/Caregiver was educated on this medication and the purpose of it. Patient/Caregiver verbalized understanding. MD notified of any discrepancies/look a-like medications/medication interactions: NA    Medications are EFFECTIVE at this time. Home health supplies by type and quantity ordered/delivered this visit include: Supplies in home; Patient states she does not need any Colostomy supplies at this time. Patient education provided this visit: Patient was educated on items checked in interventions tab.     Sharps education provided: Clinician instructed patient/CG on proper disposal of sharps: Containers should be made of hard plastic, be puncture-resistant and leak proof, such as a laundry detergent or bleach bottle.  When the container is ¾ full, it should be sealed with tape and labeled DO NOT RECYCLE prior to discarding in the regular trash.      Patient level of understanding of education provided: Patient verbalized all understanding in interventions tab. Skilled Care Performed this visit: Disease and  Medication Education and Management and Vital Sign Check    Patient response to procedure performed:  Patient tolerated vital assessment well. Patient was sitting in her chair answering questions that this nurse was asking her. Agency Progress toward goals:  Agency staff is progressing with patient as patient is very thankful to this nurse for evaluating her status and reaching out to PCP office for recommendation. Patient's Progress towards personal goals: Progress is slow as patient is not feeling well today and being sent to ER by MD.    Home exercise program: Patient will go to ER for evaluation. Continued need for the following skills: Nursing will follow up with patient after ER evaluation once discharged. Plan for next visit:  Continue to educate, complete dressing changes, assess vitals, and review medications    Patient and/or caregiver notified and agrees to changes in the Plan of Care NA      The following discharge planning was discussed with the pt/caregiver: when patient reaches goals and medication is managed, and disease processes are understood patient agrees and understand that discharge will take place.

## 2022-08-12 NOTE — PROGRESS NOTES
Rec'd a call from [facility], patient is going to the hospital for weakness and not feeling well.

## 2022-08-15 ENCOUNTER — HOME CARE VISIT (OUTPATIENT)
Dept: HOME HEALTH SERVICES | Facility: HOME HEALTH | Age: 82
End: 2022-08-15
Payer: MEDICARE

## 2022-08-15 NOTE — CASE COMMUNICATION
Patient admit to [facility] for Diffuse body pain , secondary to UTI

## 2022-08-16 DIAGNOSIS — M25.512 ACUTE PAIN OF LEFT SHOULDER: Primary | ICD-10-CM

## 2022-08-16 RX ORDER — OXYCODONE AND ACETAMINOPHEN 5; 325 MG/1; MG/1
1 TABLET ORAL
Qty: 12 TABLET | Refills: 0 | Status: SHIPPED | OUTPATIENT
Start: 2022-08-16 | End: 2022-08-16 | Stop reason: SDUPTHER

## 2022-08-16 RX ORDER — OXYCODONE AND ACETAMINOPHEN 5; 325 MG/1; MG/1
1 TABLET ORAL
Qty: 12 TABLET | Refills: 0 | Status: SHIPPED | OUTPATIENT
Start: 2022-08-16 | End: 2022-08-23

## 2022-08-16 NOTE — PROGRESS NOTES
Pt admitted to inpatient 8/12/2022-8/15/2022. She was instructed to change how her eliquis is used  She was given oxycodone while inpatient for her shoulder pain after multiple imaging  She was told to discontinue furosemide 40 mg  She was told to stop losartan due to renal insufficiency  She was told to stop metformin due to renal insufficiency.

## 2022-08-17 ENCOUNTER — HOME CARE VISIT (OUTPATIENT)
Dept: SCHEDULING | Facility: HOME HEALTH | Age: 82
End: 2022-08-17
Payer: MEDICARE

## 2022-08-17 VITALS
TEMPERATURE: 98 F | SYSTOLIC BLOOD PRESSURE: 120 MMHG | RESPIRATION RATE: 16 BRPM | OXYGEN SATURATION: 100 % | DIASTOLIC BLOOD PRESSURE: 72 MMHG | HEART RATE: 96 BPM

## 2022-08-17 PROCEDURE — G0299 HHS/HOSPICE OF RN EA 15 MIN: HCPCS

## 2022-08-17 NOTE — Clinical Note
[facility] visit performed today. Pt verb she is \"feeling ok\", not back to normal yet. BLE venous stasis wounds assessed. Went over medications with pt and educated pt on reasoning for decreasing eliquis, and stopping lasix and losartan. Pt is concerned with her chronic edema and not taking a diuretic any more. Called and made an appt with Dr. Anson Johnson for tomorrow 8/18 at 11:45 for f/u and for pt to discuss medication changes. Will continue to monitor pt for the next 5 weeks. BLE venous stasis wounds are healing and no weeping noted. Tubi- applied to BLE for edema and educated to elevate her legs above her heart. Pt also had w/c and hospital bed delivered in her home. Pt has all meds in home and they are reconciled. Pt also has aide that is assisting her in the home.      Eliza Goode, RN, BSN

## 2022-08-18 ENCOUNTER — OFFICE VISIT (OUTPATIENT)
Dept: FAMILY MEDICINE CLINIC | Age: 82
End: 2022-08-18
Payer: MEDICARE

## 2022-08-18 ENCOUNTER — HOME CARE VISIT (OUTPATIENT)
Dept: HOME HEALTH SERVICES | Facility: HOME HEALTH | Age: 82
End: 2022-08-18
Payer: MEDICARE

## 2022-08-18 VITALS
HEART RATE: 119 BPM | WEIGHT: 206 LBS | HEIGHT: 60 IN | SYSTOLIC BLOOD PRESSURE: 108 MMHG | TEMPERATURE: 97.2 F | DIASTOLIC BLOOD PRESSURE: 72 MMHG | BODY MASS INDEX: 40.44 KG/M2 | RESPIRATION RATE: 16 BRPM | OXYGEN SATURATION: 97 %

## 2022-08-18 DIAGNOSIS — I87.2 VENOUS STASIS DERMATITIS OF BOTH LOWER EXTREMITIES: Primary | ICD-10-CM

## 2022-08-18 DIAGNOSIS — I27.20 PULMONARY HTN (HCC): ICD-10-CM

## 2022-08-18 DIAGNOSIS — G25.81 RESTLESS LEG: ICD-10-CM

## 2022-08-18 DIAGNOSIS — E11.21 TYPE 2 DIABETES MELLITUS WITH NEPHROPATHY (HCC): ICD-10-CM

## 2022-08-18 DIAGNOSIS — Z93.3 COLOSTOMY PRESENT (HCC): ICD-10-CM

## 2022-08-18 DIAGNOSIS — I10 ESSENTIAL HYPERTENSION: ICD-10-CM

## 2022-08-18 DIAGNOSIS — I48.21 PERMANENT ATRIAL FIBRILLATION (HCC): ICD-10-CM

## 2022-08-18 DIAGNOSIS — N39.0 RECURRENT URINARY TRACT INFECTION: ICD-10-CM

## 2022-08-18 DIAGNOSIS — F32.1 EPISODE OF MODERATE MAJOR DEPRESSION (HCC): ICD-10-CM

## 2022-08-18 DIAGNOSIS — Z00.00 MEDICARE ANNUAL WELLNESS VISIT, SUBSEQUENT: ICD-10-CM

## 2022-08-18 DIAGNOSIS — Z09 HOSPITAL DISCHARGE FOLLOW-UP: ICD-10-CM

## 2022-08-18 DIAGNOSIS — D32.9 MENINGIOMA (HCC): ICD-10-CM

## 2022-08-18 PROCEDURE — G8752 SYS BP LESS 140: HCPCS | Performed by: FAMILY MEDICINE

## 2022-08-18 PROCEDURE — 99215 OFFICE O/P EST HI 40 MIN: CPT | Performed by: FAMILY MEDICINE

## 2022-08-18 PROCEDURE — G0439 PPPS, SUBSEQ VISIT: HCPCS | Performed by: FAMILY MEDICINE

## 2022-08-18 PROCEDURE — G8417 CALC BMI ABV UP PARAM F/U: HCPCS | Performed by: FAMILY MEDICINE

## 2022-08-18 PROCEDURE — G8432 DEP SCR NOT DOC, RNG: HCPCS | Performed by: FAMILY MEDICINE

## 2022-08-18 PROCEDURE — 1101F PT FALLS ASSESS-DOCD LE1/YR: CPT | Performed by: FAMILY MEDICINE

## 2022-08-18 PROCEDURE — G8754 DIAS BP LESS 90: HCPCS | Performed by: FAMILY MEDICINE

## 2022-08-18 PROCEDURE — G8399 PT W/DXA RESULTS DOCUMENT: HCPCS | Performed by: FAMILY MEDICINE

## 2022-08-18 PROCEDURE — G8427 DOCREV CUR MEDS BY ELIG CLIN: HCPCS | Performed by: FAMILY MEDICINE

## 2022-08-18 PROCEDURE — G8536 NO DOC ELDER MAL SCRN: HCPCS | Performed by: FAMILY MEDICINE

## 2022-08-18 PROCEDURE — 1111F DSCHRG MED/CURRENT MED MERGE: CPT | Performed by: FAMILY MEDICINE

## 2022-08-18 RX ORDER — METOLAZONE 2.5 MG/1
2.5 TABLET ORAL
Qty: 24 TABLET | Refills: 3 | Status: SHIPPED | OUTPATIENT
Start: 2022-08-19

## 2022-08-18 NOTE — PROGRESS NOTES
Alonso Collazo is a 79 y.o. female (: 1940) presenting to address:    Chief Complaint   Patient presents with    Hospital Follow Up       There were no vitals filed for this visit. Hearing/Vision:   No results found. Learning Assessment:     Learning Assessment 2019   PRIMARY LEARNER Patient   HIGHEST LEVEL OF EDUCATION - PRIMARY LEARNER  4 YEARS OF COLLEGE   BARRIERS PRIMARY LEARNER NONE   CO-LEARNER CAREGIVER No   PRIMARY LANGUAGE ENGLISH   LEARNER PREFERENCE PRIMARY LISTENING   ANSWERED BY patient   RELATIONSHIP SELF     Depression Screening:     3 most recent PHQ Screens 2022   Little interest or pleasure in doing things Not at all   Feeling down, depressed, irritable, or hopeless Not at all   Total Score PHQ 2 0     Fall Risk Assessment:     Fall Risk Assessment, last 12 mths 2022   Able to walk? Yes   Fall in past 12 months? 1   Do you feel unsteady? 1   Are you worried about falling 1   Is TUG test greater than 12 seconds? 0   Is the gait abnormal? 1   Number of falls in past 12 months 1   Fall with injury? 0     Abuse Screening:     Abuse Screening Questionnaire 2020   Do you ever feel afraid of your partner? N   Are you in a relationship with someone who physically or mentally threatens you? N   Is it safe for you to go home?  Y     ADL Assessment:     ADL Assessment 2019   Feeding yourself No Help Needed   Getting from bed to chair No Help Needed   Getting dressed No Help Needed   Bathing or showering No Help Needed   Walk across the room (includes cane/walker) No Help Needed   Using the telphone No Help Needed   Taking your medications No Help Needed   Preparing meals No Help Needed   Managing money (expenses/bills) No Help Needed   Moderately strenuous housework (laundry) No Help Needed   Shopping for personal items (toiletries/medicines) No Help Needed   Shopping for groceries No Help Needed   Driving Help Needed   Climbing a flight of stairs Help Needed   Getting to places beyond walking distances Help Needed        Coordination of Care Questionaire:     1. \"Have you been to the ER, urgent care clinic since your last visit? Hospitalized since your last visit? \" Yes [facility]: [facility]    2. \"Have you seen or consulted any other health care providers outside of the [address] [place] [address] since your last visit? \" No     3. For patients aged 39-70: Has the patient had a colonoscopy / FIT/ Cologuard? NA - based on age      If the patient is female:    4. For patients aged 41-77: Has the patient had a mammogram within the past 2 years? NA - based on age or sex      11. For patients aged 21-65: Has the patient had a pap smear?  NA - based on age or sex

## 2022-08-18 NOTE — PROGRESS NOTES
(AWV) The Medicare Annual Wellness Exam PROGRESS NOTE    This is a Medicare Annual Wellness Exam (AWV)     I have reviewed the patient's medical history in detail and updated the computerized patient record. Trey Jarquin is a 79 y.o.  female and presents for an annual wellness exam     ROS   Constitutional: Positive for weight gain. Negative for chills and fever. HENT: Negative for congestion and sore throat. Eyes: Negative for blurred vision. Respiratory: Negative for cough. Cardiovascular: Negative for chest pain and palpitations. Gastrointestinal: Negative for abdominal pain, constipation, diarrhea, heartburn, nausea and vomiting. Genitourinary: no dysuria, frequency, hematuria and urgency. Negative for flank pain. Musculoskeletal: Negative for back pain, falls and myalgias. Skin: Negative. Neurological: Negative for dizziness and headaches    All other systems reviewed and are negative. History     Past Medical History:   Diagnosis Date    Aortic stenosis     Patient denies on 10/31/2019. Arthritis     Atrial flutter ([address]     Bladder stone     Bronchitis     Resolved after getting rid of her pet bird in 2000. 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 Vesicocolic fistula       Past Surgical History:   Procedure Laterality Date    COLONOSCOPY N/A 2/2/2017    COLONOSCOPY  w/bx polyp performed by Wanda Patel MD at [address] [place] [address] 2017     Current Outpatient Medications   Medication Sig Dispense Refill    oxyCODONE-acetaminophen (PERCOCET) 5-325 mg per tablet Take 1 Tablet by mouth every four (4) hours as needed for Pain for up to 7 days.  Max Daily Amount: 6 Tablets. 12 Tablet 0    Blood-Glucose Meter (OneTouch Ultra2 Meter) misc 1 Each by Does Not Apply route two (2) times a day. 1 Each 0    glucose blood VI test strips (OneTouch Ultra Test) strip Test blood glucose twice a day 100 Strip 6    lancets (OneTouch UltraSoft Lancets) misc Test blood glucose twice a day 100 Each 6    metOLazone (ZAROXOLYN) 2.5 mg tablet Take 2.5 mg by mouth two (2) times a day. Take one tablet by mouth on Monday, Wednesday, Friday, 30 minutes before taking amiodarone in the morning. Take one tablet by mouth Monday, Wednesday, Friday every evening. metOLazone (ZAROXOLYN) 2.5 mg tablet Take 1 Tablet by mouth BID Mon Wed & Fri. 30 Tablet 2    CRANBERRY EXTRACT-VITAMIN C PO Take 25,200 mg by mouth daily. famotidine (PEPCID) 20 mg tablet Take 20 mg by mouth daily as needed for Heartburn. simethicone (MYLICON) 80 mg chewable tablet Take 160 mg by mouth every six (6) hours as needed for GI Pain. diphenhydrAMINE (BENADRYL) 25 mg tablet Take 25 mg by mouth every six (6) hours as needed for Itching. polyethylene glycol (MIRALAX) 17 gram packet Take 17 g by mouth in the morning. triamcinolone acetonide (KENALOG) 0.1 % topical cream APPLY THIN LAYER EXTERNALLY TO THE AFFECTED AREA TWICE DAILY 45 g 1    potassium chloride (KLOR-CON M10) 10 mEq tablet TAKE 1 TABLET BY MOUTH DAILY 90 Tablet 3    hydrALAZINE (APRESOLINE) 10 mg tablet TAKE 1 TABLET BY MOUTH TWICE DAILY AND 1/2 TABLET BY MOUTH AS NEEDED FOR SYSTOLIC BLOOD [id number] 180 Tablet 3    apixaban (Eliquis) 5 mg tablet TAKE 1 TABLET BY MOUTH TWICE DAILY (Patient taking differently: Take 2.5 mg by mouth two (2) times a day.  TAKE 1/2 TABLET BY MOUTH TWICE DAILY) 60 Tablet 6    rOPINIRole (REQUIP) 1 mg tablet TAKE 1 TABLET BY MOUTH THREE TIMES DAILY 270 Tablet 1    simvastatin (ZOCOR) 20 mg tablet TAKE 1 TABLET BY MOUTH EVERY NIGHT 90 Tablet 3    metFORMIN (GLUCOPHAGE) 1,000 mg tablet TAKE 1 TABLET BY MOUTH TWICE DAILY 180 Tablet 0    levothyroxine (SYNTHROID) 75 mcg tablet TAKE 1 TABLET BY MOUTH EVERY DAY BEFORE BREAKFAST 90 Tablet 3    trimethoprim-sulfamethoxazole (BACTRIM DS, SEPTRA DS) 160-800 mg per tablet Take 1 Tablet by mouth two (2) times a day. 20 Tablet 1    amiodarone (CORDARONE) 200 mg tablet TAKE 1 TABLET BY MOUTH DAILY (Patient taking differently: Take 200 mg by mouth daily. Take 1 tablet by mouth Monday, Wednesday and Friday.) 90 Tablet 3    ondansetron hcl (ZOFRAN) 4 mg tablet Take 1 Tablet by mouth every eight (8) hours as needed for Nausea. 30 Tablet 1    furosemide (LASIX) 40 mg tablet Take 1 Tablet by mouth daily. 90 Tablet 3    zinc oxide-vitamin b5-vit e (Balmex Adult Care) 11.3 % crea cream Apply 2 g to affected area as needed (rash). 453 g 1    albuterol (PROVENTIL HFA, VENTOLIN HFA, PROAIR HFA) 90 mcg/actuation inhaler Take 2 Puffs by inhalation every six (6) hours as needed for Wheezing or Shortness of Breath. 2 Inhaler 1    metoprolol tartrate (LOPRESSOR) 25 mg tablet TAKE 1/2 TABLET BY MOUTH EVERY 12 HOURS 30 Tab 3    multivitamin (ONE A DAY) tablet Take 1 Tab by mouth daily. 90 Tab 3    pantoprazole (PROTONIX) 40 mg tablet TAKE 1 TABLET BY MOUTH DAILY 90 Tab 3    cholecalciferol (VITAMIN D3) 1,000 unit tablet Take 2 Tabs by mouth daily. 60 Tab 0    losartan (COZAAR) 100 mg tablet TAKE 1 TABLET BY MOUTH DAILY (Patient not taking: Reported on 8/18/2022) 90 Tablet 3     Allergies   Allergen Reactions    Metronidazole Hives and Itching    Ampicillin Itching     Family History   Problem Relation Age of Onset    Diabetes Mother     Coronary Art Dis Mother     Cancer Father         melanoma    Stroke Sister     Hypertension Sister     Diabetes Sister     Diabetes Brother     Coronary Art Dis Brother     Breast Cancer Paternal Grandmother         older, but not sure age.     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.4    Meningioma (HCC) D32.9    Hip fracture ([facility]) S72.009A    Type 2 diabetes mellitus with nephropathy (HCC) E11.21    Atrial fibrillation with rapid ventricular response (HCC) I48.91    Hypertension I10    Atrial fibrillation with RVR ([facility]) I48.91    Hypomagnesemia E83.42    Acute hypotension I95.9    Paroxysmal atrial fibrillation ([facility]) I48.0    Pulmonary HTN ([facility]) I27.20    [place]-vesical fistula N32.1    SIRS (systemic inflammatory response syndrome) ([facility]) R65.10    UTI (urinary tract infection) N39.0    Episode of moderate major depression ([facility] F32.1       Health Maintenance History  Immunizations reviewed, dtap up to date , pneumovax up to date, fluup to date, zoster due  colonoscopy: up to date,   Eye exam: up to date  Mammo up to date  Dexascan up to date    Depression Risk Factor Screening:      Patient Health Questionnaire (PHQ-2)   Over the last 2 weeks, how often have you been bothered by any of the following problems? Little interest or pleasure in doing things? Several days. [1]  Feeling down, depressed, or hopeless? Not at all. [0]    Total Score: 1/6  PHQ-2 Assessment Scoring:   A score of 2 or more requires further screening with the PHQ-9    Alcohol Risk Factor Screening:     Women:  On any occasion during the past 3 months, have you had more than 3 drinks containing alcohol? no   Do you average more than 7 drinks per week? no    Functional Ability and Level of Safety:     Hearing Loss    Hearing is good.    Activities of Daily Living   Partial assistance. Requires assistance with: ambulation and no ADLs    Fall Risk   Secondary diagnoses (15 pts)  Ambulatory aid furniture (30 pts)  Gait weak (10 pts)  Score: 55    Abuse Screen   Patient is not abused    Examination   Physical Examination  Vitals:    08/18/22 1151   BP: 108/72   Pulse: (!) 119   Resp: 16   Temp: 97.2 °F (36.2 °C)   TempSrc: Temporal   SpO2: 97%   Weight: 206 lb (93.4 kg)   Height: 5' (1.524 m)      Body mass index is 40.23 kg/m². Evaluation of Cognitive Function:  Mood/affect:good mood with appropriate affect  Appearance:well kempt  Family member/caregiver input:concerned for constipation    alert, well appearing, and in no distress, oriented to person, place, and time, and morbidly obese    Patient Care Team:  Arnel Herndon MD as PCP - General (Family Medicine)  Arnel Herndon MD as PCP - [facility] Empaneled Provider  Lucy Medina MD (General Surgery)  Barbara Hilton MD (Orthopedic Surgery)  Sowmya Vaz MD (Cardiovascular Disease Physician)  Abi Bowers MD (Surgery Physician)    End-of-life planning  Advanced Directive in the case than an injury or illness causes the patient to be unable to make health care decisions    Health Care Directive or Living Will: yes    Advice/Referrals/Counselling/Plan:   Education and counseling provided:  End-of-Life planning (with patient's consent)  Medical nutrition therapy for individuals with diabetes or renal disease  Include in education list (weight loss, physical activity, smoking cessation, fall prevention, and nutrition)    ICD-10-CM ICD-9-CM    1. Venous stasis dermatitis of both lower extremities  I87.2 454.1 REFERRAL TO HOME HEALTH      2. Colostomy present ([facility]  Z93.3 V44.3       3. Essential hypertension  I10 401.9       4. Restless leg  G25.81 333.94 REFERRAL TO HOME HEALTH      5. Episode of moderate major depression (HCC)  F32.1 296.22       6.  Pulmonary HTN ([facility] I27.20 416.8       7. Type 2 diabetes mellitus with nephropathy ([facility])  E11.21 250.40 HEMOGLOBIN A1C WITH EAG     583.81 LIPID PANEL      LIPID PANEL      HEMOGLOBIN A1C WITH EAG      8. Meningioma ([facility])  D32.9 225.2       9. Medicare annual wellness visit, subsequent  Z00.00 V70.0       10. Recurrent urinary tract infection  N39.0 599.0 URINALYSIS W/ RFLX MICROSCOPIC      11. Permanent atrial fibrillation ([facility])  I48.21 427.31 apixaban (Eliquis) 2.5 mg tablet      12. Hospital discharge follow-up  Z09 V67.59 [place] DISCHARGE MEDS RECONCILED W/ CURRENT OUTPATIENT MED LIST      . Brief written plan, checklist    I have discussed the diagnosis with the patient and the intended plan as seen in the above orders. The patient has received an after-visit summary and questions were answered concerning future plans. I have discussed medication side effects and warnings with the patient as well. I have reviewed the plan of care with the patient, accepted their input and they are in agreement with the treatment goals. Chief Complaint   Patient presents with    Hospital Follow Up     8/12/2022 admitted for urinary tract infection and chest pain  8/15/2022 discharged to home with home health    Subjective:  Ms. Masha Goss presents with her aid, Magdy Sarah, for follow up after hospitalization for chest pain and urinary tract infection. She required opioids for pain. She underwent stress test.    Diabetes Mellitus:  She has diabetes mellitus, and  hypertension and hyperlipidemia. Diabetic ROS - medication compliance: compliant most of the time, diabetic diet compliance: compliant most of the time. Lab review: orders written for new lab studies as appropriate; see orders. Edema  Patient complains of edema. The location of the edema is lower leg(s) bilateral, feet bilateral.  The edema has been moderate. Onset of symptoms was over a year ago, waxing and waning since that time. The edema is present all day.   The swelling has been aggravated by use of medication such as amiodorone, relieved by diuretics, and been associated with weight gain. Cardiac risk factors include diabetes mellitus, obesity, hypertension, post-menopausal.     She c/o vaginal itching and had bleeding. She has been applying zinc oxide cream.    Asthma Review:  The patient is being seen for follow up of asthma; she has had increased symptoms. Asthma symptoms occur: several times a week  Wheezing when present is described as moderate and not as easily relieved with rescue bronchodilator. Current limitations in activity from asthma: none. Number of days of school or work missed in the last month: N/A. Frequency of use of quick-relief meds: less than once a month. Regimen compliance: The patient reports adherence to this regimen. Patient does not smoke cigarettes. Objective:  Vitals:    08/18/22 1151   BP: 108/72   Pulse: (!) 119   Resp: 16   Temp: 97.2 °F (36.2 °C)   TempSrc: Temporal   SpO2: 97%   Weight: 206 lb (93.4 kg)   Height: 5' (1.524 m)     Mental status - normal mood, behavior, speech, dress, and thought processes; sitting in wheelchair  Chest - clear to auscultation, no wheezes, rales or rhonchi, symmetric air entry  Heart - normal rate, regular rhythm, normal S1, S2, no murmurs, rubs, clicks or gallops  Abdomen - soft, nontender, nondistended, no masses or organomegaly; colostomy bag intact with soft solid stool  Ext -    LABS     TESTS      Assessment/Plan:    1. Venous stasis dermatitis of both lower extremities  Wound care needed along with physical therapy to help pt with standing  - REFERRAL TO HOME HEALTH    2. Colostomy present ([address]  Continue with supplies    3. Essential hypertension  Goal <130/80; continue current medications    4. Restless leg  Physical therapy ordered  - REFERRAL TO HOME HEALTH    5. Episode of moderate major depression ([address]  Encourage healthy diet and movement with physical therapy    6.  Pulmonary HTN ([facility]  Continue treatment    7. Type 2 diabetes mellitus with nephropathy (HCC)  Goal hgb a1c <7  - HEMOGLOBIN A1C WITH EAG; Future  - LIPID PANEL; Future  - LIPID PANEL  - HEMOGLOBIN A1C WITH EAG    8. Meningioma ([facility]  Follow up with neuro as scheduled    9. Medicare annual wellness visit, subsequent  Reviewed preventive recommendations    10. Recurrent urinary tract infection  Assess for resolution of infection  - URINALYSIS W/ RFLX MICROSCOPIC; Future    11. Permanent atrial fibrillation (HCC)  Continue anticoagulant  - apixaban (Eliquis) 2.5 mg tablet; Take 1 Tablet by mouth two (2) times a day. Dispense: 180 Tablet; Refill: 3    12. Hospital discharge follow-up    - [place] DISCHARGE MEDS RECONCILED W/ CURRENT OUTPATIENT MED LIST      Lab review: orders written for new lab studies as appropriate; see orders      I have discussed the diagnosis with the patient and the intended plan as seen in the above orders. The patient has received an after-visit summary and questions were answered concerning future plans. I have discussed medication side effects and warnings with the patient as well. I have reviewed the plan of care with the patient, accepted their input and they are in agreement with the treatment goals.

## 2022-08-18 NOTE — HOME HEALTH
Skilled reason for visit: PEDRO SN visit performed today. Pt verb she is \"feeling ok\", not back to normal yet. BLE venous stasis wounds assessed. Went over medications with pt and educated pt on reasoning for decreasing eliquis, and stopping lasix and losartan. Pt is concerned with her chronic edema and not taking a diuretic any more. Called and made an appt with Dr. Sarbjit Rubi for tomorrow 8/18 at 11:45 for f/u and for pt to discuss medication changes. Will continue to monitor pt for the next 5 weeks. BLE venous stasis wounds are healing and no weeping noted. Tubi- applied to BLE for edema and educated to elevate her legs above her heart. Pt also had w/c and hospital bed delivered in her home. Pt has all meds in home and they are reconciled. Pt also has aide that is assisting her in the home    Caregiver involvement: patients cg is private aide/ and son and they are available as needed or assistance with IADL's, ADL's, meal prep, medication management, and taking patient to all doctors appointments. Medications reviewed and all medications are available in the home this visit. The following education was provided regarding medications, medication interactions, and look alike medications (specify): eliquis chg in dosage, and percocet --Opiod education as follows: Take as prescribed, don't crush or chew medication. Don't take with any other drugs or alcohol, taper the drug as pain decreases due to the addictive nature of drugs, and don't share drugs. Potential side effects include: respiratory depression and death, don't drive or operate machinery, and drug could affect balance resulting in falls. Narcan can be used for overdoses. DC LASIX AND LOSARTAN. Medications are effective at this time. Medications reconciled and all meds in home.      Home health supplies by type and quantity ordered/delivered this visit include: PT Bry Marvin      Patient education provided this visit: decrease eliquis to 2.5 BID, stop losartan and lasix due to kidneys. Pt had blood in urine so eliquis dose was decreased. patient to follow eliquis regimen and to monitor for s/s of [EXCESSIVE BLEDDING, BRUSING, BLEEDING GUMS, BLACK TARY STOOLS, BLOODY STOOLS] and report to MD.  Christiano Lugo patient/caregiver on good hygiene, complete bladder emptying, and avoiding use of powders or lotions. Instruct on how to recognize symptoms of urinary tract infection including elevated temperature, changes in mental status or confusion, frequency of urination, pain, hesitancy and urgency, malodorous urine, urine that has changed in color or clarity. have someone to assist. patient/cg instructed to monitor for edema/increase in edema, to elevate extremity when edema occurs and to notify md if edema exceeds normal limits for patient. Patient is a fall risk. Educated pateint to sit on the side of the chair/bed, take a slow deep breaths, have feet firmly planted before standing up, use cane/walker/wheelchair if available, or have someone to assist. patient encouraged to monitor for increase in pain and to continue with current pain management and to notify staff/md if pain becomes excrutiating/intolerable. patient made aware to turn every 2 hours and to keep pressure off of bony prominences, to monitor for any pressure ulcer development/worsening. patient instructed to perform sob hep 4-5 x daily and prn for sob, to promote lung expansion. Instruct patient/caregiver on importance of adequate nutrition and hydration for wound healing. Healthy foods give your body the nutrients it needs to heal wounds. Protein foods like meat, fish, nuts, and soy products are important to wound healing. In addition to protein, calories, vitamin C, and zinc help wounds heal. Liquids prevent dehydration that can decrease the blood supply to wounds. Always follow physician recommended diet in regards to patient condition.  Instruct on other factors that affect wound healing such as: maintaining general hygiene, not smoking or using tobacco products, offloading pressure and pressure relieving devices, and management of blood sugars. THE PATIENT AND CG WERE RECOMMENDED TO CALL PCP OR TAKE PT TO THE ER WITH ANY FEVER 101 OR ABOVE, CHILLS, SOB, CHEST PAIN, SEVERE HEADACHE, PROFUSE VOMITING AND/OR DIARRHEA, ACUTE PAIN OR ANY OTHER ACUTE CHANGE. Patient level of understanding of education provided: pt is understanding of all procedures performed. Skilled Care Performed this visit: wound care to LLE    Patient response to procedure performed:  patient tolerated procedure with no signs of discomfort, grimacing or pain, no complications or concerns noted. Agency Progress toward goals: will cont to monitor   Patient's Progress towards personal goals:  PATIENT IS STEADILY PROGRESSING TOWARDS GOALS, STILL NEEDS REINFORCEMENT/ENCOURAGEMENT. WILL CONTINUE TO MONITOR. Home exercise program: continue home exercises program as developed by physical therapy     Continued need for the following skills: Nursing and Physical Therapy, OT    Plan for next visit: wound care, and medication and disease management education   Patient and/or caregiver notified and agrees to changes in the Plan of Care YES      The following discharge planning was discussed with the pt/caregiver: Patient will be discharged once education has completed, patient is medically stable and pt/cg are able to independently manage medications and disease process.

## 2022-08-20 ENCOUNTER — HOME CARE VISIT (OUTPATIENT)
Dept: SCHEDULING | Facility: HOME HEALTH | Age: 82
End: 2022-08-20
Payer: MEDICARE

## 2022-08-20 VITALS
DIASTOLIC BLOOD PRESSURE: 80 MMHG | HEART RATE: 79 BPM | RESPIRATION RATE: 18 BRPM | OXYGEN SATURATION: 97 % | SYSTOLIC BLOOD PRESSURE: 120 MMHG | TEMPERATURE: 97.5 F

## 2022-08-20 LAB
CHOLEST SERPL-MCNC: 161 MG/DL (ref 100–199)
EST. AVERAGE GLUCOSE BLD GHB EST-MCNC: 143 MG/DL
HBA1C MFR BLD: 6.6 % (ref 4.8–5.6)
HDLC SERPL-MCNC: 50 MG/DL
IMP & REVIEW OF LAB RESULTS: NORMAL
LDLC SERPL CALC-MCNC: 91 MG/DL (ref 0–99)
TRIGL SERPL-MCNC: 108 MG/DL (ref 0–149)
VLDLC SERPL CALC-MCNC: 20 MG/DL (ref 5–40)

## 2022-08-20 PROCEDURE — G0151 HHCP-SERV OF PT,EA 15 MIN: HCPCS

## 2022-08-20 NOTE — HOME HEALTH
Skilled services/Home bound verification:     Skilled Reason for admission/summary of clinical condition:  patient was recently hospitalized secondary to  Diffuse body pain 2/2 to UTI and Costochondritis    Hypoxia    - 1L of o2, wean down qd    - unable to do IV toradol d/t kidney function---> x1 of IV solumedrol,    - IV rocephin started, bC x 2 (-), urine cx > 50,000 klebsiella    -- respiratory viral panel    - low suspicions of ACS but to be thorough --> stress test am , npo after mn         Hypocalcemia - resolved w/Ca gluconate         AMALIA on stage 3 CKD - wrosened again? Cr up to  to 1.8, trial one time dose of IV lasix , bladder scan is pt retaining? Left shoulder pain 2/2 to o steoarthritis. - xray of left shoulder appreciated, salon pass         Elevated troponin and BNP likely demand ischemia - will trend , doubt AcS, EKG reviewed    - BNP elevated? ? Uncertain why, pending echo if you look at her CT chest there is no pleural effusions    - one time dose of iv lasix, stress test tomorrow am         Atrial fibrillation - decreased eliquis, amiodarone    Type 2 DM - SSI    hypothyroidism - synthroid    Chronic di astolic HF - echo pending, despite elevated BNP has obese body habitus and on CT findings no pleural effusions    S/p colostomy - colovesicular fistula? Noted on CT abdomen discussed with gen sx no interventions and doubt this is contributing to her UTI, no abnormality such as feces in her urine , watch for now may be scarring        This patient is homebound for the following reasons Requires considerable and taxing effort to leave the home . Caregiver: relative. Caregiver assists with IADL's. Medications reconciled and all medications are available in the home this visit.         Patient education provided this visit to include: HEP, fall prevention, dc planning       Patient level of understanding of education provided: Patient was able to partially teach back HEP     Sharps Education Provided: n/a  Patient response to procedure performed:  Patient needed verbal cues for proper technique with gait and transfers     Pt/Caregiver instructed on plan of care and are agreeable to plan of care at this time. PMHx: Circulatory      Atrial fibrillation with rapid ventricular response (HCC)      Hypertension      Atrial fibrillation with RVR (HCC)      Acute hypotension      Paroxysmal atrial fibrillation (HCC)      Pulmonary HTN (HCC)          Digestive      Pancreatitis      Acute pancrea titis      [place]-vesical fistula          Endocrine      Hypothyroid      DM (diabetes mellitus) ([facility] [address]      Hypothyroidism due to acquired atrophy of thyroid      Type 2 diabetes mellitus with nephropathy (HCC)          Nervous      Meningioma (HCC)          Skeletal      Hip fracture (HCC)          Urinary      UTI (urinary tract infection)          Other      Obesity      Family hx-breast malignancy      Diverticulitis      Episcleritis of right eye      Hypoma gnesemia      SIRS (systemic inflammatory response syndrome) ([facility] [address]   S: patient complained of weakness on BLE, impaired ability for transfers and gait and difficulty with stair negotiation   O:Patients Goals= Be able to go back to PLOF  Wound/Incision: location, description, drainage: none  PLOF: Lives with son in a 3 level apartment, reported prior to hospitalization, she was able to perform her basic ADLs and was occasionally using RW for mobility   STRENGTH B hip flexor, extensor, abductors, adductors 3/5, B knee flexor and extensor 3-/5  SIT TO STAND Mod A x1 from chair   BALANCE Poor in standing balance  GAIT Patient needed Mod A x1 with ambulation using RW x 10 feet with forward stooped posture and decreased bilateral feet clearance.  Patient was educated to clear mobility pathway to have a safe pathway for gait   BED MOBILITY Patient needed Min A x1 with bed mobility   TRANSFERS patient needed Mod A x1 with bed, chair and toilet using RW A: PT evaluation completed POC established, Med rec done, HEP established  P:Home Safety eval/recommendations: Home health physical therapy initial evaluation performed. Patient demonstrates decreased strength, balance, and endurance which increases patient's overall fall risk and burden of care. Patient would benefit from home health physical therapy to improve balance, strength, and endurance which would decrease fall risk and allow patient to return to prior level of function once all functional goals or full rehab potential is met. patient educated with HEP including seated hip flex, knee extension, hip abduction, hip adduction, ankle PF and DF and ball squeeze x 20 reps x 3 sets daily to improve MMT on BLE to facilitate with improved bed mobility, transfers and gait with AD. patient also educated with deep breathing exercises to be done daily x 10 reps x 3 sets to prevent SOB during activity.  Patient educated with fall prevention technique by decluttering space, proper use of AD and footwear

## 2022-08-23 ENCOUNTER — HOME CARE VISIT (OUTPATIENT)
Dept: SCHEDULING | Facility: HOME HEALTH | Age: 82
End: 2022-08-23
Payer: MEDICARE

## 2022-08-23 VITALS
OXYGEN SATURATION: 98 % | HEART RATE: 67 BPM | DIASTOLIC BLOOD PRESSURE: 70 MMHG | RESPIRATION RATE: 17 BRPM | TEMPERATURE: 98.9 F | SYSTOLIC BLOOD PRESSURE: 122 MMHG

## 2022-08-23 PROCEDURE — G0151 HHCP-SERV OF PT,EA 15 MIN: HCPCS

## 2022-08-23 NOTE — HOME HEALTH
S: patient reports that she was recently hospitalized due to chest pain that radiated to the left shoulder and the ribs - she also had blood in her urine per patient reports and was diagnosed with a UTI- she also reports that she had a stress test and it was \"okay\"  O: PAIN: patient is taking pain medication on schedule, educated patient on use of ice for pain and edema  patient reports 0/10 pain at rest in B knees  when walking 5/10 in B knees   when doing stairs she reports 10/10 B knees   patient to apply ice to the knees as needed for pain and swelling control using a barrier to protect the skin. Patient to monitor the skin for any adverse effects such as color changes, irritation, mottled appearance. Patient to use ice for 20 minutes an hour 4-5 times a day. WOUND: no wounds noted or reported  ROM: B hip flexion, abduction and adduction WFL  B knee flexion, extension WFL   STRENGTH: R knee ext 2-/5, R knee flexion 2-/5, R hip flex 4-/5, R hip abd/adduction 4-/5  L knee flexion and extension 5/5, L hip flexion/abduction and addection 5/5  BED MOBILITY:independent with bed mobility - using the railings and the bed controls to lower the bed to the lowest setting   EQUIPMENT: hospital bed, FWW  TRANSFERS: patient completed sit to stand from the bed and the chair with exaggerated KIMBERLYN, exaggerated forward weight shift and use of the table or surrounding furniture to assist with upright positioning advised her that she needs the walker at all times newar her for initial standing safety and for walking   GAIT: ambulating normally without the walker in the home with short shuffling steps, forward flexed posture and grabbing all furniture along the way.  she was advised to use the walker at all times when walking - with the addition of the walker she demonstrated much improved foot clearance, better step length, better posture and better safety- she ambulated in the home for 20 feet x 2 with the walker with CGA for safety and max verbal cues   STEPS: patient has a railing on one side and grab bars on the other side - she walked down 1/2 flight of step and back up holding 2 railings marking time with max cues for safe foot placement, control with descent, hand placement and safety. she needed CGA to min A for safety. She needed mod A to complete the last step up to the second floor. BALANCE: not tested via standardized testing today   RESPONSE TO TREATMENT:patient demonstrated a positive outcome post treatment and reported a reduction in pain, increased comfort and increased confidence with transfers and mobility. Patient reported good understanding of the HEP and reports confidence in ability to complete the program as prescribed by PT  RESPONSE TO EDUCATION: patient was educated on: safety, fall risk, HEP  Patient expressed understanding of all education provided and was able to repeat back education, precautions, and HEP. A:ASSESSMENT AND PROGRESS TOWARD GOALS:  Patient demonstrated a positive result to therapy this date as evidenced by an improvement in gait pattern with cues, decreased pain with exercise and walking and patient expressing an understanding of the rehab plan due to therapy verbal and written instructions. Goals established for increased independence in the home, safe mobility in the home, improvement in strength and ROM - all designed to reduce fall risk and progress toward independence.   Patient will benefit from continued PT intervention to progress toward meeting all established goals    P:.continue with progression of mobility, ther ex for strength, ROM, provide education to patient and caregivers to maximize the recovery and reduce the fall risk to progress toward a return to independent function    PMH/Summary of clinical condition: per epic \"PDGM Diagnosis Can be found in CC on Progress note 7/22/22    List of Comorbidities: Circulatory    Atrial fibrillation with rapid ventricular response ([facility] [address] Hypertension    Atrial fibrillation with RVR (HCC)    Acute hypotension    Paroxysmal atrial fibrillation (HCC)    Pulmonary HTN (HCC)    Digestive    Pancreatitis    Acute pancrea titis    [place]-vesical fistula    Endocrine    Hypothyroid    DM (diabetes mellitus) ([facility] [address]    Hypothyroidism due to acquired atrophy of thyroid    Type 2 diabetes mellitus with nephropathy ([facility] [address]    Nervous    Meningioma (HCC)    Skeletal    Hip fracture (HCC)    Urinary    UTI (urinary tract infection)   Other    Obesity    Family hx-breast malignancy    Diverticulitis    Episcleritis of right eye    Hypoma gnesemia    SIRS (systemic inflammatory response syndrome) (HCC)\"    Medications review completed. no changes noted   medications are effective at this time    social history/ caregivers:patient lives with her son in a 3 story house with her bedroom and living area on the second floor - she has an aide Monday through Friday 10 am to 3 pm. her son works on the weekends so she is alone on the weekends. patient is trying to get the son's job to let him off on saturday and sunday so he is with her but has not been successfu    Discharge planning discussed with patient and caregiver. Discharge planning as follows: DC to self and family under MD supervision when all goals are met or maximum potential is reached  Pt/Caregiver did verbalize understanding. Patient education provided this visit:afety, HEP, fall risk, recommendation to use the walker at all time     Home exercise program: stand/walk hourly with use of the walker     Continued need for the following skills: MD referral for HHPT following recent hospital stay.  HHPT is medically necessary to address  dx and clinical findings: decreased ROM, decreased strength, impaired gait, decreased ability w stair negotiation, increased swelling,  decreased transfer status, decreased endurance, decreased balance and decreased safety, increased pain in order to improve functional mobility/quality of life, decrease burden of care, reduce risk for re-hospitalization, work towards patient's personal goals of return to PLOF w decrease risk for falls.

## 2022-08-24 ENCOUNTER — HOME CARE VISIT (OUTPATIENT)
Dept: SCHEDULING | Facility: HOME HEALTH | Age: 82
End: 2022-08-24
Payer: MEDICARE

## 2022-08-24 VITALS
WEIGHT: 206 LBS | TEMPERATURE: 97.3 F | DIASTOLIC BLOOD PRESSURE: 78 MMHG | BODY MASS INDEX: 40.23 KG/M2 | SYSTOLIC BLOOD PRESSURE: 118 MMHG | OXYGEN SATURATION: 95 % | RESPIRATION RATE: 18 BRPM | HEART RATE: 66 BPM

## 2022-08-24 PROCEDURE — G0300 HHS/HOSPICE OF LPN EA 15 MIN: HCPCS

## 2022-08-24 NOTE — HOME HEALTH
Skilled reason for visit: Wound Care, Atrial Fibrillation, Hypertension, Safety Precautions, Infection Prevention, Pain, and Medication Management    Caregiver involvement: Patients son resides in the home with her and assist her with meal preparation. Patient has a nurse whom sits with her daily and assist as well with preparing her meals, light housekeeping, bathing, toileting, dressing, and accompanies her to her medical appointments. Patients son will  her prescriptions from pharmacy. Medications reviewed and all medications ARE available in the home this visit. The following education was provided regarding medications: Requip-Patient/Caregiver was educated on this medication and the purpose of it. Patient/Caregiver verbalized understanding. MD notified of any discrepancies/look a-like medications/medication interactions: NA    Medications are EFFECTIVE at this time. Home health supplies by type and quantity ordered/delivered this visit include: Supplies in home    Patient education provided this visit: Patient was educated on items checked in interventions tab. Sharps education provided: Clinician instructed patient/CG on proper disposal of sharps: Containers should be made of hard plastic, be puncture-resistant and leak proof, such as a laundry detergent or bleach bottle. When the container is ¾ full, it should be sealed with tape and labeled DO NOT RECYCLE prior to discarding in the regular trash. Patient level of understanding of education provided: Patient verbalized all understanding in interventions tab. Skilled Care Performed this visit: Disease and  Medication Education and Management and Vital Sign Check    Patient response to procedure performed:  Patient tolerated vital assessment well. Patient was sitting in her chair answering questions that this nurse was asking her.     Agency Progress toward goals:  Agency staff is progressing with patient as patient is very thankful to this nurse for evaluating her status and reaching out to PCP office for recommendation. Patient's Progress towards personal goals:  Progressing towards all goals in interventions tab. Home exercise program: Patient will be sure to keep legs moisturized and elevated to help keep skin intact and decrease edema in bilateral lower extremities    Continued need for the following skills:Nursing will continue to follow patient until wound is healed and education is understood and able to be verbalized by patient/caregiver. Plan for next visit:  Continue to educate, complete dressing changes, assess vitals, and review medications    Patient and/or caregiver notified and agrees to changes in the Plan of Care NA      The following discharge planning was discussed with the pt/caregiver: when patient reaches goals and medication is managed, and disease processes are understood patient agrees and understand that discharge will take place.

## 2022-08-25 ENCOUNTER — HOME CARE VISIT (OUTPATIENT)
Dept: SCHEDULING | Facility: HOME HEALTH | Age: 82
End: 2022-08-25
Payer: MEDICARE

## 2022-08-25 VITALS
SYSTOLIC BLOOD PRESSURE: 122 MMHG | OXYGEN SATURATION: 94 % | DIASTOLIC BLOOD PRESSURE: 80 MMHG | TEMPERATURE: 97.7 F | HEART RATE: 74 BPM

## 2022-08-25 PROCEDURE — G0157 HHC PT ASSISTANT EA 15: HCPCS

## 2022-08-25 NOTE — HOME HEALTH
SUBJECTIVE: The pt seen sitting in her recliner upon today's arrival. The pt is home alone at this time as her CG is out running errands. EDUCATION: pressure relief techniques every hour, deep breathing, sitting unsupported for improved breathing and core stengthening. Britta Parker PAIN: see pain assessment  OBJECTIVE: see interventions  . NEXT MD APPT: 9/6/22 with cardiology, 11/18/22 with Dr. Lucy Tuttle  . CAREGIVER ASSISTANCE NEEDED FOR: The pt has a paid CG from [employer] and her son is home with her when he is not working. The pt requires assistance for ADLs, IADLs, safety with mobility especially the stairs - transportation, shopping, meals, medication management. .  ASSESSMENT AND PROGRESS TOWARD GOALS:  The pt demonstrated a fair result in HHPT on this date with SpO2 dropping to 84% after ambulation of 20 ft with the RW; however, post education she is more aware of the need for pressure relief, staying clean and dry of urine, the need for sitting unsupported with deep breathing and for core strengthening. She will benefit from continued HHPT to increase strength, balance and endurance to improve the safety of functional ability especially the stairs for the safe ability to get to and from medical appts. PATIENT RESPONSE TO TREATMENT: SpO2 drop to 84% with ambulation, rising to 92%n in 1'20\". PATIENT LEVEL OF UNDERSTANDING OF EDUCATION: The pt able to teach back the need for pressure relief and to stay clean and dry of urine for reducing risks of skin breakdown and returning UTIs. .  CONTINUED NEED FOR THE FOLLOWING SKILLS: HH PT is medically necessary to address pain, decreased ROM, decreased strength, increased swelling, impaired bed mobility, decreased independence with functional transfers, impaired gait, impaired stair negotiation, and impaired balance in order to improve functional independence, quality of life, return to PLOF, reduce the risk for falls, and reduce pain.   .  [facility]  LAST COMPLETED ON:  Jose Guadalupe Purcell, PT 8/25/22  . PLAN: Plan to progress the strengthening program next visit and address the stairs with the aide in the next 1-2 visits. DISCHARGE PLANNING DISCUSSED: Discharge to self and family under MD supervision once all goals have been met or patient has reached maximum potential. Discussed with the pt the remaining frequency of 2w2 for HHPT. The pt is in agreement to the POC at this time.

## 2022-08-26 ENCOUNTER — TELEPHONE (OUTPATIENT)
Dept: FAMILY MEDICINE CLINIC | Age: 82
End: 2022-08-26

## 2022-08-26 NOTE — TELEPHONE ENCOUNTER
Nurse Hiral Phipps is requesting an updated medication list be faxed for patient.  Patient is presently hospitalized at [facility]   Fax# [phone]

## 2022-08-30 ENCOUNTER — HOME CARE VISIT (OUTPATIENT)
Dept: SCHEDULING | Facility: HOME HEALTH | Age: 82
End: 2022-08-30
Payer: MEDICARE

## 2022-08-30 ENCOUNTER — HOME CARE VISIT (OUTPATIENT)
Dept: HOME HEALTH SERVICES | Facility: HOME HEALTH | Age: 82
End: 2022-08-30
Payer: MEDICARE

## 2022-08-30 ENCOUNTER — PATIENT OUTREACH (OUTPATIENT)
Dept: CASE MANAGEMENT | Age: 82
End: 2022-08-30

## 2022-08-30 VITALS
DIASTOLIC BLOOD PRESSURE: 68 MMHG | TEMPERATURE: 97.7 F | SYSTOLIC BLOOD PRESSURE: 118 MMHG | OXYGEN SATURATION: 93 % | HEART RATE: 114 BPM

## 2022-08-30 PROCEDURE — G0157 HHC PT ASSISTANT EA 15: HCPCS

## 2022-08-30 RX ORDER — OXYCODONE AND ACETAMINOPHEN 5; 325 MG/1; MG/1
1 TABLET ORAL
COMMUNITY

## 2022-08-30 NOTE — PROGRESS NOTES
Care Transitions Initial Call    Call within 2 business days of discharge: Yes     Patient: Cade Wadsworth Patient : 1940 MRN: [id number]    Last Discharge  Street       Date Complaint Diagnosis Description Type Department Provider    20 Chest Pain Atrial fibrillation with RVR [facility]) ED to Hosp-Admission (Discharged) (ADMIT) [id number] Martin Mccrary MD; Jessy Berumen, ... Was this an external facility discharge? Yes, 2022 to 2022  Discharge Facility: [facility]    Challenges to be reviewed by the provider   Additional needs identified to be addressed with provider: no  none         Method of communication with provider : none    Discussed COVID-19 related testing which was not done at this time. Test results were not done. Patient informed of results, if available? N/a     Advance Care Planning:   Does patient have an Advance Directive: decision makers updated. Inpatient Readmission Risk score: No data recorded  Was this a readmission? no   Patient stated reason for the admission: chest pain    Patients top risk factors for readmission: functional physical ability, medical condition-chest pain, Afib with RVR, and support system   Interventions to address risk factors: Scheduled appointment with PCP-[place]    Care Transition Nurse (CTN) contacted the patient by telephone to perform post hospital discharge assessment. Verified name and  with patient as identifiers. Provided introduction to self, and explanation of the CTN role. CTN reviewed discharge instructions, medical action plan and red flags with patient who verbalized understanding. Were discharge instructions available to patient? yes. Reviewed appropriate site of care based on symptoms and resources available to patient including: PCP, Home Health, and [address] [place] [address] Road. Patient given an opportunity to ask questions and does not have any further questions or concerns at this time.  The patient agrees to contact the PCP office for questions related to their healthcare. Medication reconciliation was performed with patient, who verbalizes understanding of administration of home medications. Advised obtaining a 90-day supply of all daily and as-needed medications. Referral to Pharm D needed: no     Home Health/Outpatient orders at discharge: home health care  [address]: [employer]  Date of initial visit: 8/30/82022    Durable Medical Equipment ordered at discharge: O2 Supplies and tub transfer [place] [address]: [address] [facility] received: yes    Was patient discharged with a pulse oximeter? no    Discussed follow-up appointments. If no appointment was previously scheduled, appointment scheduling offered: yes. Is follow up appointment scheduled within 7 days of discharge? Not scheduled yet . [facility] follow up appointment(s):   Future Appointments   Date Time Provider Zuleika Ruvalcaba   8/31/2022 To Be Determined Thalia Vanegas LPN [facility]   8/31/2022 To Be Determined Yulia Patiño OTR/TRISH [address] [place] [address]   9/1/2022 To Be Determined Terrikkia Daksha, PTA [facility]   9/6/2022 To Be Determined Tereasa Daksha, PTA [facility]   9/6/2022 11:00 AM [facility] [address] ECHO 1 [facility] BS AMB   9/6/2022 11:45 AM Noel Kaplan MD [facility] BS AMB   9/7/2022 To Be Determined Thalia Vanegas LPN [place] [address] [facility]   9/8/2022 To Be Determined Marielena Louise, PT [place] [address]   9/8/2022 11:15 AM Garry Ivey MD [facility] BS AMB   9/14/2022 To Be Determined Thalia Vanegas LPN [place] [address] [facility]   11/18/2022 10:45 AM Garry Ivey MD [facility] BS AMB     Non-[facility] follow up appointment(s): n/a    Plan for follow-up call in 5-7 days based on severity of symptoms and risk factors. Plan for next call: symptom management-ensure that patient symptoms have decreased. CTN provided contact information for future needs.      Goals Addressed                   This Visit's Progress     Prevent complications post hospitalization. CTN will monitor X 4 weeks    Ensure provider appt is scheduled within 7 days post-discharge 8/30/2022 Patient does not have appt- will place on list for phone appt. Confirm patient attended post-discharge provider apt    Complete post-visit call to confirm attendance and update care needs 8/30/2022 patient stated that she is doing ok. Had [facility] today. She stated that her appetite is ok- attempting to eat and drink. Review/educate common or potential \"red flags\" of condition worsening 8/30/2022 Reviewed signs and symptoms of chest pain such as pressure, fullness or burning in chest, crushing pain that goes to back jaw, shoulders or arms, pain that lasts longer than a few minutes, SOB , N & V and cold sweat to name a few. Evaluate adherence to medications and priority barriers to resolve  8/30/2022 Patient stated that she has all meds and is taking as directed. Instruct on adherence to medications as ordered and assess for therapeutic response and side-effects  8/30/2022 Patient stated that she long with family know why she is taking meds and know when to call doctor with issues. Discuss and evaluate ADL performance. Provide recommendations on energy conservation, particularly related to transition home from an inpatient admission. Patient stated that she is having difficulty with stairs at present time. She is having HH to rebuild strength and stamina. She rests when needed.

## 2022-08-30 NOTE — HOME HEALTH
SUBJECTIVE: The pt states she went to the hospital on Thursday and just came home last evening. She was sent home with O2. The pt seated at the kitchen table without the O2 donned upon today's arrival. She is speaking with her brother reviewing her living will as she states she has now decided she wants placement in a SNF as she cannot continue to come home as she is unable to safely get out and in of the home and does not have access to a bathroom on the floor she is living on. In discussion with the pt, she has requested a call to APS. -Alerted APS via telephone of the pts situation. Micah Freeman PAIN: see pain assessment  OBJECTIVE: see interventions  . NEXT MD APPT: 9/6/22 with cardiology, 11/18/22 with Dr. Ayaka Quiroz  . CAREGIVER ASSISTANCE NEEDED FOR: The pt has a paid CG from 10-3 M-F and her son is home with her when he is not working. The pt requires assistance for ADLs, IADLs, safety with mobility especially the stairs - transportation, shopping, meals, medication management. Diana, the CG is present during today's visit. ASSESSMENT AND PROGRESS TOWARD GOALS:  The pt continues to have environmental barriers of stairs that does not allow her to exit the home safely to get to and from medical appts. She also does not have access to a bathroom on the floor of the home she is living on increasing her risk of UTIs and skin breakdown as she is not staying clean and dry. The pt will benefit from MSW to address living situation for improved care and quality of life. PATIENT RESPONSE TO TREATMENT: improved SpO2 with deep breathing education. PATIENT LEVEL OF UNDERSTANDING OF EDUCATION: Good - the pt verbalizes the desire for placement and is in agreement to alerting APS of current situation.      CONTINUED NEED FOR THE FOLLOWING SKILLS: HH PT is medically necessary to address pain, decreased ROM, decreased strength, increased swelling, impaired bed mobility, decreased independence with functional transfers, impaired gait, impaired stair negotiation, and impaired balance in order to improve functional independence, quality of life, return to PLOF, reduce the risk for falls, and reduce pain. PLAN: reassessment with Priyanka Villafuerte PT next visit. DISCHARGE PLANNING DISCUSSED: Discharge to self and family under MD supervision once all goals have been met or patient has reached maximum potential. Discussed with the pt the remaining frequency of 1 more visit this week then 2w1 for HHPT. The pt is in agreement to the POC at this time.

## 2022-08-31 ENCOUNTER — TELEPHONE (OUTPATIENT)
Dept: CARDIOLOGY CLINIC | Age: 82
End: 2022-08-31

## 2022-08-31 ENCOUNTER — HOME CARE VISIT (OUTPATIENT)
Dept: SCHEDULING | Facility: HOME HEALTH | Age: 82
End: 2022-08-31
Payer: MEDICARE

## 2022-08-31 VITALS
HEART RATE: 80 BPM | DIASTOLIC BLOOD PRESSURE: 74 MMHG | SYSTOLIC BLOOD PRESSURE: 118 MMHG | OXYGEN SATURATION: 96 % | TEMPERATURE: 97.4 F | RESPIRATION RATE: 18 BRPM

## 2022-08-31 PROCEDURE — G0300 HHS/HOSPICE OF LPN EA 15 MIN: HCPCS

## 2022-08-31 PROCEDURE — G0152 HHCP-SERV OF OT,EA 15 MIN: HCPCS

## 2022-08-31 NOTE — HOME HEALTH
Skilled reason for visit: Wound Care, Atrial Fibrillation, Hypertension, Safety Precautions, Infection Prevention, Pain, and Medication Management    Caregiver involvement: Patients son resides in the home with her and assist her with meal preparation. Patient has a nurse whom sits with her daily and assist as well with preparing her meals, light housekeeping, bathing, toileting, dressing, and accompanies her to her medical appointments. Patients son will  her prescriptions from pharmacy. Medications reviewed and all medications ARE available in the home this visit. The following education was provided regarding medications: Bactrim DS-Patient/Caregiver was educated on this medication and the purpose of it. Patient/Caregiver verbalized understanding. MD notified of any discrepancies/look a-like medications/medication interactions: NA    Medications are EFFECTIVE at this time. Home health supplies by type and quantity ordered/delivered this visit include: Supplies in home    Patient education provided this visit: Patient was educated on items checked in interventions tab. Sharps education provided: Clinician instructed patient/CG on proper disposal of sharps: Containers should be made of hard plastic, be puncture-resistant and leak proof, such as a laundry detergent or bleach bottle. When the container is ¾ full, it should be sealed with tape and labeled DO NOT RECYCLE prior to discarding in the regular trash. Patient level of understanding of education provided: Patient verbalized all understanding in interventions tab. Skilled Care Performed this visit: Disease and  Medication Education and Management and Vital Sign Check    Patient response to procedure performed:  Patient tolerated vital assessment well. Patient was sitting in her chair answering questions that this nurse was asking her.     Agency Progress toward goals:  Agency staff is progressing with patient as patient is very thankful to this nurse for evaluating her status and reaching out to PCP office for recommendation. Patient's Progress towards personal goals:  Progressing towards all goals in interventions tab. Home exercise program: Patient will contact PCP office regarding medications that ED requested she stop taking as she has concerns regarding those medications. The medications are her Hydralazine, Vitamin D, Metolazone, Potassium, Epinephrine, Celexa and Benadryl. Patient does not see her PCP until September 8, 2022 for ED follow up and this nurse explained that several of those medications are very important and she should consult with her PCP regarding if he wants her to continue those med's. Patient and her caregiver state they would call PCP office and they are unsure at this time if patient will be able to make it to PCP appointment on that day as she has a hard time walking down the stairs to get to medical transportation and there is no other way to get patient down the stairs. Continued need for the following skills:Nursing will continue to follow patient until wound is healed and education is understood and able to be verbalized by patient/caregiver. Plan for next visit:  Continue to educate, complete dressing changes, assess vitals, and review medications    Patient and/or caregiver notified and agrees to changes in the Plan of Care NA      The following discharge planning was discussed with the pt/caregiver: when patient reaches goals and medication is managed, and disease processes are understood patient agrees and understand that discharge will take place. Patient was seen again at ED lastnight for hallucinations and states she was just feeling bad overall, and seeing people. Patient was sent home with a prescription for Bactrim DS she states for another UTI and increase in Synthroid as she states the doctor at the ER notified her that her Thyroid level was off.  This nurse notified the office via E mail.

## 2022-08-31 NOTE — CASE COMMUNICATION
OT called pt 8.30.22 to schedule visit for 8.31.22 however no answer to phone. OT left message with no return call. OT will attempt to contact pt 8.31.22 to schedule visit. this was OT first attempt. thank you.

## 2022-09-01 ENCOUNTER — HOME CARE VISIT (OUTPATIENT)
Dept: SCHEDULING | Facility: HOME HEALTH | Age: 82
End: 2022-09-01
Payer: MEDICARE

## 2022-09-01 ENCOUNTER — PATIENT OUTREACH (OUTPATIENT)
Dept: CASE MANAGEMENT | Age: 82
End: 2022-09-01

## 2022-09-01 ENCOUNTER — HOME CARE VISIT (OUTPATIENT)
Dept: HOME HEALTH SERVICES | Facility: HOME HEALTH | Age: 82
End: 2022-09-01
Payer: MEDICARE

## 2022-09-01 VITALS
DIASTOLIC BLOOD PRESSURE: 80 MMHG | SYSTOLIC BLOOD PRESSURE: 138 MMHG | OXYGEN SATURATION: 95 % | TEMPERATURE: 98.1 F | HEART RATE: 84 BPM

## 2022-09-01 VITALS
TEMPERATURE: 98.3 F | RESPIRATION RATE: 17 BRPM | SYSTOLIC BLOOD PRESSURE: 108 MMHG | OXYGEN SATURATION: 97 % | HEART RATE: 83 BPM | DIASTOLIC BLOOD PRESSURE: 80 MMHG

## 2022-09-01 VITALS
RESPIRATION RATE: 20 BRPM | HEART RATE: 66 BPM | SYSTOLIC BLOOD PRESSURE: 135 MMHG | DIASTOLIC BLOOD PRESSURE: 72 MMHG | OXYGEN SATURATION: 94 % | TEMPERATURE: 98.3 F

## 2022-09-01 PROCEDURE — G0158 HHC OT ASSISTANT EA 15: HCPCS

## 2022-09-01 PROCEDURE — G0151 HHCP-SERV OF PT,EA 15 MIN: HCPCS

## 2022-09-01 NOTE — HOME HEALTH
Clinical Condition per EPIC:  \"patient was recently hospitalized secondary to  Diffuse body pain 2/2 to UTI and Costochondritis   Hypoxia   - 1L of o2, wean down qd   - unable to do IV toradol d/t kidney function---> x1 of IV solumedrol,   - IV rocephin started, bC x 2 (-), urine cx > 50,000 klebsiella   -- respiratory viral panel   - low suspicions of ACS but to be thorough --> stress test am , npo after mn   Hypocalcemia - resolved w/Ca gluconate   AMALIA on stage 3 CKD - wrosened again? Cr up to to 1.8, trial one time dose of IV lasix , bladder scan is pt retaining? Left shoulder pain 2/2 to o steoarthritis. - xray of left shoulder appreciated, salon pass   Elevated troponin and BNP likely demand ischemia - will trend , doubt AcS, EKG reviewed   - BNP elevated? ? Uncertain why, pending echo if you look at her CT chest there is no pleural effusions   - one time dose of iv lasix, stress test tomorrow am   Atrial fibrillation - decreased eliquis, amiodarone   Type 2 DM - SSI   hypothyroidism - synthroid   Chronic di astolic HF - echo pending, despite elevated BNP has obese body habitus and on CT findings no pleural effusions   S/p colostomy - colovesicular fistula?  Noted on CT abdomen discussed with gen scharles no interventions and doubt this is contributing to her UTI, no abnormality such as feces in her urine , watch for now may be scarring  List of Comorbidities: Circulatory   Atrial fibrillation with rapid ventricular response (HCC)  Hypertension   Atrial fibrillation with RVR (HCC)  Acute hypotension   Paroxysmal atrial fibrillation (HCC)  Pulmonary HTN (HCC)   Digestive   Pancreatitis  Acute pancreatitis  [place]-vesical fistula  Endocrine   Hypothyroid  DM (diabetes mellitus) ([address]  Hypothyroidism due to acquired atrophy of thyroid  Type 2 diabetes mellitus with nephropathy ([address]   Nervous   Meningioma ([address]  Skeletal   Hip fracture ([address]  Urinary   UTI (urinary tract infection)   Other   Obesity  Family hx-breast malignancy   Diverticulitis   Episcleritis of right eye  Hypoma gnesemia   SIRS (systemic inflammatory response syndrome) (HCC)\"  . SUBJECTIVE:  Pt stated \"I have been so weak my aide has been taking care of me. \" pt sitting recliner upon OT arrival. pt agreed tx. pt reports has hired aide M-F 10am to 3pm x 3 weeks now. CAREGIVER INVOLVEMENT: pt aide and son provides A ADL tasks, transfers, and ambulation PRN, performs IADL tasks, shopping, transportation, and A with medications. MEDICATION RECONCILIATION: OT reconcilled medications with pt with no changes   DME ORDERED/RECOMMENEDED: NA, pt has FWW and hospital bed. pt declined BSC. Marlen Parra OBJECTIVE:  BATHING: D via sponge bath while standing kitchen sink. pt reports holds onto sink while aide performs all bathing. OT instructed pt importance pt sitting while performing bathing for increased participation, safety, energy conservation/SOB management, and fall prevention, as well as, performing tasks per self as able for increased functional level, endurance, and strength. pt verbalized understanding. TOILETING: D. pt has ostomy for bowel. pt urinates via pullup and aide changes and cleans her. pt declined BSC. UB DRESSING: mod A due to weakness and impaired balance  LB DRESSING: D due to weakness, SOB, and impaired balance  GROOMING: min A  FEEDING: setup A  .  pt reports Modified Carl RPE 1010 after performing ambulation, transfers, and ADL assessment. .  OT instructed/demonstrated pt the following with good understanding:    - energy conservation while performing bathing, dressing, and setup such as set clothing out night before, gather items required to perform task in one trip, sit while performing tasks, take rest breaks as needed, perform shower/bathing on days with no other appointments or activities scheduled, as well as, pursed lip breathing for SOB management.      - pt is to perform bathing/dressing tasks sitting, when possible, for safety/fall prevention.     - while sitting perform weight shift technique bringing LE contra laterally onto opposite LE while performing LB bathing/dressing for safety and fall prevention. pt is not able to perform. - pt instructed/demonstrated one handed technique while standing to perform functional tasks with use FWW increased stability, fall prevention, and safety while standing. -ADL training performed for improved body mechanics, safety, and technique for reduced risk of falls and improved functional level and participation of tasks. Flaco Moses IADL: NT due pt decline  . BALANCE:  STATIC STANDING: fair-, pt required BUE support FWW at all times to maintain upright stance. pt only able to tolerate standing x 30 seconds prior to needing to sit due to SOB and fatigue. DYNAMIC STANDING: poor+  . AMBULATION: pt performed ambulation x 4' close SBA. pt reported SOB and too fatigued to continue ambulation and sat down. pt removed O2 to perform ambulation. pt O2 decreased to 89% after 1 minute activity. pt O2 increased to 96% on 3L O2 after sitting x 1 minute. OT instructed pt importance wearing O2 at all times for safety/fall prevention and SOB management. pt verbalized good understanding. .  EOB/BED TRANSFER: NT per pt decline due to fatigue. CHAIR: sit/stand close SBA/CGA with min v/c hand placement. pt required several attempts to stand with no follow through min v/c per OT for proper hand placement and technique. TOILET/BSC: NA, pt did not use toilet prior to illness as it is on the third floor and pt is unable to access. TUB SHOWER/SHOWER CHAIR/TUB BENCH: NA pt did not use tub shower prior to illness as it is on the third floor and pt is unable to access. .  -gait and transfer training performed for improved body mechanics and technique for fall prevention and safety while performing ADL/IADL tasks. Flaco Moses PATIENT RESPONSE TO TREATMENT:  pt reports increased fatigue with activity during tx.      PATIENT EDUCATION PROVIDED THIS VISIT: UB HEP, OT role, energy conservation, fall prevention/safety training ADL/IADL tasks, transfer training   . PATIENT LEVEL OF UNDERSTANDING OF EDUCATION PROVIDED: pt verbalized good understanding energy conservation/SOB management, importance wearing O2 at all times, and UB HEP. pt with poor follow through transfer technique. REHAB POTENTIAL: good for stated goals   HOME EXERCISE PROGRAM: Written HEP of the following issued. KIM will instruct/re-instruct pt next treatment. UB HEP includes the following: BUE shoulder press, shoulder flexion, shoulder abduction, shoulder extension, chest press, elbow flexion/extension x 10, x 2 per day. pt aware KIM will update HEP throughout POC. UB HEP performed for pt increased endurance and strength to perform ADL/IADL tasks and ambulation/transfers to perform tasks with improved safety, increased functional level, and for fall prevention. CONTINUED NEED FOR THE FOLLOWING SKILLS: HH OT is medically necessary to address pain, decreased functional strength, increased swelling, impaired bed mobility to perform ADL tasks, decreased independence and safety with functional transfers, decreased independence and safety performing ADL/IADL tasks, decreased activity and standing tolerance, decreased functional endurance, and impaired balance in order to improve functional independence, obtain set goals, reduce risk of falls, reduce pain, improve quality of life, and return to PLOF. ASSESSMENT: pt presents with decreased endurance, decreased functional strength, decreased safety transfers, increased A with transfers, decreased safety/participation ADL/IADL tasks, decreased balance, increased burden of care, and no UB HEP.   PLAN: pt will be seen to address ther ex: establish and upgrade HEP, ther act: activity and standing tolerance training, endurance training, balance training, safety training, energy conservation training, transfer training, ADL/IADL training, and pt/caregiver education. DISCHARGE PLANNING DISCUSSED WITH PT/CAREGIVER: Anticipate D/C skilled OT week of 9.19.22 goals met or when pt obtained maximum benefit. pt frequency 2w3, 1w1. discharge to self and family care under MD supervision once all goals have been met or patient has reached max potential. pt and caregiver verbalized understanding and agree POC.

## 2022-09-01 NOTE — CASE COMMUNICATION
dx: UTI, zip: [phone], freq: 2w3, 1w1, focus: increased safety ambulation, transfers, and I/ADL tasks, increased participation I/ADL tasks, energy conservation/SOB management, continuous use O2 for safety/fall prevention as pt destats quickly. pt has hired aide who leaves by 3pm M-F therefore pt would like visits prior to aide leaving.  thank you

## 2022-09-01 NOTE — TELEPHONE ENCOUNTER
Contacted pt at [facility] number. Pt in PT spoke with HHA. Two patient Identifiers confirmed. Advised pt per Dr Frieda Perdue ok to do [address] [place] [address] . Pts HHA verbalized understanding and stated she would advise pt.

## 2022-09-01 NOTE — HOME HEALTH
SUBJECTIVE: Pt in recliner upon arrival, she had just finished with PT and was moderately fatigued. Pt reported her hospital bed was broken and would not sit up/down on its own but that the company would not fix it because they said she caused the damage, therapist encouraged pt to reach out to medicaid  to help and she reported she has started the process. CAREGIVER ASSISTANCE NEEDED FOR: pts dinorahkunal Diana present during visit  MEDICATIONS REVIEWED AND UPDATED: no medication changes reported this visit. .  OBJECTIVE: see interventions  PATIENT RESPONSE TO TREATMENT:  Pt demonstrated positive response to treatment with min increased pain in LEs during standing and good participation. Etienne Oropeza PATIENT/CAREGIVER EDUCATION PROVIDED THIS VISIT: Therapist educated pt on use of LHAD, set-up of toileting hygiene items near her chair for energy conservation and skin integrity and importance of pt performing as much as she can for herself to improve activity tolerance and I. PATIENT LEVEL OF UNDERSTANDING OF EDUCATION PROVIDED: Pt verbalized good understanding and stated she tries to keep hygiene items close by as her brief is her method for toileting due to severe incontinence. ASSESSMENT AND PROGRESS TOWARD GOALS:  Pt demonstrated fair progress with HEP carry over, energy conservation strategies and mobility, she is very limited by her swelling and weakness. Patient will benefit from continued intervention with progression of I/ADL, transfer, balance and endurance/strength training. CONTINUED NEED FOR THE FOLLOWING SKILLS: HH OT is medically necessary to address pain, decreased ROM, decreased strength, impaired bed mobility, decreased independence with functional transfers, decreased I with I/ADLs, and impaired balance in order to improve functional independence, quality of life, return to PLOF, reduce the risk for falls, and reduce pain.   PLAN: next visit will be for ADL and further transfer safety training with use of proper AD. DISCHARGE PLANNING DISCUSSED: Discharge to self and family under MD supervision once all goals have been met or patient has reached maximum potential.  Frequency remaininw2, 1w1 remaining.

## 2022-09-01 NOTE — HOME HEALTH
S: patient reports that she is having a vary hard time and they put her on oxygen 2.5 liters continously - patient had turned it up to 3 L as she was not \"able to breathe\"    O: PAIN: patient denied pain today     WOUND: no wounds noted or reported    ROM: B hip flexion, abduction and adduction WFL  B knee flexion, extension WFL     STRENGTH: R knee ext 2-/5, R knee flexion 2-/5, R hip flex 4-/5, R hip abd/adduction 4-/5 AT EVALUATION - NOT RETESTED TODAY  L knee flexion and extension 5/5, L hip flexion/abduction and addection 5/5 AT EVALUATION - NOT RETESTED TODAY     BED MOBILITY: patient was sitting on the side of the bed on arrival to the home - she did not demonstrate bed mobility but she reports that it is getting harder to complete on her own     EQUIPMENT: hospital bed, FWW    TRANSFERS: patient completed sit to stand from thebe and recliner chair with exaggerated base of support, B UE push off, exaggerated forward weight shift and increased effort needed to complete she uses a FWW for initial standing balance     GAIT: ambulated today in the home with the FWW from the bed to the top of the steps with very slow sri, reduced foot clearance, short step length, forward flexed posture and poor breath control - she needed cues for breath control and to breathe through her nose to get the maximum benefit from the oxygen    STEPS: patient demonstrated descending and ascending a flight of steps [facility] her bedroom/living area is on the second floor. She kept her oxygen on throughout the stair training. she held the railing with 2 hands to step down to the first step - after this she had 2 railings to hold as she descending - she needed min A to assist with control and max verbal cues for sequencing and safety. She needed to rest on each step to regain her breath and needed cues to breathe in the oxygen through her nose.  When coming back up she needed min to mod A to get her leading foot onto the next step and then min A to complete the step up onto each step. She was very fatigued and short of breath despite wearing the oxygen at all time. Her caregiver Kristian Crenshaw is planning to ask someone to add another railing/grab bar at the top of the steps as that is the only area that is lacking 2 rails/bars. BREATHING/ENDURANCE: patient needed cues for correct breathing tachnique - she maintained the oxygen on at 3 L throughout the session - her oxygen saturations reduced to 84% by the time she got back upstairs- with deep breathing she was able to quickly recover back to 94%    BALANCE: not tested via standardized testing today     RESPONSE TO TREATMENT:patient demonstrated a positive outcome post treatment and reported a reduction in pain, increaed comfort and increased confidence with transfers and mobility. Patient reported good understanding of the HEP and reports confidence in ability to complete the program as prescribed by PT    RESPONSE TO EDUCATION: patient was educated on: safety, fall risk, HEP    Patient expressed understanding of all education provided and was able to repeat back education, precautions, and HEP. A:ASSESSMENT AND PROGRESS TOWARD GOALS:  Patient demonstrated a positive result to therapy this date as evidenced by an improvement in gait pattern with cues, decreased pain with exercise and walking and patient expressing an understanding of the rehab plan due to therapy verbal and written instructions. Goals established for increased independence in the home, safe mobility in the home, improvement in strength and ROM - all designed to reduce fall risk and progress toward independence.   Patient will benefit from continued PT intervention to progress toward meeting all established goals      P:.continue with progression of mobility, ther ex for strength, ROM, provide education to patient and caregivers to maximize the recovery and reduce the fall risk to progress toward a return to independent function    PMH/Summary of clinical condition: per epic \"PDGM Diagnosis Can be found in CC on Progress note 7/22/22    List of Comorbidities: Circulatory    Atrial fibrillation with rapid ventricular response (HCC)    Hypertension    Atrial fibrillation with RVR (HCC)    Acute hypotension    Paroxysmal atrial fibrillation (HCC)    Pulmonary HTN (HCC)    Digestive    Pancreatitis    Acute pancrea titis    Freeburn-vesical fistula    Endocrine    Hypothyroid    DM (diabetes mellitus) ([facility] [address]    Hypothyroidism due to acquired atrophy of thyroid    Type 2 diabetes mellitus with nephropathy ([facility] [address]    Nervous    Meningioma (HCC)    Skeletal    Hip fracture (HCC)    Urinary    UTI (urinary tract infection)   Other    Obesity    Family hx-breast malignancy    Diverticulitis    Episcleritis of right eye    Hypoma gnesemia    SIRS (systemic inflammatory response syndrome) (HCC)\"    Medications review completed. no changes noted   medications are effective at this time    social history/ caregivers:patient lives with her son in a 3 story house with her bedroom and living area on the second floor - she has an aide Monday through Friday 10 am to 3 pm. her son works on the weekends so she is alone on the weekends. patient is trying to get the son's job to let him off on saturday and sunday so he is with her but has not been successfu    Discharge planning discussed with patient and caregiver. Discharge planning as follows: DC to self and family under MD supervision when all goals are met or maximum potential is reached  Pt/Caregiver did verbalize understanding.     Patient education provided this visit:afety, HEP, fall risk, recommendation to use the walker at all time     Home exercise program:   1) stand/walk every waking hour that a CG is available with the RW   2) pressure relief techniques every hour   3) seated ther exs LAQ, hip flex x 10 - 5x/day     Continued need for the following skills: MD referral for HHPT following recent hospital stay. HHPT is medically necessary to address  dx and clinical findings: decreased ROM, decreased strength, impaired gait, decreased ability w stair negotiation, increased swelling,  decreased transfer status, decreased endurance, decreased balance and decreased safety, increased pain in order to improve functional mobility/quality of life, decrease burden of care, reduce risk for re-hospitalization, work towards patient's personal goals of return to PLOF w decrease risk for falls.

## 2022-09-02 ENCOUNTER — HOME CARE VISIT (OUTPATIENT)
Dept: SCHEDULING | Facility: HOME HEALTH | Age: 82
End: 2022-09-02
Payer: MEDICARE

## 2022-09-02 PROCEDURE — G0155 HHCP-SVS OF CSW,EA 15 MIN: HCPCS

## 2022-09-02 NOTE — Clinical Note
Pt/family is interested in long-term nursing facility placement. MSW provided contact information for nursing facility searches, which pt's EVE Ortiz agreed to assist with, and provided the contact information for two facilities that had openings within the last week. Pt will need support with business management/her home (and it's contents) prior to her transition. MSW will follow-up with pt/family and [place] Care Coordinator/Manager to provide additional support. Pt is unsure if she has Advanced Directives.

## 2022-09-06 ENCOUNTER — VIRTUAL VISIT (OUTPATIENT)
Dept: FAMILY MEDICINE CLINIC | Age: 82
End: 2022-09-06

## 2022-09-06 ENCOUNTER — HOME CARE VISIT (OUTPATIENT)
Dept: SCHEDULING | Facility: HOME HEALTH | Age: 82
End: 2022-09-06
Payer: MEDICARE

## 2022-09-06 VITALS
TEMPERATURE: 98.4 F | HEART RATE: 60 BPM | SYSTOLIC BLOOD PRESSURE: 124 MMHG | DIASTOLIC BLOOD PRESSURE: 68 MMHG | OXYGEN SATURATION: 95 %

## 2022-09-06 DIAGNOSIS — I10 ESSENTIAL HYPERTENSION: ICD-10-CM

## 2022-09-06 DIAGNOSIS — Z99.81 SUPPLEMENTAL OXYGEN DEPENDENT: ICD-10-CM

## 2022-09-06 DIAGNOSIS — Z99.3 WHEELCHAIR BOUND: ICD-10-CM

## 2022-09-06 DIAGNOSIS — R44.1 HALLUCINATION, VISUAL: ICD-10-CM

## 2022-09-06 DIAGNOSIS — Z93.3 COLOSTOMY PRESENT (HCC): Primary | ICD-10-CM

## 2022-09-06 PROCEDURE — 1101F PT FALLS ASSESS-DOCD LE1/YR: CPT | Performed by: FAMILY MEDICINE

## 2022-09-06 PROCEDURE — G8427 DOCREV CUR MEDS BY ELIG CLIN: HCPCS | Performed by: FAMILY MEDICINE

## 2022-09-06 PROCEDURE — G8756 NO BP MEASURE DOC: HCPCS | Performed by: FAMILY MEDICINE

## 2022-09-06 PROCEDURE — G0157 HHC PT ASSISTANT EA 15: HCPCS

## 2022-09-06 PROCEDURE — G9717 DOC PT DX DEP/BP F/U NT REQ: HCPCS | Performed by: FAMILY MEDICINE

## 2022-09-06 PROCEDURE — 99214 OFFICE O/P EST MOD 30 MIN: CPT | Performed by: FAMILY MEDICINE

## 2022-09-06 PROCEDURE — 1090F PRES/ABSN URINE INCON ASSESS: CPT | Performed by: FAMILY MEDICINE

## 2022-09-06 PROCEDURE — 400013 HH SOC

## 2022-09-06 PROCEDURE — 1123F ACP DISCUSS/DSCN MKR DOCD: CPT | Performed by: FAMILY MEDICINE

## 2022-09-06 PROCEDURE — G8399 PT W/DXA RESULTS DOCUMENT: HCPCS | Performed by: FAMILY MEDICINE

## 2022-09-06 NOTE — HOME HEALTH
SUBJECTIVE: The pt seen sitting in her recliner on room air upon today's arrival. SpO2 at 87%,     PAIN: see pain assessment    OBJECTIVE: see interventions  . NEXT MD APPT: 9/8/22 virtual with cardiology. 11/18/22 with Dr. Lia Mcfadden  . CAREGIVER ASSISTANCE NEEDED FOR: The pt has a paid CG from 10-3 M-F and her son is home with her when he is not working. The pt requires assistance for ADLs, IADLs, safety with mobility especially the stairs - transportation, shopping, meals, medication management. Diana, the CG is present during today's visit. ASSESSMENT AND PROGRESS TOWARD GOALS: At this time the pt is not able to participate at a therapeutic level to progress towards functional mobility goals. She is now not sleeping in her bed due to SOB and is declining stair training. She is waiting for assistance for SNF placement. She has been educated on reducing risks of skin breakdown and her PCP has been notified of her current situation during today's visit. PATIENT RESPONSE TO TREATMENT: The pt with limited particpation due to SOB and increased anxiety. PATIENT LEVEL OF UNDERSTANDING OF EDUCATION: Improved understanding of pressure relief for reducing the risk of skin breakdown, post education. PLAN:  Reassessment with planned DC next visit with Evelyn Dutton, [id number] [address]: Discharge to self and family under MD supervision once all goals have been met or patient has reached maximum potential. Discussed with the pt the remaining frequency of 1 more visit this week  for HHPT DC. The pt is in agreement to the POC at this time. D/C instructions not given

## 2022-09-06 NOTE — PROGRESS NOTES
Cayden Knapp presents today for   Chief Complaint   Patient presents with    Diabetes    Depression    Thyroid Problem           Irregular Heart Beat       Is someone accompanying this pt? na    Is the patient using any DME equipment during OV? na    Depression Screening:  3 most recent PHQ Screens 8/18/2022   Little interest or pleasure in doing things Not at all   Feeling down, depressed, irritable, or hopeless Not at all   Total Score PHQ 2 0       Learning Assessment:  Learning Assessment 1/17/2019   PRIMARY LEARNER Patient   HIGHEST LEVEL OF EDUCATION - PRIMARY LEARNER  4 YEARS OF COLLEGE   BARRIERS PRIMARY LEARNER NONE   CO-LEARNER CAREGIVER No   PRIMARY LANGUAGE ENGLISH   LEARNER PREFERENCE PRIMARY LISTENING   ANSWERED BY patient   RELATIONSHIP SELF       Abuse Screening:  Abuse Screening Questionnaire 12/1/2020   Do you ever feel afraid of your partner? N   Are you in a relationship with someone who physically or mentally threatens you? N   Is it safe for you to go home? Y       Fall Risk  Fall Risk Assessment, last 12 mths 8/18/2022   Able to walk? Yes   Fall in past 12 months? 1   Do you feel unsteady? 1   Are you worried about falling 1   Is TUG test greater than 12 seconds? 0   Is the gait abnormal? 1   Number of falls in past 12 months 1   Fall with injury? 0       Health Maintenance reviewed and discussed and ordered per Provider. Health Maintenance Due   Topic Date Due    Eye Exam Retinal or Dilated  Never done    Shingrix Vaccine Age 50> (2 of 2) 07/03/2019    COVID-19 Vaccine (3 - Booster for Moderna series) 10/20/2021    Foot Exam Q1  06/14/2022    MICROALBUMIN Q1  08/12/2022    Flu Vaccine (1) 09/01/2022   . Coordination of Care:  1. Have you been to the ER, urgent care clinic since your last visit? Hospitalized since your last visit? Yes, 8/30/22, [facility] ER, UTI    2.  Have you seen or consulted any other health care providers outside of the [address] [place] [address] since your last visit? Include any pap smears or colon screening.  no      Last  Checked na  Last UDS Checked na  Last Pain contract signed: na    Patients concerns today:  check up

## 2022-09-06 NOTE — HOME HEALTH
MSW met with the pt and her [address], and spoke with her son via phone prior to the assessment. MSW provided the pt documentation of social service resources, 211 (resource hotline), [employer] [facility] [employer], and support programs. MSW discussed Advanced Directives, POA, and Will benefits, and left a voice message with pt's brother Mya Jorge, [phone]) at her request. MSW also provided information about care assistance options/costs/funding sources, discussed NF placement (pt's preference at this time), provided electronic NF information via Medicare[employer] and provided the contact information with two NF's that had openings earlier this week. MSW provided the contact information for Margarita CHOW to request assistance from pt's Care Coordinator/Manager and her [address] agreed to provide support in contacting potential nursing facilities. MSW invited a return call if additional resource questions arise and will follow-up with the pt/family.

## 2022-09-06 NOTE — CASE COMMUNICATION
Please plan to DC next visit. At this time the pt is not able to participate at a therapeutic level to progress towards functional mobility goals. She is now not sleeping in her bed due to SOB and is declining stair training. She is waiting for assistance for SNF placement. APS contacted. She has been educated on reducing risks of skin breakdown and her PCP has been notified of her current situation during today's visit.

## 2022-09-06 NOTE — PROGRESS NOTES
Callie Luther is a 79 y.o.  female and presents with    Chief Complaint   Patient presents with    Diabetes    Depression    Thyroid Problem           Irregular Heart Beat         Jenna Doyleezequiel, who was evaluated through a synchronous (real-time) audio-video encounter, and/or her healthcare decision maker, is aware that it is a billable service, which includes applicable co-pays, with coverage as determined by her insurance carrier. She provided verbal consent to proceed and patient identification was verified. This visit was conducted pursuant to the emergency declaration under the [facility], [address] [place] [address] authority and the [employer] Act. A caregiver was present when appropriate. Ability to conduct physical exam was limited. The patient was located at: Home: [address] [facility] [address]  The provider was located at: Facility (Appt Department): [address] [place] [address]  [address]  [address]  [phone]    --Wan Dave MD on 9/6/2022 at 12:13 PM      Subjective:  Ms. Toñito Can is following up with her aid after discharge from ER. She received metoprolol at discharge and this has caused dizziness. She is using oxygen for dyspnea. She had oxygen saturation 87% on room air; currently at 3L oxygen her saturation is 94%. She was discharged to home with oxygen. Her oxygen company is [employer] [phone]. She is living on the 2nd floor of her home, and the restroom is on the 3rd floor. She is using an adult diaper for urination and has her colostomy bag. She has a  scheduled to come and assess and advise on assisted living locations. ROS   Constitutional: Positive for weight gain. Negative for chills and fever. HENT: Negative for congestion and sore throat. Eyes: Negative for blurred vision. Respiratory: Negative for cough.     Cardiovascular: Negative for chest pain and palpitations. Gastrointestinal: Negative for abdominal pain, constipation, diarrhea, heartburn, nausea and vomiting. Genitourinary: no dysuria, frequency, hematuria and urgency. Negative for flank pain. Musculoskeletal: diffuse pain  Skin: Negative. Neurological: Negative for dizziness and headaches  Psych : nighttime hallucinations with talking in her sleep and irritability    All other systems reviewed and are negative. Objective: There were no vitals filed for this visit. alert, ill appearing, and in no distress, oriented to person, place, and time, and overweight  Mental status - anxious  Chest - increased work of breathing  Neurological - cranial nerves II through XII intact  LABS     TESTS      Assessment/Plan:    1. Colostomy present ([facility] [address]  Continue keeping clean and dry    2. Essential hypertension  Goal <130/80    3. Supplemental oxygen dependent  Supply ordered    4. Hallucination, visual  Stop metoprolol    5. Wheelchair bound  Unsafe in her home; poor quality of life with bathroom on floor above main floor    Lab review: no lab studies available for review at time of visit      I have discussed the diagnosis with the patient and the intended plan as seen in the above orders. I have discussed medication side effects and warnings with the patient as well. I have reviewed the plan of care with the patient, accepted their input and they are in agreement with the treatment goals.

## 2022-09-07 ENCOUNTER — HOME CARE VISIT (OUTPATIENT)
Dept: SCHEDULING | Facility: HOME HEALTH | Age: 82
End: 2022-09-07
Payer: MEDICARE

## 2022-09-07 ENCOUNTER — HOME CARE VISIT (OUTPATIENT)
Dept: HOME HEALTH SERVICES | Facility: HOME HEALTH | Age: 82
End: 2022-09-07
Payer: MEDICARE

## 2022-09-07 VITALS
DIASTOLIC BLOOD PRESSURE: 70 MMHG | TEMPERATURE: 99 F | OXYGEN SATURATION: 97 % | HEART RATE: 66 BPM | RESPIRATION RATE: 18 BRPM | SYSTOLIC BLOOD PRESSURE: 130 MMHG

## 2022-09-07 VITALS
OXYGEN SATURATION: 94 % | HEART RATE: 64 BPM | RESPIRATION RATE: 19 BRPM | SYSTOLIC BLOOD PRESSURE: 142 MMHG | DIASTOLIC BLOOD PRESSURE: 84 MMHG | TEMPERATURE: 97.9 F

## 2022-09-07 VITALS
DIASTOLIC BLOOD PRESSURE: 84 MMHG | RESPIRATION RATE: 18 BRPM | HEART RATE: 64 BPM | SYSTOLIC BLOOD PRESSURE: 142 MMHG | OXYGEN SATURATION: 94 % | TEMPERATURE: 97.9 F

## 2022-09-07 PROCEDURE — G0151 HHCP-SERV OF PT,EA 15 MIN: HCPCS

## 2022-09-07 PROCEDURE — G0158 HHC OT ASSISTANT EA 15: HCPCS

## 2022-09-07 PROCEDURE — G0300 HHS/HOSPICE OF LPN EA 15 MIN: HCPCS

## 2022-09-07 NOTE — HOME HEALTH
Skilled reason for visit: Wound Care, Atrial Fibrillation, Hypertension, Safety Precautions, Infection Prevention, Pain, and Medication Management    Caregiver involvement: Patients son resides in the home with her and assist her with meal preparation. Patient has a nurse whom sits with her daily and assist as well with preparing her meals, light housekeeping, bathing, toileting, dressing, and accompanies her to her medical appointments. Patients son will  her prescriptions from pharmacy. Medications reviewed and all medications ARE available in the home this visit. The following education was provided regarding medications: Metolazone-Patient/Caregiver was educated on this medication and the purpose of it. Patient/Caregiver verbalized understanding. MD notified of any discrepancies/look a-like medications/medication interactions: NA    Medications are EFFECTIVE at this time. Home health supplies by type and quantity ordered/delivered this visit include: Supplies in home    Patient education provided this visit: Patient was educated on items checked in interventions tab. Sharps education provided: Clinician instructed patient/CG on proper disposal of sharps: Containers should be made of hard plastic, be puncture-resistant and leak proof, such as a laundry detergent or bleach bottle. When the container is ¾ full, it should be sealed with tape and labeled DO NOT RECYCLE prior to discarding in the regular trash. Patient level of understanding of education provided: Patient verbalized all understanding in interventions tab. Skilled Care Performed this visit: Disease and  Medication Education and Management and Vital Sign Check    Patient response to procedure performed:  Patient tolerated vital assessment well. Patient was sitting in her chair answering questions that this nurse was asking her.     Agency Progress toward goals:  Agency staff is progressing with patient as patient is very thankful to this nurse for evaluating her status and reaching out to PCP office for recommendation. Patient's Progress towards personal goals:  Progressing towards all goals in interventions tab. Home exercise program: Patient will be sure to review with MD Hortencia Barbosa tomorrow during virtual visit if he wants her to continue on Metolazone, as MD Ivey continued all med's per PT and wanted patient to discuss above medication with Cardiologist. Patient and caregiver verbalized understanding. Continued need for the following skills:Nursing will continue to follow patient until wound is healed and education is understood and able to be verbalized by patient/caregiver. Plan for next visit:  Continue to educate, complete dressing changes, assess vitals, and review medications    Patient and/or caregiver notified and agrees to changes in the Plan of Care NA      The following discharge planning was discussed with the pt/caregiver: when patient reaches goals and medication is managed, and disease processes are understood patient agrees and understand that discharge will take place.

## 2022-09-07 NOTE — Clinical Note
Patient is a 79year old female with a complicated medical history that was seen for home care PT due to a recent diagnosis of a UTI. She lives in a 3 level house (no elevator) with the garage on the first floor, her hospital bed and kitchen on the second floor, and her son's bedroom and full bathroom on the third floor. She is not able to access the third floor to get to the bathroom and is using depends, a bedside commode, and her colostomy for toileting. She is currently on oxygen continuously 3-4 L and has a concentrator and a portable tank if needed. She is able to get in and out of her hospital bed independently. She completes sit to stand and stand to sit from the recliner chair and bed with modified independence and use of the FWW for stability. She is able to ambulate with the FWW on the second level of the home with SBA and assist to clear the oxygen tubing for safety. She has worked on the steps during 2 sessions of PT. She has gone down and up with maximum effort, energy expenditure, and extreme fear one time and has completed half a flight one time. She has since decided that she will not do the stairs again as she is too afraid of falling and does not feel safe doing this. She is working with her aide and the [address] [place] [address]  to get placed in an assisted living facility for more care. She plans to use medical transport services if she ever needs to egress the house in case of emergency or to move into assistive living. She is currently at her maximum potential for physical therapy as she chooses not to work on the stairs anymore. She will wait for placement into assisted living. She will be discharged from home care PT at this time.

## 2022-09-07 NOTE — Clinical Note
Thanks for the update Cherie.  ----- Message -----  From: Marielena Louise PT  Sent: 9/7/2022   9:37 PM EDT  To: Noemy Barbosa OT      Patient is a 79year old female with a complicated medical history that was seen for home care PT due to a recent diagnosis of a UTI. She lives in a 3 level house (no elevator) with the garage on the first floor, her hospital bed and kitchen on the second floor, and her son's bedroom and full bathroom on the third floor. She is not able to access the third floor to get to the bathroom and is using depends, a bedside commode, and her colostomy for toileting. She is currently on oxygen continuously 3-4 L and has a concentrator and a portable tank if needed. She is able to get in and out of her hospital bed independently. She completes sit to stand and stand to sit from the recliner chair and bed with modified independence and use of the FWW for stability. She is able to ambulate with the FWW on the second level of the home with SBA and assist to clear the oxygen tubing for safety. She has worked on the steps during 2 sessions of PT. She has gone down and up with maximum effort, energy expenditure, and extreme fear one time and has completed half a flight one time. She has since decided that she will not do the stairs again as she is too afraid of falling and does not feel safe doing this. She is working with her aide and the [address] [place] [address]  to get placed in an assisted living facility for more care. She plans to use medical transport services if she ever needs to egress the house in case of emergency or to move into assistive living. She is currently at her maximum potential for physical therapy as she chooses not to work on the stairs anymore. She will wait for placement into assisted living. She will be discharged from home care PT at this time.

## 2022-09-07 NOTE — HOME HEALTH
SUBJECTIVE: Pt seated on wood chair inside kitchen changing her own colostomy bag, therapist educated pt on ADL set-up at kitchen sink to increase her I and participation outside of aide assistance. CAREGIVER ASSISTANCE NEEDED FOR: pts PCA present during visit for education and assistance as needed. MEDICATIONS REVIEWED AND UPDATED: no medication changes reported this visit. .  OBJECTIVE: see interventions  PATIENT RESPONSE TO TREATMENT:  Pt demonstrated positive response to treatment with no increased pain and good participation with ADL training. [facility] PATIENT/CAREGIVER EDUCATION PROVIDED THIS VISIT: Therapist educated pt on ways to set herself up for self-care tasks while conserving energy and allowing her to be more I. PATIENT LEVEL OF UNDERSTANDING OF EDUCATION PROVIDED: Pt verbalized understanding and that she never thought to move her chair over by the sink to rest while washing up. ASSESSMENT AND PROGRESS TOWARD GOALS:  Pt demonstrated good progress towards self-care and mobility goals with decreased assistance and reduced SOB. Patient will benefit from continued intervention with progression of I/ADL, balance, transfer, and endurance trainin. CONTINUED NEED FOR THE FOLLOWING SKILLS: HH OT is medically necessary to address pain, decreased ROM, decreased strength, impaired bed mobility, decreased independence with functional transfers, decreased I with I/ADLs, and impaired balance in order to improve functional independence, quality of life, return to PLOF, reduce the risk for falls, and reduce pain. PLAN: next visit will be for IADL training with use of energy conservation strategies. DISCHARGE PLANNING DISCUSSED: Discharge to self and family under MD supervision once all goals have been met or patient has reached maximum potential.  Frequency remaininw1, 2w1, 1w1 remaining.

## 2022-09-08 ENCOUNTER — HOME CARE VISIT (OUTPATIENT)
Dept: HOME HEALTH SERVICES | Facility: HOME HEALTH | Age: 82
End: 2022-09-08
Payer: MEDICARE

## 2022-09-08 NOTE — HOME HEALTH
S: patient reports that she does not want to work on steps anymore - her MD has faxed all of her information to an assiteid living facility to try to get her placed as she is requesting it - she is not able to care for herself and is not able to egress or enter her apartment due to the steps   she is on oxygen 3-4 L continously via nasal cannula     O: PAIN: patient denied pain today    WOUND: no wounds noted or reported    ROM: B hip flexion, abduction and adduction WFL  B knee flexion, extension WFL     STRENGTH: R knee ext 4/5, R knee flexion 4/5, R hip flex 3+/5, R hip abd/adduction 4/5  L knee flexion and extension 4/5, L hip flexion 3+/5 abduction and adduction 4/5    BED MOBILITY:independent with bed mobility    EQUIPMENT: hospital bed, FWW    TRANSFERS: She completes sit to stand and stand to sit from the recliner chair and bed with modified independence and use of the FWW for stability. GAIT: She is able to ambulate with the FWW on the second level of the home with SBA and assist to clear the oxygen tubing for safety. She walks with reduced foot clearance  and reduced step length for 12 feet prior to needing to sit down due to fatigue and shortness of breath    STEPS:She has worked on the steps during 2 sessions of PT. She has gone down and up with maximum effort, energy expenditure, and extreme fear one time and has completed half a flight one time. She has since decided that she will not do the stairs again as she is too afraid of falling and does not feel safe doing this. BALANCE: tinetti 14/28 high fall risk     RESPONSE TO TREATMENT:patient demonstrated a positive outcome post treatment and reported a reduction in pain, increased comfort and increased confidence with transfers and mobility.  Patient reported good understanding of the HEP and reports confidence in ability to complete the program as prescribed by PT    RESPONSE TO EDUCATION: patient was educated on: safety, fall risk, HEP    Patient expressed understanding of all education provided and was able to repeat back education, precautions, and HEP. Patient is a 79year old female with a complicated medical history that was seen for home care PT due to a recent diagnosis of a UTI. She lives in a 3 level house (no elevator) with the garage on the first floor, her hospital bed and kitchen on the second floor, and her son's bedroom and full bathroom on the third floor. She is not able to access the third floor to get to the bathroom and is using depends, a bedside commode, and her colostomy for toileting. She is currently on oxygen continuously 3-4 L and has a concentrator and a portable tank if needed. She is able to get in and out of her hospital bed independently. She completes sit to stand and stand to sit from the recliner chair and bed with modified independence and use of the FWW for stability. She is able to ambulate with the FWW on the second level of the home with SBA and assist to clear the oxygen tubing for safety. She has worked on the steps during 2 sessions of PT. She has gone down and up with maximum effort, energy expenditure, and extreme fear one time and has completed half a flight one time. She has since decided that she will not do the stairs again as she is too afraid of falling and does not feel safe doing this. She is working with her aide and the [address] [place] [address]  to get placed in an assisted living facility for more care. She plans to use medical transport services if she ever needs to egress the house in case of emergency or to move into assistive living. She is currently at her maximum potential for physical therapy as she chooses not to work on the stairs anymore. She will wait for placement into assisted living. She will be discharged from home care PT at this time.   P: DC PT     PMH/Summary of clinical condition: per epic \"PDGM Diagnosis Can be found in CC on Progress note 7/22/22    List of Comorbidities: Circulatory    Atrial fibrillation with rapid ventricular response (HCC)    Hypertension    Atrial fibrillation with RVR (HCC)    Acute hypotension    Paroxysmal atrial fibrillation (HCC)   Pulmonary HTN (HCC)    Digestive   Pancreatitis    Acute pancrea titis    Dunning-vesical fistula    Endocrine   Hypothyroid    DM (diabetes mellitus) ([facility]    Hypothyroidism due to acquired atrophy of thyroid    Type 2 diabetes mellitus with nephropathy (HCC)    Nervous    Meningioma (HCC)    Skeletal    Hip fracture (HCC)    Urinary    UTI (urinary tract infection)   Other    Obesity    Family hx-breast malignancy    Diverticulitis    Episcleritis of right eye    Hypoma gnesemia    SIRS (systemic inflammatory response syndrome) (HCC)\"    Medications review completed. no changes noted   medications are effective at this time    social history/ caregivers:patient lives with her son in a 3 story house with her bedroom and living area on the second floor - she has an aide Monday through Friday 10 am to 3 pm. her son works on the weekends so she is alone on the weekends.  patient is trying to get the son's job to let him off on saturday and sunday so he is with her but has not been successfu    Patient education provided this visit:danish, HEP, fall risk, recommendation to use the walker and oxygen at all time     Home exercise program: 1) stand/walk every waking hour that a CG is available with the RW   2) pressure relief techniques every hour   3) seated ther exs LAQ, hip flex x 10 - 5x/day   3. continue to progress walking with increasing time and distance as tolerated   4. notify MD with any concerns or problems

## 2022-09-09 ENCOUNTER — HOME CARE VISIT (OUTPATIENT)
Dept: HOME HEALTH SERVICES | Facility: HOME HEALTH | Age: 82
End: 2022-09-09
Payer: MEDICARE

## 2022-09-14 ENCOUNTER — PATIENT OUTREACH (OUTPATIENT)
Dept: CASE MANAGEMENT | Age: 82
End: 2022-09-14

## 2022-09-14 NOTE — PROGRESS NOTES
Patient has graduated from the Transitions of Care Coordination  program on 9/14/2022 due to patient being hospitalized. Patient's symptoms are stable at this time while inpatient. Nurses and physicians are managing patient at present. Will resolve episode at present. Goals Addressed                   This Visit's Progress     COMPLETED: Prevent complications post hospitalization. CTN will monitor X 4 weeks    Ensure provider appt is scheduled within 7 days post-discharge 8/30/2022 Patient does not have appt- will place on list for phone appt. Confirm patient attended post-discharge provider apt    Complete post-visit call to confirm attendance and update care needs 8/30/2022 patient stated that she is doing ok. Had [place] today. She stated that her appetite is ok- attempting to eat and drink. 9/14/2022 Called patient for routine follow up. She stated that she was admitted 9/7/2022 due to not breathing. 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 Review/educate common or potential \"red flags\" of condition worsening 8/30/2022 Reviewed signs and symptoms of chest pain such as pressure, fullness or burning in chest, crushing pain that goes to back jaw, shoulders or arms, pain that lasts longer than a few minutes, SOB , N & V and cold sweat to name a few. Evaluate adherence to medications and priority barriers to resolve  8/30/2022 Patient stated that she has all meds and is taking as directed. 9/14/2022 patient in hospital at present so she has all meds. Instruct on adherence to medications as ordered and assess for therapeutic response and side-effects  8/30/2022 Patient stated that she long with family know why she is taking meds and know when to call doctor with issues. 9/14/2022 patient being given all meds. No issues. Discuss and evaluate ADL performance.   Provide recommendations on energy conservation, particularly related to transition home from an inpatient admission. Patient stated that she is having difficulty with stairs at present time. She is having HH to rebuild strength and stamina. She rests when needed. 9/14/2022 patient moves about with assistance. She is currently in hospital.               Patient has care transitions nurse contact information for any further questions, concerns, or needs.   Patient's upcoming visits:    Future Appointments   Date Time Provider Zuleika Ruvalcaba   11/18/2022 10:45 AM Melania Ivey MD [facility]

## 2022-09-15 ENCOUNTER — HOME CARE VISIT (OUTPATIENT)
Dept: HOME HEALTH SERVICES | Facility: HOME HEALTH | Age: 82
End: 2022-09-15
Payer: MEDICARE

## 2022-09-22 DIAGNOSIS — I10 ESSENTIAL HYPERTENSION: ICD-10-CM

## 2022-09-22 RX ORDER — LOSARTAN POTASSIUM 100 MG/1
TABLET ORAL
Qty: 90 TABLET | Refills: 3 | Status: SHIPPED | OUTPATIENT
Start: 2022-09-22

## 2022-10-04 ENCOUNTER — HOME CARE VISIT (OUTPATIENT)
Dept: HOME HEALTH SERVICES | Facility: HOME HEALTH | Age: 82
End: 2022-10-04

## 2022-10-10 DIAGNOSIS — I48.21 PERMANENT ATRIAL FIBRILLATION (HCC): Primary | ICD-10-CM

## 2022-10-13 ENCOUNTER — TELEPHONE (OUTPATIENT)
Dept: CARDIOLOGY CLINIC | Age: 82
End: 2022-10-13

## 2023-01-19 ENCOUNTER — PATIENT OUTREACH (OUTPATIENT)
Dept: CASE MANAGEMENT | Age: 83
End: 2023-01-19

## 2023-01-19 NOTE — PROGRESS NOTES
Per EMR , noted Pt. Discharged to a [address] [place] [address] ([address] [id number].)    No transition of care outreach indicated due to patient discharge to a [address] [place] [address] facility.

## 2023-03-07 ENCOUNTER — CARE COORDINATION (OUTPATIENT)
Facility: CLINIC | Age: 83
End: 2023-03-07

## 2023-03-07 NOTE — CARE COORDINATION
Pt. Discharged to [address] [place].)  Patient will be permanently excluded from [facility]

## 2025-07-16 NOTE — PROGRESS NOTES
[facility]   [address]  [facility] [address]  [phone] p  [phone]    Procedure:  Level of Consciousness: [x]Alert [x]Oriented []Disoriented []Lethargic  Anxiety Level: [x]Calm []Anxious []Depressed []Other  Skin: []Warm [x]Dry []Cool []Moist []Intact []Other  Cardiovascular: []Palpitations: [x]Never []Occasionally []Frequently  Chest Pain: [x]No []Yes  Respiratory:  [x]Unlabored []Labored []Cough ([] Productive []Unproductive)  HCG Required: [x]No []Yes   Results: []Negative []Positive  Knowledge Level:        [x]Patient/Other verbalized understanding of pre-procedure instructions.        [x]Assessment of post-op care needs (transportation, responsible caregiver)        [x]Able to discuss health care problems and how to deal with it.  Factors that Affect Teaching:        Language Barrier: [x]No []Yes - why:        Hearing Loss:        [x]No []Yes            Corrective Device:  []Yes []No        Vision Loss:           [x]No []Yes            Corrective Device:  []Yes []No        Memory Loss:       [x]No []Yes            []Short Term []Long Term  Motivational Level:  []Asks Questions                  []Extremely Anxious       []Seems Interested               [x]Seems Uninterested                  [x]Denies need for Education  Risk for Injury:  [x]Patient oriented to person, place and time  []History of frequent falls/loss of balance  Nutritional:  []Change in appetite   []Weight Gain   []Weight Loss  Functional:                 William Roca is a 77 y.o.  female and presents with    Chief Complaint   Patient presents with    Diverticulitis           Subjective:  Ms. Sergio Loyd presents c/o diarrhea which she describes as watery. she has h/o diverticulitis. she has left lower abdominal pain. she denies fever. she has appointment with gastroenterologist in 2 weeks. Cardiovascular Review:  The patient has diabetes, hypertension, hyperlipidemia, Atrial Fibrillation and obesity. Diet and Lifestyle: generally follows a low fat low cholesterol diet, generally follows a low sodium diet, does not rigorously follow a diabetic diet, exercises sporadically, nonsmoker  Home BP Monitoring: is not measured at home. Pertinent ROS: taking medications as instructed, no medication side effects noted, no TIA's, no chest pain on exertion, no dyspnea on exertion, no swelling of ankles. ROS   General ROS: negative for - chills or fatigue  Ophthalmic ROS: positive for - uses glasses  ENT ROS: negative for - headaches  Endocrine ROS: negative for - polydipsia/polyuria or temperature intolerance  Respiratory ROS: no cough, shortness of breath, or wheezing  Cardiovascular ROS: no chest pain or dyspnea on exertion  Genito-Urinary ROS: no dysuria, trouble voiding, or hematuria  Dermatological ROS: negative for - rash or skin lesion changes    All other systems reviewed and are negative.       Objective:  Vitals:    03/19/19 1017   BP: 140/66   Pulse: 67   Resp: 16   Temp: 96.6 °F (35.9 °C)   TempSrc: Oral   SpO2: 100%   Weight: 171 lb (77.6 kg)   Height: 5' (1.524 m)   PainSc:   0 - No pain       alert, well appearing, and in no distress, oriented to person, place, and time and obese  Mental status - alert, oriented to person, place, and time, normal mood, behavior, speech, dress, motor activity, and thought processes  Chest - clear to auscultation, no wheezes, rales or rhonchi, symmetric air entry  Heart - normal rate, regular rhythm, normal S1, S2, no murmurs, rubs, clicks or gallops  Abdomen - soft, nondistended, no masses or organomegaly  tenderness noted left lower quadrant    Assessment/Plan:    1. Diverticulitis  F/u with GI  - diphenoxylate-atropine (LOMOTIL) 2.5-0.025 mg per tablet; Take 1 Tab by mouth four (4) times daily as needed for Diarrhea. Max Daily Amount: 4 Tabs. Dispense: 30 Tab; Refill: 1  - ciprofloxacin HCl (CIPRO) 500 mg tablet; Take 1 Tab by mouth two (2) times a day for 7 days. Dispense: 14 Tab; Refill: 0  - metroNIDAZOLE (FLAGYL) 500 mg tablet; Take 1 Tab by mouth two (2) times a day for 7 days. Dispense: 14 Tab; Refill: 0    2. Watery diarrhea  Start lomotil and abx  - diphenoxylate-atropine (LOMOTIL) 2.5-0.025 mg per tablet; Take 1 Tab by mouth four (4) times daily as needed for Diarrhea. Max Daily Amount: 4 Tabs. Dispense: 30 Tab; Refill: 1    3. Essential hypertension  Goal <130/80; borderline  - losartan (COZAAR) 50 mg tablet; Take 1 Tab by mouth daily. Dispense: 90 Tab; Refill: 2    4. Persistent atrial fibrillation (HCC)  Continue rate control and anticoagulation    5. Type 2 diabetes mellitus with diabetic polyneuropathy, without long-term current use of insulin (HCC)  Goal hgb a1c <7;     Lab review: labs reviewed, I note that glycosylated hemoglobin normal      I have discussed the diagnosis with the patient and the intended plan as seen in the above orders. The patient has received an after-visit summary and questions were answered concerning future plans. I have discussed medication side effects and warnings with the patient as well. I have reviewed the plan of care with the patient, accepted their input and they are in agreement with the treatment goals. Follow-up Disposition:  Return in about 1 month (around 4/19/2019) for medication evaluation.

## (undated) DEVICE — SOLUTION IV 1000ML 0.9% SOD CHL

## (undated) DEVICE — PREP SKN CHLRAPRP 26ML TNT -- CONVERT TO ITEM 373320

## (undated) DEVICE — SPONGE GZ W4XL4IN COT 12 PLY TYP VII WVN C FLD DSGN

## (undated) DEVICE — (D)GLOVE SURG TRIFLX 8.5 PWD L -- DISC BY MFR USE ITEM 302995

## (undated) DEVICE — STAPLER SKIN H3.9MM WIRE DIA0.58MM CRWN 6.9MM 35 STPL FIX

## (undated) DEVICE — OCCLUSIVE GAUZE STRIP,3% BISMUTH TRIBROMOPHENATE IN PETROLATUM BLEND: Brand: XEROFORM

## (undated) DEVICE — STERILE POLYISOPRENE POWDER-FREE SURGICAL GLOVES: Brand: PROTEXIS

## (undated) DEVICE — KENDALL SCD EXPRESS SLEEVES, KNEE LENGTH, MEDIUM: Brand: KENDALL SCD

## (undated) DEVICE — DEPAUL HYSTEROSCOPY PACK: Brand: MEDLINE INDUSTRIES, INC.

## (undated) DEVICE — PADDING CAST W4INXL4YD ST COT COHESIVE HND TEARABLE SPEC

## (undated) DEVICE — (D)GLOVE SURG TRIFLX 8 PWD LTX -- DISC BY MFR USE ITEM 302994

## (undated) DEVICE — DRESSING TRNSPAR 5IN LEN 4IN W FLM ST BIOCL

## (undated) DEVICE — HYSTEROSCOPIC PROCEDURE KIT: Brand: QUICKPICK

## (undated) DEVICE — 3L THIN WALL CAN: Brand: CRD

## (undated) DEVICE — DEVICE HYSTEROSCOPIC ROTARY STYL USE W/ 5C SYS TRUCLEAR

## (undated) DEVICE — 3.2MM GUIDE WIRE 400MM

## (undated) DEVICE — BIT DRL L330MM DIA4.2MM CALIB 100MM 3 FLUT QUIK CPL

## (undated) DEVICE — TOWEL: OR BLU 80/CS: Brand: MEDICAL ACTION INDUSTRIES

## (undated) DEVICE — PAD,ABDOMINAL,5"X9",STERILE,LF,1/PK: Brand: MEDLINE INDUSTRIES, INC.

## (undated) DEVICE — SUTURE VCRL SZ 0 L36IN ABSRB UD L36MM CT-1 1/2 CIR J946H

## (undated) DEVICE — TRAY PREP DRY W/ PREM GLV 2 APPL 6 SPNG 2 UNDPD 1 OVERWRAP

## (undated) DEVICE — KIT PROC TOT HIP CUST LF STRL --

## (undated) DEVICE — 3M™ IOBAN™ 2 ANTIMICROBIAL INCISE DRAPE 6640EZ: Brand: IOBAN™ 2

## (undated) DEVICE — SUTURE ABSORBABLE BRAIDED 2-0 CT-1 27 IN UD VICRYL J259H